# Patient Record
Sex: FEMALE | Race: WHITE | NOT HISPANIC OR LATINO | Employment: FULL TIME | URBAN - METROPOLITAN AREA
[De-identification: names, ages, dates, MRNs, and addresses within clinical notes are randomized per-mention and may not be internally consistent; named-entity substitution may affect disease eponyms.]

---

## 2018-04-25 ENCOUNTER — HISTORICAL (OUTPATIENT)
Dept: ADMINISTRATIVE | Facility: HOSPITAL | Age: 53
End: 2018-04-25

## 2018-04-25 LAB
ALBUMIN SERPL-MCNC: 4 GM/DL (ref 3.4–5)
ALBUMIN/GLOB SERPL: 1.74 {RATIO} (ref 1.5–2.5)
ALP SERPL-CCNC: 74 UNIT/L (ref 38–126)
ALT SERPL-CCNC: 11 UNIT/L (ref 7–52)
AST SERPL-CCNC: 11 UNIT/L (ref 15–37)
BILIRUB SERPL-MCNC: 0.4 MG/DL (ref 0.2–1)
BILIRUBIN DIRECT+TOT PNL SERPL-MCNC: 0.1 MG/DL (ref 0–0.5)
BUN SERPL-MCNC: 13 MG/DL (ref 7–18)
CALCIUM SERPL-MCNC: 9.2 MG/DL (ref 8.5–10)
CHLORIDE SERPL-SCNC: 105 MMOL/L (ref 98–107)
CHOLEST SERPL-MCNC: 236 MG/DL (ref 0–200)
CHOLEST/HDLC SERPL: 5.6 {RATIO}
CO2 SERPL-SCNC: 26 MMOL/L (ref 21–32)
CREAT SERPL-MCNC: 0.79 MG/DL (ref 0.6–1.3)
CREAT/UREA NIT SERPL: 16.5
EST. AVERAGE GLUCOSE BLD GHB EST-MCNC: 148 MG/DL
GGT SERPL-CCNC: 27 UNIT/L (ref 5–85)
GLOBULIN SER-MCNC: 2.3 GM/DL (ref 1.2–3)
GLUCOSE SERPL-MCNC: 193 MG/DL (ref 74–106)
HBA1C MFR BLD: 6.8 % (ref 4.4–6.4)
HDLC SERPL-MCNC: 42 MG/DL (ref 35–60)
LDH SERPL-CCNC: 146 UNIT/L (ref 140–271)
LDLC SERPL CALC-MCNC: 154 MG/DL (ref 0–129)
POTASSIUM SERPL-SCNC: 4.5 MMOL/L (ref 3.5–5.1)
PROT SERPL-MCNC: 6.3 GM/DL (ref 6.4–8.2)
SODIUM SERPL-SCNC: 138 MMOL/L (ref 136–145)
TRIGL SERPL-MCNC: 191 MG/DL (ref 30–150)
TSH SERPL-ACNC: 0.16 MIU/ML (ref 0.35–4.94)
VLDLC SERPL CALC-MCNC: 38.2 MG/DL

## 2018-10-30 ENCOUNTER — HISTORICAL (OUTPATIENT)
Dept: ADMINISTRATIVE | Facility: HOSPITAL | Age: 53
End: 2018-10-30

## 2018-10-30 LAB — TSH SERPL-ACNC: 0.07 MIU/ML (ref 0.35–4.94)

## 2019-01-23 ENCOUNTER — HISTORICAL (OUTPATIENT)
Dept: LAB | Facility: HOSPITAL | Age: 54
End: 2019-01-23

## 2019-01-23 ENCOUNTER — HISTORICAL (OUTPATIENT)
Dept: ADMINISTRATIVE | Facility: HOSPITAL | Age: 54
End: 2019-01-23

## 2019-01-23 LAB
ABS NEUT (OLG): 3.9 X10(3)/MCL (ref 2.1–9.2)
ALBUMIN SERPL-MCNC: 4.2 GM/DL (ref 3.4–5)
ALBUMIN/GLOB SERPL: 1.75 {RATIO} (ref 1.5–2.5)
ALP SERPL-CCNC: 88 UNIT/L (ref 38–126)
ALT SERPL-CCNC: 25 UNIT/L (ref 7–52)
AMYLASE SERPL-CCNC: 58 UNIT/L (ref 25–115)
APPEARANCE, UA: ABNORMAL
AST SERPL-CCNC: 16 UNIT/L (ref 15–37)
BACTERIA #/AREA URNS AUTO: ABNORMAL /HPF
BILIRUB SERPL-MCNC: 0.3 MG/DL (ref 0.2–1)
BILIRUB UR QL STRIP: NEGATIVE MG/DL
BILIRUBIN DIRECT+TOT PNL SERPL-MCNC: 0.1 MG/DL (ref 0–0.5)
BILIRUBIN DIRECT+TOT PNL SERPL-MCNC: 0.2 MG/DL
BUN SERPL-MCNC: 21 MG/DL (ref 7–18)
CALCIUM SERPL-MCNC: 9.1 MG/DL (ref 8.5–10)
CHLORIDE SERPL-SCNC: 103 MMOL/L (ref 98–107)
CO2 SERPL-SCNC: 31 MMOL/L (ref 21–32)
COLOR UR: YELLOW
CREAT SERPL-MCNC: 0.89 MG/DL (ref 0.6–1.3)
ERYTHROCYTE [DISTWIDTH] IN BLOOD BY AUTOMATED COUNT: 13.6 % (ref 11.5–17)
ERYTHROCYTE [SEDIMENTATION RATE] IN BLOOD: 11 MM/HR (ref 0–20)
GLOBULIN SER-MCNC: 2.4 GM/DL (ref 1.2–3)
GLUCOSE (UA): NEGATIVE MG/DL
GLUCOSE SERPL-MCNC: 148 MG/DL (ref 74–106)
HCT VFR BLD AUTO: 43 % (ref 37–47)
HGB BLD-MCNC: 13.5 GM/DL (ref 12–16)
HGB UR QL STRIP: NEGATIVE UNIT/L
KETONES UR QL STRIP: NEGATIVE MG/DL
LEUKOCYTE ESTERASE UR QL STRIP: ABNORMAL UNIT/L
LIPASE SERPL-CCNC: 190 UNIT/L (ref 73–393)
LYMPHOCYTES # BLD AUTO: 3.6 X10(3)/MCL (ref 0.6–3.4)
LYMPHOCYTES NFR BLD AUTO: 46.9 % (ref 13–40)
MCH RBC QN AUTO: 30.5 PG (ref 27–31.2)
MCHC RBC AUTO-ENTMCNC: 31 GM/DL (ref 32–36)
MCV RBC AUTO: 97 FL (ref 80–94)
MONOCYTES # BLD AUTO: 0.2 X10(3)/MCL (ref 0.1–1.3)
MONOCYTES NFR BLD AUTO: 2.1 % (ref 0.1–24)
NEUTROPHILS NFR BLD AUTO: 51 % (ref 47–80)
NITRITE UR QL STRIP.AUTO: NEGATIVE
PH UR STRIP: 6.5 [PH]
PLATELET # BLD AUTO: 343 X10(3)/MCL (ref 130–400)
PMV BLD AUTO: 9.4 FL (ref 9.4–12.4)
POTASSIUM SERPL-SCNC: 4.6 MMOL/L (ref 3.5–5.1)
PROT SERPL-MCNC: 6.6 GM/DL (ref 6.4–8.2)
PROT UR QL STRIP: NEGATIVE MG/DL
RBC # BLD AUTO: 4.42 X10(6)/MCL (ref 4.2–5.4)
RBC #/AREA URNS HPF: ABNORMAL /HPF
SODIUM SERPL-SCNC: 139 MMOL/L (ref 136–145)
SP GR UR STRIP: 1.02
SQUAMOUS EPITHELIAL, UA: ABNORMAL /LPF
UROBILINOGEN UR STRIP-ACNC: 2 MG/DL
WBC # SPEC AUTO: 7.7 X10(3)/MCL (ref 4.5–11.5)
WBC #/AREA URNS AUTO: ABNORMAL /[HPF]

## 2019-03-06 ENCOUNTER — HISTORICAL (OUTPATIENT)
Dept: ADMINISTRATIVE | Facility: HOSPITAL | Age: 54
End: 2019-03-06

## 2019-03-06 LAB — TSH SERPL-ACNC: 4.81 MIU/ML (ref 0.35–4.94)

## 2019-09-11 ENCOUNTER — HISTORICAL (OUTPATIENT)
Dept: ADMINISTRATIVE | Facility: HOSPITAL | Age: 54
End: 2019-09-11

## 2019-09-11 LAB
ALBUMIN SERPL-MCNC: 4.3 GM/DL (ref 3.4–5)
ALBUMIN/GLOB SERPL: 2.15 {RATIO} (ref 1.5–2.5)
ALP SERPL-CCNC: 79 UNIT/L (ref 38–126)
ALT SERPL-CCNC: 13 UNIT/L (ref 7–52)
AST SERPL-CCNC: 13 UNIT/L (ref 15–37)
BILIRUB SERPL-MCNC: 0.6 MG/DL (ref 0.2–1)
BILIRUBIN DIRECT+TOT PNL SERPL-MCNC: 0.1 MG/DL (ref 0–0.5)
BILIRUBIN DIRECT+TOT PNL SERPL-MCNC: 0.5 MG/DL
BUN SERPL-MCNC: 13 MG/DL (ref 7–18)
CALCIUM SERPL-MCNC: 9.8 MG/DL (ref 8.5–10)
CHLORIDE SERPL-SCNC: 105 MMOL/L (ref 98–107)
CO2 SERPL-SCNC: 31 MMOL/L (ref 21–32)
CREAT SERPL-MCNC: 0.83 MG/DL (ref 0.6–1.3)
EST. AVERAGE GLUCOSE BLD GHB EST-MCNC: 157 MG/DL
GLOBULIN SER-MCNC: 2 GM/DL (ref 1.2–3)
GLUCOSE SERPL-MCNC: 97 MG/DL (ref 74–106)
HBA1C MFR BLD: 7.1 % (ref 4.4–6.4)
POTASSIUM SERPL-SCNC: 5 MMOL/L (ref 3.5–5.1)
PROT SERPL-MCNC: 6.3 GM/DL (ref 6.4–8.2)
SODIUM SERPL-SCNC: 142 MMOL/L (ref 136–145)
TSH SERPL-ACNC: 1.59 MIU/ML (ref 0.35–4.94)

## 2020-02-11 ENCOUNTER — HISTORICAL (OUTPATIENT)
Dept: ADMINISTRATIVE | Facility: HOSPITAL | Age: 55
End: 2020-02-11

## 2020-02-11 LAB
EST. AVERAGE GLUCOSE BLD GHB EST-MCNC: 154 MG/DL
HBA1C MFR BLD: 7 % (ref 4.4–6.4)

## 2020-08-05 ENCOUNTER — HISTORICAL (OUTPATIENT)
Dept: ADMINISTRATIVE | Facility: HOSPITAL | Age: 55
End: 2020-08-05

## 2020-08-05 LAB
ALBUMIN SERPL-MCNC: 4.7 GM/DL (ref 3.4–5)
ALBUMIN/GLOB SERPL: 2.04 {RATIO} (ref 1.5–2.5)
ALP SERPL-CCNC: 90 UNIT/L (ref 38–126)
ALT SERPL-CCNC: 15 UNIT/L (ref 7–52)
AST SERPL-CCNC: 15 UNIT/L (ref 15–37)
BILIRUB SERPL-MCNC: 0.8 MG/DL (ref 0.2–1)
BILIRUBIN DIRECT+TOT PNL SERPL-MCNC: 0.1 MG/DL (ref 0–0.5)
BILIRUBIN DIRECT+TOT PNL SERPL-MCNC: 0.7 MG/DL
BUN SERPL-MCNC: 16 MG/DL (ref 7–18)
CALCIUM SERPL-MCNC: 10.1 MG/DL (ref 8.5–10)
CHLORIDE SERPL-SCNC: 104 MMOL/L (ref 98–107)
CHOLEST SERPL-MCNC: 287 MG/DL (ref 0–200)
CHOLEST/HDLC SERPL: 5 {RATIO}
CO2 SERPL-SCNC: 30 MMOL/L (ref 21–32)
CREAT SERPL-MCNC: 0.85 MG/DL (ref 0.6–1.3)
EST. AVERAGE GLUCOSE BLD GHB EST-MCNC: 151 MG/DL
GLOBULIN SER-MCNC: 2.3 GM/DL (ref 1.2–3)
GLUCOSE SERPL-MCNC: 103 MG/DL (ref 74–106)
HBA1C MFR BLD: 6.9 % (ref 4.4–6.4)
HDLC SERPL-MCNC: 57 MG/DL (ref 35–60)
LDLC SERPL CALC-MCNC: 194 MG/DL (ref 0–129)
POTASSIUM SERPL-SCNC: 5.2 MMOL/L (ref 3.5–5.1)
PROT SERPL-MCNC: 7 GM/DL (ref 6.4–8.2)
SODIUM SERPL-SCNC: 141 MMOL/L (ref 136–145)
TRIGL SERPL-MCNC: 178 MG/DL (ref 30–150)
TSH SERPL-ACNC: 1.83 MIU/ML (ref 0.35–4.94)
VLDLC SERPL CALC-MCNC: 35.6 MG/DL

## 2021-03-10 ENCOUNTER — HISTORICAL (OUTPATIENT)
Dept: ADMINISTRATIVE | Facility: HOSPITAL | Age: 56
End: 2021-03-10

## 2021-03-10 LAB
ALBUMIN SERPL-MCNC: 4.6 GM/DL (ref 3.4–5)
ALBUMIN/GLOB SERPL: 2.09 {RATIO} (ref 1.5–2.5)
ALP SERPL-CCNC: 80 UNIT/L (ref 38–126)
ALT SERPL-CCNC: 14 UNIT/L (ref 7–52)
AST SERPL-CCNC: 12 UNIT/L (ref 15–37)
BILIRUB SERPL-MCNC: 0.5 MG/DL (ref 0.2–1)
BILIRUBIN DIRECT+TOT PNL SERPL-MCNC: 0.1 MG/DL (ref 0–0.5)
BILIRUBIN DIRECT+TOT PNL SERPL-MCNC: 0.4 MG/DL
BUN SERPL-MCNC: 20 MG/DL (ref 7–18)
CALCIUM SERPL-MCNC: 10.4 MG/DL (ref 8.5–10)
CHLORIDE SERPL-SCNC: 102 MMOL/L (ref 98–107)
CHOLEST SERPL-MCNC: 252 MG/DL (ref 0–200)
CHOLEST/HDLC SERPL: 4.1 {RATIO}
CO2 SERPL-SCNC: 28 MMOL/L (ref 21–32)
CREAT SERPL-MCNC: 0.81 MG/DL (ref 0.6–1.3)
CREAT UR-MCNC: 10 MG/DL
EST. AVERAGE GLUCOSE BLD GHB EST-MCNC: 157 MG/DL
GLOBULIN SER-MCNC: 2.2 GM/DL (ref 1.2–3)
GLUCOSE SERPL-MCNC: 253 MG/DL (ref 74–106)
HBA1C MFR BLD: 7.1 % (ref 4.4–6.4)
HDLC SERPL-MCNC: 62 MG/DL (ref 35–60)
LDLC SERPL CALC-MCNC: 175 MG/DL (ref 0–129)
MICROALBUMIN UR-MCNC: 10 MG/L
MICROALBUMIN/CREAT RATIO PNL UR: ABNORMAL MG/GM
POTASSIUM SERPL-SCNC: 4.7 MMOL/L (ref 3.5–5.1)
PROT SERPL-MCNC: 6.8 GM/DL (ref 6.4–8.2)
SODIUM SERPL-SCNC: 138 MMOL/L (ref 136–145)
TRIGL SERPL-MCNC: 81 MG/DL (ref 30–150)
TSH SERPL-ACNC: 2.4 MIU/ML (ref 0.35–4.94)
VLDLC SERPL CALC-MCNC: 16.2 MG/DL

## 2021-07-28 ENCOUNTER — HISTORICAL (OUTPATIENT)
Dept: ADMINISTRATIVE | Facility: HOSPITAL | Age: 56
End: 2021-07-28

## 2021-07-28 LAB
ALBUMIN SERPL-MCNC: 4.6 GM/DL (ref 3.4–5)
ALBUMIN/GLOB SERPL: 2 {RATIO} (ref 1.5–2.5)
ALP SERPL-CCNC: 77 UNIT/L (ref 38–126)
ALT SERPL-CCNC: 14 UNIT/L (ref 7–52)
AST SERPL-CCNC: 18 UNIT/L (ref 15–37)
BILIRUB SERPL-MCNC: 0.7 MG/DL (ref 0.2–1)
BILIRUBIN DIRECT+TOT PNL SERPL-MCNC: 0.1 MG/DL (ref 0–0.5)
BILIRUBIN DIRECT+TOT PNL SERPL-MCNC: 0.6 MG/DL
BUN SERPL-MCNC: 17 MG/DL (ref 7–18)
CALCIUM SERPL-MCNC: 9.9 MG/DL (ref 8.5–10)
CHLORIDE SERPL-SCNC: 102 MMOL/L (ref 98–107)
CHOLEST SERPL-MCNC: 164 MG/DL (ref 0–200)
CHOLEST/HDLC SERPL: 3.2 {RATIO}
CO2 SERPL-SCNC: 30 MMOL/L (ref 21–32)
CREAT SERPL-MCNC: 0.94 MG/DL (ref 0.6–1.3)
EST. AVERAGE GLUCOSE BLD GHB EST-MCNC: 151 MG/DL
GLOBULIN SER-MCNC: 2.4 GM/DL (ref 1.2–3)
GLUCOSE SERPL-MCNC: 97 MG/DL (ref 74–106)
HBA1C MFR BLD: 6.9 % (ref 4.4–6.4)
HDLC SERPL-MCNC: 52 MG/DL (ref 35–60)
LDLC SERPL CALC-MCNC: 84 MG/DL (ref 0–129)
POTASSIUM SERPL-SCNC: 5.1 MMOL/L (ref 3.5–5.1)
PROT SERPL-MCNC: 6.9 GM/DL (ref 6.4–8.2)
SODIUM SERPL-SCNC: 139 MMOL/L (ref 136–145)
TRIGL SERPL-MCNC: 145 MG/DL (ref 30–150)
TSH SERPL-ACNC: 11.03 MIU/ML (ref 0.35–4.94)
VLDLC SERPL CALC-MCNC: 29 MG/DL

## 2022-04-10 ENCOUNTER — HISTORICAL (OUTPATIENT)
Dept: ADMINISTRATIVE | Facility: HOSPITAL | Age: 57
End: 2022-04-10

## 2022-04-26 VITALS
BODY MASS INDEX: 18.85 KG/M2 | DIASTOLIC BLOOD PRESSURE: 72 MMHG | HEIGHT: 66 IN | SYSTOLIC BLOOD PRESSURE: 118 MMHG | WEIGHT: 117.31 LBS

## 2022-05-03 NOTE — HISTORICAL [facility]
This is a historical note converted from Saida. Formatting and pictures may have been removed.  Please reference Saida for original formatting and attached multimedia. Chief Complaint  bump on right pointer finger  History of Present Illness  first noticed it a few weeks ago, getting more and more tender  she is right handed and it is interfering with everything  also due to recheck TSH on 100 mcg of synthroid  also due to check A!C  Review of Systems  No new complaints except as explained HPI  ?  Physical Exam  Vitals & Measurements  HR:?76(Peripheral)? BP:?120/70?  HT:?167.64?cm? HT:?167.64?cm? WT:?56.69?kg? WT:?56.69?kg? BMI:?20.17?  VERY tender 3mm superficial?nodule on pulp of distal phalanx of right index finger  o/w OK  Neuro vascular intact  ?  PHYSICAL EXAM  ?   WDWN patient in NAD, ?AFVSS  HEENT - no acute abnormality  ??????????????? oropharynx WNL  HEART - RRR  LUNGS -? CTA  ABDOMEN - benign, NTND;? no peritoneal signs  EXTREMITIES - No CCE  SKIN - warm, dry, intact  PSYCH - affect appropriate;? alert and oriented  NEURO- no new deficits noted;? cranial nerves grossly intact  ?  Assessment/Plan  1.?Hypothyroid  due to check TSH  Ordered:  Clinic Follow up, *Est. 10/25/18 3:00:00 CDT, PLEASE MAKE THIS A 30 MINUTE CPX, Order for future visit, Hypothyroid, [facility]  Lab Collection Request, 04/25/18 7:31:00 CDT, [facility] - [facility], 04/25/18 7:31:00 CDT  Office/Outpatient Visit Level 3 Established 01386 PC, Hypothyroid  Nodule of finger of right hand  Diabetes, [facility] - [facility], 04/25/18 7:31:00 CDT  Thyroid Stimulating Hormone, Routine collect, 04/25/18 7:31:00 CDT, Blood, Order for future visit, Stop date 04/25/18 7:31:00 CDT, Lab Collect, Hypothyroid, 04/25/18 7:31:00 CDT  ?  2.?Nodule of finger of right hand  ?right index finger; will refer to hand surgeon due to location and pain  Ordered:  Office/Outpatient Visit Level 3 Established [id number] PC, Hypothyroid  Nodule of finger of right hand  Diabetes,  [facility] AMB - [facility], 04/25/18 7:31:00 CDT  ?  3.?Diabetes  ?check A1C  Ordered:  Hemoglobin A1c, Routine collect, 04/25/18 7:31:00 CDT, Blood, Order for future visit, Stop date 04/25/18 7:31:00 CDT, Lab Collect, Diabetes, 04/25/18 7:31:00 CDT  Office/Outpatient Visit Level 3 Established [id number] PC, Hypothyroid  Nodule of finger of right hand  Diabetes, [facility] - [facility], 04/25/18 7:31:00 CDT  ?  4.?HLD (hyperlipidemia)  check FLP  Ordered:  CMP, Routine collect, 04/25/18 7:31:00 CDT, Blood, Order for future visit, Stop date 04/25/18 7:31:00 CDT, Lab Collect, HLD (hyperlipidemia), 04/25/18 7:31:00 CDT  Lipid Panel, Routine collect, 04/25/18 7:31:00 CDT, Blood, Order for future visit, Stop date 04/25/18 7:31:00 CDT, Lab Collect, HLD (hyperlipidemia), 04/25/18 7:31:00 CDT  ?   Problem List/Past Medical History  Ongoing  Asthma  Hyperlipidemia  Hypothyroid  Historical  diabetic  Numbness and tingling in right hand  Rheumatoid arthritis  SVT (supraventricular tachycardia)  UTI frequently  Procedure/Surgical History  Carpal Tunnel Release (Right) (10/23/2013), Cervical Fusion Anterior (.) (10/23/2013), Excision of intervertebral disc (10/23/2013), Fusion or refusion of 2-3 vertebrae (10/23/2013), Other Cervical Fusion of the Anterior Column, Anterior Technique (10/23/2013), Release of carpal tunnel (10/23/2013), Cardioversion, elective, electrical conversion of arrhythmia; external. (08/01/2013), Other electric countershock of heart (08/01/2013), angiogram, c spine surgery, hysterectomy, hystertomy, Kidney surgery as child unsure, right wrist, RT breast Lumpectomu x 2, T & A.  Medications  albuterol 2.5 mg/3 mL (0.083%) inhalation solution, 2.5 mg= 3 mL, INH, q6hr, PRN, 3 refills  albuterol CFC free 90 mcg/inh inhalation aerosol with adapter, 2 puff(s), INH, q4hr, PRN, 3 refills  levothyroxine 150 mcg (0.15 mg) oral tablet, 1/2 tab, Oral, Daily  nebulizer machine, See Instructions  Vitamin D3 oral tablet, 04810ST, Oral,  qMonday  Zanaflex 4 mg oral tablet, 4 mg= 1 tab(s), Oral, q8hr, PRN, 1 refills  Allergies  Toradol  aspirin  penicillins  sulfa drugs  Social History  Alcohol - Denies Alcohol Use, 10/17/2013  Home/Environment - No Risk, 09/27/2012  Substance Abuse - Denies Substance Abuse, 10/17/2013  Tobacco - High Risk, 10/17/2013  Current every day smoker, Cigarettes, 15 per day. 15 year(s)., 08/06/2015

## 2022-05-03 NOTE — HISTORICAL [facility]
This is a historical note converted from Saida. Formatting and pictures may have been removed.  Please reference Saida for original formatting and attached multimedia. Chief Complaint  bump on right pointer finger  Physical Exam  Vitals & Measurements  HR:?76(Peripheral)? BP:?120/70?  HT:?167.64?cm? HT:?167.64?cm? WT:?56.69?kg? WT:?56.69?kg? BMI:?20.17?  Assessment/Plan  1.?Hypothyroid  Ordered:  Clinic Follow up, *Est. 10/25/18 3:00:00 CDT, PLEASE MAKE THIS A 30 MINUTE CPX, Order for future visit, Hypothyroid, [facility]  Lab Collection Request, 04/25/18 7:31:00 CDT, [facility] - [facility], 04/25/18 7:31:00 CDT  Office/Outpatient Visit Level 3 Established [id number] PC, Hypothyroid  Nodule of finger of right hand  Diabetes, [facility] - [facility], 04/25/18 7:31:00 CDT  Thyroid Stimulating Hormone, Routine collect, 04/25/18 7:31:00 CDT, Blood, Order for future visit, Stop date 04/25/18 7:31:00 CDT, Lab Collect, Hypothyroid, 04/25/18 7:31:00 CDT  ?  2.?Nodule of finger of right hand  Ordered:  Office/Outpatient Visit Level 3 Established 62061 PC, Hypothyroid  Nodule of finger of right hand  Diabetes, [facility] - [facility], 04/25/18 7:31:00 CDT  ?  3.?Diabetes  Ordered:  Hemoglobin A1c, Routine collect, 04/25/18 7:31:00 CDT, Blood, Order for future visit, Stop date 04/25/18 7:31:00 CDT, Lab Collect, Diabetes, 04/25/18 7:31:00 CDT  Office/Outpatient Visit Level 3 Established [id number] PC, Hypothyroid  Nodule of finger of right hand  Diabetes, [facility] AM[facility] - [facility], 04/25/18 7:31:00 CDT  ?  4.?HLD (hyperlipidemia)  Ordered:  CMP, Routine collect, 04/25/18 7:31:00 CDT, Blood, Order for future visit, Stop date 04/25/18 7:31:00 CDT, Lab Collect, HLD (hyperlipidemia), 04/25/18 7:31:00 CDT  Lipid Panel, Routine collect, 04/25/18 7:31:00 CDT, Blood, Order for future visit, Stop date 04/25/18 7:31:00 CDT, Lab Collect, HLD (hyperlipidemia), 04/25/18 7:31:00 CDT  ?   Problem List/Past Medical  History  Ongoing  Asthma  Hyperlipidemia  Hypothyroid  Historical  diabetic  Numbness and tingling in right hand  Rheumatoid arthritis  SVT (supraventricular tachycardia)  UTI frequently  Procedure/Surgical History  Carpal Tunnel Release (Right) (10/23/2013), Cervical Fusion Anterior (.) (10/23/2013), Excision of intervertebral disc (10/23/2013), Fusion or refusion of 2-3 vertebrae (10/23/2013), Other Cervical Fusion of the Anterior Column, Anterior Technique (10/23/2013), Release of carpal tunnel (10/23/2013), Cardioversion, elective, electrical conversion of arrhythmia; external. (08/01/2013), Other electric countershock of heart (08/01/2013), angiogram, c spine surgery, hysterectomy, hystertomy, Kidney surgery as child unsure, right wrist, RT breast Lumpectomu x 2, T & A.  Medications  albuterol 2.5 mg/3 mL (0.083%) inhalation solution, 2.5 mg= 3 mL, INH, q6hr, PRN, 3 refills  albuterol CFC free 90 mcg/inh inhalation aerosol with adapter, 2 puff(s), INH, q4hr, PRN, 3 refills  levothyroxine 150 mcg (0.15 mg) oral tablet, 1/2 tab, Oral, Daily  nebulizer machine, See Instructions  Vitamin D3 oral tablet, 96379QV, Oral, qMonday  Zanaflex 4 mg oral tablet, 4 mg= 1 tab(s), Oral, q8hr, PRN, 1 refills  Allergies  Toradol  aspirin  penicillins  sulfa drugs  Social History  Alcohol - Denies Alcohol Use, 10/17/2013  Home/Environment - No Risk, 09/27/2012  Substance Abuse - Denies Substance Abuse, 10/17/2013  Tobacco - High Risk, 10/17/2013  Current every day smoker, Cigarettes, 15 per day. 15 year(s)., 08/06/2015

## 2022-09-13 PROBLEM — F32.A DEPRESSIVE DISORDER: Status: ACTIVE | Noted: 2022-09-13

## 2022-09-13 PROBLEM — E03.9 HYPOTHYROIDISM: Status: ACTIVE | Noted: 2022-09-13

## 2022-09-13 PROBLEM — E78.5 HYPERLIPIDEMIA: Status: ACTIVE | Noted: 2022-09-13

## 2022-09-13 PROBLEM — J43.9 PULMONARY EMPHYSEMA: Status: ACTIVE | Noted: 2022-09-13

## 2022-09-13 PROBLEM — E11.9 DIABETES MELLITUS: Status: ACTIVE | Noted: 2022-09-13

## 2022-09-13 PROBLEM — G47.00 INSOMNIA: Status: ACTIVE | Noted: 2022-09-13

## 2022-09-13 PROBLEM — K21.9 GASTROESOPHAGEAL REFLUX DISEASE: Status: ACTIVE | Noted: 2022-09-13

## 2022-09-13 PROBLEM — F41.1 GENERALIZED ANXIETY DISORDER: Status: ACTIVE | Noted: 2022-09-13

## 2022-09-13 PROBLEM — J45.909 ASTHMA: Status: ACTIVE | Noted: 2022-09-13

## 2022-11-02 ENCOUNTER — HOSPITAL ENCOUNTER (EMERGENCY)
Facility: HOSPITAL | Age: 57
Discharge: HOME | End: 2022-11-02
Attending: EMERGENCY MEDICINE
Payer: COMMERCIAL

## 2022-11-02 VITALS
RESPIRATION RATE: 15 BRPM | SYSTOLIC BLOOD PRESSURE: 142 MMHG | TEMPERATURE: 98 F | OXYGEN SATURATION: 98 % | DIASTOLIC BLOOD PRESSURE: 89 MMHG | HEART RATE: 67 BPM

## 2022-11-02 DIAGNOSIS — R10.13 EPIGASTRIC ABDOMINAL PAIN: ICD-10-CM

## 2022-11-02 DIAGNOSIS — R10.13 EPIGASTRIC PAIN: Primary | ICD-10-CM

## 2022-11-02 DIAGNOSIS — N39.0 URINARY TRACT INFECTION WITHOUT HEMATURIA, SITE UNSPECIFIED: ICD-10-CM

## 2022-11-02 LAB
ALBUMIN SERPL-MCNC: 4.2 GM/DL (ref 3.5–5)
ALBUMIN/GLOB SERPL: 1.6 RATIO (ref 1.1–2)
ALP SERPL-CCNC: 79 UNIT/L (ref 40–150)
ALT SERPL-CCNC: 12 UNIT/L (ref 0–55)
APPEARANCE UR: ABNORMAL
AST SERPL-CCNC: 12 UNIT/L (ref 5–34)
BACTERIA #/AREA URNS AUTO: ABNORMAL /HPF
BASOPHILS # BLD AUTO: 0.07 X10(3)/MCL (ref 0–0.2)
BASOPHILS NFR BLD AUTO: 1.1 %
BILIRUB UR QL STRIP.AUTO: NEGATIVE MG/DL
BILIRUBIN DIRECT+TOT PNL SERPL-MCNC: 0.6 MG/DL
BUN SERPL-MCNC: 11.5 MG/DL (ref 9.8–20.1)
CALCIUM SERPL-MCNC: 10.2 MG/DL (ref 8.4–10.2)
CHLORIDE SERPL-SCNC: 107 MMOL/L (ref 98–107)
CO2 SERPL-SCNC: 29 MMOL/L (ref 22–29)
COLOR UR AUTO: YELLOW
CREAT SERPL-MCNC: 0.83 MG/DL (ref 0.55–1.02)
EOSINOPHIL # BLD AUTO: 0.17 X10(3)/MCL (ref 0–0.9)
EOSINOPHIL NFR BLD AUTO: 2.7 %
ERYTHROCYTE [DISTWIDTH] IN BLOOD BY AUTOMATED COUNT: 13.3 % (ref 11.5–17)
GFR SERPLBLD CREATININE-BSD FMLA CKD-EPI: >60 MLS/MIN/1.73/M2
GLOBULIN SER-MCNC: 2.6 GM/DL (ref 2.4–3.5)
GLUCOSE SERPL-MCNC: 94 MG/DL (ref 74–100)
GLUCOSE UR QL STRIP.AUTO: NEGATIVE MG/DL
HCT VFR BLD AUTO: 44.3 % (ref 37–47)
HGB BLD-MCNC: 14.5 GM/DL (ref 12–16)
IMM GRANULOCYTES # BLD AUTO: 0.02 X10(3)/MCL (ref 0–0.04)
IMM GRANULOCYTES NFR BLD AUTO: 0.3 %
KETONES UR QL STRIP.AUTO: NEGATIVE MG/DL
LEUKOCYTE ESTERASE UR QL STRIP.AUTO: ABNORMAL UNIT/L
LIPASE SERPL-CCNC: 34 U/L
LYMPHOCYTES # BLD AUTO: 2.64 X10(3)/MCL (ref 0.6–4.6)
LYMPHOCYTES NFR BLD AUTO: 41.8 %
MCH RBC QN AUTO: 29.9 PG (ref 27–31)
MCHC RBC AUTO-ENTMCNC: 32.7 MG/DL (ref 33–36)
MCV RBC AUTO: 91.3 FL (ref 80–94)
MONOCYTES # BLD AUTO: 0.63 X10(3)/MCL (ref 0.1–1.3)
MONOCYTES NFR BLD AUTO: 10 %
NEUTROPHILS # BLD AUTO: 2.8 X10(3)/MCL (ref 2.1–9.2)
NEUTROPHILS NFR BLD AUTO: 44.1 %
NITRITE UR QL STRIP.AUTO: POSITIVE
NRBC BLD AUTO-RTO: 0 %
PH UR STRIP.AUTO: 6 [PH]
PLATELET # BLD AUTO: 345 X10(3)/MCL (ref 130–400)
PMV BLD AUTO: 9.9 FL (ref 7.4–10.4)
POTASSIUM SERPL-SCNC: 5.1 MMOL/L (ref 3.5–5.1)
PROT SERPL-MCNC: 6.8 GM/DL (ref 6.4–8.3)
PROT UR QL STRIP.AUTO: NEGATIVE MG/DL
RBC # BLD AUTO: 4.85 X10(6)/MCL (ref 4.2–5.4)
RBC #/AREA URNS AUTO: <5 /HPF
RBC UR QL AUTO: NEGATIVE UNIT/L
SODIUM SERPL-SCNC: 141 MMOL/L (ref 136–145)
SP GR UR STRIP.AUTO: 1.02 (ref 1–1.03)
SQUAMOUS #/AREA URNS AUTO: <5 /HPF
TROPONIN I SERPL-MCNC: <0.01 NG/ML (ref 0–0.04)
UROBILINOGEN UR STRIP-ACNC: 0.2 MG/DL
WBC # SPEC AUTO: 6.3 X10(3)/MCL (ref 4.5–11.5)
WBC #/AREA URNS AUTO: 14 /HPF

## 2022-11-02 PROCEDURE — 87077 CULTURE AEROBIC IDENTIFY: CPT | Performed by: PHYSICIAN ASSISTANT

## 2022-11-02 PROCEDURE — 99285 EMERGENCY DEPT VISIT HI MDM: CPT | Mod: 25

## 2022-11-02 PROCEDURE — 80053 COMPREHEN METABOLIC PANEL: CPT | Performed by: PHYSICIAN ASSISTANT

## 2022-11-02 PROCEDURE — 84484 ASSAY OF TROPONIN QUANT: CPT | Performed by: PHYSICIAN ASSISTANT

## 2022-11-02 PROCEDURE — 25500020 PHARM REV CODE 255: Performed by: NURSE PRACTITIONER

## 2022-11-02 PROCEDURE — 85025 COMPLETE CBC W/AUTO DIFF WBC: CPT | Performed by: PHYSICIAN ASSISTANT

## 2022-11-02 PROCEDURE — 83690 ASSAY OF LIPASE: CPT | Performed by: PHYSICIAN ASSISTANT

## 2022-11-02 PROCEDURE — 25000003 PHARM REV CODE 250: Performed by: NURSE PRACTITIONER

## 2022-11-02 PROCEDURE — 81001 URINALYSIS AUTO W/SCOPE: CPT | Performed by: PHYSICIAN ASSISTANT

## 2022-11-02 PROCEDURE — 63600175 PHARM REV CODE 636 W HCPCS: Performed by: NURSE PRACTITIONER

## 2022-11-02 PROCEDURE — 96374 THER/PROPH/DIAG INJ IV PUSH: CPT | Mod: 59

## 2022-11-02 RX ORDER — ONDANSETRON 4 MG/1
4 TABLET, ORALLY DISINTEGRATING ORAL ONCE
Status: DISCONTINUED | OUTPATIENT
Start: 2022-11-02 | End: 2022-11-02

## 2022-11-02 RX ORDER — LIDOCAINE HYDROCHLORIDE 20 MG/ML
15 SOLUTION OROPHARYNGEAL ONCE
Status: DISCONTINUED | OUTPATIENT
Start: 2022-11-02 | End: 2022-11-02

## 2022-11-02 RX ORDER — CEFDINIR 300 MG/1
300 CAPSULE ORAL 2 TIMES DAILY
Qty: 20 CAPSULE | Refills: 0 | Status: SHIPPED | OUTPATIENT
Start: 2022-11-02 | End: 2022-11-12

## 2022-11-02 RX ORDER — MAG HYDROX/ALUMINUM HYD/SIMETH 200-200-20
30 SUSPENSION, ORAL (FINAL DOSE FORM) ORAL ONCE
Status: COMPLETED | OUTPATIENT
Start: 2022-11-02 | End: 2022-11-02

## 2022-11-02 RX ORDER — MAG HYDROX/ALUMINUM HYD/SIMETH 200-200-20
30 SUSPENSION, ORAL (FINAL DOSE FORM) ORAL ONCE
Status: DISCONTINUED | OUTPATIENT
Start: 2022-11-02 | End: 2022-11-02

## 2022-11-02 RX ORDER — ONDANSETRON 2 MG/ML
4 INJECTION INTRAMUSCULAR; INTRAVENOUS ONCE
Status: COMPLETED | OUTPATIENT
Start: 2022-11-02 | End: 2022-11-02

## 2022-11-02 RX ORDER — SUCRALFATE 1 G/10ML
1 SUSPENSION ORAL
Qty: 400 ML | Refills: 0 | Status: SHIPPED | OUTPATIENT
Start: 2022-11-02

## 2022-11-02 RX ORDER — LIDOCAINE HYDROCHLORIDE 20 MG/ML
15 SOLUTION OROPHARYNGEAL ONCE
Status: DISCONTINUED | OUTPATIENT
Start: 2022-11-02 | End: 2022-11-02 | Stop reason: HOSPADM

## 2022-11-02 RX ADMIN — ONDANSETRON 4 MG: 2 INJECTION INTRAMUSCULAR; INTRAVENOUS at 09:11

## 2022-11-02 RX ADMIN — IOPAMIDOL 100 ML: 755 INJECTION, SOLUTION INTRAVENOUS at 07:11

## 2022-11-02 RX ADMIN — ALUMINUM HYDROXIDE, MAGNESIUM HYDROXIDE, AND SIMETHICONE 30 ML: 200; 200; 20 SUSPENSION ORAL at 08:11

## 2022-11-02 NOTE — FIRST PROVIDER EVALUATION
Medical screening examination initiated.  I have conducted a focused provider triage encounter, findings are as follows:    Chief Complaint   Patient presents with    Abdominal Pain    Nausea     Epigastric pain radiating to RUQ, reports Nausea x few weekas, worse today. PMH GERD     Brief history of present illness:  57 y.o. female presents to the ED with epigastric abdominal pain radiating to RUQ . Nausea worsening as well. Hx of GERD.     Vitals:    11/02/22 1536   BP: 107/77   BP Location: Left arm   Patient Position: Sitting   Pulse: 77   Resp: 18   Temp: 97.9 °F (36.6 °C)   TempSrc: Temporal   SpO2: 99%       Pertinent physical exam:  Awake, alert, ambulatory, non-labored respirations      Brief workup plan:  labs, UA, US    Preliminary workup initiated; this workup will be continued and followed by the physician or advanced practice provider that is assigned to the patient when roomed.

## 2022-11-03 NOTE — ED PROVIDER NOTES
Encounter Date: 11/2/2022       History     Chief Complaint   Patient presents with    Abdominal Pain    Nausea     Epigastric pain radiating to RUQ, reports Nausea x few weekas, worse today. PMH GERD     Patient is a 57-year-old female  that presents with epigastric pain that has been present 3 weeks. Associated symptoms nausea. Surrounding information is nothing. Exacerbated by nothing. Relieved by nothing. Patient treatment prior to arrival none. Risk factors include none. Other history pertaining to this complaint nothing.       The history is provided by the patient. No  was used.   Review of patient's allergies indicates:   Allergen Reactions    Aspirin     Ketorolac     Penicillins     Sulfa (sulfonamide antibiotics)      Past Medical History:   Diagnosis Date    Accelerated junctional rhythm     Agatston CAC score, <100     Anxiety disorder, unspecified     COPD type A     Depression     Diabetes mellitus without complication     GERD (gastroesophageal reflux disease)     History of COVID-19     HLD (hyperlipidemia)     Hypothyroidism, unspecified     Insomnia     Lung nodule     Mild intermittent asthma, uncomplicated      Past Surgical History:   Procedure Laterality Date    ANGIOGRAM, CORONARY, WITH LEFT HEART CATHETERIZATION      BREAST LUMPECTOMY Right     CARDIOVERSION  08/01/2013    CARPAL TUNNEL RELEASE  10/23/2013    FUSION OF CERVICAL SPINE BY ANTERIOR APPROACH USING COMPUTER-ASSISTED NAVIGATION  06/10/2019    KIDNEY SURGERY      TONSILLECTOMY AND ADENOIDECTOMY      TOTAL ABDOMINAL HYSTERECTOMY      WRIST SURGERY       Family History   Problem Relation Age of Onset    Heart attack Mother     Diabetes Father      Social History     Tobacco Use    Smoking status: Every Day     Packs/day: 0.50     Types: Cigarettes    Smokeless tobacco: Never   Substance Use Topics    Alcohol use: Never    Drug use: Never     Review of Systems   Constitutional:  Negative for fever.   Respiratory:   Negative for cough and shortness of breath.    Cardiovascular:  Negative for chest pain.   Gastrointestinal:  Positive for abdominal pain and nausea.   Genitourinary:  Negative for difficulty urinating and dysuria.   Musculoskeletal:  Negative for gait problem.   Skin:  Negative for color change.   Neurological:  Negative for dizziness, speech difficulty and headaches.   Psychiatric/Behavioral:  Negative for hallucinations and suicidal ideas.    All other systems reviewed and are negative.    Physical Exam     Initial Vitals [11/02/22 1536]   BP Pulse Resp Temp SpO2   107/77 77 18 97.9 °F (36.6 °C) 99 %      MAP       --         Physical Exam    Nursing note and vitals reviewed.  Constitutional: She appears well-developed and well-nourished.   HENT:   Head: Normocephalic.   Eyes: EOM are normal.   Neck:   Normal range of motion.  Cardiovascular:  Normal rate, regular rhythm, normal heart sounds and intact distal pulses.           Pulmonary/Chest: Breath sounds normal. No respiratory distress.   Abdominal: Abdomen is soft. Bowel sounds are normal. There is abdominal tenderness.   Epigastric tenderness   Musculoskeletal:         General: Normal range of motion.      Cervical back: Normal range of motion.     Neurological: She is alert and oriented to person, place, and time. She has normal strength.   Skin: Skin is warm and dry.   Psychiatric: She has a normal mood and affect. Her behavior is normal. Judgment and thought content normal.       ED Course   Procedures  Labs Reviewed   URINALYSIS, REFLEX TO URINE CULTURE - Abnormal; Notable for the following components:       Result Value    Appearance, UA Cloudy (*)     Nitrites, UA Positive (*)     Leukocyte Esterase, UA 1+ (*)     All other components within normal limits   CBC WITH DIFFERENTIAL - Abnormal; Notable for the following components:    MCHC 32.7 (*)     All other components within normal limits   URINALYSIS, MICROSCOPIC - Abnormal; Notable for the following  components:    WBC, UA 14 (*)     Bacteria, UA 4+ (*)     All other components within normal limits   LIPASE - Normal   TROPONIN I - Normal   CULTURE, URINE   CBC W/ AUTO DIFFERENTIAL    Narrative:     The following orders were created for panel order CBC auto differential.  Procedure                               Abnormality         Status                     ---------                               -----------         ------                     CBC with Differential[[id number]]        Abnormal            Final result                 Please view results for these tests on the individual orders.   COMPREHENSIVE METABOLIC PANEL          Imaging Results              CT Abdomen Pelvis With Contrast (Final result)  Result time 11/02/22 19:37:57      Final result by Adam Sears MD (11/02/22 19:37:57)                   Impression:      1. Bilateral kidneys mild lobular control without nephrolithiasis or obstructive uropathy..    2. No acute findings identified..      Electronically signed by: Adam Sears  Date:    11/02/2022  Time:    19:37               Narrative:    EXAMINATION:  CT ABDOMEN PELVIS WITH CONTRAST    CLINICAL HISTORY:  Epigastric pain;    TECHNIQUE:  Multidetector axial images were obtained of the abdomen and pelvis following the administration of IV contrast. Oral contrast was not administered.    Dose length product of 249 mGycm. Automated exposure control was utilized to minimize radiation dose.    COMPARISON:  Ultrasound exam same date    FINDINGS:  Included portion of the lungs are without suspicious nodularity, acute air space infiltrates or fluid within the pleural spaces.    Hepatic volume and attenuation is unremarkable. Gallbladder wall is not thickened and there is no intra luminal calcified calculus. No biliary dilation identified. Pancreatic unremarkable attenuation without acute peripancreatic phlegmons. Main pancreatic duct is not dilated. Spleen is of normal size without focal  lesion.    The adrenal glands appear within normal limits.  Bilateral mild lobular contour of the kidneys without significant loss of cortical volume. No solid or cystic renal lesion identified. There is no hydronephrosis and no definite renal calculi identified as were suspected on the previous ultrasound exam.  No perinephric fluid strandings or collections identified.    The stomach is is entirely decompressed and difficult to assess.  Small bowel is normal in caliber. The appendix appears unremarkable. There is no apparent colonic wall thickening.  Pericolonic fat is preserved. There is no evidence for bowel obstruction. There is no free air or free fluid.    Urinary bladder appears within normal limits. There is no pelvic free fluid.    No acute or otherwise osseous abnormality identified.                                       US Abdomen Limited (Final result)  Result time 11/02/22 17:50:04      Final result by Adam Sears MD (11/02/22 17:50:04)                   Impression:      1.  Suspected right renal calculi.    2.  Otherwise, no acute findings identified.      Electronically signed by: Adam Sears  Date:    11/02/2022  Time:    17:50               Narrative:    EXAMINATION:  US ABDOMEN LIMITED    CLINICAL HISTORY:  Right upper quadrant pain.  Nausea and vomiting    TECHNIQUE:  Multiple real-time transverse and longitudinal sections were performed of the right abdomen by the sonographer. Select images were submitted for review.    COMPARISON:  September 27, 2012    FINDINGS:  Liver is of unremarkable echotexture with normal contour and size. There was no delineation of discrete hepatic cystic or solid mass. Hepatic maximum diameter of 15.6 cm. There was unremarkable hepatopedal flow within the portal vein.  Pancreas is hyper echoic which can be seen with chronic pancreatitis.    Gallbladder lumen is without echogenicity indicative of sludge or cholelithiasis. Gallbladder wall thickness is within  normal limits. Common bile duct caliber of 2.6 mm is within normal limits for the age. Sonographer reported negative Osborn's sign.    Inferior vena cava is unremarkable. Visualized portion of the abdominal aorta is without aneurysmal dilatation.    Normally located right kidney length measures 9.6 x 4.2 x 4.0 cm.  Right kidney is remarkable for small echogenic foci which may represent calculi.  Otherwise, right renal corticomedullary differentiation is unremarkable. No evidence of hydronephrosis.                                       Medications   aluminum-magnesium hydroxide-simethicone 200-200-20 mg/5 mL suspension 30 mL (30 mLs Oral Given 11/2/22 2045)     And   LIDOcaine HCl 2% oral solution 15 mL (has no administration in time range)   iopamidoL (ISOVUE-370) injection 100 mL (100 mLs Intravenous Given 11/2/22 1930)   ondansetron injection 4 mg (4 mg Intravenous Given 11/2/22 2100)     Medical Decision Making:   History:   Old Medical Records: I decided to obtain old medical records.  Old Records Summarized: records from previous admission(s).       <> Summary of Records: Patient's previous records is not pertinent to current visit  Initial Assessment:   See history and physical  Differential Diagnosis:   Kidney stone, cholecystitis, colon that the aces,  Clinical Tests:   Lab Tests: Reviewed  The following lab test(s) were unremarkable: CBC, BMP, Lipase and Urinalysis  Radiological Study: Reviewed  ED Management:  History and physical was obtained.  Assessment was done and charted.  Reviewed lab work and ultrasound.  None of this explains her symptoms.  I will order a CT scan of abdomen and pelvis to rule out other pathology.   CT scan and ultrasound were both negative.  Will place patient on Carafate and have her follow-up with GI outpatient                        Clinical Impression:   Final diagnoses:  [R10.13] Epigastric abdominal pain  [R10.13] Epigastric pain (Primary)        ED Disposition Condition     Discharge Stable          ED Prescriptions       Medication Sig Dispense Start Date End Date Auth. Provider    sucralfate (CARAFATE) 100 mg/mL suspension Take 10 mLs (1 g total) by mouth 4 (four) times daily before meals and nightly. 400 mL 11/2/2022 -- LUIS Aiken          Follow-up Information       Follow up With Specialties Details Why Contact Info    Your Primary Care Provider  Call in 3 days ed follow up     Nigel Peter MD Gastroenterology Call in 3 days ed follow up [address] [facility] [address]  [phone]               LUIS Aiken  11/02/22 8079

## 2022-11-04 LAB — BACTERIA UR CULT: ABNORMAL

## 2023-01-05 PROBLEM — Z72.0 TOBACCO USE: Status: ACTIVE | Noted: 2023-01-05

## 2023-01-05 PROBLEM — F41.9 ANXIETY DISORDER, UNSPECIFIED: Status: ACTIVE | Noted: 2022-09-13

## 2023-05-25 ENCOUNTER — HOSPITAL ENCOUNTER (OUTPATIENT)
Dept: RADIOLOGY | Facility: HOSPITAL | Age: 58
Discharge: HOME | End: 2023-05-25
Attending: FAMILY MEDICINE
Payer: COMMERCIAL

## 2023-05-25 DIAGNOSIS — Z72.0 TOBACCO USE: ICD-10-CM

## 2023-05-25 PROCEDURE — 71271 CT THORAX LUNG CANCER SCR C-: CPT | Mod: TC

## 2023-05-25 NOTE — PROGRESS NOTES
Patient's screening chest CT is negative.  No evidence of lung cancer at this time.  Would recommend scheduling repeat for 1 year at this time so we can put it on the books.

## 2023-09-21 PROBLEM — E11.65 INADEQUATELY CONTROLLED DIABETES MELLITUS: Status: ACTIVE | Noted: 2022-09-13

## 2023-09-21 PROCEDURE — 86803 HEPATITIS C AB TEST: CPT | Performed by: FAMILY MEDICINE

## 2023-09-21 PROCEDURE — 87389 HIV-1 AG W/HIV-1&-2 AB AG IA: CPT | Performed by: FAMILY MEDICINE

## 2024-06-01 ENCOUNTER — ANESTHESIA EVENT (OUTPATIENT)
Dept: RADIOLOGY | Facility: HOSPITAL | Age: 59
DRG: 024 | End: 2024-06-01
Payer: COMMERCIAL

## 2024-06-01 ENCOUNTER — HOSPITAL ENCOUNTER (INPATIENT)
Facility: HOSPITAL | Age: 59
LOS: 4 days | Discharge: HOME | DRG: 024 | End: 2024-06-05
Attending: EMERGENCY MEDICINE | Admitting: HOSPITALIST
Payer: COMMERCIAL

## 2024-06-01 ENCOUNTER — ANESTHESIA (OUTPATIENT)
Dept: RADIOLOGY | Facility: HOSPITAL | Age: 59
DRG: 024 | End: 2024-06-01
Payer: COMMERCIAL

## 2024-06-01 DIAGNOSIS — R29.818 ACUTE FOCAL NEUROLOGICAL DEFICIT: ICD-10-CM

## 2024-06-01 DIAGNOSIS — I63.9 STROKE: ICD-10-CM

## 2024-06-01 DIAGNOSIS — R53.1 ACUTE RIGHT-SIDED WEAKNESS: ICD-10-CM

## 2024-06-01 DIAGNOSIS — I63.232 CEREBRAL INFARCTION DUE TO OCCLUSION OF LEFT INTERNAL CAROTID ARTERY: Primary | ICD-10-CM

## 2024-06-01 LAB
ALBUMIN SERPL-MCNC: 3.3 G/DL (ref 3.5–5)
ALBUMIN/GLOB SERPL: 1.1 RATIO (ref 1.1–2)
ALLENS TEST BLOOD GAS (OHS): YES
ALP SERPL-CCNC: 95 UNIT/L (ref 40–150)
ALT SERPL-CCNC: 16 UNIT/L (ref 0–55)
ANION GAP SERPL CALC-SCNC: 18 MMOL/L (ref 8–16)
ANION GAP SERPL CALC-SCNC: 6 MEQ/L
AST SERPL-CCNC: 13 UNIT/L (ref 5–34)
BASE EXCESS BLD CALC-SCNC: 2 MMOL/L
BASOPHILS # BLD AUTO: 0.05 X10(3)/MCL
BASOPHILS NFR BLD AUTO: 0.9 %
BILIRUB SERPL-MCNC: 0.6 MG/DL
BLOOD GAS SAMPLE TYPE (OHS): ABNORMAL
BUN SERPL-MCNC: 15 MG/DL (ref 6–30)
BUN SERPL-MCNC: 15.6 MG/DL (ref 9.8–20.1)
CA-I BLD-SCNC: 1.14 MMOL/L (ref 1.12–1.23)
CALCIUM SERPL-MCNC: 9 MG/DL (ref 8.4–10.2)
CHLORIDE SERPL-SCNC: 106 MMOL/L (ref 95–110)
CHLORIDE SERPL-SCNC: 111 MMOL/L (ref 98–107)
CHOLEST SERPL-MCNC: 221 MG/DL
CHOLEST/HDLC SERPL: 5 {RATIO} (ref 0–5)
CO2 BLDA-SCNC: 27.7 MMOL/L
CO2 SERPL-SCNC: 24 MMOL/L (ref 22–29)
CREAT SERPL-MCNC: 0.6 MG/DL (ref 0.5–1.4)
CREAT SERPL-MCNC: 0.76 MG/DL (ref 0.55–1.02)
CREAT/UREA NIT SERPL: 21
DRAWN BY BLOOD GAS (OHS): ABNORMAL
EOSINOPHIL # BLD AUTO: 0.21 X10(3)/MCL (ref 0–0.9)
EOSINOPHIL NFR BLD AUTO: 3.9 %
ERYTHROCYTE [DISTWIDTH] IN BLOOD BY AUTOMATED COUNT: 13.2 % (ref 11.5–17)
EST. AVERAGE GLUCOSE BLD GHB EST-MCNC: 154.2 MG/DL
GFR SERPLBLD CREATININE-BSD FMLA CKD-EPI: >60 ML/MIN/1.73/M2
GLOBULIN SER-MCNC: 3 GM/DL (ref 2.4–3.5)
GLUCOSE SERPL-MCNC: 154 MG/DL (ref 70–110)
GLUCOSE SERPL-MCNC: 154 MG/DL (ref 74–100)
HBA1C MFR BLD: 7 %
HCO3 BLDA-SCNC: 26.5 MMOL/L (ref 22–26)
HCT VFR BLD AUTO: 42.2 % (ref 37–47)
HCT VFR BLD CALC: 43 %PCV (ref 36–54)
HDLC SERPL-MCNC: 41 MG/DL (ref 35–60)
HGB BLD-MCNC: 13.8 G/DL (ref 12–16)
HGB BLD-MCNC: 15 G/DL
IMM GRANULOCYTES # BLD AUTO: 0.01 X10(3)/MCL (ref 0–0.04)
IMM GRANULOCYTES NFR BLD AUTO: 0.2 %
INHALED O2 CONCENTRATION: 40 %
INR PPP: 0.9
LDLC SERPL CALC-MCNC: 158 MG/DL (ref 50–140)
LYMPHOCYTES # BLD AUTO: 2.33 X10(3)/MCL (ref 0.6–4.6)
LYMPHOCYTES NFR BLD AUTO: 43.5 %
MCH RBC QN AUTO: 29.9 PG (ref 27–31)
MCHC RBC AUTO-ENTMCNC: 32.7 G/DL (ref 33–36)
MCV RBC AUTO: 91.3 FL (ref 80–94)
MECH RR (OHS): 20 B/MIN
MODE (OHS): AC
MONOCYTES # BLD AUTO: 0.71 X10(3)/MCL (ref 0.1–1.3)
MONOCYTES NFR BLD AUTO: 13.2 %
NEUTROPHILS # BLD AUTO: 2.05 X10(3)/MCL (ref 2.1–9.2)
NEUTROPHILS NFR BLD AUTO: 38.3 %
NRBC BLD AUTO-RTO: 0 %
OXYGEN DEVICE BLOOD GAS (OHS): ABNORMAL
PCO2 BLDA: 40 MMHG (ref 35–45)
PEEP RESPIRATORY: 5 CMH2O
PH BLDA: 7.43 [PH] (ref 7.35–7.45)
PLATELET # BLD AUTO: 311 X10(3)/MCL (ref 130–400)
PMV BLD AUTO: 10.2 FL (ref 7.4–10.4)
PO2 BLDA: 186 MMHG (ref 80–100)
POC IONIZED CALCIUM: 1.19 MMOL/L (ref 1.06–1.42)
POC PTINR: 1.1 (ref 0.9–1.2)
POC PTWBT: 13.5 SEC (ref 9.7–14.3)
POC TCO2 (MEASURED): 24 MMOL/L (ref 23–29)
POCT GLUCOSE: 151 MG/DL (ref 70–110)
POCT GLUCOSE: 176 MG/DL (ref 70–110)
POTASSIUM BLD-SCNC: 4.1 MMOL/L (ref 3.5–5.1)
POTASSIUM BLOOD GAS (OHS): 3.6 MMOL/L (ref 3.5–5)
POTASSIUM SERPL-SCNC: 4.1 MMOL/L (ref 3.5–5.1)
PROT SERPL-MCNC: 6.3 GM/DL (ref 6.4–8.3)
PROTHROMBIN TIME: 12.5 SECONDS (ref 12.5–14.5)
RBC # BLD AUTO: 4.62 X10(6)/MCL (ref 4.2–5.4)
SAMPLE SITE BLOOD GAS (OHS): ABNORMAL
SAMPLE: ABNORMAL
SAMPLE: NORMAL
SAO2 % BLDA: 100 %
SODIUM BLD-SCNC: 142 MMOL/L (ref 136–145)
SODIUM BLOOD GAS (OHS): 138 MMOL/L (ref 137–145)
SODIUM SERPL-SCNC: 141 MMOL/L (ref 136–145)
SPONT+MECH VT ON VENT: 420 ML
TRIGL SERPL-MCNC: 110 MG/DL (ref 37–140)
TSH SERPL-ACNC: 0.04 UIU/ML (ref 0.35–4.94)
VLDLC SERPL CALC-MCNC: 22 MG/DL
WBC # SPEC AUTO: 5.36 X10(3)/MCL (ref 4.5–11.5)

## 2024-06-01 PROCEDURE — 85610 PROTHROMBIN TIME: CPT | Performed by: EMERGENCY MEDICINE

## 2024-06-01 PROCEDURE — 80053 COMPREHEN METABOLIC PANEL: CPT | Performed by: EMERGENCY MEDICINE

## 2024-06-01 PROCEDURE — 5A1945Z RESPIRATORY VENTILATION, 24-96 CONSECUTIVE HOURS: ICD-10-PCS | Performed by: INTERNAL MEDICINE

## 2024-06-01 PROCEDURE — 82962 GLUCOSE BLOOD TEST: CPT

## 2024-06-01 PROCEDURE — 80048 BASIC METABOLIC PNL TOTAL CA: CPT | Mod: XB

## 2024-06-01 PROCEDURE — 99285 EMERGENCY DEPT VISIT HI MDM: CPT | Mod: 25

## 2024-06-01 PROCEDURE — 99900035 HC TECH TIME PER 15 MIN (STAT)

## 2024-06-01 PROCEDURE — 36600 WITHDRAWAL OF ARTERIAL BLOOD: CPT

## 2024-06-01 PROCEDURE — 25500020 PHARM REV CODE 255: Performed by: EMERGENCY MEDICINE

## 2024-06-01 PROCEDURE — 36415 COLL VENOUS BLD VENIPUNCTURE: CPT | Performed by: NURSE PRACTITIONER

## 2024-06-01 PROCEDURE — 82803 BLOOD GASES ANY COMBINATION: CPT

## 2024-06-01 PROCEDURE — 03CL3ZZ EXTIRPATION OF MATTER FROM LEFT INTERNAL CAROTID ARTERY, PERCUTANEOUS APPROACH: ICD-10-PCS | Performed by: STUDENT IN AN ORGANIZED HEALTH CARE EDUCATION/TRAINING PROGRAM

## 2024-06-01 PROCEDURE — 93005 ELECTROCARDIOGRAM TRACING: CPT

## 2024-06-01 PROCEDURE — 94760 N-INVAS EAR/PLS OXIMETRY 1: CPT | Mod: XB

## 2024-06-01 PROCEDURE — 27100171 HC OXYGEN HIGH FLOW UP TO 24 HOURS

## 2024-06-01 PROCEDURE — 63600175 PHARM REV CODE 636 W HCPCS

## 2024-06-01 PROCEDURE — D9220A PRA ANESTHESIA: Mod: CRNA,,, | Performed by: NURSE ANESTHETIST, CERTIFIED REGISTERED

## 2024-06-01 PROCEDURE — 80061 LIPID PANEL: CPT | Performed by: EMERGENCY MEDICINE

## 2024-06-01 PROCEDURE — 20000000 HC ICU ROOM

## 2024-06-01 PROCEDURE — 51702 INSERT TEMP BLADDER CATH: CPT

## 2024-06-01 PROCEDURE — D9220A PRA ANESTHESIA: Mod: ANES,,, | Performed by: ANESTHESIOLOGY

## 2024-06-01 PROCEDURE — 27200966 HC CLOSED SUCTION SYSTEM

## 2024-06-01 PROCEDURE — 99900031 HC PATIENT EDUCATION (STAT)

## 2024-06-01 PROCEDURE — 25000003 PHARM REV CODE 250: Performed by: HOSPITALIST

## 2024-06-01 PROCEDURE — 85025 COMPLETE CBC W/AUTO DIFF WBC: CPT | Performed by: EMERGENCY MEDICINE

## 2024-06-01 PROCEDURE — 84443 ASSAY THYROID STIM HORMONE: CPT | Performed by: EMERGENCY MEDICINE

## 2024-06-01 PROCEDURE — B3171ZZ FLUOROSCOPY OF LEFT INTERNAL CAROTID ARTERY USING LOW OSMOLAR CONTRAST: ICD-10-PCS | Performed by: STUDENT IN AN ORGANIZED HEALTH CARE EDUCATION/TRAINING PROGRAM

## 2024-06-01 PROCEDURE — 93010 ELECTROCARDIOGRAM REPORT: CPT | Mod: ,,, | Performed by: INTERNAL MEDICINE

## 2024-06-01 PROCEDURE — 99900026 HC AIRWAY MAINTENANCE (STAT)

## 2024-06-01 PROCEDURE — 83036 HEMOGLOBIN GLYCOSYLATED A1C: CPT | Performed by: NURSE PRACTITIONER

## 2024-06-01 PROCEDURE — B3141ZZ FLUOROSCOPY OF LEFT COMMON CAROTID ARTERY USING LOW OSMOLAR CONTRAST: ICD-10-PCS | Performed by: STUDENT IN AN ORGANIZED HEALTH CARE EDUCATION/TRAINING PROGRAM

## 2024-06-01 PROCEDURE — 25000003 PHARM REV CODE 250

## 2024-06-01 PROCEDURE — 63600175 PHARM REV CODE 636 W HCPCS: Performed by: STUDENT IN AN ORGANIZED HEALTH CARE EDUCATION/TRAINING PROGRAM

## 2024-06-01 PROCEDURE — 63600175 PHARM REV CODE 636 W HCPCS: Performed by: HOSPITALIST

## 2024-06-01 PROCEDURE — 25000003 PHARM REV CODE 250: Performed by: NURSE ANESTHETIST, CERTIFIED REGISTERED

## 2024-06-01 PROCEDURE — 94003 VENT MGMT INPAT SUBQ DAY: CPT

## 2024-06-01 PROCEDURE — 25500020 PHARM REV CODE 255: Performed by: STUDENT IN AN ORGANIZED HEALTH CARE EDUCATION/TRAINING PROGRAM

## 2024-06-01 PROCEDURE — 63600175 PHARM REV CODE 636 W HCPCS: Performed by: NURSE ANESTHETIST, CERTIFIED REGISTERED

## 2024-06-01 RX ORDER — HYDRALAZINE HYDROCHLORIDE 20 MG/ML
10 INJECTION INTRAMUSCULAR; INTRAVENOUS EVERY 6 HOURS PRN
Status: DISCONTINUED | OUTPATIENT
Start: 2024-06-01 | End: 2024-06-01

## 2024-06-01 RX ORDER — MUPIROCIN 20 MG/G
OINTMENT TOPICAL 2 TIMES DAILY
Status: DISCONTINUED | OUTPATIENT
Start: 2024-06-01 | End: 2024-06-05 | Stop reason: HOSPADM

## 2024-06-01 RX ORDER — HYDROMORPHONE HYDROCHLORIDE 2 MG/ML
0.2 INJECTION, SOLUTION INTRAMUSCULAR; INTRAVENOUS; SUBCUTANEOUS EVERY 5 MIN PRN
OUTPATIENT
Start: 2024-06-01

## 2024-06-01 RX ORDER — PROPOFOL 10 MG/ML
0-50 INJECTION, EMULSION INTRAVENOUS CONTINUOUS
Status: DISCONTINUED | OUTPATIENT
Start: 2024-06-01 | End: 2024-06-04

## 2024-06-01 RX ORDER — FENTANYL CITRATE 50 UG/ML
50 INJECTION, SOLUTION INTRAMUSCULAR; INTRAVENOUS
Status: DISCONTINUED | OUTPATIENT
Start: 2024-06-01 | End: 2024-06-05 | Stop reason: HOSPADM

## 2024-06-01 RX ORDER — EPHEDRINE SULFATE 50 MG/ML
INJECTION, SOLUTION INTRAVENOUS
Status: DISCONTINUED | OUTPATIENT
Start: 2024-06-01 | End: 2024-06-01

## 2024-06-01 RX ORDER — LIDOCAINE HYDROCHLORIDE 10 MG/ML
1 INJECTION, SOLUTION EPIDURAL; INFILTRATION; INTRACAUDAL; PERINEURAL ONCE
OUTPATIENT
Start: 2024-06-01 | End: 2024-06-01

## 2024-06-01 RX ORDER — FENTANYL CITRATE 50 UG/ML
INJECTION, SOLUTION INTRAMUSCULAR; INTRAVENOUS
Status: DISCONTINUED | OUTPATIENT
Start: 2024-06-01 | End: 2024-06-01

## 2024-06-01 RX ORDER — PROPOFOL 10 MG/ML
VIAL (ML) INTRAVENOUS
Status: DISCONTINUED | OUTPATIENT
Start: 2024-06-01 | End: 2024-06-01

## 2024-06-01 RX ORDER — SODIUM CHLORIDE 9 MG/ML
INJECTION, SOLUTION INTRAVENOUS CONTINUOUS
Status: DISCONTINUED | OUTPATIENT
Start: 2024-06-01 | End: 2024-06-05 | Stop reason: HOSPADM

## 2024-06-01 RX ORDER — ONDANSETRON HYDROCHLORIDE 2 MG/ML
4 INJECTION, SOLUTION INTRAVENOUS EVERY 8 HOURS PRN
Status: DISCONTINUED | OUTPATIENT
Start: 2024-06-01 | End: 2024-06-05 | Stop reason: HOSPADM

## 2024-06-01 RX ORDER — BISACODYL 10 MG/1
10 SUPPOSITORY RECTAL DAILY PRN
Status: DISCONTINUED | OUTPATIENT
Start: 2024-06-01 | End: 2024-06-05 | Stop reason: HOSPADM

## 2024-06-01 RX ORDER — HYDRALAZINE HYDROCHLORIDE 20 MG/ML
10 INJECTION INTRAMUSCULAR; INTRAVENOUS EVERY 6 HOURS PRN
Status: DISCONTINUED | OUTPATIENT
Start: 2024-06-01 | End: 2024-06-05 | Stop reason: HOSPADM

## 2024-06-01 RX ORDER — ROCURONIUM BROMIDE 10 MG/ML
INJECTION, SOLUTION INTRAVENOUS
Status: DISCONTINUED | OUTPATIENT
Start: 2024-06-01 | End: 2024-06-01

## 2024-06-01 RX ORDER — SODIUM CHLORIDE 0.9 % (FLUSH) 0.9 %
10 SYRINGE (ML) INJECTION
OUTPATIENT
Start: 2024-06-01

## 2024-06-01 RX ORDER — ACETAMINOPHEN 10 MG/ML
1000 INJECTION, SOLUTION INTRAVENOUS ONCE
OUTPATIENT
Start: 2024-06-01 | End: 2024-06-01

## 2024-06-01 RX ORDER — SODIUM CHLORIDE 0.9 % (FLUSH) 0.9 %
10 SYRINGE (ML) INJECTION
Status: DISCONTINUED | OUTPATIENT
Start: 2024-06-01 | End: 2024-06-05 | Stop reason: HOSPADM

## 2024-06-01 RX ORDER — FENTANYL CITRATE 50 UG/ML
25 INJECTION, SOLUTION INTRAMUSCULAR; INTRAVENOUS EVERY 5 MIN PRN
OUTPATIENT
Start: 2024-06-01

## 2024-06-01 RX ORDER — ATORVASTATIN CALCIUM 40 MG/1
80 TABLET, FILM COATED ORAL DAILY
Status: DISCONTINUED | OUTPATIENT
Start: 2024-06-01 | End: 2024-06-01

## 2024-06-01 RX ORDER — LIDOCAINE HYDROCHLORIDE 20 MG/ML
INJECTION, SOLUTION EPIDURAL; INFILTRATION; INTRACAUDAL; PERINEURAL
Status: DISCONTINUED | OUTPATIENT
Start: 2024-06-01 | End: 2024-06-01

## 2024-06-01 RX ORDER — GLYCOPYRROLATE 0.2 MG/ML
INJECTION INTRAMUSCULAR; INTRAVENOUS
Status: DISCONTINUED | OUTPATIENT
Start: 2024-06-01 | End: 2024-06-01

## 2024-06-01 RX ORDER — ONDANSETRON HYDROCHLORIDE 2 MG/ML
INJECTION, SOLUTION INTRAVENOUS
Status: DISCONTINUED | OUTPATIENT
Start: 2024-06-01 | End: 2024-06-01

## 2024-06-01 RX ORDER — SUCCINYLCHOLINE CHLORIDE 20 MG/ML
INJECTION INTRAMUSCULAR; INTRAVENOUS
Status: DISCONTINUED | OUTPATIENT
Start: 2024-06-01 | End: 2024-06-01

## 2024-06-01 RX ORDER — ATORVASTATIN CALCIUM 40 MG/1
80 TABLET, FILM COATED ORAL DAILY
Status: DISCONTINUED | OUTPATIENT
Start: 2024-06-01 | End: 2024-06-05 | Stop reason: HOSPADM

## 2024-06-01 RX ADMIN — ATORVASTATIN CALCIUM 80 MG: 40 TABLET, FILM COATED ORAL at 08:06

## 2024-06-01 RX ADMIN — LIDOCAINE HYDROCHLORIDE 50 MG: 20 INJECTION, SOLUTION INTRAVENOUS at 05:06

## 2024-06-01 RX ADMIN — MUPIROCIN: 20 OINTMENT TOPICAL at 08:06

## 2024-06-01 RX ADMIN — GLYCOPYRROLATE 0.2 MG: 0.2 INJECTION INTRAMUSCULAR; INTRAVENOUS at 05:06

## 2024-06-01 RX ADMIN — SODIUM CHLORIDE, SODIUM GLUCONATE, SODIUM ACETATE, POTASSIUM CHLORIDE AND MAGNESIUM CHLORIDE: 526; 502; 368; 37; 30 INJECTION, SOLUTION INTRAVENOUS at 05:06

## 2024-06-01 RX ADMIN — SUCCINYLCHOLINE CHLORIDE 120 MG: 20 INJECTION, SOLUTION INTRAMUSCULAR; INTRAVENOUS at 05:06

## 2024-06-01 RX ADMIN — IOPAMIDOL 100 ML: 755 INJECTION, SOLUTION INTRAVENOUS at 06:06

## 2024-06-01 RX ADMIN — EPHEDRINE SULFATE 5 MG: 50 INJECTION INTRAVENOUS at 05:06

## 2024-06-01 RX ADMIN — PROPOFOL 50 MCG/KG/MIN: 10 INJECTION, EMULSION INTRAVENOUS at 10:06

## 2024-06-01 RX ADMIN — SODIUM CHLORIDE: 9 INJECTION, SOLUTION INTRAVENOUS at 05:06

## 2024-06-01 RX ADMIN — PROPOFOL 40 MCG/KG/MIN: 10 INJECTION, EMULSION INTRAVENOUS at 05:06

## 2024-06-01 RX ADMIN — CEFTRIAXONE SODIUM 1 G: 1 INJECTION, POWDER, FOR SOLUTION INTRAMUSCULAR; INTRAVENOUS at 01:06

## 2024-06-01 RX ADMIN — IOHEXOL 50 ML: 350 INJECTION, SOLUTION INTRAVENOUS at 04:06

## 2024-06-01 RX ADMIN — FENTANYL CITRATE 50 MCG: 50 INJECTION, SOLUTION INTRAMUSCULAR; INTRAVENOUS at 05:06

## 2024-06-01 RX ADMIN — FENTANYL CITRATE 50 MCG: 50 INJECTION, SOLUTION INTRAMUSCULAR; INTRAVENOUS at 12:06

## 2024-06-01 RX ADMIN — ROCURONIUM BROMIDE 30 MG: 10 SOLUTION INTRAVENOUS at 05:06

## 2024-06-01 RX ADMIN — ONDANSETRON 4 MG: 2 INJECTION INTRAMUSCULAR; INTRAVENOUS at 05:06

## 2024-06-01 RX ADMIN — SODIUM CHLORIDE: 9 INJECTION, SOLUTION INTRAVENOUS at 06:06

## 2024-06-01 RX ADMIN — ROCURONIUM BROMIDE 5 MG: 10 SOLUTION INTRAVENOUS at 05:06

## 2024-06-01 RX ADMIN — PROPOFOL 150 MG: 10 INJECTION, EMULSION INTRAVENOUS at 05:06

## 2024-06-01 NOTE — ED PROVIDER NOTES
Encounter Date: 6/1/2024       History     Chief Complaint   Patient presents with    Cerebrovascular Accident     Pt. woke from sleep approx 30 min ago with R-sided facial droop, slurred speech and flaccid R arm and leg. Pt. last seen normal 2130 last night before going to bed. CBG in triage 151. Hx DM.      57 yo female with right side weakness since waking up 30 minutes ago and fell due to right side weakness while attempting to get up to go to restroom. Per , she was normal at 2130 last night when he went to bed. He is unsure of what time she went to sleep.     The history is provided by the spouse.     Review of patient's allergies indicates:   Allergen Reactions    Aspirin     Ketorolac     Penicillins     Sulfa (sulfonamide antibiotics)     Ozempic [semaglutide] Other (See Comments)     Abdominal pain     Past Medical History:   Diagnosis Date    Accelerated junctional rhythm     Agatston CAC score, <100     Anxiety disorder, unspecified     COPD type A     Diabetes mellitus without complication     GERD (gastroesophageal reflux disease)     History of COVID-19     HLD (hyperlipidemia)     Insomnia     Lung nodule      Past Surgical History:   Procedure Laterality Date    ANGIOGRAM, CORONARY, WITH LEFT HEART CATHETERIZATION      BREAST LUMPECTOMY Right     CARDIOVERSION  08/01/2013    CARPAL TUNNEL RELEASE  10/23/2013    FUSION OF CERVICAL SPINE BY ANTERIOR APPROACH USING COMPUTER-ASSISTED NAVIGATION  06/10/2019    KIDNEY SURGERY      TONSILLECTOMY AND ADENOIDECTOMY      TOTAL ABDOMINAL HYSTERECTOMY      WRIST SURGERY       Family History   Problem Relation Name Age of Onset    Heart attack Mother      Diabetes Father       Social History     Tobacco Use    Smoking status: Every Day     Current packs/day: 0.50     Types: Cigarettes    Smokeless tobacco: Never   Substance Use Topics    Alcohol use: Never    Drug use: Never     Review of Systems   Neurological:  Positive for speech difficulty and  weakness.       Physical Exam     Initial Vitals [06/01/24 0400]   BP Pulse Resp Temp SpO2   118/78 80 18 97.9 °F (36.6 °C) (!) 94 %      MAP       --         Physical Exam    Nursing note and vitals reviewed.  Constitutional: She appears well-developed and well-nourished. She is not diaphoretic. She does not appear ill. No distress.   HENT:   Head: Normocephalic and atraumatic.   Right Ear: External ear normal.   Left Ear: External ear normal.   Mouth/Throat: Oropharynx is clear and moist.   Eyes: Conjunctivae are normal.   Neck: Neck supple. No tracheal deviation present.   Cardiovascular:  Normal rate, regular rhythm and normal heart sounds.           No murmur heard.  Pulmonary/Chest: Breath sounds normal. No respiratory distress. She has no wheezes. She has no rhonchi. She has no rales.   Abdominal: Abdomen is soft. She exhibits no distension. There is no abdominal tenderness.   No right CVA tenderness.  No left CVA tenderness.   Musculoskeletal:         General: Normal range of motion.      Cervical back: Neck supple.     Neurological: She is alert. She displays no seizure activity. GCS eye subscore is 3. GCS verbal subscore is 5. GCS motor subscore is 6.   Right facial droop, RUE and RLE are flaccid, right side neglect    Skin: Skin is warm and dry. Capillary refill takes less than 2 seconds. No rash noted. No pallor.         ED Course   Critical Care    Date/Time: 6/1/2024 4:02 AM    Performed by: Katerine Verduzco MD  Authorized by: Katerine Verduzco MD  Direct patient critical care time: 15 minutes  Additional history critical care time: 5 minutes  Ordering / reviewing critical care time: 5 minutes  Documentation critical care time: 5 minutes  Consulting other physicians critical care time: 5 minutes  Consult with family critical care time: 7 minutes  Total critical care time (exclusive of procedural time) : 42 minutes  Critical care time was exclusive of separately billable procedures and treating other  patients and teaching time.  Critical care was necessary to treat or prevent imminent or life-threatening deterioration of the following conditions: cardiac failure and CNS failure or compromise.  Critical care was time spent personally by me on the following activities: blood draw for specimens, development of treatment plan with patient or surrogate, discussions with consultants, examination of patient, obtaining history from patient or surrogate, ordering and performing treatments and interventions, ordering and review of laboratory studies and ordering and review of radiographic studies.        Labs Reviewed   COMPREHENSIVE METABOLIC PANEL - Abnormal; Notable for the following components:       Result Value    Chloride 111 (*)     Glucose 154 (*)     Protein Total 6.3 (*)     Albumin 3.3 (*)     All other components within normal limits   TSH - Abnormal; Notable for the following components:    TSH 0.035 (*)     All other components within normal limits   LIPID PANEL - Abnormal; Notable for the following components:    Cholesterol Total 221 (*)     LDL Cholesterol 158.00 (*)     All other components within normal limits   CBC WITH DIFFERENTIAL - Abnormal; Notable for the following components:    MCHC 32.7 (*)     Neut # 2.05 (*)     All other components within normal limits   POCT GLUCOSE - Abnormal; Notable for the following components:    POCT Glucose 151 (*)     All other components within normal limits   ISTAT CHEM8 - Abnormal; Notable for the following components:    POC Glucose 154 (*)     POC Anion Gap 18 (*)     All other components within normal limits   PROTIME-INR - Normal   CBC W/ AUTO DIFFERENTIAL    Narrative:     The following orders were created for panel order CBC W/ AUTO DIFFERENTIAL.  Procedure                               Abnormality         Status                     ---------                               -----------         ------                     CBC with Differential[[id number]]        Abnormal            Final result                 Please view results for these tests on the individual orders.   POCT GLUCOSE, HAND-HELD DEVICE   ISTAT PROCEDURE     EKG Readings: (Independently Interpreted)   Initial Reading: No STEMI. Rhythm: Normal Sinus Rhythm. Heart Rate: 71. Ectopy: No Ectopy. ST Segments: Normal ST Segments. T Waves: Normal. Axis: Normal.   Performed at 0422       Imaging Results              IR Thrombectomy Intracranial Inc all Imaging (In process)  Result time 06/01/24 08:57:08                      CTA STROKE MULTI-PHASE (Final result)  Result time 06/01/24 08:10:12      Final result by Zoran Decker MD (06/01/24 08:10:12)                   Narrative:    EXAMINATION  CTA STROKE MULTI-PHASE    CLINICAL HISTORY  Neuro deficit, acute, stroke suspected;    TECHNIQUE  Pre- and post-contrast helical-acquisition CT images were obtained, imaging timed to coincide with arterial opacification for purposes of CT angiography.  Multiplanar reconstructions, to include MIP and volume-rendered (3D) images, were accomplished by a CT technologist at a separate workstation, pushed to PACS for physician review.    Evaluation of any visualized ICA stenosis was performed using NASCET criteria.    CONTRAST  IV: Omnipaque 350, 50 mL    COMPARISON  *No prior CTA is available at the time of initial interpretation.  *Non-contrast head CT obtained earlier the same day was reviewed.    FINDINGS  Images were reviewed in soft tissue, brain, subdural, and bone windows.    Exam quality: adequate for evaluation    Non-contrast Head: No significant change from the above-referenced non-contrast head CT comparison.    Intracranial Vasculature: Minimal atheromatous calcification of the clinoid and supraclinoid segment of the right internal carotid artery is seen with no significant stenosis. There is significantly diminished opacification of the distal cervical segment of the left internal carotid artery with no definitive  flow identified in the petrous, cavernous, clinoid and supraclinoid segments (series 11, images 59-40). There is also non-opacification of the A1 segment of the left anterior cerebral artery and the M1 through M2 segments of the left middle cerebral artery (series 11, image 40), with diminished contrast flow through the M3 segments and M4 segments.  There is diminished contrast flow in the P1 and P2 segments of the left posterior cerebral artery.  Flow reconstitution at the left YOLANDA A2 segment is provided by of a patent anterior communicating vessel.  The right YOLANDA, MCA, and PCA demonstrate normal intraluminal flow.  Normal contour and intraluminal contrast opacification of the dural sinuses.    Extracranial Vasculature: The bilateral common carotid vessels are unremarkable. The carotid bifurcations are unremarkable.  There is normal contrast opacification throughout the extracranial right ICA course.  Diminished contrast density is appreciated through the extracranial left ICA.  No suspicious findings of the bilateral ECA branches. Left-dominant vertebral artery system is present, with both vessels well opacified throughout their extracranial course and no focal narrowing or intraluminal abnormality. Visualized aortic arch is without focal contour irregularity, aneurysmal dilatation, or intraluminal abnormality. Normal 3-branch arch configuration is present.    Nonvascular: The included nonvascular structures are without convincing evidence of acute or suspicious focal abnormality. Aerodigestive structures are widely patent. No acute or destructive osseous process is appreciated.    IMPRESSION  1. No significant short-interval change of the non-contrast head CT.  2. Grossly diminished flow within the left cervical ICA, with complete loss of intraluminal contrast density through the intracranial ICA segments.  3. Markedly reduced/absent contrast flow into the left YOLANDA A1 segment, throughout the course of the left  MCA, and the left PCA.  4. Additional secondary details discussed above.  ==========    No significant discrepancy identified in relation to the teleradiology preliminary report. The above findings were discussed with Dr. Verduzco (Emergency Dept) prior to completion of the overnight preliminary report (1 June 2024, 04:24).    ==========    This report was flagged in Epic as abnormal.    RADIATION DOSE  Automated tube current modulation, weight-based exposure dosing, and/or iterative reconstruction technique utilized to reach lowest reasonably achievable exposure rate.    DLP: 1295 mGy*cm      Electronically signed by: Zoran Decker  Date:    06/01/2024  Time:    08:10                      Preliminary result by Bartolo Harding MD (06/01/24 04:37:37)                   Impression:    1. No abnormal intracranial enhancement is seen on the post contrast images.  2. Minimal atheromatous calcification of the clinoid and supraclinoid segment of the right internal carotid artery is seen with no significant stenosis. There is significantly diminished opacification of the distal cervical segment of the left internal carotid artery with no definitive flow identified in the petrous, cavernous, clinoid and supraclinoid segments (series 11, images 59-40) of the left intracranial internal carotid artery. There is also non-opacification of the A1 segment of the left anterior cerebral artery as well as the M1 through M2 segments of the left middle cerebral artery (series 11, image 40) with diminished contrast flow through the M3 segments and M4 segments of the left middle cerebral artery. There is diminished contrast flow in the P1 and P2 segments of the left posterior cerebral artery. This is consistent with large vessel occusion of the left internal carotid artery with consequent lack of flow in the left MCA and other vessels as detailed above.  3. The right posterior cerebral artery appears intact with most of its flow coming from  a posterior communicating artery however the left posterior cerebral artery does not demonstrate definitive flow possibly reflecting lack of flow from a nonvisualized posterior communicating artery from the anterior circulation on the left. Correlate clinically as regards additional evaluation and follow-up.  4. Details and other findings as described above.               Narrative:    START OF REPORT:  Technique: CT angiogram of the intracranial vessels was performed with intravenous contrast with direct axial as well as sagittal and coronal reformations with coronal reformation only. CT angiogram of the neck vessels was performed with intravenous contrast with direct axial as well as sagittal and coronal reformations. Non-contrast CT head axial as well as coronal and sagittal images were also performed.    Comparison: Correlation is with study [id number] 04:12:05.    Clinical history: Right side neglect.    Findings:  Intracranial Vascular structures:  Internal carotid arteries: Minimal atheromatous calcification of the clinoid and supraclinoid segment of the right internal carotid artery is seen with no significant stenosis. There is significantly diminished opacification of the distal cervical segment of the left internal carotid artery with no definitive flow identified in the petrous, cavernous, clinoid and supraclinoid segments (series 11, images 59-40) of the left intracranial internal carotid artery. There is also non-opacification of the A1 segment of the left anterior cerebral artery as well as the M1 through M2 segments of the left middle cerebral artery (series 11, image 40) with diminished contrast flow through the M3 segments and M4 segments of the left middle cerebral artery. There is diminished contrast flow in the P1 and P2 segments of the left posterior cerebral artery.  Middle cerebral arteries: Unremarkable right middle cerebral artery.  Anterior cerebral arteries: Unremarkable right  anterior cerebral artery.  Vertebral arteries: Unremarkable.  Basilar artery: Unremarkable.  Posterior cerebral arteries: The right posterior cerebral artery appears intact with most of its flow coming from a posterior communicating artery however the left posterior cerebral artery does not demonstrate definitive flow possibly reflecting lack of flow from a nonvisualized posterior communicating artery from the anterior circulation on the left.  Posterior communicating arteries: Unremarkable.  Jugular Veins and venous sinuses: Unremarkable.  Neck Vascular structures: The visualized aorta and origin of the great vessels of the neck appear unremarkable.  Carotids:  Common carotid arteries: Unremarkable.  Internal carotid artery: The right internal carotid artery appears unremarkable.  Vertebral arteries: Unremarkable.  Jugular Veins and venous sinuses: Unremarkable.  Hemorrhage: No acute intracranial hemorrhage is seen.  Brain parenchyma: No abnormal intracranial enhancement is seen on the post contrast images.    Notifications: This is a stroke protocol report and the results were discussed with the emergency room physician (Dr Verduzco) prior to dictation at 2024-06-01 04:24:26 CDT.                                         CT HEAD FOR STROKE (Final result)  Result time 06/01/24 08:40:46   Procedure changed from CT Head Without Contrast     Final result by Nigel Au MD (06/01/24 08:40:46)                   Impression:      Obscuration of the gray-white matter differentiation in the left MCA distribution suggesting cytotoxic edema and left MCA infarction.    [facility] concordance      Electronically signed by: Nigel Au MD  Date:    06/01/2024  Time:    08:40               Narrative:    EXAMINATION:  CT HEAD FOR STROKE    CLINICAL HISTORY:  Neuro deficit, acute, stroke suspected;    TECHNIQUE:  Axial images of the head were obtained without IV contrast administration.  Coronal and sagittal reconstructions were  provided.  Three dimensional and MIP images were obtained and evaluated.  Total DLP was 967 mGy-cm. Dose lowering technique and automated exposure control were utilized for this exam.    COMPARISON:  CT of the head 07/28/2015.    FINDINGS:  There is normal brain formation.  There is slight obscuration of the gray-white matter differentiation within the left MCA distribution.  There is no hemorrhage, hydrocephalus, or midline shift.  There is no intra or extra-axial fluid collection.  There is no herniation.    The calvarium is intact.  There is no fracture.  The bilateral orbits are normal.  The paranasal sinuses and mastoid air cells are normally developed and free of disease.                        Preliminary result by Bartolo Harding MD (06/01/24 04:27:00)                   Impression:    1. There is subtle blurring of the left MCA territory gray-white junction. This is suggestive of an acute left MCA territory infarct. Follow-up as clinically indicated.  2. This is a stroke protocol report and the results were discussed with the emergency room physician (Dr Verduzco) prior to dictation at 2024-06-01 04:24:26 CDT.  3. Details and other findings as noted above. Recommend additional evaluation and follow-up as indicated.               Narrative:    START OF REPORT:  Technique: CT of the head was performed without intravenous contrast with axial as well as coronal and sagittal images.    Comparison: Comparison is with study dated 2015-07-28 21:28:22.    Dosage Information: Automated exposure control was utilized.    Clinical history: Code stroke, right side neglect.    Findings:  Hemorrhage: No acute intracranial hemorrhage is seen.  CSF spaces: The ventricles sulci and basal cisterns are within normal limits.  Brain parenchyma: Mild microvascular change is seen in portions of the periventricular and deep white matter tracts. There is subtle blurring of the left MCA territory gray-white junction.  Cerebellum:  Unremarkable.  Vascular: Mild to moderate stable appearing atheromatous calcification of the intracranial arteries is seen.  Sella and skull base: The sella appears to be within normal limits for age.  Cerebellopontine angles: Within normal limits.  Herniation: None.  Intracranial calcifications: Incidental note is made of bilateral choroid plexus calcification. Incidental note is made of some pineal region calcification. Incidental note is made of some calcification of the falx.  Calvarium: No acute linear or depressed skull fracture is seen.    Maxillofacial Structures:  Paranasal sinuses: The visualized paranasal sinuses appear clear with no mucoperiosteal thickening or air fluid levels identified.  Orbits: The orbits appear unremarkable.  Zygomatic arches: The zygomatic arches are intact and unremarkable.  Temporal bones and mastoids: The temporal bones and mastoids appear unremarkable.  TMJ: The mandibular condyles appear normally placed with respect to the mandibular fossa.    Notifications: This is a stroke protocol report and the results were discussed with the emergency room physician (Dr Verduzco) prior to dictation at 2024-06-01 04:24:26 CDT.                                      X-Rays:   Independently Interpreted Readings:   Head CT: Diminished contrast flow on left      Medications   sodium chloride 0.9% flush 10 mL (has no administration in time range)   mupirocin 2 % ointment ( Nasal Given 6/1/24 0839)   0.9%  NaCl infusion ( Intravenous Restarted 6/1/24 0839)   atorvastatin tablet 80 mg (80 mg Oral Given 6/1/24 0839)   hydrALAZINE injection 10 mg (has no administration in time range)   propofol (DIPRIVAN) 10 mg/mL infusion (has no administration in time range)   0.9%  NaCl infusion ( Intravenous Not Given 6/1/24 0815)   ondansetron injection 4 mg (has no administration in time range)   bisacodyL suppository 10 mg (has no administration in time range)   iohexoL (OMNIPAQUE 350) injection 50 mL (50 mLs  Intravenous Given 6/1/24 0420)   iopamidoL (ISOVUE-370) injection 100 mL (100 mLs Intravenous Given 6/1/24 0604)     Medical Decision Making  DDX includes but not limited to ischemic vs hemorrhagic CVA, brain tumor, intracranial hemorrhage, hypoglycemia, seizure    CT with LVO of left internal carotid   Discussed with neuro and taken to cath lab for thrombectomy     Problems Addressed:  Acute focal neurological deficit: acute illness or injury that poses a threat to life or bodily functions  Acute right-sided weakness: acute illness or injury that poses a threat to life or bodily functions  Cerebral infarction due to occlusion of left internal carotid artery: acute illness or injury that poses a threat to life or bodily functions    Amount and/or Complexity of Data Reviewed  Labs: ordered. Decision-making details documented in ED Course.  Radiology: ordered and independent interpretation performed. Decision-making details documented in ED Course.    Risk  Prescription drug management.  Decision regarding hospitalization.  Emergency major surgery.               ED Course as of 06/01/24 0858   Sat Jun 01, 2024   2255 Neuro IR consult: spoke with Dr Amato who will review CTA as soon as images cross over, no wake up MRI for now  [KM]   0425 Spoke with Dr Hogan (radiology) and Dr Amato (neuro)- internal carotid occlusion, will take for thrombectomy  [KM]   0428 Discussed results with patient and .  They are both understanding and wished to proceed [KM]   0437 Spoke with ICU resident regarding admission  [KM]      ED Course User Index  [KM] Katerine Verduzco MD                           Clinical Impression:  Final diagnoses:  [R29.818] Acute focal neurological deficit  [I63.232] Cerebral infarction due to occlusion of left internal carotid artery (Primary)  [R53.1] Acute right-sided weakness          ED Disposition Condition    Admit Fair                Katerine Verduzco MD  06/01/24 0858

## 2024-06-01 NOTE — H&P
[facility] - [facility]  Pulmonary Critical Care Note    Patient Name: Chelle Chandra  MRN: [id number]  Admission Date: 6/1/2024  Hospital Length of Stay: 0 days  Code Status: Full Code  Attending Provider: Robin Santoyo MD  Primary Care Provider: Jorge Silver MD     Subjective:     HPI:       57 yo female with right side weakness since waking up 30 minutes ago and fell due to right side weakness while attempting to get up to go to restroom. Per , she was normal at 2130 last night when he went to bed. He is unsure of what time she went to sleep.  Deficits in the ED noted as right facial droop, right upper extremity and right lower extremity flaccid paralysis, as well as right-sided neglect.  CTA stroke yielded diminished flow in the left anterior cerebral artery, as well as left middle cerebral artery, left posterior cerebral artery, and large vessel occlusion in the left MCA.  Patient was taken for mechanical thrombectomy.  Mechanical thrombectomy was successful.  Patient currently sedated intubated.  We will attempt to wean as tolerated.     Past Medical History:   Diagnosis Date    Accelerated junctional rhythm     Agatston CAC score, <100     Anxiety disorder, unspecified     COPD type A     Diabetes mellitus without complication     GERD (gastroesophageal reflux disease)     History of COVID-19     HLD (hyperlipidemia)     Insomnia     Lung nodule        Past Surgical History:   Procedure Laterality Date    ANGIOGRAM, CORONARY, WITH LEFT HEART CATHETERIZATION      BREAST LUMPECTOMY Right     CARDIOVERSION  08/01/2013    CARPAL TUNNEL RELEASE  10/23/2013    FUSION OF CERVICAL SPINE BY ANTERIOR APPROACH USING COMPUTER-ASSISTED NAVIGATION  06/10/2019    KIDNEY SURGERY      TONSILLECTOMY AND ADENOIDECTOMY      TOTAL ABDOMINAL HYSTERECTOMY      WRIST SURGERY         Social History     Socioeconomic History    Marital status:    Tobacco Use    Smoking status: Every Day     Current  "packs/day: 0.50     Types: Cigarettes    Smokeless tobacco: Never   Substance and Sexual Activity    Alcohol use: Never    Drug use: Never           Current Outpatient Medications   Medication Instructions    albuterol (PROVENTIL) 2.5 mg, Nebulization, Every 6 hours PRN    busPIRone (BUSPAR) 15 mg, Oral, 3 times daily    dapagliflozin propanediol (FARXIGA) 5 mg, Oral, Daily    fluticasone propionate (FLOVENT DISKUS) 250 mcg/actuation DsDv 2 puffs, Inhalation, 2 times daily, Controller    levothyroxine (SYNTHROID) 112 mcg, Oral, Before breakfast    metFORMIN (GLUCOPHAGE) 500 mg, Oral, 2 times daily with meals    paroxetine (PAXIL) 20 mg, Oral, Every morning    pen needle, diabetic 32 gauge x 5/32" Ndle 1 each, Misc.(Non-Drug; Combo Route), Weekly    promethazine-codeine 6.25-10 mg/5 ml (PHENERGAN WITH CODEINE) 6.25-10 mg/5 mL syrup 5 mLs, Oral, Every 4 hours PRN    rosuvastatin (CRESTOR) 40 mg, Oral, Daily    sucralfate (CARAFATE) 1 g, Oral, Before meals & nightly    VENTOLIN HFA 90 mcg/actuation inhaler 2 puffs, Inhalation, Every 4 hours PRN       Current Inpatient Medications   mupirocin   Nasal BID       Current Intravenous Infusions   sodium chloride 0.9%   Intravenous Continuous             Review of Systems   Reason unable to perform ROS: Unable to assess patient was currently sedated and ventilated.          Objective:       Intake/Output Summary (Last 24 hours) at 6/1/2024 0631  Last data filed at 6/1/2024 0558  Gross per 24 hour   Intake 600 ml   Output --   Net 600 ml         Vital Signs (Most Recent):  Temp: (!) 94 °F (34.4 °C) (06/01/24 0626)  Pulse: 65 (06/01/24 0626)  Resp: 19 (06/01/24 0626)  BP: (!) 152/104 (06/01/24 0626)  SpO2: 100 % (06/01/24 0626)  Body mass index is 18.68 kg/m².  Weight: 52.5 kg (115 lb 11.9 oz) Vital Signs (24h Range):  Temp:  [94 °F (34.4 °C)-97.9 °F (36.6 °C)] 94 °F (34.4 °C)  Pulse:  [65-80] 65  Resp:  [16-26] 19  SpO2:  [94 %-100 %] 100 %  BP: (118-152)/() 152/104 " "    Constitutional: She appears well-developed and well-nourished. She is not diaphoretic. She does not appear ill. No distress.   HENT:   Head: Normocephalic and atraumatic.   Right Ear: External ear normal.   Left Ear: External ear normal.   Mouth/Throat: Oropharynx is clear and moist.   Eyes: Conjunctivae are normal.   Neck: Neck supple. No tracheal deviation present.   Cardiovascular:  Normal rate, regular rhythm and normal heart sounds.           No murmur heard.  Pulmonary/Chest: Breath sounds normal.  Patient currently intubated  Abdominal: Abdomen is soft. She exhibits no distension. There is no abdominal tenderness.   No right CVA tenderness.  No left CVA tenderness.   Musculoskeletal:        Unable to assess patient currently sedated  Neurology:  Unable to assess due to patient currently sedated intubated      Lines/Drains/Airways       Airway  Duration                  Airway - Non-Surgical 06/01/24 0509 <1 day              Peripheral Intravenous Line  Duration                  Peripheral IV - Single Lumen 06/01/24 0403 20 G Anterior;Left Forearm <1 day         Peripheral IV - Single Lumen 06/01/24 0403 20 G Left;Posterior Forearm <1 day                    Significant Labs:    Lab Results   Component Value Date    WBC 5.36 06/01/2024    HGB 13.8 06/01/2024    HCT 42.2 06/01/2024    MCV 91.3 06/01/2024     06/01/2024           BMP  Lab Results   Component Value Date     06/01/2024    K 4.1 06/01/2024    CO2 24 06/01/2024    BUN 15.6 06/01/2024    CREATININE 0.76 06/01/2024    CALCIUM 9.0 06/01/2024    AGAP 6.0 06/01/2024    EGFRNONAA >60 07/28/2021         ABG  No results for input(s): "PH", "PO2", "PCO2", "HCO3", "POCBASEDEF" in the last 168 hours.    Mechanical Ventilation Support:  Oxygen Concentration (%): 100 (06/01/24 0626)      Significant Imaging:  I have reviewed the pertinent imaging within the past 24 hours.        Assessment/Plan:     Assessment  CVA s/p thrombectomy of left hand " and my Ca segments M1      Plan  -patient currently intubated and sedated. Will wean as tolerated.   -ECG, CXR, CT head: as above   -Lipitor 80 mg daily  -Vital signs & Neuro checks: every 15 minutes x 2 hours, then every 30 minutes x 6 hours, then every hour x 16 hours  -NPO. Swallow study ordered. Speech therapy to additionally eval for aphasia/dysarthria    -HOB flat, bedrest (from stroke symptom onset) for 24hrs  -sheath in place: groin site assessment  -Intervention blood pressure protocol with necessary medications for SBP >140 for 24 hours, progress to Cleviprex as needed  -progressive mobility once sheath is removed  -PT/OT  -NS @100 cc/hr      DVT Prophylaxis:  GI Prophylaxis:     32 minutes of critical care was time spent personally by me on the following activities: development of treatment plan with patient or surrogate and bedside caregivers, discussions with consultants, evaluation of patient's response to treatment, examination of patient, ordering and performing treatments and interventions, ordering and review of laboratory studies, ordering and review of radiographic studies, pulse oximetry, re-evaluation of patient's condition.  This critical care time did not overlap with that of any other provider or involve time for any procedures.     Susana Flanagan MD  Pulmonary Critical Care Medicine  [facility] - [facility]  DOS: 06/01/2024

## 2024-06-01 NOTE — CONSULTS
NEUROINTERVENTIONAL CONSULT    Patient: Karey Chandra     MRN: [id number]        : 1965 Age: 58 y.o. Sex:female  Room: Exam 35 Bed: 35    Admit Date: 2024        Pt. Type: Emergency    Reason for Visit:   Chief Complaint   Patient presents with    Cerebrovascular Accident     Pt. woke from sleep approx 30 min ago with R-sided facial droop, slurred speech and flaccid R arm and leg. Pt. last seen normal 2130 last night before going to bed. CBG in triage 151. Hx DM.         Attending Physician: Katerine Verduzco MD      Admitting Physician: No admitting provider for patient encounter.    Consulting Physician:     [facility]    Primary Care Physician: Jorge Silver MD    Reason For Consult  Stroke    History Of Present Illness  KAREY Chandra is a 58 y.o. female presenting with wake up stroke.    LKW 2130 prior to going to bed, woke up with right facial droop and right arm/leg weakness with dysarthria.    CT head negative for acute process. No TNK due to outside time window. CTA demonstrates left carotid occlusion.    Taken to thrombectomy s/p aspiration x 2 with TICI 2c reperfusion. Remains intubated post procedure due to emesis and significant dysarthria preprocedure.     Past Medical History  Smoker    Surgical History  She has a past surgical history that includes Fusion of cervical spine by anterior approach using computer-assisted navigation (06/10/2019); Carpal tunnel release (10/23/2013); Cardioversion (2013); ANGIOGRAM, CORONARY, WITH LEFT HEART CATHETERIZATION; Total abdominal hysterectomy; Kidney surgery; Wrist surgery; Breast lumpectomy (Right); and Tonsillectomy and adenoidectomy.     Social History  She reports that she has been smoking cigarettes. She has never used smokeless tobacco. She reports that she does not drink alcohol and does not use drugs.     Allergies  Aspirin, Ketorolac, Penicillins, Sulfa (sulfonamide antibiotics), and Ozempic  "[semaglutide]    Medications  (Not in a hospital admission)      Review of Systems  Review of Systems    Unable to obtain during acute evaluation.    Last Recorded Vitals  Blood pressure 135/80, pulse 75, temperature 97.9 °F (36.6 °C), temperature source Temporal, resp. rate (!) 23, height 5' 6" (1.676 m), weight 52.5 kg (115 lb 11.9 oz), SpO2 97%.     Physical Exam  Physical Exam    Alert, fully oriented  Left gaze preference  Dysarthria  Right facial droop  Flaccid paralysis right upper/lower extremities  2+ DP    Relevant Results  CT head negative acute process  CTA left carotid occlusion with no opacification intracranially     Assessment & Plan   There are no hospital problems to display for this patient.    57 yo woman presenting as a wake-up stroke with tandem left ICA communicating segment and MCA occlusion.    - Tandem occlusion s/p thromebctomy 6/1/24, TICI 2c. No TNK due to outside time window. Stroke etiology possibly centroembolic given no significant large vessel disease.  - Smoker    Post thrombectomy protocol, SBP <160 (MAPs >65)  Repeat CT head in 6 hours  MRI brain wo contrast  Check A1c, lipid panel  Telemetry, TTE  PT/OT    Discussed above with  at bedside. Questions and concerns answered.    Samira Amato MD  Endovascular Surgical Neuroradiology   "

## 2024-06-01 NOTE — BRIEF OP NOTE
Date: 2024 Location: [facility]    Name: Karey Chandra : 1965 MRN: [id number]    Diagnosis  Stroke Left MCA syndrome     Procedures  Right femoral artery ultrasound guided access  Emergent thrombectomy    Surgeons   Samira Amato     Procedure Summary  Anesthesia: General anesthesia   Estimated Blood Loss: 15 cc    Indications: KAREY Chandra is an 58 y.o. female who presents with left MCA syndrome found to have noninvasive imaging concerning for tandem carotid/M1 occlusion.      Preliminary Findings:   Left ICA communicating segment occlusion with tandem M1 occlusion  Successful aspiration thrombectomy x 2. TICI 2c.    Complications:  None; patient tolerated the procedure well.     Disposition: ICU - intubated and hemodynamically stable.  Condition: stable    Samira Amato   Endovascular Surgical Neuroradiology

## 2024-06-01 NOTE — TRANSFER OF CARE
"Anesthesia Transfer of Care Note    Patient: Chelle Chandra    Procedure(s) Performed: * No procedures listed *    Patient location: ICU    Anesthesia Type: general    Transport from OR: Transported from OR intubated on 100% O2  with adequate ventilation controlled by transport ventilator. Continuous ECG monitoring in transport. Continuous SpO2 monitoring in transport    Post pain: adequate analgesia    Post assessment: no apparent anesthetic complications and tolerated procedure well    Post vital signs: stable    Level of consciousness: awake and alert    Nausea/Vomiting: no nausea/vomiting    Complications: none    Transfer of care protocol was followedComments: Pt transported w/ ICU nurse; ICU nurse came to cath lab bedside; report given; report accepted; pt connected to transport ventilator; pt vital signs stable and appropriate for transport      Last vitals: Visit Vitals  /81   Pulse 68   Temp 36.6 °C (97.9 °F) (Temporal)   Resp (!) 26   Ht 5' 6" (1.676 m)   Wt 52.5 kg (115 lb 11.9 oz)   SpO2 96%   BMI 18.68 kg/m²     "

## 2024-06-01 NOTE — NURSING
Nurses Note -- 4 Eyes      6/1/2024   7:08 AM      Skin assessed during: Admit      [] No Altered Skin Integrity Present    [x]Prevention Measures Documented      [x] Yes- Altered Skin Integrity Present or Discovered   [] LDA Added if Not in Epic (Describe Wound)   [] New Altered Skin Integrity was Present on Admit and Documented in LDA   [] Wound Image Taken    Wound Care Consulted? No    Attending Nurse:  Bryley Second RN/Staff Member:  ASHLEY Santos    Patient has no skin abnormalities except for R groin sheath site that is c/d/I.

## 2024-06-01 NOTE — PLAN OF CARE
Problem: Adult Inpatient Plan of Care  Goal: Plan of Care Review  Outcome: Progressing  Goal: Patient-Specific Goal (Individualized)  Outcome: Progressing  Goal: Absence of Hospital-Acquired Illness or Injury  Outcome: Progressing  Goal: Optimal Comfort and Wellbeing  Outcome: Progressing  Goal: Readiness for Transition of Care  Outcome: Progressing     Problem: Diabetes Comorbidity  Goal: Blood Glucose Level Within Targeted Range  Outcome: Progressing     Problem: Wound  Goal: Optimal Coping  Outcome: Progressing  Goal: Optimal Functional Ability  Outcome: Progressing  Goal: Absence of Infection Signs and Symptoms  Outcome: Progressing  Goal: Improved Oral Intake  Outcome: Progressing  Goal: Optimal Pain Control and Function  Outcome: Progressing  Goal: Skin Health and Integrity  Outcome: Progressing  Goal: Optimal Wound Healing  Outcome: Progressing     Problem: Fall Injury Risk  Goal: Absence of Fall and Fall-Related Injury  Outcome: Progressing     Problem: Skin Injury Risk Increased  Goal: Skin Health and Integrity  Outcome: Progressing     Problem: Stroke, Ischemic (Includes Transient Ischemic Attack)  Goal: Optimal Coping  Outcome: Progressing  Goal: Effective Bowel Elimination  Outcome: Progressing  Goal: Optimal Cerebral Tissue Perfusion  Outcome: Progressing  Goal: Optimal Cognitive Function  Outcome: Progressing  Goal: Improved Communication Skills  Outcome: Progressing  Goal: Optimal Functional Ability  Outcome: Progressing  Goal: Optimal Nutrition Intake  Outcome: Progressing  Goal: Effective Oxygenation and Ventilation  Outcome: Progressing  Goal: Improved Sensorimotor Function  Outcome: Progressing  Goal: Safe and Effective Swallow  Outcome: Progressing  Goal: Effective Urinary Elimination  Outcome: Progressing     Problem: Infection  Goal: Absence of Infection Signs and Symptoms  Outcome: Progressing

## 2024-06-01 NOTE — PT/OT/SLP PROGRESS
CVA orders received, chart reviewed, and nursing consulted. Pt admitted 6/1/24 s/p fall due to R sided weakness, right facial droop, right upper extremity and right lower extremity flaccid paralysis, as well as right-sided neglect.  CTA stroke yielded diminished flow in the left anterior cerebral artery, as well as left middle cerebral artery, left posterior cerebral artery, and large vessel occlusion in the left MCA.  Patient underwent mechanical thrombectomy.  Patient currently sedated and intubated with plan to wean as tolerated.    Pt not appropriate at this time for evaluation/treatment. ST to sign off at this time. Feel free to re-consult as appropriate.

## 2024-06-01 NOTE — NURSING
Nurses Note -- 4 Eyes      6/1/2024   3:01 PM      Skin assessed during: Q Shift Change      [] No Altered Skin Integrity Present    []Prevention Measures Documented      [x] Yes- Altered Skin Integrity Present or Discovered   [] LDA Added if Not in Epic (Describe Wound)   [] New Altered Skin Integrity was Present on Admit and Documented in LDA   [] Wound Image Taken    Wound Care Consulted? No    Attending Nurse:  Radha Orozco RN     Second RN/Staff Member:  tiffanie gracia rn

## 2024-06-01 NOTE — ED NOTES
Report given to Tricia RAMIREZ in IR Cath Lab 7. Room being readied for patient. Will place patient on monitor to transport

## 2024-06-01 NOTE — PROGRESS NOTES
Patient seen briefly s/p thrombectomy, family at bedside, nursing reports no new issues    Off of sedation:   Follows commands with LUE, LLE  Nods appropriately  Can wiggle toes on right foot and squeeze right hand  Currently intubated and sedated      -160  Q1 hour neuro checks  Continue IVF @100  Repeat imaging tomorrow am or if she has a neuro change  Hopefully she will be able to be extubated tomorrow am     Will continue to follow     1400  Reviewed CT head  -expected findings of evolving left MCA infarct on CT   -clinically has not changed   -hopefully MRI brain tomorrow (24 hours), if unable to have MRI within that time frame likely needs repeat CT head, will assess in am  -aspirin 81 mg today

## 2024-06-01 NOTE — ANESTHESIA PROCEDURE NOTES
Intubation    Date/Time: 6/1/2024 5:09 AM    Performed by: Estiven Wright CRNA  Authorized by: Harinder Frazier DO    Intubation:     Induction:  Rapid sequence induction    Intubated:  Postinduction    Mask Ventilation:  Not attempted    Attempts:  1    Attempted By:  CRNA    Method of Intubation:  Direct    Blade:  Lang 2    Laryngeal View Grade: Grade IIA - cords partially seen      Difficult Airway Encountered?: No      Complications:  None    Airway Device:  Oral endotracheal tube    Airway Device Size:  7.5    Style/Cuff Inflation:  Cuffed (inflated to minimal occlusive pressure)    Inflation Amount (mL):  6    Tube secured:  21    Secured at:  The lips    Placement Verified By:  Capnometry    Complicating Factors:  None    Findings Post-Intubation:  BS equal bilateral

## 2024-06-01 NOTE — ANESTHESIA PREPROCEDURE EVALUATION
06/01/2024  Chelle Chandra is a 58 y.o., female.      Pre-op Assessment    I have reviewed the Patient Summary Reports.     I have reviewed the Nursing Notes. I have reviewed the NPO Status.   I have reviewed the Medications.     Review of Systems  Anesthesia Hx:  No problems with previous Anesthesia                Social:  Smoker       Cardiovascular:      Denies Hypertension.   Denies MI.        Denies Angina.                                  Pulmonary:   COPD Asthma       Asthma:   Chronic Obstructive Pulmonary Disease (COPD):                      Renal/:   Denies Chronic Renal Disease. no renal calculi               Hepatic/GI:     GERD Denies Liver Disease.  Denies Hepatitis.    Gerd          Neurological:   CVA (current)    Denies Seizures.                                Endocrine:  Diabetes, poorly controlled, type 2 Hypothyroidism   Diabetes                   Hypothyroidism        Denies Obesity / BMI > 30  Psych:  Psychiatric History anxiety                 Physical Exam  General: Well nourished, Cooperative, Alert, Oriented and Lethargic    Airway:  Mallampati: II   Mouth Opening: Normal  Tongue: Normal  Neck ROM: Normal ROM    Dental:  Dentures        Anesthesia Plan  Type of Anesthesia, risks & benefits discussed:    Anesthesia Type: Gen ETT  Intra-op Monitoring Plan: Standard ASA Monitors  Induction:  IV  Airway Plan: Video  Informed Consent: Informed consent signed with the Patient representative and all parties understand the risks and agree with anesthesia plan.  All questions answered.   ASA Score: 3  Day of Surgery Review of History & Physical: H&P Update referred to the surgeon/provider.  Anesthesia Plan Notes: Ipad consent by  Roberto Gill For Surgery From Anesthesia Perspective.     .

## 2024-06-01 NOTE — OP NOTE
CEREBRAL ANGIOGRAM AND MECHANICAL THROMBECTOMY     Date: 2024 Location:  Neuro endovascular lab    Name: Chelle Chandra, : 1965, MRN: [id number]    Preprocedure Diagnosis:    Acute ischemic stroke    Postprocedure Diagnosis  Same    Procedures  Diagnostic cerebral angiogram  with emergent thrombectomy  Use of femoral artery ultrasound for  right femoral  Artery access    -Diagnostic catheter: Lopez 2  -Guidewire: 035 Glidewire  -Guide catheter: 088 Zoom  -Intermediate catheter: 6F Lora  -Microcatheter: 035 Zoom  -Microwire: 016 Fathom  -Stent-retriever: N/A    Surgeons   Samira Amato MD    Procedure Summary  Anesthesia: General     Estimated Blood Loss: 10 cc    Indications: Chelle Chandra is an 58 y.o. female who presents with left MCA syndrome. Non-invasive imaging suggested tandem left carotid/M1 occlusion. A diagnostic cerebral angiogram was requested to better define the anatomy and evaluate for possible therapeutic intervention.    Technique: The patient was brought to the angiography suite, positioned supine, underwent general anesthesia without complication, and both inguinal regions were prepped and draped in the usual sterile fashion. Time-out performed according to protocol.     Localization: The right femoral head was localized under fluoroscopy. The right femoral artery was then sonographically evaluated with an image saved:    Right femoral artery: Patent and normal.    The right femoral artery was accessed under real-time ultrasound to visualize needle entry into the vessel  and vascular access was obtained in the right femoral artery utilizing a 4 Fr micropuncture. A 8  Fr sheath was inserted and connected to a heparinized saline flush.     A 5F Lopez 2 diagnostic catheter in conjunction with a 035 angled glidewire was used to selectively catheterize the left carotid arteries.     PRE-INTERVENTION FINDINGS:    LEFT COMMON CAROTID ARTERY (cervical): DSA images of the left  anterior cervical circulation demonstrate normal course and caliber of the distal common carotid artery. The bifurcation is at C4. No significant atherosclerosis at the bifurcation. Tapering flow to occlusion indicating distal ICA occlusion. There is no evidence of dissection or early arteriovenous shunting.     LEFT INTERNAL  CAROTID ARTERY (cranial): DSA images of the left anterior circulation demonstrate an intraluminal filling defect due to clot at the communicating segment. The ophthalmic artery origin, course, and caliber are normal. There appears to be a moderate sized posterior communicating artery with incomplete filling proximally. No aneurysm or arteriovenous shunting is identified.     BASED ON THE ABOVE FINDINGS, WE PROCEEDED WITH THE FOLLOWING INTERVENTION:     Using roadmap assistance, the 088 Zoom was advanced over the Lopez 2 catheter to the distal common carotid artery. The Zoom catheter was unable to advance alone with a 035 Zoom within. A 6F Lora intermediate catheter over a 035 Zoom microcatheter and 016 Fathom microwire was advanced to the occlusion using fluoroscopic roadmap assistance. The microcatheter and microwire were removed. Using a 50 ml self locking syringe, the intermediate catheter was pulled slowly into the guide catheter, then removed through the guide catheter under constant suction. The guide sheath was allowed to back bleed and once no clots were seen it was flushed forward. Follow-up angiography demonstrated residual distal ICA/proximal M1 occlusion.     Again, the microcatheter within the intermediate catheter were advanced to the occlusion and aspiration was repeated. Follow up angiography demonstrated restoration of intracranial flow.    Follow-up angiography over the head to identify branch occlusion was performed.     An angiography of the sheath access site was performed.     Hemostasis at the right groin was obtained with 8F angioseal.    POST-INTERVENTION  FINDINGS:  LEFT INTERNAL CAROTID ARTERY (cranial): DSA images of the left anterior circulation demonstrate normal course and caliber of the petrous, cavernous, and supraclinoid segments of the internal carotid artery. The ophthalmic artery origin, course, and caliber are normal. There is a moderate sized posterior communicating artery, now filling normally. The anterior choroidal artery is normal. M1 and A1 segments are normal. There is a distal frontal capillary filling defect due to distal occlusion, but otherwise normal filling in the MCA territory. There is no filling across the anterior communicating artery to the contralateral YOLANDA. Capillary and venous phases are otherwise normal. No aneurysm or arteriovenous shunting is identified.     FEMORAL ARTERY: DSA images of the femoral artery demonstrate normal course and caliber of the artery. There is no evidence of dissection.        IMPRESSION:    1. Successful recanalization of the left tandem ICA communicating segment and M1 occlusion with mechanical thrombectomy by aspiration  2. Recanalization grade TICI 2c    Complications:  None; patient tolerated the procedure well.     Disposition: ICU - intubated and hemodynamically stable.  Condition: stable    Samira Amato MD  Endovascular Surgical Neuroradiology

## 2024-06-01 NOTE — ANESTHESIA POSTPROCEDURE EVALUATION
Anesthesia Post Evaluation    Patient: Chelle Chandra    Procedure(s) Performed: * No procedures listed *    Final Anesthesia Type: general      Patient location during evaluation: ICU  Patient participation: No - Unable to Participate, Intubation  Level of consciousness: sedated  Post-procedure vital signs: reviewed and stable  Airway patency: patent    PONV status at discharge: No PONV  Anesthetic complications: no      Cardiovascular status: blood pressure returned to baseline  Respiratory status: intubated and ventilator  Hydration status: euvolemic  Follow-up needed               Vitals Value Taken Time   /97 06/01/24 0631   Temp 34.4 °C (94 °F) 06/01/24 0626   Pulse 70 06/01/24 0631   Resp 14 06/01/24 0631   SpO2 94 % 06/01/24 0631   Vitals shown include unfiled device data.      No case tracking events are documented in the log.      Pain/Bob Score: No data recorded

## 2024-06-02 PROBLEM — I63.9 STROKE: Status: ACTIVE | Noted: 2024-06-02

## 2024-06-02 LAB
ALLENS TEST BLOOD GAS (OHS): ABNORMAL
ALLENS TEST BLOOD GAS (OHS): YES
AV INDEX (PROSTH): 0.71
AV MEAN GRADIENT: 5 MMHG
AV PEAK GRADIENT: 9 MMHG
AV VALVE AREA BY VELOCITY RATIO: 2.18 CM²
AV VALVE AREA: 2.02 CM²
AV VELOCITY RATIO: 0.77
BASE EXCESS BLD CALC-SCNC: 0.8 MMOL/L
BASE EXCESS BLD CALC-SCNC: 0.9 MMOL/L
BLOOD GAS SAMPLE TYPE (OHS): ABNORMAL
BLOOD GAS SAMPLE TYPE (OHS): ABNORMAL
CA-I BLD-SCNC: 1.15 MMOL/L (ref 1.12–1.23)
CA-I BLD-SCNC: 1.19 MMOL/L (ref 1.12–1.23)
CO2 BLDA-SCNC: 24.8 MMOL/L
CO2 BLDA-SCNC: 25.2 MMOL/L
CV ECHO LV RWT: 0.36 CM
DOP CALC AO PEAK VEL: 1.52 M/S
DOP CALC AO VTI: 33 CM
DOP CALC LVOT AREA: 2.8 CM2
DOP CALC LVOT DIAMETER: 1.9 CM
DOP CALC LVOT PEAK VEL: 1.17 M/S
DOP CALC LVOT STROKE VOLUME: 66.6 CM3
DOP CALC MV VTI: 27.9 CM
DOP CALCLVOT PEAK VEL VTI: 23.5 CM
DRAWN BY BLOOD GAS (OHS): ABNORMAL
DRAWN BY BLOOD GAS (OHS): ABNORMAL
E WAVE DECELERATION TIME: 198 MSEC
E/A RATIO: 0.95
E/E' RATIO: 5.4 M/S
ECHO LV POSTERIOR WALL: 0.77 CM (ref 0.6–1.1)
FRACTIONAL SHORTENING: 30 % (ref 28–44)
HCO3 BLDA-SCNC: 23.8 MMOL/L (ref 22–26)
HCO3 BLDA-SCNC: 24.2 MMOL/L (ref 22–26)
HR MV ECHO: 80 BPM
INHALED O2 CONCENTRATION: 25 %
INHALED O2 CONCENTRATION: 25 %
INTERVENTRICULAR SEPTUM: 0.7 CM (ref 0.6–1.1)
LEFT ATRIUM SIZE: 3 CM
LEFT ATRIUM VOLUME INDEX MOD: 19.2 ML/M2
LEFT ATRIUM VOLUME MOD: 30.4 CM3
LEFT INTERNAL DIMENSION IN SYSTOLE: 2.99 CM (ref 2.1–4)
LEFT VENTRICLE DIASTOLIC VOLUME INDEX: 52.03 ML/M2
LEFT VENTRICLE DIASTOLIC VOLUME: 82.2 ML
LEFT VENTRICLE MASS INDEX: 59 G/M2
LEFT VENTRICLE SYSTOLIC VOLUME INDEX: 22 ML/M2
LEFT VENTRICLE SYSTOLIC VOLUME: 34.7 ML
LEFT VENTRICULAR INTERNAL DIMENSION IN DIASTOLE: 4.28 CM (ref 3.5–6)
LEFT VENTRICULAR MASS: 93.54 G
LV LATERAL E/E' RATIO: 4.76 M/S
LV SEPTAL E/E' RATIO: 6.23 M/S
LVOT MG: 3 MMHG
LVOT MV: 0.76 CM/S
MECH RR (OHS): 20 B/MIN
MODE (OHS): ABNORMAL
MODE (OHS): AC
MV MEAN GRADIENT: 2 MMHG
MV PEAK A VEL: 0.85 M/S
MV PEAK E VEL: 0.81 M/S
MV PEAK GRADIENT: 4 MMHG
MV STENOSIS PRESSURE HALF TIME: 79 MS
MV VALVE AREA BY CONTINUITY EQUATION: 2.39 CM2
MV VALVE AREA P 1/2 METHOD: 2.78 CM2
OHS LV EJECTION FRACTION SIMPSONS BIPLANE MOD: 63 %
OHS QRS DURATION: 74 MS
OHS QTC CALCULATION: 460 MS
OXYGEN DEVICE BLOOD GAS (OHS): ABNORMAL
OXYGEN DEVICE BLOOD GAS (OHS): ABNORMAL
PCO2 BLDA: 32 MMHG (ref 35–45)
PCO2 BLDA: 34 MMHG (ref 35–45)
PEEP RESPIRATORY: 5 CMH2O
PEEP RESPIRATORY: 5 CMH2O
PH BLDA: 7.46 [PH] (ref 7.35–7.45)
PH BLDA: 7.48 [PH] (ref 7.35–7.45)
PISA TR MAX VEL: 1.67 M/S
PO2 BLDA: 119 MMHG (ref 80–100)
PO2 BLDA: 128 MMHG (ref 80–100)
POCT GLUCOSE: 136 MG/DL (ref 70–110)
POTASSIUM BLOOD GAS (OHS): 3.3 MMOL/L (ref 3.5–5)
POTASSIUM BLOOD GAS (OHS): 3.3 MMOL/L (ref 3.5–5)
PS (OHS): 10 CMH2O
RA PRESSURE ESTIMATED: 15 MMHG
RV TB RVSP: 17 MMHG
SAMPLE SITE BLOOD GAS (OHS): ABNORMAL
SAMPLE SITE BLOOD GAS (OHS): ABNORMAL
SAO2 % BLDA: 99 %
SAO2 % BLDA: 99 %
SINUS: 3.1 CM
SODIUM BLOOD GAS (OHS): 140 MMOL/L (ref 137–145)
SODIUM BLOOD GAS (OHS): 140 MMOL/L (ref 137–145)
SPONT+MECH VT ON VENT: 420 ML
TDI LATERAL: 0.17 M/S
TDI SEPTAL: 0.13 M/S
TDI: 0.15 M/S
TR MAX PG: 11 MMHG
TRICUSPID ANNULAR PLANE SYSTOLIC EXCURSION: 1.83 CM
TV REST PULMONARY ARTERY PRESSURE: 26 MMHG
Z-SCORE OF LEFT VENTRICULAR DIMENSION IN END DIASTOLE: -0.56
Z-SCORE OF LEFT VENTRICULAR DIMENSION IN END SYSTOLE: 0.5

## 2024-06-02 PROCEDURE — 25000003 PHARM REV CODE 250: Performed by: NURSE PRACTITIONER

## 2024-06-02 PROCEDURE — 36600 WITHDRAWAL OF ARTERIAL BLOOD: CPT

## 2024-06-02 PROCEDURE — 99900031 HC PATIENT EDUCATION (STAT)

## 2024-06-02 PROCEDURE — 82803 BLOOD GASES ANY COMBINATION: CPT

## 2024-06-02 PROCEDURE — 63600175 PHARM REV CODE 636 W HCPCS: Performed by: HOSPITALIST

## 2024-06-02 PROCEDURE — 99900026 HC AIRWAY MAINTENANCE (STAT)

## 2024-06-02 PROCEDURE — 63600175 PHARM REV CODE 636 W HCPCS

## 2024-06-02 PROCEDURE — 25000003 PHARM REV CODE 250: Performed by: HOSPITALIST

## 2024-06-02 PROCEDURE — 20000000 HC ICU ROOM

## 2024-06-02 PROCEDURE — 25000003 PHARM REV CODE 250

## 2024-06-02 PROCEDURE — 63600175 PHARM REV CODE 636 W HCPCS: Performed by: STUDENT IN AN ORGANIZED HEALTH CARE EDUCATION/TRAINING PROGRAM

## 2024-06-02 PROCEDURE — 94760 N-INVAS EAR/PLS OXIMETRY 1: CPT

## 2024-06-02 PROCEDURE — 99900035 HC TECH TIME PER 15 MIN (STAT)

## 2024-06-02 PROCEDURE — 27100171 HC OXYGEN HIGH FLOW UP TO 24 HOURS

## 2024-06-02 PROCEDURE — 94003 VENT MGMT INPAT SUBQ DAY: CPT

## 2024-06-02 RX ORDER — CLOPIDOGREL BISULFATE 75 MG/1
75 TABLET ORAL DAILY
Status: DISCONTINUED | OUTPATIENT
Start: 2024-06-02 | End: 2024-06-05 | Stop reason: HOSPADM

## 2024-06-02 RX ORDER — ACETAMINOPHEN 325 MG/1
650 TABLET ORAL EVERY 6 HOURS PRN
Status: DISCONTINUED | OUTPATIENT
Start: 2024-06-02 | End: 2024-06-05 | Stop reason: HOSPADM

## 2024-06-02 RX ADMIN — PROPOFOL 50 MCG/KG/MIN: 10 INJECTION, EMULSION INTRAVENOUS at 12:06

## 2024-06-02 RX ADMIN — ACETAMINOPHEN 650 MG: 325 TABLET, FILM COATED ORAL at 01:06

## 2024-06-02 RX ADMIN — FENTANYL CITRATE 50 MCG: 50 INJECTION, SOLUTION INTRAMUSCULAR; INTRAVENOUS at 07:06

## 2024-06-02 RX ADMIN — ATORVASTATIN CALCIUM 80 MG: 40 TABLET, FILM COATED ORAL at 10:06

## 2024-06-02 RX ADMIN — PROPOFOL 50 MCG/KG/MIN: 10 INJECTION, EMULSION INTRAVENOUS at 08:06

## 2024-06-02 RX ADMIN — MUPIROCIN: 20 OINTMENT TOPICAL at 08:06

## 2024-06-02 RX ADMIN — SODIUM CHLORIDE: 9 INJECTION, SOLUTION INTRAVENOUS at 05:06

## 2024-06-02 RX ADMIN — PROPOFOL 50 MCG/KG/MIN: 10 INJECTION, EMULSION INTRAVENOUS at 05:06

## 2024-06-02 RX ADMIN — MUPIROCIN: 20 OINTMENT TOPICAL at 09:06

## 2024-06-02 RX ADMIN — CEFTRIAXONE SODIUM 1 G: 1 INJECTION, POWDER, FOR SOLUTION INTRAMUSCULAR; INTRAVENOUS at 10:06

## 2024-06-02 NOTE — PROGRESS NOTES
[facility]  Pulmonary Critical Care Note    Patient Name: Chelle Chandra  MRN: [id number]  Admission Date: 6/1/2024  Hospital Length of Stay: 1 days  Code Status: Full Code  Attending Provider: Robin Santoyo MD  Primary Care Provider: Jorge Silver MD     Subjective:     HPI: 57 yo female with right side weakness since waking up 30 minutes ago and fell due to right side weakness while attempting to get up to go to restroom. Per , she was normal at 2130 last night when he went to bed. He is unsure of what time she went to sleep.  Deficits in the ED noted as right facial droop, right upper extremity and right lower extremity flaccid paralysis, as well as right-sided neglect.  CTA stroke yielded diminished flow in the left anterior cerebral artery, as well as left middle cerebral artery, left posterior cerebral artery, and large vessel occlusion in the left MCA.  Patient was taken for mechanical thrombectomy.  Mechanical thrombectomy was successful.  Patient currently sedated intubated.  We will attempt to wean as tolerated.       Hospital Course/Significant events:      24 Hour Interval History:  No acute events overnight.  Patient continues to be intubated and sedated.    Past Medical History:   Diagnosis Date    Accelerated junctional rhythm     Agatston CAC score, <100     Anxiety disorder, unspecified     COPD type A     Diabetes mellitus without complication     GERD (gastroesophageal reflux disease)     History of COVID-19     HLD (hyperlipidemia)     Insomnia     Lung nodule        Past Surgical History:   Procedure Laterality Date    ANGIOGRAM, CORONARY, WITH LEFT HEART CATHETERIZATION      BREAST LUMPECTOMY Right     CARDIOVERSION  08/01/2013    CARPAL TUNNEL RELEASE  10/23/2013    FUSION OF CERVICAL SPINE BY ANTERIOR APPROACH USING COMPUTER-ASSISTED NAVIGATION  06/10/2019    KIDNEY SURGERY      TONSILLECTOMY AND ADENOIDECTOMY      TOTAL ABDOMINAL HYSTERECTOMY       "WRIST SURGERY         Social History     Socioeconomic History    Marital status:    Tobacco Use    Smoking status: Every Day     Current packs/day: 0.50     Types: Cigarettes    Smokeless tobacco: Never   Substance and Sexual Activity    Alcohol use: Never    Drug use: Never           Current Outpatient Medications   Medication Instructions    albuterol (PROVENTIL) 2.5 mg, Nebulization, Every 6 hours PRN    busPIRone (BUSPAR) 15 mg, Oral, 3 times daily    dapagliflozin propanediol (FARXIGA) 5 mg, Oral, Daily    fluticasone propionate (FLOVENT DISKUS) 250 mcg/actuation DsDv 2 puffs, Inhalation, 2 times daily, Controller    levothyroxine (SYNTHROID) 112 mcg, Oral, Before breakfast    metFORMIN (GLUCOPHAGE) 500 mg, Oral, 2 times daily with meals    paroxetine (PAXIL) 20 mg, Oral, Every morning    pen needle, diabetic 32 gauge x 5/32" Ndle 1 each, Misc.(Non-Drug; Combo Route), Weekly    promethazine-codeine 6.25-10 mg/5 ml (PHENERGAN WITH CODEINE) 6.25-10 mg/5 mL syrup 5 mLs, Oral, Every 4 hours PRN    rosuvastatin (CRESTOR) 40 mg, Oral, Daily    sucralfate (CARAFATE) 1 g, Oral, Before meals & nightly    VENTOLIN HFA 90 mcg/actuation inhaler 2 puffs, Inhalation, Every 4 hours PRN       Current Inpatient Medications   atorvastatin  80 mg Oral Daily    cefTRIAXone (Rocephin) IV (PEDS and ADULTS)  1 g Intravenous Q24H    clopidogreL  75 mg Per NG tube Daily    mupirocin   Nasal BID       Current Intravenous Infusions   sodium chloride 0.9%   Intravenous Continuous 100 mL/hr at 06/02/24 0611 Rate Verify at 06/02/24 0611    sodium chloride 0.9%   Intravenous Continuous        propofoL  0-50 mcg/kg/min (Dosing Weight) Intravenous Continuous 15.8 mL/hr at 06/02/24 0611 50 mcg/kg/min at 06/02/24 0611       Review of Systems   Reason unable to perform ROS: Unable to assess patient was currently sedated and ventilated.       Objective:       Intake/Output Summary (Last 24 hours) at 6/2/2024 0634  Last data filed at " 6/2/2024 0611  Gross per 24 hour   Intake 2579.22 ml   Output 2035 ml   Net 544.22 ml         Vital Signs (Most Recent):  Temp: 98.9 °F (37.2 °C) (06/02/24 0400)  Pulse: 66 (06/02/24 0600)  Resp: 20 (06/02/24 0600)  BP: 129/80 (06/02/24 0600)  SpO2: 98 % (06/02/24 0600)  Body mass index is 18.69 kg/m².  Weight: 52.5 kg (115 lb 11.9 oz) Vital Signs (24h Range):  Temp:  [97.1 °F (36.2 °C)-98.9 °F (37.2 °C)] 98.9 °F (37.2 °C)  Pulse:  [57-90] 66  Resp:  [13-24] 20  SpO2:  [96 %-100 %] 98 %  BP: (103-149)/(69-89) 129/80     Constitutional: She appears well-developed and well-nourished. She is not diaphoretic. She does not appear ill. No distress.   HENT:   Head: Normocephalic and atraumatic.   Right Ear: External ear normal.   Left Ear: External ear normal.   Mouth/Throat: Oropharynx is clear and moist.   Eyes: Conjunctivae are normal.   Neck: Neck supple. No tracheal deviation present.   Cardiovascular:  Normal rate, regular rhythm and normal heart sounds.           No murmur heard.  Pulmonary/Chest: Breath sounds normal.  Patient currently intubated  Abdominal: Abdomen is soft. She exhibits no distension. There is no abdominal tenderness.   No right CVA tenderness.  No left CVA tenderness.   Musculoskeletal:        Unable to assess patient currently sedated  Neurology:  Unable to assess due to patient currently sedated intubated      Lines/Drains/Airways       Drain  Duration                  NG/OG Tube 06/01/24 0830 [place]os sump 16 Fr. Center mouth <1 day         Urethral Catheter 06/01/24 1400 16 Fr. <1 day              Airway  Duration                  Airway - Non-Surgical 06/01/24 0509 1 day              Peripheral Intravenous Line  Duration                  Peripheral IV - Single Lumen 06/01/24 0403 20 G Anterior;Left Forearm 1 day         Peripheral IV - Single Lumen 06/01/24 0403 20 G Left;Posterior Forearm 1 day                    Significant Labs:    Lab Results   Component Value Date    WBC 5.36 06/01/2024     HGB 13.8 06/01/2024    HCT 42.2 06/01/2024    MCV 91.3 06/01/2024     06/01/2024           BMP  Lab Results   Component Value Date     06/01/2024    K 4.1 06/01/2024    CO2 24 06/01/2024    BUN 15.6 06/01/2024    CREATININE 0.76 06/01/2024    CALCIUM 9.0 06/01/2024    AGAP 6.0 06/01/2024    EGFRNONAA >60 07/28/2021         ABG  Recent Labs   Lab 06/02/24 0316   PH 7.480*   PO2 128.0*   PCO2 32.0*   HCO3 23.8   POCBASEDEF 0.90       Mechanical Ventilation Support:  Vent Mode: A/C (06/02/24 0500)  Set Rate: 20 BPM (06/02/24 0500)  Vt Set: 420 mL (06/02/24 0500)  PEEP/CPAP: 5 cmH20 (06/02/24 0500)  Oxygen Concentration (%): 25 (06/02/24 0500)  Peak Airway Pressure: 27 cmH20 (06/02/24 0500)  Total Ve: 6.9 L/m (06/02/24 0500)  F/VT Ratio<105 (RSBI): (!) 57.64 (06/02/24 0500)      Significant Imaging:  I have reviewed the pertinent imaging within the past 24 hours.        Assessment/Plan:     Assessment  CVA s/p thrombectomy of left hand and ICA segments M1        Plan  -patient still currently intubated and sedated. Will wean as tolerated.   -ECG, CXR, CT head: as above  -plans for MRI brain.  Neurology possibly also plans for another CT head.  We will await their recommendation and orders.  -Lipitor 80 mg daily  -Vital signs & Neuro checks: q.1 hour   -NPO. Swallow study ordered. Speech therapy to additionally eval for aphasia/dysarthria    -HOB flat, bedrest (from stroke symptom onset) for 24hrs  -sheath in place: groin site assessment  -Intervention blood pressure protocol with necessary medications for SBP >160 for 24 hours, progress to Cleviprex as needed  -progressive mobility once sheath is removed  -PT/OT  -NS @100 cc/hr    DVT Prophylaxis:  SCD  GI Prophylaxis:  None     32 minutes of critical care was time spent personally by me on the following activities: development of treatment plan with patient or surrogate and bedside caregivers, discussions with consultants, evaluation of patient's response  to treatment, examination of patient, ordering and performing treatments and interventions, ordering and review of laboratory studies, ordering and review of radiographic studies, pulse oximetry, re-evaluation of patient's condition.  This critical care time did not overlap with that of any other provider or involve time for any procedures.     Susana Flanagan MD  Pulmonary Critical Care Medicine  [facility]  DOS: 06/02/2024

## 2024-06-02 NOTE — PROGRESS NOTES
[facility]  Neurology  Progress Note    Patient Name: Chelle Chandra  MRN: [id number]  Admission Date: 6/1/2024  Hospital Length of Stay: 1 days  Code Status: Full Code   Attending Provider: Robin Santoyo MD  Primary Care Physician: Jorge Silver MD   Principal Problem:<principal problem not specified>        Subjective:     Interval History:     No new issues, sedation has been turned off, she is following commands, family at bedside, all questions answered.     Current Neurological Medications:     Current Facility-Administered Medications   Medication Dose Route Frequency Provider Last Rate Last Admin    0.9%  NaCl infusion   Intravenous Continuous Susana Flanagan  mL/hr at 06/02/24 1013 Restarted at 06/02/24 1013    0.9%  NaCl infusion   Intravenous Continuous Ana Luisa Ku  mL/hr at 06/02/24 1010 Restarted at 06/02/24 1010    atorvastatin tablet 80 mg  80 mg Oral Daily Susana Flanagan MD   80 mg at 06/02/24 1005    bisacodyL suppository 10 mg  10 mg Rectal Daily PRN Ana Luisa Ku NP        cefTRIAXone (Rocephin) 1 g in dextrose 5 % in water (D5W) 100 mL IVPB (MB+)  1 g Intravenous Q24H Robin Santoyo  mL/hr at 06/02/24 1015 1 g at 06/02/24 1015    clopidogreL tablet 75 mg  75 mg Per NG tube Daily Ana Luisa Ku NP        fentaNYL injection 50 mcg  50 mcg Intravenous Q1H PRN Srikanth Sanders DO   50 mcg at 06/02/24 0750    hydrALAZINE injection 10 mg  10 mg Intravenous Q6H PRN Susana Flanagan MD        mupirocin 2 % ointment   Nasal BID Robin Santoyo MD   Given at 06/02/24 0900    ondansetron injection 4 mg  4 mg Intravenous Q8H PRN Ana Luisa Ku NP        propofol (DIPRIVAN) 10 mg/mL infusion  0-50 mcg/kg/min (Dosing Weight) Intravenous Continuous Susana Flanagan MD 15.8 mL/hr at 06/02/24 1015 50 mcg/kg/min at 06/02/24 1015    sodium chloride 0.9% flush 10 mL  10 mL Intravenous PRN Samira Amato MD           Review of Systems    Unable to perform ROS: Intubated     Objective:     Vital Signs (Most Recent):  Temp: 98.9 °F (37.2 °C) (06/02/24 0400)  Pulse: 74 (06/02/24 1145)  Resp: (!) 22 (06/02/24 1145)  BP: (!) 145/86 (06/02/24 1115)  SpO2: 99 % (06/02/24 1145) Vital Signs (24h Range):  Temp:  [97.1 °F (36.2 °C)-98.9 °F (37.2 °C)] 98.9 °F (37.2 °C)  Pulse:  [57-92] 74  Resp:  [13-24] 22  SpO2:  [96 %-100 %] 99 %  BP: (116-152)/(70-89) 145/86     Weight: 52.5 kg (115 lb 11.9 oz)  Body mass index is 18.69 kg/m².     Physical Exam  Vitals and nursing note reviewed.   Constitutional:       General: She is not in acute distress.     Interventions: She is intubated.      Comments: Sedation paused during exam     HENT:      Head: Normocephalic.   Eyes:      Pupils: Pupils are equal, round, and reactive to light.   Cardiovascular:      Rate and Rhythm: Normal rate.   Pulmonary:      Effort: Pulmonary effort is normal. She is intubated.   Musculoskeletal:         General: Normal range of motion.      Cervical back: Normal range of motion.      Right lower leg: No edema.      Left lower leg: No edema.   Skin:     General: Skin is warm and dry.      Capillary Refill: Capillary refill takes less than 2 seconds.   Neurological:      Mental Status: She is alert.      Comments:     Limited PE 2/2 intubation  She is awake, tracks  PERR, no gaze preference, looks to the right and left  Follows commands  Antigravity RUE, RLE  5/5 LUE, LLE  Able to stick out tongue   Limited communication 2/2 intubation     Psychiatric:         Behavior: Behavior is cooperative.          NEUROLOGICAL EXAMINATION:     CRANIAL NERVES     CN III, IV, VI   Pupils are equal, round, and reactive to light.      Significant Labs:   Recent Lab Results         06/02/24  1155   06/02/24  0316        Allens Test N/A   Yes       Calcium Level Ionized 1.19   1.15       Drawn by krviator rt   TIGIST, RT       FIO2, Blood gas 25   25       Mech Vt   420       MODE CPAP   AC       Oxygen  Device, Blood gas Ventilator   Ventilator       PEEP 5.0   5.0       Base Excess, Blood gas 0.80   0.90       POC HCO3 24.2   23.8       POC PCO2 34.0   32.0       POC PH 7.460   7.480       POC PO2 119.0   128.0       Potassium, Blood Gas 3.3   3.3       Sodium, Blood Gas 140   140       PS 10.0         [facility] RR   20       Sample site Left Radial Artery   Right Radial Artery       Sample Type Arterial Blood   Arterial Blood       sO2, Blood gas 99.0   99.0       TOC2, Blood gas 25.2   24.8               Significant Imaging: I have reviewed all pertinent imaging results/findings within the past 24 hours.  Assessment and Plan:     Stroke  -presented with right sided weakness, right sided neglect  -stroke RF: smoking  -intervention: thrombectomy for L ICA occlusion      Stroke workup  -MRI brain:  1. Findings consistent with widespread acute ischemic infarct involving the left MCA distribution.  2. No convincing evidence of interval hemorrhagic conversion or other new intracranial abnormality.  -CT head on 6/1 at 13:00:  1. Changes consistent with evolving left MCA ischemic infarct.  2. No evidence of acute intracranial hemorrhage.  -LDL: 158  -TSH: 0.035        Plan  -therapy evaluations after extubation  -plavix today, she did have mild bloody drainage coming from NGT unsure if it is just local irritation from NGT (allergy to aspirin, hives)  -Goal Sbp less than 140, A1c goal less than 7, LDL goal less than 70 to optimize secondary stroke prevention  -atorvastatin 80 mg   -awaiting ECHO    -can follow up in stroke clinic within 2 months of hospital discharge with Jayne BOSTON  -needs 30 day monitor upon discharge, if negative needs loop/linq, strong suspicion for cardioembolic etiology   -signing off from a stroke standpoint, please call with questions       Further recommendations to follow from MD    VTE Risk Mitigation (From admission, onward)           Ordered     IP VTE LOW RISK PATIENT  Once         06/01/24 0751      Place sequential compression device  Until discontinued         06/01/24 0709                    Ana Luisa Ku NP  Neurology  [facility]

## 2024-06-02 NOTE — SUBJECTIVE & OBJECTIVE
Subjective:     Interval History:     No new issues, sedation has been turned off, she is following commands, family at bedside, all questions answered.     Current Neurological Medications:     Current Facility-Administered Medications   Medication Dose Route Frequency Provider Last Rate Last Admin    0.9%  NaCl infusion   Intravenous Continuous Susana Flanagan  mL/hr at 06/02/24 1013 Restarted at 06/02/24 1013    0.9%  NaCl infusion   Intravenous Continuous Ana Luisa Ku  mL/hr at 06/02/24 1010 Restarted at 06/02/24 1010    atorvastatin tablet 80 mg  80 mg Oral Daily Susana Flanagan MD   80 mg at 06/02/24 1005    bisacodyL suppository 10 mg  10 mg Rectal Daily PRN Ana Luisa Ku NP        cefTRIAXone (Rocephin) 1 g in dextrose 5 % in water (D5W) 100 mL IVPB (MB+)  1 g Intravenous Q24H Robin Santoyo  mL/hr at 06/02/24 1015 1 g at 06/02/24 1015    clopidogreL tablet 75 mg  75 mg Per NG tube Daily Ana Luisa Ku NP        fentaNYL injection 50 mcg  50 mcg Intravenous Q1H PRN Srikanth Sanders DO   50 mcg at 06/02/24 0750    hydrALAZINE injection 10 mg  10 mg Intravenous Q6H PRN Susana Flanagan MD        mupirocin 2 % ointment   Nasal BID Robin Santoyo MD   Given at 06/02/24 0900    ondansetron injection 4 mg  4 mg Intravenous Q8H PRN Ana Luisa Ku NP        propofol (DIPRIVAN) 10 mg/mL infusion  0-50 mcg/kg/min (Dosing Weight) Intravenous Continuous Susana Flanagan MD 15.8 mL/hr at 06/02/24 1015 50 mcg/kg/min at 06/02/24 1015    sodium chloride 0.9% flush 10 mL  10 mL Intravenous PRN Samira Amato MD           Review of Systems   Unable to perform ROS: Intubated     Objective:     Vital Signs (Most Recent):  Temp: 98.9 °F (37.2 °C) (06/02/24 0400)  Pulse: 74 (06/02/24 1145)  Resp: (!) 22 (06/02/24 1145)  BP: (!) 145/86 (06/02/24 1115)  SpO2: 99 % (06/02/24 1145) Vital Signs (24h Range):  Temp:  [97.1 °F (36.2 °C)-98.9 °F (37.2 °C)] 98.9 °F (37.2 °C)  Pulse:  [57-92]  74  Resp:  [13-24] 22  SpO2:  [96 %-100 %] 99 %  BP: (116-152)/(70-89) 145/86     Weight: 52.5 kg (115 lb 11.9 oz)  Body mass index is 18.69 kg/m².     Physical Exam  Vitals and nursing note reviewed.   Constitutional:       General: She is not in acute distress.     Interventions: She is intubated.      Comments: Sedation paused during exam     HENT:      Head: Normocephalic.   Eyes:      Pupils: Pupils are equal, round, and reactive to light.   Cardiovascular:      Rate and Rhythm: Normal rate.   Pulmonary:      Effort: Pulmonary effort is normal. She is intubated.   Musculoskeletal:         General: Normal range of motion.      Cervical back: Normal range of motion.      Right lower leg: No edema.      Left lower leg: No edema.   Skin:     General: Skin is warm and dry.      Capillary Refill: Capillary refill takes less than 2 seconds.   Neurological:      Mental Status: She is alert.      Comments:     Limited PE 2/2 intubation  She is awake, tracks  PERR, no gaze preference, looks to the right and left  Follows commands  Antigravity RUE, RLE  5/5 LUE, LLE  Able to stick out tongue   Limited communication 2/2 intubation     Psychiatric:         Behavior: Behavior is cooperative.          NEUROLOGICAL EXAMINATION:     CRANIAL NERVES     CN III, IV, VI   Pupils are equal, round, and reactive to light.      Significant Labs:   Recent Lab Results         06/02/24  1155   06/02/24  0316        Allens Test N/A   Yes       Calcium Level Ionized 1.19   1.15       Drawn by krviator rt   TIGIST, RT       FIO2, Blood gas 25   25       Mech Vt   420       MODE CPAP   AC       Oxygen Device, Blood gas Ventilator   Ventilator       PEEP 5.0   5.0       Base Excess, Blood gas 0.80   0.90       POC HCO3 24.2   23.8       POC PCO2 34.0   32.0       POC PH 7.460   7.480       POC PO2 119.0   128.0       Potassium, Blood Gas 3.3   3.3       Sodium, Blood Gas 140   140       PS 10.0         Mech RR   20       Sample site Left  Radial Artery   Right Radial Artery       Sample Type Arterial Blood   Arterial Blood       sO2, Blood gas 99.0   99.0       TOC2, Blood gas 25.2   24.8               Significant Imaging: I have reviewed all pertinent imaging results/findings within the past 24 hours.

## 2024-06-02 NOTE — NURSING
Nurses Note -- 4 Eyes      6/1/2024   8:14 PM      Skin assessed during: Daily Assessment      [] No Altered Skin Integrity Present    [x]Prevention Measures Documented      [x] Yes- Altered Skin Integrity Present or Discovered   [] LDA Added if Not in Epic (Describe Wound)   [] New Altered Skin Integrity was Present on Admit and Documented in LDA   [] Wound Image Taken    Wound Care Consulted? No    Attending Nurse:  Angella Lopez RN/Staff Member:  ASHLEY Campbell    Patient has no skin abnormalities except for R groin sheath site that is c/d/I.

## 2024-06-02 NOTE — PLAN OF CARE
Problem: Adult Inpatient Plan of Care  Goal: Plan of Care Review  Outcome: Progressing  Flowsheets (Taken 6/1/2024 2013)  Plan of Care Reviewed With: patient  Goal: Patient-Specific Goal (Individualized)  Outcome: Progressing  Goal: Absence of Hospital-Acquired Illness or Injury  Outcome: Progressing  Goal: Optimal Comfort and Wellbeing  Outcome: Progressing  Goal: Readiness for Transition of Care  Outcome: Progressing     Problem: Diabetes Comorbidity  Goal: Blood Glucose Level Within Targeted Range  Outcome: Progressing     Problem: Wound  Goal: Optimal Coping  Outcome: Progressing  Goal: Optimal Functional Ability  Outcome: Progressing  Goal: Absence of Infection Signs and Symptoms  Outcome: Progressing  Goal: Improved Oral Intake  Outcome: Progressing  Goal: Optimal Pain Control and Function  Outcome: Progressing  Goal: Skin Health and Integrity  Outcome: Progressing  Goal: Optimal Wound Healing  Outcome: Progressing     Problem: Fall Injury Risk  Goal: Absence of Fall and Fall-Related Injury  Outcome: Progressing     Problem: Skin Injury Risk Increased  Goal: Skin Health and Integrity  Outcome: Progressing     Problem: Stroke, Ischemic (Includes Transient Ischemic Attack)  Goal: Optimal Coping  Outcome: Progressing  Goal: Effective Bowel Elimination  Outcome: Progressing  Goal: Optimal Cerebral Tissue Perfusion  Outcome: Progressing  Goal: Optimal Cognitive Function  Outcome: Progressing  Goal: Improved Communication Skills  Outcome: Progressing  Goal: Optimal Functional Ability  Outcome: Progressing  Goal: Optimal Nutrition Intake  Outcome: Progressing  Goal: Effective Oxygenation and Ventilation  Outcome: Progressing  Goal: Improved Sensorimotor Function  Outcome: Progressing  Goal: Safe and Effective Swallow  Outcome: Progressing  Goal: Effective Urinary Elimination  Outcome: Progressing     Problem: Infection  Goal: Absence of Infection Signs and Symptoms  Outcome: Progressing

## 2024-06-02 NOTE — PT/OT/SLP PROGRESS
Physical Therapy      Patient Name:  Chelle Chandra   MRN:  [id number]    Patient not seen today secondary to, currently intubated/sedated, not appropriate for therapy  . Will follow-up as schedule permits.

## 2024-06-02 NOTE — NURSING
Nurses Note -- 4 Eyes      6/2/2024   8:29 AM      Skin assessed during: Daily Assessment      [] No Altered Skin Integrity Present    []Prevention Measures Documented      [x] Yes- Altered Skin Integrity Present or Discovered   [] LDA Added if Not in Epic (Describe Wound)   [] New Altered Skin Integrity was Present on Admit and Documented in LDA   [] Wound Image Taken    Wound Care Consulted? No    Attending Nurse:  Radha Orozco RN     Second RN/Staff Member:  Jessica Rivera RN

## 2024-06-03 LAB
ALBUMIN SERPL-MCNC: 2.9 G/DL (ref 3.5–5)
ALBUMIN/GLOB SERPL: 1.2 RATIO (ref 1.1–2)
ALP SERPL-CCNC: 95 UNIT/L (ref 40–150)
ALT SERPL-CCNC: 11 UNIT/L (ref 0–55)
ANION GAP SERPL CALC-SCNC: 11 MEQ/L
AST SERPL-CCNC: 23 UNIT/L (ref 5–34)
BASOPHILS # BLD AUTO: 0.04 X10(3)/MCL
BASOPHILS NFR BLD AUTO: 0.5 %
BILIRUB SERPL-MCNC: 0.9 MG/DL
BUN SERPL-MCNC: 11.3 MG/DL (ref 9.8–20.1)
CALCIUM SERPL-MCNC: 8.4 MG/DL (ref 8.4–10.2)
CHLORIDE SERPL-SCNC: 112 MMOL/L (ref 98–107)
CO2 SERPL-SCNC: 21 MMOL/L (ref 22–29)
CREAT SERPL-MCNC: 0.68 MG/DL (ref 0.55–1.02)
CREAT/UREA NIT SERPL: 17
EOSINOPHIL # BLD AUTO: 0.08 X10(3)/MCL (ref 0–0.9)
EOSINOPHIL NFR BLD AUTO: 1.1 %
ERYTHROCYTE [DISTWIDTH] IN BLOOD BY AUTOMATED COUNT: 12.9 % (ref 11.5–17)
GFR SERPLBLD CREATININE-BSD FMLA CKD-EPI: >60 ML/MIN/1.73/M2
GLOBULIN SER-MCNC: 2.5 GM/DL (ref 2.4–3.5)
GLUCOSE SERPL-MCNC: 76 MG/DL (ref 74–100)
HCT VFR BLD AUTO: 35.3 % (ref 37–47)
HGB BLD-MCNC: 12.1 G/DL (ref 12–16)
IMM GRANULOCYTES # BLD AUTO: 0.02 X10(3)/MCL (ref 0–0.04)
IMM GRANULOCYTES NFR BLD AUTO: 0.3 %
LEFT CCA DIST DIAS: 27 CM/S
LEFT CCA DIST SYS: 74 CM/S
LEFT CCA PROX DIAS: 25 CM/S
LEFT CCA PROX SYS: 63 CM/S
LEFT ECA DIAS: 15 CM/S
LEFT ECA SYS: 67 CM/S
LEFT ICA DIST DIAS: 38 CM/S
LEFT ICA DIST SYS: 92 CM/S
LEFT ICA MID DIAS: 29 CM/S
LEFT ICA MID SYS: 69 CM/S
LEFT ICA PROX DIAS: 28 CM/S
LEFT ICA PROX SYS: 69 CM/S
LEFT VERTEBRAL DIAS: 14 CM/S
LEFT VERTEBRAL SYS: 49 CM/S
LYMPHOCYTES # BLD AUTO: 1.67 X10(3)/MCL (ref 0.6–4.6)
LYMPHOCYTES NFR BLD AUTO: 22.4 %
MCH RBC QN AUTO: 30.6 PG (ref 27–31)
MCHC RBC AUTO-ENTMCNC: 34.3 G/DL (ref 33–36)
MCV RBC AUTO: 89.1 FL (ref 80–94)
MONOCYTES # BLD AUTO: 0.69 X10(3)/MCL (ref 0.1–1.3)
MONOCYTES NFR BLD AUTO: 9.3 %
NEUTROPHILS # BLD AUTO: 4.94 X10(3)/MCL (ref 2.1–9.2)
NEUTROPHILS NFR BLD AUTO: 66.4 %
NRBC BLD AUTO-RTO: 0 %
OHS CV CAROTID RIGHT ICA EDV HIGHEST: 31
OHS CV CAROTID ULTRASOUND LEFT ICA/CCA RATIO: 1.24
OHS CV CAROTID ULTRASOUND RIGHT ICA/CCA RATIO: 1.4
OHS CV PV CAROTID LEFT HIGHEST CCA: 74
OHS CV PV CAROTID LEFT HIGHEST ICA: 92
OHS CV PV CAROTID RIGHT HIGHEST CCA: 63
OHS CV PV CAROTID RIGHT HIGHEST ICA: 88
OHS CV US CAROTID LEFT HIGHEST EDV: 38
PLATELET # BLD AUTO: 238 X10(3)/MCL (ref 130–400)
PMV BLD AUTO: 10.8 FL (ref 7.4–10.4)
POCT GLUCOSE: 123 MG/DL (ref 70–110)
POTASSIUM SERPL-SCNC: 3.6 MMOL/L (ref 3.5–5.1)
PROT SERPL-MCNC: 5.4 GM/DL (ref 6.4–8.3)
RBC # BLD AUTO: 3.96 X10(6)/MCL (ref 4.2–5.4)
RIGHT CCA DIST DIAS: 16 CM/S
RIGHT CCA DIST SYS: 63 CM/S
RIGHT CCA PROX DIAS: 24 CM/S
RIGHT CCA PROX SYS: 62 CM/S
RIGHT ECA DIAS: 49 CM/S
RIGHT ECA SYS: 141 CM/S
RIGHT ICA DIST DIAS: 31 CM/S
RIGHT ICA DIST SYS: 88 CM/S
RIGHT ICA MID DIAS: 27 CM/S
RIGHT ICA MID SYS: 77 CM/S
RIGHT ICA PROX DIAS: 20 CM/S
RIGHT ICA PROX SYS: 57 CM/S
RIGHT VERTEBRAL SYS: 32 CM/S
SODIUM SERPL-SCNC: 144 MMOL/L (ref 136–145)
T3FREE SERPL-MCNC: 2.26 PG/ML (ref 1.58–3.91)
T4 FREE SERPL-MCNC: 2.01 NG/DL (ref 0.7–1.48)
WBC # SPEC AUTO: 7.44 X10(3)/MCL (ref 4.5–11.5)

## 2024-06-03 PROCEDURE — 25000003 PHARM REV CODE 250: Performed by: NURSE PRACTITIONER

## 2024-06-03 PROCEDURE — 25000003 PHARM REV CODE 250: Performed by: HOSPITALIST

## 2024-06-03 PROCEDURE — 11000001 HC ACUTE MED/SURG PRIVATE ROOM

## 2024-06-03 PROCEDURE — 92611 MOTION FLUOROSCOPY/SWALLOW: CPT

## 2024-06-03 PROCEDURE — 63600175 PHARM REV CODE 636 W HCPCS: Performed by: HOSPITALIST

## 2024-06-03 PROCEDURE — 84481 FREE ASSAY (FT-3): CPT | Performed by: STUDENT IN AN ORGANIZED HEALTH CARE EDUCATION/TRAINING PROGRAM

## 2024-06-03 PROCEDURE — 25500020 PHARM REV CODE 255: Performed by: HOSPITALIST

## 2024-06-03 PROCEDURE — 85025 COMPLETE CBC W/AUTO DIFF WBC: CPT | Performed by: HOSPITALIST

## 2024-06-03 PROCEDURE — A9698 NON-RAD CONTRAST MATERIALNOC: HCPCS | Performed by: HOSPITALIST

## 2024-06-03 PROCEDURE — 25000003 PHARM REV CODE 250

## 2024-06-03 PROCEDURE — 97161 PT EVAL LOW COMPLEX 20 MIN: CPT

## 2024-06-03 PROCEDURE — 36415 COLL VENOUS BLD VENIPUNCTURE: CPT | Performed by: HOSPITALIST

## 2024-06-03 PROCEDURE — 80053 COMPREHEN METABOLIC PANEL: CPT | Performed by: HOSPITALIST

## 2024-06-03 PROCEDURE — 84439 ASSAY OF FREE THYROXINE: CPT | Performed by: STUDENT IN AN ORGANIZED HEALTH CARE EDUCATION/TRAINING PROGRAM

## 2024-06-03 PROCEDURE — 97166 OT EVAL MOD COMPLEX 45 MIN: CPT

## 2024-06-03 PROCEDURE — 92610 EVALUATE SWALLOWING FUNCTION: CPT

## 2024-06-03 RX ADMIN — MUPIROCIN: 20 OINTMENT TOPICAL at 08:06

## 2024-06-03 RX ADMIN — CEFTRIAXONE SODIUM 1 G: 1 INJECTION, POWDER, FOR SOLUTION INTRAMUSCULAR; INTRAVENOUS at 09:06

## 2024-06-03 RX ADMIN — CLOPIDOGREL BISULFATE 75 MG: 75 TABLET ORAL at 09:06

## 2024-06-03 RX ADMIN — ACETAMINOPHEN 650 MG: 325 TABLET, FILM COATED ORAL at 09:06

## 2024-06-03 RX ADMIN — ATORVASTATIN CALCIUM 80 MG: 40 TABLET, FILM COATED ORAL at 09:06

## 2024-06-03 RX ADMIN — BARIUM SULFATE 10 ML: 0.81 POWDER, FOR SUSPENSION ORAL at 01:06

## 2024-06-03 NOTE — PROGRESS NOTES
[facility] - [facility]  Pulmonary Critical Care Note    Patient Name: Chelle Chandra  MRN: [id number]  Admission Date: 6/1/2024  Hospital Length of Stay: 2 days  Code Status: Full Code  Attending Provider: Robin Santoyo MD  Primary Care Provider: Jorge Silver MD     Subjective:     HPI: 59 yo female with right side weakness since waking up 30 minutes ago and fell due to right side weakness while attempting to get up to go to restroom. Per , she was normal at 2130 last night when he went to bed. He is unsure of what time she went to sleep.  Deficits in the ED noted as right facial droop, right upper extremity and right lower extremity flaccid paralysis, as well as right-sided neglect.  CTA stroke yielded diminished flow in the left anterior cerebral artery, as well as left middle cerebral artery, left posterior cerebral artery, and large vessel occlusion in the left MCA.  Patient was taken for mechanical thrombectomy.  Mechanical thrombectomy was successful.  Patient currently sedated intubated.  We will attempt to wean as tolerated.       Hospital Course/Significant events:      24 Hour Interval History:  No acute events overnight.  Patient was doing well.  Off of Cleviprex infusion.  Keeping blood pressure parameters by neurology recommendation.  Needs speech evaluation given patient failed bedside swallow study yesterday.    Past Medical History:   Diagnosis Date    Accelerated junctional rhythm     Agatston CAC score, <100     Anxiety disorder, unspecified     COPD type A     Diabetes mellitus without complication     GERD (gastroesophageal reflux disease)     History of COVID-19     HLD (hyperlipidemia)     Insomnia     Lung nodule        Past Surgical History:   Procedure Laterality Date    ANGIOGRAM, CORONARY, WITH LEFT HEART CATHETERIZATION      BREAST LUMPECTOMY Right     CARDIOVERSION  08/01/2013    CARPAL TUNNEL RELEASE  10/23/2013    FUSION OF CERVICAL SPINE BY ANTERIOR  "APPROACH USING COMPUTER-ASSISTED NAVIGATION  06/10/2019    KIDNEY SURGERY      TONSILLECTOMY AND ADENOIDECTOMY      TOTAL ABDOMINAL HYSTERECTOMY      WRIST SURGERY         Social History     Socioeconomic History    Marital status:    Tobacco Use    Smoking status: Every Day     Current packs/day: 0.50     Types: Cigarettes    Smokeless tobacco: Never   Substance and Sexual Activity    Alcohol use: Never    Drug use: Never           Current Outpatient Medications   Medication Instructions    albuterol (PROVENTIL) 2.5 mg, Nebulization, Every 6 hours PRN    busPIRone (BUSPAR) 15 mg, Oral, 3 times daily    dapagliflozin propanediol (FARXIGA) 5 mg, Oral, Daily    fluticasone propionate (FLOVENT DISKUS) 250 mcg/actuation DsDv 2 puffs, Inhalation, 2 times daily, Controller    levothyroxine (SYNTHROID) 112 mcg, Oral, Before breakfast    metFORMIN (GLUCOPHAGE) 500 mg, Oral, 2 times daily with meals    paroxetine (PAXIL) 20 mg, Oral, Every morning    pen needle, diabetic 32 gauge x 5/32" Ndle 1 each, Misc.(Non-Drug; Combo Route), Weekly    promethazine-codeine 6.25-10 mg/5 ml (PHENERGAN WITH CODEINE) 6.25-10 mg/5 mL syrup 5 mLs, Oral, Every 4 hours PRN    rosuvastatin (CRESTOR) 40 mg, Oral, Daily    sucralfate (CARAFATE) 1 g, Oral, Before meals & nightly    VENTOLIN HFA 90 mcg/actuation inhaler 2 puffs, Inhalation, Every 4 hours PRN       Current Inpatient Medications   atorvastatin  80 mg Oral Daily    cefTRIAXone (Rocephin) IV (PEDS and ADULTS)  1 g Intravenous Q24H    clopidogreL  75 mg Per NG tube Daily    mupirocin   Nasal BID       Current Intravenous Infusions   sodium chloride 0.9%   Intravenous Continuous 100 mL/hr at 06/02/24 1811 Rate Verify at 06/02/24 1811    sodium chloride 0.9%   Intravenous Continuous 100 mL/hr at 06/02/24 1010 Restarted at 06/02/24 1010    propofoL  0-50 mcg/kg/min (Dosing Weight) Intravenous Continuous   Stopped at 06/02/24 1040       Review of Systems   Reason unable to perform ROS: " Unable to assess patient was currently sedated and ventilated.       Objective:       Intake/Output Summary (Last 24 hours) at 6/3/2024 0739  Last data filed at 6/2/2024 1811  Gross per 24 hour   Intake 1065.56 ml   Output 1125 ml   Net -59.44 ml         Vital Signs (Most Recent):  Temp: 97.1 °F (36.2 °C) (06/03/24 0400)  Pulse: 77 (06/03/24 0400)  Resp: (!) 24 (06/03/24 0400)  BP: 127/71 (06/03/24 0400)  SpO2: 96 % (06/03/24 0400)  Body mass index is 18.69 kg/m².  Weight: 52.5 kg (115 lb 11.9 oz) Vital Signs (24h Range):  Temp:  [97.1 °F (36.2 °C)-100.7 °F (38.2 °C)] 97.1 °F (36.2 °C)  Pulse:  [64-92] 77  Resp:  [14-29] 24  SpO2:  [95 %-100 %] 96 %  BP: (121-146)/(63-87) 127/71     Constitutional: She appears well-developed and well-nourished. She is not diaphoretic. She does not appear ill. No distress.   HENT:   Head: Normocephalic and atraumatic.   Right Ear: External ear normal.   Left Ear: External ear normal.   Mouth/Throat: Oropharynx is clear and moist.   Eyes: Conjunctivae are normal.   Neck: Neck supple. No tracheal deviation present.   Cardiovascular:  Normal rate, regular rhythm and normal heart sounds.           No murmur heard.  Pulmonary/Chest: Breath sounds normal.  On room air  Abdominal: Abdomen is soft. She exhibits no distension. There is no abdominal tenderness.   No right CVA tenderness.  No left CVA tenderness.   Musculoskeletal:          Neurology:  Alert, oriented x3; right facial droop noted.  Coherent speech.  5/5 muscle strength on upper and lower extremities bilaterally.  Sensation intact      Lines/Drains/Airways       Peripheral Intravenous Line  Duration                  Peripheral IV - Single Lumen 06/01/24 0403 20 G Anterior;Left Forearm 2 days         Peripheral IV - Single Lumen 06/01/24 0403 20 G Left;Posterior Forearm 2 days                    Significant Labs:    Lab Results   Component Value Date    WBC 5.36 06/01/2024    HGB 13.8 06/01/2024    HCT 42.2 06/01/2024    MCV 91.3  06/01/2024     06/01/2024           BMP  Lab Results   Component Value Date     06/01/2024    K 4.1 06/01/2024    CO2 24 06/01/2024    BUN 15.6 06/01/2024    CREATININE 0.76 06/01/2024    CALCIUM 9.0 06/01/2024    AGAP 6.0 06/01/2024    EGFRNONAA >60 07/28/2021         ABG  Recent Labs   Lab 06/02/24  1155   PH 7.460*   PO2 119.0*   PCO2 34.0*   HCO3 24.2   POCBASEDEF 0.80       Mechanical Ventilation Support:  Vent Mode: (S) CPAP / PSV (per MD) (06/02/24 1144)  Set Rate: 20 BPM (06/02/24 0812)  Vt Set: 420 mL (06/02/24 0812)  Pressure Support: 10 cmH20 (06/02/24 1144)  PEEP/CPAP: 5 cmH20 (06/02/24 1144)  Oxygen Concentration (%): 25 (06/02/24 1200)  Peak Airway Pressure: 23 cmH20 (06/02/24 1144)  Total Ve: 8.1 L/m (06/02/24 1144)  F/VT Ratio<105 (RSBI): (!) 50.25 (06/02/24 1144)      Significant Imaging:  I have reviewed the pertinent imaging within the past 24 hours.        Assessment/Plan:     Assessment  CVA s/p thrombectomy of left hand and ICA segments M1  Hyperlipidemia  Diabetes        Plan  -continue Plavix(aspirin allergy) and atorvastatin 80 per neurology recommendation  -Vital signs & Neuro checks: q.1 hour   -patient failed bedside swallow study yesterday; will keep patient NPO until speech evaluate the patient this morning  -HOB flat, bedrest (from stroke symptom onset) for 24hrs  -sheath in place: groin site assessment  -patient is off of Cleviprex infusion; keeping blood pressure parameters per neurology and neurosurgery recommendation  -progressive mobility once sheath is removed  -PT/OT  - labs pending this morning including T3 and T4  -needs Holter monitor on discharge for AFib monitoring per neurology recommendation  -likely downgrade patient today if okay with Neurology and Neurosurgery recommendation    DVT Prophylaxis:  SCD  GI Prophylaxis:  None     32 minutes of critical care was time spent personally by me on the following activities: development of treatment plan with patient  or surrogate and bedside caregivers, discussions with consultants, evaluation of patient's response to treatment, examination of patient, ordering and performing treatments and interventions, ordering and review of laboratory studies, ordering and review of radiographic studies, pulse oximetry, re-evaluation of patient's condition.  This critical care time did not overlap with that of any other provider or involve time for any procedures.     Anjum Geronimo DO  Pulmonary Critical Care Medicine  [facility] - [facility]  DOS: 06/03/2024

## 2024-06-03 NOTE — PLAN OF CARE
06/03/24 1305   Discharge Assessment   Assessment Type Discharge Planning Assessment   Confirmed/corrected address, phone number and insurance Yes   Confirmed Demographics Correct on Facesheet   Source of Information patient   When was your last doctors appointment?   (last month)   Communicated DARIAN with patient/caregiver Date not available/Unable to determine   Reason For Admission Stroke   People in Home spouse   Do you expect to return to your current living situation? Yes   Do you have help at home or someone to help you manage your care at home? Yes   Who are your caregiver(s) and their phone number(s)? Roberto Chandra (Spouse)  [phone]   Prior to hospitilization cognitive status: Unable to Assess   Current cognitive status: Alert/Oriented   Walking or Climbing Stairs Difficulty no   Dressing/Bathing Difficulty no   Home Accessibility stairs to enter home   Number of Stairs, Main Entrance three   Stair Railings, Main Entrance railings safe and in good condition   Equipment Currently Used at Home blood pressure machine;glucometer   Readmission within 30 days? No   Patient currently being followed by outpatient case management? No   Do you currently have service(s) that help you manage your care at home? No   Do you take prescription medications? Yes   Do you have prescription coverage? Yes   Coverage bcbs   Do you have any problems affording any of your prescribed medications? No   Is the patient taking medications as prescribed? yes   Who is going to help you get home at discharge? Roberto Chandra (Spouse)  [phone]   How do you get to doctors appointments? car, drives self   Are you on dialysis? No   Do you take coumadin? No   Discharge Plan A Home   Discharge Plan B Home   DME Needed Upon Discharge  none   Discharge Plan discussed with: Patient   Transition of Care Barriers None   Physical Activity   On average, how many days per week do you engage in moderate to strenuous exercise (like a brisk  walk)? 0 days   Financial Resource Strain   How hard is it for you to pay for the very basics like food, housing, medical care, and heating? Not very   Housing Stability   In the last 12 months, was there a time when you were not able to pay the mortgage or rent on time? N   At any time in the past 12 months, were you homeless or living in a shelter (including now)? N   Transportation Needs   Has the lack of transportation kept you from medical appointments, meetings, work or from getting things needed for daily living? No   Food Insecurity   Within the past 12 months, you worried that your food would run out before you got the money to buy more. Never true   Within the past 12 months, the food you bought just didn't last and you didn't have money to get more. Never true   Stress   Do you feel stress - tense, restless, nervous, or anxious, or unable to sleep at night because your mind is troubled all the time - these days? Very much   Social Isolation   How often do you feel lonely or isolated from those around you?  Sometimes   Alcohol Use   Q1: How often do you have a drink containing alcohol? Never   Utilities   In the past 12 months has the electric, gas, oil, or water company threatened to shut off services in your home? No   Health Literacy   How often do you need to have someone help you when you read instructions, pamphlets, or other written material from your doctor or pharmacy? Never   OTHER   Name(s) of People in Home Roberto Chandra (Spouse)  [phone]

## 2024-06-03 NOTE — PLAN OF CARE
Problem: Adult Inpatient Plan of Care  Goal: Plan of Care Review  Outcome: Progressing  Goal: Patient-Specific Goal (Individualized)  Outcome: Progressing  Goal: Absence of Hospital-Acquired Illness or Injury  Outcome: Progressing  Goal: Optimal Comfort and Wellbeing  Outcome: Progressing  Goal: Readiness for Transition of Care  Outcome: Progressing     Problem: Diabetes Comorbidity  Goal: Blood Glucose Level Within Targeted Range  Outcome: Progressing     Problem: Stroke, Ischemic (Includes Transient Ischemic Attack)  Goal: Effective Bowel Elimination  Outcome: Progressing  Goal: Optimal Cerebral Tissue Perfusion  Outcome: Progressing  Goal: Optimal Cognitive Function  Outcome: Progressing  Goal: Improved Communication Skills  Outcome: Progressing  Goal: Improved Sensorimotor Function  Outcome: Progressing  Goal: Safe and Effective Swallow  Outcome: Progressing  Goal: Effective Urinary Elimination  Outcome: Progressing

## 2024-06-03 NOTE — NURSING
Nurses Note -- 4 Eyes      6/2/2024   9:45 PM      Skin assessed during: Daily Assessment      [] No Altered Skin Integrity Present    []Prevention Measures Documented      [] Yes- Altered Skin Integrity Present or Discovered   [] LDA Added if Not in Epic (Describe Wound)   [] New Altered Skin Integrity was Present on Admit and Documented in LDA   [] Wound Image Taken    Wound Care Consulted? No    Attending Nurse:  Angella Lopez RN/Staff Member:  ASHLEY Valencia    Patient has no skin abnormalities except for R groin sheath site that is c/d/I.

## 2024-06-03 NOTE — PT/OT/SLP EVAL
"Occupational Therapy  Evaluation    Name: Chelle Chandra  MRN: [id number]  Admitting Diagnosis: CVA  Recent Surgery: * No surgery found *      Recommendations:     Discharge therapy intensity: Low Intensity Therapy   Discharge Equipment Recommendations:  none  Barriers to discharge:  none evident     Assessment:     Chelle Chandra is a 58 y.o. female with a medical diagnosis of CVA s/p thrombectomy of L ICA communicating segment and MCA occlusion.  No TNK due to outside of window.  Hx of dm, hld, current smoker.  She presents agreeable and pleasant.  RUE weakness present. Anticipate low intensity therapy at discharge to promote return of function of RUE.  She presents with the following performance deficits affecting function: weakness, impaired self care skills, decreased upper extremity function.     Rehab Prognosis: Good; patient would benefit from acute skilled OT services to address these deficits and reach maximum level of function.       Plan:     Patient to be seen  5x/week to address the above listed problems via self-care/home management, therapeutic exercises  Plan of Care Expires: 07/03/24  Plan of Care Reviewed with:      Subjective     Chief Complaint: no complaints, agreeable and pleasant  Patient/Family Comments/goals: "get back to work"    Occupational Profile:  Living Environment: lives with  who works during the day, 3 steps R rail, tub/shower combo, grab bars in [place], near shower and toilet  Previous level of function: independent, works as a manager at [employer]  Roles and Routines: wife, employee  Equipment Used at Home: none  Assistance upon Discharge:  but he works during the day    Pain/Comfort:  Pain Rating 1: 0/10    Patients cultural, spiritual, Temple conflicts given the current situation:      Objective:     OT communicated with RN prior to session.      Patient was found supine with  (vital monitoring) upon OT entry to room.    General Precautions: Standard,  " "(<160)  Orthopedic Precautions: N/A  Braces: N/A    Vital Signs: 134/71 HR 67    Bed Mobility:    Patient completed Supine to Sit with stand by assistance  Patient completed Sit to Supine with stand by assistance    Functional Mobility/Transfers:  Patient completed Sit <> Stand Transfer with contact guard assistance  with  no assistive device   Patient completed Toilet Transfer Step Transfer technique with contact guard assistance with  no AD  Functional Mobility: Gait belt utilized, overall CGA for functional mobility to toilet.      Activities of Daily Living:  Lower Body Dressing: stand by assistance increased time to don R sock, hard time grasping with R thumb and index  Toileting: stand by assistance     AMPA 6 Click ADL:  AMPA Total Score: 18    Functional Cognition:  Intact  Affect: Appropriate to situation    Visual Perceptual Skills:  Intact    Upper Extremity Function:  Right Upper Extremity:   3/5 R shoulder, elbow/hand 3+/5    Left Upper Extremity:  WFL    Balance:   Intact    Therapeutic Positioning  Risk for acquired pressure injuries is decreased due to ability to get to BSC/toilet with assist.    OT interventions performed during the course of today's session:   Education was provided on benefits of and recommendations for therapeutic positioning    Skin assessment: all bony prominences were assessed    Findings: no redness or breakdown noted    OT recommendations for therapeutic positioning throughout hospitalization:   Follow [facility] Pressure Injury Prevention Protocol      Patient Education:  Patient provided with verbal education education regarding OT role/goals/POC, fall prevention, and Discharge/DME recommendations.  Understanding was verbalized, however additional teaching warranted.     Patient left supine with all lines intact, "I want to take a nap".  Chair left in room for assist up to chair for lunch    GOALS:   Multidisciplinary Problems       Occupational Therapy Goals          " Problem: Occupational Therapy    Goal Priority Disciplines Outcome Interventions   Occupational Therapy Goal     OT, PT/OT Progressing    Description: Goals to be met by: 7/3/24     Patient will increase functional independence with ADLs by performing:    UE Dressing with Modified [place].  LE Dressing with Modified [place].  Toileting from toilet with Modified [place] for hygiene and clothing management.   Toilet transfer to toilet with Modified [place].  Increased functional strength to 4+/5 through therEx, neuromuscular re-ed.                         History:     Past Medical History:   Diagnosis Date    Accelerated junctional rhythm     Agatston CAC score, <100     Anxiety disorder, unspecified     COPD type A     Diabetes mellitus without complication     GERD (gastroesophageal reflux disease)     History of COVID-19     HLD (hyperlipidemia)     Insomnia     Lung nodule          Past Surgical History:   Procedure Laterality Date    ANGIOGRAM, CORONARY, WITH LEFT HEART CATHETERIZATION      BREAST LUMPECTOMY Right     CARDIOVERSION  08/01/2013    CARPAL TUNNEL RELEASE  10/23/2013    FUSION OF CERVICAL SPINE BY ANTERIOR APPROACH USING COMPUTER-ASSISTED NAVIGATION  06/10/2019    KIDNEY SURGERY      TONSILLECTOMY AND ADENOIDECTOMY      TOTAL ABDOMINAL HYSTERECTOMY      WRIST SURGERY         Time Tracking:     OT Date of Treatment:    OT Start Time: 1020  OT Stop Time: 1033  OT Total Time (min): 13 min    Billable Minutes:Evaluation MOD    6/3/2024

## 2024-06-03 NOTE — PLAN OF CARE
Problem: SLP  Goal: SLP Goal  Description:   LTG: Tolerate least restrictive PO diet with no clinical signs/sx aspiration  STGs:  1.  Complete base of tongue and laryngeal strengthening exercises with minimal cues  2.  Tolerate thermal stimulation to the anterior faucial pillars with 100% effortful swallow responses and delay less than 2 seconds.  3.  Pt will tolerate 2 oz of ice chips by spoon with no clinical signs/sx aspiration.     Outcome: Progressing

## 2024-06-03 NOTE — PLAN OF CARE
Referral sent to [facility], stated they are in network with pts insurance. Awaiting phone call with acceptance or denial. Fax confirmation confirmed.

## 2024-06-03 NOTE — NURSING
Nurses Note -- 4 Eyes      6/3/2024   4:28 PM      Skin assessed during: Q Shift Change      [x] No Altered Skin Integrity Present    [x]Prevention Measures Documented      [] Yes- Altered Skin Integrity Present or Discovered   [] LDA Added if Not in Epic (Describe Wound)   [] New Altered Skin Integrity was Present on Admit and Documented in LDA   [] Wound Image Taken    Wound Care Consulted? No    Attending Nurse:  Yandel Lopez RN/Staff Member:  ASHLEY jones

## 2024-06-03 NOTE — PROCEDURES
[facility]  Speech Language Pathology Department  Modified Barium Swallow (MBS) Study    Patient Name:  Chelle Chandra   MRN:  [id number]    Recommendations     General recommendations:  dysphagia therapy and Speech/Language and Cognitive Evaluation  Repeat MBS study: 10-14 days  Diet texture/consistency recommendations:  Puree solids (IDDSI 4) and moderately thick liquids (IDDSI 3)  Medications: crushed in puree  Swallow strategies/precautions: small bites/sips, slow rate, and supervision with meals  General precautions: Standard, aspiration    History     Chelle Chandra is a/n 58 y.o. female to ICU s/p mechanical thrombectomy with successful recanalization of the left tandem ICA communicating segment and M1 occlusion on 6/1.  Pt remained intubated post procedure and was extubated on 6/2.  Pt failed nursing swallow screen.     Past Medical History:   Diagnosis Date    Accelerated junctional rhythm     Agatston CAC score, <100     Anxiety disorder, unspecified     COPD type A     Diabetes mellitus without complication     GERD (gastroesophageal reflux disease)     History of COVID-19     HLD (hyperlipidemia)     Insomnia     Lung nodule      Past Surgical History:   Procedure Laterality Date    ANGIOGRAM, CORONARY, WITH LEFT HEART CATHETERIZATION      BREAST LUMPECTOMY Right     CARDIOVERSION  08/01/2013    CARPAL TUNNEL RELEASE  10/23/2013    FUSION OF CERVICAL SPINE BY ANTERIOR APPROACH USING COMPUTER-ASSISTED NAVIGATION  06/10/2019    KIDNEY SURGERY      TONSILLECTOMY AND ADENOIDECTOMY      TOTAL ABDOMINAL HYSTERECTOMY      WRIST SURGERY       A MBS Study was completed to assess the efficiency of her swallow function, rule out aspiration and make recommendations regarding safe dietary consistencies, effective compensatory strategies, and safe eating environment.     Home diet texture/consistency: Regular and thin liquids  Current Method of Nutrition: NPO    Imaging   Results for  orders placed during the hospital encounter of 06/01/24    X-Ray Chest 1 View    Narrative  EXAMINATION:  XR CHEST 1 VIEW    CLINICAL HISTORY:  Resp failure;    TECHNIQUE:  Single frontal view of the chest was performed.    COMPARISON:  11/14/2023    FINDINGS:  Enteric tube is identified with the distal tip and side hole distal to the GE junction.  Endotracheal tube is at the level the clavicular heads.    The cardiomediastinal silhouette and pulmonary vasculature are normal.    The lungs and pleural spaces are clear.    Impression  1. Lines and tubes as above.  2. No acute cardiopulmonary process.      Electronically signed by: Nigel Zhu MD  Date:    06/02/2024  Time:    11:57    No results found for this or any previous visit.    Results for orders placed during the hospital encounter of 06/01/24    MRI Brain Without Contrast    Narrative  EXAMINATION  MRI BRAIN WITHOUT CONTRAST    CLINICAL HISTORY  Stroke, follow up;    TECHNIQUE  Multiplanar, multisequence MR images were obtained without the intravenous administration of gadolinium-based contrast media.    COMPARISON  1 June 2024 non-contrast head CT    FINDINGS  Exam quality: adequate for evaluation    Parenchyma: Widespread areas of increased diffusion weighted signal and corresponding signal hypointensity on the ADC map through the left MCA distribution consistent with acute ischemic infarct.  Associated cortical T1 hypointensity and hyperintense T2/FLAIR signal consistent with diffuse cortical edema through the left temporoparietal region, as well as the ipsilateral basal ganglia and thalamus.  No acute signal abnormality of the right cerebral hemisphere or cerebellum.  No mass, mass effect, or evidence of hemorrhage.  Differentiation of the gray-white border is grossly preserved.    Midline shift: None appreciated.    CSF spaces: No abnormal extra-axial collection or mass-like findings.  Normal size and configuration of the ventricles.  The basal cisterns  are preserved.  Sulcal volume appears normal for patient age.    Sella/Suprasellar structures: No abnormalities.    Vasculature: Normal flow signal voids within the large intracranial arteries.  No focal abnormality of the dural sinuses.    Other findings: No abnormalities of the skull or scalp.  Mastoid air cells are well aerated.  Facial cavities are clear, with no fluid level.    IMPRESSION  1. Findings consistent with widespread acute ischemic infarct involving the left MCA distribution.  2. No convincing evidence of interval hemorrhagic conversion or other new intracranial abnormality.  ==========    This report was flagged in Epic as abnormal.      Electronically signed by: Zoran Decker  Date:    06/02/2024  Time:    11:44    Subjective     Patient awake, alert, and cooperative.    Spiritual/Cultural/Yazdanism Beliefs/Practices that affect care: no  Pain/Comfort: Pain Rating 1: 0/10    Respiratory Status: room air  Restraints/positioning devices: none    Fluoroscopic Findings     Oral Musculature  Dentition: upper dentures and lower dentures  Secretion Management: adequate  Mucosal Quality: good  Facial Movement: general weakness  Buccal Strength & Mobility: WFL  Mandibular Strength & Mobility: WFL  Oral Labial Strength & Mobility: WFL  Lingual Strength & Mobility: impaired strength  Velar Elevation: WFL  Vocal Quality: hoarse    Setup  Upright in bed  Able to self feed  Adequate head control    Visualization  Lateral view    Oral Phase:   Adequate lip closure  Adequate bolus formation  Prolonged mastication  Piecemeal deglutition  Reduced bolus cohesion  Adequate anterior-posterior transport  Loss of bolus control with all consistencies    Pharyngeal Phase:   Swallow delay with spill to the vocal folds and pyriform sinuses  Reduced base of tongue retraction  Adequate epiglottic deflection  Reduced hyolaryngeal excursion  Poor airway protection  Consistency Fed by Laryngeal Penetration Aspiration Residue    Mildly thick liquid by cup Self Before the swallow  To the vocal folds  Did NOT clear None Mild  Valleculae and Pyriform sinus  Cleared with additional swallow   Thin liquid by cup Self Before the swallow  To the vocal folds  Did NOT clear None Mild  Valleculae and Pyriform sinus  Cleared with additional swallow   Puree SLP During the swallow  Cleared spontaneously None Mild  Valleculae  Cleared with additional swallow   Thin liquid by straw Self Before the swallow  To the vocal folds  Did NOT clear None Mild  Valleculae and Pyriform sinus  Cleared with additional swallow   Moderately thick liquid by cup Self None None Mild  Valleculae and Pyriform sinus  Cleared with additional swallow   Chewable solid SLP During the swallow  Did NOT clear None Mild  Valleculae and Posterior pharyngeal wall  Did NOT fully clear   Moderately thick liquid by straw SLP None None Trace     Cervical Esophageal Phase:   UES appeared to accommodate all bolus types without stasis or retrograde movement visualized    Assessment     Pt exhibited moderate oropharyngeal dysphagia characterized by the findings noted above.  Laryngeal penetration of thin and mildly thick liquids as well as puree and chewable solids visualized.  Contrast material did NOT consistently clear the laryngeal vestibule placing pt at HIGH risk for aspiration.  Both swallow safety and efficiency are impaired.     Patient appears to be at moderate risk for aspiration related pneumonia when considering complicated medical status, poor airway closure, and reduced mobility.  Prognosis for behavioral swallow rehabilitation is fair.    Goals     Multidisciplinary Problems       SLP Goals          Problem: SLP    Goal Priority Disciplines Outcome   SLP Goal     SLP Progressing   Description:   LTG: Tolerate least restrictive PO diet with no clinical signs/sx aspiration  STGs:  1.  Complete base of tongue and laryngeal strengthening exercises with minimal cues  2.  Tolerate  thermal stimulation to the anterior faucial pillars with 100% effortful swallow responses and delay less than 2 seconds.  3.  Pt will tolerate 2 oz of ice chips by spoon with no clinical signs/sx aspiration.                        Education     Patient provided with verbal education regarding results/recommendations.  Understanding was verbalized, however additional teaching warranted.    Plan     SLP Follow-Up:  Yes    Patient to be seen:  5 x/week   Plan of Care expires:  06/13/24  Plan of Care reviewed with:  patient     Time Tracking     SLP Treatment Date:   06/03/24  Speech Start Time:  1250  Speech Stop Time:  1310     Speech Total Time (min):  20 min    Billable minutes:   Motion Fluoroscopic Evaluation, Video Recording, 20 minutes     06/03/2024

## 2024-06-03 NOTE — PLAN OF CARE
Problem: Adult Inpatient Plan of Care  Goal: Plan of Care Review  Outcome: Progressing  Flowsheets (Taken 6/2/2024 2145)  Plan of Care Reviewed With: patient  Goal: Patient-Specific Goal (Individualized)  Outcome: Progressing  Goal: Absence of Hospital-Acquired Illness or Injury  Outcome: Progressing  Goal: Optimal Comfort and Wellbeing  Outcome: Progressing  Goal: Readiness for Transition of Care  Outcome: Progressing     Problem: Diabetes Comorbidity  Goal: Blood Glucose Level Within Targeted Range  Outcome: Progressing     Problem: Wound  Goal: Optimal Coping  Outcome: Progressing  Goal: Optimal Functional Ability  Outcome: Progressing  Goal: Absence of Infection Signs and Symptoms  Outcome: Progressing  Goal: Improved Oral Intake  Outcome: Progressing  Goal: Optimal Pain Control and Function  Outcome: Progressing  Goal: Skin Health and Integrity  Outcome: Progressing  Goal: Optimal Wound Healing  Outcome: Progressing     Problem: Fall Injury Risk  Goal: Absence of Fall and Fall-Related Injury  Outcome: Progressing     Problem: Skin Injury Risk Increased  Goal: Skin Health and Integrity  Outcome: Progressing     Problem: Stroke, Ischemic (Includes Transient Ischemic Attack)  Goal: Optimal Coping  Outcome: Progressing  Goal: Effective Bowel Elimination  Outcome: Progressing  Goal: Optimal Cerebral Tissue Perfusion  Outcome: Progressing  Goal: Optimal Cognitive Function  Outcome: Progressing  Goal: Improved Communication Skills  Outcome: Progressing  Goal: Optimal Functional Ability  Outcome: Progressing  Goal: Optimal Nutrition Intake  Outcome: Progressing  Goal: Effective Oxygenation and Ventilation  Outcome: Progressing  Goal: Improved Sensorimotor Function  Outcome: Progressing  Goal: Safe and Effective Swallow  Outcome: Progressing  Goal: Effective Urinary Elimination  Outcome: Progressing     Problem: Infection  Goal: Absence of Infection Signs and Symptoms  Outcome: Progressing

## 2024-06-03 NOTE — NURSING
Kristyn with SLP at bedside. She states able to crush medications and place in pudding for medication administration

## 2024-06-03 NOTE — PT/OT/SLP EVAL
[facility]  Speech Language Pathology Department  Clinical Swallow Evaluation    Patient Name:  Chelle Chandra   MRN:  [id number]    Recommendations     General recommendations:  Modified Barium Swallow Study  Diet texture/consistency recommendations:  NPO  Medications: crushed in puree  Precautions: Standard, aspiration    History     Chelle Chandra is a/n 58 y.o. female admitted to ICU s/p mechanical thrombectomy with successful recanalization of the left tandem ICA communicating segment and M1 occlusion on 6/1.  Pt remained intubated post procedure and was extubated on 6/2.  Pt failed nursing swallow screen.    Past Medical History:   Diagnosis Date    Accelerated junctional rhythm     Agatston CAC score, <100     Anxiety disorder, unspecified     COPD type A     Diabetes mellitus without complication     GERD (gastroesophageal reflux disease)     History of COVID-19     HLD (hyperlipidemia)     Insomnia     Lung nodule      Past Surgical History:   Procedure Laterality Date    ANGIOGRAM, CORONARY, WITH LEFT HEART CATHETERIZATION      BREAST LUMPECTOMY Right     CARDIOVERSION  08/01/2013    CARPAL TUNNEL RELEASE  10/23/2013    FUSION OF CERVICAL SPINE BY ANTERIOR APPROACH USING COMPUTER-ASSISTED NAVIGATION  06/10/2019    KIDNEY SURGERY      TONSILLECTOMY AND ADENOIDECTOMY      TOTAL ABDOMINAL HYSTERECTOMY      WRIST SURGERY       Home diet texture/consistency: Regular and thin liquids  Current method of nutrition: NPO    Imaging   Results for orders placed during the hospital encounter of 06/01/24    X-Ray Chest 1 View    Narrative  EXAMINATION:  XR CHEST 1 VIEW    CLINICAL HISTORY:  Resp failure;    TECHNIQUE:  Single frontal view of the chest was performed.    COMPARISON:  11/14/2023    FINDINGS:  Enteric tube is identified with the distal tip and side hole distal to the GE junction.  Endotracheal tube is at the level the clavicular heads.    The cardiomediastinal silhouette and  pulmonary vasculature are normal.    The lungs and pleural spaces are clear.    Impression  1. Lines and tubes as above.  2. No acute cardiopulmonary process.      Electronically signed by: Nigel Zhu MD  Date:    06/02/2024  Time:    11:57    MRI Brain Without Contrast    Narrative  EXAMINATION  MRI BRAIN WITHOUT CONTRAST    CLINICAL HISTORY  Stroke, follow up;    TECHNIQUE  Multiplanar, multisequence MR images were obtained without the intravenous administration of gadolinium-based contrast media.    COMPARISON  1 June 2024 non-contrast head CT    FINDINGS  Exam quality: adequate for evaluation    Parenchyma: Widespread areas of increased diffusion weighted signal and corresponding signal hypointensity on the ADC map through the left MCA distribution consistent with acute ischemic infarct.  Associated cortical T1 hypointensity and hyperintense T2/FLAIR signal consistent with diffuse cortical edema through the left temporoparietal region, as well as the ipsilateral basal ganglia and thalamus.  No acute signal abnormality of the right cerebral hemisphere or cerebellum.  No mass, mass effect, or evidence of hemorrhage.  Differentiation of the gray-white border is grossly preserved.    Midline shift: None appreciated.    CSF spaces: No abnormal extra-axial collection or mass-like findings.  Normal size and configuration of the ventricles.  The basal cisterns are preserved.  Sulcal volume appears normal for patient age.    Sella/Suprasellar structures: No abnormalities.    Vasculature: Normal flow signal voids within the large intracranial arteries.  No focal abnormality of the dural sinuses.    Other findings: No abnormalities of the skull or scalp.  Mastoid air cells are well aerated.  Facial cavities are clear, with no fluid level.    IMPRESSION  1. Findings consistent with widespread acute ischemic infarct involving the left MCA distribution.  2. No convincing evidence of interval hemorrhagic conversion or other  new intracranial abnormality.  ==========    This report was flagged in Epic as abnormal.      Electronically signed by: Zoran Decker  Date:    06/02/2024  Time:    11:44    Subjective     Patient awake, alert, and cooperative.    Patient goals: to eat/drink   Spiritual/Cultural/Restoration Beliefs/Practices that affect care: no    Pain/Comfort: Pain Rating 1: 0/10  Respiratory status: room air  Restraints/positioning devices: none    Objective     ORAL MUSCULATURE  Dentition: upper dentures and own teeth lower  Secretion Management: adequate  Mucosal Quality: good  Facial Movement: general weakness  Buccal Strength & Mobility: WFL  Mandibular Strength & Mobility: WFL  Oral Labial Strength & Mobility: WFL  Lingual Strength & Mobility: impaired strength  Vocal Quality: hoarse    Consistency Fed By Oral Symptoms Pharyngeal Symptoms   Thin liquid by cup Self None Throat clear after swallow   Puree Self None Multiple swallows   Thin liquid by straw Self None Throat clear after swallow     Assessment     Pt presents with signs/sx oropharyngeal dysphagia warranting comprehensive assessment of swallow function to determine safety of PO intake.    Education     Patient and spouse were provided with verbal education regarding risks of aspiration and SLP POC.  Understanding was verbalized.    Plan     Plan of Care reviewed with:  patient, spouse     Time Tracking     SLP Treatment Date:   06/03/24  Speech Start Time:  0920  Speech Stop Time:  0930     Speech Total Time (min):  10 min    Billable minutes:  Swallow and Oral Function Evaluation, 10 minutes     06/03/2024

## 2024-06-03 NOTE — PT/OT/SLP EVAL
Physical Therapy Evaluation and Discharge Note    Patient Name:  Chelle Chandra   MRN:  [id number]    Recommendations:     Discharge therapy intensity: No Therapy Indicated   Discharge Equipment Recommendations: none   Barriers to discharge: None    Assessment:     Chelle Chandra is a 58 y.o. female admitted with a medical diagnosis of left ICA communicating segment and MCA occlusion. . .  At this time, patient is functioning at their prior level of function and does not require further acute PT services.     Recent Surgery: * No surgery found *      Plan:     During this hospitalization, patient does not require further acute PT services.  Please re-consult if situation changes.      Subjective     Chief Complaint:   Patient/Family Comments/goals:   Pain/Comfort:  Pain Rating 1: 0/10    Patients cultural, spiritual, Anabaptism conflicts given the current situation:      Living Environment:  Pt klives with spouse in a camper with 3 steps  Prior to admission, patients level of function was ind.  Equipment used at home: none.  DME owned (not currently used): none.  Upon discharge, patient will have assistance from spouse.    Objective:     Communicated with nurse prior to session.  Patient found supine with blood pressure cuff, pulse ox (continuous), telemetry upon PT entry to room.    General Precautions: Standard, fall    Orthopedic Precautions:N/A   Braces: N/A  Respiratory Status: Room air  Blood Pressure:     Exams:  RLE ROM: WFL  RLE Strength: WFL  LLE ROM: WFL  LLE Strength: WFL    Functional Mobility:  Bed Mobility:     Supine to Sit: independence  Sit to Supine: independence  Transfers:     Sit to Stand:  independence with no AD  Bed to Chair: independence with  no AD  using  Step Transfer  Gait: ambulated 200ft with no ad with ind    AM-PAC 6 CLICK MOBILITY  Total Score:        Treatment and Education:      Patient and spouse were provided with verbal education education regarding PT role/goals/POC.   Understanding was verbalized.     Patient left up in chair with all lines intact and call button in reach.    GOALS:   Multidisciplinary Problems       Physical Therapy Goals       Not on file                    History:     Past Medical History:   Diagnosis Date    Accelerated junctional rhythm     Agatston CAC score, <100     Anxiety disorder, unspecified     COPD type A     Diabetes mellitus without complication     GERD (gastroesophageal reflux disease)     History of COVID-19     HLD (hyperlipidemia)     Insomnia     Lung nodule        Past Surgical History:   Procedure Laterality Date    ANGIOGRAM, CORONARY, WITH LEFT HEART CATHETERIZATION      BREAST LUMPECTOMY Right     CARDIOVERSION  08/01/2013    CARPAL TUNNEL RELEASE  10/23/2013    FUSION OF CERVICAL SPINE BY ANTERIOR APPROACH USING COMPUTER-ASSISTED NAVIGATION  06/10/2019    KIDNEY SURGERY      TONSILLECTOMY AND ADENOIDECTOMY      TOTAL ABDOMINAL HYSTERECTOMY      WRIST SURGERY         Time Tracking:     PT Received On:    PT Start Time: 1055     PT Stop Time: 1117  PT Total Time (min): 22 min     Billable Minutes: Evaluation 22 06/03/2024

## 2024-06-03 NOTE — PLAN OF CARE
Problem: Occupational Therapy  Goal: Occupational Therapy Goal  Description: Goals to be met by: 7/3/24     Patient will increase functional independence with ADLs by performing:    UE Dressing with Modified [place].  LE Dressing with Modified [place].  Toileting from toilet with Modified [place] for hygiene and clothing management.   Toilet transfer to toilet with Modified [place].  Increased functional strength to 4+/5 through therEx, neuromuscular re-ed.    Outcome: Progressing

## 2024-06-04 LAB
POCT GLUCOSE: 155 MG/DL (ref 70–110)
POCT GLUCOSE: 194 MG/DL (ref 70–110)

## 2024-06-04 PROCEDURE — 92523 SPEECH SOUND LANG COMPREHEN: CPT

## 2024-06-04 PROCEDURE — 25000003 PHARM REV CODE 250: Performed by: NURSE PRACTITIONER

## 2024-06-04 PROCEDURE — 11000001 HC ACUTE MED/SURG PRIVATE ROOM

## 2024-06-04 PROCEDURE — 25000003 PHARM REV CODE 250: Performed by: HOSPITALIST

## 2024-06-04 PROCEDURE — 25000003 PHARM REV CODE 250

## 2024-06-04 PROCEDURE — 97535 SELF CARE MNGMENT TRAINING: CPT | Mod: CO

## 2024-06-04 RX ORDER — IBUPROFEN 200 MG
16 TABLET ORAL
Status: DISCONTINUED | OUTPATIENT
Start: 2024-06-04 | End: 2024-06-05 | Stop reason: HOSPADM

## 2024-06-04 RX ORDER — IBUPROFEN 200 MG
24 TABLET ORAL
Status: DISCONTINUED | OUTPATIENT
Start: 2024-06-04 | End: 2024-06-05 | Stop reason: HOSPADM

## 2024-06-04 RX ORDER — GLUCAGON 1 MG
1 KIT INJECTION
Status: DISCONTINUED | OUTPATIENT
Start: 2024-06-04 | End: 2024-06-05 | Stop reason: HOSPADM

## 2024-06-04 RX ORDER — INSULIN ASPART 100 [IU]/ML
0-5 INJECTION, SOLUTION INTRAVENOUS; SUBCUTANEOUS
Status: DISCONTINUED | OUTPATIENT
Start: 2024-06-04 | End: 2024-06-05 | Stop reason: HOSPADM

## 2024-06-04 RX ADMIN — CLOPIDOGREL BISULFATE 75 MG: 75 TABLET ORAL at 08:06

## 2024-06-04 RX ADMIN — MUPIROCIN: 20 OINTMENT TOPICAL at 08:06

## 2024-06-04 RX ADMIN — ACETAMINOPHEN 650 MG: 325 TABLET, FILM COATED ORAL at 05:06

## 2024-06-04 RX ADMIN — ATORVASTATIN CALCIUM 80 MG: 40 TABLET, FILM COATED ORAL at 08:06

## 2024-06-04 NOTE — HPI
58 y.o. female who  PMH includes anxiety, COPD, DM type II, GERD, HLD, lung nodule, insomnia; presents to the ED at [facility] on 6/1/2024 with a primary complaint of right side weakness while attempting to get up to go to restroom while waking up. It was reported pt was normal the night prior to going to bed. She woke up the am of arrival in the ED with right side weakness and associated right side facial droop right lower extremity flaccid paralysis, as well as right-sided neglect. Pt presented to the ED for further evaluation. Work up in the ED  CTA stroke yielded diminished flow in the left anterior cerebral artery, as well as left middle cerebral artery, left posterior cerebral artery, and large vessel occlusion in the left MCA.  Patient was taken for mechanical thrombectomy per [facility] which was successful.  Patient was admitted to ICU sedated and intubated.  Patient did require Cleviprex infusion secondary to hypertension.  Patient has since been extubated and weaned off of Cleviprex infusion.  Her blood pressures have been stable.  Patient has been evaluated by therapy Services PT, OT, ST services.  Patient does have slight residual right-sided facial droop however her right-sided deficits have resolved.  Patient does admit to smoking about 1/2 pack cigarettes a day prior to admission.  She denies any illicit drug use and drinks alcohol socially.  Patient has been cleared for transfer out of ICU to the regular floor.  Hospital medicine services have been consulted for assumption of care.

## 2024-06-04 NOTE — PLAN OF CARE
Problem: Adult Inpatient Plan of Care  Goal: Plan of Care Review  Outcome: Progressing  Goal: Patient-Specific Goal (Individualized)  Outcome: Progressing  Goal: Optimal Comfort and Wellbeing  Outcome: Progressing  Goal: Readiness for Transition of Care  Outcome: Progressing     Problem: Fall Injury Risk  Goal: Absence of Fall and Fall-Related Injury  Outcome: Progressing     Problem: Stroke, Ischemic (Includes Transient Ischemic Attack)  Goal: Optimal Coping  Outcome: Progressing  Goal: Effective Bowel Elimination  Outcome: Progressing  Goal: Optimal Cerebral Tissue Perfusion  Outcome: Progressing  Goal: Optimal Cognitive Function  Outcome: Progressing  Goal: Effective Oxygenation and Ventilation  Outcome: Progressing  Goal: Safe and Effective Swallow  Outcome: Progressing

## 2024-06-04 NOTE — HOSPITAL COURSE
6/4/24-Patient seems to be doing well at this time.  Likely d/c soon if she continues to improve.      6/5/24-Patietn is doing well.  She will be set up with outpatient therapy and a walker.  She is stable for discharge home today.  Exam(A&O, NAD, RRR, CTA, BS +, NTTP,  ROM I)

## 2024-06-04 NOTE — ASSESSMENT & PLAN NOTE
-presented with right sided weakness, right sided neglect  -stroke RF: smoking  -intervention: thrombectomy for L ICA occlusion      Stroke workup  -MRI brain:  1. Findings consistent with widespread acute ischemic infarct involving the left MCA distribution.  2. No convincing evidence of interval hemorrhagic conversion or other new intracranial abnormality.  -CT head on 6/1 at 13:00:  1. Changes consistent with evolving left MCA ischemic infarct.  2. No evidence of acute intracranial hemorrhage.  -LDL: 158  -TSH: 0.035        Plan  -therapy evaluations after extubation  -plavix today, she did have mild bloody drainage coming from NGT unsure if it is just local irritation from NGT (allergy to aspirin, hives)  -Goal Sbp less than 140, A1c goal less than 7, LDL goal less than 70 to optimize secondary stroke prevention  -atorvastatin 80 mg   -awaiting ECHO    -can follow up in stroke clinic within 2 months of hospital discharge with Jayne BOSTON  -needs 30 day monitor upon discharge, if negative needs loop/linq, strong suspicion for cardioembolic etiology   -signing off from a stroke standpoint, please call with questions     
.  Antithrombotics for secondary stroke prevention: Antiplatelets: Clopidogrel: 75 mg daily    Statins for secondary stroke prevention and hyperlipidemia, if present:   Statins: Atorvastatin- 40 mg daily    Aggressive risk factor modification: HTN, Smoking     Rehab efforts: The patient has been evaluated by a stroke team provider and the therapy needs have been fully considered based off the presenting complaints and exam findings. The following therapy evaluations are needed: PT evaluate and treat, OT evaluate and treat, SLP evaluate and treat    Diagnostics ordered/pending: CT scan of head without contrast to asses brain parenchyma, MRI head without contrast to assess brain parenchyma    VTE prophylaxis: None: Reason for No Pharmacological VTE Prophylaxis: Holding x 24 hours s/p treatment with Thrombolytic therapy    BP parameters: Infarct: Post Thrombolytic therapy, SBP <180      
Advised to stop.  Can offer nicotine supplements.  She is considering stopping(cessation=4mins)    
Resume meds    
WDL

## 2024-06-04 NOTE — PT/OT/SLP PROGRESS
Occupational Therapy   Treatment    Name: Chelle Chandra  MRN: [id number]  Admitting Diagnosis:  CVA       Recommendations:     Recommended therapy intensity at discharge: Low Intensity Therapy   Discharge Equipment Recommendations:  none  Barriers to discharge:       Assessment:     Chelle Chandra is a 58 y.o. female with a medical diagnosis of CVA.  She presents with improved balance and endurance, increased R UE strength, educated on HEP for R UE strengthing. Recommending Low intensity therapy at this time.   Performance deficits affecting function are weakness, impaired self care skills, decreased upper extremity function.     Rehab Prognosis:  Good; patient would benefit from acute skilled OT services to address these deficits and reach maximum level of function.       Plan:     Patient to be seen   to address the above listed problems via self-care/home management, therapeutic exercises  Plan of Care Expires: 07/03/24  Plan of Care Reviewed with: spouse, patient    Subjective     Pain/Comfort:       Objective:     Communicated with: RN prior to session.  Patient found HOB elevated with   upon OT entry to room.    General Precautions: Standard, aspiration    Orthopedic Precautions:N/A  Braces: N/A  Respiratory Status: Room air  Vital Signs: Blood Pressure: 150/79     Occupational Performance:   (Bed Mobility- independent)  (Sitting balance EOB- Independent)  Pt. Donning/doffing socks Mod I EOB in a figure 4 position.  (Sit to stand- Supervision)  Pt. Ambulating to toilet with supervision no LOB noted, performing toilet t/f with supervision.   3/5 R UE, educated patient on HEP for R UE strengthening. Pt. Able to demonstrate thera ex and verbalized understanding.       Therapeutic Positioning    OT interventions performed during the course of today's session in an effort to prevent and/or reduce acquired pressure injuries:   Therapeutic positioning was provided at the conclusion of session to offload all bony  prominences for the prevention and/or reduction of pressure injuries        [facility] 6 Click ADL:      Patient Education:  Patient provided with verbal education education regarding fall prevention, safety awareness, and pressure ulcer prevention.  Additional teaching is warranted.      Patient left HOB elevated with all lines intact and call button in reach.    GOALS:   Multidisciplinary Problems       Occupational Therapy Goals          Problem: Occupational Therapy    Goal Priority Disciplines Outcome Interventions   Occupational Therapy Goal     OT, PT/OT Progressing    Description: Goals to be met by: 7/3/24     Patient will increase functional independence with ADLs by performing:    UE Dressing with Modified [place].  LE Dressing with Modified [place].  Toileting from toilet with Modified [place] for hygiene and clothing management.   Toilet transfer to toilet with Modified [place].  Increased functional strength to 4+/5 through therEx, neuromuscular re-ed.                         Time Tracking:     OT Date of Treatment: 06/04/24  OT Start Time: 1047  OT Stop Time: 1058  OT Total Time (min): 11 min    Billable Minutes:Self Care/Home Management 1    OT/EDILSON: EDILSON     Number of EDILSON visits since last OT visit: 1    6/4/2024

## 2024-06-04 NOTE — NURSING
Nurses Note -- 4 Eyes      6/3/2024   7:16 PM      Skin assessed during: Daily Assessment      [] No Altered Skin Integrity Present    [x]Prevention Measures Documented      [x] Yes- Altered Skin Integrity Present or Discovered   [] LDA Added if Not in Epic (Describe Wound)   [] New Altered Skin Integrity was Present on Admit and Documented in LDA   [] Wound Image Taken    Wound Care Consulted? No    Attending Nurse:  Angella Lopez RN/Staff Member:  ASHLEY Antony    Patient has no skin abnormalities except for R groin sheath site that is c/d/I.

## 2024-06-04 NOTE — PT/OT/SLP EVAL
[facility]  Speech Language Pathology Department  Cognitive-Communication Evaluation    Patient Name:  Chelle Chandra   MRN:  [id number]    Recommendations     General recommendations:  cognitive-linguistic therapy and continue dysphagia therapy as established  Communication strategies:  go to room if call light pushed    Discharge therapy intensity: Low Intensity Therapy  Barriers to safe discharge: none    History     Chelle Chandra is a/n 58 y.o. female to ICU s/p mechanical thrombectomy with successful recanalization of the left tandem ICA communicating segment and M1 occlusion on 6/1.  Pt remained intubated post procedure and was extubated on 6/2.     Past Medical History:   Diagnosis Date    Accelerated junctional rhythm     Agatston CAC score, <100     Anxiety disorder, unspecified     COPD type A     Diabetes mellitus without complication     GERD (gastroesophageal reflux disease)     History of COVID-19     HLD (hyperlipidemia)     Insomnia     Lung nodule      Past Surgical History:   Procedure Laterality Date    ANGIOGRAM, CORONARY, WITH LEFT HEART CATHETERIZATION      BREAST LUMPECTOMY Right     CARDIOVERSION  08/01/2013    CARPAL TUNNEL RELEASE  10/23/2013    FUSION OF CERVICAL SPINE BY ANTERIOR APPROACH USING COMPUTER-ASSISTED NAVIGATION  06/10/2019    KIDNEY SURGERY      TONSILLECTOMY AND ADENOIDECTOMY      TOTAL ABDOMINAL HYSTERECTOMY      WRIST SURGERY         Previous level of Function  Education: GED  Occupation: full time job ()  Lives: with spouse  Handed: Right  Glasses: yes  Hearing Aids: no  Home Responsibilities: drives, financial management, medication/health management, laundry, shopping, meal preparation, and cleaning    Imaging   Results for orders placed during the hospital encounter of 06/01/24    MRI Brain Without Contrast    Narrative  EXAMINATION  MRI BRAIN WITHOUT CONTRAST    CLINICAL HISTORY  Stroke, follow  up;    TECHNIQUE  Multiplanar, multisequence MR images were obtained without the intravenous administration of gadolinium-based contrast media.    COMPARISON  1 June 2024 non-contrast head CT    FINDINGS  Exam quality: adequate for evaluation    Parenchyma: Widespread areas of increased diffusion weighted signal and corresponding signal hypointensity on the ADC map through the left MCA distribution consistent with acute ischemic infarct.  Associated cortical T1 hypointensity and hyperintense T2/FLAIR signal consistent with diffuse cortical edema through the left temporoparietal region, as well as the ipsilateral basal ganglia and thalamus.  No acute signal abnormality of the right cerebral hemisphere or cerebellum.  No mass, mass effect, or evidence of hemorrhage.  Differentiation of the gray-white border is grossly preserved.    Midline shift: None appreciated.    CSF spaces: No abnormal extra-axial collection or mass-like findings.  Normal size and configuration of the ventricles.  The basal cisterns are preserved.  Sulcal volume appears normal for patient age.    Sella/Suprasellar structures: No abnormalities.    Vasculature: Normal flow signal voids within the large intracranial arteries.  No focal abnormality of the dural sinuses.    Other findings: No abnormalities of the skull or scalp.  Mastoid air cells are well aerated.  Facial cavities are clear, with no fluid level.    IMPRESSION  1. Findings consistent with widespread acute ischemic infarct involving the left MCA distribution.  2. No convincing evidence of interval hemorrhagic conversion or other new intracranial abnormality.  ==========    This report was flagged in Epic as abnormal.      Electronically signed by: Zoran Decker  Date:    06/02/2024  Time:    11:44    Subjective     Patient awake, alert, and cooperative.  Patient goals: to go home   Spiritual/Cultural/Zoroastrianism Beliefs/Practices that affect care: no    Pain/Comfort: Pain Rating 1:  0/10  Respiratory Status: room air    Objective     ORAL MUSCULATURE  Dentition: upper dentures  Facial Movement: general weakness  Buccal Strength & Mobility: WFL  Mandibular Strength & Mobility: WFL  Oral Labial Strength & Mobility: WFL  Lingual Strength & Mobility: impaired strength    SPEECH PRODUCTION  Phoneme Production: adequate  Voice Quality: adequate  Voice Production: adequate  Speech Rate: appropriate  Loudness: acceptable  Respiration: WFL for speech  Resonance: adequate  Prosody: adequate  Speech Intelligibility  Known Context: Greater that 90%  Unknown Context: Greater that 90%    AUDITORY COMPREHENSION  Following Directions:  1-Step: 100%  2-Step: 100%  Yes/No Questions:  Biographical: 100%  Environmental: 100%  Simple: 100%  Complex: 100%    VERBAL EXPRESSION  Automatic Speech:  Functional needs: Within Functional Limits  Confrontation Naming  Objects: 100%  Wh- Questions:  Object name: 100%  Object function: 100%    COGNITION  Orientation:  Person: yes  Place: yes  Time: yes  Situation: yes   Attention:  Focused: Within Functional Limits  Sustained: Within Functional Limits  Pragmatics:  Within Functional Limits  Memory:  Immediate: Within Functional Limits  Delayed: Impaired (4/12 Four Word Memory Task)  Long Term: Within Functional Limits  Problem Solving  Functional simple: Within Functional Limits  Organization:  Convergent thinking: Within Functional Limits  Divergent thinking: Within Functional Limits  Executive Function:  Within Functional Limits    Assessment     Pt presents with functional speech/language abilities.  Mild memory impairments noted and given previously high level of independent functioning, continued skilled SLP services warranted.    Goals     Multidisciplinary Problems       SLP Goals          Problem: SLP    Goal Priority Disciplines Outcome   SLP Goal     SLP Progressing   Description:   LTG: Tolerate least restrictive PO diet with no clinical signs/sx  aspiration  STGs:  1.  Complete base of tongue and laryngeal strengthening exercises with minimal cues  2.  Tolerate thermal stimulation to the anterior faucial pillars with 100% effortful swallow responses and delay less than 2 seconds.  3.  Pt will tolerate 2 oz of ice chips by spoon with no clinical signs/sx aspiration.     LTG: complete complex cognitive tasks modified independent  STGs:  1.  Delayed memory tasks with visual cues  2.  Money management tasks with supervision  3.  Medication management tasks with supervision                       Patient Education     Patient and spouse were provided with verbal education regarding SLP POC.  Understanding was verbalized.    Plan     SLP Follow-Up:  Yes   Patient to be seen:  5 x/week   Plan of Care expires:  06/13/24  Plan of Care reviewed with:  patient, spouse      Time Tracking     SLP Treatment Date:   06/04/24  Speech Start Time:  1050  Speech Stop Time:  1110     Speech Total Time (min):  20 min    Billable minutes:  Evaluation of Speech Sound Production with Comprehension and Expression, 20 minutes     06/04/2024

## 2024-06-04 NOTE — PROGRESS NOTES
[facility] Hospital Medicine  Progress Note    Patient Name: Chelle Chandra  MRN: [id number]  Patient Class: IP- Inpatient   Admission Date: 6/1/2024  Length of Stay: 3 days  Attending Physician: Levy Damon MD  Primary Care Provider: Jorge Silver MD        Subjective:     Principal Problem:Stroke        HPI:  58 y.o. female who  PMH includes anxiety, COPD, DM type II, GERD, HLD, lung nodule, insomnia; presents to the ED at [facility] on 6/1/2024 with a primary complaint of right side weakness while attempting to get up to go to restroom while waking up. It was reported pt was normal the night prior to going to bed. She woke up the am of arrival in the ED with right side weakness and associated right side facial droop right lower extremity flaccid paralysis, as well as right-sided neglect. Pt presented to the ED for further evaluation. Work up in the ED  CTA stroke yielded diminished flow in the left anterior cerebral artery, as well as left middle cerebral artery, left posterior cerebral artery, and large vessel occlusion in the left MCA.  Patient was taken for mechanical thrombectomy per Interventional Neurology Services which was successful.  Patient was admitted to ICU sedated and intubated.  Patient did require Cleviprex infusion secondary to hypertension.  Patient has since been extubated and weaned off of Cleviprex infusion.  Her blood pressures have been stable.  Patient has been evaluated by therapy Services PT, OT, ST services.  Patient does have slight residual right-sided facial droop however her right-sided deficits have resolved.  Patient does admit to smoking about 1/2 pack cigarettes a day prior to admission.  She denies any illicit drug use and drinks alcohol socially.  Patient has been cleared for transfer out of ICU to the regular floor.  Hospital medicine services have been consulted for assumption of care.     Overview/Hospital Course:  6/4/24-Patient seems to  be doing well at this time.  Likely d/c soon if she continues to improve.      Interval History:     Review of Systems   Constitutional:  Positive for activity change.   HENT: Negative.     Eyes: Negative.    Respiratory: Negative.     Cardiovascular: Negative.    Gastrointestinal: Negative.    Endocrine: Negative.    Musculoskeletal:  Positive for gait problem.   Skin: Negative.    Allergic/Immunologic: Negative.    Neurological:  Positive for weakness.   Hematological: Negative.    Psychiatric/Behavioral: Negative.       Objective:     Vital Signs (Most Recent):  Temp: 98.6 °F (37 °C) (06/04/24 1200)  Pulse: (!) 57 (06/04/24 1200)  Resp: 20 (06/04/24 1200)  BP: (!) 144/79 (06/04/24 1200)  SpO2: 98 % (06/04/24 1200) Vital Signs (24h Range):  Temp:  [97.1 °F (36.2 °C)-98.7 °F (37.1 °C)] 98.6 °F (37 °C)  Pulse:  [57-77] 57  Resp:  [15-26] 20  SpO2:  [97 %-99 %] 98 %  BP: (123-144)/(71-83) 144/79     Weight: 52.5 kg (115 lb 11.9 oz)  Body mass index is 18.69 kg/m².    Intake/Output Summary (Last 24 hours) at 6/4/2024 1331  Last data filed at 6/4/2024 0828  Gross per 24 hour   Intake --   Output 200 ml   Net -200 ml         Physical Exam  Constitutional:       Appearance: Normal appearance. She is normal weight.   HENT:      Head: Normocephalic and atraumatic.      Nose: Nose normal.      Mouth/Throat:      Mouth: Mucous membranes are moist.      Pharynx: Oropharynx is clear.   Eyes:      Extraocular Movements: Extraocular movements intact.      Conjunctiva/sclera: Conjunctivae normal.      Pupils: Pupils are equal, round, and reactive to light.   Cardiovascular:      Rate and Rhythm: Normal rate and regular rhythm.      Pulses: Normal pulses.      Heart sounds: Normal heart sounds.   Pulmonary:      Effort: Pulmonary effort is normal.      Breath sounds: Normal breath sounds.   Abdominal:      General: Bowel sounds are normal.      Palpations: Abdomen is soft.   Musculoskeletal:         General: Normal range of  "motion.      Cervical back: Normal range of motion and neck supple.   Skin:     General: Skin is warm and dry.      Capillary Refill: Capillary refill takes 2 to 3 seconds.   Neurological:      Mental Status: She is alert and oriented to person, place, and time.      Motor: Weakness present.   Psychiatric:         Mood and Affect: Mood normal.         Behavior: Behavior normal.         Thought Content: Thought content normal.             Significant Labs: All pertinent labs within the past 24 hours have been reviewed.  BMP:   Recent Labs   Lab 06/03/24  0805      K 3.6   *   CO2 21*   BUN 11.3   CREATININE 0.68   CALCIUM 8.4     CBC:   Recent Labs   Lab 06/03/24  0805   WBC 7.44   HGB 12.1   HCT 35.3*        CMP:   Recent Labs   Lab 06/03/24  0805      K 3.6   *   CO2 21*   BUN 11.3   CREATININE 0.68   CALCIUM 8.4   ALBUMIN 2.9*   BILITOT 0.9   ALKPHOS 95   AST 23   ALT 11     Magnesium: No results for input(s): "MG" in the last 48 hours.    Significant Imaging: I have reviewed all pertinent imaging results/findings within the past 24 hours.    Assessment/Plan:      * Stroke  .  Antithrombotics for secondary stroke prevention: Antiplatelets: Clopidogrel: 75 mg daily    Statins for secondary stroke prevention and hyperlipidemia, if present:   Statins: Atorvastatin- 40 mg daily    Aggressive risk factor modification: HTN, Smoking     Rehab efforts: The patient has been evaluated by a stroke team provider and the therapy needs have been fully considered based off the presenting complaints and exam findings. The following therapy evaluations are needed: PT evaluate and treat, OT evaluate and treat, SLP evaluate and treat    Diagnostics ordered/pending: CT scan of head without contrast to asses brain parenchyma, MRI head without contrast to assess brain parenchyma    VTE prophylaxis: None: Reason for No Pharmacological VTE Prophylaxis: Holding x 24 hours s/p treatment with Thrombolytic " therapy    BP parameters: Infarct: Post Thrombolytic therapy, SBP <180        Tobacco use  Advised to stop.  Can offer nicotine supplements.  She is considering stopping(cessation=4mins)      HLD (hyperlipidemia)  Resume meds        VTE Risk Mitigation (From admission, onward)           Ordered     IP VTE LOW RISK PATIENT  Once         06/01/24 0709     Place sequential compression device  Until discontinued         06/01/24 0709                  Therapy  OOB  Resume current meds  Possible d/c soon  Discharge Planning   DARIAN:      Code Status: Full Code   Is the patient medically ready for discharge?:     Reason for patient still in hospital (select all that apply): Patient trending condition, Treatment, Consult recommendations, PT / OT recommendations, and Pending disposition  Discharge Plan A: Home                  Alessio Arteaga MD  Department of Hospital Medicine   [facility] - [facility]

## 2024-06-04 NOTE — SUBJECTIVE & OBJECTIVE
Interval History:     Review of Systems   Constitutional:  Positive for activity change.   HENT: Negative.     Eyes: Negative.    Respiratory: Negative.     Cardiovascular: Negative.    Gastrointestinal: Negative.    Endocrine: Negative.    Musculoskeletal:  Positive for gait problem.   Skin: Negative.    Allergic/Immunologic: Negative.    Neurological:  Positive for weakness.   Hematological: Negative.    Psychiatric/Behavioral: Negative.       Objective:     Vital Signs (Most Recent):  Temp: 98.6 °F (37 °C) (06/04/24 1200)  Pulse: (!) 57 (06/04/24 1200)  Resp: 20 (06/04/24 1200)  BP: (!) 144/79 (06/04/24 1200)  SpO2: 98 % (06/04/24 1200) Vital Signs (24h Range):  Temp:  [97.1 °F (36.2 °C)-98.7 °F (37.1 °C)] 98.6 °F (37 °C)  Pulse:  [57-77] 57  Resp:  [15-26] 20  SpO2:  [97 %-99 %] 98 %  BP: (123-144)/(71-83) 144/79     Weight: 52.5 kg (115 lb 11.9 oz)  Body mass index is 18.69 kg/m².    Intake/Output Summary (Last 24 hours) at 6/4/2024 1331  Last data filed at 6/4/2024 0828  Gross per 24 hour   Intake --   Output 200 ml   Net -200 ml         Physical Exam  Constitutional:       Appearance: Normal appearance. She is normal weight.   HENT:      Head: Normocephalic and atraumatic.      Nose: Nose normal.      Mouth/Throat:      Mouth: Mucous membranes are moist.      Pharynx: Oropharynx is clear.   Eyes:      Extraocular Movements: Extraocular movements intact.      Conjunctiva/sclera: Conjunctivae normal.      Pupils: Pupils are equal, round, and reactive to light.   Cardiovascular:      Rate and Rhythm: Normal rate and regular rhythm.      Pulses: Normal pulses.      Heart sounds: Normal heart sounds.   Pulmonary:      Effort: Pulmonary effort is normal.      Breath sounds: Normal breath sounds.   Abdominal:      General: Bowel sounds are normal.      Palpations: Abdomen is soft.   Musculoskeletal:         General: Normal range of motion.      Cervical back: Normal range of motion and neck supple.   Skin:      "General: Skin is warm and dry.      Capillary Refill: Capillary refill takes 2 to 3 seconds.   Neurological:      Mental Status: She is alert and oriented to person, place, and time.      Motor: Weakness present.   Psychiatric:         Mood and Affect: Mood normal.         Behavior: Behavior normal.         Thought Content: Thought content normal.             Significant Labs: All pertinent labs within the past 24 hours have been reviewed.  BMP:   Recent Labs   Lab 06/03/24  0805      K 3.6   *   CO2 21*   BUN 11.3   CREATININE 0.68   CALCIUM 8.4     CBC:   Recent Labs   Lab 06/03/24  0805   WBC 7.44   HGB 12.1   HCT 35.3*        CMP:   Recent Labs   Lab 06/03/24  0805      K 3.6   *   CO2 21*   BUN 11.3   CREATININE 0.68   CALCIUM 8.4   ALBUMIN 2.9*   BILITOT 0.9   ALKPHOS 95   AST 23   ALT 11     Magnesium: No results for input(s): "MG" in the last 48 hours.    Significant Imaging: I have reviewed all pertinent imaging results/findings within the past 24 hours.  "

## 2024-06-04 NOTE — H&P
[facility] Hospital Medicine History & Physical Examination       Patient Name: Chelle Chandra  MRN: [id number]  Patient Class: IP- Inpatient   Admission Date: 6/1/2024   Admitting Physician: ALYSA Service   Length of Stay: 2  Attending Physician: Dr. Levy Damon  Primary Care Provider: Jorge Silver MD  Face-to-Face encounter date: 06/03/2024  Code Status: Full code  Chief Complaint: Cerebrovascular Accident (Pt. woke from sleep approx 30 min ago with R-sided facial droop, slurred speech and flaccid R arm and leg. Pt. last seen normal 2130 last night before going to bed. CBG in triage 151. Hx DM. )        Screening for Social Drivers for health:  Patient screened for food insecurity, housing instability, transportation needs, utility difficulties, and interpersonal safety (select all that apply as identified as concern)  []Housing or Food  []Transportation Needs  []Utility Difficulties  []Interpersonal safety  [x]None    Patient information was obtained from patient, patient's family, past medical records and/or ER records.     HISTORY OF PRESENT ILLNESS:   Chelle Chandra is a 58 y.o. female who  PMH includes anxiety, COPD, DM type II, GERD, HLD, lung nodule, insomnia; presents to the ED at [facility] on 6/1/2024 with a primary complaint of right side weakness while attempting to get up to go to restroom while waking up. It was reported pt was normal the night prior to going to bed. She woke up the am of arrival in the ED with right side weakness and associated right side facial droop right lower extremity flaccid paralysis, as well as right-sided neglect. Pt presented to the ED for further evaluation. Work up in the ED  CTA stroke yielded diminished flow in the left anterior cerebral artery, as well as left middle cerebral artery, left posterior cerebral artery, and large vessel occlusion in the left MCA.  Patient was taken for mechanical thrombectomy per Interventional Neurology  Services which was successful.  Patient was admitted to ICU sedated and intubated.  Patient did require Cleviprex infusion secondary to hypertension.  Patient has since been extubated and weaned off of Cleviprex infusion.  Her blood pressures have been stable.  Patient has been evaluated by therapy Services PT, OT, ST services.  Patient does have slight residual right-sided facial droop however her right-sided deficits have resolved.  Patient does admit to smoking about 1/2 pack cigarettes a day prior to admission.  She denies any illicit drug use and drinks alcohol socially.  Patient has been cleared for transfer out of ICU to the regular floor.  Hospital medicine services have been consulted for assumption of care.     PAST MEDICAL HISTORY:     Past Medical History:   Diagnosis Date    Accelerated junctional rhythm     Agatston CAC score, <100     Anxiety disorder, unspecified     COPD type A     Diabetes mellitus without complication     GERD (gastroesophageal reflux disease)     History of COVID-19     HLD (hyperlipidemia)     Insomnia     Lung nodule        PAST SURGICAL HISTORY:     Past Surgical History:   Procedure Laterality Date    ANGIOGRAM, CORONARY, WITH LEFT HEART CATHETERIZATION      BREAST LUMPECTOMY Right     CARDIOVERSION  08/01/2013    CARPAL TUNNEL RELEASE  10/23/2013    FUSION OF CERVICAL SPINE BY ANTERIOR APPROACH USING COMPUTER-ASSISTED NAVIGATION  06/10/2019    KIDNEY SURGERY      TONSILLECTOMY AND ADENOIDECTOMY      TOTAL ABDOMINAL HYSTERECTOMY      WRIST SURGERY         ALLERGIES:   Aspirin, Ketorolac, Penicillins, Sulfa (sulfonamide antibiotics), and Ozempic [semaglutide]    FAMILY HISTORY:   Reviewed and negative    SOCIAL HISTORY:     Social History     Tobacco Use    Smoking status: Every Day     Current packs/day: 0.50     Types: Cigarettes    Smokeless tobacco: Never   Substance Use Topics    Alcohol use: Never        HOME MEDICATIONS:   As documented   Prior to Admission medications   "  Medication Sig Start Date End Date Taking? Authorizing Provider   albuterol (PROVENTIL) 2.5 mg /3 mL (0.083 %) nebulizer solution USE 1 VIAL IN NEBULIZER EVERY 6 HOURS AS NEEDED FOR WHEEZING 5/8/24   Jorge Silver MD   busPIRone (BUSPAR) 15 MG tablet Take 1 tablet (15 mg total) by mouth 3 (three) times daily. 9/21/23 9/20/24  Carlton Maldonado FNP   dapagliflozin (FARXIGA) 5 mg Tab tablet Take 1 tablet (5 mg total) by mouth once daily. 1/5/23   Jorge Silver MD   fluticasone propionate (FLOVENT DISKUS) 250 mcg/actuation DsDv Inhale 2 puffs into the lungs 2 (two) times daily. Controller 5/23/23   Jorge Silver MD   levothyroxine (SYNTHROID) 112 MCG tablet Take 1 tablet (112 mcg total) by mouth before breakfast. 2/22/24   Jorge Silver MD   metFORMIN (GLUCOPHAGE) 500 MG tablet Take 500 mg by mouth 2 (two) times daily with meals. 2/10/22   Provider, Historical   paroxetine (PAXIL) 20 MG tablet Take 1 tablet (20 mg total) by mouth every morning. 9/21/23 9/20/24  Carlton Maldonado FNP   pen needle, diabetic 32 gauge x 5/32" Ndle 1 each by Misc.(Non-Drug; Combo Route) route once a week. 11/3/22   Carlton Maldonado FNP   promethazine-codeine 6.25-10 mg/5 ml (PHENERGAN WITH CODEINE) 6.25-10 mg/5 mL syrup Take 5 mLs by mouth every 4 (four) hours as needed for Cough. 11/14/23   Jorge Silver MD   rosuvastatin (CRESTOR) 40 MG Tab Take 1 tablet (40 mg total) by mouth once daily. 9/28/23 9/27/24  Carlton Maldonado FNP   sucralfate (CARAFATE) 100 mg/mL suspension Take 10 mLs (1 g total) by mouth 4 (four) times daily before meals and nightly. 11/2/22   Lauchner, [employer]qu[employer], FNP   VENTOLIN HFA 90 mcg/actuation inhaler Inhale 2 puffs into the lungs every 4 (four) hours as needed. 5/8/24   Jorge Silver MD       REVIEW OF SYSTEMS:   Except as documented, all other systems reviewed and negative     PHYSICAL EXAM:     VITAL SIGNS: 24 HRS MIN & MAX LAST   Temp  Min: 97.1 °F (36.2 °C)  Max: 98.9 °F (37.2 °C) 98.7 °F " (37.1 °C)   BP  Min: 123/81  Max: 138/82 138/82   Pulse  Min: 53  Max: 80  68   Resp  Min: 15  Max: 26 (!) 26   SpO2  Min: 95 %  Max: 99 % 99 %       General appearance: Well-developed, well-nourished female, looks older than stated age; nontoxic, in no apparent distress; family member visiting at the bedside  HENT: Atraumatic head. Moist mucous membranes of oral cavity, mild right-sided facial droop  Eyes: PERRL  Lungs: Clear to auscultation bilaterally. No wheezing present.   Heart: Regular rate and rhythm. S1 and S2 present with no murmurs/gallop/rub. No pedal edema. No JVD present.   Abdomen: Soft, non-distended, non-tender. No rebound tenderness/guarding. Bowel sounds are normal.   Extremities: No cyanosis, clubbing, or edema.  Skin: No Rash.   Neuro: Neuro  CN 2, 3, 4, 6: EOMI, nl visual fields, nl gross vision test  CN 5: nl chewing, nl facial sensation, mild right side facial droop  CN 7: nl smile, nl brow wrinkle, nl eyes closure  CN 8: nl hearing  CN 9, 10: nl uvular elevation on phonation  CN 11: nl shoulder shrug  CN 12: nl tongue protrusion, midline, nl lateral deviation   Strength: nl strength proximally and distally, upper and lower extremity  Sensation: nl to light touch proximally and distally, upper and lower extremity  Speech: clear, no dysarthria  Thinking: clear, goal oriented, no delusions  Psych/mental status: Appropriate mood and affect. Responds appropriately to questions.     LABS AND IMAGING:     Recent Labs   Lab 06/01/24  0405 06/01/24  0407 06/03/24  0805   WBC  --  5.36 7.44   RBC  --  4.62 3.96*   HGB  --  13.8 12.1   HCT 43 42.2 35.3*   MCV  --  91.3 89.1   MCH  --  29.9 30.6   MCHC  --  32.7* 34.3   RDW  --  13.2 12.9   PLT  --  311 238   MPV  --  10.2 10.8*       Recent Labs   Lab 05/28/24  0742 06/01/24  0407 06/01/24  1000 06/02/24  0316 06/02/24  1155 06/03/24  0805    141  --   --   --  144   K 4.9 4.1  --   --   --  3.6    111*  --   --   --  112*   CO2 29 24  --    --   --  21*   BUN 20* 15.6  --   --   --  11.3   CREATININE 0.84 0.76  --   --   --  0.68   CALCIUM 9.5 9.0  --   --   --  8.4   PH  --   --  7.430 7.480* 7.460*  --    ALBUMIN 4.1 3.3*  --   --   --  2.9*   ALKPHOS 88 95  --   --   --  95   ALT 19 16  --   --   --  11   AST 15 13  --   --   --  23   BILITOT 0.6 0.6  --   --   --  0.9       Microbiology Results (last 7 days)       ** No results found for the last 168 hours. **             Fl Modified Barium Swallow Speech  See procedure notes from Speech Pathologist.    This procedure was auto-finalized.  CV Ultrasound Bilateral Doppler Carotid  The right internal carotid artery demonstrated no hemodynamically   significant stenosis.  The left internal carotid artery demonstrated no hemodynamically   significant stenosis.    Bilateral vertebral arteries were patent with antegrade flow.          ASSESSMENT & PLAN:   ASSESSMENT:  Acute CVA left ICA occlusion-POA   Status post thrombectomy mechanical on 06/01/2024   Right-sided weakness and facial droop-secondary to acute CVA-POA  HTN-urgency-POA  Dysphagia- secondary to CVA- POA  DM type 2-with hyperglycemia-POA   HLD with elevated LDL-POA   Tobacco abuse- cigarettes- POA    PLAN:  Neurology Services following appreciate assistance and recommendations   Continue with PT OT ST services  P.o. diet per SLP recommendations   Discussed at length smoking cessation  Accu-Cheks with sliding scale coverage  Blood pressure parameters per post thrombectomy protocol   Repeat lab work in a.m.   Continue with Plavix and atorvastatin per Neurology recommendations-patient has a allergy to aspirin   Monitor groin site with vital signs   Home medication deemed necessary  Per Neurology recommendations patient will need Holter monitor for AFib monitoring on discharge    Discussed at length smoking cessation is in need to quit smoking and the benefits on her overall health.  Discussed at length continued smoking on her overall health and  the possibility of recurrent CVA episodes.  Offered gum patches which patient reports she will think about on discharge.  Patient is amenable to smoking cessation at this time.  Time spent 8 minutes      VTE Prophylaxis: SCD for DVT prophylaxis and will be advised to be as mobile as possible and sit in a chair as tolerated    Patient condition:  Stable  __________________________________________________________________________  INPATIENT LIST OF MEDICATIONS     Scheduled Meds:   atorvastatin  80 mg Oral Daily    clopidogreL  75 mg Per NG tube Daily    mupirocin   Nasal BID     Continuous Infusions:   sodium chloride 0.9%   Intravenous Continuous   Stopped at 06/02/24 2012    sodium chloride 0.9%   Intravenous Continuous   Stopped at 06/03/24 0700    propofoL  0-50 mcg/kg/min (Dosing Weight) Intravenous Continuous   Stopped at 06/02/24 1040     PRN Meds:.  Current Facility-Administered Medications:     acetaminophen, 650 mg, Oral, Q6H PRN    bisacodyL, 10 mg, Rectal, Daily PRN    fentaNYL, 50 mcg, Intravenous, Q1H PRN    hydrALAZINE, 10 mg, Intravenous, Q6H PRN    ondansetron, 4 mg, Intravenous, Q8H PRN    sodium chloride 0.9%, 10 mL, Intravenous, PRN      I, LUIS Batista have reviewed and discussed the case with   Dr Levy Damon.  Please see the following addendum for further assessment and plan from their attending MD.  LUIS Nugent   06/03/2024    Dr damon 6-3-24 - chart reviewed and pt examined . 58 y.o. female w anxiety, COPD, DM type II, GERD, HLD, lung nodule, insomnia; presents  with a primary complaint of right side weakness , right side facial droop right lower extremity flaccid paralysis, as well as right-sided neglect.  CTA brain/neck  yielded diminished flow in the left anterior cerebral artery, as well as left middle cerebral artery, left posterior cerebral artery, and large vessel occlusion in the left MCA. taken for mechanical thrombectomy which was successful. admitted to  ICU intubated/extubated and weaned off of Cleviprex infusion.   blood pressures have been stable. evaluated by  PT, OT, ST services.  Patient does have slight residual right-sided facial droop however her right-sided deficits have resolved.  Patient does admit to smoking about 1/2 pack cigarettes a day prior to admission.  She denies any illicit drug use and drinks alcohol socially.  Patient has been cleared for transfer out of ICU to the regular floor.  Hospital medicine services have been consulted for assumption of care. Continue present management  and follow recs by PT as well as neurology. + tobacco. Continue speech recs. Will discuss antiplatelet therapy with neurology since pt is allergic to aspirin .  Agree with assessment and plans done by NP, Ms kris zarco. Some corrections were done to HPI/PE/A+P at the time of my evaluation.    Discharge Planning and Disposition: No mobility needs. Ambulating well. Good social support system.   Anticipated discharge    All diagnosis and differential diagnosis have been reviewed; assessment and plan has been documented; I have personally reviewed the labs and test results that are presently available; I have reviewed the patients medication list; I have reviewed the consulting providers response and recommendations. I have reviewed or attempted to review medical records based upon their availability.    All of the patient and family questions have been addressed and answered. Patient's is agreeable to the above stated plan. I will continue to monitor closely and make adjustments to medical management as needed.

## 2024-06-04 NOTE — PLAN OF CARE
Problem: Adult Inpatient Plan of Care  Goal: Plan of Care Review  Outcome: Progressing  Flowsheets (Taken 6/3/2024 1915)  Plan of Care Reviewed With: patient  Goal: Patient-Specific Goal (Individualized)  Outcome: Progressing  Goal: Absence of Hospital-Acquired Illness or Injury  Outcome: Progressing  Goal: Optimal Comfort and Wellbeing  Outcome: Progressing  Goal: Readiness for Transition of Care  Outcome: Progressing     Problem: Diabetes Comorbidity  Goal: Blood Glucose Level Within Targeted Range  Outcome: Progressing     Problem: Wound  Goal: Optimal Coping  Outcome: Progressing  Goal: Optimal Functional Ability  Outcome: Progressing  Goal: Absence of Infection Signs and Symptoms  Outcome: Progressing  Goal: Improved Oral Intake  Outcome: Progressing  Goal: Optimal Pain Control and Function  Outcome: Progressing  Goal: Skin Health and Integrity  Outcome: Progressing  Goal: Optimal Wound Healing  Outcome: Progressing     Problem: Fall Injury Risk  Goal: Absence of Fall and Fall-Related Injury  Outcome: Progressing     Problem: Skin Injury Risk Increased  Goal: Skin Health and Integrity  Outcome: Progressing     Problem: Stroke, Ischemic (Includes Transient Ischemic Attack)  Goal: Optimal Coping  Outcome: Progressing  Goal: Effective Bowel Elimination  Outcome: Progressing  Goal: Optimal Cerebral Tissue Perfusion  Outcome: Progressing  Goal: Optimal Cognitive Function  Outcome: Progressing  Goal: Improved Communication Skills  Outcome: Progressing  Goal: Optimal Functional Ability  Outcome: Progressing  Goal: Optimal Nutrition Intake  Outcome: Progressing  Goal: Effective Oxygenation and Ventilation  Outcome: Progressing  Goal: Improved Sensorimotor Function  Outcome: Progressing  Goal: Safe and Effective Swallow  Outcome: Progressing  Goal: Effective Urinary Elimination  Outcome: Progressing     Problem: Infection  Goal: Absence of Infection Signs and Symptoms  Outcome: Progressing

## 2024-06-04 NOTE — NURSING
Nurses Note -- 4 Eyes      6/4/2024   4:01 PM      Skin assessed during: Q Shift Change      [x] No Altered Skin Integrity Present    [x]Prevention Measures Documented      [] Yes- Altered Skin Integrity Present or Discovered   [] LDA Added if Not in Epic (Describe Wound)   [] New Altered Skin Integrity was Present on Admit and Documented in LDA   [] Wound Image Taken    Wound Care Consulted? No    Attending Nurse:  Yandel Lopez RN/Staff Member:  ASHLEY rodriguez

## 2024-06-05 VITALS
HEART RATE: 53 BPM | DIASTOLIC BLOOD PRESSURE: 80 MMHG | SYSTOLIC BLOOD PRESSURE: 141 MMHG | WEIGHT: 115.75 LBS | OXYGEN SATURATION: 98 % | TEMPERATURE: 98 F | BODY MASS INDEX: 18.6 KG/M2 | RESPIRATION RATE: 18 BRPM | HEIGHT: 66 IN

## 2024-06-05 LAB — POCT GLUCOSE: 151 MG/DL (ref 70–110)

## 2024-06-05 PROCEDURE — 25000003 PHARM REV CODE 250: Performed by: NURSE PRACTITIONER

## 2024-06-05 PROCEDURE — 25000003 PHARM REV CODE 250

## 2024-06-05 PROCEDURE — 97530 THERAPEUTIC ACTIVITIES: CPT | Mod: CO

## 2024-06-05 RX ORDER — ATORVASTATIN CALCIUM 80 MG/1
80 TABLET, FILM COATED ORAL DAILY
Qty: 90 TABLET | Refills: 3 | Status: SHIPPED | OUTPATIENT
Start: 2024-06-06 | End: 2025-06-06

## 2024-06-05 RX ORDER — CLOPIDOGREL BISULFATE 75 MG/1
75 TABLET ORAL DAILY
Qty: 30 TABLET | Refills: 11 | Status: SHIPPED | OUTPATIENT
Start: 2024-06-06 | End: 2025-06-06

## 2024-06-05 RX ADMIN — CLOPIDOGREL BISULFATE 75 MG: 75 TABLET ORAL at 08:06

## 2024-06-05 RX ADMIN — ATORVASTATIN CALCIUM 80 MG: 40 TABLET, FILM COATED ORAL at 08:06

## 2024-06-05 RX ADMIN — ACETAMINOPHEN 650 MG: 325 TABLET, FILM COATED ORAL at 11:06

## 2024-06-05 RX ADMIN — ACETAMINOPHEN 650 MG: 325 TABLET, FILM COATED ORAL at 05:06

## 2024-06-05 NOTE — NURSING TRANSFER
Nursing Transfer Note      6/4/2024   9:38 PM    Nurse giving handoff:Angella  Nurse receiving handoff:Cherie    Reason patient is being transferred: downgraded    Transfer To: 431    Transfer via wheelchair    Transfer with chart, personal belongings    Transported by NT    Transfer Vital Signs:WNL    Order for Tele Monitor? No      4eyes on Skin: yes    Medicines sent: n/a    Any special needs or follow-up needed: n/a    Patient belongings transferred with patient: Yes    Chart send with patient: Yes    Notified: spouse- called pt's  Roberto and updated on pt being transferred to room 431

## 2024-06-05 NOTE — PT/OT/SLP PROGRESS
Occupational Therapy   Treatment    Name: Chelle Chandra  MRN: [id number]  Admitting Diagnosis:  Stroke       Recommendations:     Recommended therapy intensity at discharge: Low Intensity Therapy   Discharge Equipment Recommendations:  none  Barriers to discharge:       Assessment:     Chelle Chandra is a 58 y.o. female with a medical diagnosis of Stroke. Performance deficits affecting function are weakness, impaired self care skills, decreased upper extremity function.     Rehab Prognosis:  Good; patient would benefit from acute skilled OT services to address these deficits and reach maximum level of function.       Plan:     Patient to be seen   to address the above listed problems via self-care/home management, therapeutic exercises  Plan of Care Expires: 07/03/24  Plan of Care Reviewed with: patient    Subjective     Pain/Comfort:  Pain Rating 1: 0/10    Objective:     Communicated with: RN prior to session.  Patient found supine with telemetry upon OT entry to room.    General Precautions: Standard, aspiration    Orthopedic Precautions:N/A  Braces: N/A  Respiratory Status: Room air     Occupational Performance:     Bed Mobility:    Patient completed Scooting/Bridging with independence  Patient completed Supine to Sit with independence     Functional Mobility/Transfers:  Patient completed Sit <> Stand Transfer with stand by assistance  with  no assistive device   Functional Mobility: ambulated in hallway with SBA and no AD. Few minor LOB however pt able to correct.     Therapeutic Activities:  Educated on UE ex/ROM and strengthening activities for RUE. Provided pt with printed HEP, theraband and sponge for  strengthening and educated on use. Verbalized understanding.     Therapeutic Positioning    OT interventions performed during the course of today's session in an effort to prevent and/or reduce acquired pressure injuries:   Education was provided on benefits of and recommendations for therapeutic  positioning    [facility] 6 Click ADL:      Patient Education:  Patient provided with verbal education education regarding OT role/goals/POC, fall prevention, safety awareness, and Discharge/DME recommendations.  Understanding was verbalized.      Patient left up in chair with all lines intact, call button in reach, and RN present.    GOALS:   Multidisciplinary Problems       Occupational Therapy Goals          Problem: Occupational Therapy    Goal Priority Disciplines Outcome Interventions   Occupational Therapy Goal     OT, PT/OT Progressing    Description: Goals to be met by: 7/3/24     Patient will increase functional independence with ADLs by performing:    UE Dressing with Modified [place].  LE Dressing with Modified [place].  Toileting from toilet with Modified [place] for hygiene and clothing management.   Toilet transfer to toilet with Modified [place].  Increased functional strength to 4+/5 through therEx, neuromuscular re-ed.                         Time Tracking:     OT Date of Treatment: 06/05/24  OT Start Time: 0837  OT Stop Time: 0900  OT Total Time (min): 23 min    Billable Minutes:Therapeutic Activity 23    OT/EDILSON: EDILSON     Number of EDILSON visits since last OT visit: 2    6/5/2024

## 2024-06-05 NOTE — NURSING
Nurses Note -- 4 Eyes      6/4/2024   9:25 PM      Skin assessed during: Daily Assessment      [] No Altered Skin Integrity Present    [x]Prevention Measures Documented      [x] Yes- Altered Skin Integrity Present or Discovered   [] LDA Added if Not in Epic (Describe Wound)   [] New Altered Skin Integrity was Present on Admit and Documented in LDA   [] Wound Image Taken    Wound Care Consulted? No    Attending Nurse:  Angella Lopez RN/Staff Member:  ASHLEY Roldan    Patient has no skin abnormalities except for R groin sheath site that is c/d/I.

## 2024-06-05 NOTE — PLAN OF CARE
Spoke to patient about equipment (walker) for discharge today. Referral sent to [facility] via [facility].

## 2024-06-05 NOTE — NURSING
No data to display            Nurses Note -- 4 Eyes      6/5/2024   6:49 AM      Skin assessed during: Admit      [x] No Altered Skin Integrity Present    []Prevention Measures Documented      [] Yes- Altered Skin Integrity Present or Discovered   [] LDA Added if Not in Epic (Describe Wound)   [] New Altered Skin Integrity was Present on Admit and Documented in LDA   [] Wound Image Taken    Wound Care Consulted? No    Attending Nurse:  Cherie Lopez RN/Staff Member:  ASHLEY Roldan N

## 2024-06-05 NOTE — NURSING
Received room ICU via wheelchair.  Color good resp non labored.  Oriented to room call light in reach, no distress noted at this time  Denies complaints of pain at this time.

## 2024-06-05 NOTE — PLAN OF CARE
Problem: Adult Inpatient Plan of Care  Goal: Plan of Care Review  Outcome: Progressing  Flowsheets (Taken 6/4/2024 2124)  Plan of Care Reviewed With: patient  Goal: Patient-Specific Goal (Individualized)  Outcome: Progressing  Goal: Absence of Hospital-Acquired Illness or Injury  Outcome: Progressing  Goal: Optimal Comfort and Wellbeing  Outcome: Progressing  Goal: Readiness for Transition of Care  Outcome: Progressing     Problem: Diabetes Comorbidity  Goal: Blood Glucose Level Within Targeted Range  Outcome: Progressing     Problem: Wound  Goal: Optimal Coping  Outcome: Progressing  Goal: Optimal Functional Ability  Outcome: Progressing  Goal: Absence of Infection Signs and Symptoms  Outcome: Progressing  Goal: Improved Oral Intake  Outcome: Progressing  Goal: Optimal Pain Control and Function  Outcome: Progressing  Goal: Skin Health and Integrity  Outcome: Progressing  Goal: Optimal Wound Healing  Outcome: Progressing     Problem: Fall Injury Risk  Goal: Absence of Fall and Fall-Related Injury  Outcome: Progressing     Problem: Skin Injury Risk Increased  Goal: Skin Health and Integrity  Outcome: Progressing     Problem: Stroke, Ischemic (Includes Transient Ischemic Attack)  Goal: Optimal Coping  Outcome: Progressing  Goal: Effective Bowel Elimination  Outcome: Progressing  Goal: Optimal Cerebral Tissue Perfusion  Outcome: Progressing  Goal: Optimal Cognitive Function  Outcome: Progressing  Goal: Improved Communication Skills  Outcome: Progressing  Goal: Optimal Functional Ability  Outcome: Progressing  Goal: Optimal Nutrition Intake  Outcome: Progressing  Goal: Effective Oxygenation and Ventilation  Outcome: Progressing  Goal: Improved Sensorimotor Function  Outcome: Progressing  Goal: Safe and Effective Swallow  Outcome: Progressing  Goal: Effective Urinary Elimination  Outcome: Progressing     Problem: Infection  Goal: Absence of Infection Signs and Symptoms  Outcome: Progressing

## 2024-06-05 NOTE — DISCHARGE SUMMARY
[facility] - 4th Floor [facility] Medicine  Discharge Summary      Patient Name: Chelle Chandra  MRN: [id number]  LUIS: [id number]  Patient Class: IP- Inpatient  Admission Date: 6/1/2024  Hospital Length of Stay: 4 days  Discharge Date and Time:  06/05/2024 9:49 AM  Attending Physician: Levy Damon MD   Discharging Provider: Alessio Arteaga MD  Primary Care Provider: Jorge Silver MD    Primary Care Team: Networked reference to record PCT     HPI:   58 y.o. female who  PMH includes anxiety, COPD, DM type II, GERD, HLD, lung nodule, insomnia; presents to the ED at [facility] on 6/1/2024 with a primary complaint of right side weakness while attempting to get up to go to restroom while waking up. It was reported pt was normal the night prior to going to bed. She woke up the am of arrival in the ED with right side weakness and associated right side facial droop right lower extremity flaccid paralysis, as well as right-sided neglect. Pt presented to the ED for further evaluation. Work up in the ED  CTA stroke yielded diminished flow in the left anterior cerebral artery, as well as left middle cerebral artery, left posterior cerebral artery, and large vessel occlusion in the left MCA.  Patient was taken for mechanical thrombectomy per Interventional Neurology Services which was successful.  Patient was admitted to ICU sedated and intubated.  Patient did require Cleviprex infusion secondary to hypertension.  Patient has since been extubated and weaned off of Cleviprex infusion.  Her blood pressures have been stable.  Patient has been evaluated by therapy Services PT, OT, ST services.  Patient does have slight residual right-sided facial droop however her right-sided deficits have resolved.  Patient does admit to smoking about 1/2 pack cigarettes a day prior to admission.  She denies any illicit drug use and drinks alcohol socially.  Patient has been cleared for transfer out of ICU to  the regular floor.  Hospital medicine services have been consulted for assumption of care.     * No surgery found *      Hospital Course:   6/4/24-Patient seems to be doing well at this time.  Likely d/c soon if she continues to improve.      6/5/24-Shelby is doing well.  She will be set up with outpatient therapy and a walker.  She is stable for discharge home today.  Exam(A&O, NAD, RRR, CTA, BS +, NTTP,  ROM I)     Goals of Care Treatment Preferences:  Code Status: Full Code      Consults:   Consults (From admission, onward)          Status Ordering Provider     Inpatient consult to Social Work/Case Management  Once        Provider:  (Not yet assigned)    Completed ZACK ABURTO     Inpatient consult to Social Work/Case Management  Once        Provider:  (Not yet assigned)    Completed CHANELL VILLATORO     Inpatient consult to Vascular (Stroke) Neurology  Once        Provider:  Samira Amato MD    Completed ALEKSEY WILLIS            No new Assessment & Plan notes have been filed under this hospital service since the last note was generated.  Service: Hospital Medicine    Final Active Diagnoses:    Diagnosis Date Noted POA    PRINCIPAL PROBLEM:  Stroke [I63.9] 06/02/2024 Yes    Tobacco use [Z72.0] 01/05/2023 Yes    HLD (hyperlipidemia) [E78.5] 09/13/2022 Yes      Problems Resolved During this Admission:       Discharged Condition: stable    Disposition: Home    Follow Up:   Follow-up Information       Jayne Verdin FNP Follow up in 2 month(s).    Specialty: Neurology  Why: Follow up in stroke clinic within 2 months of hospital discharge with Jayne BOSTON  Contact information:  [address] [place] [address] [address] [place] [id number]  [phone]               [facility] Follow up.    Specialty: Physical Therapy  Why: They will call you upon discharge  Contact information:  [address] [place] [address] [address] [place] [id number]  [phone]                           Patient Instructions:   No discharge  "procedures on file.    Significant Diagnostic Studies: Labs: BMP: No results for input(s): "GLU", "NA", "K", "CL", "CO2", "BUN", "CREATININE", "CALCIUM", "MG" in the last 48 hours., CMP No results for input(s): "NA", "K", "CL", "CO2", "GLU", "BUN", "CREATININE", "CALCIUM", "PROT", "ALBUMIN", "BILITOT", "ALKPHOS", "AST", "ALT", "ANIONGAP", "ESTGFRAFRICA", "EGFRNONAA" in the last 48 hours., and CBC No results for input(s): "WBC", "HGB", "HCT", "PLT" in the last 48 hours.    Pending Diagnostic Studies:       None           Medications:  Reconciled Home Medications:      Medication List        START taking these medications      atorvastatin 80 MG tablet  Commonly known as: LIPITOR  Take 1 tablet (80 mg total) by mouth once daily.  Start taking on: June 6, 2024     clopidogreL 75 mg tablet  Commonly known as: PLAVIX  Take 1 tablet (75 mg total) by mouth once daily.  Start taking on: June 6, 2024            CONTINUE taking these medications      * albuterol 2.5 mg /3 mL (0.083 %) nebulizer solution  Commonly known as: PROVENTIL  USE 1 VIAL IN NEBULIZER EVERY 6 HOURS AS NEEDED FOR WHEEZING     * VENTOLIN HFA 90 mcg/actuation inhaler  Generic drug: albuterol  Inhale 2 puffs into the lungs every 4 (four) hours as needed.     busPIRone 15 MG tablet  Commonly known as: BUSPAR  Take 1 tablet (15 mg total) by mouth 3 (three) times daily.     dapagliflozin propanediol 5 mg Tab tablet  Commonly known as: Farxiga  Take 1 tablet (5 mg total) by mouth once daily.     FLOVENT DISKUS 250 mcg/actuation Dsdv  Generic drug: fluticasone propionate  Inhale 2 puffs into the lungs 2 (two) times daily. Controller     levothyroxine 112 MCG tablet  Commonly known as: SYNTHROID  Take 1 tablet (112 mcg total) by mouth before breakfast.     metFORMIN 500 MG tablet  Commonly known as: GLUCOPHAGE  Take 500 mg by mouth 2 (two) times daily with meals.     paroxetine 20 MG tablet  Commonly known as: PAXIL  Take 1 tablet (20 mg total) by mouth every " "morning.     pen needle, diabetic 32 gauge x 5/32" Ndle  1 each by Misc.(Non-Drug; Combo Route) route once a week.     promethazine-codeine 6.25-10 mg/5 ml 6.25-10 mg/5 mL syrup  Commonly known as: PHENERGAN with CODEINE  Take 5 mLs by mouth every 4 (four) hours as needed for Cough.     sucralfate 100 mg/mL suspension  Commonly known as: CARAFATE  Take 10 mLs (1 g total) by mouth 4 (four) times daily before meals and nightly.           * This list has 2 medication(s) that are the same as other medications prescribed for you. Read the directions carefully, and ask your doctor or other care provider to review them with you.                STOP taking these medications      rosuvastatin 40 MG Tab  Commonly known as: CRESTOR              Indwelling Lines/Drains at time of discharge:   Lines/Drains/Airways       None                   Time spent on the discharge of patient: 36 minutes        Patient screened for food insecurity, housing instability, transportation needs, utility difficulties, and interpersonal safety.   Will be set up with outpatient therapy    Alessio Arteaga MD  Department of Hospital Medicine  [facility] - 4th Floor Medical Telemetry  "

## 2024-06-06 ENCOUNTER — PATIENT OUTREACH (OUTPATIENT)
Dept: ADMINISTRATIVE | Facility: CLINIC | Age: 59
End: 2024-06-06
Payer: COMMERCIAL

## 2024-06-06 NOTE — PROGRESS NOTES
C3 nurse attempted to contact Chelle Chandra  for a TCC post hospital discharge follow up call. No answer. Left voicemail with callback information. The patient has a scheduled HOS appointment with Jorge Silver MD (Family Medicine) on 6/13/2024 @11:00am

## 2024-06-07 ENCOUNTER — NURSE TRIAGE (OUTPATIENT)
Dept: ADMINISTRATIVE | Facility: CLINIC | Age: 59
End: 2024-06-07
Payer: COMMERCIAL

## 2024-06-07 NOTE — TELEPHONE ENCOUNTER
[place]    PCP:  Dr. Jorge Silver    Spoke with Dtr, Wendy Camacho, on pts behalf.  S/P hospital discharge on Wednesday for CVA.  C/O constant HA, visual floaters, and nausea.  Denies vomiting, weakness, SOB, and CP.  Per protocol, care advised is go to ED now.  Pt/Dtr VU but refused care advised.  Care advice reinforced but they continue to refuse care advice.  Advised to call for worsening/questions/concerns.  VU.    Reason for Disposition   SEVERE headache, states 'worst headache' of life    Additional Information   Negative: Difficult to awaken or acting confused (e.g., disoriented, slurred speech)   Negative: Weakness of the face, arm or leg on one side of the body and new-onset   Negative: Numbness of the face, arm or leg on one side of the body and new-onset   Negative: Loss of speech or garbled speech and new-onset   Negative: Passed out (i.e., fainted, collapsed and was not responding)   Negative: Sounds like a life-threatening emergency to the triager   Negative: Unable to walk without falling   Negative: Stiff neck (can't touch chin to chest)   Negative: Possibility of carbon monoxide exposure    Protocols used: Headache-A-OH

## 2024-06-12 ENCOUNTER — HOSPITAL ENCOUNTER (OUTPATIENT)
Dept: RADIOLOGY | Facility: HOSPITAL | Age: 59
Discharge: HOME | End: 2024-06-12
Attending: FAMILY MEDICINE
Payer: COMMERCIAL

## 2024-06-12 ENCOUNTER — CLINICAL SUPPORT (OUTPATIENT)
Dept: REHABILITATION | Facility: HOSPITAL | Age: 59
End: 2024-06-12
Attending: FAMILY MEDICINE
Payer: COMMERCIAL

## 2024-06-12 DIAGNOSIS — R13.10 DYSPHAGIA, UNSPECIFIED TYPE: ICD-10-CM

## 2024-06-12 PROCEDURE — A9698 NON-RAD CONTRAST MATERIALNOC: HCPCS | Performed by: FAMILY MEDICINE

## 2024-06-12 PROCEDURE — 92611 MOTION FLUOROSCOPY/SWALLOW: CPT

## 2024-06-12 PROCEDURE — 74230 X-RAY XM SWLNG FUNCJ C+: CPT | Mod: TC

## 2024-06-12 PROCEDURE — 25500020 PHARM REV CODE 255: Performed by: FAMILY MEDICINE

## 2024-06-12 RX ADMIN — BARIUM SULFATE 10 ML: 0.81 POWDER, FOR SUSPENSION ORAL at 01:06

## 2024-06-13 NOTE — PROGRESS NOTES
[facility]  Speech Language Pathology Department  Outpatient Modified Barium Swallow Study    Patient Name:  Chelle Chandra   MRN:  [id number]    Recommendations     General recommendations:  outpatient SLP services for oropharyngeal dysphagia and follow up with PCP as scheduled  Repeat MBS study: 6-8 weeks  Diet texture/consistency recommendations: Soft & Bite-sized solids (IDDSI 6) and moderately thick liquids (IDDSI 3)  Medications: crushed in puree  Swallow strategies/precautions: small bites/sips, slow rate, additional swallow per bite/sip, and alternate solids/liquids  General precautions: aspiration    History/Reason for Referral     Chelle Chandra is a/n 58 y.o. female referred by Dr. Jorge Silver for a follow up Modified Barium Swallow Study.  Ms Chandra is known to this clinician from recent hospital stay.  She was admitted for a CVA and a mechanical thrombectomy with successful recanalization of the left tandem ICA communicating segment and M1 occlusion was performed.  MBS study was completed on 6/3 at which time pt exhibited moderate oropharyngeal dysphagia.  Laryngeal penetration of thin and mildly thick liquids as well as puree and chewable solids visualized.  Contrast material did NOT consistently clear the laryngeal vestibule placing pt at HIGH risk for aspiration.  Both swallow safety and efficiency were impaired. Puree solids and moderately thick liquids were recommended.  She reports compliance with diet modification and home exercise program, but she is not currently receiving OP SLP services.    Past medical history includes COPD, a lung nodule, GERD, and hyperlipidemia.  Past surgical history includes an ACDF.  She reports she is quitting smoking.    Home diet texture/consistency: Puree and moderately thick liquids    Patient complaint: would like to advance diet texture/consistency    Subjective     Patient awake, alert, and  cooperative.  Spiritual/Cultural/Adventist Beliefs/Practices that affect care: none    Pain/Comfort: no c/o pain    Respiratory Status:  room air    Restraints/positioning devices: none    Fluoroscopic Findings     Oral Musculature  Dentition: upper dentures and lower dentures  Secretion Management: adequate  Mucosal Quality: good  Facial Movement: WFL  Buccal Strength & Mobility: WFL  Mandibular Strength & Mobility: WFL  Oral Labial Strength & Mobility: WFL  Lingual Strength & Mobility: WFL  Velar Elevation: WFL  Vocal Quality: hoarse    Setup  Seated on fluoroscopy chair  Able to self feed  Adequate head control    Visualization  Lateral view  Cervical hardware noted    Oral Phase:   Adequate lip closure  Adequate bolus formation  Prolonged mastication  Adequate bolus cohesion  Adequate anterior-posterior transport  Loss of bolus control with liquids    Pharyngeal Phase:   Swallow delay with spill to the pyriform sinuses  Reduced base of tongue retraction  Adequate epiglottic deflection  Reduced hyolaryngeal excursion  Poor airway protection  Consistency Laryngeal Penetration Aspiration Residue   Mildly thick liquid by cup During the swallow  To the vocal folds  Did NOT clear None Mild   Puree None None Mild-moderate   Thin liquid by cup During the swallow  To the vocal folds  Did NOT clear None Mild   Thin liquid by straw During the swallow  To the vocal folds  Did NOT clear None Mild   Chewable solid None None Mild-moderate   Mildly thick liquid by straw During the swallow  Did NOT clear None Mild     Cervical Esophageal Phase:   UES appeared to accommodate all bolus types without stasis or retrograde movement visualized    Compensatory Strategies:  A breath hold strategy was NOT effective for improved airway protection  Additional swallows and/or liquids was was effective for clearing pharyngeal residue    Assessment     Pt exhibited mild-moderate oropharyngeal dysphagia characterized by the findings noted  above.  Laryngeal penetration of thin and mildly thick liquids to the vocal folds persists and while no aspiration was visualized during this study pt remains at HIGH risk.  Both swallow safety and efficiency are impaired.      Patient appears to be at moderate risk for aspiration related pneumonia when considering severity of dysphagia and poor airway closure.  Prognosis for behavioral swallow rehabilitation is good.    Patient Education     Patient provided with verbal education and handouts regarding study results/recommendations and upgraded diet texture.  Understanding was verbalized.  Pt was encouraged to call SLP with any questions/concerns.    Time Tracking     SLP Treatment Date:  6/12/2024  Speech Start Time:  1300  Speech Stop Time:  1320     Speech Total Time (min):  20    Billable minutes:   Motion Fluoroscopic Evaluation, Video Recording, 20 minutes     06/13/2024

## 2024-06-14 PROBLEM — Z86.73 HISTORY OF STROKE: Status: ACTIVE | Noted: 2024-06-14

## 2024-06-14 PROBLEM — E78.2 MIXED HYPERLIPIDEMIA: Status: ACTIVE | Noted: 2022-09-13

## 2024-07-01 ENCOUNTER — HOSPITAL ENCOUNTER (INPATIENT)
Facility: HOSPITAL | Age: 59
LOS: 2 days | Discharge: HOME HEALTH | DRG: 641 | End: 2024-07-04
Attending: EMERGENCY MEDICINE | Admitting: STUDENT IN AN ORGANIZED HEALTH CARE EDUCATION/TRAINING PROGRAM
Payer: COMMERCIAL

## 2024-07-01 DIAGNOSIS — Z86.73 HISTORY OF ISCHEMIC LEFT MCA STROKE: ICD-10-CM

## 2024-07-01 DIAGNOSIS — R26.81 GAIT INSTABILITY: Primary | ICD-10-CM

## 2024-07-01 DIAGNOSIS — R42 DIZZINESS: ICD-10-CM

## 2024-07-01 DIAGNOSIS — R09.89 SUSPECTED CEREBROVASCULAR ACCIDENT (CVA): ICD-10-CM

## 2024-07-01 LAB
ALBUMIN SERPL-MCNC: 3.9 G/DL (ref 3.5–5)
ALBUMIN/GLOB SERPL: 1.6 RATIO (ref 1.1–2)
ALP SERPL-CCNC: 110 UNIT/L (ref 40–150)
ALT SERPL-CCNC: 49 UNIT/L (ref 0–55)
ANION GAP SERPL CALC-SCNC: 6 MEQ/L
AST SERPL-CCNC: 30 UNIT/L (ref 5–34)
BACTERIA #/AREA URNS AUTO: ABNORMAL /HPF
BASOPHILS # BLD AUTO: 0.06 X10(3)/MCL
BASOPHILS NFR BLD AUTO: 1 %
BILIRUB SERPL-MCNC: 0.6 MG/DL
BILIRUB UR QL STRIP.AUTO: NEGATIVE
BUN SERPL-MCNC: 15 MG/DL (ref 9.8–20.1)
CALCIUM SERPL-MCNC: 10.1 MG/DL (ref 8.4–10.2)
CHLORIDE SERPL-SCNC: 106 MMOL/L (ref 98–107)
CLARITY UR: CLEAR
CO2 SERPL-SCNC: 30 MMOL/L (ref 22–29)
COLOR UR AUTO: ABNORMAL
CREAT SERPL-MCNC: 0.78 MG/DL (ref 0.55–1.02)
CREAT/UREA NIT SERPL: 19
CRP SERPL HS-MCNC: 0.43 MG/L
EOSINOPHIL # BLD AUTO: 0.36 X10(3)/MCL (ref 0–0.9)
EOSINOPHIL NFR BLD AUTO: 6.1 %
ERYTHROCYTE [DISTWIDTH] IN BLOOD BY AUTOMATED COUNT: 12.9 % (ref 11.5–17)
GFR SERPLBLD CREATININE-BSD FMLA CKD-EPI: >60 ML/MIN/1.73/M2
GLOBULIN SER-MCNC: 2.5 GM/DL (ref 2.4–3.5)
GLUCOSE SERPL-MCNC: 102 MG/DL (ref 74–100)
GLUCOSE UR QL STRIP: ABNORMAL
HCT VFR BLD AUTO: 40.8 % (ref 37–47)
HGB BLD-MCNC: 13.8 G/DL (ref 12–16)
HGB UR QL STRIP: NEGATIVE
IMM GRANULOCYTES # BLD AUTO: 0.01 X10(3)/MCL (ref 0–0.04)
IMM GRANULOCYTES NFR BLD AUTO: 0.2 %
INR PPP: 0.9
KETONES UR QL STRIP: NEGATIVE
LEUKOCYTE ESTERASE UR QL STRIP: NEGATIVE
LYMPHOCYTES # BLD AUTO: 2.21 X10(3)/MCL (ref 0.6–4.6)
LYMPHOCYTES NFR BLD AUTO: 37.5 %
MAGNESIUM SERPL-MCNC: 1.8 MG/DL (ref 1.6–2.6)
MCH RBC QN AUTO: 30.1 PG (ref 27–31)
MCHC RBC AUTO-ENTMCNC: 33.8 G/DL (ref 33–36)
MCV RBC AUTO: 88.9 FL (ref 80–94)
MONOCYTES # BLD AUTO: 0.59 X10(3)/MCL (ref 0.1–1.3)
MONOCYTES NFR BLD AUTO: 10 %
MUCOUS THREADS URNS QL MICRO: ABNORMAL /LPF
NEUTROPHILS # BLD AUTO: 2.66 X10(3)/MCL (ref 2.1–9.2)
NEUTROPHILS NFR BLD AUTO: 45.2 %
NITRITE UR QL STRIP: NEGATIVE
NRBC BLD AUTO-RTO: 0 %
PH UR STRIP: 5.5 [PH]
PLATELET # BLD AUTO: 303 X10(3)/MCL (ref 130–400)
PMV BLD AUTO: 10.1 FL (ref 7.4–10.4)
POTASSIUM SERPL-SCNC: 4.7 MMOL/L (ref 3.5–5.1)
PROT SERPL-MCNC: 6.4 GM/DL (ref 6.4–8.3)
PROT UR QL STRIP: NEGATIVE
PROTHROMBIN TIME: 12.6 SECONDS (ref 12.5–14.5)
RBC # BLD AUTO: 4.59 X10(6)/MCL (ref 4.2–5.4)
RBC #/AREA URNS AUTO: ABNORMAL /HPF
SODIUM SERPL-SCNC: 142 MMOL/L (ref 136–145)
SP GR UR STRIP.AUTO: 1.03 (ref 1–1.03)
SQUAMOUS #/AREA URNS LPF: ABNORMAL /HPF
TROPONIN I SERPL-MCNC: <0.01 NG/ML (ref 0–0.04)
TSH SERPL-ACNC: 0.15 UIU/ML (ref 0.35–4.94)
UROBILINOGEN UR STRIP-ACNC: NORMAL
WBC # BLD AUTO: 5.89 X10(3)/MCL (ref 4.5–11.5)
WBC #/AREA URNS AUTO: ABNORMAL /HPF

## 2024-07-01 PROCEDURE — G0378 HOSPITAL OBSERVATION PER HR: HCPCS

## 2024-07-01 PROCEDURE — 83735 ASSAY OF MAGNESIUM: CPT

## 2024-07-01 PROCEDURE — 96361 HYDRATE IV INFUSION ADD-ON: CPT

## 2024-07-01 PROCEDURE — 99285 EMERGENCY DEPT VISIT HI MDM: CPT | Mod: 25

## 2024-07-01 PROCEDURE — 85025 COMPLETE CBC W/AUTO DIFF WBC: CPT

## 2024-07-01 PROCEDURE — 93010 ELECTROCARDIOGRAM REPORT: CPT | Mod: ,,, | Performed by: INTERNAL MEDICINE

## 2024-07-01 PROCEDURE — 25000003 PHARM REV CODE 250: Performed by: NURSE PRACTITIONER

## 2024-07-01 PROCEDURE — 84484 ASSAY OF TROPONIN QUANT: CPT

## 2024-07-01 PROCEDURE — 85610 PROTHROMBIN TIME: CPT

## 2024-07-01 PROCEDURE — 96374 THER/PROPH/DIAG INJ IV PUSH: CPT

## 2024-07-01 PROCEDURE — 25000003 PHARM REV CODE 250: Performed by: EMERGENCY MEDICINE

## 2024-07-01 PROCEDURE — 80053 COMPREHEN METABOLIC PANEL: CPT

## 2024-07-01 PROCEDURE — 84439 ASSAY OF FREE THYROXINE: CPT | Performed by: INTERNAL MEDICINE

## 2024-07-01 PROCEDURE — 63600175 PHARM REV CODE 636 W HCPCS

## 2024-07-01 PROCEDURE — 95816 EEG AWAKE AND DROWSY: CPT | Mod: 26,,, | Performed by: PSYCHIATRY & NEUROLOGY

## 2024-07-01 PROCEDURE — 84443 ASSAY THYROID STIM HORMONE: CPT | Performed by: NURSE PRACTITIONER

## 2024-07-01 PROCEDURE — 81001 URINALYSIS AUTO W/SCOPE: CPT

## 2024-07-01 PROCEDURE — 86141 C-REACTIVE PROTEIN HS: CPT | Performed by: NURSE PRACTITIONER

## 2024-07-01 PROCEDURE — 93005 ELECTROCARDIOGRAM TRACING: CPT

## 2024-07-01 RX ORDER — IBUPROFEN 200 MG
24 TABLET ORAL
Status: DISCONTINUED | OUTPATIENT
Start: 2024-07-01 | End: 2024-07-04 | Stop reason: HOSPADM

## 2024-07-01 RX ORDER — LABETALOL HYDROCHLORIDE 5 MG/ML
10 INJECTION, SOLUTION INTRAVENOUS
Status: DISCONTINUED | OUTPATIENT
Start: 2024-07-01 | End: 2024-07-04 | Stop reason: HOSPADM

## 2024-07-01 RX ORDER — GLUCAGON 1 MG
1 KIT INJECTION
Status: DISCONTINUED | OUTPATIENT
Start: 2024-07-01 | End: 2024-07-04 | Stop reason: HOSPADM

## 2024-07-01 RX ORDER — ONDANSETRON HYDROCHLORIDE 2 MG/ML
4 INJECTION, SOLUTION INTRAVENOUS EVERY 4 HOURS PRN
Status: DISCONTINUED | OUTPATIENT
Start: 2024-07-01 | End: 2024-07-04 | Stop reason: HOSPADM

## 2024-07-01 RX ORDER — ONDANSETRON HYDROCHLORIDE 2 MG/ML
4 INJECTION, SOLUTION INTRAVENOUS
Status: COMPLETED | OUTPATIENT
Start: 2024-07-01 | End: 2024-07-01

## 2024-07-01 RX ORDER — ENOXAPARIN SODIUM 100 MG/ML
40 INJECTION SUBCUTANEOUS EVERY 24 HOURS
Status: DISCONTINUED | OUTPATIENT
Start: 2024-07-02 | End: 2024-07-04 | Stop reason: HOSPADM

## 2024-07-01 RX ORDER — BISACODYL 10 MG/1
10 SUPPOSITORY RECTAL DAILY PRN
Status: DISCONTINUED | OUTPATIENT
Start: 2024-07-01 | End: 2024-07-04 | Stop reason: HOSPADM

## 2024-07-01 RX ORDER — LORAZEPAM 0.5 MG/1
1 TABLET ORAL ONCE
Status: COMPLETED | OUTPATIENT
Start: 2024-07-01 | End: 2024-07-01

## 2024-07-01 RX ORDER — IBUPROFEN 200 MG
16 TABLET ORAL
Status: DISCONTINUED | OUTPATIENT
Start: 2024-07-01 | End: 2024-07-04 | Stop reason: HOSPADM

## 2024-07-01 RX ORDER — ACETAMINOPHEN 325 MG/1
650 TABLET ORAL EVERY 6 HOURS PRN
Status: DISCONTINUED | OUTPATIENT
Start: 2024-07-01 | End: 2024-07-04 | Stop reason: HOSPADM

## 2024-07-01 RX ORDER — INSULIN ASPART 100 [IU]/ML
0-10 INJECTION, SOLUTION INTRAVENOUS; SUBCUTANEOUS
Status: DISCONTINUED | OUTPATIENT
Start: 2024-07-01 | End: 2024-07-04 | Stop reason: HOSPADM

## 2024-07-01 RX ADMIN — SODIUM CHLORIDE, POTASSIUM CHLORIDE, SODIUM LACTATE AND CALCIUM CHLORIDE 1000 ML: 600; 310; 30; 20 INJECTION, SOLUTION INTRAVENOUS at 04:07

## 2024-07-01 RX ADMIN — ONDANSETRON 4 MG: 2 INJECTION INTRAMUSCULAR; INTRAVENOUS at 04:07

## 2024-07-01 RX ADMIN — LORAZEPAM 1 MG: 0.5 TABLET ORAL at 11:07

## 2024-07-01 RX ADMIN — SODIUM CHLORIDE 1000 ML: 9 INJECTION, SOLUTION INTRAVENOUS at 06:07

## 2024-07-01 NOTE — Clinical Note
Diagnosis: Suspected cerebrovascular accident (CVA) [[id number]]   Future Attending Provider: RANJANA MCCOY [[id number]]   Admit to which facility:: [facility] [[id number]]

## 2024-07-01 NOTE — ED PROVIDER NOTES
Encounter Date: 7/1/2024       History     Chief Complaint   Patient presents with    Dizziness     Reports dizziness, loss of balance, nausea & diarrhea x4 days. Also reports intermittent headaches. Hx CVA 1 month ago. Sent by PCP for eval.      59 y/o F with hx DM, recent CVA presents w/ progressive gait instability over last 4 days. States since CVA (s/p thrombectomy), only minimal right sided weakness but has been independently ambulatory - able to work with outpatient PT. However, today, unable to ambulate without walker, unable to tolerate even 10 minutes of therapy. Denies any headache, vision changes, no N/V.     The history is provided by the patient, a relative and medical records.     Review of patient's allergies indicates:   Allergen Reactions    Aspirin     Ketorolac     Penicillins     Sulfa (sulfonamide antibiotics)     Ozempic [semaglutide] Other (See Comments)     Abdominal pain     Past Medical History:   Diagnosis Date    Accelerated junctional rhythm     Agatston CAC score, <100     Anxiety disorder, unspecified     COPD type A     Diabetes mellitus without complication     GERD (gastroesophageal reflux disease)     History of COVID-19     Insomnia     Lung nodule      Past Surgical History:   Procedure Laterality Date    ANGIOGRAM, CORONARY, WITH LEFT HEART CATHETERIZATION      BREAST LUMPECTOMY Right     CARDIOVERSION  08/01/2013    CARPAL TUNNEL RELEASE  10/23/2013    FUSION OF CERVICAL SPINE BY ANTERIOR APPROACH USING COMPUTER-ASSISTED NAVIGATION  06/10/2019    KIDNEY SURGERY      TONSILLECTOMY AND ADENOIDECTOMY      TOTAL ABDOMINAL HYSTERECTOMY      WRIST SURGERY       Family History   Problem Relation Name Age of Onset    Heart attack Mother      Diabetes Father       Social History     Tobacco Use    Smoking status: Every Day     Current packs/day: 0.50     Types: Cigarettes    Smokeless tobacco: Never   Substance Use Topics    Alcohol use: Never    Drug use: Never     Review of Systems    Constitutional:  Negative for fever.   Gastrointestinal:  Negative for abdominal pain, nausea and vomiting.   Musculoskeletal:  Positive for gait problem.   Neurological:  Positive for weakness.       Physical Exam     Initial Vitals [07/01/24 1535]   BP Pulse Resp Temp SpO2   107/73 82 18 97.5 °F (36.4 °C) 98 %      MAP       --         Physical Exam    Nursing note and vitals reviewed.  Constitutional: She appears well-developed and well-nourished. No distress.   HENT:   Head: Normocephalic and atraumatic.   Mouth/Throat: Oropharynx is clear and moist.   Eyes: Conjunctivae are normal. Pupils are equal, round, and reactive to light.   Neck: Neck supple.   Normal range of motion.  Cardiovascular:  Normal rate, regular rhythm and normal heart sounds.           No murmur heard.  Pulmonary/Chest: Breath sounds normal. No respiratory distress.   Abdominal: Abdomen is soft. She exhibits no distension. There is no abdominal tenderness.   Musculoskeletal:         General: Normal range of motion.      Cervical back: Normal range of motion and neck supple.     Neurological: She is alert and oriented to person, place, and time. No cranial nerve deficit. GCS score is 15. GCS eye subscore is 4. GCS verbal subscore is 5. GCS motor subscore is 6.   5/5 strength bilateral upper extremities, 4/5 bilateral lower extremities - neither hit bed, + impaired heel-shin testing bilaterally, normal FNF   Skin: Skin is warm and dry. No rash noted.   Psychiatric: She has a normal mood and affect.         ED Course   Procedures  Labs Reviewed   COMPREHENSIVE METABOLIC PANEL - Abnormal; Notable for the following components:       Result Value    CO2 30 (*)     Glucose 102 (*)     All other components within normal limits   URINALYSIS, REFLEX TO URINE CULTURE - Abnormal; Notable for the following components:    Glucose, UA 4+ (*)     Mucous, UA Trace (*)     All other components within normal limits   MAGNESIUM - Normal   PROTIME-INR - Normal    TROPONIN I - Normal   CBC W/ AUTO DIFFERENTIAL    Narrative:     The following orders were created for panel order CBC auto differential.  Procedure                               Abnormality         Status                     ---------                               -----------         ------                     CBC with Differential[[id number]]                           Final result                 Please view results for these tests on the individual orders.   CBC WITH DIFFERENTIAL     EKG Readings: (Independently Interpreted)   Initial Reading: No STEMI. Rhythm: Normal Sinus Rhythm. Heart Rate: 81. Ectopy: No Ectopy. ST Segments: Normal ST Segments. T Waves: Normal. Axis: Normal. Clinical Impression: Normal Sinus Rhythm   07/01/2024 @ 1536     ECG Results              EKG 12-lead (Final result)        Collection Time Result Time QRS Duration OHS QTC Calculation    07/01/24 15:36:04 07/02/24 01:08:36 72 448                     Final result by Interface, Lab In [facility] (07/02/24 01:08:45)                   Narrative:    Test Reason : R42,    Vent. Rate : 081 BPM     Atrial Rate : 081 BPM     P-R Int : 148 ms          QRS Dur : 072 ms      QT Int : 386 ms       P-R-T Axes : 083 084 073 degrees     QTc Int : 448 ms    Normal sinus rhythm  Normal ECG  Confirmed by Julien Valle MD ([id number]) on 7/2/2024 1:08:35 AM    Referred By:             Confirmed By:Julien Valle MD                                  Imaging Results              CT Head Without Contrast (Final result)  Result time 07/01/24 16:06:14      Final result by Zoran Decker MD (07/01/24 16:06:14)                   Narrative:    EXAMINATION  CT HEAD WITHOUT CONTRAST    CLINICAL HISTORY  Dizziness, persistent/recurrent, cardiac or vascular cause suspected;    TECHNIQUE  Axial non-contrast CT images of the head were acquired and multiplanar reconstructions accomplished by a CT technologist at a separate workstation, pushed to PACS for physician  review.    COMPARISON  1 June 2024    FINDINGS  Images were reviewed in subdural, brain, soft tissue, and bone windows.    Exam quality: Motion/streak artifact limits assessment of the posterior fossa.    No development of hyperdensity to suggest acute/worsened intracranial hemorrhage. Gray-white differentiation is maintained.  No new or worsening focal mass effect or midline shift.  There are interval parenchymal attenuation changes again consistent with sequela of recent left MCA territory ischemic infarct.  Similar appearance of the ventricular system and sulcal spaces.  No expansile extra-axial collection is identified.  Chronic/age-related intracranial changes are stable in the short interval.    Visualized paranasal sinuses and mastoid cavities are clear.  No new or worsening focal abnormality of the regional osseous structures and extracranial soft tissues.  Vascular components are unchanged by limited non-contrast CT assessment.    IMPRESSION  1. No convincing acute intracranial abnormality.  2. Redemonstrated findings consistent with changes of left MCA territory ischemic infarct.    RADIATION DOSE  Automated tube current modulation, weight-based exposure dosing, and/or iterative reconstruction technique utilized to reach lowest reasonably achievable exposure rate.    DLP: 1654 mGy*cm      Electronically signed by: Zoran Decker  Date:    07/01/2024  Time:    16:06                                     Medications   glucose chewable tablet 16 g (has no administration in time range)   glucose chewable tablet 24 g (has no administration in time range)   glucagon (human recombinant) injection 1 mg (has no administration in time range)   insulin aspart U-100 injection 0-10 Units (has no administration in time range)   dextrose 10% bolus 125 mL 125 mL (has no administration in time range)   dextrose 10% bolus 250 mL 250 mL (has no administration in time range)   acetaminophen tablet 650 mg (has no administration in  time range)   labetaloL injection 10 mg (has no administration in time range)   ondansetron injection 4 mg (has no administration in time range)   bisacodyL suppository 10 mg (has no administration in time range)   enoxaparin injection 40 mg (has no administration in time range)   0.9%  NaCl infusion ( Intravenous New Bag 7/2/24 0144)   LORazepam tablet 1 mg (has no administration in time range)   atorvastatin tablet 80 mg (80 mg Oral Given 7/2/24 0843)   clopidogreL tablet 75 mg (75 mg Oral Given 7/2/24 0842)   ezetimibe tablet 10 mg (10 mg Oral Given 7/2/24 0843)   levothyroxine tablet 100 mcg (100 mcg Oral Given 7/2/24 0540)   traZODone tablet 100 mg (has no administration in time range)   lactated ringers bolus 1,000 mL (0 mLs Intravenous Stopped 7/1/24 1700)   ondansetron injection 4 mg (4 mg Intravenous Given 7/1/24 1652)   sodium chloride 0.9% bolus 1,000 mL 1,000 mL (0 mLs Intravenous Stopped 7/1/24 1939)   LORazepam tablet 1 mg (1 mg Oral Given 7/1/24 2351)     Medical Decision Making  Problems Addressed:  Dizziness: acute illness or injury  Gait instability: acute illness or injury  History of ischemic left MCA stroke: chronic illness or injury with exacerbation, progression, or side effects of treatment  Suspected cerebrovascular accident (CVA): acute illness or injury      ED assessment:    Ms. Chandra presented w/ new gait instability x 4 days - worse today, in the setting of large left MCA CVA last month but reportedly with minimal residual deficits. + lower extremity ataxia today but otherwise limited focal neuro deficits. Borderline low BP on arrival - reportedly w/ some recent loose, non-bloody stools.     Differential diagnosis (including but not limited to):   Dehydration, ADAM, intravascular volume depletion, recrudescence of recent CVA symptoms, cerebral hypoperfusion, penumbra expansion, acute CVA, vertigo    ED management:   Labs and CT unrevealing for acute pathology - CT demonstrates evolving  large territory infarction. Given recent CVA, concern for new/worsening ischemic process. Will admit for further inpatient evaluation, IV hydration, neuro evaluation and possible PT/OT.    My independent radiology interpretation:   CT head w/o contrast: no acute intracranial hemorrhage      Amount and/or Complexity of Data Reviewed  Independent historian: daughter    Summary of history: reports when discharged from hospital, minimal weakness, ambulatory independently until last few days - very unsteady, requiring walker today.   External data reviewed: notes from previous admissions, notes from previous ED visits, prior labs, prior imaging, and previous procedure and OR notes  Summary of data reviewed: presented 6/1/24 with acute onset right facial droop, slurred speech, hemiplegia. CTA w/ left cervical ICA occlusion. Underwent emergent cerebral angiography and mechanical thrombectomy   Risk and benefits of testing: discussed   Labs: ordered and reviewed  Radiology: ordered and independent interpretation performed (see above or ED course)  ECG/medicine tests: ordered and independent interpretation performed (see above or ED course)  Discussion of management or test interpretation with external provider(s): discussed with hospitalist physician   Summary of discussion: reviewed with hospitalist NP who accepts for admission    Risk  Decision regarding hospitalization  Shared decision making     Critical Care  none    I, Lynn Gould MD personally performed the history, PE, MDM, and procedures as documented above and agree with the scribe's documentation.                                     Clinical Impression:  Final diagnoses:  [R42] Dizziness  [R09.89] Suspected cerebrovascular accident (CVA)  [R26.81] Gait instability (Primary)  [Z86.73] History of ischemic left MCA stroke          ED Disposition Condition    Observation Stable                Lynn Gould MD  07/02/24 2745

## 2024-07-01 NOTE — FIRST PROVIDER EVALUATION
"Medical screening examination initiated.  I have conducted a focused provider triage encounter, findings are as follows:    Brief history of present illness:  58 year old female presents to ER with c/o dizziness, off balance, and diarrhea since Thursday. Daughter also reports confusion. Patient reports having a stroke a month ago.     Vitals:    07/01/24 1535   BP: 107/73   Pulse: 82   Resp: 18   Temp: 97.5 °F (36.4 °C)   TempSrc: Temporal   SpO2: 98%   Weight: 50.8 kg (112 lb)   Height: 5' 6" (1.676 m)       Pertinent physical exam:  Awake and alert, NAD    Brief workup plan:  Labs, imaging     Preliminary workup initiated; this workup will be continued and followed by the physician or advanced practice provider that is assigned to the patient when roomed.  "

## 2024-07-02 PROBLEM — F17.200 TOBACCO DEPENDENCY: Status: ACTIVE | Noted: 2024-07-02

## 2024-07-02 LAB
ALBUMIN SERPL-MCNC: 3.1 G/DL (ref 3.5–5)
ALBUMIN/GLOB SERPL: 1.5 RATIO (ref 1.1–2)
ALP SERPL-CCNC: 94 UNIT/L (ref 40–150)
ALT SERPL-CCNC: 41 UNIT/L (ref 0–55)
ANION GAP SERPL CALC-SCNC: 6 MEQ/L
AST SERPL-CCNC: 29 UNIT/L (ref 5–34)
BASOPHILS # BLD AUTO: 0.06 X10(3)/MCL
BASOPHILS NFR BLD AUTO: 1.2 %
BILIRUB SERPL-MCNC: 0.9 MG/DL
BUN SERPL-MCNC: 11.5 MG/DL (ref 9.8–20.1)
CALCIUM SERPL-MCNC: 8.8 MG/DL (ref 8.4–10.2)
CHLORIDE SERPL-SCNC: 111 MMOL/L (ref 98–107)
CO2 SERPL-SCNC: 24 MMOL/L (ref 22–29)
CREAT SERPL-MCNC: 0.73 MG/DL (ref 0.55–1.02)
CREAT/UREA NIT SERPL: 16
EOSINOPHIL # BLD AUTO: 0.31 X10(3)/MCL (ref 0–0.9)
EOSINOPHIL NFR BLD AUTO: 6.1 %
ERYTHROCYTE [DISTWIDTH] IN BLOOD BY AUTOMATED COUNT: 13 % (ref 11.5–17)
ERYTHROCYTE [SEDIMENTATION RATE] IN BLOOD: <1 MM/HR (ref 0–20)
GFR SERPLBLD CREATININE-BSD FMLA CKD-EPI: >60 ML/MIN/1.73/M2
GLOBULIN SER-MCNC: 2.1 GM/DL (ref 2.4–3.5)
GLUCOSE SERPL-MCNC: 76 MG/DL (ref 74–100)
HCT VFR BLD AUTO: 34.8 % (ref 37–47)
HGB BLD-MCNC: 11.6 G/DL (ref 12–16)
IMM GRANULOCYTES # BLD AUTO: 0.01 X10(3)/MCL (ref 0–0.04)
IMM GRANULOCYTES NFR BLD AUTO: 0.2 %
LYMPHOCYTES # BLD AUTO: 2.02 X10(3)/MCL (ref 0.6–4.6)
LYMPHOCYTES NFR BLD AUTO: 39.5 %
MAGNESIUM SERPL-MCNC: 1.7 MG/DL (ref 1.6–2.6)
MCH RBC QN AUTO: 30.4 PG (ref 27–31)
MCHC RBC AUTO-ENTMCNC: 33.3 G/DL (ref 33–36)
MCV RBC AUTO: 91.1 FL (ref 80–94)
MONOCYTES # BLD AUTO: 0.57 X10(3)/MCL (ref 0.1–1.3)
MONOCYTES NFR BLD AUTO: 11.2 %
NEUTROPHILS # BLD AUTO: 2.14 X10(3)/MCL (ref 2.1–9.2)
NEUTROPHILS NFR BLD AUTO: 41.8 %
NRBC BLD AUTO-RTO: 0 %
OHS QRS DURATION: 72 MS
OHS QTC CALCULATION: 448 MS
PHOSPHATE SERPL-MCNC: 3.8 MG/DL (ref 2.3–4.7)
PLATELET # BLD AUTO: 238 X10(3)/MCL (ref 130–400)
PMV BLD AUTO: 10.6 FL (ref 7.4–10.4)
POCT GLUCOSE: 84 MG/DL (ref 70–110)
POTASSIUM SERPL-SCNC: 3.7 MMOL/L (ref 3.5–5.1)
PROT SERPL-MCNC: 5.2 GM/DL (ref 6.4–8.3)
RBC # BLD AUTO: 3.82 X10(6)/MCL (ref 4.2–5.4)
SODIUM SERPL-SCNC: 141 MMOL/L (ref 136–145)
T4 FREE SERPL-MCNC: 1.18 NG/DL (ref 0.7–1.48)
WBC # BLD AUTO: 5.11 X10(3)/MCL (ref 4.5–11.5)

## 2024-07-02 PROCEDURE — 85025 COMPLETE CBC W/AUTO DIFF WBC: CPT | Performed by: NURSE PRACTITIONER

## 2024-07-02 PROCEDURE — 97166 OT EVAL MOD COMPLEX 45 MIN: CPT

## 2024-07-02 PROCEDURE — 25000003 PHARM REV CODE 250: Performed by: STUDENT IN AN ORGANIZED HEALTH CARE EDUCATION/TRAINING PROGRAM

## 2024-07-02 PROCEDURE — 83735 ASSAY OF MAGNESIUM: CPT | Performed by: NURSE PRACTITIONER

## 2024-07-02 PROCEDURE — 25000003 PHARM REV CODE 250: Performed by: NURSE PRACTITIONER

## 2024-07-02 PROCEDURE — 80053 COMPREHEN METABOLIC PANEL: CPT | Performed by: NURSE PRACTITIONER

## 2024-07-02 PROCEDURE — 25000003 PHARM REV CODE 250: Performed by: INTERNAL MEDICINE

## 2024-07-02 PROCEDURE — 85652 RBC SED RATE AUTOMATED: CPT | Performed by: NURSE PRACTITIONER

## 2024-07-02 PROCEDURE — 84100 ASSAY OF PHOSPHORUS: CPT | Performed by: NURSE PRACTITIONER

## 2024-07-02 PROCEDURE — 36415 COLL VENOUS BLD VENIPUNCTURE: CPT | Performed by: NURSE PRACTITIONER

## 2024-07-02 PROCEDURE — 92610 EVALUATE SWALLOWING FUNCTION: CPT

## 2024-07-02 PROCEDURE — 11000001 HC ACUTE MED/SURG PRIVATE ROOM

## 2024-07-02 PROCEDURE — 92523 SPEECH SOUND LANG COMPREHEN: CPT

## 2024-07-02 PROCEDURE — 97162 PT EVAL MOD COMPLEX 30 MIN: CPT

## 2024-07-02 PROCEDURE — 63600175 PHARM REV CODE 636 W HCPCS: Performed by: STUDENT IN AN ORGANIZED HEALTH CARE EDUCATION/TRAINING PROGRAM

## 2024-07-02 RX ORDER — ATORVASTATIN CALCIUM 40 MG/1
80 TABLET, FILM COATED ORAL DAILY
Status: DISCONTINUED | OUTPATIENT
Start: 2024-07-02 | End: 2024-07-04 | Stop reason: HOSPADM

## 2024-07-02 RX ORDER — EZETIMIBE 10 MG/1
10 TABLET ORAL DAILY
Status: DISCONTINUED | OUTPATIENT
Start: 2024-07-02 | End: 2024-07-04 | Stop reason: HOSPADM

## 2024-07-02 RX ORDER — LEVOTHYROXINE SODIUM 100 UG/1
100 TABLET ORAL
Status: DISCONTINUED | OUTPATIENT
Start: 2024-07-02 | End: 2024-07-04 | Stop reason: HOSPADM

## 2024-07-02 RX ORDER — METFORMIN HYDROCHLORIDE 500 MG/1
500 TABLET ORAL 2 TIMES DAILY WITH MEALS
Status: DISCONTINUED | OUTPATIENT
Start: 2024-07-02 | End: 2024-07-04 | Stop reason: HOSPADM

## 2024-07-02 RX ORDER — BUSPIRONE HYDROCHLORIDE 5 MG/1
15 TABLET ORAL 2 TIMES DAILY
Status: DISCONTINUED | OUTPATIENT
Start: 2024-07-02 | End: 2024-07-04 | Stop reason: HOSPADM

## 2024-07-02 RX ORDER — SODIUM CHLORIDE 9 MG/ML
INJECTION, SOLUTION INTRAVENOUS CONTINUOUS
Status: DISCONTINUED | OUTPATIENT
Start: 2024-07-02 | End: 2024-07-02

## 2024-07-02 RX ORDER — TRAZODONE HYDROCHLORIDE 100 MG/1
100 TABLET ORAL NIGHTLY PRN
Status: DISCONTINUED | OUTPATIENT
Start: 2024-07-02 | End: 2024-07-04 | Stop reason: HOSPADM

## 2024-07-02 RX ORDER — MAGNESIUM SULFATE HEPTAHYDRATE 40 MG/ML
2 INJECTION, SOLUTION INTRAVENOUS ONCE
Status: COMPLETED | OUTPATIENT
Start: 2024-07-02 | End: 2024-07-02

## 2024-07-02 RX ORDER — CLOPIDOGREL BISULFATE 75 MG/1
75 TABLET ORAL DAILY
Status: DISCONTINUED | OUTPATIENT
Start: 2024-07-02 | End: 2024-07-04 | Stop reason: HOSPADM

## 2024-07-02 RX ORDER — SODIUM CHLORIDE 9 MG/ML
INJECTION, SOLUTION INTRAVENOUS CONTINUOUS
Status: ACTIVE | OUTPATIENT
Start: 2024-07-02 | End: 2024-07-03

## 2024-07-02 RX ORDER — PAROXETINE HYDROCHLORIDE 20 MG/1
20 TABLET, FILM COATED ORAL EVERY MORNING
Status: DISCONTINUED | OUTPATIENT
Start: 2024-07-02 | End: 2024-07-04 | Stop reason: HOSPADM

## 2024-07-02 RX ORDER — LORAZEPAM 0.5 MG/1
1 TABLET ORAL ONCE
Status: COMPLETED | OUTPATIENT
Start: 2024-07-02 | End: 2024-07-02

## 2024-07-02 RX ADMIN — MAGNESIUM SULFATE HEPTAHYDRATE 2 G: 40 INJECTION, SOLUTION INTRAVENOUS at 03:07

## 2024-07-02 RX ADMIN — LEVOTHYROXINE SODIUM 100 MCG: 100 TABLET ORAL at 05:07

## 2024-07-02 RX ADMIN — CLOPIDOGREL BISULFATE 75 MG: 75 TABLET ORAL at 08:07

## 2024-07-02 RX ADMIN — METFORMIN HYDROCHLORIDE 500 MG: 500 TABLET, FILM COATED ORAL at 06:07

## 2024-07-02 RX ADMIN — EZETIMIBE 10 MG: 10 TABLET ORAL at 08:07

## 2024-07-02 RX ADMIN — LORAZEPAM 1 MG: 0.5 TABLET ORAL at 10:07

## 2024-07-02 RX ADMIN — ATORVASTATIN CALCIUM 80 MG: 40 TABLET, FILM COATED ORAL at 08:07

## 2024-07-02 RX ADMIN — BUSPIRONE HYDROCHLORIDE 15 MG: 5 TABLET ORAL at 08:07

## 2024-07-02 RX ADMIN — SODIUM CHLORIDE: 9 INJECTION, SOLUTION INTRAVENOUS at 01:07

## 2024-07-02 RX ADMIN — SODIUM CHLORIDE: 9 INJECTION, SOLUTION INTRAVENOUS at 04:07

## 2024-07-02 RX ADMIN — ENOXAPARIN SODIUM 40 MG: 40 INJECTION SUBCUTANEOUS at 06:07

## 2024-07-02 RX ADMIN — PAROXETINE HYDROCHLORIDE 20 MG: 20 TABLET, FILM COATED ORAL at 04:07

## 2024-07-02 RX ADMIN — TRAZODONE HYDROCHLORIDE 100 MG: 100 TABLET ORAL at 08:07

## 2024-07-02 NOTE — PLAN OF CARE
Problem: Adult Inpatient Plan of Care  Goal: Plan of Care Review  Outcome: Progressing  Goal: Patient-Specific Goal (Individualized)  Outcome: Progressing  Goal: Absence of Hospital-Acquired Illness or Injury  Outcome: Progressing  Goal: Optimal Comfort and Wellbeing  Outcome: Progressing  Goal: Readiness for Transition of Care  Outcome: Progressing     Problem: Diabetes Comorbidity  Goal: Blood Glucose Level Within Targeted Range  Outcome: Progressing     Problem: Wound  Goal: Optimal Coping  Outcome: Progressing  Goal: Optimal Functional Ability  Outcome: Progressing  Goal: Absence of Infection Signs and Symptoms  Outcome: Progressing  Goal: Improved Oral Intake  Outcome: Progressing  Goal: Optimal Pain Control and Function  Outcome: Progressing  Goal: Skin Health and Integrity  Outcome: Progressing  Goal: Optimal Wound Healing  Outcome: Progressing     Problem: Stroke, Ischemic (Includes Transient Ischemic Attack)  Goal: Optimal Coping  Outcome: Progressing  Goal: Effective Bowel Elimination  Outcome: Progressing  Goal: Optimal Cerebral Tissue Perfusion  Outcome: Progressing  Goal: Optimal Cognitive Function  Outcome: Progressing  Goal: Improved Communication Skills  Outcome: Progressing  Goal: Optimal Functional Ability  Outcome: Progressing  Goal: Optimal Nutrition Intake  Outcome: Progressing  Goal: Effective Oxygenation and Ventilation  Outcome: Progressing  Goal: Improved Sensorimotor Function  Outcome: Progressing  Goal: Safe and Effective Swallow  Outcome: Progressing  Goal: Effective Urinary Elimination  Outcome: Progressing     Problem: Bariatric Environmental Safety  Goal: Safety Maintained with Care  Outcome: Progressing

## 2024-07-02 NOTE — PT/OT/SLP EVAL
Physical Therapy Evaluation    Patient Name:  Chelle Chandra   MRN:  [id number]    Recommendations:     Discharge therapy intensity: Low Intensity Therapy   Discharge Equipment Recommendations:   none  Barriers to discharge: Ongoing medical needs    Assessment:     Chelle Chandra is a 58 y.o. female admitted with a medical diagnosis of dizziness; suspected cerebrovascular accident. Pt with previous CVA about 1 month ago. She presents with the following impairments/functional limitations: weakness, decreased lower extremity function, impaired functional mobility. Pt req SBA for bed mobility. Pt with decreased RLE strength compared to LLE. Unable to assess transfers or ambulation since pt was taken for MRI. Will assess next session. Recommending low intensity therapy at this time due to caregiver support and current mobility level; will update if status changes.     Rehab Prognosis: Good; patient would benefit from acute skilled PT services to address these deficits and reach maximum level of function.    Recent Surgery: * No surgery found *      Plan:     During this hospitalization, patient would benefit from acute PT services 6 x/week to address the identified rehab impairments via gait training, therapeutic activities, therapeutic exercises, neuromuscular re-education and progress toward the following goals:    Plan of Care Expires:  08/02/24    Subjective     Chief Complaint: Headache   Patient/Family Comments/goals: get better   Pain/Comfort:  Location 1: head    Patients cultural, spiritual, Anabaptism conflicts given the current situation: no    Living Environment:  Pt lives with her  and daughter. Pt's daughter is able to stay home and assist her as needed.   Prior to admission, patients level of function was Independent.  Equipment used at home: rollator.  DME owned (not currently used):  rollator .  Upon discharge, patient will have assistance from  and daughter.    Objective:      Communicated with RN prior to session.  Patient found supine with telemetry, pulse ox (continuous), peripheral IV  upon PT entry to room.    General Precautions: Standard,    Orthopedic Precautions:    Braces:    Respiratory Status: Room air  Blood Pressure: 131/68      Exams:  Cognitive Exam:  Patient is oriented to Person, Place, Time, and Situation  RLE ROM: WFL except ankle; inconsistent exam ankle DF maybe limited by tone vs resistance   RLE Strength: Deficits: grossly 3/5  LLE ROM: WNL  LLE Strength: WNL  Skin integrity: Visible skin intact      Functional Mobility:  Bed Mobility:     Supine to Sit: contact guard assistance  Sit to Supine: contact guard assistance  Transfers: unable to assess  Gait: unable to asssess      AM-PAC 6 CLICK MOBILITY  Total Score:        Treatment & Education:  Sitting balance EOB WFL. Transporter took pt to MRI.     Patient and daughter/s were provided with verbal education education regarding PT role/goals/POC.  Understanding was verbalized.     Patient left supine with all lines intact, call button in reach, and daughter and MRI transporter present.    GOALS:   Multidisciplinary Problems       Physical Therapy Goals          Problem: Physical Therapy    Goal Priority Disciplines Outcome Goal Variances Interventions   Physical Therapy Goal     PT, PT/OT Progressing     Description: Goals to be met by: 2024     Patient will increase functional independence with mobility by performin. Supine to sit with [place]  2. Sit to supine with [place]  3. Sit to stand transfer with [place]  4. Gait  x 150 feet with Modified [place] using Rolling Walker vs LRAD.   5. Ascend/descend 2 stair with bilateral Handrails Modified [place] using No Assistive Device.                          History:     Past Medical History:   Diagnosis Date    Accelerated junctional rhythm     Agatston CAC score, <100     Anxiety disorder, unspecified     COPD type A      Diabetes mellitus without complication     GERD (gastroesophageal reflux disease)     History of COVID-19     Insomnia     Lung nodule        Past Surgical History:   Procedure Laterality Date    ANGIOGRAM, CORONARY, WITH LEFT HEART CATHETERIZATION      BREAST LUMPECTOMY Right     CARDIOVERSION  08/01/2013    CARPAL TUNNEL RELEASE  10/23/2013    FUSION OF CERVICAL SPINE BY ANTERIOR APPROACH USING COMPUTER-ASSISTED NAVIGATION  06/10/2019    KIDNEY SURGERY      TONSILLECTOMY AND ADENOIDECTOMY      TOTAL ABDOMINAL HYSTERECTOMY      WRIST SURGERY         Time Tracking:     PT Received On: 07/02/24  PT Start Time: 1008     PT Stop Time: 1025  PT Total Time (min): 17 min     Billable Minutes: Evaluation Mod      07/02/2024

## 2024-07-02 NOTE — PT/OT/SLP EVAL
[facility]  Speech Language Pathology Department  Clinical Swallow Evaluation    Patient Name:  Chelle Chandra   MRN:  [id number]    Recommendations     General recommendations:  Modified Barium Swallow Study  Solid texture recommendation:  Regular Diet - IDDSI Level 7  Liquid consistency recommendation: Moderately thick liquids - IDDSI Level 3   Medications: per patient preference  Swallow strategies/precautions: small bites/sips and slow rate  Precautions:      History     Chelle Chandra is a/n 58 y.o. female admitted with progressive gait instability. Pt with recent hospital admission for CVA and mechanical thrombectomy with successful recanalization of the left tandem ICA communicating segment and M1 occlusion was performed.  MBS study was completed on 6/13 at which time pt exhibited mild- moderate oropharyngeal dysphagia.  Laryngeal penetration of thin and mildly thick liquids was visualized and did not clear. Pt denies compliance with diet recommendations.     Past Medical History:   Diagnosis Date    Accelerated junctional rhythm     Agatston CAC score, <100     Anxiety disorder, unspecified     COPD type A     Diabetes mellitus without complication     GERD (gastroesophageal reflux disease)     History of COVID-19     Insomnia     Lung nodule      Past Surgical History:   Procedure Laterality Date    ANGIOGRAM, CORONARY, WITH LEFT HEART CATHETERIZATION      BREAST LUMPECTOMY Right     CARDIOVERSION  08/01/2013    CARPAL TUNNEL RELEASE  10/23/2013    FUSION OF CERVICAL SPINE BY ANTERIOR APPROACH USING COMPUTER-ASSISTED NAVIGATION  06/10/2019    KIDNEY SURGERY      TONSILLECTOMY AND ADENOIDECTOMY      TOTAL ABDOMINAL HYSTERECTOMY      WRIST SURGERY       Home diet texture/consistency: Regular and thin liquids  Current method of nutrition: PO diet (SBS and thin liquids )    Imaging   Results for orders placed during the hospital encounter of 06/01/24    X-Ray Chest 1  View    Narrative  EXAMINATION:  XR CHEST 1 VIEW    CLINICAL HISTORY:  Resp failure;    TECHNIQUE:  Single frontal view of the chest was performed.    COMPARISON:  11/14/2023    FINDINGS:  Enteric tube is identified with the distal tip and side hole distal to the GE junction.  Endotracheal tube is at the level the clavicular heads.    The cardiomediastinal silhouette and pulmonary vasculature are normal.    The lungs and pleural spaces are clear.    Impression  1. Lines and tubes as above.  2. No acute cardiopulmonary process.      Electronically signed by: Nigel Zhu MD  Date:    06/02/2024  Time:    11:57    No results found for this or any previous visit.    Results for orders placed during the hospital encounter of 07/01/24    MRI Brain Without Contrast    Narrative  EXAMINATION:  MRI BRAIN WITHOUT CONTRAST    CLINICAL HISTORY:  dizziness, off-balance, r/o cva, recent history of cva 1 month ago;    TECHNIQUE:  Multiplanar MRI sequences were performed of the brain without contrast.    COMPARISON:  MRI brain June 2, 2024    FINDINGS:  There are extensive left middle cerebral artery territory frontoparietal lobes and temporooccipital lobes as well as basal ganglia subacute nonhemorrhagic infarcts.  Infarct shows less dense brightness on diffusion-weighted sequence and now is not associated with signal dropout on the ADC map.  Brain edematous changes and sulci effacement is slightly less evident.  There is local mass effect without midline shift.  No hydrocephalus.  There is no new infratentorial or supratentorial brain infarct.  Gradient echo sequence are without altered signal to reflect a previous hemorrhagic byproduct.  Sella and the suprasellar areas are unremarkable.    The cerebellar tonsils are normally positioned.  No acute extra axial fluid collections identified. The mastoid air cells are clear.    Impression  1.  Left cerebral hemisphere extensive subacute infarct without hemorrhagic  transformation.    2.  No new findings.      Electronically signed by: Adam Sears  Date:    07/02/2024  Time:    11:36    Subjective     Patient awake, alert, and oriented x4 .  Spiritual/Cultural/Zoroastrianism Beliefs/Practices that affect care:  no  Pain/Comfort:  0/10  Respiratory Status:  room air  Restraints/positioning devices: none    Objective     ORAL MUSCULATURE  Dentition: own teeth  Secretion Management: adequate  Mucosal Quality: good  Facial Movement: WFL  Buccal Strength & Mobility: WFL  Mandibular Strength & Mobility: WFL  Oral Labial Strength & Mobility: WFL  Lingual Strength & Mobility: WFL  Vocal Quality: adequate  Volitional Cough: Able to clear secretions    PO TRIALS  Consistency Fed By Oral Symptoms Pharyngeal Symptoms   Moderately thick liquid by cup SLP None None   Puree SLP None None   Chewable solid SLP None None     Assessment     No signs/sx of aspiration observed. REC: MBS to further evaluate swallow function.     Education     Patient and daughter/s were provided with verbal education regarding POC.  Understanding was verbalized.    Plan     SLP Follow-Up:    7/3/24  Plan of Care reviewed with:  patient, daughter     Time Tracking     SLP Treatment Date:   07/02/24  Speech Start Time:  0830  Speech Stop Time:  0845     Speech Total Time (min):  15 min    Billable minutes:  Swallow and Oral Function Evaluation, 15 minutes     07/02/2024

## 2024-07-02 NOTE — SUBJECTIVE & OBJECTIVE
Past Medical History:   Diagnosis Date    Accelerated junctional rhythm     Agatston CAC score, <100     Anxiety disorder, unspecified     COPD type A     Diabetes mellitus without complication     GERD (gastroesophageal reflux disease)     History of COVID-19     Insomnia     Lung nodule        Past Surgical History:   Procedure Laterality Date    ANGIOGRAM, CORONARY, WITH LEFT HEART CATHETERIZATION      BREAST LUMPECTOMY Right     CARDIOVERSION  08/01/2013    CARPAL TUNNEL RELEASE  10/23/2013    FUSION OF CERVICAL SPINE BY ANTERIOR APPROACH USING COMPUTER-ASSISTED NAVIGATION  06/10/2019    KIDNEY SURGERY      TONSILLECTOMY AND ADENOIDECTOMY      TOTAL ABDOMINAL HYSTERECTOMY      WRIST SURGERY         Review of patient's allergies indicates:   Allergen Reactions    Aspirin     Ketorolac     Penicillins     Sulfa (sulfonamide antibiotics)     Ozempic [semaglutide] Other (See Comments)     Abdominal pain       Current Neurological Medications:     No current facility-administered medications on file prior to encounter.     Current Outpatient Medications on File Prior to Encounter   Medication Sig    atorvastatin (LIPITOR) 80 MG tablet Take 1 tablet (80 mg total) by mouth once daily.    busPIRone (BUSPAR) 15 MG tablet Take 1 tablet (15 mg total) by mouth 3 (three) times daily.    clopidogreL (PLAVIX) 75 mg tablet Take 1 tablet (75 mg total) by mouth once daily.    dapagliflozin propanediol (FARXIGA) 5 mg Tab tablet Take 1 tablet (5 mg total) by mouth once daily.    ezetimibe (ZETIA) 10 mg tablet Take 1 tablet (10 mg total) by mouth once daily.    fluticasone propionate (FLOVENT DISKUS) 250 mcg/actuation DsDv Inhale 2 puffs into the lungs 2 (two) times daily. Controller    levothyroxine (SYNTHROID) 100 MCG tablet Take 1 tablet (100 mcg total) by mouth before breakfast.    metFORMIN (GLUCOPHAGE) 500 MG tablet Take 500 mg by mouth 2 (two) times daily with meals.    paroxetine (PAXIL) 20 MG tablet Take 1 tablet (20 mg  "total) by mouth every morning.    pen needle, diabetic 32 gauge x 5/32" Ndle 1 each by Misc.(Non-Drug; Combo Route) route once a week.    traZODone (DESYREL) 100 MG tablet Take 1 tablet (100 mg total) by mouth nightly as needed for Insomnia.    VENTOLIN HFA 90 mcg/actuation inhaler Inhale 2 puffs into the lungs every 4 (four) hours as needed.     Family History       Problem Relation (Age of Onset)    Diabetes Father    Heart attack Mother          Tobacco Use    Smoking status: Every Day     Current packs/day: 0.50     Types: Cigarettes    Smokeless tobacco: Never   Substance and Sexual Activity    Alcohol use: Never    Drug use: Never    Sexual activity: Not on file     Review of Systems   All other systems reviewed and are negative.    Objective:     Vital Signs (Most Recent):  Temp: 97.5 °F (36.4 °C) (07/02/24 1110)  Pulse: 66 (07/02/24 1110)  Resp: 18 (07/02/24 0336)  BP: 127/84 (07/02/24 1110)  SpO2: 97 % (07/02/24 1110) Vital Signs (24h Range):  Temp:  [97.4 °F (36.3 °C)-98.1 °F (36.7 °C)] 97.5 °F (36.4 °C)  Pulse:  [60-90] 66  Resp:  [15-20] 18  SpO2:  [95 %-100 %] 97 %  BP: (104-130)/(60-84) 127/84     Weight: 120.6 kg (265 lb 14 oz)  Body mass index is 42.91 kg/m².     Physical Exam  Vitals reviewed.   Constitutional:       Appearance: Normal appearance.   HENT:      Head: Normocephalic and atraumatic.      Nose: Nose normal.      Mouth/Throat:      Mouth: Mucous membranes are moist.   Eyes:      Extraocular Movements: Extraocular movements intact.      Pupils: Pupils are equal, round, and reactive to light.   Pulmonary:      Effort: Pulmonary effort is normal.   Abdominal:      Palpations: Abdomen is soft.   Musculoskeletal:         General: Normal range of motion.      Cervical back: Normal range of motion and neck supple.   Skin:     General: Skin is warm and dry.      Capillary Refill: Capillary refill takes less than 2 seconds.   Neurological:      Mental Status: She is alert and oriented to person, " place, and time.      Cranial Nerves: No cranial nerve deficit.      Sensory: No sensory deficit.      Motor: Weakness (RUE 4.5/5, RLE 4.5/5, LUE 5/5, LLLE  5/5) present.      Coordination: Coordination abnormal (dysmetria of LUE on finger to nose).   Psychiatric:         Mood and Affect: Mood normal.          NEUROLOGICAL EXAMINATION:     MENTAL STATUS   Oriented to person, place, and time.     CRANIAL NERVES     CN III, IV, VI   Pupils are equal, round, and reactive to light.      Significant Labs: CBC:   Recent Labs   Lab 07/01/24  1616 07/02/24  0334   WBC 5.89 5.11   HGB 13.8 11.6*   HCT 40.8 34.8*    238     CMP:   Recent Labs   Lab 07/01/24  1616 07/02/24  0334    141   K 4.7 3.7    111*   CO2 30* 24   BUN 15.0 11.5   CREATININE 0.78 0.73   CALCIUM 10.1 8.8   MG 1.80 1.70   ALBUMIN 3.9 3.1*   BILITOT 0.6 0.9   ALKPHOS 110 94   AST 30 29   ALT 49 41       Significant Imaging: I have reviewed all pertinent imaging results/findings within the past 24 hours.

## 2024-07-02 NOTE — PROCEDURES
EEG    Date/Time: 7/1/2024 3:44 PM    Performed by: Marian Hogan MD  Authorized by: Reyes, Thairy G, DO      Reason for exam: seizure      Technical description:  A standard digital EEG was performed at [facility].  The 10 20 international system of electrode placement is used.  Standard montages were recorded.  Activation procedures were performed.    Description:  The record was dominated by a 11 hertz posterior dominant rhythm which attenuated with eye opening activity.  During drowsiness, identified by ocular signs and alpha attenuation, there is diffuse asynchronous theta activity.  Stage 2 sleep was not identified.  Photic stimulation elicited no abnormalities.  There were no epileptiform discharges or electrographic seizures    Impression:  This is a normal awake and drowsy EEG.

## 2024-07-02 NOTE — H&P
[facility] Hospital Medicine - H&P Note    Patient Name: Chelle Chandra  : 1965  MRN: [id number]  PCP: Jorge Silver MD  Admitting Physician: Robbin Figueroa MD  Admission Class: OP- Observation   Length of Stay: 0  Face-to-Face encounter date: 2024  Code status: --Full    Chief Complaint   Dizziness (Reports dizziness, loss of balance, nausea & diarrhea x4 days. Also reports intermittent headaches. Hx CVA 1 month ago. Sent by PCP for eval. )      History of Present Illness   Ms. Chandra is a 58 year old female with a pmh of COPD, T2 DM, GERD, HLD, CVA, lung nodule, insomnia who presented to the ED with complaints of 4 days of dizziness, nausea, and diarrhea. States dizziness became acutely worse today.  She has a history of a left ICA CVA one month ago. At the time of my visit, she is lethargic as she was given Ativan for her MRI and has become very sleepy. She does arouse to voice, but poor effort to participate in HPI or physical exam, but does state she is feeling better.    ED vitals on arrival:  Temp 97.5° F, pulse 82, resp 18, /73, SpO2 98% on RA.  Today's ED lab work was wholly unremarkable.  CT head without contrast showed no convincing acute intracranial abnormality.  Redemonstrated findings consistent with changes of left MCA territory ischemic infarct.  In the ED, she was given 1 L NS, 1 L LR, Zofran and admitted to Hospital Medicine for management.      ROS   Except as documented, all other systems reviewed and negative     Past Medical History     COPD   Diabetes mellitus type 2   GERD   Hyperlipidemia   CVA  Lung nodule   Insomnia  Anxiety    Past Surgical History     Past Surgical History:   Procedure Laterality Date    ANGIOGRAM, CORONARY, WITH LEFT HEART CATHETERIZATION      BREAST LUMPECTOMY Right     CARDIOVERSION  2013    CARPAL TUNNEL RELEASE  10/23/2013    FUSION OF CERVICAL SPINE BY ANTERIOR APPROACH USING COMPUTER-ASSISTED NAVIGATION   06/10/2019    KIDNEY SURGERY      TONSILLECTOMY AND ADENOIDECTOMY      TOTAL ABDOMINAL HYSTERECTOMY      WRIST SURGERY         Social History     Screening for Social Drivers for health: Patient screened for food insecurity, housing instability, transportation needs, utility   difficulties, and interpersonal safety (select all that apply as identified as concern)  []Housing or Food  []Transportation Needs  []Utility Difficulties  []Interpersonal safety  [x]None    Social History     Tobacco Use    Smoking status: Every Day     Current packs/day: 0.50     Types: Cigarettes    Smokeless tobacco: Never   Substance Use Topics    Alcohol use: Never        Family History   Reviewed and negative    Allergies   Aspirin, Ketorolac, Penicillins, Sulfa (sulfonamide antibiotics), and Ozempic [semaglutide]    Home Medications     Prior to Admission medications    Medication Sig Start Date End Date Taking? Authorizing Provider   atorvastatin (LIPITOR) 80 MG tablet Take 1 tablet (80 mg total) by mouth once daily. 6/6/24 6/6/25  Alessio Arteaga MD   busPIRone (BUSPAR) 15 MG tablet Take 1 tablet (15 mg total) by mouth 3 (three) times daily. 9/21/23 9/20/24  Carlton Maldonado FNP   clopidogreL (PLAVIX) 75 mg tablet Take 1 tablet (75 mg total) by mouth once daily. 6/6/24 6/6/25  Alessio Arteaga MD   dapagliflozin propanediol (FARXIGA) 5 mg Tab tablet Take 1 tablet (5 mg total) by mouth once daily. 6/21/24   Jorge Silver MD   ezetimibe (ZETIA) 10 mg tablet Take 1 tablet (10 mg total) by mouth once daily. 6/5/24 6/5/25  Jorge Silver MD   fluticasone propionate (FLOVENT DISKUS) 250 mcg/actuation DsDv Inhale 2 puffs into the lungs 2 (two) times daily. Controller 5/23/23   Jorge Silver MD   levothyroxine (SYNTHROID) 100 MCG tablet Take 1 tablet (100 mcg total) by mouth before breakfast. 6/5/24   Jorge Silver MD   metFORMIN (GLUCOPHAGE) 500 MG tablet Take 500 mg by mouth 2 (two) times daily with meals. 2/10/22    "Provider, Historical   paroxetine (PAXIL) 20 MG tablet Take 1 tablet (20 mg total) by mouth every morning. 9/21/23 9/20/24  Carlton Maldonado FNP   pen needle, diabetic 32 gauge x 5/32" Ndle 1 each by Misc.(Non-Drug; Combo Route) route once a week. 11/3/22   Carlton Maldonado FNP   traZODone (DESYREL) 100 MG tablet Take 1 tablet (100 mg total) by mouth nightly as needed for Insomnia. 6/24/24 6/24/25  Jorge Silver MD   VENTOLIN HFA 90 mcg/actuation inhaler Inhale 2 puffs into the lungs every 4 (four) hours as needed. 5/8/24   Jorge Silver MD        Physical Exam   Vital Signs  Temp:  [97.5 °F (36.4 °C)-98.1 °F (36.7 °C)]   Pulse:  [64-90]   Resp:  [15-20]   BP: (104-130)/(60-84)   SpO2:  [95 %-100 %]    General: Well developed, well nourished. In no acute distress, non-toxic appearing, lethargic after receiving ativan  HEENT: NC/AT  Neck:  Supple. No JVD  Chest: Clear bilaterally, no wheezing, no accessory muscle use.  CVS: Regular rhythm. Normal S1/S2.  Abdomen: nondistended, normoactive BS, soft and non-tender.  MSK: No obvious deformity or joint swelling  Skin: Warm and dry  Neuro: AAOx3, no focal neurological deficit  Psych: Cooperative      Labs     Recent Labs     07/01/24  1616   WBC 5.89   RBC 4.59   HGB 13.8   HCT 40.8   MCV 88.9   MCH 30.1   MCHC 33.8   RDW 12.9        Recent Labs     07/01/24  1616   INR 0.9      Recent Labs     07/01/24  1616      K 4.7   CO2 30*   BUN 15.0   CREATININE 0.78   EGFRNORACEVR >60   GLUCOSE 102*   CALCIUM 10.1   MG 1.80   ALBUMIN 3.9   GLOBULIN 2.5   ALKPHOS 110   ALT 49   AST 30   BILITOT 0.6     No results for input(s): "LACTIC" in the last 72 hours.  Recent Labs     07/01/24  1616   TROPONINI <0.010        Microbiology Results (last 7 days)       ** No results found for the last 168 hours. **           Imaging     CT Head Without Contrast   Final Result        Assessment & Plan   Dizziness likely 2/2 IVVD due to diarrhea  S/p Left MCA infarct 1 month " ago    History: COPD, T2 DM, GERD, HLD, CVA, lung nodule, insomnia    Plan:  -Telemetry monitoring  -MRI brain without contrast pending  -Neurology consult placed in ED  -NS @ 75 ml/hour  -Neuro checks q 4 hours  -PT/OT/SLP eval and treat  -Aspiration/fall precautions  -Sliding scale insulin with accu-checks  -Antihypertensives as needed  -Resume home meds as appropriate  -Labs in AM       VTE Prophylaxis: SCDs, Lovenox       I, Suellen Bhandari, NP have reviewed and discussed this case with Dr. Figueroa.  Please see addendum for further assessment and plan from attending MD.

## 2024-07-02 NOTE — PLAN OF CARE
Problem: Occupational Therapy  Goal: Occupational Therapy Goal  Description: Goals to be met by: 8/2/24     Patient will increase functional independence with ADLs by performing:    UE Dressing with Modified [place].  LE Dressing with Modified [place].  Grooming while standing at sink with Modified [place].  Toileting from toilet with Modified [place] for hygiene and clothing management.   Toilet transfer to toilet with Modified [place].    Outcome: Progressing

## 2024-07-02 NOTE — HPI
Chelle Chandra is a 58 y.o. female with past medical history of COPD, DM II, GERD, HLD, CVA, and Lung nodule who presented to the ER with complaints fo dizziness, nausea, and diarrhea for the past 4 days. Dizziness worsened yesterday prompting her to come to the Hospital. Recent history of Left ICA CVA s/p MT with Dr. Leone. CT head without contrast showed no convincing acute intracranial abnormality. Redemonstrated findings consistent with changes of left MCA territory ischemic infarct. Patient admitted to Hospitalist's and Vascular Neurology consulted for possible CVA.

## 2024-07-02 NOTE — PT/OT/SLP EVAL
[facility]  Speech Language Pathology Department  Cognitive-Communication Evaluation    Patient Name:  Chelle Chandra   MRN:  [id number]    Recommendations     General recommendations:  SLP intervention not indicated  Communication strategies:  none  Barriers to safe discharge: level of skilled assistance needed    History     Chelle Chandra is a/n 58 y.o. female with history of CVA was admitted with progressive gait instability. Pt passed swallow screen.     Past Medical History:   Diagnosis Date    Accelerated junctional rhythm     Agatston CAC score, <100     Anxiety disorder, unspecified     COPD type A     Diabetes mellitus without complication     GERD (gastroesophageal reflux disease)     History of COVID-19     Insomnia     Lung nodule      Past Surgical History:   Procedure Laterality Date    ANGIOGRAM, CORONARY, WITH LEFT HEART CATHETERIZATION      BREAST LUMPECTOMY Right     CARDIOVERSION  08/01/2013    CARPAL TUNNEL RELEASE  10/23/2013    FUSION OF CERVICAL SPINE BY ANTERIOR APPROACH USING COMPUTER-ASSISTED NAVIGATION  06/10/2019    KIDNEY SURGERY      TONSILLECTOMY AND ADENOIDECTOMY      TOTAL ABDOMINAL HYSTERECTOMY      WRIST SURGERY         Previous level of Function  Education:high school  Occupation: unemployed  Lives: with spouse  Handed: Right  Glasses: yes  Hearing Aids: no    Imaging   Results for orders placed during the hospital encounter of 07/01/24    MRI Brain Without Contrast    Narrative  EXAMINATION:  MRI BRAIN WITHOUT CONTRAST    CLINICAL HISTORY:  dizziness, off-balance, r/o cva, recent history of cva 1 month ago;    TECHNIQUE:  Multiplanar MRI sequences were performed of the brain without contrast.    COMPARISON:  MRI brain June 2, 2024    FINDINGS:  There are extensive left middle cerebral artery territory frontoparietal lobes and temporooccipital lobes as well as basal ganglia subacute nonhemorrhagic infarcts.  Infarct shows less dense brightness on  diffusion-weighted sequence and now is not associated with signal dropout on the ADC map.  Brain edematous changes and sulci effacement is slightly less evident.  There is local mass effect without midline shift.  No hydrocephalus.  There is no new infratentorial or supratentorial brain infarct.  Gradient echo sequence are without altered signal to reflect a previous hemorrhagic byproduct.  Sella and the suprasellar areas are unremarkable.    The cerebellar tonsils are normally positioned.  No acute extra axial fluid collections identified. The mastoid air cells are clear.    Impression  1.  Left cerebral hemisphere extensive subacute infarct without hemorrhagic transformation.    2.  No new findings.    Electronically signed by: Adam Sears  Date:    07/02/2024  Time:    11:36    Subjective     Patient awake and alert.  Spiritual/Cultural/Mormon Beliefs/Practices that affect care:  no    Pain/Comfort:  0/10    Objective     ORAL MUSCULATURE  Dentition: own teeth  Facial Movement: WFL  Buccal Strength & Mobility: WFL  Mandibular Strength & Mobility: WFL    SPEECH PRODUCTION  Phoneme Production: adequate  Voice Quality: adequate  Voice Production: adequate  Speech Rate: appropriate  Loudness: acceptable  Respiration: WFL for speech  Speech Intelligibility  Known Context: Greater that 90%  Unknown Context: Greater that 90%    AUDITORY COMPREHENSION  Identification:  Body parts: 100%  Objects: 100%  Following Directions:  1-Step: 100%  2-Step: 100%  Yes/No Questions:  Biographical: 100%  Environmental: 100%  Simple: 100%  Complex: 100%    VERBAL EXPRESSION  Automatic Speech:  Days of the week: 100%  Months of the year: 100%  Repetition:  Phonemes: 100%  Single syllable words: 100%  Bi-syllabic words: 100%  Multi-syllabic words: 100%  Phrase Completion: 100%  Confrontation Naming  Body Parts: 100%  Objects: 100%  Wh- Questions:  Object name: 100%  Object function: 100%    COGNITION  Orientation:  Person: yes  Place:  yes  Time: yes  Situation: yes   Attention:  Focused: Within Functional Limits  Sustained: Within Functional Limits  Memory:  Immediate: Within Functional Limits  Delayed: Within Functional Limits  Problem Solving  Functional simple: Within Functional Limits  Functional complex: Within Functional Limits  Organization:  Convergent thinking: Within Functional Limits  Divergent thinking: Within Functional Limits  Executive Function:  Planning: Within Functional Limits    Assessment     Pt presents with functional speech and language skills, cognitive linguistic skills note to be at baseline. No skilled SLP intervention is warranted at this time.       Patient Education     Patient and daughter/s were provided with verbal education regarding POC.  Understanding was verbalized.    Plan     Plan of Care reviewed with:    patient and daughter     Time Tracking     SLP Treatment Date:   07/02/24  Speech Start Time:  0815  Speech Stop Time:  0830     Speech Total Time (min):  15 min    Billable minutes:  Evaluation of Speech Sound Production with Comprehension and Expression, 15 minutes     07/02/2024

## 2024-07-02 NOTE — PLAN OF CARE
Problem: Physical Therapy  Goal: Physical Therapy Goal  Description: Goals to be met by: 2024     Patient will increase functional independence with mobility by performin. Supine to sit with [place]  2. Sit to supine with [place]  3. Sit to stand transfer with [place]  4. Gait  x 150 feet with Modified [place] using Rolling Walker vs LRAD.   5. Ascend/descend 2 stair with bilateral Handrails Modified [place] using No Assistive Device.     2024 1155 by Bijal Macedo, PT  Outcome: Progressing

## 2024-07-02 NOTE — ASSESSMENT & PLAN NOTE
Presented with Dizziness, Ataxia, Nausea  Hx of Right MCA s/p MT  RF: CVA, HLD, DM  Etiology: Stroke Recrudescence vs. Seizure vs.. Metabolic Derangement      CT head without contrast showed no convincing acute intracranial abnormality. Redemonstrated findings consistent with changes of left MCA territory ischemic infarct.    MRI: There are extensive left middle cerebral artery territory frontoparietal lobes and temporooccipital lobes as well as basal ganglia subacute nonhemorrhagic infarcts. Infarct shows less dense brightness on diffusion-weighted sequence and now is not associated with signal dropout on the ADC map. Brain edematous changes and sulci effacement is slightly less evident. There is local mass effect without midline shift. No hydrocephalus. There is no new infratentorial or supratentorial brain infarct. Gradient echo sequence are without altered signal to reflect a previous hemorrhagic byproduct.       Plan:    Ok to normalize blood pressure  PT/OT to evaluate  Ok to continue home Plavix  Continue home atorvastatin 80 mg   EEG to rule out possible seizure activity.    Further recommendations may follow per MD.

## 2024-07-02 NOTE — NURSING
Pt arrived to the floor via transport on stretcher, Pt independently moved to bed. AAOx4. Answered all admit questions. C/o headache to the back of head.

## 2024-07-02 NOTE — CONSULTS
[facility] - Neurology  Neurology  Consult Note    Patient Name: Chelle Chandra  MRN: [id number]  Admission Date: 7/1/2024  Hospital Length of Stay: 0 days  Code Status: Full Code   Attending Provider: Reyes, Thairy G, DO   Consulting Provider: Diya Dewitt NP  Primary Care Physician: Jorge Silver MD  Principal Problem:Dizziness    Inpatient consult to Vascular (Stroke) Neurology  Consult performed by: Diya Dewitt NP  Consult ordered by: Suellen Bhandari, [facility]         Subjective:     Chief Complaint:    Chief Complaint   Patient presents with    Dizziness     Reports dizziness, loss of balance, nausea & diarrhea x4 days. Also reports intermittent headaches. Hx CVA 1 month ago. Sent by PCP for eval.           HPI:   Chelle Chandra is a 58 y.o. female with past medical history of COPD, DM II, GERD, HLD, CVA, and Lung nodule who presented to the ER with complaints fo dizziness, nausea, and diarrhea for the past 4 days. Dizziness worsened yesterday prompting her to come to the Hospital. Recent history of Left ICA CVA s/p MT with Dr. Leone. CT head without contrast showed no convincing acute intracranial abnormality. Redemonstrated findings consistent with changes of left MCA territory ischemic infarct. Patient admitted to Hospitalist's and Vascular Neurology consulted for possible CVA.      Past Medical History:   Diagnosis Date    Accelerated junctional rhythm     Agatston CAC score, <100     Anxiety disorder, unspecified     COPD type A     Diabetes mellitus without complication     GERD (gastroesophageal reflux disease)     History of COVID-19     Insomnia     Lung nodule        Past Surgical History:   Procedure Laterality Date    ANGIOGRAM, CORONARY, WITH LEFT HEART CATHETERIZATION      BREAST LUMPECTOMY Right     CARDIOVERSION  08/01/2013    CARPAL TUNNEL RELEASE  10/23/2013    FUSION OF CERVICAL SPINE BY ANTERIOR APPROACH USING COMPUTER-ASSISTED NAVIGATION  06/10/2019     "KIDNEY SURGERY      TONSILLECTOMY AND ADENOIDECTOMY      TOTAL ABDOMINAL HYSTERECTOMY      WRIST SURGERY         Review of patient's allergies indicates:   Allergen Reactions    Aspirin     Ketorolac     Penicillins     Sulfa (sulfonamide antibiotics)     Ozempic [semaglutide] Other (See Comments)     Abdominal pain       Current Neurological Medications:     No current facility-administered medications on file prior to encounter.     Current Outpatient Medications on File Prior to Encounter   Medication Sig    atorvastatin (LIPITOR) 80 MG tablet Take 1 tablet (80 mg total) by mouth once daily.    busPIRone (BUSPAR) 15 MG tablet Take 1 tablet (15 mg total) by mouth 3 (three) times daily.    clopidogreL (PLAVIX) 75 mg tablet Take 1 tablet (75 mg total) by mouth once daily.    dapagliflozin propanediol (FARXIGA) 5 mg Tab tablet Take 1 tablet (5 mg total) by mouth once daily.    ezetimibe (ZETIA) 10 mg tablet Take 1 tablet (10 mg total) by mouth once daily.    fluticasone propionate (FLOVENT DISKUS) 250 mcg/actuation DsDv Inhale 2 puffs into the lungs 2 (two) times daily. Controller    levothyroxine (SYNTHROID) 100 MCG tablet Take 1 tablet (100 mcg total) by mouth before breakfast.    metFORMIN (GLUCOPHAGE) 500 MG tablet Take 500 mg by mouth 2 (two) times daily with meals.    paroxetine (PAXIL) 20 MG tablet Take 1 tablet (20 mg total) by mouth every morning.    pen needle, diabetic 32 gauge x 5/32" Ndle 1 each by Misc.(Non-Drug; Combo Route) route once a week.    traZODone (DESYREL) 100 MG tablet Take 1 tablet (100 mg total) by mouth nightly as needed for Insomnia.    VENTOLIN HFA 90 mcg/actuation inhaler Inhale 2 puffs into the lungs every 4 (four) hours as needed.     Family History       Problem Relation (Age of Onset)    Diabetes Father    Heart attack Mother          Tobacco Use    Smoking status: Every Day     Current packs/day: 0.50     Types: Cigarettes    Smokeless tobacco: Never   Substance and Sexual " Activity    Alcohol use: Never    Drug use: Never    Sexual activity: Not on file     Review of Systems   All other systems reviewed and are negative.    Objective:     Vital Signs (Most Recent):  Temp: 97.5 °F (36.4 °C) (07/02/24 1110)  Pulse: 66 (07/02/24 1110)  Resp: 18 (07/02/24 0336)  BP: 127/84 (07/02/24 1110)  SpO2: 97 % (07/02/24 1110) Vital Signs (24h Range):  Temp:  [97.4 °F (36.3 °C)-98.1 °F (36.7 °C)] 97.5 °F (36.4 °C)  Pulse:  [60-90] 66  Resp:  [15-20] 18  SpO2:  [95 %-100 %] 97 %  BP: (104-130)/(60-84) 127/84     Weight: 120.6 kg (265 lb 14 oz)  Body mass index is 42.91 kg/m².     Physical Exam  Vitals reviewed.   Constitutional:       Appearance: Normal appearance.   HENT:      Head: Normocephalic and atraumatic.      Nose: Nose normal.      Mouth/Throat:      Mouth: Mucous membranes are moist.   Eyes:      Extraocular Movements: Extraocular movements intact.      Pupils: Pupils are equal, round, and reactive to light.   Pulmonary:      Effort: Pulmonary effort is normal.   Abdominal:      Palpations: Abdomen is soft.   Musculoskeletal:         General: Normal range of motion.      Cervical back: Normal range of motion and neck supple.   Skin:     General: Skin is warm and dry.      Capillary Refill: Capillary refill takes less than 2 seconds.   Neurological:      Mental Status: She is alert and oriented to person, place, and time.      Cranial Nerves: No cranial nerve deficit.      Sensory: No sensory deficit.      Motor: Weakness (RUE 4.5/5, RLE 4.5/5, LUE 5/5, LLLE  5/5) present.      Coordination: Coordination abnormal (dysmetria of LUE on finger to nose).   Psychiatric:         Mood and Affect: Mood normal.          NEUROLOGICAL EXAMINATION:     MENTAL STATUS   Oriented to person, place, and time.     CRANIAL NERVES     CN III, IV, VI   Pupils are equal, round, and reactive to light.      Significant Labs: CBC:   Recent Labs   Lab 07/01/24  1616 07/02/24  0334   WBC 5.89 5.11   HGB 13.8 11.6*    HCT 40.8 34.8*    238     CMP:   Recent Labs   Lab 07/01/24  1616 07/02/24  0334    141   K 4.7 3.7    111*   CO2 30* 24   BUN 15.0 11.5   CREATININE 0.78 0.73   CALCIUM 10.1 8.8   MG 1.80 1.70   ALBUMIN 3.9 3.1*   BILITOT 0.6 0.9   ALKPHOS 110 94   AST 30 29   ALT 49 41       Significant Imaging: I have reviewed all pertinent imaging results/findings within the past 24 hours.  Assessment and Plan:     Stroke  Presented with Dizziness, Ataxia, Nausea  Hx of Right MCA s/p MT  RF: CVA, HLD, DM  Etiology: Stroke Recrudescence vs. Seizure vs.. Metabolic Derangement      CT head without contrast showed no convincing acute intracranial abnormality. Redemonstrated findings consistent with changes of left MCA territory ischemic infarct.    MRI: There are extensive left middle cerebral artery territory frontoparietal lobes and temporooccipital lobes as well as basal ganglia subacute nonhemorrhagic infarcts. Infarct shows less dense brightness on diffusion-weighted sequence and now is not associated with signal dropout on the ADC map. Brain edematous changes and sulci effacement is slightly less evident. There is local mass effect without midline shift. No hydrocephalus. There is no new infratentorial or supratentorial brain infarct. Gradient echo sequence are without altered signal to reflect a previous hemorrhagic byproduct.       Plan:    Ok to normalize blood pressure  PT/OT to evaluate  Ok to continue home Plavix  Continue home atorvastatin 80 mg   EEG to rule out possible seizure activity.    Further recommendations may follow per MD.          VTE Risk Mitigation (From admission, onward)           Ordered     enoxaparin injection 40 mg  Daily         07/01/24 2231     IP VTE HIGH RISK PATIENT  Once         07/01/24 2231     Place sequential compression device  Until discontinued         07/01/24 2231                    Thank you for your consult.  Will follow up with patient.    Diya Dewitt,  NP  Neurology  [facility]

## 2024-07-02 NOTE — PROGRESS NOTES
[facility] MEDICINE ~ PROGRESS NOTE    CHIEF COMPLAINT   Hospital follow up for AMS    HOSPITAL COURSE   58 year old female with a pmh of COPD, T2 DM, GERD, HLD, CVA, lung nodule, insomnia who presented to the ED with complaints of 4 days of dizziness, nausea, and diarrhea. States dizziness became acutely worse today.  She has a history of a left ICA CVA one month ago. At the time of my visit, she is lethargic as she was given Ativan for her MRI and has become very sleepy. She does arouse to voice, but poor effort to participate in HPI or physical exam, but does state she is feeling better.     ED vitals on arrival:  Temp 97.5° F, pulse 82, resp 18, /73, SpO2 98% on RA.  Today's ED lab work was wholly unremarkable.  CT head without contrast showed no convincing acute intracranial abnormality.  Redemonstrated findings consistent with changes of left MCA territory ischemic infarct.  In the ED, she was given 1 L NS, 1 L LR, Zofran and admitted to Hospital Medicine for management.     At baseline patient lives home with her , uses a rolling walker.    Today  No complaints on exam   OBJECTIVE/PHYSICAL EXAM     VITAL SIGNS (MOST RECENT):  Temp: 97.5 °F (36.4 °C) (07/02/24 1110)  Pulse: 66 (07/02/24 1110)  Resp: 18 (07/02/24 0336)  BP: 127/84 (07/02/24 1110)  SpO2: 97 % (07/02/24 1110) VITAL SIGNS (24 HOUR RANGE):  Temp:  [97.4 °F (36.3 °C)-98.1 °F (36.7 °C)] 97.5 °F (36.4 °C)  Pulse:  [60-90] 66  Resp:  [15-20] 18  SpO2:  [95 %-100 %] 97 %  BP: (104-130)/(60-84) 127/84   GENERAL: In no acute distress, afebrile  HEENT:PERRLA  CHEST: Clear to auscultation bilaterally  HEART: S1, S2, no appreciable murmur  ABDOMEN: Soft, nontender, BS +  MSK: Warm, no lower extremity edema, no clubbing or cyanosis  NEUROLOGIC: Alert and oriented x4, R sided deficits   ASSESSMENT/PLAN   Dizziness likely 2/2 IVVD due to diarrhea  Sub acute Stroke   -S/p Left MCA infarct 1 month ago     History:  COPD, T2 DM, GERD, HLD, CVA, lung nodule, insomnia     Plan:  -MRI brain without contrast showed subacute left hemispheric infarct without hemorrhagic transformation  -Neurology planning for EEG today, okay to resume Plavix  -NS @ 75 ml/hour for 24 more hours  -PT/OT/SLP eval and treat       VTE Prophylaxis: SCDs, Lovenox  Anticipated discharge and disposition:  Back to home with her ?  __________________________________________________________________________    NUTRITIONAL STATUS     Patient meets ASPEN criteria for   malnutrition of   per RD assessment as evidenced by:                       A minimum of two characteristics is recommended for diagnosis of either severe or non-severe malnutrition.     LABS/MICRO/MEDS/DIAGNOSTICS       LABS  Recent Labs     07/02/24  0334      K 3.7   CO2 24   BUN 11.5   CREATININE 0.73   GLUCOSE 76   CALCIUM 8.8   ALKPHOS 94   AST 29   ALT 41   ALBUMIN 3.1*     Recent Labs     07/02/24  0334   WBC 5.11   RBC 3.82*   HCT 34.8*   MCV 91.1          MICROBIOLOGY  Microbiology Results (last 7 days)       ** No results found for the last 168 hours. **               MEDICATIONS   atorvastatin  80 mg Oral Daily    busPIRone  15 mg Oral BID    clopidogreL  75 mg Oral Daily    enoxparin  40 mg Subcutaneous Daily    ezetimibe  10 mg Oral Daily    levothyroxine  100 mcg Oral Before breakfast    metFORMIN  500 mg Oral BID WM    paroxetine  20 mg Oral QAM         INFUSIONS   0.9% NaCl   Intravenous Continuous 75 mL/hr at 07/02/24 0144 New Bag at 07/02/24 0144          DIAGNOSTIC TESTS  MRI Brain Without Contrast   Final Result      1.  Left cerebral hemisphere extensive subacute infarct without hemorrhagic transformation.      2.  No new findings.         Electronically signed by: Adam Sears   Date:    07/02/2024   Time:    11:36      CT Head Without Contrast   Final Result           No echocardiogram results found for the past 14 days.         Case related differential  diagnoses have been reviewed; assessment and plan has been documented. I have personally reviewed the labs and test results that are currently available; I have reviewed the patients medication list. I have reviewed the consulting providers recommendations. I have reviewed or attempted to review medical records based upon their availability.  All of the patient's and/or family's questions have been addressed and answered to the best of my ability.  I will continue to monitor closely and make adjustments to medical management as needed.  This document was created using M*Modal Fluency Direct.  Transcription errors may have been made.  Please contact me if any questions may rise regarding documentation to clarify transcription.        Thairy G Reyes, DO   Internal Medicine  Department of Hospital Medicine  [facility] - Neurology

## 2024-07-02 NOTE — NURSING
Nurses Note -- 4 Eyes      7/1/2024   10:33 PM      Skin assessed during: Admit      [x] No Altered Skin Integrity Present    []Prevention Measures Documented      [] Yes- Altered Skin Integrity Present or Discovered   [] LDA Added if Not in Epic (Describe Wound)   [] New Altered Skin Integrity was Present on Admit and Documented in LDA   [] Wound Image Taken    Wound Care Consulted? No    Attending Nurse:  Lauri Lopez RN/Staff Member: ASHLEY Pizano

## 2024-07-02 NOTE — PT/OT/SLP EVAL
Occupational Therapy  Evaluation    Name: Chelle Chandra  MRN: [id number]  Admitting Diagnosis: dizziness, diarrhea  Recent Surgery: * No surgery found *      Recommendations:     Discharge therapy intensity: Low Intensity Therapy   Discharge Equipment Recommendations:  none  Barriers to discharge:       Assessment:     Chelle Chandra is a 58 y.o. female with a medical diagnosis of L cerebral hemisphere extensive subacute infarct, hx of L MCA CVA 1 month ago s/p mechanical thrombectomy. She presents with the following performance deficits affecting function: weakness, impaired endurance, impaired self care skills, impaired functional mobility, gait instability, impaired balance, decreased safety awareness, decreased lower extremity function, visual deficits.     Pt with good tolerance to OT evaluation, presents with equal BUE strength. Pt reports intermittent tingling in bilateral hands (R>L), sensation intact. Pt with decreased RUE coordination, reports this has been since previous CVA. Overall pt required SBA for LB dressing (more difficulty with R sock due to RLE weakness) and required CGA for functional mobility using RW, 1 LOB to left when opening room door requiring Min A for correction. Pt has decreased vision in R peripheral field (chronic) and requires cues during ambulation due to NBOS and kyphotic posture. Pt occasionally veers to right when ambulating, no LOB noted. Pt expected to progress well, recommending low intensity therapy upon discharge. Pt was active with outpatient OT prior to admit.    Rehab Prognosis: Good; patient would benefit from acute skilled OT services to address these deficits and reach maximum level of function.       Plan:     Patient to be seen 5 x/week to address the above listed problems via self-care/home management, therapeutic activities, therapeutic exercises  Plan of Care Expires: 08/02/24  Plan of Care Reviewed with: patient, daughter    Subjective     Chief Complaint:  none  Patient/Family Comments/goals: to get better    Occupational Profile:  Living Environment: lives with  and daughter in [facility], 4 [address] with R rail  Previous level of function: Mod I with rollator  Roles and Routines: mother, spouse  Equipment Used at Home: rollator, grab bar, shower chair, walker, rolling  Assistance upon Discharge:  and daughter present 24/7    Pain/Comfort:  Pain Rating 1: 0/10    Patients cultural, spiritual, Mandaen conflicts given the current situation: no    Objective:     OT communicated with RN prior to session.      Patient was found up in chair with pulse ox (continuous), telemetry, peripheral IV upon OT entry to room.    General Precautions: Standard, fall  Orthopedic Precautions: N/A  Braces: N/A    Vital Signs: Blood Pressure: 115/76  HR: 73    Functional Mobility/Transfers:  Patient completed Sit <> Stand Transfer with stand by assistance  with  rolling walker   Functional Mobility: CGA for mobility 53 ft x2 with RW, 1 seated rest break between trials due to fatigue. Pt with 1 LOB to left while opening room door, requiring Min A to correct. Pt with NBOS and kyphotic posture while ambulating requiring verbal cues to correct    Activities of Daily Living:  Lower Body Dressing: stand by assistance donning socks; able to complete figure four position with LLE, unable to with RLE due to weakness (donned with forward lean)    Functional Cognition:  Orientation: oriented to Person, Place, Time (month, not year), and Situation    Visual Perceptual Skills:  Pursuits: WFL  Convergence/Divergence: WFL  Visual Fields: R peripheral vision impaired    Upper Extremity Function:  Right Upper Extremity:   Strength: WFL  Coordination: moderately slowed finger opposition and finger to nose  Sensation: WFL (reports intermittent tingling)    Left Upper Extremity:  Strength: WFL  Coordination: mildly slowed finger opposition and finger to nose  Sensation: WFL (reports intermittent  tingling)    Balance:   Static standing balance:WFL  Dynamic standing balance:min A at RW    Therapeutic Positioning  Risk for acquired pressure injuries is decreased due to ability to get to BSC/toilet with assist.    OT interventions performed during the course of today's session:   Education was provided on benefits of and recommendations for therapeutic positioning    Skin assessment: all bony prominences were assessed    Findings: no redness or breakdown noted    OT recommendations for therapeutic positioning throughout hospitalization:   Follow [facility] Pressure Injury Prevention Protocol    Patient Education:  Patient and family were provided with verbal education education regarding OT role/goals/POC, fall prevention, safety awareness, and Discharge/DME recommendations.  Understanding was verbalized.     Patient left up in chair with all lines intact, call button in reach, and family present.    GOALS:   Multidisciplinary Problems       Occupational Therapy Goals          Problem: Occupational Therapy    Goal Priority Disciplines Outcome Interventions   Occupational Therapy Goal     OT, PT/OT Progressing    Description: Goals to be met by: 8/2/24     Patient will increase functional independence with ADLs by performing:    UE Dressing with Modified [place].  LE Dressing with Modified [place].  Grooming while standing at sink with Modified [place].  Toileting from toilet with Modified [place] for hygiene and clothing management.   Toilet transfer to toilet with Modified [place].                         History:     Past Medical History:   Diagnosis Date    Accelerated junctional rhythm     Agatston CAC score, <100     Anxiety disorder, unspecified     COPD type A     Diabetes mellitus without complication     GERD (gastroesophageal reflux disease)     History of COVID-19     Insomnia     Lung nodule          Past Surgical History:   Procedure Laterality Date    ANGIOGRAM, CORONARY, WITH  LEFT HEART CATHETERIZATION      BREAST LUMPECTOMY Right     CARDIOVERSION  08/01/2013    CARPAL TUNNEL RELEASE  10/23/2013    FUSION OF CERVICAL SPINE BY ANTERIOR APPROACH USING COMPUTER-ASSISTED NAVIGATION  06/10/2019    KIDNEY SURGERY      TONSILLECTOMY AND ADENOIDECTOMY      TOTAL ABDOMINAL HYSTERECTOMY      WRIST SURGERY         Time Tracking:     OT Date of Treatment: 07/02/24  OT Start Time: 1346  OT Stop Time: 1415  OT Total Time (min): 29 min    Billable Minutes:Evaluation mod complexity    7/2/2024

## 2024-07-03 LAB
CHOLEST SERPL-MCNC: 93 MG/DL
CHOLEST/HDLC SERPL: 3 {RATIO} (ref 0–5)
EST. AVERAGE GLUCOSE BLD GHB EST-MCNC: 151.3 MG/DL
HBA1C MFR BLD: 6.9 %
HDLC SERPL-MCNC: 37 MG/DL (ref 35–60)
LDLC SERPL CALC-MCNC: 40 MG/DL (ref 50–140)
POCT GLUCOSE: 105 MG/DL (ref 70–110)
POCT GLUCOSE: 124 MG/DL (ref 70–110)
POCT GLUCOSE: 157 MG/DL (ref 70–110)
POCT GLUCOSE: 93 MG/DL (ref 70–110)
TRIGL SERPL-MCNC: 82 MG/DL (ref 37–140)
VLDLC SERPL CALC-MCNC: 16 MG/DL

## 2024-07-03 PROCEDURE — 36415 COLL VENOUS BLD VENIPUNCTURE: CPT | Performed by: STUDENT IN AN ORGANIZED HEALTH CARE EDUCATION/TRAINING PROGRAM

## 2024-07-03 PROCEDURE — 80061 LIPID PANEL: CPT | Performed by: STUDENT IN AN ORGANIZED HEALTH CARE EDUCATION/TRAINING PROGRAM

## 2024-07-03 PROCEDURE — 97116 GAIT TRAINING THERAPY: CPT

## 2024-07-03 PROCEDURE — 11000001 HC ACUTE MED/SURG PRIVATE ROOM

## 2024-07-03 PROCEDURE — 25000003 PHARM REV CODE 250: Performed by: NURSE PRACTITIONER

## 2024-07-03 PROCEDURE — A9698 NON-RAD CONTRAST MATERIALNOC: HCPCS | Performed by: STUDENT IN AN ORGANIZED HEALTH CARE EDUCATION/TRAINING PROGRAM

## 2024-07-03 PROCEDURE — 63600175 PHARM REV CODE 636 W HCPCS: Performed by: STUDENT IN AN ORGANIZED HEALTH CARE EDUCATION/TRAINING PROGRAM

## 2024-07-03 PROCEDURE — 83036 HEMOGLOBIN GLYCOSYLATED A1C: CPT | Performed by: STUDENT IN AN ORGANIZED HEALTH CARE EDUCATION/TRAINING PROGRAM

## 2024-07-03 PROCEDURE — 25500020 PHARM REV CODE 255: Performed by: STUDENT IN AN ORGANIZED HEALTH CARE EDUCATION/TRAINING PROGRAM

## 2024-07-03 PROCEDURE — 25000003 PHARM REV CODE 250: Performed by: STUDENT IN AN ORGANIZED HEALTH CARE EDUCATION/TRAINING PROGRAM

## 2024-07-03 PROCEDURE — 97530 THERAPEUTIC ACTIVITIES: CPT | Mod: CO

## 2024-07-03 PROCEDURE — 92611 MOTION FLUOROSCOPY/SWALLOW: CPT

## 2024-07-03 PROCEDURE — 97535 SELF CARE MNGMENT TRAINING: CPT | Mod: CO

## 2024-07-03 RX ADMIN — METFORMIN HYDROCHLORIDE 500 MG: 500 TABLET, FILM COATED ORAL at 06:07

## 2024-07-03 RX ADMIN — PAROXETINE HYDROCHLORIDE 20 MG: 20 TABLET, FILM COATED ORAL at 06:07

## 2024-07-03 RX ADMIN — LEVOTHYROXINE SODIUM 100 MCG: 100 TABLET ORAL at 06:07

## 2024-07-03 RX ADMIN — METFORMIN HYDROCHLORIDE 500 MG: 500 TABLET, FILM COATED ORAL at 09:07

## 2024-07-03 RX ADMIN — ENOXAPARIN SODIUM 40 MG: 40 INJECTION SUBCUTANEOUS at 06:07

## 2024-07-03 RX ADMIN — BARIUM SULFATE 10 ML: 0.81 POWDER, FOR SUSPENSION ORAL at 10:07

## 2024-07-03 RX ADMIN — CLOPIDOGREL BISULFATE 75 MG: 75 TABLET ORAL at 09:07

## 2024-07-03 RX ADMIN — ATORVASTATIN CALCIUM 80 MG: 40 TABLET, FILM COATED ORAL at 09:07

## 2024-07-03 RX ADMIN — TRAZODONE HYDROCHLORIDE 100 MG: 100 TABLET ORAL at 08:07

## 2024-07-03 RX ADMIN — EZETIMIBE 10 MG: 10 TABLET ORAL at 09:07

## 2024-07-03 RX ADMIN — BUSPIRONE HYDROCHLORIDE 15 MG: 5 TABLET ORAL at 09:07

## 2024-07-03 RX ADMIN — BUSPIRONE HYDROCHLORIDE 15 MG: 5 TABLET ORAL at 08:07

## 2024-07-03 NOTE — PROCEDURES
[facility]  Speech Language Pathology Department  Modified Barium Swallow (MBS) Study    Patient Name:  Chelle Chandra   MRN:  [id number]    Recommendations     General recommendations:  dysphagia therapy  Repeat MBS study: as clinically warranted  Solid texture recommendation:  Regular Diet - IDDSI Level 7  Liquid consistency recommendation: Mildly thick liquids - IDDSI Level 2   Medications: per patient preference  Swallow strategies/precautions: small bites/sips and slow rate  General precautions:      History     Chelle Chandra is a/n 58 y.o. female admitted with progressive gait instability. Pt with recent hospital admission for CVA and mechanical thrombectomy with successful recanalization of the left tandem ICA communicating segment and M1 occlusion was performed.  MBS study was completed on 6/13 at which time pt exhibited mild- moderate oropharyngeal dysphagia.  Laryngeal penetration of thin and mildly thick liquids was visualized and did not clear. Pt denies compliance with diet recommendations.     Past Medical History:   Diagnosis Date    Accelerated junctional rhythm     Agatston CAC score, <100     Anxiety disorder, unspecified     COPD type A     Diabetes mellitus without complication     GERD (gastroesophageal reflux disease)     History of COVID-19     Insomnia     Lung nodule      Past Surgical History:   Procedure Laterality Date    ANGIOGRAM, CORONARY, WITH LEFT HEART CATHETERIZATION      BREAST LUMPECTOMY Right     CARDIOVERSION  08/01/2013    CARPAL TUNNEL RELEASE  10/23/2013    FUSION OF CERVICAL SPINE BY ANTERIOR APPROACH USING COMPUTER-ASSISTED NAVIGATION  06/10/2019    KIDNEY SURGERY      TONSILLECTOMY AND ADENOIDECTOMY      TOTAL ABDOMINAL HYSTERECTOMY      WRIST SURGERY       A MBS Study was completed to assess the efficiency of her swallow function, rule out aspiration and make recommendations regarding safe dietary consistencies, effective compensatory  strategies, and safe eating environment.     Home diet texture/consistency: Regular and thin liquids  Current Method of Nutrition: PO diet (moderately thick liquids and regular solids)    Imaging   Results for orders placed during the hospital encounter of 06/01/24    X-Ray Chest 1 View    Narrative  EXAMINATION:  XR CHEST 1 VIEW    CLINICAL HISTORY:  Resp failure;    TECHNIQUE:  Single frontal view of the chest was performed.    COMPARISON:  11/14/2023    FINDINGS:  Enteric tube is identified with the distal tip and side hole distal to the GE junction.  Endotracheal tube is at the level the clavicular heads.    The cardiomediastinal silhouette and pulmonary vasculature are normal.    The lungs and pleural spaces are clear.    Impression  1. Lines and tubes as above.  2. No acute cardiopulmonary process.      Electronically signed by: Nigel Zhu MD  Date:    06/02/2024  Time:    11:57    No results found for this or any previous visit.    Results for orders placed during the hospital encounter of 07/01/24    MRI Brain Without Contrast    Narrative  EXAMINATION:  MRI BRAIN WITHOUT CONTRAST    CLINICAL HISTORY:  dizziness, off-balance, r/o cva, recent history of cva 1 month ago;    TECHNIQUE:  Multiplanar MRI sequences were performed of the brain without contrast.    COMPARISON:  MRI brain June 2, 2024    FINDINGS:  There are extensive left middle cerebral artery territory frontoparietal lobes and temporooccipital lobes as well as basal ganglia subacute nonhemorrhagic infarcts.  Infarct shows less dense brightness on diffusion-weighted sequence and now is not associated with signal dropout on the ADC map.  Brain edematous changes and sulci effacement is slightly less evident.  There is local mass effect without midline shift.  No hydrocephalus.  There is no new infratentorial or supratentorial brain infarct.  Gradient echo sequence are without altered signal to reflect a previous hemorrhagic byproduct.  Sella and the  suprasellar areas are unremarkable.    The cerebellar tonsils are normally positioned.  No acute extra axial fluid collections identified. The mastoid air cells are clear.    Impression  1.  Left cerebral hemisphere extensive subacute infarct without hemorrhagic transformation.    2.  No new findings.  Electronically signed by: Adam Sears  Date:    07/02/2024  Time:    11:36    Subjective     Patient awake and alert.    Spiritual/Cultural/Mormonism Beliefs/Practices that affect care:  no  Pain/Comfort:  0/10    Fluoroscopic Findings     Oral Musculature  Dentition: own teeth  Secretion Management: adequate  Mucosal Quality: good  Facial Movement: WFL  Buccal Strength & Mobility: WFL  Mandibular Strength & Mobility: WFL  Oral Labial Strength & Mobility: WFL  Lingual Strength & Mobility: WFL  Vocal Quality: adequate  Volitional Cough: Able to clear secretions    Setup  Upright in bed  Able to self feed  Adequate head control    Visualization  Lateral view  Cervical hardware noted    Oral Phase:   Adequate lip closure  Prolonged mastication  Adequate bolus cohesion  Adequate anterior-posterior transport  Reduced bolus control with thin liquids    Pharyngeal Phase:   Swallow delay with spill to the valleculae  Reduced base of tongue retraction  Adequate epiglottic deflection  Reduced hyolaryngeal excursion  Reduced airway protection    Consistency Fed by Laryngeal Penetration Aspiration Residue   Mildly thick liquid by cup Self None None Mild   Thin liquid by cup Self During the swallow  Did NOT clear None Mild   Thin liquid by straw Self During the swallow  Did NOT clear None Mild   Puree Self None None None   Mildly thick liquid by straw Self None None Mild       Cervical Esophageal Phase:   UES appeared to accommodate all bolus types without stasis or retrograde movement visualized  Assessment     Pt exhibited mild-moderate oropharyngeal dysphagia characterized by the findings noted above.  Laryngeal penetration of  thin liquids via cup and straw was visualized and did not clear placing the pt at risk for aspiration. No aspiration was visualized during this study.  Both swallow safety and efficiency are impaired.     Patient appears to be at moderate risk for aspiration related pneumonia when considering severity of dysphagia and reduced airway protection .  Prognosis for behavioral swallow rehabilitation is good.    Goals     Multidisciplinary Problems       SLP Goals          Problem: SLP    Goal Priority Disciplines Outcome   SLP Goal     SLP    Description: LT. To tolerate least restrictive diet without clinical signs/sx of aspiration.   ST. Tongue base/laryngeal excursion exercises   2. Tolerate thermal stimulation x10 with 100% swallow response and less than a 2 second delay.   3. Thin liquid trials without clinical signs/sx of aspiration.                        Education     Patient provided with verbal education regarding POC.  Understanding was verbalized.    Plan     SLP Follow-Up:       Patient to be seen:  5 x/week   Plan of Care expires:  24  Plan of Care reviewed with:  patient, daughter     Time Tracking     SLP Treatment Date:   24  Speech Start Time:  1010  Speech Stop Time:  1030     Speech Total Time (min):  20 min    Billable minutes:   Motion Fluoroscopic Evaluation, Video Recording, 20 minutes     2024

## 2024-07-03 NOTE — PT/OT/SLP PROGRESS
Occupational Therapy   Treatment    Name: Chelle Chandra  MRN: [id number]  Admitting Diagnosis:  Dizziness       Recommendations:     Recommended therapy intensity at discharge: Low Intensity Therapy   Discharge Equipment Recommendations:  none  Barriers to discharge:       Assessment:     Chelle Chandra is a 58 y.o. female with a medical diagnosis of Dizziness.  She presents with good motivation and participation. Performance deficits affecting function are weakness, impaired balance, impaired endurance, impaired self care skills.     Rehab Prognosis:  Good; patient would benefit from acute skilled OT services to address these deficits and reach maximum level of function.       Plan:     Patient to be seen 5 x/week to address the above listed problems via self-care/home management, therapeutic activities, therapeutic exercises  Plan of Care Expires: 08/02/24  Plan of Care Reviewed with: patient, daughter    Subjective     Pain/Comfort:  Pain Rating 1: 0/10    Objective:     Communicated with: nurse prior to session.  Patient found  laying on sofa  with pulse ox (continuous), telemetry, peripheral IV upon OT entry to room.    General Precautions: Standard, fall    Orthopedic Precautions:N/A  Braces: N/A  Respiratory Status: Room air     Occupational Performance:     Bed Mobility:    Patient completed Supine to Sit with stand by assistance     Functional Mobility/Transfers:  Patient completed Sit <> Stand Transfer with stand by assistance  with  rollator   Patient completed sofa <> Chair Transfer using Step Transfer technique with stand by assistance with rollator  Patient completed Toilet Transfer Step Transfer technique with stand by assistance with  rollator  Functional Mobility: functional mobility in hallway negotiating obstacles with SBA    Activities of Daily Living:  Lower Body Dressing: stand by assistance don doff socks sitting on sofa  Education provided to patient and daughter on use of rollator and  locking before sitting and safety with mobility  Instructed on sit to stand exercises 3 x 5 or 10 whatever is tolerated, 2 x per day for strengthening for safety    [facility] 6 Click ADL: 21    Patient Education:  Patient and daughter/s were provided with verbal education and demonstrations education regarding fall prevention and safety awareness.  Patient was able to return demonstration.      Patient left HOB elevated with all lines intact, call button in reach, and daughter present.    GOALS:   Multidisciplinary Problems       Occupational Therapy Goals          Problem: Occupational Therapy    Goal Priority Disciplines Outcome Interventions   Occupational Therapy Goal     OT, PT/OT Progressing    Description: Goals to be met by: 8/2/24     Patient will increase functional independence with ADLs by performing:    UE Dressing with Modified [place].  LE Dressing with Modified [place].  Grooming while standing at sink with Modified [place].  Toileting from toilet with Modified [place] for hygiene and clothing management.   Toilet transfer to toilet with Modified [place].                         Time Tracking:     OT Date of Treatment: 07/03/24  OT Start Time: 1500  OT Stop Time: 1538  OT Total Time (min): 38 min    Billable Minutes:Self Care/Home Management 23  Therapeutic Activity 15    OT/EDILSON: EDILSON     Number of EDILSON visits since last OT visit: 1    7/3/2024

## 2024-07-03 NOTE — PLAN OF CARE
LT. To tolerate least restrictive diet without clinical signs/sx of aspiration.   ST. Tongue base/laryngeal excursion exercises   2. Tolerate thermal stimulation x10 with 100% swallow response and less than a 2 second delay.   3. Thin liquid trials without clinical signs/sx of aspiration.

## 2024-07-03 NOTE — PROGRESS NOTES
[facility] MEDICINE ~ PROGRESS NOTE    CHIEF COMPLAINT   Hospital follow up for AMS    HOSPITAL COURSE   58 year old female with a pmh of COPD, T2 DM, GERD, HLD, CVA, lung nodule, insomnia who presented to the ED with complaints of 4 days of dizziness, nausea, and diarrhea. States dizziness became acutely worse today.  She has a history of a left ICA CVA one month ago. At the time of my visit, she is lethargic as she was given Ativan for her MRI and has become very sleepy. She does arouse to voice, but poor effort to participate in HPI or physical exam, but does state she is feeling better.     ED vitals on arrival:  Temp 97.5° F, pulse 82, resp 18, /73, SpO2 98% on RA.  Today's ED lab work was wholly unremarkable.  CT head without contrast showed no convincing acute intracranial abnormality.  Redemonstrated findings consistent with changes of left MCA territory ischemic infarct.  In the ED, she was given 1 L NS, 1 L LR, Zofran and admitted to Hospital Medicine for management.     At baseline patient lives home with her , uses a rolling walker.    July 2, 2024-No complaints on exam     July 3, 2024-the patient got the EEG yesterday and result is pending, the patient is waiting for barium swallowing study today  OBJECTIVE/PHYSICAL EXAM     VITAL SIGNS (MOST RECENT):  Temp: 97.5 °F (36.4 °C) (07/03/24 0321)  Pulse: 73 (07/03/24 0820)  Resp: 18 (07/03/24 0321)  BP: 108/66 (07/03/24 0820)  SpO2: 96 % (07/03/24 0820) VITAL SIGNS (24 HOUR RANGE):  Temp:  [97.5 °F (36.4 °C)-97.6 °F (36.4 °C)] 97.5 °F (36.4 °C)  Pulse:  [58-80] 73  Resp:  [18] 18  SpO2:  [95 %-97 %] 96 %  BP: ()/(54-84) 108/66   GENERAL: In no acute distress, afebrile  HEENT:PERRLA  CHEST: Clear to auscultation bilaterally  HEART: S1, S2, no appreciable murmur  ABDOMEN: Soft, nontender, BS +  MSK: Warm, no lower extremity edema, no clubbing or cyanosis  NEUROLOGIC: Alert and oriented x4, R sided  deficits   ASSESSMENT/PLAN   Dizziness likely 2/2 IVVD due to diarrhea  Sub acute Stroke   -S/p Left MCA infarct 1 month ago     History: COPD, T2 DM, GERD, HLD, CVA, lung nodule, insomnia     Plan:  -MRI brain without contrast showed subacute left hemispheric infarct without hemorrhagic transformation  -consult Neurology  -check EEG result and the barium swallowing study  -PT/OT/SLP eval and treat       VTE Prophylaxis: SCDs, Lovenox  Anticipated discharge and disposition:  Back to home with her ?  __________________________________________________________________________    NUTRITIONAL STATUS     Patient meets ASPEN criteria for   malnutrition of   per RD assessment as evidenced by:                       A minimum of two characteristics is recommended for diagnosis of either severe or non-severe malnutrition.     LABS/MICRO/MEDS/DIAGNOSTICS       LABS  Recent Labs     07/02/24  0334      K 3.7   CO2 24   BUN 11.5   CREATININE 0.73   GLUCOSE 76   CALCIUM 8.8   ALKPHOS 94   AST 29   ALT 41   ALBUMIN 3.1*     Recent Labs     07/02/24  0334   WBC 5.11   RBC 3.82*   HCT 34.8*   MCV 91.1          MICROBIOLOGY  Microbiology Results (last 7 days)       ** No results found for the last 168 hours. **               MEDICATIONS   atorvastatin  80 mg Oral Daily    busPIRone  15 mg Oral BID    clopidogreL  75 mg Oral Daily    enoxparin  40 mg Subcutaneous Daily    ezetimibe  10 mg Oral Daily    levothyroxine  100 mcg Oral Before breakfast    metFORMIN  500 mg Oral BID WM    paroxetine  20 mg Oral QAM         INFUSIONS   0.9% NaCl   Intravenous Continuous 75 mL/hr at 07/02/24 1601 New Bag at 07/02/24 1601          DIAGNOSTIC TESTS  MRI Brain Without Contrast   Final Result      1.  Left cerebral hemisphere extensive subacute infarct without hemorrhagic transformation.      2.  No new findings.         Electronically signed by: Adam Sears   Date:    07/02/2024   Time:    11:36      CT Head Without Contrast    Final Result      Fl Modified Barium Swallow Speech    (Results Pending)        No echocardiogram results found for the past 14 days.         Case related differential diagnoses have been reviewed; assessment and plan has been documented. I have personally reviewed the labs and test results that are currently available; I have reviewed the patients medication list. I have reviewed the consulting providers recommendations. I have reviewed or attempted to review medical records based upon their availability.  All of the patient's and/or family's questions have been addressed and answered to the best of my ability.  I will continue to monitor closely and make adjustments to medical management as needed.  This document was created using [employer] Fluency Direct.  Transcription errors may have been made.  Please contact me if any questions may rise regarding documentation to clarify transcription.        Idris Decker MD   Internal Medicine  Department of [facility] Medicine  [facility] - Neurology

## 2024-07-03 NOTE — PT/OT/SLP PROGRESS
Physical Therapy Treatment    Patient Name:  Chelle Cahndra   MRN:  [id number]    Recommendations:     Discharge therapy intensity: Low Intensity Therapy   Discharge Equipment Recommendations: none  Barriers to discharge: Ongoing medical needs    Assessment:     Chelle Chandra is a 58 y.o. female admitted with a medical diagnosis of dizziness; suspected cerebrovascular accident. Pt with previous CVA about 1 month ago. CT head without contrast showed no convincing acute intracranial abnormality. MRI: There are extensive left middle cerebral artery territory frontoparietal lobes and temporooccipital lobes as well as basal ganglia subacute nonhemorrhagic infarcts.She presents with the following impairments/functional limitations: weakness, gait instability, impaired endurance. Pt req CGA-SGA for transfers and ambulation today. Pt ambulated 200ft CGA with RW + 100ft SBA with RW and ascent/descent 3 steps in stair well with B handrails req Min A. Still recommending low intensity therapy due to currently mobility and great caregiver support from her daughter.     Rehab Prognosis: Good; patient would benefit from acute skilled PT services to address these deficits and reach maximum level of function.    Recent Surgery: * No surgery found *      Plan:     During this hospitalization, patient would benefit from acute PT services 6 x/week to address the identified rehab impairments via gait training, therapeutic activities, therapeutic exercises, neuromuscular re-education and progress toward the following goals:    Plan of Care Expires:  08/02/24    Subjective     Chief Complaint: none  Patient/Family Comments/goals: get stronger  Pain/Comfort:  Pain Rating 1: 0/10      Objective:     Communicated with RN prior to session.  Patient found HOB elevated with pulse ox (continuous), telemetry, peripheral IV upon PT entry to room.     General Precautions: Standard, fall  Orthopedic Precautions:    Braces:    Respiratory Status:  Room air  Blood Pressure: 129/75  Skin Integrity: Visible skin intact      Functional Mobility:  Bed Mobility:     Supine to Sit: stand by assistance  Transfers:     Sit to Stand:  contact guard assistance with rolling walker  Gait: Pt amb 200ft CGA + 100ft SBA with RW. Pt starting amb with narrow ALEJANDRO but with cues and with increased distance was able to walk with a wider and more normal ALEJANDRO. VC given for gaze to decrease looking down at her feet.    Balance: sitting EOB SBA  Stairs:  Pt ascended/descended 3 stair(s) with No Assistive Device with bilateral handrails with Minimal Assistance.  Pt with increased difficulty with descent due to decreased eccentric control with lowering. Educated patient on up with good leg and down with bad leg.     Therapeutic Activities/Exercises:      Education:  Patient provided with verbal education education regarding PT role/goals/POC and fall prevention.  Understanding was verbalized.     Patient left up in chair with all lines intact, call button in reach, RN notified, and daughter present    GOALS:   Multidisciplinary Problems       Physical Therapy Goals          Problem: Physical Therapy    Goal Priority Disciplines Outcome Goal Variances Interventions   Physical Therapy Goal     PT, PT/OT Progressing     Description: Goals to be met by: 2024     Patient will increase functional independence with mobility by performin. Supine to sit with [place]  2. Sit to supine with [place]  3. Sit to stand transfer with [place]  4. Gait  x 150 feet with Modified [place] using Rolling Walker vs LRAD.   5. Ascend/descend 2 stair with bilateral Handrails Modified [place] using No Assistive Device.                          Time Tracking:     PT Received On: 24  PT Start Time: 1114     PT Stop Time: 1136  PT Total Time (min): 22 min     Billable Minutes: Gait Training 22    Treatment Type: Treatment  PT/PTA: PT     Number of PTA visits since last PT  visit: 1     07/03/2024

## 2024-07-04 VITALS
OXYGEN SATURATION: 97 % | DIASTOLIC BLOOD PRESSURE: 73 MMHG | HEART RATE: 60 BPM | WEIGHT: 265.88 LBS | BODY MASS INDEX: 42.73 KG/M2 | RESPIRATION RATE: 18 BRPM | TEMPERATURE: 98 F | HEIGHT: 66 IN | SYSTOLIC BLOOD PRESSURE: 119 MMHG

## 2024-07-04 LAB
POCT GLUCOSE: 125 MG/DL (ref 70–110)
POCT GLUCOSE: 95 MG/DL (ref 70–110)

## 2024-07-04 PROCEDURE — 92526 ORAL FUNCTION THERAPY: CPT

## 2024-07-04 PROCEDURE — 25000003 PHARM REV CODE 250: Performed by: NURSE PRACTITIONER

## 2024-07-04 PROCEDURE — 25000003 PHARM REV CODE 250: Performed by: STUDENT IN AN ORGANIZED HEALTH CARE EDUCATION/TRAINING PROGRAM

## 2024-07-04 RX ORDER — ATORVASTATIN CALCIUM 80 MG/1
80 TABLET, FILM COATED ORAL DAILY
Qty: 90 TABLET | Refills: 3 | Status: SHIPPED | OUTPATIENT
Start: 2024-07-04 | End: 2025-07-04

## 2024-07-04 RX ORDER — BUSPIRONE HYDROCHLORIDE 15 MG/1
15 TABLET ORAL 2 TIMES DAILY
Qty: 60 TABLET | Refills: 11 | Status: SHIPPED | OUTPATIENT
Start: 2024-07-04 | End: 2025-07-04

## 2024-07-04 RX ORDER — CLOPIDOGREL BISULFATE 75 MG/1
75 TABLET ORAL DAILY
Qty: 30 TABLET | Refills: 11 | Status: SHIPPED | OUTPATIENT
Start: 2024-07-04 | End: 2025-07-04

## 2024-07-04 RX ADMIN — LEVOTHYROXINE SODIUM 100 MCG: 100 TABLET ORAL at 06:07

## 2024-07-04 RX ADMIN — PAROXETINE HYDROCHLORIDE 20 MG: 20 TABLET, FILM COATED ORAL at 06:07

## 2024-07-04 RX ADMIN — METFORMIN HYDROCHLORIDE 500 MG: 500 TABLET, FILM COATED ORAL at 08:07

## 2024-07-04 RX ADMIN — EZETIMIBE 10 MG: 10 TABLET ORAL at 08:07

## 2024-07-04 RX ADMIN — BUSPIRONE HYDROCHLORIDE 15 MG: 5 TABLET ORAL at 08:07

## 2024-07-04 RX ADMIN — ATORVASTATIN CALCIUM 80 MG: 40 TABLET, FILM COATED ORAL at 08:07

## 2024-07-04 RX ADMIN — CLOPIDOGREL BISULFATE 75 MG: 75 TABLET ORAL at 08:07

## 2024-07-04 NOTE — PT/OT/SLP PROGRESS
[facility]  Speech Language Pathology Department  Dysphagia Therapy Progress Note    Patient Name:  Chelle Chandra   MRN:  [id number]    Recommendations     General recommendations:  dysphagia therapy  Solid texture recommendation:  Regular Diet - IDDSI Level 7  Liquid consistency recommendation: Mildly thick liquids - IDDSI Level 2   Medications: per patient preference  Aspiration precautions: small bites/sips and slow rate    Discharge therapy intensity: Low Intensity Therapy   Barriers to safe discharge:  none    Subjective     Patient awake, alert, and cooperative.  Spiritual/Cultural/Lutheran Beliefs/Practices that affect care: no    Pain/Comfort: Pain Rating 1: 0/10    Objective     Therapeutic Activities:  Pt completed base of tongue and laryngeal exercises x40 with minimal cues.  Pt performed effortful swallows with all PO trials.    Therapeutic PO Trials:  Consistency Amount Fed By Oral Symptoms Pharyngeal Symptoms   Mildly thick liquid by straw x10 Self None None   Chewable solid x5 Self None None     Assessment     Pt continues to present with oropharyngeal dysphagia requiring diet modification to reduce the risk of aspiration.    Goals     Multidisciplinary Problems       SLP Goals          Problem: SLP    Goal Priority Disciplines Outcome   SLP Goal     SLP    Description: LT. To tolerate least restrictive diet without clinical signs/sx of aspiration.   ST. Tongue base/laryngeal excursion exercises   2. Tolerate thermal stimulation x10 with 100% swallow response and less than a 2 second delay.   3. Thin liquid trials without clinical signs/sx of aspiration.                        Patient Education     Patient provided with verbal education regarding ST POC.  Understanding was verbalized.    Plan     Will continue to follow and tx as appropriate.    SLP Follow-Up:  Yes   Patient to be seen:  5 x/week   Plan of Care expires:  24  Plan of Care reviewed with:   patient       Time Tracking     SLP Treatment Date:   07/04/24  Speech Start Time:  1140  Speech Stop Time:  1150     Speech Total Time (min):  10 min    Billable minutes:  Treatment of Swallow Dysfunction, 10 minutes       07/04/2024

## 2024-07-04 NOTE — PLAN OF CARE
07/04/24 1258   Final Note   Assessment Type Final Discharge Note   Anticipated Discharge Disposition Home-Health  (FOC: The patient will be discharged from [facility] to her private residence with HH services through; [facility]. Clinical notes/updates and HH Packet and HH orders and AVS and D/C Summary will be sent by April, [facility] via [employer].)   Hospital Resources/Appts/Education Provided Appointment suggestion unavailable   Post-Acute Status   Post-Acute Authorization Home Health   Home Health Status Referrals Sent   Patient choice form signed by patient/caregiver List with quality metrics by geographic area provided   Discharge Delays None known at this time     The patient will be discharged from [facility] to her private residence with HH services through [facility]. Clinical notes/updates and AVS and D/C Summary will be sent via [employer] for review. The patient is requesting HH services through: [facility]; her PCP: Jorge Silver. I spoke with Harleen Ochoa- [facility]-on call staff ([phone]) and informed her of discharge and referral sent to [facility] for HH services.

## 2024-07-04 NOTE — PLAN OF CARE
[facility] sent referral/discharge orders/AVS and clinicals to [facility] via [employer].

## 2024-07-04 NOTE — PLAN OF CARE
Problem: Adult Inpatient Plan of Care  Goal: Plan of Care Review  7/4/2024 1431 by Tricia Rutherford RN  Outcome: Met  7/4/2024 0847 by Tricia Rutherford RN  Outcome: Progressing  Goal: Patient-Specific Goal (Individualized)  7/4/2024 1431 by Tricia Rutherford RN  Outcome: Met  7/4/2024 0847 by Tricia Rutherford RN  Outcome: Progressing  Goal: Absence of Hospital-Acquired Illness or Injury  7/4/2024 1431 by Tricia Rutherford RN  Outcome: Met  7/4/2024 0847 by Tricia Rutherford RN  Outcome: Progressing  Goal: Optimal Comfort and Wellbeing  7/4/2024 1431 by Tricia Rutherford RN  Outcome: Met  7/4/2024 0847 by Tricia Rutherford RN  Outcome: Progressing  Goal: Readiness for Transition of Care  7/4/2024 1431 by Tricia Rutherford RN  Outcome: Met  7/4/2024 0847 by Tricia Rutherford RN  Outcome: Progressing     Problem: Diabetes Comorbidity  Goal: Blood Glucose Level Within Targeted Range  7/4/2024 1431 by Tricia Rutherford RN  Outcome: Met  7/4/2024 0847 by Tricia Rutherford RN  Outcome: Progressing     Problem: Wound  Goal: Optimal Coping  7/4/2024 1431 by Tricia Rutherford RN  Outcome: Met  7/4/2024 0847 by Tricia Rutherford RN  Outcome: Progressing  Goal: Optimal Functional Ability  7/4/2024 1431 by Tricia Rutherford, RN  Outcome: Met  7/4/2024 0847 by Tricia Rutherford, RN  Outcome: Progressing  Goal: Absence of Infection Signs and Symptoms  7/4/2024 1431 by Tricia Rutherford, RN  Outcome: Met  7/4/2024 0847 by Tricia Rutherford, RN  Outcome: Progressing  Goal: Improved Oral Intake  7/4/2024 1431 by Tricia Rutherford, RN  Outcome: Met  7/4/2024 0847 by Tricia Rutherford RN  Outcome: Progressing  Goal: Optimal Pain Control and Function  7/4/2024 1431 by Tricia Rutherford, RN  Outcome: Met  7/4/2024 0847 by Tricia Rutherford, RN  Outcome: Progressing  Goal: Skin Health and Integrity  7/4/2024 1431 by Tricia Rutherford, RN  Outcome: Met  7/4/2024 0847 by Tricia Rutherford, RN  Outcome: Progressing  Goal: Optimal Wound Healing  7/4/2024 1431 by Tricia Rutherford, RN  Outcome: Met  7/4/2024 0847 by Tricia Rutherford, RN  Outcome:  Progressing     Problem: Stroke, Ischemic (Includes Transient Ischemic Attack)  Goal: Optimal Coping  7/4/2024 1431 by Tricia Rutherford, RN  Outcome: Met  7/4/2024 0847 by Tricia Rutherford RN  Outcome: Progressing  Goal: Effective Bowel Elimination  7/4/2024 1431 by Tricia Rutherford, RN  Outcome: Met  7/4/2024 0847 by Tricia Rutherford, RN  Outcome: Progressing  Goal: Optimal Cerebral Tissue Perfusion  7/4/2024 1431 by Tricia Rutherford, RN  Outcome: Met  7/4/2024 0847 by Tricia Rutherford RN  Outcome: Progressing  Goal: Optimal Cognitive Function  7/4/2024 1431 by Tricia Rutherford, RN  Outcome: Met  7/4/2024 0847 by Tricia Rutherford RN  Outcome: Progressing  Goal: Improved Communication Skills  7/4/2024 1431 by Tricia Rutherford, RN  Outcome: Met  7/4/2024 0847 by Tricia Rutherford, RN  Outcome: Progressing  Goal: Optimal Functional Ability  7/4/2024 1431 by Tricia Rutherford, RN  Outcome: Met  7/4/2024 0847 by Tricia Rutherford, RN  Outcome: Progressing  Goal: Optimal Nutrition Intake  7/4/2024 1431 by Tricia Rutherford, RN  Outcome: Met  7/4/2024 0847 by Tricia Rutherford, RN  Outcome: Progressing  Goal: Effective Oxygenation and Ventilation  7/4/2024 1431 by Tricia Rutherford, RN  Outcome: Met  7/4/2024 0847 by Tricia Rutherford, RN  Outcome: Progressing  Goal: Improved Sensorimotor Function  7/4/2024 1431 by Tricia Rutherford, RN  Outcome: Met  7/4/2024 0847 by Tricia Rutherford, RN  Outcome: Progressing  Goal: Safe and Effective Swallow  7/4/2024 1431 by Tricia Rutherford, RN  Outcome: Met  7/4/2024 0847 by Tricia Rutherford, RN  Outcome: Progressing  Goal: Effective Urinary Elimination  7/4/2024 1431 by Tricia Rutherford, RN  Outcome: Met  7/4/2024 0847 by Tricia Rutherford, RN  Outcome: Progressing     Problem: Bariatric Environmental Safety  Goal: Safety Maintained with Care  7/4/2024 1431 by Tricia Rutherford, RN  Outcome: Met  7/4/2024 0847 by Tricia Rutherford, RN  Outcome: Progressing

## 2024-07-29 NOTE — DISCHARGE SUMMARY
[facility]  Hospital Medicine Discharge Summary    Admit Date: 7/1/2024  Discharge Date and Time: 07/04/2024  Admitting Physician:  Team  Discharging Physician: Chucho Hernandez MD.  Primary Care Physician: Jorge Silver MD  Consults: Hospital Medicine and Neurology    Discharge Diagnoses:  Dizziness likely 2/2 IVVD due to diarrhea  Sub acute Stroke   -S/p Left MCA infarct 1 month ago     History: COPD, T2 DM, GERD, HLD, CVA, lung nodule, insomnia    Hospital Course:   58 year old female with a pmh of COPD, T2 DM, GERD, HLD, CVA, lung nodule, insomnia who presented to the ED with complaints of 4 days of dizziness, nausea, and diarrhea. States dizziness became acutely worse today.  She has a history of a left ICA CVA one month ago. At the time of my visit, she is lethargic as she was given Ativan for her MRI and has become very sleepy. She does arouse to voice, but poor effort to participate in HPI or physical exam, but does state she is feeling better.     ED vitals on arrival:  Temp 97.5° F, pulse 82, resp 18, /73, SpO2 98% on RA.  Today's ED lab work was wholly unremarkable.  CT head without contrast showed no convincing acute intracranial abnormality.  Redemonstrated findings consistent with changes of left MCA territory ischemic infarct.  In the ED, she was given 1 L NS, 1 L LR, Zofran and admitted to Hospital Medicine for management. EEG done which showed no abnormalities. Speech therapy performed MBS & recommended regular diet and mildly thick liquids. PT/OT recommended low intensity therapy.    Pt was seen and examined on the day of discharge. She stated she was doing well and had no new complaints. Plan for DC with .    Vitals:  VITAL SIGNS: 24 HRS MIN & MAX LAST   No data recorded 98 °F (36.7 °C)   No data recorded 119/73   No data recorded  60   No data recorded 18   No data recorded 97 %       Physical Exam:  General: alert lady lying comfortably in bed, in no  "acute distress  HENT: oral and oropharyngeal mucosa moist, pink, with no erythema or exudates, no ear pain or discharge  Neck: normal neck movement, no lymph nodes or swellings, no JVD or Carotid bruit  Respiratory: clear breathing sounds bilaterally, no crackles, rales, ronchi or wheezes  Cardiovascular: clear S1 and S2, no murmurs, rubs or gallops  Peripheral Vascular: no lesions, ulcers or erosions, normal peripheral pulses and no pedal edema  Gastrointestinal: soft, non-tender, non-distended abdomen, no guarding, rigidity or rebound tenderness, normal bowel sounds  Integumentary: normal skin color, no rashes or lesions  Neuro: AAO x 3; motor strength 5/5 in B/L UEs & LEs; sensation intact to gross and fine touch B/L; CN II-XII grossly intact    Procedures Performed: No admission procedures for hospital encounter.     Significant Diagnostic Studies: See Full reports for all details    No results for input(s): "WBC", "RBC", "HGB", "HCT", "MCV", "MCH", "MCHC", "RDW", "PLT", "MPV", "GRAN", "LYMPH", "MONO", "BASO", "NRBC" in the last 168 hours.    No results for input(s): "NA", "K", "CL", "CO2", "ANIONGAP", "BUN", "CREATININE", "GLU", "CALCIUM", "PH", "MG", "ALBUMIN", "PROT", "ALKPHOS", "ALT", "AST", "BILITOT" in the last 168 hours.     Microbiology Results (last 7 days)       ** No results found for the last 168 hours. **             Fl Modified Barium Swallow Speech  See procedure notes from Speech Pathologist.    This procedure was auto-finalized.         Medication List        CHANGE how you take these medications      busPIRone 15 MG tablet  Commonly known as: BUSPAR  Take 1 tablet (15 mg total) by mouth 2 (two) times daily.  What changed: when to take this            CONTINUE taking these medications      atorvastatin 80 MG tablet  Commonly known as: LIPITOR  Take 1 tablet (80 mg total) by mouth once daily.     clopidogreL 75 mg tablet  Commonly known as: PLAVIX  Take 1 tablet (75 mg total) by mouth once " "daily.     dapagliflozin propanediol 5 mg Tab tablet  Commonly known as: Farxiga  Take 1 tablet (5 mg total) by mouth once daily.     ezetimibe 10 mg tablet  Commonly known as: ZETIA  Take 1 tablet (10 mg total) by mouth once daily.     FLOVENT DISKUS 250 mcg/actuation Dsdv  Generic drug: fluticasone propionate  Inhale 2 puffs into the lungs 2 (two) times daily. Controller     levothyroxine 100 MCG tablet  Commonly known as: SYNTHROID  Take 1 tablet (100 mcg total) by mouth before breakfast.     metFORMIN 500 MG tablet  Commonly known as: GLUCOPHAGE     paroxetine 20 MG tablet  Commonly known as: PAXIL  Take 1 tablet (20 mg total) by mouth every morning.     pen needle, diabetic 32 gauge x 5/32" Ndle  1 each by Misc.(Non-Drug; Combo Route) route once a week.     traZODone 100 MG tablet  Commonly known as: DESYREL  Take 1 tablet (100 mg total) by mouth nightly as needed for Insomnia.     VENTOLIN HFA 90 mcg/actuation inhaler  Generic drug: albuterol  Inhale 2 puffs into the lungs every 4 (four) hours as needed.               Where to Get Your Medications        These medications were sent to [facility] [place] - [address]  [address] [address]      Phone: [phone]   atorvastatin 80 MG tablet  busPIRone 15 MG tablet  clopidogreL 75 mg tablet          Explained in detail to the patient about the discharge plan, medications, and follow-up visits. Pt understands and agrees with the treatment plan  Discharge Disposition: Home-Health Care [facility]   Discharged Condition: stable  Diet-    Medications Per DC med rec  Activities as tolerated    For further questions contact hospitalist office    Discharge time 33 minutes    For worsening symptoms, chest pain, shortness of breath, increased abdominal pain, high grade fever, stroke or stroke like symptoms, immediately go to the nearest Emergency Room or call 911 as soon as possible.      Chucho Solis M.D."

## 2024-08-05 ENCOUNTER — TELEPHONE (OUTPATIENT)
Dept: NEUROLOGY | Facility: CLINIC | Age: 59
End: 2024-08-05
Payer: COMMERCIAL

## 2024-08-06 ENCOUNTER — OFFICE VISIT (OUTPATIENT)
Dept: NEUROLOGY | Facility: CLINIC | Age: 59
End: 2024-08-06
Payer: COMMERCIAL

## 2024-08-06 VITALS
HEART RATE: 96 BPM | HEIGHT: 66 IN | BODY MASS INDEX: 42.73 KG/M2 | SYSTOLIC BLOOD PRESSURE: 106 MMHG | WEIGHT: 265.88 LBS | DIASTOLIC BLOOD PRESSURE: 82 MMHG

## 2024-08-06 DIAGNOSIS — I63.9 CEREBROVASCULAR ACCIDENT (CVA), UNSPECIFIED MECHANISM: Primary | ICD-10-CM

## 2024-08-06 PROCEDURE — 99999 PR PBB SHADOW E&M-EST. PATIENT-LVL IV: CPT | Mod: PBBFAC,,, | Performed by: NURSE PRACTITIONER

## 2024-09-09 PROBLEM — I63.9 STROKE: Status: RESOLVED | Noted: 2024-06-02 | Resolved: 2024-09-09

## 2024-10-03 ENCOUNTER — TELEPHONE (OUTPATIENT)
Dept: NEUROLOGY | Facility: CLINIC | Age: 59
End: 2024-10-03
Payer: COMMERCIAL

## 2024-10-03 NOTE — TELEPHONE ENCOUNTER
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 Jorge Silver, states this is a mutual patient. She is requesting to be release to return to work. You may contact Catherine at [phone] Ext/ [phone]

## 2024-10-03 NOTE — TELEPHONE ENCOUNTER
Contacted patient to make her aware we sent a letter to Dr. Jorge Silver for her clearance and also left a message for Catherine at Dr. Jorge Jordan office to advise her as well.

## 2025-02-02 PROBLEM — F51.01 PRIMARY INSOMNIA: Status: ACTIVE | Noted: 2022-09-13

## 2025-02-04 ENCOUNTER — OFFICE VISIT (OUTPATIENT)
Dept: NEUROLOGY | Facility: CLINIC | Age: 60
End: 2025-02-04
Payer: COMMERCIAL

## 2025-02-04 VITALS
WEIGHT: 110.88 LBS | HEIGHT: 66 IN | SYSTOLIC BLOOD PRESSURE: 114 MMHG | BODY MASS INDEX: 17.82 KG/M2 | DIASTOLIC BLOOD PRESSURE: 85 MMHG

## 2025-02-04 DIAGNOSIS — E78.2 MIXED HYPERLIPIDEMIA: ICD-10-CM

## 2025-02-04 DIAGNOSIS — F51.01 PRIMARY INSOMNIA: ICD-10-CM

## 2025-02-04 DIAGNOSIS — G31.84 MCI (MILD COGNITIVE IMPAIRMENT): ICD-10-CM

## 2025-02-04 DIAGNOSIS — I63.9 CEREBROVASCULAR ACCIDENT (CVA), UNSPECIFIED MECHANISM: Primary | ICD-10-CM

## 2025-02-04 DIAGNOSIS — E03.9 ACQUIRED HYPOTHYROIDISM: ICD-10-CM

## 2025-02-04 PROCEDURE — 3079F DIAST BP 80-89 MM HG: CPT | Mod: CPTII,S$GLB,, | Performed by: NURSE PRACTITIONER

## 2025-02-04 PROCEDURE — 3074F SYST BP LT 130 MM HG: CPT | Mod: CPTII,S$GLB,, | Performed by: NURSE PRACTITIONER

## 2025-02-04 PROCEDURE — 99999 PR PBB SHADOW E&M-EST. PATIENT-LVL III: CPT | Mod: PBBFAC,,, | Performed by: NURSE PRACTITIONER

## 2025-02-04 PROCEDURE — 3044F HG A1C LEVEL LT 7.0%: CPT | Mod: CPTII,S$GLB,, | Performed by: NURSE PRACTITIONER

## 2025-02-04 PROCEDURE — 1160F RVW MEDS BY RX/DR IN RCRD: CPT | Mod: CPTII,S$GLB,, | Performed by: NURSE PRACTITIONER

## 2025-02-04 PROCEDURE — 3008F BODY MASS INDEX DOCD: CPT | Mod: CPTII,S$GLB,, | Performed by: NURSE PRACTITIONER

## 2025-02-04 PROCEDURE — 1159F MED LIST DOCD IN RCRD: CPT | Mod: CPTII,S$GLB,, | Performed by: NURSE PRACTITIONER

## 2025-02-04 PROCEDURE — 99215 OFFICE O/P EST HI 40 MIN: CPT | Mod: S$GLB,,, | Performed by: NURSE PRACTITIONER

## 2025-02-04 RX ORDER — NORTRIPTYLINE HYDROCHLORIDE 25 MG/1
25 CAPSULE ORAL NIGHTLY
Qty: 30 CAPSULE | Refills: 2 | Status: SHIPPED | OUTPATIENT
Start: 2025-02-04

## 2025-02-04 RX ORDER — BUTALBITAL, ACETAMINOPHEN AND CAFFEINE 50; 325; 40 MG/1; MG/1; MG/1
1 TABLET ORAL EVERY 6 HOURS PRN
Qty: 30 TABLET | Refills: 0 | Status: SHIPPED | OUTPATIENT
Start: 2025-02-04 | End: 2025-03-06

## 2025-02-04 NOTE — PROGRESS NOTES
Subjective:      Patient ID: Chelle Chandra is a 59 y.o. female.    Chief Complaint:  Cerebrovascular accident (CVA), unspecified mechanism (Pt states she is still falling. Fell 3x in less than a month. Memory decline..headaches; taking tylenol. States it only it takes the edge off. Had a headache every day for the past week. Unsteady gait)      History of Present Illness     An office visit of an established patient,59 years old. Prior to the patient's arrival on the same day, the provider spends 10 minutes reviewing the results of lab work, hospital stay and physician notes. Once in the exam room with the patient, the provider then spends 35 minutes in the room with the member performing a history and exam as well as reviewing the test results and recommendations with the patient. After leaving the exam room, the provider then spends an additional 10 minutes completing the electronic health record. The total time spent that day caring for the member is 55 minutes, and this time--including the breakdown--is documented in the medical record.          Patient presents for hospital follow up of stroke. Patient presented to [facility] on June 1 with reports of right facial droop, right arm and leg weakness and dysarthria. Patient's CT head was negative for an acute abnormality. CTA showed a left carotid occlusion. Patient was taken to cath lab and had thrombectomy s/p aspiration x 2 with TICI 2c reperfusion by Dr. Lyn. MRI brain on 6/2 consistent with widespread acute ischemic infarct involving the left MCA distribution. Patient reported back to ED in early July with reports of increased right sided weakness. Patient's daughter reported patient was dehydrated.   Today, patient reports she is still experiencing falls. About 3 to 4 falls a week. Her recent A1C was 6.7. Her TSH was still low. Her LDL was elevated despite being on Zetia. Reports she has recently discontinued Atorvastatin due to high LFTs in November. LFTs  yesterday were normal.. She reports a daily headache that is not alleviated by tylenol. States a history of migraines that occur to her right eye. Last migraine was last week. Holter monitor ordered after LOV with no Afib noted, potential SVT noted for duration of about 10 min on 8/27 at 10:20 AM.Patient reports memory is worsened since after stroke. She returned to work but was sent home recently due to inability to complete tasks. 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 She has a family history of dementia in 2 aunts but it was late onset. She is fearful that memory loss is from dementia.             Past Medical History:   Diagnosis Date    Accelerated junctional rhythm     Agatston CAC score, <100     Anxiety disorder, unspecified     COPD type A     Diabetes mellitus without complication     GERD (gastroesophageal reflux disease)     History of COVID-19     Insomnia     Lung nodule        Past Surgical History:   Procedure Laterality Date    ANGIOGRAM, CORONARY, WITH LEFT HEART CATHETERIZATION      BREAST LUMPECTOMY Right     CARDIOVERSION  08/01/2013    CARPAL TUNNEL RELEASE  10/23/2013    FUSION OF CERVICAL SPINE BY ANTERIOR APPROACH USING COMPUTER-ASSISTED NAVIGATION  06/10/2019    KIDNEY SURGERY      TONSILLECTOMY AND ADENOIDECTOMY      TOTAL ABDOMINAL HYSTERECTOMY      WRIST SURGERY         Family History   Problem Relation Name Age of Onset    Heart attack Mother      Diabetes Father         Social History     Socioeconomic History    Marital status:    Tobacco Use    Smoking status: Every Day     Current packs/day: 0.50     Types: Cigarettes    Smokeless tobacco: Never   Substance and Sexual Activity    Alcohol use: Never    Drug use: Never     Social Drivers of Health     Financial Resource Strain: Low Risk  (7/11/2024)    [facility]    Overall  Financial Resource Strain (CARDIA)     Difficulty of Paying Living Expenses: Not hard at all   Food Insecurity: No Food Insecurity (7/11/2024)    Received from [facility]    Hunger Vital Sign     Worried About Running Out of Food in the Last Year: Never true     Ran Out of Food in the Last Year: Never true   Transportation Needs: No Transportation Needs (7/11/2024)    Received from [facility]    PRAPARE - Transportation     Lack of Transportation (Medical): No     Lack of Transportation (Non-Medical): No   Physical Activity: Sufficiently Active (7/11/2024)    Received from [facility]    Exercise Vital Sign     Days of Exercise per Week: 3 days     Minutes of Exercise per Session: 50 min   Stress: Stress Concern Present (7/3/2024)    [employer] [place] [employer] - Occupational Stress Questionnaire     Feeling of Stress : Very much   Housing Stability: Unknown (7/11/2024)    Received from [facility]    Housing Stability Vital Sign     Unable to Pay for Housing in the Last Year: No     Homeless in the Last Year: No       Current Outpatient Medications   Medication Sig Dispense Refill    atorvastatin (LIPITOR) 80 MG tablet Take 1 tablet (80 mg total) by mouth once daily. 90 tablet 3    busPIRone (BUSPAR) 15 MG tablet Take 1 tablet (15 mg total) by mouth 2 (two) times daily. 60 tablet 11    clopidogreL (PLAVIX) 75 mg tablet Take 1 tablet (75 mg total) by mouth once daily. 30 tablet 11    dapagliflozin propanediol (FARXIGA) 5 mg Tab tablet Take 1 tablet (5 mg total) by mouth once daily. 30 tablet 3    ezetimibe (ZETIA) 10 mg tablet Take 1 tablet by mouth once daily 90 tablet 0    fluticasone propionate (FLOVENT DISKUS) 250 mcg/actuation DsDv Inhale 2 puffs into the lungs 2 (two) times daily. Controller 60 each 5    levothyroxine (SYNTHROID) 100 MCG tablet TAKE 1 TABLET BY MOUTH BEFORE BREAKFAST  "90 tablet 0    metFORMIN (GLUCOPHAGE) 500 MG tablet TAKE 1 TABLET BY MOUTH TWICE DAILY WITH MEALS 180 tablet 0    metoprolol succinate (TOPROL-XL) 25 MG 24 hr tablet Take 12.5 mg by mouth.      ondansetron (ZOFRAN) 8 MG tablet Take 1 tablet (8 mg total) by mouth every 6 (six) hours as needed for Nausea. 20 tablet 1    paroxetine (PAXIL) 20 MG tablet Take 1 tablet (20 mg total) by mouth every morning. 90 tablet 1    pen needle, diabetic 32 gauge x 5/32" Ndle 1 each by Misc.(Non-Drug; Combo Route) route once a week. 100 each 1    traZODone (DESYREL) 100 MG tablet Take 1 tablet (100 mg total) by mouth nightly as needed for Insomnia. 30 tablet 3    VENTOLIN HFA 90 mcg/actuation inhaler Inhale 2 puffs into the lungs every 4 (four) hours as needed. 18 g 2    butalbital-acetaminophen-caffeine -40 mg (FIORICET, ESGIC) -40 mg per tablet Take 1 tablet by mouth every 6 (six) hours as needed for Headaches. 30 tablet 0    nortriptyline (PAMELOR) 25 MG capsule Take 1 capsule (25 mg total) by mouth every evening. 30 capsule 2     No current facility-administered medications for this visit.       Review of patient's allergies indicates:   Allergen Reactions    Aspirin     Ketorolac     Penicillins     Sulfa (sulfonamide antibiotics)     Ozempic [semaglutide] Other (See Comments)     Abdominal pain      Vitals:    02/04/25 0948   BP: 114/85   BP Location: Left arm   Patient Position: Sitting   Weight: 50.3 kg (110 lb 14.3 oz)   Height: 5' 6" (1.676 m)      Review of Systems  12 point ROS performed and negative unless otherwise documented in HPI  Objective:     Neurologic Exam      Mental Status   Oriented to person, place, and time.   Speech: speech is normal   Level of consciousness: alert     Cranial Nerves   Cranial nerves II through XII intact.      Motor Exam   Muscle bulk: normalRight upper and lower extremity slightly weaker than the left      Sensory Exam   Decreased sensation to right upper and " lower extremity      Gait, Coordination, and Reflexes Slow/careful         Physical Exam  Vitals reviewed.   Pulmonary:      Effort: Pulmonary effort is normal.   Neurological:      Mental Status: She is oriented to person, place, and time.      Cranial Nerves: Cranial nerves 2-12 are intact.   Psychiatric:         Speech: Speech normal.     Assessment:     1. Cerebrovascular accident (CVA), unspecified mechanism    2. HLD.    3. HYPOTHYROIDISM.    4. Primary insomnia    5. MCI (mild cognitive impairment)         Plan:     Recommend repeat outpatient therapy services (speech and PT)  Will send referral to [facility] for speech/cognitive therapy  Already has referral to [facility] for balance therapy  MMSE today 28/30, [place] orderable for early detection of Alz. ordered  Start fiorcet prn migraine  Nortriptyline 25 mg QHS for headache prevention  Results of lab work provided to patient  Discussed elevated LDL; needs LDL to be at 70. Should follow a mediterranean type diet. LFTs improved

## 2025-04-12 ENCOUNTER — TELEPHONE (OUTPATIENT)
Dept: PHARMACY | Facility: CLINIC | Age: 60
End: 2025-04-12
Payer: COMMERCIAL

## 2025-04-13 NOTE — TELEPHONE ENCOUNTER
[facility]/Population Health Chart Review & Patient Outreach Details For Medication Adherence Project    Reason for Outreach Encounter: 3rd Party payor non-compliance report ([employer], [employer], [employer], etc)  2.  Patient Outreach Method: Reviewed patient chart   3.   Medication in question:    Hyperlipidemia Medications              atorvastatin (LIPITOR) 80 MG tablet Take 1 tablet (80 mg total) by mouth once daily.    ezetimibe (ZETIA) 10 mg tablet Take 1 tablet by mouth once daily                    last filled  3/4/25 for 90 day supply      4.  Reviewed and or Updates Made To: Patient Chart  5. Outreach Outcomes and/or actions taken: Patient filled medication and is on track to be adherent  Additional Notes:

## 2025-04-28 DIAGNOSIS — F51.01 PRIMARY INSOMNIA: ICD-10-CM

## 2025-04-28 RX ORDER — NORTRIPTYLINE HYDROCHLORIDE 25 MG/1
25 CAPSULE ORAL
Qty: 30 CAPSULE | Refills: 0 | Status: SHIPPED | OUTPATIENT
Start: 2025-04-28

## 2025-05-06 ENCOUNTER — OFFICE VISIT (OUTPATIENT)
Dept: NEUROLOGY | Facility: CLINIC | Age: 60
End: 2025-05-06
Payer: COMMERCIAL

## 2025-05-06 VITALS
SYSTOLIC BLOOD PRESSURE: 88 MMHG | DIASTOLIC BLOOD PRESSURE: 65 MMHG | HEIGHT: 66 IN | WEIGHT: 112 LBS | HEART RATE: 104 BPM | BODY MASS INDEX: 18 KG/M2

## 2025-05-06 DIAGNOSIS — R26.81 GAIT INSTABILITY: ICD-10-CM

## 2025-05-06 DIAGNOSIS — R51.9 NONINTRACTABLE HEADACHE, UNSPECIFIED CHRONICITY PATTERN, UNSPECIFIED HEADACHE TYPE: ICD-10-CM

## 2025-05-06 DIAGNOSIS — I63.9 CEREBROVASCULAR ACCIDENT (CVA), UNSPECIFIED MECHANISM: Primary | ICD-10-CM

## 2025-05-06 DIAGNOSIS — R55 SYNCOPE AND COLLAPSE: ICD-10-CM

## 2025-05-06 PROCEDURE — 1160F RVW MEDS BY RX/DR IN RCRD: CPT | Mod: CPTII,S$GLB,, | Performed by: NURSE PRACTITIONER

## 2025-05-06 PROCEDURE — 3074F SYST BP LT 130 MM HG: CPT | Mod: CPTII,S$GLB,, | Performed by: NURSE PRACTITIONER

## 2025-05-06 PROCEDURE — 99999 PR PBB SHADOW E&M-EST. PATIENT-LVL IV: CPT | Mod: PBBFAC,,, | Performed by: NURSE PRACTITIONER

## 2025-05-06 PROCEDURE — 99214 OFFICE O/P EST MOD 30 MIN: CPT | Mod: S$GLB,,, | Performed by: NURSE PRACTITIONER

## 2025-05-06 PROCEDURE — 3044F HG A1C LEVEL LT 7.0%: CPT | Mod: CPTII,S$GLB,, | Performed by: NURSE PRACTITIONER

## 2025-05-06 PROCEDURE — 3078F DIAST BP <80 MM HG: CPT | Mod: CPTII,S$GLB,, | Performed by: NURSE PRACTITIONER

## 2025-05-06 PROCEDURE — 1159F MED LIST DOCD IN RCRD: CPT | Mod: CPTII,S$GLB,, | Performed by: NURSE PRACTITIONER

## 2025-05-06 PROCEDURE — 3008F BODY MASS INDEX DOCD: CPT | Mod: CPTII,S$GLB,, | Performed by: NURSE PRACTITIONER

## 2025-05-06 RX ORDER — BUTALBITAL, ASPIRIN, AND CAFFEINE 325; 50; 40 MG/1; MG/1; MG/1
1 CAPSULE ORAL EVERY 4 HOURS PRN
COMMUNITY

## 2025-05-06 NOTE — PROGRESS NOTES
Subjective:      Patient ID: Chelle Chandra is a 59 y.o. female.    Chief Complaint:  Cerebrovascular accident (CVA), unspecified mechanism (Pt states about 5 falls since last visit, 3 within the last two weeks. No ER visit. Unsteady gait. Denies any other issues at this time)      History of Present Illness  Patient presents for hospital follow up of stroke. Patient presented to [facility] on June 1 with reports of right facial droop, right arm and leg weakness and dysarthria. Patient's CT head was negative for an acute abnormality. CTA showed a left carotid occlusion. Patient was taken to cath lab and had thrombectomy s/p aspiration x 2 with TICI 2c reperfusion by Dr. Lyn. MRI brain on 6/2 consistent with widespread acute ischemic infarct involving the left MCA distribution. Patient reported back to ED in early July with reports of increased right sided weakness. Patient's daughter reported patient was dehydrated.   Today, patient reports she is still experiencing falls. She had about 5 falls since her last visit 3 months ago. 3 of the falls occurred within the last 2 weeks. Her BP is low today. She reports she is staying hydrated. She reports that she is not having headaches since the initiation of Nortriptyline. She is no longer seeing cardiology. She reports she was unable to afford PT/ST.        Past Medical History:   Diagnosis Date    Accelerated junctional rhythm     Agatston CAC score, <100     Anxiety disorder, unspecified     COPD type A     Diabetes mellitus without complication     GERD (gastroesophageal reflux disease)     History of COVID-19     Insomnia     Lung nodule        Past Surgical History:   Procedure Laterality Date    ANGIOGRAM, CORONARY, WITH LEFT HEART CATHETERIZATION      BREAST LUMPECTOMY Right     CARDIOVERSION  08/01/2013    CARPAL TUNNEL RELEASE  10/23/2013    FUSION OF CERVICAL SPINE BY ANTERIOR APPROACH USING COMPUTER-ASSISTED NAVIGATION  06/10/2019    KIDNEY SURGERY       TONSILLECTOMY AND ADENOIDECTOMY      TOTAL ABDOMINAL HYSTERECTOMY      WRIST SURGERY         Family History   Problem Relation Name Age of Onset    Heart attack Mother      Diabetes Father         Social History     Socioeconomic History    Marital status:    Tobacco Use    Smoking status: Every Day     Current packs/day: 0.50     Types: Cigarettes    Smokeless tobacco: Never   Substance and Sexual Activity    Alcohol use: Never    Drug use: Never     Social Drivers of Health     Financial Resource Strain: Low Risk  (7/11/2024)    Received from [facility]    Overall Financial Resource Strain (CARDIA)     Difficulty of Paying Living Expenses: Not hard at all   Food Insecurity: No Food Insecurity (7/11/2024)    Received from [facility]    Hunger Vital Sign     Worried About Running Out of Food in the Last Year: Never true     Ran Out of Food in the Last Year: Never true   Transportation Needs: No Transportation Needs (7/11/2024)    Received from [facility]    PRAPARE - Transportation     Lack of Transportation (Medical): No     Lack of Transportation (Non-Medical): No   Physical Activity: Sufficiently Active (7/11/2024)    Received from [facility]    Exercise Vital Sign     Days of Exercise per Week: 3 days     Minutes of Exercise per Session: 50 min   Stress: Stress Concern Present (7/3/2024)    [employer] - Occupational Stress Questionnaire     Feeling of Stress : Very much   Housing Stability: Unknown (7/11/2024)    Received from [facility]    Housing Stability Vital Sign     Unable to Pay for Housing in the Last Year: No     Homeless in the Last Year: No       Current Medications[1]    Review of patient's allergies indicates:   Allergen Reactions    Aspirin     Ketorolac     Penicillins     Sulfa (sulfonamide antibiotics)     Ozempic [semaglutide] Other  "(See Comments)     Abdominal pain        Vitals:    05/06/25 1002   BP: (!) 88/65   BP Location: Left arm   Patient Position: Sitting   Pulse: 104   Weight: 50.8 kg (112 lb)   Height: 5' 6" (1.676 m)        Review of Systems  12 point ROS performed and negative unless otherwise documented in HPI  Objective:     Neurologic Exam      Mental Status   Oriented to person, place, and time.   Speech: speech is normal   Level of consciousness: alert     Cranial Nerves   Cranial nerves II through XII intact.      Motor Exam   Muscle bulk: normalRight upper and lower extremity slightly weaker than the left      Sensory Exam   Decreased sensation to right upper and lower extremity      Gait, Coordination, and Reflexes Slow/careful         Physical Exam  Vitals reviewed.   Pulmonary:      Effort: Pulmonary effort is normal.   Neurological:      Mental Status: She is oriented to person, place, and time.      Cranial Nerves: Cranial nerves 2-12 are intact.   Psychiatric:         Speech: Speech normal.     Assessment:     1. Cerebrovascular accident (CVA), unspecified mechanism    2. Gait instability    3. Nonintractable headache, unspecified chronicity pattern, unspecified headache type        Plan:   Continue ASA 81 mg daily  Increase hydration.  CTA head and neck to ensure adequate flow to brain.  Patient's BP is low today. Falls could be from hypotension. Advised to take BP 2 times a day x 1 week and contact PCP for further recs.   Reviewed medication list. Nothing would be causing decreases in BP.  Handouts given on balance therapy. Patient is unable to afford therapy services. She has not been able to return to work due to her memory issues.  Memory loss most likely due to stroke rather than dementia.  Based on AHA/ACC 2021 guidelines, patient's primary care doctor should target BP goal of <130/80 mm?Hg, LDL-C <70 mg/dL and HbA1c of <7% to minimize the risk of future strokes.            [1]   Current Outpatient Medications " "  Medication Sig Dispense Refill    atorvastatin (LIPITOR) 80 MG tablet Take 1 tablet (80 mg total) by mouth once daily. 90 tablet 3    busPIRone (BUSPAR) 15 MG tablet Take 1 tablet (15 mg total) by mouth 2 (two) times daily. 60 tablet 11    butalbital-aspirin-caffeine -40 mg (FIORINAL) -40 mg Cap Take 1 capsule by mouth every 4 (four) hours as needed.      clopidogreL (PLAVIX) 75 mg tablet Take 1 tablet (75 mg total) by mouth once daily. 30 tablet 11    ezetimibe (ZETIA) 10 mg tablet Take 1 tablet by mouth once daily 90 tablet 0    FARXIGA 5 mg Tab tablet Take 1 tablet by mouth once daily 30 tablet 3    fluticasone propionate (FLOVENT DISKUS) 250 mcg/actuation DsDv Inhale 2 puffs into the lungs 2 (two) times daily. Controller 60 each 5    levothyroxine (SYNTHROID) 100 MCG tablet TAKE 1 TABLET BY MOUTH BEFORE BREAKFAST 90 tablet 0    metFORMIN (GLUCOPHAGE) 500 MG tablet TAKE 1 TABLET BY MOUTH TWICE DAILY WITH MEALS 180 tablet 0    metoprolol succinate (TOPROL-XL) 25 MG 24 hr tablet Take 12.5 mg by mouth.      nortriptyline (PAMELOR) 25 MG capsule TAKE 1 CAPSULE BY MOUTH IN THE EVENING 30 capsule 0    ondansetron (ZOFRAN) 8 MG tablet Take 1 tablet (8 mg total) by mouth every 6 (six) hours as needed for Nausea. 20 tablet 1    paroxetine (PAXIL) 20 MG tablet TAKE 1 TABLET BY MOUTH ONCE DAILY IN THE MORNING 90 tablet 0    pen needle, diabetic 32 gauge x 5/32" Ndle 1 each by Misc.(Non-Drug; Combo Route) route once a week. 100 each 1    traZODone (DESYREL) 100 MG tablet Take 1 tablet (100 mg total) by mouth nightly as needed for Insomnia. 30 tablet 3    VENTOLIN HFA 90 mcg/actuation inhaler Inhale 2 puffs into the lungs every 4 (four) hours as needed. 18 g 2     No current facility-administered medications for this visit.     "

## 2025-05-14 ENCOUNTER — HOSPITAL ENCOUNTER (OUTPATIENT)
Dept: RADIOLOGY | Facility: HOSPITAL | Age: 60
Discharge: HOME | End: 2025-05-14
Attending: NURSE PRACTITIONER
Payer: COMMERCIAL

## 2025-05-14 DIAGNOSIS — R55 SYNCOPE AND COLLAPSE: ICD-10-CM

## 2025-05-14 PROCEDURE — 70496 CT ANGIOGRAPHY HEAD: CPT | Mod: TC

## 2025-05-14 PROCEDURE — 25500020 PHARM REV CODE 255: Performed by: NURSE PRACTITIONER

## 2025-05-14 RX ADMIN — IOHEXOL 50 ML: 350 INJECTION, SOLUTION INTRAVENOUS at 10:05

## 2025-05-15 ENCOUNTER — HOSPITAL ENCOUNTER (INPATIENT)
Facility: HOSPITAL | Age: 60
LOS: 7 days | Discharge: HOME HEALTH | DRG: 025 | End: 2025-05-22
Attending: EMERGENCY MEDICINE | Admitting: INTERNAL MEDICINE
Payer: COMMERCIAL

## 2025-05-15 ENCOUNTER — RESULTS FOLLOW-UP (OUTPATIENT)
Dept: NEUROLOGY | Facility: CLINIC | Age: 60
End: 2025-05-15

## 2025-05-15 DIAGNOSIS — R07.9 CHEST PAIN: ICD-10-CM

## 2025-05-15 DIAGNOSIS — Z79.02 PLATELET INHIBITION DUE TO PLAVIX: ICD-10-CM

## 2025-05-15 DIAGNOSIS — S06.5XAA SUBDURAL HEMATOMA: Primary | ICD-10-CM

## 2025-05-15 DIAGNOSIS — Z01.818 PRE-OP EXAMINATION: ICD-10-CM

## 2025-05-15 LAB
ALBUMIN SERPL-MCNC: 4 G/DL (ref 3.4–4.8)
ALBUMIN/GLOB SERPL: 1.2 RATIO (ref 1.1–2)
ALP SERPL-CCNC: 157 UNIT/L (ref 40–150)
ALT SERPL-CCNC: 56 UNIT/L (ref 0–55)
ANION GAP SERPL CALC-SCNC: 10 MEQ/L
APTT PPP: 27.4 SECONDS (ref 23.2–33.7)
AST SERPL-CCNC: 24 UNIT/L (ref 11–45)
BACTERIA #/AREA URNS AUTO: ABNORMAL /HPF
BASOPHILS # BLD AUTO: 0.07 X10(3)/MCL
BASOPHILS NFR BLD AUTO: 1 %
BILIRUB SERPL-MCNC: 0.7 MG/DL
BILIRUB UR QL STRIP.AUTO: NEGATIVE
BUN SERPL-MCNC: 14.6 MG/DL (ref 9.8–20.1)
CALCIUM SERPL-MCNC: 9.8 MG/DL (ref 8.4–10.2)
CHLORIDE SERPL-SCNC: 107 MMOL/L (ref 98–107)
CLARITY UR: CLEAR
CO2 SERPL-SCNC: 24 MMOL/L (ref 23–31)
COLOR UR AUTO: ABNORMAL
CREAT SERPL-MCNC: 0.82 MG/DL (ref 0.55–1.02)
CREAT/UREA NIT SERPL: 18
EOSINOPHIL # BLD AUTO: 0.26 X10(3)/MCL (ref 0–0.9)
EOSINOPHIL NFR BLD AUTO: 3.7 %
ERYTHROCYTE [DISTWIDTH] IN BLOOD BY AUTOMATED COUNT: 14 % (ref 11.5–17)
GFR SERPLBLD CREATININE-BSD FMLA CKD-EPI: >60 ML/MIN/1.73/M2
GLOBULIN SER-MCNC: 3.4 GM/DL (ref 2.4–3.5)
GLUCOSE SERPL-MCNC: 129 MG/DL (ref 82–115)
GLUCOSE UR QL STRIP: ABNORMAL
HCT VFR BLD AUTO: 42.1 % (ref 37–47)
HGB BLD-MCNC: 14 G/DL (ref 12–16)
HGB UR QL STRIP: NEGATIVE
IMM GRANULOCYTES # BLD AUTO: 0.02 X10(3)/MCL (ref 0–0.04)
IMM GRANULOCYTES NFR BLD AUTO: 0.3 %
INR PPP: 1
KETONES UR QL STRIP: NEGATIVE
LEUKOCYTE ESTERASE UR QL STRIP: 25
LYMPHOCYTES # BLD AUTO: 2.43 X10(3)/MCL (ref 0.6–4.6)
LYMPHOCYTES NFR BLD AUTO: 34.9 %
MCH RBC QN AUTO: 30 PG (ref 27–31)
MCHC RBC AUTO-ENTMCNC: 33.3 G/DL (ref 33–36)
MCV RBC AUTO: 90.3 FL (ref 80–94)
MONOCYTES # BLD AUTO: 0.57 X10(3)/MCL (ref 0.1–1.3)
MONOCYTES NFR BLD AUTO: 8.2 %
NEUTROPHILS # BLD AUTO: 3.61 X10(3)/MCL (ref 2.1–9.2)
NEUTROPHILS NFR BLD AUTO: 51.9 %
NITRITE UR QL STRIP: NEGATIVE
NRBC BLD AUTO-RTO: 0 %
PH UR STRIP: 5.5 [PH]
PLATELET # BLD AUTO: 382 X10(3)/MCL (ref 130–400)
PMV BLD AUTO: 10 FL (ref 7.4–10.4)
POCT GLUCOSE: 128 MG/DL (ref 70–110)
POTASSIUM SERPL-SCNC: 4.1 MMOL/L (ref 3.5–5.1)
PROT SERPL-MCNC: 7.4 GM/DL (ref 5.8–7.6)
PROT UR QL STRIP: NEGATIVE
PROTHROMBIN TIME: 12.7 SECONDS (ref 12.5–14.5)
RBC # BLD AUTO: 4.66 X10(6)/MCL (ref 4.2–5.4)
RBC #/AREA URNS AUTO: ABNORMAL /HPF
SODIUM SERPL-SCNC: 141 MMOL/L (ref 136–145)
SP GR UR STRIP.AUTO: 1.02 (ref 1–1.03)
SQUAMOUS #/AREA URNS LPF: ABNORMAL /HPF
UROBILINOGEN UR STRIP-ACNC: NORMAL
WBC # BLD AUTO: 6.96 X10(3)/MCL (ref 4.5–11.5)
WBC #/AREA URNS AUTO: ABNORMAL /HPF

## 2025-05-15 PROCEDURE — 11000001 HC ACUTE MED/SURG PRIVATE ROOM

## 2025-05-15 PROCEDURE — 82977 ASSAY OF GGT: CPT | Performed by: STUDENT IN AN ORGANIZED HEALTH CARE EDUCATION/TRAINING PROGRAM

## 2025-05-15 PROCEDURE — 84443 ASSAY THYROID STIM HORMONE: CPT | Performed by: STUDENT IN AN ORGANIZED HEALTH CARE EDUCATION/TRAINING PROGRAM

## 2025-05-15 PROCEDURE — 99285 EMERGENCY DEPT VISIT HI MDM: CPT | Mod: 25

## 2025-05-15 PROCEDURE — 21400001 HC TELEMETRY ROOM

## 2025-05-15 PROCEDURE — 85730 THROMBOPLASTIN TIME PARTIAL: CPT | Performed by: NURSE PRACTITIONER

## 2025-05-15 PROCEDURE — 25000003 PHARM REV CODE 250: Performed by: STUDENT IN AN ORGANIZED HEALTH CARE EDUCATION/TRAINING PROGRAM

## 2025-05-15 PROCEDURE — 80053 COMPREHEN METABOLIC PANEL: CPT | Performed by: NURSE PRACTITIONER

## 2025-05-15 PROCEDURE — 81001 URINALYSIS AUTO W/SCOPE: CPT | Performed by: NURSE PRACTITIONER

## 2025-05-15 PROCEDURE — 84439 ASSAY OF FREE THYROXINE: CPT | Performed by: STUDENT IN AN ORGANIZED HEALTH CARE EDUCATION/TRAINING PROGRAM

## 2025-05-15 PROCEDURE — 85025 COMPLETE CBC W/AUTO DIFF WBC: CPT | Performed by: NURSE PRACTITIONER

## 2025-05-15 PROCEDURE — 85610 PROTHROMBIN TIME: CPT | Performed by: NURSE PRACTITIONER

## 2025-05-15 RX ORDER — ATORVASTATIN CALCIUM 40 MG/1
80 TABLET, FILM COATED ORAL DAILY
Status: DISCONTINUED | OUTPATIENT
Start: 2025-05-16 | End: 2025-05-22 | Stop reason: HOSPADM

## 2025-05-15 RX ORDER — BISACODYL 10 MG/1
10 SUPPOSITORY RECTAL DAILY PRN
Status: DISCONTINUED | OUTPATIENT
Start: 2025-05-15 | End: 2025-05-22 | Stop reason: HOSPADM

## 2025-05-15 RX ORDER — NALOXONE HCL 0.4 MG/ML
0.02 VIAL (ML) INJECTION
Status: DISCONTINUED | OUTPATIENT
Start: 2025-05-15 | End: 2025-05-22 | Stop reason: HOSPADM

## 2025-05-15 RX ORDER — INSULIN ASPART 100 [IU]/ML
0-5 INJECTION, SOLUTION INTRAVENOUS; SUBCUTANEOUS
Status: DISCONTINUED | OUTPATIENT
Start: 2025-05-15 | End: 2025-05-22 | Stop reason: HOSPADM

## 2025-05-15 RX ORDER — POLYETHYLENE GLYCOL 3350 17 G/17G
17 POWDER, FOR SOLUTION ORAL 3 TIMES DAILY PRN
Status: DISCONTINUED | OUTPATIENT
Start: 2025-05-15 | End: 2025-05-22 | Stop reason: HOSPADM

## 2025-05-15 RX ORDER — SIMETHICONE 80 MG
1 TABLET,CHEWABLE ORAL 4 TIMES DAILY PRN
Status: DISCONTINUED | OUTPATIENT
Start: 2025-05-15 | End: 2025-05-22 | Stop reason: HOSPADM

## 2025-05-15 RX ORDER — IBUPROFEN 200 MG
16 TABLET ORAL
Status: DISCONTINUED | OUTPATIENT
Start: 2025-05-15 | End: 2025-05-22 | Stop reason: HOSPADM

## 2025-05-15 RX ORDER — TRAZODONE HYDROCHLORIDE 100 MG/1
100 TABLET ORAL NIGHTLY PRN
Status: DISCONTINUED | OUTPATIENT
Start: 2025-05-16 | End: 2025-05-22 | Stop reason: HOSPADM

## 2025-05-15 RX ORDER — EZETIMIBE 10 MG/1
10 TABLET ORAL DAILY
Status: DISCONTINUED | OUTPATIENT
Start: 2025-05-16 | End: 2025-05-22 | Stop reason: HOSPADM

## 2025-05-15 RX ORDER — SODIUM CHLORIDE 0.9 % (FLUSH) 0.9 %
10 SYRINGE (ML) INJECTION
Status: DISCONTINUED | OUTPATIENT
Start: 2025-05-15 | End: 2025-05-22 | Stop reason: HOSPADM

## 2025-05-15 RX ORDER — ONDANSETRON HYDROCHLORIDE 2 MG/ML
4 INJECTION, SOLUTION INTRAVENOUS EVERY 8 HOURS PRN
Status: DISCONTINUED | OUTPATIENT
Start: 2025-05-15 | End: 2025-05-22 | Stop reason: HOSPADM

## 2025-05-15 RX ORDER — TALC
9 POWDER (GRAM) TOPICAL NIGHTLY PRN
Status: DISCONTINUED | OUTPATIENT
Start: 2025-05-15 | End: 2025-05-22 | Stop reason: HOSPADM

## 2025-05-15 RX ORDER — LEVOTHYROXINE SODIUM 100 UG/1
100 TABLET ORAL
Status: DISCONTINUED | OUTPATIENT
Start: 2025-05-16 | End: 2025-05-16

## 2025-05-15 RX ORDER — MORPHINE SULFATE 4 MG/ML
2 INJECTION, SOLUTION INTRAMUSCULAR; INTRAVENOUS EVERY 6 HOURS PRN
Refills: 0 | Status: DISCONTINUED | OUTPATIENT
Start: 2025-05-15 | End: 2025-05-22 | Stop reason: HOSPADM

## 2025-05-15 RX ORDER — IPRATROPIUM BROMIDE AND ALBUTEROL SULFATE 2.5; .5 MG/3ML; MG/3ML
3 SOLUTION RESPIRATORY (INHALATION) EVERY 6 HOURS PRN
Status: DISCONTINUED | OUTPATIENT
Start: 2025-05-15 | End: 2025-05-22 | Stop reason: HOSPADM

## 2025-05-15 RX ORDER — BUSPIRONE HYDROCHLORIDE 5 MG/1
15 TABLET ORAL 2 TIMES DAILY
Status: DISCONTINUED | OUTPATIENT
Start: 2025-05-15 | End: 2025-05-22 | Stop reason: HOSPADM

## 2025-05-15 RX ORDER — IBUPROFEN 200 MG
24 TABLET ORAL
Status: DISCONTINUED | OUTPATIENT
Start: 2025-05-15 | End: 2025-05-22 | Stop reason: HOSPADM

## 2025-05-15 RX ORDER — ACETAMINOPHEN 325 MG/1
650 TABLET ORAL EVERY 4 HOURS PRN
Status: DISCONTINUED | OUTPATIENT
Start: 2025-05-15 | End: 2025-05-22 | Stop reason: HOSPADM

## 2025-05-15 RX ORDER — ONDANSETRON 4 MG/1
8 TABLET, ORALLY DISINTEGRATING ORAL EVERY 8 HOURS PRN
Status: DISCONTINUED | OUTPATIENT
Start: 2025-05-15 | End: 2025-05-22 | Stop reason: HOSPADM

## 2025-05-15 RX ORDER — HYDROCODONE BITARTRATE AND ACETAMINOPHEN 5; 325 MG/1; MG/1
1 TABLET ORAL EVERY 6 HOURS PRN
Refills: 0 | Status: DISCONTINUED | OUTPATIENT
Start: 2025-05-15 | End: 2025-05-17

## 2025-05-15 RX ORDER — NORTRIPTYLINE HYDROCHLORIDE 25 MG/1
25 CAPSULE ORAL NIGHTLY
Status: DISCONTINUED | OUTPATIENT
Start: 2025-05-16 | End: 2025-05-22 | Stop reason: HOSPADM

## 2025-05-15 RX ORDER — ALUMINUM HYDROXIDE, MAGNESIUM HYDROXIDE, AND SIMETHICONE 1200; 120; 1200 MG/30ML; MG/30ML; MG/30ML
30 SUSPENSION ORAL 4 TIMES DAILY PRN
Status: DISCONTINUED | OUTPATIENT
Start: 2025-05-15 | End: 2025-05-22 | Stop reason: HOSPADM

## 2025-05-15 RX ORDER — PAROXETINE 20 MG/1
20 TABLET, FILM COATED ORAL EVERY MORNING
Status: DISCONTINUED | OUTPATIENT
Start: 2025-05-16 | End: 2025-05-22 | Stop reason: HOSPADM

## 2025-05-15 RX ORDER — GLUCAGON 1 MG
1 KIT INJECTION
Status: DISCONTINUED | OUTPATIENT
Start: 2025-05-15 | End: 2025-05-22 | Stop reason: HOSPADM

## 2025-05-15 RX ADMIN — BUSPIRONE HYDROCHLORIDE 15 MG: 5 TABLET ORAL at 10:05

## 2025-05-15 RX ADMIN — ACETAMINOPHEN 650 MG: 325 TABLET ORAL at 10:05

## 2025-05-15 NOTE — ED PROVIDER NOTES
Encounter Date: 5/15/2025       History     Chief Complaint   Patient presents with    Medical Evaluation     Pt presents to ED. Pt had outpatient CT yesterday showing a brain bleed. Pt hs been falling more frequently over the last few months with 3 falls in the last 30 days. GCS 15 in triage.     Pt is 58 yo female w/ pmhx of T2DM, HLD, Left MCA infarct 06/2024 w/ residual right sided weakness, hypothyroidism, GERD who presents to ED for evaluation after she had outpatient CT yesterday, 05/14/2025, that revealed subdural hemorrhage along the left cerebral convexity.  Patient has been falling more recently these last few months and has had persistent headache.  She has residual right-sided weakness from her previous stroke and intermittently uses a Rollator.  Last fall 3 weeks ago, she did not feel like she hit her head.  Last dose of Plavix 7:00 a.m. this morning.  Denies current headache, shortness breath, chest pain, dysuria, abdominal pain, diarrhea, constipation, fever.  Complains of frequent falls and poor balance.  Daughter at bedside who reports that patient is at her mental baseline and strength baseline.    Review of patient's allergies indicates:   Allergen Reactions    Aspirin     Ketorolac     Penicillins     Sulfa (sulfonamide antibiotics)     Ozempic [semaglutide] Other (See Comments)     Abdominal pain     Past Medical History:   Diagnosis Date    Accelerated junctional rhythm     Agatston CAC score, <100     Anxiety disorder, unspecified     COPD type A     Diabetes mellitus without complication     GERD (gastroesophageal reflux disease)     History of COVID-19     Insomnia     Lung nodule      Past Surgical History:   Procedure Laterality Date    ANGIOGRAM, CORONARY, WITH LEFT HEART CATHETERIZATION      BREAST LUMPECTOMY Right     CARDIOVERSION  08/01/2013    CARPAL TUNNEL RELEASE  10/23/2013    FUSION OF CERVICAL SPINE BY ANTERIOR APPROACH USING COMPUTER-ASSISTED NAVIGATION  06/10/2019    KIDNEY  SURGERY      TONSILLECTOMY AND ADENOIDECTOMY      TOTAL ABDOMINAL HYSTERECTOMY      WRIST SURGERY       Family History   Problem Relation Name Age of Onset    Heart attack Mother      Diabetes Father       Social History[1]  Review of Systems   Constitutional:  Negative for activity change, appetite change, diaphoresis and fever.   HENT:  Negative for congestion, ear pain, rhinorrhea and sore throat.    Respiratory:  Negative for cough and shortness of breath.    Gastrointestinal:  Negative for diarrhea and vomiting.   Genitourinary:  Negative for decreased urine volume.   Skin:  Negative for rash.   Neurological:         Frequent falls  Poor balance       Physical Exam     Initial Vitals [05/15/25 1454]   BP Pulse Resp Temp SpO2   101/65 94 20 97.4 °F (36.3 °C) 96 %      MAP       --         Physical Exam    Nursing note and vitals reviewed.  Constitutional: She appears well-developed and well-nourished.   HENT:   Head: Normocephalic and atraumatic.   Eyes: EOM are normal. Pupils are equal, round, and reactive to light.   Cardiovascular:  Normal rate and regular rhythm.           Pulmonary/Chest: Breath sounds normal. No respiratory distress.   Abdominal: Abdomen is soft. Bowel sounds are normal. She exhibits no distension. There is no abdominal tenderness.     Neurological: She is oriented to person, place, and time. No cranial nerve deficit.   RLE strength 3/5  RUE strength 4/5  LUE strength 5/5  LLE strength 5/5   Skin: Skin is warm. Capillary refill takes less than 2 seconds.         ED Course   Procedures  Labs Reviewed   COMPREHENSIVE METABOLIC PANEL - Abnormal       Result Value    Sodium 141      Potassium 4.1      Chloride 107      CO2 24      Glucose 129 (*)     Blood Urea Nitrogen 14.6      Creatinine 0.82      Calcium 9.8      Protein Total 7.4      Albumin 4.0      Globulin 3.4      Albumin/Globulin Ratio 1.2      Bilirubin Total 0.7       (*)     ALT 56 (*)     AST 24      eGFR >60      Anion  Gap 10.0      BUN/Creatinine Ratio 18     URINALYSIS, REFLEX TO URINE CULTURE - Abnormal    Color, UA Light-Yellow      Appearance, UA Clear      Specific Gravity, UA 1.022      pH, UA 5.5      Protein, UA Negative      Glucose, UA 4+ (*)     Ketones, UA Negative      Blood, UA Negative      Bilirubin, UA Negative      Urobilinogen, UA Normal      Nitrites, UA Negative      Leukocyte Esterase, UA 25 (*)     RBC, UA 0-5      WBC, UA 0-5      Bacteria, UA Trace      Squamous Epithelial Cells, UA Trace     APTT - Normal    PTT 27.4     PROTIME-INR - Normal    PT 12.7      INR 1.0      Narrative:     Protimes are used to monitor anticoagulant agents such as warfarin. PT INR values are based on the current patient normal mean and the LESLYE value for the specific instrument reagent used.  **Routine theraputic target values for the INR are 2.0-3.0**   CBC W/ AUTO DIFFERENTIAL    Narrative:     The following orders were created for panel order CBC Auto Differential.  Procedure                               Abnormality         Status                     ---------                               -----------         ------                     CBC with Differential[[id number]]                           Final result                 Please view results for these tests on the individual orders.   CBC WITH DIFFERENTIAL    WBC 6.96      RBC 4.66      Hgb 14.0      Hct 42.1      MCV 90.3      MCH 30.0      MCHC 33.3      RDW 14.0      Platelet 382      MPV 10.0      Neut % 51.9      Lymph % 34.9      Mono % 8.2      Eos % 3.7      Basophil % 1.0      Imm Grans % 0.3      Neut # 3.61      Lymph # 2.43      Mono # 0.57      Eos # 0.26      Baso # 0.07      Imm Gran # 0.02      NRBC% 0.0            Imaging Results              CT Head Without Contrast (Final result)  Result time 05/15/25 16:15:16      Final result by Wally Lam MD (05/15/25 16:15:16)                   Impression:      Similar left-sided subdural  hematoma.      Electronically signed by: Wally aLm  Date:    05/15/2025  Time:    16:15               Narrative:    EXAMINATION:  CT HEAD WITHOUT CONTRAST    CLINICAL HISTORY:  Subdural hematoma;    TECHNIQUE:  CT imaging of the head performed from the skull base to the vertex without intravenous contrast.   mGycm. Automatic exposure control, adjustment of mA/kV or iterative reconstruction technique was used to reduce radiation.    COMPARISON:  Yesterday    FINDINGS:  There is similar appearance of left-sided heterogeneous largely isoattenuating subdural hematoma which measures up to approximately 15 mm.  Little if any midline shift.  No new areas of hemorrhage are seen.  No new parenchymal attenuation abnormality.    Visualized paranasal sinuses and mastoid air cells are clear.                                       Medications - No data to display  Medical Decision Making  Patient is 59-year-old female who presents to the ED for evaluation after it was seen that she had subdural hematoma along left cerebral convexity on outpatient CT yesterday.  CT performed today in ED similar appearing hematoma. No new confusion or cranial nerve deficits.  Strength appears to be at baseline with known right-sided weakness according to daughter at bedside.  Last dose of Plavix early this morning (7 AM on 05/15/2025), last fall 3 weeks ago.  Neurosurgery was consulted who recommended SBP less than 160, q4h neuro checks, and admission. She may be candidate for surgery per NSGY. Pt hemodynamically stable throughout ED visit; NPO at midnight for possible procedure tomorrow.  Hospital Medicine was consulted for admission.    Amount and/or Complexity of Data Reviewed  Radiology: ordered.    Risk  Decision regarding hospitalization.                                      Clinical Impression:  Final diagnoses:  [S06.5XAA] Subdural hematoma (Primary)          ED Disposition Condition    Admit                     [1]   Social  History  Tobacco Use    Smoking status: Every Day     Current packs/day: 0.50     Types: Cigarettes    Smokeless tobacco: Never   Substance Use Topics    Alcohol use: Never    Drug use: Never        Albert Cordova MD  Resident  05/15/25 8547

## 2025-05-15 NOTE — FIRST PROVIDER EVALUATION
"Medical screening examination initiated.  I have conducted a focused provider triage encounter, findings are as follows:    Brief history of present illness:  Patient states that she had an outpatient CT scan of her head due to frequent falls and feeling off balance. States that she was called and told to report to the ED due to her having a "brain bleed."    There were no vitals filed for this visit.    Pertinent physical exam:  Awake, alert, ambulatory      Brief workup plan:  Labs    Preliminary workup initiated; this workup will be continued and followed by the physician or advanced practice provider that is assigned to the patient when roomed.  "

## 2025-05-16 ENCOUNTER — ANESTHESIA (OUTPATIENT)
Dept: INTERVENTIONAL RADIOLOGY/VASCULAR | Facility: HOSPITAL | Age: 60
End: 2025-05-16
Payer: COMMERCIAL

## 2025-05-16 LAB
EST. AVERAGE GLUCOSE BLD GHB EST-MCNC: 162.8 MG/DL
GGT SERPL-CCNC: 52 U/L (ref 9–36)
HBA1C MFR BLD: 7.3 %
POCT GLUCOSE: 122 MG/DL (ref 70–110)
POCT GLUCOSE: 158 MG/DL (ref 70–110)
POCT GLUCOSE: 184 MG/DL (ref 70–110)
T4 FREE SERPL-MCNC: 1.27 NG/DL (ref 0.7–1.48)
TSH SERPL-ACNC: 0.2 UIU/ML (ref 0.35–4.94)

## 2025-05-16 PROCEDURE — 25000003 PHARM REV CODE 250: Performed by: STUDENT IN AN ORGANIZED HEALTH CARE EDUCATION/TRAINING PROGRAM

## 2025-05-16 PROCEDURE — 83036 HEMOGLOBIN GLYCOSYLATED A1C: CPT | Performed by: STUDENT IN AN ORGANIZED HEALTH CARE EDUCATION/TRAINING PROGRAM

## 2025-05-16 PROCEDURE — 25500020 PHARM REV CODE 255: Performed by: INTERNAL MEDICINE

## 2025-05-16 PROCEDURE — 63600175 PHARM REV CODE 636 W HCPCS: Performed by: STUDENT IN AN ORGANIZED HEALTH CARE EDUCATION/TRAINING PROGRAM

## 2025-05-16 PROCEDURE — 75894 X-RAYS TRANSCATH THERAPY: CPT | Mod: 26,,, | Performed by: PSYCHIATRY & NEUROLOGY

## 2025-05-16 PROCEDURE — 63600175 PHARM REV CODE 636 W HCPCS: Performed by: INTERNAL MEDICINE

## 2025-05-16 PROCEDURE — 36223 PLACE CATH CAROTID/INOM ART: CPT | Mod: 51,LT,, | Performed by: PSYCHIATRY & NEUROLOGY

## 2025-05-16 PROCEDURE — B31B1ZZ FLUOROSCOPY OF LEFT EXTERNAL CAROTID ARTERY USING LOW OSMOLAR CONTRAST: ICD-10-PCS | Performed by: PSYCHIATRY & NEUROLOGY

## 2025-05-16 PROCEDURE — 75898 FOLLOW-UP ANGIOGRAPHY: CPT | Mod: 26,,, | Performed by: PSYCHIATRY & NEUROLOGY

## 2025-05-16 PROCEDURE — 21400001 HC TELEMETRY ROOM

## 2025-05-16 PROCEDURE — 11000001 HC ACUTE MED/SURG PRIVATE ROOM

## 2025-05-16 PROCEDURE — 99223 1ST HOSP IP/OBS HIGH 75: CPT | Mod: FS,,, | Performed by: STUDENT IN AN ORGANIZED HEALTH CARE EDUCATION/TRAINING PROGRAM

## 2025-05-16 PROCEDURE — B3141ZZ FLUOROSCOPY OF LEFT COMMON CAROTID ARTERY USING LOW OSMOLAR CONTRAST: ICD-10-PCS | Performed by: PSYCHIATRY & NEUROLOGY

## 2025-05-16 PROCEDURE — 36227 PLACE CATH XTRNL CAROTID: CPT | Mod: LT,,, | Performed by: PSYCHIATRY & NEUROLOGY

## 2025-05-16 PROCEDURE — 63600175 PHARM REV CODE 636 W HCPCS: Mod: JZ,TB | Performed by: STUDENT IN AN ORGANIZED HEALTH CARE EDUCATION/TRAINING PROGRAM

## 2025-05-16 PROCEDURE — 99223 1ST HOSP IP/OBS HIGH 75: CPT | Mod: 25,FS,, | Performed by: PSYCHIATRY & NEUROLOGY

## 2025-05-16 PROCEDURE — 36415 COLL VENOUS BLD VENIPUNCTURE: CPT | Performed by: STUDENT IN AN ORGANIZED HEALTH CARE EDUCATION/TRAINING PROGRAM

## 2025-05-16 PROCEDURE — 03LG3DZ OCCLUSION OF INTRACRANIAL ARTERY WITH INTRALUMINAL DEVICE, PERCUTANEOUS APPROACH: ICD-10-PCS | Performed by: PSYCHIATRY & NEUROLOGY

## 2025-05-16 PROCEDURE — 76937 US GUIDE VASCULAR ACCESS: CPT | Mod: 26,,, | Performed by: PSYCHIATRY & NEUROLOGY

## 2025-05-16 PROCEDURE — 61624 TCAT PERM OCCLS/EMBOLJ CNS: CPT | Mod: ,,, | Performed by: PSYCHIATRY & NEUROLOGY

## 2025-05-16 RX ORDER — PROPOFOL 10 MG/ML
VIAL (ML) INTRAVENOUS
Status: DISCONTINUED | OUTPATIENT
Start: 2025-05-16 | End: 2025-05-16

## 2025-05-16 RX ORDER — ACETAMINOPHEN 10 MG/ML
1000 INJECTION, SOLUTION INTRAVENOUS ONCE
Status: COMPLETED | OUTPATIENT
Start: 2025-05-16 | End: 2025-05-16

## 2025-05-16 RX ORDER — LIDOCAINE HYDROCHLORIDE 20 MG/ML
INJECTION INTRAVENOUS
Status: DISCONTINUED | OUTPATIENT
Start: 2025-05-16 | End: 2025-05-16

## 2025-05-16 RX ORDER — ROCURONIUM BROMIDE 10 MG/ML
INJECTION, SOLUTION INTRAVENOUS
Status: DISCONTINUED | OUTPATIENT
Start: 2025-05-16 | End: 2025-05-16

## 2025-05-16 RX ORDER — IOPAMIDOL 755 MG/ML
50 INJECTION, SOLUTION INTRAVASCULAR
Status: COMPLETED | OUTPATIENT
Start: 2025-05-16 | End: 2025-05-16

## 2025-05-16 RX ORDER — HEPARIN SODIUM 1000 [USP'U]/ML
INJECTION, SOLUTION INTRAVENOUS; SUBCUTANEOUS
Status: DISCONTINUED | OUTPATIENT
Start: 2025-05-16 | End: 2025-05-16

## 2025-05-16 RX ORDER — DEXAMETHASONE SODIUM PHOSPHATE 4 MG/ML
INJECTION, SOLUTION INTRA-ARTICULAR; INTRALESIONAL; INTRAMUSCULAR; INTRAVENOUS; SOFT TISSUE
Status: DISCONTINUED | OUTPATIENT
Start: 2025-05-16 | End: 2025-05-16

## 2025-05-16 RX ORDER — PHENYLEPHRINE HCL IN 0.9% NACL 1 MG/10 ML
SYRINGE (ML) INTRAVENOUS
Status: DISCONTINUED | OUTPATIENT
Start: 2025-05-16 | End: 2025-05-16

## 2025-05-16 RX ORDER — ONDANSETRON HYDROCHLORIDE 2 MG/ML
INJECTION, SOLUTION INTRAVENOUS
Status: DISCONTINUED | OUTPATIENT
Start: 2025-05-16 | End: 2025-05-16

## 2025-05-16 RX ORDER — GLYCOPYRROLATE 0.2 MG/ML
INJECTION INTRAMUSCULAR; INTRAVENOUS
Status: DISCONTINUED | OUTPATIENT
Start: 2025-05-16 | End: 2025-05-16

## 2025-05-16 RX ORDER — LEVOTHYROXINE SODIUM 88 UG/1
88 TABLET ORAL
Status: DISCONTINUED | OUTPATIENT
Start: 2025-05-16 | End: 2025-05-22 | Stop reason: HOSPADM

## 2025-05-16 RX ORDER — FENTANYL CITRATE 50 UG/ML
INJECTION, SOLUTION INTRAMUSCULAR; INTRAVENOUS
Status: DISCONTINUED | OUTPATIENT
Start: 2025-05-16 | End: 2025-05-16

## 2025-05-16 RX ADMIN — FENTANYL CITRATE 50 MCG: 50 INJECTION, SOLUTION INTRAMUSCULAR; INTRAVENOUS at 12:05

## 2025-05-16 RX ADMIN — HEPARIN SODIUM 2000 UNITS: 1000 INJECTION, SOLUTION INTRAVENOUS; SUBCUTANEOUS at 12:05

## 2025-05-16 RX ADMIN — HYDROCODONE BITARTRATE AND ACETAMINOPHEN 1 TABLET: 5; 325 TABLET ORAL at 01:05

## 2025-05-16 RX ADMIN — PAROXETINE HYDROCHLORIDE 20 MG: 20 TABLET, FILM COATED ORAL at 06:05

## 2025-05-16 RX ADMIN — SODIUM CHLORIDE, SODIUM GLUCONATE, SODIUM ACETATE, POTASSIUM CHLORIDE AND MAGNESIUM CHLORIDE: 526; 502; 368; 37; 30 INJECTION, SOLUTION INTRAVENOUS at 12:05

## 2025-05-16 RX ADMIN — Medication 50 MCG: at 12:05

## 2025-05-16 RX ADMIN — NORTRIPTYLINE HYDROCHLORIDE 25 MG: 25 CAPSULE ORAL at 08:05

## 2025-05-16 RX ADMIN — HYDROCODONE BITARTRATE AND ACETAMINOPHEN 1 TABLET: 5; 325 TABLET ORAL at 07:05

## 2025-05-16 RX ADMIN — ONDANSETRON 4 MG: 2 INJECTION INTRAMUSCULAR; INTRAVENOUS at 12:05

## 2025-05-16 RX ADMIN — BUSPIRONE HYDROCHLORIDE 15 MG: 5 TABLET ORAL at 08:05

## 2025-05-16 RX ADMIN — METOPROLOL SUCCINATE 12.5 MG: 25 TABLET, EXTENDED RELEASE ORAL at 09:05

## 2025-05-16 RX ADMIN — BUSPIRONE HYDROCHLORIDE 15 MG: 5 TABLET ORAL at 09:05

## 2025-05-16 RX ADMIN — ACETAMINOPHEN 650 MG: 325 TABLET ORAL at 09:05

## 2025-05-16 RX ADMIN — GLYCOPYRROLATE 0.2 MG: 0.2 INJECTION INTRAMUSCULAR; INTRAVENOUS at 12:05

## 2025-05-16 RX ADMIN — MORPHINE SULFATE 2 MG: 4 INJECTION INTRAVENOUS at 01:05

## 2025-05-16 RX ADMIN — DEXAMETHASONE SODIUM PHOSPHATE 4 MG: 4 INJECTION, SOLUTION INTRA-ARTICULAR; INTRALESIONAL; INTRAMUSCULAR; INTRAVENOUS; SOFT TISSUE at 12:05

## 2025-05-16 RX ADMIN — IOPAMIDOL 50 ML: 755 INJECTION, SOLUTION INTRAVENOUS at 12:05

## 2025-05-16 RX ADMIN — ACETAMINOPHEN 1000 MG: 10 INJECTION, SOLUTION INTRAVENOUS at 03:05

## 2025-05-16 RX ADMIN — ONDANSETRON 8 MG: 4 TABLET, ORALLY DISINTEGRATING ORAL at 03:05

## 2025-05-16 RX ADMIN — PROPOFOL 100 MG: 10 INJECTION, EMULSION INTRAVENOUS at 12:05

## 2025-05-16 RX ADMIN — LIDOCAINE HYDROCHLORIDE 80 MG: 20 INJECTION INTRAVENOUS at 12:05

## 2025-05-16 RX ADMIN — ATORVASTATIN CALCIUM 80 MG: 40 TABLET, FILM COATED ORAL at 09:05

## 2025-05-16 RX ADMIN — SUGAMMADEX 200 MG: 100 INJECTION, SOLUTION INTRAVENOUS at 12:05

## 2025-05-16 RX ADMIN — LEVOTHYROXINE SODIUM 88 MCG: 0.09 TABLET ORAL at 06:05

## 2025-05-16 RX ADMIN — EZETIMIBE 10 MG: 10 TABLET ORAL at 09:05

## 2025-05-16 RX ADMIN — ROCURONIUM BROMIDE 40 MG: 10 SOLUTION INTRAVENOUS at 12:05

## 2025-05-16 NOTE — TRANSFER OF CARE
"Anesthesia Transfer of Care Note    Patient: Chelle Chandra    Procedure(s) Performed: * No procedures listed *    Patient location: PACU    Anesthesia Type: general    Transport from OR: Transported from OR on room air with adequate spontaneous ventilation    Post pain: adequate analgesia    Post assessment: no apparent anesthetic complications    Post vital signs: stable    Level of consciousness: sedated    Nausea/Vomiting: no nausea/vomiting    Complications: none    Transfer of care protocol was followed      Last vitals: Visit Vitals  /77   Pulse 78   Temp 36.4 °C (97.6 °F) (Oral)   Resp 18   Ht 5' 6" (1.676 m)   Wt 49 kg (108 lb 0.4 oz)   SpO2 96%   BMI 17.44 kg/m²     "

## 2025-05-16 NOTE — ASSESSMENT & PLAN NOTE
- presented with frequent falls, incidental finding of SDH  - Stroke RF: DM, smoker, HLD, previous CVA  - Intervention: MMA embolization today (5/16/2025) with Dr. Egan    Stroke workup:  -CTh: Similar left-sided subdural hematoma   -CTA h/n: No large vessel occlusion or flow-limiting stenosis. New intermediate density subdural hemorrhage along the left cerebral convexity.  No midline shift.      -home medications include: Plavix, atorvastatin, ezetimibe        Plan:  - MMA embolization planned for today with Dr. Egan  - continue Atorvastatin 80mg daily  - avoid anticoagulation/antiplatelets at this time  - SBP < 160  - Further recommendations to follow post-procedure from MD

## 2025-05-16 NOTE — H&P
[facility]  Emergency [facility] MEDICINE - H&P ADMISSION NOTE      Patient Name: Chelle Chandra  MRN: [id number]  Patient Class: IP- Inpatient   Admission Date: 5/15/2025   Admitting Physician: ALYSA Service   Attending Physician: Thairy G Reyes, DO  Primary Care Provider: Jorge Silver MD  Face-to-Face encounter date: 05/15/2025        CHIEF COMPLAINT     Chief Complaint   Patient presents with    Medical Evaluation     Pt presents to ED. Pt had outpatient CT yesterday showing a brain bleed. Pt hs been falling more frequently over the last few months with 3 falls in the last 30 days. GCS 15 in triage.       HISTORY OF PRESENTING ILLNESS   59-year-old female with a past medical history of prior CVA, diabetes mellitus, tobacco dependence presenting after an outpatient CT head showed subdural hematoma.  Patient has had 3 falls alone in the month of May and had 1 fall in the month of April.  She states the most recent fall was a proximally 3 weeks ago.  Says her legs have been wobbling lately and while she should be ambulating with her Rollator she tends to hold onto the walls in her home.  The most recent fall occurred when she was ambulating in this way however she fell forward but denies any head trauma/syncope. States she was able to catch herself with her hands.  Patient takes her blood pressure at home on a regular basis and states her systolic blood pressure is never in the 100s usually systolic in the 70s.     At baseline patient lives with her .  PAST MEDICAL HISTORY     Past Medical History:   Diagnosis Date    Accelerated junctional rhythm     Agatston CAC score, <100     Anxiety disorder, unspecified     COPD type A     Diabetes mellitus without complication     GERD (gastroesophageal reflux disease)     History of COVID-19     Insomnia     Lung nodule        PAST SURGICAL HISTORY     Past Surgical History:   Procedure Laterality Date    ANGIOGRAM, CORONARY, WITH LEFT HEART  CATHETERIZATION      BREAST LUMPECTOMY Right     CARDIOVERSION  08/01/2013    CARPAL TUNNEL RELEASE  10/23/2013    FUSION OF CERVICAL SPINE BY ANTERIOR APPROACH USING COMPUTER-ASSISTED NAVIGATION  06/10/2019    KIDNEY SURGERY      TONSILLECTOMY AND ADENOIDECTOMY      TOTAL ABDOMINAL HYSTERECTOMY      WRIST SURGERY         FAMILY HISTORY   Reviewed and noncontributory to this case    SOCIAL HISTORY   Social History[1]    HOME MEDICATIONS     Prior to Admission medications    Medication Sig Start Date End Date Taking? Authorizing Provider   atorvastatin (LIPITOR) 80 MG tablet Take 1 tablet (80 mg total) by mouth once daily. 7/4/24 7/4/25  Chucho Hernandez MD   busPIRone (BUSPAR) 15 MG tablet Take 1 tablet (15 mg total) by mouth 2 (two) times daily. 7/4/24 7/4/25  Chucho Hernandez MD   butalbital-aspirin-caffeine -40 mg (FIORINAL) -40 mg Cap Take 1 capsule by mouth every 4 (four) hours as needed.    Provider, Historical   clopidogreL (PLAVIX) 75 mg tablet Take 1 tablet (75 mg total) by mouth once daily. 7/4/24 7/4/25  Chucho Hernandez MD   ezetimibe (ZETIA) 10 mg tablet Take 1 tablet by mouth once daily 2/24/25   Jorge Silver MD   FARXIGA 5 mg Tab tablet Take 1 tablet by mouth once daily 5/15/25   Jorge Silver MD   fluticasone propionate (FLOVENT DISKUS) 250 mcg/actuation DsDv Inhale 2 puffs into the lungs 2 (two) times daily. Controller 2/3/25   Jorge Silver MD   levothyroxine (SYNTHROID) 100 MCG tablet TAKE 1 TABLET BY MOUTH BEFORE BREAKFAST 3/24/25   Jorge Silver MD   metFORMIN (GLUCOPHAGE) 500 MG tablet TAKE 1 TABLET BY MOUTH TWICE DAILY WITH MEALS 4/22/25   Jorge Silver MD   metoprolol succinate (TOPROL-XL) 25 MG 24 hr tablet Take 12.5 mg by mouth. 9/20/24   Provider, Historical   nortriptyline (PAMELOR) 25 MG capsule TAKE 1 CAPSULE BY MOUTH IN THE EVENING 4/28/25   Jayne Verdin, LUIS   ondansetron (ZOFRAN) 8 MG tablet Take 1 tablet (8 mg total) by mouth every 6 (six) hours as  "needed for Nausea. 10/29/24   Jorge Silver MD   paroxetine (PAXIL) 20 MG tablet TAKE 1 TABLET BY MOUTH ONCE DAILY IN THE MORNING 4/22/25   Jorge Silver MD   pen needle, diabetic 32 gauge x 5/32" Ndle 1 each by Misc.(Non-Drug; Combo Route) route once a week. 11/3/22   Carlton Maldonado FNP   traZODone (DESYREL) 100 MG tablet Take 1 tablet (100 mg total) by mouth nightly as needed for Insomnia. 6/24/24 6/24/25  Jorge Silver MD   VENTOLIN HFA 90 mcg/actuation inhaler Inhale 2 puffs into the lungs every 4 (four) hours as needed. 5/8/24   Jorge Silver MD   FARXIGA 5 mg Tab tablet Take 1 tablet by mouth once daily 2/17/25 5/15/25  Jorge Silver MD       ALLERGIES   Aspirin, Ketorolac, Penicillins, Sulfa (sulfonamide antibiotics), and Ozempic [semaglutide]    REVIEW OF SYSTEMS   Except as documented above, all other systems reviewed and negative    PHYSICAL EXAM     Vitals:    05/15/25 1802   BP: 116/74   Pulse: 74   Resp:    Temp:       General:  Frail  Head and neck:  Atraumatic, normocephalic, moist mucous membranes, supple neck  Chest:  Clear to auscultation bilaterally  Heart:  S1, S2, no appreciable murmur  Abdomen:  Soft, nontender, BS +  MSK:  Warm, no lower extremity edema, no clubbing or cyanosis  Neuro:  Alert and oriented x4, moving all extremities with good strength  Integumentary:  No obvious skin rash  Psychiatry:  Appropriate mood and affect  ASSESSMENT AND PLAN   Subdural hematoma  -noncontrast CT head showed left-sided subdural hematoma similar in size to outpatient CT  -patient is on Plavix at home for a recent CVA  -states she is not taking aspirin at this time but she is taking migraine headache medication with aspirin in it  -hold all antiplatelet therapy, chemical DVT prophylaxis  -Neuro checks, bedrest  -neurosurgery consulted for further recommendations    Frequent falls  -BP soft since arrival  -resumed patient's metoprolol with hold parameters    NIDDM  -hold home Farxiga, " metformin  -sliding scale insulin   -hemoglobin A1c = 7.3    Elevated ALP   -also with a concurrent elevated GGT   -pending right upper quadrant ultrasound  history of prior CVA, diabetes mellitus, tobacco dependence     Hypothyroidism   -TSH low, reduce her dose of levothyroxine from 100 to 88 mcg   -repeat TSH in 4-6 weeks     DVT prophylaxis:  SCD in the setting of subdural hematoma  Screening for Social Drivers for health:  Patient screened for food insecurity, housing instability, transportation needs, utility difficulties, and interpersonal safety (select all that apply as identified as concern)  []Housing or Food  []Transportation Needs  []Utility Difficulties  []Interpersonal safety  [x]None  __________________________________________________________________  LABS/MICRO/MEDS/DIAGNOSTICS       LABS  Recent Labs     05/15/25  1514      K 4.1   CO2 24   BUN 14.6   CREATININE 0.82   CALCIUM 9.8   ALKPHOS 157*   AST 24   ALT 56*   ALBUMIN 4.0     Recent Labs     05/15/25  1514   WBC 6.96   RBC 4.66   HCT 42.1   MCV 90.3          MICROBIOLOGY  Microbiology Results (last 7 days)       ** No results found for the last 168 hours. **            MEDICATIONS     INFUSIONS      DIAGNOSTIC TESTS  CT Head Without Contrast   Final Result      Similar left-sided subdural hematoma.         Electronically signed by: Wally Lam   Date:    05/15/2025   Time:    16:15      IR Embolization (CNS) Intracranial (XPD)    (Results Pending)          Patient information was obtained from patient, patient's family, past medical records and ER records.   All diagnosis and differential diagnosis have been reviewed; assessment and plan has been documented. I have personally reviewed the labs and test results that are presently available; I have reviewed the patients medication list. I have reviewed the consulting providers response and recommendations. I have reviewed or attempted to review medical records based upon their  availability.  All of the patient's questions have been addressed and answered. Patient's is agreeable to the above stated plan. I will continue to monitor closely and make adjustments to medical management as needed.  This note was created using [employer] voice recognition software that occasionally misinterpreted phrases or words.  Please contact me if any questions may rise regarding documentation to clarify verbiage.        Thairy G Reyes, DO   Internal Medicine  Department of [facility] Medicine  [facility] - Emergency Dept         [1]   Social History  Socioeconomic History    Marital status:    Tobacco Use    Smoking status: Every Day     Current packs/day: 0.50     Types: Cigarettes    Smokeless tobacco: Never   Substance and Sexual Activity    Alcohol use: Never    Drug use: Never     Social Drivers of Health     Financial Resource Strain: Low Risk  (7/11/2024)    Received from [facility]    Overall Financial Resource Strain (CARDIA)     Difficulty of Paying Living Expenses: Not hard at all   Food Insecurity: No Food Insecurity (7/11/2024)    Received from [facility]    Hunger Vital Sign     Worried About Running Out of Food in the Last Year: Never true     Ran Out of Food in the Last Year: Never true   Transportation Needs: No Transportation Needs (7/11/2024)    Received from [facility]    PRAPARE - Transportation     Lack of Transportation (Medical): No     Lack of Transportation (Non-Medical): No   Physical Activity: Sufficiently Active (7/11/2024)    Received from [facility]    Exercise Vital Sign     Days of Exercise per Week: 3 days     Minutes of Exercise per Session: 50 min   Stress: Stress Concern Present (7/3/2024)    [employer] - Occupational Stress Questionnaire     Feeling of Stress : Very much   Housing Stability: Unknown (7/11/2024)    Received  from [facility]    Housing Stability Vital Sign     Unable to Pay for Housing in the Last Year: No     Homeless in the Last Year: No

## 2025-05-16 NOTE — CONSULTS
[facility] - Neurology  Neurosurgery  Consult Note    Inpatient consult to Neurosurgery  Consult performed by: Judith Luo PA  Consult ordered by: Reyes, Thairy G, DO        Subjective:     Chief Complaint/Reason for Admission: SDH, hygroma    History of Present Illness: Patient is a 59-year-old female with a past medical history of prior CVA, diabetes mellitus, tobacco dependence, who presented to the ED after an outpatient CT head showed subdural hematoma.  Patient has had 3 falls alone in the month of May and had 1 fall in the month of April.  She states the most recent fall was a proximally 3 weeks ago.  Says her legs have been wobbling lately and while she should be ambulating with her Rollator she tends to hold onto the walls in her home.  The most recent fall occurred when she was ambulating in this way however she fell forward but denies any head trauma/syncope. States she was able to catch herself with her hands.  Patient takes her blood pressure at home on a regular basis and states her systolic blood pressure is never in the 100s usually systolic in the 70s.     CT head was significant for left SDH with minimal midline shift. Dr. Mendiola was consulted for evaluation and treatment recommendations.    On PE today she is lying in bed, NAD. She does c/o mild HA. She has no other complaints at time of rounds.    PTA Medications   Medication Sig    atorvastatin (LIPITOR) 80 MG tablet Take 1 tablet (80 mg total) by mouth once daily.    busPIRone (BUSPAR) 15 MG tablet Take 1 tablet (15 mg total) by mouth 2 (two) times daily.    clopidogreL (PLAVIX) 75 mg tablet Take 1 tablet (75 mg total) by mouth once daily.    ezetimibe (ZETIA) 10 mg tablet Take 1 tablet by mouth once daily    FARXIGA 5 mg Tab tablet Take 1 tablet by mouth once daily    levothyroxine (SYNTHROID) 100 MCG tablet TAKE 1 TABLET BY MOUTH BEFORE BREAKFAST    metFORMIN (GLUCOPHAGE) 500 MG tablet TAKE 1 TABLET BY MOUTH TWICE  "DAILY WITH MEALS    metoprolol succinate (TOPROL-XL) 25 MG 24 hr tablet Take 12.5 mg by mouth.    nortriptyline (PAMELOR) 25 MG capsule TAKE 1 CAPSULE BY MOUTH IN THE EVENING    paroxetine (PAXIL) 20 MG tablet TAKE 1 TABLET BY MOUTH ONCE DAILY IN THE MORNING    traZODone (DESYREL) 100 MG tablet Take 1 tablet (100 mg total) by mouth nightly as needed for Insomnia.    butalbital-aspirin-caffeine -40 mg (FIORINAL) -40 mg Cap Take 1 capsule by mouth every 4 (four) hours as needed.    fluticasone propionate (FLOVENT DISKUS) 250 mcg/actuation DsDv Inhale 2 puffs into the lungs 2 (two) times daily. Controller    ondansetron (ZOFRAN) 8 MG tablet Take 1 tablet (8 mg total) by mouth every 6 (six) hours as needed for Nausea.    pen needle, diabetic 32 gauge x 5/32" Ndle 1 each by Misc.(Non-Drug; Combo Route) route once a week.    VENTOLIN HFA 90 mcg/actuation inhaler Inhale 2 puffs into the lungs every 4 (four) hours as needed.       Review of patient's allergies indicates:   Allergen Reactions    Aspirin     Ketorolac     Penicillins     Sulfa (sulfonamide antibiotics)     Ozempic [semaglutide] Other (See Comments)     Abdominal pain       Past Medical History:   Diagnosis Date    Accelerated junctional rhythm     Agatston CAC score, <100     Anxiety disorder, unspecified     COPD type A     Diabetes mellitus without complication     GERD (gastroesophageal reflux disease)     History of COVID-19     Insomnia     Lung nodule      Past Surgical History:   Procedure Laterality Date    ANGIOGRAM, CORONARY, WITH LEFT HEART CATHETERIZATION      BREAST LUMPECTOMY Right     CARDIOVERSION  08/01/2013    CARPAL TUNNEL RELEASE  10/23/2013    FUSION OF CERVICAL SPINE BY ANTERIOR APPROACH USING COMPUTER-ASSISTED NAVIGATION  06/10/2019    KIDNEY SURGERY      TONSILLECTOMY AND ADENOIDECTOMY      TOTAL ABDOMINAL HYSTERECTOMY      WRIST SURGERY       Family History       Problem Relation (Age of Onset)    Diabetes Father    Heart " attack Mother          Tobacco Use    Smoking status: Every Day     Current packs/day: 0.50     Types: Cigarettes    Smokeless tobacco: Never   Substance and Sexual Activity    Alcohol use: Never    Drug use: Never    Sexual activity: Not on file     Review of Systems  Objective:     Weight: 49 kg (108 lb 0.4 oz)  Body mass index is 17.44 kg/m².  Vital Signs (Most Recent):  Temp: 97.6 °F (36.4 °C) (05/16/25 0735)  Pulse: 78 (05/16/25 0735)  Resp: 18 (05/15/25 2154)  BP: 112/77 (05/16/25 0735)  SpO2: 96 % (05/16/25 0735) Vital Signs (24h Range):  Temp:  [97.4 °F (36.3 °C)-97.8 °F (36.6 °C)] 97.6 °F (36.4 °C)  Pulse:  [71-94] 78  Resp:  [17-21] 18  SpO2:  [93 %-99 %] 96 %  BP: ()/(65-77) 112/77                              Physical Exam:  Nursing note and vitals reviewed.    Constitutional: She appears well-developed and well-nourished. No distress.     Eyes: Pupils are equal, round, and reactive to light. EOM are normal.     Cardiovascular: Intact distal pulses.     Abdominal: Soft.     Psych/Behavior: She is alert. She is oriented to person, place, and time. She has a normal mood and affect.     Musculoskeletal:        Neck: Range of motion is full.        Back: Range of motion is full.        Right Upper Extremities: Range of motion is full. Muscle strength is 5/5.        Left Upper Extremities: Range of motion is full. Muscle strength is 5/5.       Right Lower Extremities: Range of motion is full. Muscle strength is 5/5.        Left Lower Extremities: Range of motion is full. Muscle strength is 5/5.     Neurological:        Sensory: There is no sensory deficit in the trunk. There is no sensory deficit in the extremities.        DTRs: DTRs are DTRS NORMAL AND SYMMETRICnormal and symmetric.        Cranial nerves: Cranial nerve(s) II, III, IV, V, VI, VII, VIII, IX, X, XI and XII are intact.     Neg clonus  No pronator drift    Significant Labs:  Recent Labs   Lab 05/15/25  1514   *      K 4.1   CL  107   CO2 24   BUN 14.6   CREATININE 0.82   CALCIUM 9.8     Recent Labs   Lab 05/15/25  1514   WBC 6.96   HGB 14.0   HCT 42.1        Recent Labs   Lab 05/15/25  1514   INR 1.0   APTT 27.4     Microbiology Results (last 7 days)       ** No results found for the last 168 hours. **          Significant Diagnostics:  CT Head Without Contrast [[id number]] Resulted: 05/15/25 1615   Order Status: Completed Updated: 05/15/25 1617   Narrative:     EXAMINATION:  CT HEAD WITHOUT CONTRAST    CLINICAL HISTORY:  Subdural hematoma;    TECHNIQUE:  CT imaging of the head performed from the skull base to the vertex without intravenous contrast.   mGycm. Automatic exposure control, adjustment of mA/kV or iterative reconstruction technique was used to reduce radiation.    COMPARISON:  Yesterday    FINDINGS:  There is similar appearance of left-sided heterogeneous largely isoattenuating subdural hematoma which measures up to approximately 15 mm.  Little if any midline shift.  No new areas of hemorrhage are seen.  No new parenchymal attenuation abnormality.    Visualized paranasal sinuses and mastoid air cells are clear.   Impression:       Similar left-sided subdural hematoma.       Assessment/Plan:     Active Diagnoses:    Diagnosis Date Noted POA    PRINCIPAL PROBLEM:  Subdural hematoma [S06.5XAA] 05/15/2025 Yes      Problems Resolved During this Admission:     SDH    She is GCS 15 with mild c/o HA  Repeat CT head shows stable left SDH  No plans for surgical intervention at this time  Interventional neurology plans for MMA embolization today  We will follow along and see how she does following this procedure  OK for Q2 hour neuro exams  BP parameters per Dr. Julisa Jones BID  PT/OT when appropriate    Thank you for your consult. I will follow-up with patient. Please contact us if you have any additional questions.    CECILIA Khan  Neurosurgery  [facility] - Neurology

## 2025-05-16 NOTE — SUBJECTIVE & OBJECTIVE
Past Medical History:   Diagnosis Date    Accelerated junctional rhythm     Agatston CAC score, <100     Anxiety disorder, unspecified     COPD type A     Diabetes mellitus without complication     GERD (gastroesophageal reflux disease)     History of COVID-19     Insomnia     Lung nodule        Past Surgical History:   Procedure Laterality Date    ANGIOGRAM, CORONARY, WITH LEFT HEART CATHETERIZATION      BREAST LUMPECTOMY Right     CARDIOVERSION  08/01/2013    CARPAL TUNNEL RELEASE  10/23/2013    FUSION OF CERVICAL SPINE BY ANTERIOR APPROACH USING COMPUTER-ASSISTED NAVIGATION  06/10/2019    KIDNEY SURGERY      TONSILLECTOMY AND ADENOIDECTOMY      TOTAL ABDOMINAL HYSTERECTOMY      WRIST SURGERY         Review of patient's allergies indicates:   Allergen Reactions    Aspirin     Ketorolac     Penicillins     Sulfa (sulfonamide antibiotics)     Ozempic [semaglutide] Other (See Comments)     Abdominal pain       Current Neurological Medications:     No current facility-administered medications on file prior to encounter.     Current Outpatient Medications on File Prior to Encounter   Medication Sig    atorvastatin (LIPITOR) 80 MG tablet Take 1 tablet (80 mg total) by mouth once daily.    busPIRone (BUSPAR) 15 MG tablet Take 1 tablet (15 mg total) by mouth 2 (two) times daily.    clopidogreL (PLAVIX) 75 mg tablet Take 1 tablet (75 mg total) by mouth once daily.    ezetimibe (ZETIA) 10 mg tablet Take 1 tablet by mouth once daily    FARXIGA 5 mg Tab tablet Take 1 tablet by mouth once daily    levothyroxine (SYNTHROID) 100 MCG tablet TAKE 1 TABLET BY MOUTH BEFORE BREAKFAST    metFORMIN (GLUCOPHAGE) 500 MG tablet TAKE 1 TABLET BY MOUTH TWICE DAILY WITH MEALS    metoprolol succinate (TOPROL-XL) 25 MG 24 hr tablet Take 12.5 mg by mouth.    nortriptyline (PAMELOR) 25 MG capsule TAKE 1 CAPSULE BY MOUTH IN THE EVENING    paroxetine (PAXIL) 20 MG tablet TAKE 1 TABLET BY MOUTH ONCE DAILY IN THE MORNING    traZODone (DESYREL) 100  "MG tablet Take 1 tablet (100 mg total) by mouth nightly as needed for Insomnia.    butalbital-aspirin-caffeine -40 mg (FIORINAL) -40 mg Cap Take 1 capsule by mouth every 4 (four) hours as needed.    fluticasone propionate (FLOVENT DISKUS) 250 mcg/actuation DsDv Inhale 2 puffs into the lungs 2 (two) times daily. Controller    ondansetron (ZOFRAN) 8 MG tablet Take 1 tablet (8 mg total) by mouth every 6 (six) hours as needed for Nausea.    pen needle, diabetic 32 gauge x 5/32" Ndle 1 each by Misc.(Non-Drug; Combo Route) route once a week.    VENTOLIN HFA 90 mcg/actuation inhaler Inhale 2 puffs into the lungs every 4 (four) hours as needed.     Family History       Problem Relation (Age of Onset)    Diabetes Father    Heart attack Mother          Tobacco Use    Smoking status: Every Day     Current packs/day: 0.50     Types: Cigarettes    Smokeless tobacco: Never   Substance and Sexual Activity    Alcohol use: Never    Drug use: Never    Sexual activity: Not on file     Review of Systems   Constitutional:  Negative for diaphoresis, fatigue and fever.   HENT:  Negative for trouble swallowing.    Eyes:  Positive for visual disturbance. Negative for photophobia.        Reports of intermittent "floaters" in the left eye   Cardiovascular:  Negative for chest pain, palpitations and leg swelling.   Gastrointestinal:  Negative for nausea and vomiting.   Musculoskeletal:  Positive for gait problem.        Patient reports frequent falls with balance issues   Neurological:  Positive for headaches. Negative for dizziness, tremors, seizures, syncope, facial asymmetry, speech difficulty, weakness, light-headedness and numbness.        Patient reports mild headache described as pressure behind her eyes   Psychiatric/Behavioral:  Negative for confusion and sleep disturbance.    All other systems reviewed and are negative.    Objective:     Vital Signs (Most Recent):  Temp: 97.6 °F (36.4 °C) (05/16/25 0735)  Pulse: 78 " (05/16/25 0735)  Resp: 18 (05/15/25 2154)  BP: 112/77 (05/16/25 0735)  SpO2: 96 % (05/16/25 0735) Vital Signs (24h Range):  Temp:  [97.4 °F (36.3 °C)-97.8 °F (36.6 °C)] 97.6 °F (36.4 °C)  Pulse:  [71-94] 78  Resp:  [17-21] 18  SpO2:  [93 %-99 %] 96 %  BP: ()/(65-77) 112/77     Weight: 49 kg (108 lb 0.4 oz)  Body mass index is 17.44 kg/m².     Physical Exam  Vitals and nursing note reviewed.   Constitutional:       General: She is not in acute distress.     Appearance: Normal appearance. She is not toxic-appearing.   HENT:      Head: Normocephalic and atraumatic.   Eyes:      General: No visual field deficit.     Extraocular Movements: Extraocular movements intact.      Pupils: Pupils are equal, round, and reactive to light.   Pulmonary:      Effort: Pulmonary effort is normal. No respiratory distress.   Musculoskeletal:         General: No swelling. Normal range of motion.      Cervical back: Normal range of motion.      Right lower leg: No edema.      Left lower leg: No edema.   Skin:     General: Skin is warm and dry.   Neurological:      General: No focal deficit present.      Mental Status: She is alert and oriented to person, place, and time.      GCS: GCS eye subscore is 4. GCS verbal subscore is 5. GCS motor subscore is 6.      Cranial Nerves: Cranial nerves 2-12 are intact. No dysarthria or facial asymmetry.      Sensory: Sensation is intact. No sensory deficit.      Motor: Motor function is intact. No weakness, tremor, atrophy, abnormal muscle tone, seizure activity or pronator drift.   Psychiatric:         Mood and Affect: Mood normal.         Behavior: Behavior normal.          NEUROLOGICAL EXAMINATION:     MENTAL STATUS   Oriented to person, place, and time.     CRANIAL NERVES   Cranial nerves II through XII intact.     CN III, IV, VI   Pupils are equal, round, and reactive to light.      Significant Labs: All pertinent lab results from the past 24 hours have been reviewed.    Significant Imaging:  I have reviewed all pertinent imaging results/findings within the past 24 hours.

## 2025-05-16 NOTE — OP NOTE
[facility]                       [address] [id number]     PATIENT NAME:Chelle Chandra    YOB: 1965      DATE OF SURGERY: 5/16/24      SURGEON:  Denis Egan MD     PREOPERATIVE DIAGNOSIS:  Chronic left subdural hematoma     POSTOPERATIVE DIAGNOSIS:  as above     PROCEDURE PERFORMED:    Cerebral angiogram with catheter insertion in the following arteries:   left common carotid artery, left external carotid artery, left middle meningeal artery  Interpretation of images  Embolization of the left middle meningeal artery using coil and glue  Ultrasound-guided right femoral artery access  Right groin access closure with angioseal       LEVEL OF SEDATION:  GA     TOTAL INTRASERVICE SEDATION TIME:  45 minutes.     COMPLICATIONS:  None.    APPROACH:  Right femoral artery      VESSELS SELECTIVELY CATHETERIZED:     Left common carotid artery  Left external carotid artery       DEVICES USED:  6 Fr short sheath  5 Fr Angled catheter  035 glidewire  Headway duo microcatheter  Synchro microwire  Coils Target daniella 2 x 4 mm  Trufil nBCA liquid embolic agent  Angioseal  Micropuncture kit       INDICATION:  59 y.o. years old female with a history of long term anticoagulation use, multiple falls recently was found to have chronic left SDH. She presents for a cerebral angiogram for evaluation and MMA embolization.    DETAILED DESCRIPTION:      Risks/benefits were thoroughly reviewed with patient/family prior to the procedure.  Risks inclusive but not limited to death, stroke, contrast reaction/nephropathy, access/vascular injury, and other unforeseen risks.  Patient/family provided informed consent.  Patient supine on the angio table, groin prepped and draped in routine fashion.  General anesthesia is induced by the anesthesia team.     The right femoral artery was sonographically evaluated and judged to be patent.  Real-time  ultrasound was used to visualize needle entry into the vessel and a permanent image was stored. Using a micropuncture kit modified Seldinger technique,, an arterial sheath was placed the right femoral artery.  Diagnostic catheter telescoping over the 035 glidewire advanced to the arch and double flushed.  Enumerated vessels were then selectively catheterized and angiograms obtained as listed below.  Multiple cervical and intracranial runs were obtained in AP and lateral projection.  The films were reviewed and determined to be of good diagnostic quality.      Progressive embolization of the left middle meningeal artery is performed using coil in frontal branch and glue in parietal branch. Follow up injections demonstrated successful embolization of middle meningeal artery. Microcatheter & Diagnostic catheter and sheath were then withdrawn and hemostasis was obtained with the above closure device.  No immediate complications were noted.  Patient tolerated the procedure well.    Angiograms performed and findings:      Left common carotid artery - cervical/cranial: Unremarkable left common carotid artery and carotid bifurcation.  The left internal carotid artery is patent distally.  There is normal flow and branching noted in left YOLANDA and MCA territory.  There is a ophthalmic artery that is arising from the left internal carotid artery.  The left external carotid artery and its branches appear unremarkable.  Unremarkable capillary and venous phase.  Left external carotid artery-cranial: Unremarkable left external carotid artery and its branches.  The frontal and parietal branches of the left middle meningeal artery are visualized.  There is tortuosity noted in the frontal branch of the left middle meningeal artery.  Left external carotid artery-cranial-delia procedural:  Following coil embolization of the frontal branch and embolization of the parietal branch with the liquid embolic agent, there is near complete  obliteration of the frontal and parietal branches.  Left external carotid artery-cranial-delia procedural view:  The frontal and parietal branches of the left middle meningeal artery are completely obliterated.  There was no evidence of contrast extravasation noted.    Left common carotid artery-cranial:  Following embolization of the left middle meningeal artery, there is patent flow in the intracranial branches of the left internal carotid artery including left MCA and YOLANDA without evidence of distal emboli.  The left ophthalmic artery appears patent.        OVERALL IMPRESSION:  Successful embolization of the left middle meningeal artery using coil and liquid embolic agent        ______________________________  Denis Egan MD  Vascular and Interventional Neurology

## 2025-05-16 NOTE — PLAN OF CARE
Problem: Adult Inpatient Plan of Care  Goal: Plan of Care Review  5/16/2025 0252 by Xavier Beatty RN  Outcome: Progressing  5/16/2025 0252 by Xavier Beatty RN  Outcome: Progressing  Goal: Patient-Specific Goal (Individualized)  5/16/2025 0252 by Xaveir Beatty RN  Outcome: Progressing  5/16/2025 0252 by Xavier Beatty RN  Outcome: Progressing  Goal: Absence of Hospital-Acquired Illness or Injury  5/16/2025 0252 by Xavier Beatty RN  Outcome: Progressing  5/16/2025 0252 by Xavier Beatty RN  Outcome: Progressing  Goal: Optimal Comfort and Wellbeing  5/16/2025 0252 by Xavier Beatty RN  Outcome: Progressing  5/16/2025 0252 by Xavier Beatty RN  Outcome: Progressing  Goal: Readiness for Transition of Care  5/16/2025 0252 by Xavier Beatty RN  Outcome: Progressing  5/16/2025 0252 by Xavier Beatty RN  Outcome: Progressing     Problem: Diabetes Comorbidity  Goal: Blood Glucose Level Within Targeted Range  5/16/2025 0252 by Xavier Beatty RN  Outcome: Progressing  5/16/2025 0252 by Xavier Beatty RN  Outcome: Progressing     Problem: Wound  Goal: Optimal Coping  5/16/2025 0252 by Xavier Beatty RN  Outcome: Progressing  5/16/2025 0252 by Xavier Beatty RN  Outcome: Progressing  Goal: Optimal Functional Ability  5/16/2025 0252 by Xavier Beatty RN  Outcome: Progressing  5/16/2025 0252 by Xavier Beatty RN  Outcome: Progressing  Goal: Absence of Infection Signs and Symptoms  5/16/2025 0252 by Xavier Beatty RN  Outcome: Progressing  5/16/2025 0252 by Xavier Beatty RN  Outcome: Progressing  Goal: Improved Oral Intake  5/16/2025 0252 by Xavier Beatty RN  Outcome: Progressing  5/16/2025 0252 by Xavier Beatty RN  Outcome: Progressing  Goal: Optimal Pain Control and Function  5/16/2025 0252 by Xavier Beatty RN  Outcome:  Progressing  5/16/2025 0252 by Xavier Beatty RN  Outcome: Progressing  Goal: Skin Health and Integrity  5/16/2025 0252 by Xavier Beatty RN  Outcome: Progressing  5/16/2025 0252 by Xavier Beatty RN  Outcome: Progressing  Goal: Optimal Wound Healing  5/16/2025 0252 by Xavier Beatty RN  Outcome: Progressing  5/16/2025 0252 by Xavier Beatty RN  Outcome: Progressing     Problem: Infection  Goal: Absence of Infection Signs and Symptoms  5/16/2025 0252 by Xavier Beatty RN  Outcome: Progressing  5/16/2025 0252 by Xavier Beatty RN  Outcome: Progressing

## 2025-05-16 NOTE — BRIEF OP NOTE
Name: Chelle Chandra  MRN: [id number]  Age: 59 y.o.  Gender: female    Date of Procedure: 05/16/2025    Pre-operative Diagnosis: Chronic left SDH    Post-operative Diagnosis: as above    Procedure: Cerebral angiogram with left middle meningeal artery embolization     Access/Closure: Right femoral artery access closed with angioseal    Surgeon: Denis Egan MD    Anesthesia: GA    EBL: min ml    Description of findings:  - successful embolization of left MMA with coils and glue    Patient condition:   stable    Implant/specimen(s):  Target coil 2 x 4 mm daniella  Trufil Liquid embolic agent    Plan:  - -160  - bed rest x 2 hrs with HOB 30 degrees  - PT/OT tomorrow  - follow up with Dr Egan in 1 month with head CT      Denis Egan MD  Interventional Neurology

## 2025-05-16 NOTE — ANESTHESIA PROCEDURE NOTES
Intubation    Date/Time: 5/16/2025 12:07 PM    Performed by: Shantal Saleh CRNA  Authorized by: Fab Mendoza MD    Intubation:     Induction:  Intravenous    Intubated:  Postinduction    Mask Ventilation:  Easy mask    Attempts:  1    Attempted By:  CRNA    Method of Intubation:  Direct    Blade:  Lang 2    Laryngeal View Grade: Grade I - full view of cords      Difficult Airway Encountered?: No      Complications:  None    Airway Device:  Oral endotracheal tube    Airway Device Size:  7.0    Style/Cuff Inflation:  Cuffed (inflated to minimal occlusive pressure) (inflated to 64fnR20, verified with manometer)    Inflation Amount (mL):  6    Tube secured:  21    Secured at:  The lips    Placement Verified By:  Capnometry    Complicating Factors:  None    Findings Post-Intubation:  BS equal bilateral and atraumatic/condition of teeth unchanged

## 2025-05-16 NOTE — PLAN OF CARE
05/16/25 1402   Discharge Assessment   Assessment Type Discharge Planning Assessment   Confirmed/corrected address, phone number and insurance Yes   Confirmed Demographics Correct on Facesheet   Source of Information family   When was your last doctors appointment?   (Dr. Jorge Silver - Feb.)   Communicated DARIAN with patient/caregiver Date not available/Unable to determine   Reason For Admission SDA, Hygroma, Falls   People in Home spouse   Do you expect to return to your current living situation? Yes   Do you have help at home or someone to help you manage your care at home? Yes   Prior to hospitilization cognitive status: Unable to Assess   Current cognitive status: Unable to Assess  (Patient in a procedure.  Discussed with family.)   Walking or Climbing Stairs Difficulty yes   Walking or Climbing Stairs ambulation difficulty, requires equipment   Mobility Management rollator, walker, grab bars   Dressing/Bathing Difficulty no   Equipment Currently Used at Home rollator;walker, standard;grab bar   Readmission within 30 days? No   Patient currently being followed by outpatient case management? No   Do you currently have service(s) that help you manage your care at home? No   Do you take prescription medications? Yes   Do you have prescription coverage? Yes   Do you have any problems affording any of your prescribed medications? No   Is the patient taking medications as prescribed? yes   Who is going to help you get home at discharge? Spouse - Roberto Prateek [phone]   How do you get to doctors appointments? family or friend will provide   Are you on dialysis? No   Do you take coumadin? No   Discharge Plan A Home with family   Discharge Plan B Home Health   DME Needed Upon Discharge  other (see comments)  (TBD)   Discharge Plan discussed with: Adult children   Transition of Care Barriers None   OTHER   Name(s) of People in Home Spouse - Roberto Chandra [phone]     Patient admitted for SDH, hygroma, and  frequent falls.  PCP is Dr. Jorge Silver.  Pharmacy of choice is [facility] in [place]. Will follow for discharge planning needs.

## 2025-05-16 NOTE — ANESTHESIA PREPROCEDURE EVALUATION
05/16/2025  Chelle Chandra is a 59 y.o., female.    59-year-old female with a past medical history of prior CVA, diabetes mellitus, tobacco dependence presenting after an outpatient CT head showed subdural hematoma.  Patient has had 3 falls alone in the month of May and had 1 fall in the month of April.  She states the most recent fall was a proximally 3 weeks ago.  Says her legs have been wobbling lately and while she should be ambulating with her Rollator she tends to hold onto the walls in her home.  The most recent fall occurred when she was ambulating in this way however she fell forward but denies any head trauma/syncope. States she was able to catch herself with her hands.  Patient takes her blood pressure at home on a regular basis and states her systolic blood pressure is never in the 100s usually systolic in the 70s.     Past Medical History:   Diagnosis Date    Accelerated junctional rhythm     Agatston CAC score, <100     Anxiety disorder, unspecified     COPD type A     Diabetes mellitus without complication     GERD (gastroesophageal reflux disease)     History of COVID-19     Insomnia     Lung nodule      Past Surgical History:   Procedure Laterality Date    ANGIOGRAM, CORONARY, WITH LEFT HEART CATHETERIZATION      BREAST LUMPECTOMY Right     CARDIOVERSION  08/01/2013    CARPAL TUNNEL RELEASE  10/23/2013    FUSION OF CERVICAL SPINE BY ANTERIOR APPROACH USING COMPUTER-ASSISTED NAVIGATION  06/10/2019    KIDNEY SURGERY      TONSILLECTOMY AND ADENOIDECTOMY      TOTAL ABDOMINAL HYSTERECTOMY      WRIST SURGERY       Facility-Administered Medications as of 5/16/2025   Medication Dose Route Frequency Provider Last Rate Last Admin    acetaminophen tablet 650 mg  650 mg Oral Q4H PRN Reyes, Thairy G, DO   650 mg at 05/16/25 0905    albuterol-ipratropium 2.5 mg-0.5 mg/3 mL nebulizer solution 3 mL  3 mL  Nebulization Q6H PRN Reyes, Thairy G, DO        aluminum-magnesium hydroxide-simethicone 200-200-20 mg/5 mL suspension 30 mL  30 mL Oral QID PRN Reyes, Thairy G, DO        atorvastatin tablet 80 mg  80 mg Oral Daily Reyes, Thairy G, DO   80 mg at 05/16/25 0905    bisacodyL suppository 10 mg  10 mg Rectal Daily PRN Reyes, Thairy G, DO        busPIRone tablet 15 mg  15 mg Oral BID Reyes, Thairy G, DO   15 mg at 05/16/25 0905    dextrose 50% injection 12.5 g  12.5 g Intravenous PRN Reyes, Thairy G, DO        dextrose 50% injection 25 g  25 g Intravenous PRN Reyes, Thairy G, DO        ezetimibe tablet 10 mg  10 mg Oral Daily Reyes, Thairy G, DO   10 mg at 05/16/25 0905    glucagon (human recombinant) injection 1 mg  1 mg Intramuscular PRN Reyes, Thairy G, DO        glucose chewable tablet 16 g  16 g Oral PRN Reyes, Thairy G, DO        glucose chewable tablet 24 g  24 g Oral PRN Reyes, Thairy G, DO        HYDROcodone-acetaminophen 5-325 mg per tablet 1 tablet  1 tablet Oral Q6H PRN Reyes, Thairy G, DO        insulin aspart U-100 injection 0-5 Units  0-5 Units Subcutaneous QID (AC + HS) PRN Reyes, Thairy G, DO        levothyroxine tablet 88 mcg  88 mcg Oral Before breakfast Reyes, Thairy G, DO   88 mcg at 05/16/25 0608    melatonin tablet 9 mg  9 mg Oral Nightly PRN Reyes, Thairy G, DO        metoprolol succinate (TOPROL-XL) 24 hr split tablet 12.5 mg  12.5 mg Oral Daily Reyes, Thairy G, DO   12.5 mg at 05/16/25 0905    morphine injection 2 mg  2 mg Intravenous Q6H PRN Reyes, Thairy G, DO        naloxone 0.4 mg/mL injection 0.02 mg  0.02 mg Intravenous PRN Reyes, Thairy G, DO        nortriptyline capsule 25 mg  25 mg Oral QHS Reyes, Thairy G, DO        ondansetron disintegrating tablet 8 mg  8 mg Oral Q8H PRN Reyes, Thairy G, DO        ondansetron injection 4 mg  4 mg Intravenous Q8H PRN Reyes, Thairy G, DO        paroxetine tablet 20 mg  20 mg Oral QAM Reyes, Thairy G, DO   20 mg at 05/16/25 0608    polyethylene glycol  packet 17 g  17 g Oral TID PRN Reyes, Thairy G, DO        simethicone chewable tablet 80 mg  1 tablet Oral QID PRN Reyes, Thairy G, DO        sodium chloride 0.9% flush 10 mL  10 mL Intravenous PRN Reyes, Thairy G, DO        traZODone tablet 100 mg  100 mg Oral Nightly PRN Reyes, Thairy G, DO         No current outpatient medications on file as of 5/16/2025.     6/2/24 TTE:    Left Ventricle: The left ventricle is normal in size. Normal wall thickness. There is normal systolic function with a visually estimated ejection fraction of 55 - 60%. Grade I diastolic dysfunction.    Right Ventricle: Normal right ventricular cavity size. Systolic function is normal. TAPSE is 1.83 cm.    Left Atrium: Agitated saline study of the atrial septum is negative, suggesting absence of intracardiac shunt at the atrial level. Atrial septum is bulging to the right.    Aortic Valve: The aortic valve is structurally normal.    Mitral Valve: The mitral valve is structurally normal. There is trace regurgitation.    Tricuspid Valve: The tricuspid valve is structurally normal.    Pulmonary Artery: The estimated pulmonary artery systolic pressure is 26 mmHg.    IVC/SVC: Elevated venous pressure at 15 mmHg.         Pre-op Assessment    I have reviewed the Patient Summary Reports.     I have reviewed the Nursing Notes. I have reviewed the NPO Status.   I have reviewed the Medications.     Review of Systems  Anesthesia Hx:  No problems with previous Anesthesia                Social:  Smoker       Cardiovascular:     Hypertension                                          Pulmonary:   COPD                     Hepatic/GI:     GERD                Neurological:   CVA                                    Endocrine:  Diabetes           Psych:   anxiety                 Physical Exam  General: Cooperative, Alert and Oriented    Airway:  Mallampati: II   Mouth Opening: Small, but > 3cm  TM Distance: Normal  Tongue: Normal  Neck ROM: Normal  ROM    Dental:  Dentures        Anesthesia Plan  Type of Anesthesia, risks & benefits discussed:    Anesthesia Type: Gen ETT  Intra-op Monitoring Plan: Standard ASA Monitors  Post Op Pain Control Plan: multimodal analgesia  Induction:  IV  Airway Plan: Direct, Post-Induction  Informed Consent: Informed consent signed with the Patient and all parties understand the risks and agree with anesthesia plan.  All questions answered.   ASA Score: 3  Day of Surgery Review of History & Physical: H&P Update referred to the surgeon/provider.I have interviewed and examined the patient. I have reviewed the patient's H&P dated: There are no significant changes.     Ready For Surgery From Anesthesia Perspective.     .

## 2025-05-16 NOTE — SEDATION DOCUMENTATION
Report called to primary nurse.   Report given bedside to PACU nurse. Transported to PACU by CRNA &RN   Pt returned to bed. Groin access site free of bleeding or hematoma.  Dentures sent with patient.

## 2025-05-16 NOTE — ANESTHESIA POSTPROCEDURE EVALUATION
Anesthesia Post Evaluation    Patient: Chelle Chandra    Procedure(s) Performed: * No procedures listed *    Final Anesthesia Type: MAC      Patient location during evaluation: floor  Patient participation: Yes- Able to Participate  Level of consciousness: awake  Post-procedure vital signs: reviewed and stable  Pain management: adequate  Airway patency: patent  SARAH mitigation strategies: Multimodal analgesia  PONV status at discharge: No PONV  Anesthetic complications: no      Cardiovascular status: hemodynamically stable  Respiratory status: spontaneous ventilation  Hydration status: euvolemic  Follow-up not needed.              Vitals Value Taken Time   /85 05/16/25 13:41   Temp 97.6 05/16/25 13:46   Pulse 75 05/16/25 13:45   Resp 15 05/16/25 13:45   SpO2 96 % 05/16/25 13:45   Vitals shown include unfiled device data.      No case tracking events are documented in the log.      Pain/Bob Score: Pain Rating Prior to Med Admin: 7 (5/16/2025  1:32 PM)  Pain Rating Post Med Admin: 0 (5/15/2025 11:26 PM)  Bob Score: 7 (5/16/2025  1:06 PM)

## 2025-05-16 NOTE — CONSULTS
"[facility] - [facility]  Neurology  Consult Note    Patient Name: Chelle Chandra  MRN: [id number]  Admission Date: 5/15/2025  Hospital Length of Stay: 1 days  Code Status: Full Code   Attending Provider: Curry Payton MD   Consulting Provider: LUIS So  Primary Care Physician: Jorge Silver MD  Principal Problem:Subdural hematoma    IP CONSULT TO NEURO-INTERVENTIONAL  Consult performed by: Nadege Greenberg FNP  Consult ordered by: Reyes, Thairy G, DO         Subjective:     Chief Complaint:    Chief Complaint   Patient presents with    Medical Evaluation     Pt presents to ED. Pt had outpatient CT yesterday showing a brain bleed. Pt hs been falling more frequently over the last few months with 3 falls in the last 30 days. GCS 15 in triage.          HPI:   Chelle Chandra is a 59 y.o. female with past medical history significant for DM, previous CVA, and tobacco dependence who presented to the ED on 5/15/2025 after having an outpatient CTA which showed new subdural hemorrhage.  She was seen last June by the stroke team after presenting to the hospital with right facial droop, right arm and leg weakness and dysarthria, and imaging revealing left carotid occlusion.  She underwent thrombectomy s/p aspiration x 2 with TICI 2c reperfusion, and post-procedure MRI was consistent with widespread acute ischemic infarct involving the left MCA distribution.  She has been having frequent falls and was being seen in the stroke clinic for follow-up.  She is admitted to the hospitalist service and interventional neurology was consulted for MMA embolization.      On exam today (5/16/2025), the patient was seen lying in bed with complaints of a mild headache described as pressure behind her eyes.  Her daughter at the bedside reported that the patient has been having "floaters" in her left eye and is seeing an ophthalmologist for this.  She has no focal neurological deficits on exam and is scheduled for " an MMA embolization later today with Dr. Egan.     Past Medical History:   Diagnosis Date    Accelerated junctional rhythm     Agatston CAC score, <100     Anxiety disorder, unspecified     COPD type A     Diabetes mellitus without complication     GERD (gastroesophageal reflux disease)     History of COVID-19     Insomnia     Lung nodule        Past Surgical History:   Procedure Laterality Date    ANGIOGRAM, CORONARY, WITH LEFT HEART CATHETERIZATION      BREAST LUMPECTOMY Right     CARDIOVERSION  08/01/2013    CARPAL TUNNEL RELEASE  10/23/2013    FUSION OF CERVICAL SPINE BY ANTERIOR APPROACH USING COMPUTER-ASSISTED NAVIGATION  06/10/2019    KIDNEY SURGERY      TONSILLECTOMY AND ADENOIDECTOMY      TOTAL ABDOMINAL HYSTERECTOMY      WRIST SURGERY         Review of patient's allergies indicates:   Allergen Reactions    Aspirin     Ketorolac     Penicillins     Sulfa (sulfonamide antibiotics)     Ozempic [semaglutide] Other (See Comments)     Abdominal pain       Current Neurological Medications:     No current facility-administered medications on file prior to encounter.     Current Outpatient Medications on File Prior to Encounter   Medication Sig    atorvastatin (LIPITOR) 80 MG tablet Take 1 tablet (80 mg total) by mouth once daily.    busPIRone (BUSPAR) 15 MG tablet Take 1 tablet (15 mg total) by mouth 2 (two) times daily.    clopidogreL (PLAVIX) 75 mg tablet Take 1 tablet (75 mg total) by mouth once daily.    ezetimibe (ZETIA) 10 mg tablet Take 1 tablet by mouth once daily    FARXIGA 5 mg Tab tablet Take 1 tablet by mouth once daily    levothyroxine (SYNTHROID) 100 MCG tablet TAKE 1 TABLET BY MOUTH BEFORE BREAKFAST    metFORMIN (GLUCOPHAGE) 500 MG tablet TAKE 1 TABLET BY MOUTH TWICE DAILY WITH MEALS    metoprolol succinate (TOPROL-XL) 25 MG 24 hr tablet Take 12.5 mg by mouth.    nortriptyline (PAMELOR) 25 MG capsule TAKE 1 CAPSULE BY MOUTH IN THE EVENING    paroxetine (PAXIL) 20 MG tablet TAKE 1 TABLET BY MOUTH  "ONCE DAILY IN THE MORNING    traZODone (DESYREL) 100 MG tablet Take 1 tablet (100 mg total) by mouth nightly as needed for Insomnia.    butalbital-aspirin-caffeine -40 mg (FIORINAL) -40 mg Cap Take 1 capsule by mouth every 4 (four) hours as needed.    fluticasone propionate (FLOVENT DISKUS) 250 mcg/actuation DsDv Inhale 2 puffs into the lungs 2 (two) times daily. Controller    ondansetron (ZOFRAN) 8 MG tablet Take 1 tablet (8 mg total) by mouth every 6 (six) hours as needed for Nausea.    pen needle, diabetic 32 gauge x 5/32" Ndle 1 each by Misc.(Non-Drug; Combo Route) route once a week.    VENTOLIN HFA 90 mcg/actuation inhaler Inhale 2 puffs into the lungs every 4 (four) hours as needed.     Family History       Problem Relation (Age of Onset)    Diabetes Father    Heart attack Mother          Tobacco Use    Smoking status: Every Day     Current packs/day: 0.50     Types: Cigarettes    Smokeless tobacco: Never   Substance and Sexual Activity    Alcohol use: Never    Drug use: Never    Sexual activity: Not on file     Review of Systems   Constitutional:  Negative for diaphoresis, fatigue and fever.   HENT:  Negative for trouble swallowing.    Eyes:  Positive for visual disturbance. Negative for photophobia.        Reports of intermittent "floaters" in the left eye   Cardiovascular:  Negative for chest pain, palpitations and leg swelling.   Gastrointestinal:  Negative for nausea and vomiting.   Musculoskeletal:  Positive for gait problem.        Patient reports frequent falls with balance issues   Neurological:  Positive for headaches. Negative for dizziness, tremors, seizures, syncope, facial asymmetry, speech difficulty, weakness, light-headedness and numbness.        Patient reports mild headache described as pressure behind her eyes   Psychiatric/Behavioral:  Negative for confusion and sleep disturbance.    All other systems reviewed and are negative.    Objective:     Vital Signs (Most " Recent):  Temp: 97.6 °F (36.4 °C) (05/16/25 0735)  Pulse: 78 (05/16/25 0735)  Resp: 18 (05/15/25 2154)  BP: 112/77 (05/16/25 0735)  SpO2: 96 % (05/16/25 0735) Vital Signs (24h Range):  Temp:  [97.4 °F (36.3 °C)-97.8 °F (36.6 °C)] 97.6 °F (36.4 °C)  Pulse:  [71-94] 78  Resp:  [17-21] 18  SpO2:  [93 %-99 %] 96 %  BP: ()/(65-77) 112/77     Weight: 49 kg (108 lb 0.4 oz)  Body mass index is 17.44 kg/m².     Physical Exam  Vitals and nursing note reviewed.   Constitutional:       General: She is not in acute distress.     Appearance: Normal appearance. She is not toxic-appearing.   HENT:      Head: Normocephalic and atraumatic.   Eyes:      General: No visual field deficit.     Extraocular Movements: Extraocular movements intact.      Pupils: Pupils are equal, round, and reactive to light.   Pulmonary:      Effort: Pulmonary effort is normal. No respiratory distress.   Musculoskeletal:         General: No swelling. Normal range of motion.      Cervical back: Normal range of motion.      Right lower leg: No edema.      Left lower leg: No edema.   Skin:     General: Skin is warm and dry.   Neurological:      General: No focal deficit present.      Mental Status: She is alert and oriented to person, place, and time.      GCS: GCS eye subscore is 4. GCS verbal subscore is 5. GCS motor subscore is 6.      Cranial Nerves: Cranial nerves 2-12 are intact. No dysarthria or facial asymmetry.      Sensory: Sensation is intact. No sensory deficit.      Motor: Motor function is intact. No weakness, tremor, atrophy, abnormal muscle tone, seizure activity or pronator drift.   Psychiatric:         Mood and Affect: Mood normal.         Behavior: Behavior normal.          NEUROLOGICAL EXAMINATION:     MENTAL STATUS   Oriented to person, place, and time.     CRANIAL NERVES   Cranial nerves II through XII intact.     CN III, IV, VI   Pupils are equal, round, and reactive to light.      Significant Labs: All pertinent lab results from  the past 24 hours have been reviewed.    Significant Imaging: I have reviewed all pertinent imaging results/findings within the past 24 hours.  Assessment and Plan:     * Subdural hematoma  - presented with frequent falls, incidental finding of SDH  - Stroke RF: DM, smoker, HLD, previous CVA  - Intervention: MMA embolization today (5/16/2025) with Dr. Egan    Stroke workup:  -CTh: Similar left-sided subdural hematoma   -CTA h/n: No large vessel occlusion or flow-limiting stenosis. New intermediate density subdural hemorrhage along the left cerebral convexity.  No midline shift.      -home medications include: Plavix, atorvastatin, ezetimibe        Plan:  - MMA embolization planned for today with Dr. Egan  - continue Atorvastatin 80mg daily  - avoid anticoagulation/antiplatelets at this time  - SBP < 160  - Further recommendations to follow post-procedure from MD          VTE Risk Mitigation (From admission, onward)           Ordered     IP VTE LOW RISK PATIENT  Once         05/15/25 2217     Place sequential compression device  Until discontinued         05/15/25 2217                        ILA So  Neurology  [facility]

## 2025-05-16 NOTE — PROGRESS NOTES
[facility] Hospital Medicine Progress Note        Chief Complaint: Inpatient Follow-up     HPI:   59-year-old female with a past medical history of prior CVA, diabetes mellitus, tobacco dependence presenting after an outpatient CT head showed subdural hematoma.  Patient has had 3 falls alone in the month of May and had 1 fall in the month of April.  She states the most recent fall was a proximally 3 weeks ago.  Says her legs have been wobbling lately and while she should be ambulating with her Rollator she tends to hold onto the walls in her home.  The most recent fall occurred when she was ambulating in this way however she fell forward but denies any head trauma/syncope. States she was able to catch herself with her hands.  Patient takes her blood pressure at home on a regular basis and states her systolic blood pressure is never in the 100s usually systolic in the 70s. At baseline patient lives with her .    Interval Hx:     Some headache. Planned for MMA embolization today by neurology.  Prn pain meds    Case was discussed with patient's nurse and  on the floor.    Objective/physical exam:  General: In no acute distress, afebrile  Chest: Clear to auscultation bilaterally  Heart: RRR, +S1, S2, no appreciable murmur  Abdomen: Soft, nontender, BS +  MSK: Warm, no lower extremity edema, no clubbing or cyanosis  Neurologic: Alert and oriented, moving all extremities with good strength    VITAL SIGNS: 24 HRS MIN & MAX LAST   Temp  Min: 97.4 °F (36.3 °C)  Max: 97.8 °F (36.6 °C) 97.6 °F (36.4 °C)   BP  Min: 99/69  Max: 118/75 112/77   Pulse  Min: 71  Max: 94  78   Resp  Min: 17  Max: 21 18   SpO2  Min: 93 %  Max: 99 % 96 %     I have reviewed the following labs:  Recent Labs   Lab 05/15/25  1514   WBC 6.96   RBC 4.66   HGB 14.0   HCT 42.1   MCV 90.3   MCH 30.0   MCHC 33.3   RDW 14.0      MPV 10.0     Recent Labs   Lab 05/15/25  1514      K 4.1      CO2  24   BUN 14.6   CREATININE 0.82   *   CALCIUM 9.8   ALBUMIN 4.0   PROT 7.4   ALKPHOS 157*   ALT 56*   AST 24   BILITOT 0.7     Microbiology Results (last 7 days)       ** No results found for the last 168 hours. **             See below for Radiology    CTA head and neck 5/14 1. No large vessel occlusion or flow-limiting stenosis.  2. New intermediate density subdural hemorrhage along the left cerebral convexity.  No midline shift.  CT head 5/15 Similar left-sided subdural hematoma.     Assessment/Plan:    Subdural hematoma likely related to below  - s/p Embolization of the left middle meningeal artery using coil and liquid embolic agent 5/16/25  Frequent falls  Hypothyroidism   NIDDM  Elevated ALP    history of prior CVA, diabetes mellitus, tobacco dependence      Plan  noncontrast CT head showed left-sided subdural hematoma similar in size to outpatient CT  on Plavix at home for a recent CVA; states she is not taking aspirin at this time but she is taking migraine headache medication with aspirin in it; hold all antiplatelet therapy  Neuro checks q2    Neurosurgery No plans for surgical intervention at this time   Interventional neurology   - s/p Embolization of the left middle meningeal artery using coil and glue 5/16/25  - -160  - bed rest x 2 hrs with HOB 30 degrees  - PT/OT tomorrow  - hold plavix x 2 weeks   - follow up with Dr Egan in 1 month with head CT    Frequent falls, BP soft since arrival, PT/OT eval on 5/17/25  Resumed patient's metoprolol with hold parameters     Elevated ; also with a concurrent elevated GGT; monitor  right upper quadrant ultrasound 5/16 - No significant sonographic abnormality of the right upper quadrant.     Hypothyroidism; TSH low, reduce her dose of levothyroxine from 100 to 88 mcg   -repeat TSH in 4-6 weeks     NIDDM, hold home Farxiga, metformin  sliding scale insulin; hemoglobin A1c = 7.3  Resume home meds as appropriate    Labs am    VTE prophylaxis:  SCD in the setting of subdural hematoma    Patient condition:  Fair    Anticipated discharge and Disposition:         All diagnosis and differential diagnosis have been reviewed; assessment and plan has been documented; I have personally reviewed the labs and test results that are presently available; I have reviewed the patients medication list; I have reviewed the consulting providers response and recommendations. I have reviewed or attempted to review medical records based upon their availability    All of the patient's questions have been  addressed and answered. Patient's is agreeable to the above stated plan. I will continue to monitor closely and make adjustments to medical management as needed.    Portions of this note dictated using EMR integrated voice recognition software, and may be subject to voice recognition errors not corrected at proofreading. Please contact writer for clarification if needed.   _____________________________________________________________________    Malnutrition Status:  Nutrition consulted. Most recent weight and BMI monitored-     Measurements:  Wt Readings from Last 1 Encounters:   05/15/25 49 kg (108 lb 0.4 oz)   Body mass index is 17.44 kg/m².    Patient has been screened and assessed by RD.    Malnutrition Type:  Context:    Level:      Malnutrition Characteristic Summary:       Interventions/Recommendations (treatment strategy):        Scheduled Med:   atorvastatin  80 mg Oral Daily    busPIRone  15 mg Oral BID    ezetimibe  10 mg Oral Daily    levothyroxine  88 mcg Oral Before breakfast    metoprolol succinate  12.5 mg Oral Daily    nortriptyline  25 mg Oral QHS    paroxetine  20 mg Oral QAM      Continuous Infusions:     PRN Meds:    Current Facility-Administered Medications:     acetaminophen, 650 mg, Oral, Q4H PRN    albuterol-ipratropium, 3 mL, Nebulization, Q6H PRN    aluminum-magnesium hydroxide-simethicone, 30 mL, Oral, QID PRN    bisacodyL, 10 mg, Rectal, Daily  PRN    dextrose 50%, 12.5 g, Intravenous, PRN    dextrose 50%, 25 g, Intravenous, PRN    glucagon (human recombinant), 1 mg, Intramuscular, PRN    glucose, 16 g, Oral, PRN    glucose, 24 g, Oral, PRN    HYDROcodone-acetaminophen, 1 tablet, Oral, Q6H PRN    insulin aspart U-100, 0-5 Units, Subcutaneous, QID (AC + HS) PRN    melatonin, 9 mg, Oral, Nightly PRN    morphine, 2 mg, Intravenous, Q6H PRN    naloxone, 0.02 mg, Intravenous, PRN    ondansetron, 8 mg, Oral, Q8H PRN    ondansetron, 4 mg, Intravenous, Q8H PRN    polyethylene glycol, 17 g, Oral, TID PRN    simethicone, 1 tablet, Oral, QID PRN    sodium chloride 0.9%, 10 mL, Intravenous, PRN    traZODone, 100 mg, Oral, Nightly PRN     Radiology:  I have personally reviewed the following imaging and agree with the radiologist.     US Abdomen Limited  Narrative: EXAMINATION:  US ABDOMEN LIMITED    CLINICAL HISTORY:  RUQ/ Elevated ALP, GGT;    COMPARISON:  2 November 2022.    FINDINGS:  Grayscale, color and spectral doppler evaluation of the right upper quadrant.    Pancreas obscured by bowel gas.  Imaged portions of the IVC normal in caliber.    Liver is not significantly enlarged. No focal liver lesion. Normal hepatopetal flow is noted in the portal vein.    No gallstones. No significant gallbladder wall thickening or pericholecystic fluid.  The common bile duct is normal in caliber  and measures 4 mm.    Right kidney measures 9 cm in length. No hydronephrosis.  Impression: No significant sonographic abnormality of the right upper quadrant.    Electronically signed by: Wally Lam  Date:    05/16/2025  Time:    08:31      Curry Payton MD  Department of Hospital Medicine   [facility]   05/16/2025

## 2025-05-16 NOTE — HPI
"Chelle Chandra is a 59 y.o. female with past medical history significant for DM, previous CVA, and tobacco dependence who presented to the ED on 5/15/2025 after having an outpatient CTA which showed new subdural hemorrhage.  She was seen last June by the stroke team after presenting to the hospital with right facial droop, right arm and leg weakness and dysarthria, and imaging revealing left carotid occlusion.  She underwent thrombectomy s/p aspiration x 2 with TICI 2c reperfusion, and post-procedure MRI was consistent with widespread acute ischemic infarct involving the left MCA distribution.  She has been having frequent falls and was being seen in the stroke clinic for follow-up.  She is admitted to the hospitalist service and interventional neurology was consulted for MMA embolization.      On exam today (5/16/2025), the patient was seen lying in bed with complaints of a mild headache described as pressure behind her eyes.  Her daughter at the bedside reported that the patient has been having "floaters" in her left eye and is seeing an ophthalmologist for this.  She has no focal neurological deficits on exam and is scheduled for an MMA embolization later today with Dr. Egan.  "

## 2025-05-17 LAB
ALBUMIN SERPL-MCNC: 3.6 G/DL (ref 3.4–4.8)
ALBUMIN/GLOB SERPL: 1.2 RATIO (ref 1.1–2)
ALP SERPL-CCNC: 139 UNIT/L (ref 40–150)
ALT SERPL-CCNC: 40 UNIT/L (ref 0–55)
ANION GAP SERPL CALC-SCNC: 10 MEQ/L
AST SERPL-CCNC: 16 UNIT/L (ref 11–45)
BILIRUB SERPL-MCNC: 0.7 MG/DL
BUN SERPL-MCNC: 18.3 MG/DL (ref 9.8–20.1)
CALCIUM SERPL-MCNC: 9.2 MG/DL (ref 8.4–10.2)
CHLORIDE SERPL-SCNC: 104 MMOL/L (ref 98–107)
CO2 SERPL-SCNC: 23 MMOL/L (ref 23–31)
CREAT SERPL-MCNC: 0.72 MG/DL (ref 0.55–1.02)
CREAT/UREA NIT SERPL: 25
ERYTHROCYTE [DISTWIDTH] IN BLOOD BY AUTOMATED COUNT: 13.5 % (ref 11.5–17)
GFR SERPLBLD CREATININE-BSD FMLA CKD-EPI: >60 ML/MIN/1.73/M2
GLOBULIN SER-MCNC: 3.1 GM/DL (ref 2.4–3.5)
GLUCOSE SERPL-MCNC: 131 MG/DL (ref 82–115)
GLUCOSE SERPL-MCNC: 226 MG/DL (ref 70–110)
HCT VFR BLD AUTO: 39.3 % (ref 37–47)
HGB BLD-MCNC: 12.9 G/DL (ref 12–16)
MAGNESIUM SERPL-MCNC: 1.9 MG/DL (ref 1.6–2.6)
MCH RBC QN AUTO: 29.5 PG (ref 27–31)
MCHC RBC AUTO-ENTMCNC: 32.8 G/DL (ref 33–36)
MCV RBC AUTO: 89.7 FL (ref 80–94)
NRBC BLD AUTO-RTO: 0 %
PLATELET # BLD AUTO: 379 X10(3)/MCL (ref 130–400)
PMV BLD AUTO: 10 FL (ref 7.4–10.4)
POCT GLUCOSE: 187 MG/DL (ref 70–110)
POCT GLUCOSE: 187 MG/DL (ref 70–110)
POCT GLUCOSE: 229 MG/DL (ref 70–110)
POTASSIUM SERPL-SCNC: 4.5 MMOL/L (ref 3.5–5.1)
PROT SERPL-MCNC: 6.7 GM/DL (ref 5.8–7.6)
RBC # BLD AUTO: 4.38 X10(6)/MCL (ref 4.2–5.4)
SODIUM SERPL-SCNC: 137 MMOL/L (ref 136–145)
WBC # BLD AUTO: 8.79 X10(3)/MCL (ref 4.5–11.5)

## 2025-05-17 PROCEDURE — 80053 COMPREHEN METABOLIC PANEL: CPT | Performed by: INTERNAL MEDICINE

## 2025-05-17 PROCEDURE — 99233 SBSQ HOSP IP/OBS HIGH 50: CPT | Mod: ,,, | Performed by: STUDENT IN AN ORGANIZED HEALTH CARE EDUCATION/TRAINING PROGRAM

## 2025-05-17 PROCEDURE — 97116 GAIT TRAINING THERAPY: CPT

## 2025-05-17 PROCEDURE — 21400001 HC TELEMETRY ROOM

## 2025-05-17 PROCEDURE — 83735 ASSAY OF MAGNESIUM: CPT | Performed by: INTERNAL MEDICINE

## 2025-05-17 PROCEDURE — 85027 COMPLETE CBC AUTOMATED: CPT | Performed by: INTERNAL MEDICINE

## 2025-05-17 PROCEDURE — 99232 SBSQ HOSP IP/OBS MODERATE 35: CPT | Mod: FS,,, | Performed by: PSYCHIATRY & NEUROLOGY

## 2025-05-17 PROCEDURE — 97165 OT EVAL LOW COMPLEX 30 MIN: CPT

## 2025-05-17 PROCEDURE — 25000003 PHARM REV CODE 250: Performed by: NURSE PRACTITIONER

## 2025-05-17 PROCEDURE — 63600175 PHARM REV CODE 636 W HCPCS: Performed by: STUDENT IN AN ORGANIZED HEALTH CARE EDUCATION/TRAINING PROGRAM

## 2025-05-17 PROCEDURE — 97161 PT EVAL LOW COMPLEX 20 MIN: CPT

## 2025-05-17 PROCEDURE — 11000001 HC ACUTE MED/SURG PRIVATE ROOM

## 2025-05-17 PROCEDURE — 25000003 PHARM REV CODE 250: Performed by: STUDENT IN AN ORGANIZED HEALTH CARE EDUCATION/TRAINING PROGRAM

## 2025-05-17 PROCEDURE — 36415 COLL VENOUS BLD VENIPUNCTURE: CPT | Performed by: INTERNAL MEDICINE

## 2025-05-17 RX ORDER — HYDROCODONE BITARTRATE AND ACETAMINOPHEN 5; 325 MG/1; MG/1
1 TABLET ORAL EVERY 4 HOURS PRN
Refills: 0 | Status: DISCONTINUED | OUTPATIENT
Start: 2025-05-17 | End: 2025-05-22 | Stop reason: HOSPADM

## 2025-05-17 RX ORDER — HEPARIN SODIUM 5000 [USP'U]/ML
5000 INJECTION, SOLUTION INTRAVENOUS; SUBCUTANEOUS EVERY 8 HOURS
Status: DISCONTINUED | OUTPATIENT
Start: 2025-05-17 | End: 2025-05-19

## 2025-05-17 RX ADMIN — HEPARIN SODIUM 5000 UNITS: 5000 INJECTION, SOLUTION INTRAVENOUS; SUBCUTANEOUS at 09:05

## 2025-05-17 RX ADMIN — PAROXETINE HYDROCHLORIDE 20 MG: 20 TABLET, FILM COATED ORAL at 06:05

## 2025-05-17 RX ADMIN — NORTRIPTYLINE HYDROCHLORIDE 25 MG: 25 CAPSULE ORAL at 08:05

## 2025-05-17 RX ADMIN — HYDROCODONE BITARTRATE AND ACETAMINOPHEN 1 TABLET: 5; 325 TABLET ORAL at 02:05

## 2025-05-17 RX ADMIN — HEPARIN SODIUM 5000 UNITS: 5000 INJECTION, SOLUTION INTRAVENOUS; SUBCUTANEOUS at 02:05

## 2025-05-17 RX ADMIN — LEVOTHYROXINE SODIUM 88 MCG: 0.09 TABLET ORAL at 06:05

## 2025-05-17 RX ADMIN — BUSPIRONE HYDROCHLORIDE 15 MG: 5 TABLET ORAL at 09:05

## 2025-05-17 RX ADMIN — ATORVASTATIN CALCIUM 80 MG: 40 TABLET, FILM COATED ORAL at 09:05

## 2025-05-17 RX ADMIN — HYDROCODONE BITARTRATE AND ACETAMINOPHEN 1 TABLET: 5; 325 TABLET ORAL at 01:05

## 2025-05-17 RX ADMIN — HYDROCODONE BITARTRATE AND ACETAMINOPHEN 1 TABLET: 5; 325 TABLET ORAL at 09:05

## 2025-05-17 RX ADMIN — EZETIMIBE 10 MG: 10 TABLET ORAL at 09:05

## 2025-05-17 RX ADMIN — METOPROLOL SUCCINATE 12.5 MG: 25 TABLET, EXTENDED RELEASE ORAL at 09:05

## 2025-05-17 RX ADMIN — HYDROCODONE BITARTRATE AND ACETAMINOPHEN 1 TABLET: 5; 325 TABLET ORAL at 08:05

## 2025-05-17 RX ADMIN — BUSPIRONE HYDROCHLORIDE 15 MG: 5 TABLET ORAL at 08:05

## 2025-05-17 RX ADMIN — ACETAMINOPHEN 650 MG: 325 TABLET ORAL at 06:05

## 2025-05-17 NOTE — PT/OT/SLP EVAL
Physical Therapy Evaluation and Discharge Note    Patient Name:  Chelle Chandra   MRN:  [id number]    Recommendations:     Discharge therapy intensity: No Therapy Indicated   Discharge Equipment Recommendations: none   Barriers to discharge: Ongoing medical needs    Assessment:     Chelle Chandra is a 59 y.o. female admitted with a medical diagnosis of Frequent falls, SDH, S/p MMA embolization 5/16. Hx CVA w/ R sided deficits.  At this time, patient is functioning at their prior level of function and does not require further acute PT services.     Recent Surgery: * No surgery found *      Plan:     During this hospitalization, patient does not require further acute PT services.  Please re-consult if situation changes.      Subjective     Chief Complaint: headache  Patient/Family Comments/goals: get stronger & stop falling  Pain/Comfort:  Location 1: head    Patients cultural, spiritual, Lutheran conflicts given the current situation:      Living Environment:  Pt lives with spouse in [facility], [address] w/ railing  Prior to admission, patients level of function was ind ADLs, ind/Mod I mobility w/ rollator however pt states inconsistent use.  Equipment used at home: rollator, shower chair, grab bar.  DME owned (not currently used): none.  Upon discharge, patient will have assistance from spouse, family.    Objective:     Communicated with RN prior to session.  Patient found HOB elevated with telemetry, peripheral IV upon PT entry to room.    General Precautions: Standard, fall, other (see comments) (-160)    Orthopedic Precautions:N/A   Braces: N/A  Respiratory Status: Room air  Blood Pressure: 112/73    Exams:  Fine Motor Coordination:    -       Impaired  Right hand, finger to nose   Gross Motor Coordination:  impaired heel to shin on R  Impaired peripheral vision bilaterally  RLE ROM: WNL  RLE Strength: WFL except hip flexion 4-/5  LLE ROM: WNL  LLE Strength: WNL    Functional Mobility:  Bed Mobility:     Supine  to Sit: modified independence  Transfers:     Sit to Stand:  stand by assistance with rolling walker  Gait: Pt ambulated ~275 ft w/ RW, SBA. Fairly slow gait speed, decreased safety awareness with frequent taking hands off of walker. Cueing needed for safety and staying focused to task    AM-PAC 6 CLICK MOBILITY  Total Score:21       Treatment and Education:  Safe mobility, use of AD at all times, need for assist/supervision to mobilize while here in hospital, PT POC eval/d/c    Patient left up in chair with all lines intact, call button in reach, chair alarm on, and RN notified.    GOALS:   Multidisciplinary Problems       Physical Therapy Goals       Not on file                    History:     Past Medical History:   Diagnosis Date    Accelerated junctional rhythm     Agatston CAC score, <100     Anxiety disorder, unspecified     COPD type A     Diabetes mellitus without complication     GERD (gastroesophageal reflux disease)     History of COVID-19     Insomnia     Lung nodule        Past Surgical History:   Procedure Laterality Date    ANGIOGRAM, CORONARY, WITH LEFT HEART CATHETERIZATION      BREAST LUMPECTOMY Right     CARDIOVERSION  08/01/2013    CARPAL TUNNEL RELEASE  10/23/2013    FUSION OF CERVICAL SPINE BY ANTERIOR APPROACH USING COMPUTER-ASSISTED NAVIGATION  06/10/2019    KIDNEY SURGERY      TONSILLECTOMY AND ADENOIDECTOMY      TOTAL ABDOMINAL HYSTERECTOMY      WRIST SURGERY         Time Tracking:     PT Received On: 05/17/25  PT Start Time: 0943     PT Stop Time: 1011  PT Total Time (min): 28 min     Billable Minutes: Evaluation 18 and Gait Training 10      05/17/2025

## 2025-05-17 NOTE — PROGRESS NOTES
[facility]  Neurology  Interventional Neurology  Progress Note    Subjective:     Interval History:  59-year-old female with chronic left subdural hematoma on Plavix with history of multiple falls and headaches.  Status post MMA embolization to decrease the risk of reoccurrence and management of the chronic subdural hematoma.    Status post left MMA embolization with glue and coils.      Continues with intermittent headaches-Norco is ordered every 6 hours we will change this to every 4 hours.  Patient states by the 6 hour jake things are out of control.    Has worked with physical therapy.  Has ambulated independently to bathroom   Vital signs have been stable   No acute events overnight    Post-Op Info:  * No surgery found *          Medications:  Continuous Infusions:  Scheduled Meds:   atorvastatin  80 mg Oral Daily    busPIRone  15 mg Oral BID    ezetimibe  10 mg Oral Daily    heparin (porcine)  5,000 Units Subcutaneous Q8H    levothyroxine  88 mcg Oral Before breakfast    metoprolol succinate  12.5 mg Oral Daily    nortriptyline  25 mg Oral QHS    paroxetine  20 mg Oral QAM     PRN Meds:  Current Facility-Administered Medications:     acetaminophen, 650 mg, Oral, Q4H PRN    albuterol-ipratropium, 3 mL, Nebulization, Q6H PRN    aluminum-magnesium hydroxide-simethicone, 30 mL, Oral, QID PRN    bisacodyL, 10 mg, Rectal, Daily PRN    dextrose 50%, 12.5 g, Intravenous, PRN    dextrose 50%, 25 g, Intravenous, PRN    glucagon (human recombinant), 1 mg, Intramuscular, PRN    glucose, 16 g, Oral, PRN    glucose, 24 g, Oral, PRN    HYDROcodone-acetaminophen, 1 tablet, Oral, Q6H PRN    insulin aspart U-100, 0-5 Units, Subcutaneous, QID (AC + HS) PRN    melatonin, 9 mg, Oral, Nightly PRN    morphine, 2 mg, Intravenous, Q6H PRN    naloxone, 0.02 mg, Intravenous, PRN    ondansetron, 8 mg, Oral, Q8H PRN    ondansetron, 4 mg, Intravenous, Q8H PRN    polyethylene glycol, 17 g, Oral, TID PRN    simethicone, 1  tablet, Oral, QID PRN    sodium chloride 0.9%, 10 mL, Intravenous, PRN    traZODone, 100 mg, Oral, Nightly PRN     Review of Systems  Objective:     Weight: 49 kg (108 lb 0.4 oz)  Body mass index is 17.44 kg/m².  Vital Signs (Most Recent):  Temp: 97.9 °F (36.6 °C) (05/17/25 0716)  Pulse: 78 (05/17/25 0909)  Resp: 17 (05/17/25 0909)  BP: 113/76 (05/17/25 0909)  SpO2: 95 % (05/17/25 0716) Vital Signs (24h Range):  Temp:  [97.6 °F (36.4 °C)-98.3 °F (36.8 °C)] 97.9 °F (36.6 °C)  Pulse:  [72-92] 78  Resp:  [11-22] 17  SpO2:  [94 %-98 %] 95 %  BP: (113-134)/(64-88) 113/76                              Neurosurgery Physical Exam    Awake and oriented with a GCS of 15   Has ambulated independently   Moves all extremities   Speech clear   PERRLA   Continues with intermittent headaches       Significant Labs:  Recent Labs   Lab 05/15/25  1514 05/17/25  0508   * 131*    137   K 4.1 4.5    104   CO2 24 23   BUN 14.6 18.3   CREATININE 0.82 0.72   CALCIUM 9.8 9.2   MG  --  1.90     Recent Labs   Lab 05/15/25  1514 05/17/25  0508   WBC 6.96 8.79   HGB 14.0 12.9   HCT 42.1 39.3    379     Recent Labs   Lab 05/15/25  1514   INR 1.0   APTT 27.4     Microbiology Results (last 7 days)       ** No results found for the last 168 hours. **            Assessment/Plan:    Status post left MMA embolization with blowing coils   Chronic left subdural hematoma on Plavix with multiple falls, headaches.        Neurological exams every 4 hours  Patient on stroke unit   PTOT today   SCDs   Fall precautions   Blood pressure less than 160 systolic.    Follow up in 1 month with Dr. Egan- with CT head without contrast  Dr. Mendiola with Neurosurgery has assessed patient-if patient continues with persistent headaches she may need diony hole drainage versus a mini craniotomy for subdural hematoma early next week   Hold antiplatelets per Neurosurgery   SubQ heparin for DVT prophylaxis per Neurosurgery       Active Diagnoses:     Diagnosis Date Noted POA    PRINCIPAL PROBLEM:  Subdural hematoma [S06.5XAA] 05/15/2025 Yes      Problems Resolved During this Admission:       ILA Campo-BC  Interventional Neurology  [facility] - Neurology

## 2025-05-17 NOTE — PROGRESS NOTES
NEUROSURGERY PROGRESS NOTE     HD2 L sided subacute/chronic SDH     Interval  Neuro IR for MMA embolization  Patient continues with intermittent headaches despite PRN analgesics  Has ambulated by herself to bathroom without issues.  Awaiting PT/OT assessment    Vital Signs (Most Recent)  Temp: 97.9 °F (36.6 °C) (05/17/25 0716)  Pulse: 78 (05/17/25 0909)  Resp: 17 (05/17/25 0909)  BP: 113/76 (05/17/25 0909)  SpO2: 95 % (05/17/25 0716)    Vital Signs Range (Last 24H):  Temp:  [97.6 °F (36.4 °C)-98.3 °F (36.8 °C)]   Pulse:  [72-92]   Resp:  [11-22]   BP: (113-134)/(64-88)   SpO2:  [94 %-98 %]      Data Review:    CBC:   Lab Results   Component Value Date    WBC 8.79 05/17/2025    RBC 4.38 05/17/2025    HGB 12.9 05/17/2025    HCT 39.3 05/17/2025    HCT 43 06/01/2024     05/17/2025     BMP:   Lab Results   Component Value Date     (H) 05/17/2025     05/17/2025    K 4.5 05/17/2025     05/17/2025    CO2 23 05/17/2025    BUN 18.3 05/17/2025    CREATININE 0.72 05/17/2025    CALCIUM 9.2 05/17/2025     Coagulation:   Lab Results   Component Value Date    INR 1.0 05/15/2025    INR 1.1 06/01/2024    APTT 27.4 05/15/2025     Exam:  GCS 15  No focal deficits     PLAN  Continue serial neurological exams, Q4H neuro checks  PT/OT, mobilize as tolerated  Pain control  Will continue to follow. If she continues with persistent headaches despite medical management she may need diony hole drainage vs mini crani for SDH early next week  Continue to hold antiplatelets  SCDs and SQH for DVT prophylaxis     Ricardo Mendiola MD  Neurosurgery

## 2025-05-17 NOTE — PROGRESS NOTES
[facility] Medicine Progress Note        Chief Complaint: Inpatient Follow-up     HPI:   59-year-old female with a past medical history of prior CVA, diabetes mellitus, tobacco dependence presenting after an outpatient CT head showed subdural hematoma.  Patient has had 3 falls alone in the month of May and had 1 fall in the month of April.  She states the most recent fall was a proximally 3 weeks ago.  Says her legs have been wobbling lately and while she should be ambulating with her Rollator she tends to hold onto the walls in her home.  The most recent fall occurred when she was ambulating in this way however she fell forward but denies any head trauma/syncope. States she was able to catch herself with her hands.  Patient takes her blood pressure at home on a regular basis and states her systolic blood pressure is never in the 100s usually systolic in the 70s. At baseline patient lives with her .    5/15 Neuro IR for MMA embolization     Interval Hx:     continues with intermittent headaches despite PRN analgesics   ambulated by herself to bathroom without issues.   Per neurosurgery - If she continues with persistent headaches despite medical management she may need diony hole drainage vs mini crani for SDH early next week     Case was discussed with patient's nurse and  on the floor.    Objective/physical exam:  General: In no acute distress, afebrile  Chest: Clear to auscultation bilaterally  Heart: RRR, +S1, S2, no appreciable murmur  Abdomen: Soft, nontender, BS +  MSK: Warm, no lower extremity edema, no clubbing or cyanosis  Neurologic: Alert and oriented, moving all extremities with good strength    VITAL SIGNS: 24 HRS MIN & MAX LAST   Temp  Min: 97.6 °F (36.4 °C)  Max: 98.3 °F (36.8 °C) 98.1 °F (36.7 °C)   BP  Min: 106/65  Max: 132/73 119/72   Pulse  Min: 73  Max: 92  73   Resp  Min: 17  Max: 18 17   SpO2  Min: 94 %  Max: 97 % 95 %     I have  reviewed the following labs:  Recent Labs   Lab 05/15/25  1514 05/17/25  0508   WBC 6.96 8.79   RBC 4.66 4.38   HGB 14.0 12.9   HCT 42.1 39.3   MCV 90.3 89.7   MCH 30.0 29.5   MCHC 33.3 32.8*   RDW 14.0 13.5    379   MPV 10.0 10.0     Recent Labs   Lab 05/15/25  1514 05/17/25  0508    137   K 4.1 4.5    104   CO2 24 23   BUN 14.6 18.3   CREATININE 0.82 0.72   * 131*   CALCIUM 9.8 9.2   MG  --  1.90   ALBUMIN 4.0 3.6   PROT 7.4 6.7   ALKPHOS 157* 139   ALT 56* 40   AST 24 16   BILITOT 0.7 0.7     Microbiology Results (last 7 days)       ** No results found for the last 168 hours. **             See below for Radiology    CTA head and neck 5/14 1. No large vessel occlusion or flow-limiting stenosis.  2. New intermediate density subdural hemorrhage along the left cerebral convexity.  No midline shift.  CT head 5/15 Similar left-sided subdural hematoma.     Assessment/Plan:    Subdural hematoma likely related to below  - s/p Embolization of the left middle meningeal artery using coil and liquid embolic agent 5/16/25  Frequent falls  Hypothyroidism   NIDDM  Elevated ALP    history of prior CVA, diabetes mellitus, tobacco dependence      Plan  noncontrast CT head showed left-sided subdural hematoma similar in size to outpatient CT  on Plavix at home for a recent CVA; states she is not taking aspirin at this time but she is taking migraine headache medication with aspirin in it; hold all antiplatelet therapy  Neuro checks q2    Neurosurgery No plans for surgical intervention at this time   - If she continues with persistent headaches despite medical management she may need diony hole drainage vs mini crani for SDH early next week     Interventional neurology signed off 5/17  - s/p Embolization of the left middle meningeal artery using coil and glue 5/16/25  - -160  - hold plavix x 2 weeks   - follow up with Dr Egan in 1 month with head CT    Frequent falls, BP soft since arrival, PT/OT  eval on 5/17/25  Resumed patient's metoprolol with hold parameters     Elevated >>139, improved  right upper quadrant ultrasound 5/16 - No significant sonographic abnormality of the right upper quadrant.     Hypothyroidism; TSH low, reduce her dose of levothyroxine from 100 to 88 mcg   -repeat TSH in 4-6 weeks     NIDDM, hold home Farxiga, metformin  sliding scale insulin; hemoglobin A1c = 7.3  Resume home meds as appropriate    Labs am    VTE prophylaxis: heparin s/q, cleared by neurosurgery    Patient condition:  Fair    Anticipated discharge and Disposition:         All diagnosis and differential diagnosis have been reviewed; assessment and plan has been documented; I have personally reviewed the labs and test results that are presently available; I have reviewed the patients medication list; I have reviewed the consulting providers response and recommendations. I have reviewed or attempted to review medical records based upon their availability    All of the patient's questions have been  addressed and answered. Patient's is agreeable to the above stated plan. I will continue to monitor closely and make adjustments to medical management as needed.    Portions of this note dictated using EMR integrated voice recognition software, and may be subject to voice recognition errors not corrected at proofreading. Please contact writer for clarification if needed.   _____________________________________________________________________    Malnutrition Status:  Nutrition consulted. Most recent weight and BMI monitored-     Measurements:  Wt Readings from Last 1 Encounters:   05/15/25 49 kg (108 lb 0.4 oz)   Body mass index is 17.44 kg/m².    Patient has been screened and assessed by RD.    Malnutrition Type:  Context:    Level:      Malnutrition Characteristic Summary:       Interventions/Recommendations (treatment strategy):        Scheduled Med:   atorvastatin  80 mg Oral Daily    busPIRone  15 mg Oral BID     ezetimibe  10 mg Oral Daily    heparin (porcine)  5,000 Units Subcutaneous Q8H    levothyroxine  88 mcg Oral Before breakfast    metoprolol succinate  12.5 mg Oral Daily    nortriptyline  25 mg Oral QHS    paroxetine  20 mg Oral QAM      Continuous Infusions:     PRN Meds:    Current Facility-Administered Medications:     acetaminophen, 650 mg, Oral, Q4H PRN    albuterol-ipratropium, 3 mL, Nebulization, Q6H PRN    aluminum-magnesium hydroxide-simethicone, 30 mL, Oral, QID PRN    bisacodyL, 10 mg, Rectal, Daily PRN    dextrose 50%, 12.5 g, Intravenous, PRN    dextrose 50%, 25 g, Intravenous, PRN    glucagon (human recombinant), 1 mg, Intramuscular, PRN    glucose, 16 g, Oral, PRN    glucose, 24 g, Oral, PRN    HYDROcodone-acetaminophen, 1 tablet, Oral, Q4H PRN    insulin aspart U-100, 0-5 Units, Subcutaneous, QID (AC + HS) PRN    melatonin, 9 mg, Oral, Nightly PRN    morphine, 2 mg, Intravenous, Q6H PRN    naloxone, 0.02 mg, Intravenous, PRN    ondansetron, 8 mg, Oral, Q8H PRN    ondansetron, 4 mg, Intravenous, Q8H PRN    polyethylene glycol, 17 g, Oral, TID PRN    simethicone, 1 tablet, Oral, QID PRN    sodium chloride 0.9%, 10 mL, Intravenous, PRN    traZODone, 100 mg, Oral, Nightly PRN     Radiology:  I have personally reviewed the following imaging and agree with the radiologist.     IR Embolization (CNS) Intracranial (XPD)  Narrative: EXAMINATION:  IR EMBOLIZATION (CNS) INTRACRANIAL (XPD)    CLINICAL HISTORY:  MMA embolization;    FINDINGS:  Fluoroscopic and sonographic assistance provided during vascular procedure.  Images were independently interpreted by the attending physician performing the procedure.    Fluoro time: 9.3 minutes.    Reference Air Kerma: 157 mGy.  Impression: Fluoroscopic assistance provided as above.    Electronically signed by: Wally Lam  Date:    05/16/2025  Time:    13:01  US Abdomen Limited  Narrative: EXAMINATION:  US ABDOMEN LIMITED    CLINICAL HISTORY:  RUQ/ Elevated ALP,  GGT;    COMPARISON:  2 November 2022.    FINDINGS:  Grayscale, color and spectral doppler evaluation of the right upper quadrant.    Pancreas obscured by bowel gas.  Imaged portions of the IVC normal in caliber.    Liver is not significantly enlarged. No focal liver lesion. Normal hepatopetal flow is noted in the portal vein.    No gallstones. No significant gallbladder wall thickening or pericholecystic fluid.  The common bile duct is normal in caliber  and measures 4 mm.    Right kidney measures 9 cm in length. No hydronephrosis.  Impression: No significant sonographic abnormality of the right upper quadrant.    Electronically signed by: Wally Lam  Date:    05/16/2025  Time:    08:31      Curry Payton MD  Department of Hospital Medicine   [facility]   05/17/2025

## 2025-05-17 NOTE — PLAN OF CARE
Problem: Fall Injury Risk  Goal: Absence of Fall and Fall-Related Injury  Outcome: Progressing     Problem: Pain Acute  Goal: Optimal Pain Control and Function  Outcome: Progressing     Problem: Wound  Goal: Optimal Wound Healing  Outcome: Progressing     Problem: Adult Inpatient Plan of Care  Goal: Plan of Care Review  Outcome: Progressing  Goal: Patient-Specific Goal (Individualized)  Outcome: Progressing  Goal: Absence of Hospital-Acquired Illness or Injury  Outcome: Progressing  Goal: Optimal Comfort and Wellbeing  Outcome: Progressing  Goal: Readiness for Transition of Care  Outcome: Progressing     Problem: Diabetes Comorbidity  Goal: Blood Glucose Level Within Targeted Range  Outcome: Progressing     Problem: Wound  Goal: Optimal Coping  Outcome: Progressing  Goal: Optimal Functional Ability  Outcome: Progressing  Goal: Absence of Infection Signs and Symptoms  Outcome: Progressing  Goal: Improved Oral Intake  Outcome: Progressing  Goal: Optimal Pain Control and Function  Outcome: Progressing  Goal: Skin Health and Integrity  Outcome: Progressing  Goal: Optimal Wound Healing  Outcome: Progressing     Problem: Infection  Goal: Absence of Infection Signs and Symptoms  Outcome: Progressing     Problem: Fall Injury Risk  Goal: Absence of Fall and Fall-Related Injury  Outcome: Progressing     Problem: Pain Acute  Goal: Optimal Pain Control and Function  Outcome: Progressing

## 2025-05-17 NOTE — PT/OT/SLP EVAL
Occupational Therapy   Evaluation and Discharge Note    Name: Chelle Chandra  MRN: [id number]  Recent Surgery: * No surgery found *      Recommendations:     Discharge therapy intensity: No Therapy Indicated   Discharge Equipment Recommendations: none  Barriers to discharge:  None    Assessment:     Chelle Chandra is a 59 y.o. female with a medical diagnosis of SDH s/p embolization of left middle meningeal artery 5/16, frequent falls, hx of CVA. On eval, patient presents with no self care deficits. Pt with poor peripheral vision bilaterally and report of floaters, however this does not hinder occupational performance. Pt completed ADLs and functional mobility with SBA using RW.  Skilled OT services are not warranted at this time.    Plan:     OT to sign off as acute OT services are not warranted at this time.  Please re-consult if situation changes during this hospitalization.    Plan of Care Reviewed with: patient    Subjective     Chief Complaint: headache  Patient/Family Comments/goals: to get better    Occupational Profile:  Living Environment: lives with spouse in [facility], 3 [address] with L rail; has tub shower with seat  Previous level of function: mod I with Rollator PRN, reports she does not use all the time, furniture walks.   Roles and Routines: mother, grandmother, wife  Equipment Used at Home: rollator, shower chair, grab bar  Assistance upon Discharge: family    Pain/Comfort:  Pain Rating 1: 6/10  Location 1: head  Pain Addressed 1: Reposition, Distraction, Cessation of Activity    Patients cultural, spiritual, Jainism conflicts given the current situation: no    Objective:     OT communicated with RN prior to session.      Patient was found HOB elevated with peripheral IV, telemetry upon OT entry to room.    General Precautions: Standard, fall, other (see comments) (-160)  Orthopedic Precautions: N/A  Braces: N/A    Vital Signs: Blood Pressure: 112/73    Bed Mobility:    Patient completed Supine to  Sit with modified independence    Functional Mobility/Transfers:  Patient completed Sit <> Stand Transfer with stand by assistance  with  rolling walker   Patient completed Toilet Transfer Step Transfer technique with stand by assistance with  rolling walker  Functional Mobility: ambulated >255 ft using RW with SBA. Cues needed for keeping hands on RW, noted slight right knee buckle with fatigue but no major LOB    Activities of Daily Living:  Lower Body Dressing: stand by assistance seated EOB    AMPAC 6 Click ADL:  AMPA Total Score: 24    Functional Cognition:  Orientation: oriented to Person, Place, Time, and Situation    Visual Perceptual Skills:  Pursuits: WFL  Visual Fields: poor vision in bilateral peripheral fields    Upper Extremity Function:  Right Upper Extremity:   Strength: WFL  Coordination: impaired finger to nose assessment, dysmetria    Left Upper Extremity:  Strength: WFL  Coordination: impaired finger to nose assessment, dysmetria    Balance:   Static sitting balance: WFL  Dynamic sitting balance:WFL  Static standing balance:WFL  Dynamic standing balance:SBA at RW    Therapeutic Positioning  Risk for acquired pressure injuries is decreased due to ability to get to BSC/toilet with assist.    OT interventions performed during the course of today's session in an effort to prevent and/or reduce acquired pressure injuries:   Education was provided on benefits of and recommendations for therapeutic positioning    OT recommendations for therapeutic positioning throughout hospitalization:   Follow [facility] Pressure Injury Prevention Protocol    Patient Education:  Patient provided with verbal education education regarding OT role/goals/POC, fall prevention, safety awareness, and Discharge/DME recommendations.  Understanding was verbalized.     Patient left up in chair with all lines intact, call button in reach, and RN notified.    History:     Past Medical History:   Diagnosis Date    Accelerated  junctional rhythm     Agatston CAC score, <100     Anxiety disorder, unspecified     COPD type A     Diabetes mellitus without complication     GERD (gastroesophageal reflux disease)     History of COVID-19     Insomnia     Lung nodule          Past Surgical History:   Procedure Laterality Date    ANGIOGRAM, CORONARY, WITH LEFT HEART CATHETERIZATION      BREAST LUMPECTOMY Right     CARDIOVERSION  08/01/2013    CARPAL TUNNEL RELEASE  10/23/2013    FUSION OF CERVICAL SPINE BY ANTERIOR APPROACH USING COMPUTER-ASSISTED NAVIGATION  06/10/2019    KIDNEY SURGERY      TONSILLECTOMY AND ADENOIDECTOMY      TOTAL ABDOMINAL HYSTERECTOMY      WRIST SURGERY         Time Tracking:     OT Date of Treatment: 05/17/25  OT Start Time: 0943  OT Stop Time: 1006  OT Total Time (min): 23 min    Billable Minutes:Evaluation Low    5/17/2025

## 2025-05-18 LAB
POCT GLUCOSE: 150 MG/DL (ref 70–110)
POCT GLUCOSE: 162 MG/DL (ref 70–110)
POCT GLUCOSE: 202 MG/DL (ref 70–110)

## 2025-05-18 PROCEDURE — 25000003 PHARM REV CODE 250: Performed by: INTERNAL MEDICINE

## 2025-05-18 PROCEDURE — 25000003 PHARM REV CODE 250: Performed by: STUDENT IN AN ORGANIZED HEALTH CARE EDUCATION/TRAINING PROGRAM

## 2025-05-18 PROCEDURE — 25000003 PHARM REV CODE 250: Performed by: NURSE PRACTITIONER

## 2025-05-18 PROCEDURE — 21400001 HC TELEMETRY ROOM

## 2025-05-18 PROCEDURE — 63600175 PHARM REV CODE 636 W HCPCS: Performed by: STUDENT IN AN ORGANIZED HEALTH CARE EDUCATION/TRAINING PROGRAM

## 2025-05-18 RX ORDER — BUTALBITAL, ACETAMINOPHEN AND CAFFEINE 50; 325; 40 MG/1; MG/1; MG/1
1 TABLET ORAL EVERY 6 HOURS PRN
Status: DISCONTINUED | OUTPATIENT
Start: 2025-05-18 | End: 2025-05-22 | Stop reason: HOSPADM

## 2025-05-18 RX ADMIN — POLYETHYLENE GLYCOL 3350 17 G: 17 POWDER, FOR SOLUTION ORAL at 10:05

## 2025-05-18 RX ADMIN — HEPARIN SODIUM 5000 UNITS: 5000 INJECTION, SOLUTION INTRAVENOUS; SUBCUTANEOUS at 03:05

## 2025-05-18 RX ADMIN — HYDROCODONE BITARTRATE AND ACETAMINOPHEN 1 TABLET: 5; 325 TABLET ORAL at 08:05

## 2025-05-18 RX ADMIN — EZETIMIBE 10 MG: 10 TABLET ORAL at 08:05

## 2025-05-18 RX ADMIN — POLYETHYLENE GLYCOL 3350 17 G: 17 POWDER, FOR SOLUTION ORAL at 08:05

## 2025-05-18 RX ADMIN — ATORVASTATIN CALCIUM 80 MG: 40 TABLET, FILM COATED ORAL at 08:05

## 2025-05-18 RX ADMIN — BUTALBITAL, ACETAMINOPHEN, AND CAFFEINE 1 TABLET: 325; 50; 40 TABLET ORAL at 10:05

## 2025-05-18 RX ADMIN — NORTRIPTYLINE HYDROCHLORIDE 25 MG: 25 CAPSULE ORAL at 08:05

## 2025-05-18 RX ADMIN — PAROXETINE HYDROCHLORIDE 20 MG: 20 TABLET, FILM COATED ORAL at 05:05

## 2025-05-18 RX ADMIN — HYDROCODONE BITARTRATE AND ACETAMINOPHEN 1 TABLET: 5; 325 TABLET ORAL at 11:05

## 2025-05-18 RX ADMIN — HYDROCODONE BITARTRATE AND ACETAMINOPHEN 1 TABLET: 5; 325 TABLET ORAL at 05:05

## 2025-05-18 RX ADMIN — LEVOTHYROXINE SODIUM 88 MCG: 0.09 TABLET ORAL at 05:05

## 2025-05-18 RX ADMIN — HEPARIN SODIUM 5000 UNITS: 5000 INJECTION, SOLUTION INTRAVENOUS; SUBCUTANEOUS at 05:05

## 2025-05-18 RX ADMIN — HEPARIN SODIUM 5000 UNITS: 5000 INJECTION, SOLUTION INTRAVENOUS; SUBCUTANEOUS at 09:05

## 2025-05-18 RX ADMIN — BUTALBITAL, ACETAMINOPHEN, AND CAFFEINE 1 TABLET: 325; 50; 40 TABLET ORAL at 06:05

## 2025-05-18 RX ADMIN — BUSPIRONE HYDROCHLORIDE 15 MG: 5 TABLET ORAL at 08:05

## 2025-05-18 NOTE — PROGRESS NOTES
Medications adjusted yesterday. Headaches better controlled.  Worked with PT OT, did well.  She remains at her neurologic baseline this AM  Continue serial neurological exams  Recommend observation for at least another 24h    Ricardo Mendiola MD  Neurosurgery  [facility]

## 2025-05-18 NOTE — PROGRESS NOTES
[facility]  Hospital Medicine Progress Note        Chief Complaint: Inpatient Follow-up     HPI:   59-year-old female with a past medical history of prior CVA, diabetes mellitus, tobacco dependence presenting after an outpatient CT head showed subdural hematoma.  Patient has had 3 falls alone in the month of May and had 1 fall in the month of April.  She states the most recent fall was a proximally 3 weeks ago.  Says her legs have been wobbling lately and while she should be ambulating with her Rollator she tends to hold onto the walls in her home.  The most recent fall occurred when she was ambulating in this way however she fell forward but denies any head trauma/syncope. States she was able to catch herself with her hands.  Patient takes her blood pressure at home on a regular basis and states her systolic blood pressure is never in the 100s usually systolic in the 70s. At baseline patient lives with her .    5/15 Neuro IR for MMA embolization   5/17 continues with intermittent headaches despite PRN analgesics   ambulated by herself to bathroom without issues.   Per neurosurgery - If she continues with persistent headaches despite medical management she may need diony hole drainage vs mini crani for SDH early next week     Interval Hx:     Continues to have headache similar to yesterday. No other focal neurologic weakness/ deficits.   Added Fioricet for headaches.    Case was discussed with patient's nurse and  on the floor.    Objective/physical exam:  General: In no acute distress, afebrile  Chest: Clear to auscultation bilaterally  Heart: RRR, +S1, S2, no appreciable murmur  Abdomen: Soft, nontender, BS +  MSK: Warm, no lower extremity edema, no clubbing or cyanosis  Neurologic: Alert and oriented, moving all extremities with good strength    VITAL SIGNS: 24 HRS MIN & MAX LAST   Temp  Min: 97.5 °F (36.4 °C)  Max: 98.1 °F (36.7 °C) 98.1 °F (36.7 °C)   BP  Min:  94/65  Max: 107/70 106/71   Pulse  Min: 72  Max: 85  84   Resp  Min: 16  Max: 17 17   SpO2  Min: 91 %  Max: 95 % (!) 93 %     I have reviewed the following labs:  Recent Labs   Lab 05/15/25  1514 05/17/25  0508   WBC 6.96 8.79   RBC 4.66 4.38   HGB 14.0 12.9   HCT 42.1 39.3   MCV 90.3 89.7   MCH 30.0 29.5   MCHC 33.3 32.8*   RDW 14.0 13.5    379   MPV 10.0 10.0     Recent Labs   Lab 05/15/25  1514 05/17/25  0508    137   K 4.1 4.5    104   CO2 24 23   BUN 14.6 18.3   CREATININE 0.82 0.72   * 131*   CALCIUM 9.8 9.2   MG  --  1.90   ALBUMIN 4.0 3.6   PROT 7.4 6.7   ALKPHOS 157* 139   ALT 56* 40   AST 24 16   BILITOT 0.7 0.7     Microbiology Results (last 7 days)       ** No results found for the last 168 hours. **             See below for Radiology    CTA head and neck 5/14 1. No large vessel occlusion or flow-limiting stenosis.  2. New intermediate density subdural hemorrhage along the left cerebral convexity.  No midline shift.  CT head 5/15 Similar left-sided subdural hematoma.     Assessment/Plan:    Subdural hematoma likely related to below  - s/p Embolization of the left middle meningeal artery using coil and liquid embolic agent 5/16/25  Frequent falls  Hypothyroidism   NIDDM  Elevated ALP    history of prior CVA, diabetes mellitus, tobacco dependence      Plan  noncontrast CT head showed left-sided subdural hematoma similar in size to outpatient CT  on Plavix at home for a recent CVA; states she is not taking aspirin at this time but she is taking migraine headache medication with aspirin in it; hold all antiplatelet therapy  Neuro checks q2    Neurosurgery No plans for surgical intervention at this time   - If she continues with persistent headaches despite medical management she may need diony hole drainage vs mini crani for SDH early next week     Interventional neurology signed off 5/17  - s/p Embolization of the left middle meningeal artery using coil and glue 5/16/25  - SBP  100-160  - hold plavix x 2 weeks   - follow up with Dr Egan in 1 month with head CT    Frequent falls, BP soft since arrival, PT/OT eval on 5/17/25  Resumed patient's metoprolol with hold parameters     Elevated >>139, improved  right upper quadrant ultrasound 5/16 - No significant sonographic abnormality of the right upper quadrant.     Hypothyroidism; TSH low, reduce her dose of levothyroxine from 100 to 88 mcg   -repeat TSH in 4-6 weeks     NIDDM, hold home Farxiga, metformin  sliding scale insulin; hemoglobin A1c = 7.3  Resume home meds as appropriate    Labs am  PT/OT no therapy indicated    VTE prophylaxis: heparin s/q, cleared by neurosurgery    Patient condition:  Fair    Anticipated discharge and Disposition:         All diagnosis and differential diagnosis have been reviewed; assessment and plan has been documented; I have personally reviewed the labs and test results that are presently available; I have reviewed the patients medication list; I have reviewed the consulting providers response and recommendations. I have reviewed or attempted to review medical records based upon their availability    All of the patient's questions have been  addressed and answered. Patient's is agreeable to the above stated plan. I will continue to monitor closely and make adjustments to medical management as needed.    Portions of this note dictated using EMR integrated voice recognition software, and may be subject to voice recognition errors not corrected at proofreading. Please contact writer for clarification if needed.   _____________________________________________________________________    Malnutrition Status:  Nutrition consulted. Most recent weight and BMI monitored-     Measurements:  Wt Readings from Last 1 Encounters:   05/15/25 49 kg (108 lb 0.4 oz)   Body mass index is 17.44 kg/m².    Patient has been screened and assessed by RD.    Malnutrition Type:  Context:    Level:      Malnutrition  Characteristic Summary:       Interventions/Recommendations (treatment strategy):        Scheduled Med:   atorvastatin  80 mg Oral Daily    busPIRone  15 mg Oral BID    ezetimibe  10 mg Oral Daily    heparin (porcine)  5,000 Units Subcutaneous Q8H    levothyroxine  88 mcg Oral Before breakfast    metoprolol succinate  12.5 mg Oral Daily    nortriptyline  25 mg Oral QHS    paroxetine  20 mg Oral QAM      Continuous Infusions:     PRN Meds:    Current Facility-Administered Medications:     acetaminophen, 650 mg, Oral, Q4H PRN    albuterol-ipratropium, 3 mL, Nebulization, Q6H PRN    aluminum-magnesium hydroxide-simethicone, 30 mL, Oral, QID PRN    bisacodyL, 10 mg, Rectal, Daily PRN    butalbital-acetaminophen-caffeine -40 mg, 1 tablet, Oral, Q6H PRN    dextrose 50%, 12.5 g, Intravenous, PRN    dextrose 50%, 25 g, Intravenous, PRN    glucagon (human recombinant), 1 mg, Intramuscular, PRN    glucose, 16 g, Oral, PRN    glucose, 24 g, Oral, PRN    HYDROcodone-acetaminophen, 1 tablet, Oral, Q4H PRN    insulin aspart U-100, 0-5 Units, Subcutaneous, QID (AC + HS) PRN    melatonin, 9 mg, Oral, Nightly PRN    morphine, 2 mg, Intravenous, Q6H PRN    naloxone, 0.02 mg, Intravenous, PRN    ondansetron, 8 mg, Oral, Q8H PRN    ondansetron, 4 mg, Intravenous, Q8H PRN    polyethylene glycol, 17 g, Oral, TID PRN    simethicone, 1 tablet, Oral, QID PRN    sodium chloride 0.9%, 10 mL, Intravenous, PRN    traZODone, 100 mg, Oral, Nightly PRN     Radiology:  I have personally reviewed the following imaging and agree with the radiologist.     IR Embolization (CNS) Intracranial (XPD)  Narrative: EXAMINATION:  IR EMBOLIZATION (CNS) INTRACRANIAL (XPD)    CLINICAL HISTORY:  MMA embolization;    FINDINGS:  Fluoroscopic and sonographic assistance provided during vascular procedure.  Images were independently interpreted by the attending physician performing the procedure.    Fluoro time: 9.3 minutes.    Reference Air Kerma: 157  mGy.  Impression: Fluoroscopic assistance provided as above.    Electronically signed by: Wally Lam  Date:    05/16/2025  Time:    13:01  US Abdomen Limited  Narrative: EXAMINATION:  US ABDOMEN LIMITED    CLINICAL HISTORY:  RUQ/ Elevated ALP, GGT;    COMPARISON:  2 November 2022.    FINDINGS:  Grayscale, color and spectral doppler evaluation of the right upper quadrant.    Pancreas obscured by bowel gas.  Imaged portions of the IVC normal in caliber.    Liver is not significantly enlarged. No focal liver lesion. Normal hepatopetal flow is noted in the portal vein.    No gallstones. No significant gallbladder wall thickening or pericholecystic fluid.  The common bile duct is normal in caliber  and measures 4 mm.    Right kidney measures 9 cm in length. No hydronephrosis.  Impression: No significant sonographic abnormality of the right upper quadrant.    Electronically signed by: Wally Lam  Date:    05/16/2025  Time:    08:31      Curry Payton MD  Department of Hospital Medicine   [facility]   05/18/2025

## 2025-05-19 ENCOUNTER — ANESTHESIA (OUTPATIENT)
Dept: SURGERY | Facility: HOSPITAL | Age: 60
End: 2025-05-19
Payer: COMMERCIAL

## 2025-05-19 ENCOUNTER — ANESTHESIA EVENT (OUTPATIENT)
Dept: SURGERY | Facility: HOSPITAL | Age: 60
End: 2025-05-19
Payer: COMMERCIAL

## 2025-05-19 LAB
ALBUMIN SERPL-MCNC: 3.9 G/DL (ref 3.4–4.8)
ALBUMIN/GLOB SERPL: 1.3 RATIO (ref 1.1–2)
ALP SERPL-CCNC: 163 UNIT/L (ref 40–150)
ALT SERPL-CCNC: 65 UNIT/L (ref 0–55)
ANION GAP SERPL CALC-SCNC: 9 MEQ/L
APTT PPP: 27.8 SECONDS (ref 23.2–33.7)
AST SERPL-CCNC: 45 UNIT/L (ref 11–45)
BASOPHILS # BLD AUTO: 0.08 X10(3)/MCL
BASOPHILS NFR BLD AUTO: 1 %
BILIRUB SERPL-MCNC: 0.7 MG/DL
BUN SERPL-MCNC: 14.4 MG/DL (ref 9.8–20.1)
CALCIUM SERPL-MCNC: 9.7 MG/DL (ref 8.4–10.2)
CHLORIDE SERPL-SCNC: 103 MMOL/L (ref 98–107)
CLOSURE TME COLL+ADP BLD: 89 SECONDS (ref 46–119)
CLOSURE TME COLL+EPINEP BLD: 80 SECONDS (ref 68–183)
CO2 SERPL-SCNC: 29 MMOL/L (ref 23–31)
CREAT SERPL-MCNC: 0.76 MG/DL (ref 0.55–1.02)
CREAT/UREA NIT SERPL: 19
EOSINOPHIL # BLD AUTO: 0.17 X10(3)/MCL (ref 0–0.9)
EOSINOPHIL NFR BLD AUTO: 2.1 %
ERYTHROCYTE [DISTWIDTH] IN BLOOD BY AUTOMATED COUNT: 13.6 % (ref 11.5–17)
GFR SERPLBLD CREATININE-BSD FMLA CKD-EPI: >60 ML/MIN/1.73/M2
GLOBULIN SER-MCNC: 3.1 GM/DL (ref 2.4–3.5)
GLUCOSE SERPL-MCNC: 173 MG/DL (ref 82–115)
GROUP & RH: NORMAL
HCT VFR BLD AUTO: 42.4 % (ref 37–47)
HGB BLD-MCNC: 13.9 G/DL (ref 12–16)
IMM GRANULOCYTES # BLD AUTO: 0.03 X10(3)/MCL (ref 0–0.04)
IMM GRANULOCYTES NFR BLD AUTO: 0.4 %
INDIRECT COOMBS: NORMAL
INR PPP: 1
LYMPHOCYTES # BLD AUTO: 2.25 X10(3)/MCL (ref 0.6–4.6)
LYMPHOCYTES NFR BLD AUTO: 27.4 %
MCH RBC QN AUTO: 29.8 PG (ref 27–31)
MCHC RBC AUTO-ENTMCNC: 32.8 G/DL (ref 33–36)
MCV RBC AUTO: 90.8 FL (ref 80–94)
MONOCYTES # BLD AUTO: 0.86 X10(3)/MCL (ref 0.1–1.3)
MONOCYTES NFR BLD AUTO: 10.5 %
NEUTROPHILS # BLD AUTO: 4.82 X10(3)/MCL (ref 2.1–9.2)
NEUTROPHILS NFR BLD AUTO: 58.6 %
NRBC BLD AUTO-RTO: 0 %
OHS QRS DURATION: 76 MS
OHS QTC CALCULATION: 454 MS
PLATELET # BLD AUTO: 383 X10(3)/MCL (ref 130–400)
PMV BLD AUTO: 10 FL (ref 7.4–10.4)
POCT GLUCOSE: 148 MG/DL (ref 70–110)
POCT GLUCOSE: 164 MG/DL (ref 70–110)
POCT GLUCOSE: 166 MG/DL (ref 70–110)
POTASSIUM SERPL-SCNC: 4.3 MMOL/L (ref 3.5–5.1)
PROT SERPL-MCNC: 7 GM/DL (ref 5.8–7.6)
PROTHROMBIN TIME: 12.5 SECONDS (ref 12.5–14.5)
RBC # BLD AUTO: 4.67 X10(6)/MCL (ref 4.2–5.4)
SODIUM SERPL-SCNC: 141 MMOL/L (ref 136–145)
SPECIMEN OUTDATE: NORMAL
WBC # BLD AUTO: 8.21 X10(3)/MCL (ref 4.5–11.5)

## 2025-05-19 PROCEDURE — 63600175 PHARM REV CODE 636 W HCPCS: Performed by: STUDENT IN AN ORGANIZED HEALTH CARE EDUCATION/TRAINING PROGRAM

## 2025-05-19 PROCEDURE — 63600175 PHARM REV CODE 636 W HCPCS

## 2025-05-19 PROCEDURE — C1762 CONN TISS, HUMAN(INC FASCIA): HCPCS | Performed by: STUDENT IN AN ORGANIZED HEALTH CARE EDUCATION/TRAINING PROGRAM

## 2025-05-19 PROCEDURE — 00C40ZZ EXTIRPATION OF MATTER FROM INTRACRANIAL SUBDURAL SPACE, OPEN APPROACH: ICD-10-PCS | Performed by: STUDENT IN AN ORGANIZED HEALTH CARE EDUCATION/TRAINING PROGRAM

## 2025-05-19 PROCEDURE — 25000003 PHARM REV CODE 250: Performed by: NURSE ANESTHETIST, CERTIFIED REGISTERED

## 2025-05-19 PROCEDURE — 93005 ELECTROCARDIOGRAM TRACING: CPT

## 2025-05-19 PROCEDURE — 25000003 PHARM REV CODE 250: Performed by: INTERNAL MEDICINE

## 2025-05-19 PROCEDURE — 20000000 HC ICU ROOM

## 2025-05-19 PROCEDURE — 80053 COMPREHEN METABOLIC PANEL: CPT

## 2025-05-19 PROCEDURE — 85610 PROTHROMBIN TIME: CPT

## 2025-05-19 PROCEDURE — D9220A PRA ANESTHESIA: Mod: ANES,,, | Performed by: ANESTHESIOLOGY

## 2025-05-19 PROCEDURE — 25000003 PHARM REV CODE 250

## 2025-05-19 PROCEDURE — 61312 CRNEC/CRNOT STTL XDRL/SDRL: CPT | Mod: ,,, | Performed by: STUDENT IN AN ORGANIZED HEALTH CARE EDUCATION/TRAINING PROGRAM

## 2025-05-19 PROCEDURE — 86901 BLOOD TYPING SEROLOGIC RH(D): CPT

## 2025-05-19 PROCEDURE — 37000008 HC ANESTHESIA 1ST 15 MINUTES: Performed by: STUDENT IN AN ORGANIZED HEALTH CARE EDUCATION/TRAINING PROGRAM

## 2025-05-19 PROCEDURE — 36000711: Performed by: STUDENT IN AN ORGANIZED HEALTH CARE EDUCATION/TRAINING PROGRAM

## 2025-05-19 PROCEDURE — 25000003 PHARM REV CODE 250: Performed by: NURSE PRACTITIONER

## 2025-05-19 PROCEDURE — 93010 ELECTROCARDIOGRAM REPORT: CPT | Mod: ,,, | Performed by: INTERNAL MEDICINE

## 2025-05-19 PROCEDURE — 37000009 HC ANESTHESIA EA ADD 15 MINS: Performed by: STUDENT IN AN ORGANIZED HEALTH CARE EDUCATION/TRAINING PROGRAM

## 2025-05-19 PROCEDURE — 25000003 PHARM REV CODE 250: Performed by: STUDENT IN AN ORGANIZED HEALTH CARE EDUCATION/TRAINING PROGRAM

## 2025-05-19 PROCEDURE — 36415 COLL VENOUS BLD VENIPUNCTURE: CPT

## 2025-05-19 PROCEDURE — 85576 BLOOD PLATELET AGGREGATION: CPT

## 2025-05-19 PROCEDURE — D9220A PRA ANESTHESIA: Mod: CRNA,,, | Performed by: NURSE ANESTHETIST, CERTIFIED REGISTERED

## 2025-05-19 PROCEDURE — C1713 ANCHOR/SCREW BN/BN,TIS/BN: HCPCS | Performed by: STUDENT IN AN ORGANIZED HEALTH CARE EDUCATION/TRAINING PROGRAM

## 2025-05-19 PROCEDURE — C1729 CATH, DRAINAGE: HCPCS | Performed by: STUDENT IN AN ORGANIZED HEALTH CARE EDUCATION/TRAINING PROGRAM

## 2025-05-19 PROCEDURE — 85730 THROMBOPLASTIN TIME PARTIAL: CPT

## 2025-05-19 PROCEDURE — 85025 COMPLETE CBC W/AUTO DIFF WBC: CPT

## 2025-05-19 PROCEDURE — 36000710: Performed by: STUDENT IN AN ORGANIZED HEALTH CARE EDUCATION/TRAINING PROGRAM

## 2025-05-19 PROCEDURE — 27201423 OPTIME MED/SURG SUP & DEVICES STERILE SUPPLY: Performed by: STUDENT IN AN ORGANIZED HEALTH CARE EDUCATION/TRAINING PROGRAM

## 2025-05-19 PROCEDURE — 86923 COMPATIBILITY TEST ELECTRIC: CPT | Performed by: STUDENT IN AN ORGANIZED HEALTH CARE EDUCATION/TRAINING PROGRAM

## 2025-05-19 PROCEDURE — 00U20KZ SUPPLEMENT DURA MATER WITH NONAUTOLOGOUS TISSUE SUBSTITUTE, OPEN APPROACH: ICD-10-PCS | Performed by: STUDENT IN AN ORGANIZED HEALTH CARE EDUCATION/TRAINING PROGRAM

## 2025-05-19 DEVICE — COLLAGEN DURAL REGENERATION MEMBRANE 3IN X 3IN (7.5CM X 7.5CM)
Type: IMPLANTABLE DEVICE | Site: BRAIN | Status: FUNCTIONAL
Brand: DURAMATRIX-ONLAY PLUS

## 2025-05-19 DEVICE — SCREW UN3 AXS SD 1.5X4MM: Type: IMPLANTABLE DEVICE | Site: BRAIN | Status: FUNCTIONAL

## 2025-05-19 DEVICE — IMPLANTABLE DEVICE: Type: IMPLANTABLE DEVICE | Site: BRAIN | Status: FUNCTIONAL

## 2025-05-19 DEVICE — PLATE SHUNT BURR HOLE 14MM: Type: IMPLANTABLE DEVICE | Site: BRAIN | Status: FUNCTIONAL

## 2025-05-19 RX ORDER — HYDRALAZINE HYDROCHLORIDE 20 MG/ML
10 INJECTION INTRAMUSCULAR; INTRAVENOUS EVERY 4 HOURS PRN
Status: DISCONTINUED | OUTPATIENT
Start: 2025-05-19 | End: 2025-05-22 | Stop reason: HOSPADM

## 2025-05-19 RX ORDER — LABETALOL HYDROCHLORIDE 5 MG/ML
10 INJECTION, SOLUTION INTRAVENOUS EVERY 4 HOURS PRN
Status: DISCONTINUED | OUTPATIENT
Start: 2025-05-19 | End: 2025-05-22 | Stop reason: HOSPADM

## 2025-05-19 RX ORDER — FENTANYL CITRATE 50 UG/ML
INJECTION, SOLUTION INTRAMUSCULAR; INTRAVENOUS
Status: DISCONTINUED | OUTPATIENT
Start: 2025-05-19 | End: 2025-05-19

## 2025-05-19 RX ORDER — MIDAZOLAM HYDROCHLORIDE 1 MG/ML
INJECTION INTRAMUSCULAR; INTRAVENOUS
Status: DISCONTINUED | OUTPATIENT
Start: 2025-05-19 | End: 2025-05-19

## 2025-05-19 RX ORDER — CLINDAMYCIN PHOSPHATE 900 MG/50ML
900 INJECTION, SOLUTION INTRAVENOUS
Status: DISCONTINUED | OUTPATIENT
Start: 2025-05-19 | End: 2025-05-22 | Stop reason: HOSPADM

## 2025-05-19 RX ORDER — DEXMEDETOMIDINE HYDROCHLORIDE 100 UG/ML
INJECTION, SOLUTION INTRAVENOUS
Status: DISCONTINUED | OUTPATIENT
Start: 2025-05-19 | End: 2025-05-19

## 2025-05-19 RX ORDER — LEVETIRACETAM 500 MG/5ML
1000 INJECTION, SOLUTION, CONCENTRATE INTRAVENOUS ONCE
Status: COMPLETED | OUTPATIENT
Start: 2025-05-19 | End: 2025-05-19

## 2025-05-19 RX ORDER — CLINDAMYCIN PHOSPHATE 900 MG/50ML
INJECTION, SOLUTION INTRAVENOUS
Status: DISCONTINUED | OUTPATIENT
Start: 2025-05-19 | End: 2025-05-19

## 2025-05-19 RX ORDER — HYDROCODONE BITARTRATE AND ACETAMINOPHEN 10; 325 MG/1; MG/1
1 TABLET ORAL EVERY 4 HOURS PRN
Refills: 0 | Status: DISCONTINUED | OUTPATIENT
Start: 2025-05-19 | End: 2025-05-22 | Stop reason: HOSPADM

## 2025-05-19 RX ORDER — ROCURONIUM BROMIDE 10 MG/ML
INJECTION, SOLUTION INTRAVENOUS
Status: DISCONTINUED | OUTPATIENT
Start: 2025-05-19 | End: 2025-05-19

## 2025-05-19 RX ORDER — CLINDAMYCIN PHOSPHATE 900 MG/50ML
900 INJECTION, SOLUTION INTRAVENOUS
Status: COMPLETED | OUTPATIENT
Start: 2025-05-19 | End: 2025-05-20

## 2025-05-19 RX ORDER — PROPOFOL 10 MG/ML
VIAL (ML) INTRAVENOUS
Status: DISCONTINUED | OUTPATIENT
Start: 2025-05-19 | End: 2025-05-19

## 2025-05-19 RX ORDER — SODIUM CHLORIDE 9 MG/ML
INJECTION, SOLUTION INTRAVENOUS CONTINUOUS
Status: DISCONTINUED | OUTPATIENT
Start: 2025-05-19 | End: 2025-05-20

## 2025-05-19 RX ORDER — LIDOCAINE HYDROCHLORIDE 20 MG/ML
INJECTION, SOLUTION EPIDURAL; INFILTRATION; INTRACAUDAL; PERINEURAL
Status: DISCONTINUED | OUTPATIENT
Start: 2025-05-19 | End: 2025-05-19

## 2025-05-19 RX ORDER — LIDOCAINE HYDROCHLORIDE AND EPINEPHRINE 5; 5 MG/ML; UG/ML
INJECTION, SOLUTION INFILTRATION; PERINEURAL
Status: DISCONTINUED | OUTPATIENT
Start: 2025-05-19 | End: 2025-05-19 | Stop reason: HOSPADM

## 2025-05-19 RX ORDER — LEVETIRACETAM 500 MG/1
500 TABLET ORAL 2 TIMES DAILY
Status: DISCONTINUED | OUTPATIENT
Start: 2025-05-19 | End: 2025-05-22 | Stop reason: HOSPADM

## 2025-05-19 RX ORDER — VANCOMYCIN HYDROCHLORIDE 1 G/20ML
INJECTION, POWDER, LYOPHILIZED, FOR SOLUTION INTRAVENOUS
Status: DISCONTINUED | OUTPATIENT
Start: 2025-05-19 | End: 2025-05-19 | Stop reason: HOSPADM

## 2025-05-19 RX ORDER — MUPIROCIN 20 MG/G
OINTMENT TOPICAL 2 TIMES DAILY
Status: DISCONTINUED | OUTPATIENT
Start: 2025-05-19 | End: 2025-05-22 | Stop reason: HOSPADM

## 2025-05-19 RX ADMIN — BUSPIRONE HYDROCHLORIDE 15 MG: 5 TABLET ORAL at 09:05

## 2025-05-19 RX ADMIN — MUPIROCIN: 20 OINTMENT TOPICAL at 09:05

## 2025-05-19 RX ADMIN — LEVETIRACETAM 500 MG: 500 TABLET, FILM COATED ORAL at 09:05

## 2025-05-19 RX ADMIN — ROCURONIUM BROMIDE 70 MG: 10 SOLUTION INTRAVENOUS at 05:05

## 2025-05-19 RX ADMIN — PAROXETINE HYDROCHLORIDE 20 MG: 20 TABLET, FILM COATED ORAL at 05:05

## 2025-05-19 RX ADMIN — CLINDAMYCIN PHOSPHATE 900 MG: 900 INJECTION, SOLUTION INTRAVENOUS at 05:05

## 2025-05-19 RX ADMIN — ATORVASTATIN CALCIUM 80 MG: 40 TABLET, FILM COATED ORAL at 08:05

## 2025-05-19 RX ADMIN — DOCUSATE SODIUM 50 MG: 50 CAPSULE, LIQUID FILLED ORAL at 09:05

## 2025-05-19 RX ADMIN — BUTALBITAL, ACETAMINOPHEN, AND CAFFEINE 1 TABLET: 325; 50; 40 TABLET ORAL at 09:05

## 2025-05-19 RX ADMIN — PROPOFOL 100 MG: 10 INJECTION, EMULSION INTRAVENOUS at 05:05

## 2025-05-19 RX ADMIN — EZETIMIBE 10 MG: 10 TABLET ORAL at 08:05

## 2025-05-19 RX ADMIN — SODIUM CHLORIDE: 9 INJECTION, SOLUTION INTRAVENOUS at 09:05

## 2025-05-19 RX ADMIN — BUSPIRONE HYDROCHLORIDE 15 MG: 5 TABLET ORAL at 08:05

## 2025-05-19 RX ADMIN — NORTRIPTYLINE HYDROCHLORIDE 25 MG: 25 CAPSULE ORAL at 09:05

## 2025-05-19 RX ADMIN — LEVETIRACETAM 1000 MG: 100 INJECTION, SOLUTION INTRAVENOUS at 06:05

## 2025-05-19 RX ADMIN — BUTALBITAL, ACETAMINOPHEN, AND CAFFEINE 1 TABLET: 325; 50; 40 TABLET ORAL at 01:05

## 2025-05-19 RX ADMIN — FENTANYL CITRATE 50 MCG: 50 INJECTION, SOLUTION INTRAMUSCULAR; INTRAVENOUS at 05:05

## 2025-05-19 RX ADMIN — SUGAMMADEX 400 MG: 100 INJECTION, SOLUTION INTRAVENOUS at 06:05

## 2025-05-19 RX ADMIN — METOPROLOL SUCCINATE 12.5 MG: 25 TABLET, EXTENDED RELEASE ORAL at 08:05

## 2025-05-19 RX ADMIN — MIDAZOLAM HYDROCHLORIDE 1 MG: 1 INJECTION, SOLUTION INTRAMUSCULAR; INTRAVENOUS at 05:05

## 2025-05-19 RX ADMIN — BUTALBITAL, ACETAMINOPHEN, AND CAFFEINE 1 TABLET: 325; 50; 40 TABLET ORAL at 08:05

## 2025-05-19 RX ADMIN — LIDOCAINE HYDROCHLORIDE 80 MG: 20 INJECTION, SOLUTION EPIDURAL; INFILTRATION; INTRACAUDAL; PERINEURAL at 05:05

## 2025-05-19 RX ADMIN — HYDROCODONE BITARTRATE AND ACETAMINOPHEN 1 TABLET: 5; 325 TABLET ORAL at 10:05

## 2025-05-19 RX ADMIN — CLINDAMYCIN PHOSPHATE 900 MG: 900 INJECTION, SOLUTION INTRAVENOUS at 09:05

## 2025-05-19 RX ADMIN — HYDROCODONE BITARTRATE AND ACETAMINOPHEN 1 TABLET: 5; 325 TABLET ORAL at 03:05

## 2025-05-19 RX ADMIN — HEPARIN SODIUM 5000 UNITS: 5000 INJECTION, SOLUTION INTRAVENOUS; SUBCUTANEOUS at 05:05

## 2025-05-19 RX ADMIN — LEVOTHYROXINE SODIUM 88 MCG: 0.09 TABLET ORAL at 05:05

## 2025-05-19 RX ADMIN — HYDROCODONE BITARTRATE AND ACETAMINOPHEN 1 TABLET: 5; 325 TABLET ORAL at 08:05

## 2025-05-19 RX ADMIN — DEXMEDETOMIDINE 10 MCG: 200 INJECTION, SOLUTION INTRAVENOUS at 07:05

## 2025-05-19 RX ADMIN — SODIUM CHLORIDE, SODIUM GLUCONATE, SODIUM ACETATE, POTASSIUM CHLORIDE AND MAGNESIUM CHLORIDE: 526; 502; 368; 37; 30 INJECTION, SOLUTION INTRAVENOUS at 05:05

## 2025-05-19 NOTE — PLAN OF CARE
Problem: Adult Inpatient Plan of Care  Goal: Plan of Care Review  Outcome: Progressing  Goal: Patient-Specific Goal (Individualized)  Outcome: Progressing  Goal: Absence of Hospital-Acquired Illness or Injury  Outcome: Progressing  Goal: Optimal Comfort and Wellbeing  Outcome: Progressing  Goal: Readiness for Transition of Care  Outcome: Progressing     Problem: Diabetes Comorbidity  Goal: Blood Glucose Level Within Targeted Range  Outcome: Progressing     Problem: Wound  Goal: Optimal Coping  Outcome: Progressing  Goal: Optimal Functional Ability  Outcome: Progressing  Goal: Absence of Infection Signs and Symptoms  Outcome: Progressing  Goal: Improved Oral Intake  Outcome: Progressing  Goal: Optimal Pain Control and Function  Outcome: Progressing  Goal: Skin Health and Integrity  Outcome: Progressing  Goal: Optimal Wound Healing  Outcome: Progressing     Problem: Infection  Goal: Absence of Infection Signs and Symptoms  Outcome: Progressing     Problem: Fall Injury Risk  Goal: Absence of Fall and Fall-Related Injury  Outcome: Progressing     Problem: Pain Acute  Goal: Optimal Pain Control and Function  Outcome: Progressing

## 2025-05-19 NOTE — PROGRESS NOTES
[facility] - Neurology  Neurosurgery  Progress Note    Subjective:     Interval History:   Hospital Day #4: L sided subacute/chronic SDH  Patient is lying in bed with no family members at the bedside. She complains her headache was better yesterday for a bit, but then the headache got worse. Will order a CT head to reassess the SDH. Will keep her NPO for now. She may need surgery depending on the CT head scan.     Post-Op Info:  * No surgery found *          Medications:  Continuous Infusions:  Scheduled Meds:   atorvastatin  80 mg Oral Daily    busPIRone  15 mg Oral BID    ezetimibe  10 mg Oral Daily    levothyroxine  88 mcg Oral Before breakfast    metoprolol succinate  12.5 mg Oral Daily    nortriptyline  25 mg Oral QHS    paroxetine  20 mg Oral QAM     PRN Meds:  Current Facility-Administered Medications:     acetaminophen, 650 mg, Oral, Q4H PRN    albuterol-ipratropium, 3 mL, Nebulization, Q6H PRN    aluminum-magnesium hydroxide-simethicone, 30 mL, Oral, QID PRN    bisacodyL, 10 mg, Rectal, Daily PRN    butalbital-acetaminophen-caffeine -40 mg, 1 tablet, Oral, Q6H PRN    dextrose 50%, 12.5 g, Intravenous, PRN    dextrose 50%, 25 g, Intravenous, PRN    glucagon (human recombinant), 1 mg, Intramuscular, PRN    glucose, 16 g, Oral, PRN    glucose, 24 g, Oral, PRN    HYDROcodone-acetaminophen, 1 tablet, Oral, Q4H PRN    insulin aspart U-100, 0-5 Units, Subcutaneous, QID (AC + HS) PRN    melatonin, 9 mg, Oral, Nightly PRN    morphine, 2 mg, Intravenous, Q6H PRN    naloxone, 0.02 mg, Intravenous, PRN    ondansetron, 8 mg, Oral, Q8H PRN    ondansetron, 4 mg, Intravenous, Q8H PRN    polyethylene glycol, 17 g, Oral, TID PRN    simethicone, 1 tablet, Oral, QID PRN    sodium chloride 0.9%, 10 mL, Intravenous, PRN    traZODone, 100 mg, Oral, Nightly PRN     Review of Systems  Objective:     Weight: 49 kg (108 lb 0.4 oz)  Body mass index is 17.44 kg/m².  Vital Signs (Most Recent):  Temp: 97.9 °F (36.6  "°C) (05/19/25 0805)  Pulse: 93 (05/19/25 0844)  Resp: 16 (05/18/25 2326)  BP: 133/78 (05/19/25 0844)  SpO2: (!) 94 % (05/19/25 0805) Vital Signs (24h Range):  Temp:  [97.5 °F (36.4 °C)-98.1 °F (36.7 °C)] 97.9 °F (36.6 °C)  Pulse:  [] 93  Resp:  [16-17] 16  SpO2:  [93 %-97 %] 94 %  BP: ()/(60-78) 133/78       Neurosurgery Physical Exam  GCS: 15  Alert and responds appropriately to questions  Speech is clear and coherent  No pronator drift  Spontaneously move all extremities    Significant Labs:  No results for input(s): "GLU", "NA", "K", "CL", "CO2", "BUN", "CREATININE", "CALCIUM", "MG" in the last 48 hours.  No results for input(s): "WBC", "HGB", "HCT", "PLT" in the last 48 hours.  No results for input(s): "LABPT", "INR", "APTT" in the last 48 hours.  Microbiology Results (last 7 days)       ** No results found for the last 168 hours. **            Significant Diagnostics:      Assessment/Plan:    Dx: L subacute/chronic SDH      Plan:  - Floor status  - Neuro checks Q4H  - SBP<160/90  - Pain control   - HOB 30 degrees  - Ordering full set of labs, PTT, INR, APTT, type and screen, EKG, and chest xray  - NPO for now  - SCDs for DVT prophylaxis. Discontinue subq heparin  - Pending CT head without contrast   - Possibly needing burrholes depending on the CT head scan result     Active Diagnoses:    Diagnosis Date Noted POA    PRINCIPAL PROBLEM:  Subdural hematoma [S06.5XAA] 05/15/2025 Yes      Problems Resolved During this Admission:       ILA Loyd-BC  Neurosurgery  [facility] - Neurology    "

## 2025-05-19 NOTE — PROGRESS NOTES
[facility]  [facility] Progress [facility]        Chief Complaint: Inpatient Follow-up     HPI:   59-year-old female with a past medical history of prior CVA, diabetes mellitus, tobacco dependence presenting after an outpatient CT head showed subdural hematoma.  Patient has had 3 falls alone in the month of May and had 1 fall in the month of April.  She states the most recent fall was a proximally 3 weeks ago.  Says her legs have been wobbling lately and while she should be ambulating with her Rollator she tends to hold onto the walls in her home.  The most recent fall occurred when she was ambulating in this way however she fell forward but denies any head trauma/syncope. States she was able to catch herself with her hands.  Patient takes her blood pressure at home on a regular basis and states her systolic blood pressure is never in the 100s usually systolic in the 70s. At baseline patient lives with her .    5/15 Neuro IR for MMA embolization   5/17 continues with intermittent headaches despite PRN analgesics   ambulated by herself to bathroom without issues.   Per neurosurgery - If she continues with persistent headaches despite medical management she may need diony hole drainage vs mini crani for SDH early next week   5/18 Continues to have headache similar to yesterday. No other focal neurologic weakness/ deficits.   Added Fioricet for headaches.    Interval Hx:     Headache persistent, planned for diony hole per neurosurgery today.    Case was discussed with patient's nurse and  on the floor.    Objective/physical exam:  General: In no acute distress, afebrile  Chest: Clear to auscultation bilaterally  Heart: RRR, +S1, S2, no appreciable murmur  Abdomen: Soft, nontender, BS +  MSK: Warm, no lower extremity edema, no clubbing or cyanosis  Neurologic: Alert and oriented, moving all extremities with good strength    VITAL SIGNS: 24 HRS MIN & MAX LAST   Temp  Min:  97.5 °F (36.4 °C)  Max: 98 °F (36.7 °C) 97.8 °F (36.6 °C)   BP  Min: 97/60  Max: 133/78 102/74   Pulse  Min: 88  Max: 105  89   Resp  Min: 15  Max: 17 15   SpO2  Min: 94 %  Max: 98 % 97 %     I have reviewed the following labs:  Recent Labs   Lab 05/15/25  1514 05/17/25  0508 05/19/25  1048   WBC 6.96 8.79 8.21   RBC 4.66 4.38 4.67   HGB 14.0 12.9 13.9   HCT 42.1 39.3 42.4   MCV 90.3 89.7 90.8   MCH 30.0 29.5 29.8   MCHC 33.3 32.8* 32.8*   RDW 14.0 13.5 13.6    379 383   MPV 10.0 10.0 10.0     Recent Labs   Lab 05/15/25  1514 05/17/25  0508 05/19/25  1048    137 141   K 4.1 4.5 4.3    104 103   CO2 24 23 29   BUN 14.6 18.3 14.4   CREATININE 0.82 0.72 0.76   * 131* 173*   CALCIUM 9.8 9.2 9.7   MG  --  1.90  --    ALBUMIN 4.0 3.6 3.9   PROT 7.4 6.7 7.0   ALKPHOS 157* 139 163*   ALT 56* 40 65*   AST 24 16 45   BILITOT 0.7 0.7 0.7     Microbiology Results (last 7 days)       ** No results found for the last 168 hours. **             See below for Radiology    CTA head and neck 5/14 1. No large vessel occlusion or flow-limiting stenosis.  2. New intermediate density subdural hemorrhage along the left cerebral convexity.  No midline shift.  CT head 5/15 Similar left-sided subdural hematoma.     Assessment/Plan:    Subdural hematoma likely related to below  - s/p Embolization of the left middle meningeal artery using coil and liquid embolic agent 5/16/25  Frequent falls  Hypothyroidism   NIDDM  Elevated ALP    history of prior CVA, diabetes mellitus, tobacco dependence      Plan  noncontrast CT head showed left-sided subdural hematoma similar in size to outpatient CT  on Plavix at home for a recent CVA; states she is not taking aspirin at this time but she is taking migraine headache medication with aspirin in it; hold all antiplatelet therapy  Neuro checks q2    Neurosurgery plans for L sided diony holes vs mini craniotomy for SDH evac. 5/19    Interventional neurology signed off 5/17  - s/p  Embolization of the left middle meningeal artery using coil and glue 5/16/25  - -160  - hold plavix x 2 weeks   - follow up with Dr Egan in 1 month with head CT    Frequent falls, BP soft since arrival, PT/OT eval on 5/17/25  Resumed patient's metoprolol with hold parameters     Elevated >>139>>163  right upper quadrant ultrasound 5/16 - No significant sonographic abnormality of the right upper quadrant.     Hypothyroidism; TSH low, reduce her dose of levothyroxine from 100 to 88 mcg   -repeat TSH in 4-6 weeks     NIDDM, hold home Farxiga, metformin  sliding scale insulin; hemoglobin A1c = 7.3  Resume home meds as appropriate    Labs am  PT/OT no therapy indicated    VTE prophylaxis: heparin s/q, cleared by neurosurgery    Patient condition:  Fair    Anticipated discharge and Disposition:         All diagnosis and differential diagnosis have been reviewed; assessment and plan has been documented; I have personally reviewed the labs and test results that are presently available; I have reviewed the patients medication list; I have reviewed the consulting providers response and recommendations. I have reviewed or attempted to review medical records based upon their availability    All of the patient's questions have been  addressed and answered. Patient's is agreeable to the above stated plan. I will continue to monitor closely and make adjustments to medical management as needed.    Portions of this note dictated using EMR integrated voice recognition software, and may be subject to voice recognition errors not corrected at proofreading. Please contact writer for clarification if needed.   _____________________________________________________________________    Malnutrition Status:  Nutrition consulted. Most recent weight and BMI monitored-     Measurements:  Wt Readings from Last 1 Encounters:   05/15/25 49 kg (108 lb 0.4 oz)   Body mass index is 17.44 kg/m².    Patient has been screened and assessed  by ALFREDO.    Malnutrition Type:  Context:    Level:      Malnutrition Characteristic Summary:       Interventions/Recommendations (treatment strategy):        Scheduled Med:   atorvastatin  80 mg Oral Daily    busPIRone  15 mg Oral BID    ezetimibe  10 mg Oral Daily    levothyroxine  88 mcg Oral Before breakfast    metoprolol succinate  12.5 mg Oral Daily    nortriptyline  25 mg Oral QHS    paroxetine  20 mg Oral QAM      Continuous Infusions:     PRN Meds:    Current Facility-Administered Medications:     acetaminophen, 650 mg, Oral, Q4H PRN    albuterol-ipratropium, 3 mL, Nebulization, Q6H PRN    aluminum-magnesium hydroxide-simethicone, 30 mL, Oral, QID PRN    bisacodyL, 10 mg, Rectal, Daily PRN    butalbital-acetaminophen-caffeine -40 mg, 1 tablet, Oral, Q6H PRN    clindamycin IV (PEDS and ADULTS), 900 mg, Intravenous, On Call Procedure    dextrose 50%, 12.5 g, Intravenous, PRN    dextrose 50%, 25 g, Intravenous, PRN    glucagon (human recombinant), 1 mg, Intramuscular, PRN    glucose, 16 g, Oral, PRN    glucose, 24 g, Oral, PRN    HYDROcodone-acetaminophen, 1 tablet, Oral, Q4H PRN    insulin aspart U-100, 0-5 Units, Subcutaneous, QID (AC + HS) PRN    melatonin, 9 mg, Oral, Nightly PRN    morphine, 2 mg, Intravenous, Q6H PRN    naloxone, 0.02 mg, Intravenous, PRN    ondansetron, 8 mg, Oral, Q8H PRN    ondansetron, 4 mg, Intravenous, Q8H PRN    polyethylene glycol, 17 g, Oral, TID PRN    simethicone, 1 tablet, Oral, QID PRN    sodium chloride 0.9%, 10 mL, Intravenous, PRN    traZODone, 100 mg, Oral, Nightly PRN     Radiology:  I have personally reviewed the following imaging and agree with the radiologist.     X-Ray Chest 1 View  Narrative: EXAMINATION:  XR CHEST 1 VIEW    CLINICAL HISTORY:  pre-op examination;, .    FINDINGS:  No alveolar consolidation, effusion, or pneumothorax is seen.   The thoracic aorta is normal  cardiac silhouette, central pulmonary vessels and mediastinum are normal in size and are  grossly unremarkable.   visualized osseous structures are grossly unremarkable.  Impression: No acute chest disease is identified.    Electronically signed by: Carlos Mcclain  Date:    05/19/2025  Time:    11:38  CT Head Without Contrast  Narrative: EXAMINATION:  CT HEAD WITHOUT CONTRAST    CLINICAL HISTORY:  F/U Bilateral SDH;    TECHNIQUE:  Axial scans were obtained from skull base to the vertex.    Coronal and sagittal reconstructions obtained from the axial data.    Automatic exposure control was utilized to limit radiation dose.    Contrast: None    Radiation Dose:    Total DLP: 899 mGy*cm    COMPARISON:  CT head dated 05/15/2025    FINDINGS:  There is stable size of a subdural hemorrhage along the left cerebral convexity measuring 1.6 cm in thickness.  There is encephalomalacia in the low cerebral hemisphere.  There is local mass effect without midline shift or herniation.  The basal cisterns are patent.  There is parenchymal volume loss with ex vacuo dilatation of the left temporal horn.  The calvarium and skull base are intact.  The mastoid air cells are clear  Impression: Stable exam without significant interval change.    Electronically signed by: Beverley Ames  Date:    05/19/2025  Time:    10:30      Curry Payton MD  Department of Hospital Medicine   [facility]   05/19/2025

## 2025-05-19 NOTE — ANESTHESIA PREPROCEDURE EVALUATION
05/19/2025  Chelle Chandra is a 59 y.o., female.      Pre-op Assessment    I have reviewed the Patient Summary Reports.     I have reviewed the Nursing Notes. I have reviewed the NPO Status.   I have reviewed the Medications.     Review of Systems  Anesthesia Hx:  No problems with previous Anesthesia                Social:  Smoker       Pulmonary:   COPD Asthma                    Hepatic/GI:     GERD                Neurological:   CVA, residual symptoms                                    Endocrine:  Diabetes Hypothyroidism          Psych:   anxiety               Physical Exam  General: Well nourished, Cooperative, Alert and Oriented    Airway:  Mallampati: II   Mouth Opening: Normal  TM Distance: Normal  Neck ROM: Normal ROM    Dental:  Partial Dentures    Chest/Lungs:  Clear to auscultation    Heart:  Rate: Normal    Anesthesia Plan  Type of Anesthesia, risks & benefits discussed:    Anesthesia Type: Gen ETT  Intra-op Monitoring Plan: Standard ASA Monitors  Post Op Pain Control Plan: multimodal analgesia  Induction:  IV  Airway Plan: Direct  ASA Score: 3  Day of Surgery Review of History & Physical: H&P Update referred to the surgeon/provider.    Ready For Surgery From Anesthesia Perspective.   .

## 2025-05-19 NOTE — ANESTHESIA PROCEDURE NOTES
Intubation    Date/Time: 5/19/2025 5:34 PM    Performed by: Tricia Egan CRNA  Authorized by: Bola Dunaway MD    Intubation:     Induction:  Intravenous    Intubated:  Postinduction    Mask Ventilation:  Easy mask    Attempts:  1    Attempted By:  Student (ANGELA Chowdhury)    Method of Intubation:  Direct    Blade:  Lang 2    Laryngeal View Grade: Grade I - full view of cords      Difficult Airway Encountered?: No      Complications:  None    Airway Device:  Oral endotracheal tube    Airway Device Size:  7.5    Style/Cuff Inflation:  Cuffed (inflated to minimal occlusive pressure)    Inflation Amount (mL):  6    Tube secured:  21    Secured at:  The lips    Placement Verified By:  Capnometry    Complicating Factors:  None    Findings Post-Intubation:  BS equal bilateral and atraumatic/condition of teeth unchanged

## 2025-05-19 NOTE — NURSING
Patient transport here to bring patient to OR for L sided diony hole procedure. Clindamycin hanging on patient's bed IV pole.

## 2025-05-20 LAB
ANION GAP SERPL CALC-SCNC: 9 MEQ/L
BUN SERPL-MCNC: 9.3 MG/DL (ref 9.8–20.1)
CALCIUM SERPL-MCNC: 8.4 MG/DL (ref 8.4–10.2)
CHLORIDE SERPL-SCNC: 106 MMOL/L (ref 98–107)
CO2 SERPL-SCNC: 25 MMOL/L (ref 23–31)
CREAT SERPL-MCNC: 0.66 MG/DL (ref 0.55–1.02)
CREAT/UREA NIT SERPL: 14
ERYTHROCYTE [DISTWIDTH] IN BLOOD BY AUTOMATED COUNT: 13.7 % (ref 11.5–17)
GFR SERPLBLD CREATININE-BSD FMLA CKD-EPI: >60 ML/MIN/1.73/M2
GLUCOSE SERPL-MCNC: 156 MG/DL (ref 82–115)
HCT VFR BLD AUTO: 36.1 % (ref 37–47)
HGB BLD-MCNC: 11.6 G/DL (ref 12–16)
MCH RBC QN AUTO: 30 PG (ref 27–31)
MCHC RBC AUTO-ENTMCNC: 32.1 G/DL (ref 33–36)
MCV RBC AUTO: 93.3 FL (ref 80–94)
NRBC BLD AUTO-RTO: 0 %
PLATELET # BLD AUTO: 299 X10(3)/MCL (ref 130–400)
PMV BLD AUTO: 9.9 FL (ref 7.4–10.4)
POCT GLUCOSE: 145 MG/DL (ref 70–110)
POCT GLUCOSE: 154 MG/DL (ref 70–110)
POCT GLUCOSE: 187 MG/DL (ref 70–110)
POTASSIUM SERPL-SCNC: 4.2 MMOL/L (ref 3.5–5.1)
RBC # BLD AUTO: 3.87 X10(6)/MCL (ref 4.2–5.4)
SODIUM SERPL-SCNC: 140 MMOL/L (ref 136–145)
WBC # BLD AUTO: 7.91 X10(3)/MCL (ref 4.5–11.5)

## 2025-05-20 PROCEDURE — 11000001 HC ACUTE MED/SURG PRIVATE ROOM

## 2025-05-20 PROCEDURE — 94760 N-INVAS EAR/PLS OXIMETRY 1: CPT

## 2025-05-20 PROCEDURE — 80048 BASIC METABOLIC PNL TOTAL CA: CPT | Performed by: INTERNAL MEDICINE

## 2025-05-20 PROCEDURE — 36415 COLL VENOUS BLD VENIPUNCTURE: CPT | Performed by: INTERNAL MEDICINE

## 2025-05-20 PROCEDURE — 25000003 PHARM REV CODE 250: Performed by: STUDENT IN AN ORGANIZED HEALTH CARE EDUCATION/TRAINING PROGRAM

## 2025-05-20 PROCEDURE — 25000003 PHARM REV CODE 250

## 2025-05-20 PROCEDURE — 97164 PT RE-EVAL EST PLAN CARE: CPT

## 2025-05-20 PROCEDURE — 99900035 HC TECH TIME PER 15 MIN (STAT)

## 2025-05-20 PROCEDURE — 85027 COMPLETE CBC AUTOMATED: CPT | Performed by: INTERNAL MEDICINE

## 2025-05-20 PROCEDURE — 21400001 HC TELEMETRY ROOM

## 2025-05-20 PROCEDURE — 63600175 PHARM REV CODE 636 W HCPCS

## 2025-05-20 PROCEDURE — 97168 OT RE-EVAL EST PLAN CARE: CPT

## 2025-05-20 PROCEDURE — 25000003 PHARM REV CODE 250: Performed by: NURSE PRACTITIONER

## 2025-05-20 RX ADMIN — HYDROCODONE BITARTRATE AND ACETAMINOPHEN 1 TABLET: 10; 325 TABLET ORAL at 09:05

## 2025-05-20 RX ADMIN — EZETIMIBE 10 MG: 10 TABLET ORAL at 08:05

## 2025-05-20 RX ADMIN — DOCUSATE SODIUM 50 MG: 50 CAPSULE, LIQUID FILLED ORAL at 09:05

## 2025-05-20 RX ADMIN — LEVOTHYROXINE SODIUM 88 MCG: 0.09 TABLET ORAL at 05:05

## 2025-05-20 RX ADMIN — HYDROCODONE BITARTRATE AND ACETAMINOPHEN 1 TABLET: 5; 325 TABLET ORAL at 09:05

## 2025-05-20 RX ADMIN — METOPROLOL SUCCINATE 12.5 MG: 25 TABLET, EXTENDED RELEASE ORAL at 08:05

## 2025-05-20 RX ADMIN — PAROXETINE HYDROCHLORIDE 20 MG: 20 TABLET, FILM COATED ORAL at 06:05

## 2025-05-20 RX ADMIN — BUSPIRONE HYDROCHLORIDE 15 MG: 5 TABLET ORAL at 09:05

## 2025-05-20 RX ADMIN — NORTRIPTYLINE HYDROCHLORIDE 25 MG: 25 CAPSULE ORAL at 09:05

## 2025-05-20 RX ADMIN — BUSPIRONE HYDROCHLORIDE 15 MG: 5 TABLET ORAL at 08:05

## 2025-05-20 RX ADMIN — HYDROCODONE BITARTRATE AND ACETAMINOPHEN 1 TABLET: 10; 325 TABLET ORAL at 04:05

## 2025-05-20 RX ADMIN — HYDROCODONE BITARTRATE AND ACETAMINOPHEN 1 TABLET: 5; 325 TABLET ORAL at 01:05

## 2025-05-20 RX ADMIN — MUPIROCIN: 20 OINTMENT TOPICAL at 08:05

## 2025-05-20 RX ADMIN — HYDROCODONE BITARTRATE AND ACETAMINOPHEN 1 TABLET: 10; 325 TABLET ORAL at 12:05

## 2025-05-20 RX ADMIN — ATORVASTATIN CALCIUM 80 MG: 40 TABLET, FILM COATED ORAL at 08:05

## 2025-05-20 RX ADMIN — CLINDAMYCIN PHOSPHATE 900 MG: 900 INJECTION, SOLUTION INTRAVENOUS at 04:05

## 2025-05-20 RX ADMIN — CLINDAMYCIN PHOSPHATE 900 MG: 900 INJECTION, SOLUTION INTRAVENOUS at 11:05

## 2025-05-20 RX ADMIN — HYDROCODONE BITARTRATE AND ACETAMINOPHEN 1 TABLET: 5; 325 TABLET ORAL at 03:05

## 2025-05-20 RX ADMIN — DOCUSATE SODIUM 50 MG: 50 CAPSULE, LIQUID FILLED ORAL at 08:05

## 2025-05-20 RX ADMIN — LEVETIRACETAM 500 MG: 500 TABLET, FILM COATED ORAL at 09:05

## 2025-05-20 RX ADMIN — ONDANSETRON 8 MG: 4 TABLET, ORALLY DISINTEGRATING ORAL at 05:05

## 2025-05-20 RX ADMIN — LEVETIRACETAM 500 MG: 500 TABLET, FILM COATED ORAL at 08:05

## 2025-05-20 NOTE — OP NOTE
NEUROSURGERY OPERATIVE NOTE     DATE OF OPERATION:   5/19/2025     PREOPERATIVE DIAGNOSIS:   Left sided subacute/chronic subdural hematoma causing brain compression  Worsening headaches     POSTOPERATIVE DIAGNOSIS:   Same     SURGEON: Ricardo Mendiola MD  ASSISTANT: Sandra Ansari NP     PROCEDURE:   Left frontoparietal craniotomy for evacuation of subdural hematoma    Findings   Dark blood under moderate pressure was evacuated  Subdural space was irrigated until return was clear  Subdural membranes were coagulated and resected  Subdural drain left in place     ANESTHESIA:   General endotracheal      BLOOD LOSS:   100  mL     SPECIMEN(s):   None     COMPLICATIONS:   None      DRAINS:   Subdural drain- JOHNATHAN to thumbprint suction     INDICATION   59 year-old female with a past medical history of prior CVA, diabetes mellitus, tobacco dependence, who presented to the ED after an outpatient CT head showed subdural hematoma.  Patient has had 3 falls alone in the month of May and had 1 fall in the month of April. She underwent MMA embolization. She developed worsening headaches, nausea and vomiting. Subdural hematoma evacuation was recommended. Risks and benefits of mini craniotomy for evacuation were discussed and informed consent was obtained.     PROCEDURE IN DETAIL  The patient was transferred to the operating room where she underwent general endotracheal anesthesia.  Appropriate IV access and Geiger catheter were placed.  She was positioned supine on the operating table with a bump under her ipsilateral side and her head in a horseshoe head ogden turned to the right. A small linear incision was planned in the frontoparietal area approximately 6 cm of the midline.  Hair was clipped and the area was prepped and draped in sterile fashion.  She received preoperative antibiotics and Keppra.  A time-out was completed with all parties in agreement.     Incision was opened with a 10 blade.  Dissection carried down to the skull with  monopolar cautery.  Self-retaining retractor was placed.   on high-speed drill was used to complete two diony holes and then a standard craniotome used to elevate a small craniotomy flap. The dura was then coagulated and opened in cruciate fashion with a 15 blade.     Dark blood under pressure was immediately evacuated.  The subdural space was then copiously irrigated with warm saline until the return was clear. Subdural membranes were coagulated and resected. No active bleeding points were identified.  A 10 Fr round silicone drain was placed in the subdural space through the anterior diony hole and tunneled out. Gelfoam was placed. The dura was re approximated and Dura Matrix Onlay was then placed. [place] hole covers from the [employer] plating system were used to replace then bone flap. The wound was copiously irrigated.     Next, closure was undertaken using 2-0 Vicryl for the galea in inverted, interrupted fashion.  The scalp was closed with Staples.  Sterile dressing was applied.  General anesthesia was reversed.  She was then extubated and transferred to recovery in stable condition.  All needle and sponge counts were correct.      Ricardo Mendiola MD  Neurosurgery  [facility]

## 2025-05-20 NOTE — PLAN OF CARE
Problem: Adult Inpatient Plan of Care  Goal: Plan of Care Review  Outcome: Progressing  Goal: Patient-Specific Goal (Individualized)  Outcome: Progressing  Goal: Absence of Hospital-Acquired Illness or Injury  Outcome: Progressing  Goal: Optimal Comfort and Wellbeing  Outcome: Progressing  Goal: Readiness for Transition of Care  Outcome: Progressing     Problem: Diabetes Comorbidity  Goal: Blood Glucose Level Within Targeted Range  Outcome: Progressing     Problem: Infection  Goal: Absence of Infection Signs and Symptoms  Outcome: Progressing     Problem: Fall Injury Risk  Goal: Absence of Fall and Fall-Related Injury  Outcome: Progressing     Problem: Pain Acute  Goal: Optimal Pain Control and Function  Outcome: Progressing     Problem: Wound  Goal: Optimal Coping  Outcome: Progressing  Goal: Optimal Functional Ability  Outcome: Progressing  Goal: Absence of Infection Signs and Symptoms  Outcome: Progressing  Goal: Improved Oral Intake  Outcome: Progressing  Goal: Optimal Pain Control and Function  Outcome: Progressing  Goal: Skin Health and Integrity  Outcome: Progressing  Goal: Optimal Wound Healing  Outcome: Progressing

## 2025-05-20 NOTE — PT/OT/SLP RE-EVAL
Physical Therapy Re-Evaluation    Patient Name:  Chelle Chandra   MRN:  [id number]    Recommendations:     Discharge therapy intensity: Low Intensity Therapy   Discharge Equipment Recommendations: none   Barriers to discharge: medical dx, impaired mobility, decreased independence     Assessment:     Chelle Chandra is a 59 y.o. female admitted with a medical diagnosis of frequent falls; SDH s/p MMA embolization on 5/17; worsening headaches prompting s/p craniotomy on 5/19. Cleared by NSGY to continue mobilizing.      She presents with the following impairments/functional limitations: weakness, gait instability, impaired endurance, impaired balance, impaired self care skills, impaired functional mobility, decreased lower extremity function.    Pt tolerates PT re-eval fairly well. Complains of some nausea and increased headache with mobility. Requires CGA for transfers and ambulation with use of RW. At baseline, pt reports having very good family 24/7 support and assistance. Appears as if pt can dc with low intensity  (outpatient if she has a ride) and family help. Will continue seeing her while in-house in order to ensure complete safety upon dc.     Rehab Prognosis: Good; patient would benefit from acute skilled PT services to address these deficits and reach maximum level of function.    Recent Surgery: Procedure(s) (LRB):  CRANIOTOMY, FOR SUBDURAL HEMATOMA EVACUATION (Left) 1 Day Post-Op    Plan:     During this hospitalization, patient would benefit from acute PT services 5 x/week to address the identified rehab impairments via gait training, therapeutic activities, therapeutic exercises, neuromuscular re-education and progress toward the following goals:    Plan of Care Expires:  06/20/25    Subjective     Chief Complaint: headache  Patient/Family Comments/goals: to go home   Pain/Comfort:  Pain Rating 1: 7/10  Location 1:  (headache)    Patients cultural, spiritual, Judaism conflicts given the current  situation: no    Objective:     Communicated with NSG prior to session.  Patient found HOB elevated with blood pressure cuff, peripheral IV, pulse ox (continuous), telemetry, JOHNATHAN drain, SCD  upon PT entry to room.    General Precautions: Standard, fall (BP<140/90)  Orthopedic Precautions:N/A   Braces: N/A  Respiratory Status: Room air  Blood Pressure: 123/75  HR: 83  SpO2: 96%      Exams:  Cognitive Exam:  Patient is oriented to Person, Place, Time, and Situation  RLE Strength: hip 3+/5, knee 4-/5, DF 4-/5, PF 4+/5  LLE Strength: hip 4-/5 otherwise grossly 5/5  Skin integrity: Visible skin intact      Functional Mobility:  Bed Mobility:     Supine to Sit: stand by assistance  Transfers:     Sit to Stand:  contact guard assistance with rolling walker  Gait: pt ambulates approx 90 ft with use of RW and CGA; pt demo a step through pattern; decreased eva; NBOS with intermittent scissoring of R LE; no major LOB       AM-PAC 6 CLICK MOBILITY  Total Score:18       Treatment & Education:  Patient provided with verbal education regarding PT role/goals/POC, fall prevention, safety awareness, and discharge/DME recommendations.  Understanding was verbalized.     Patient left up in chair with all lines intact, call button in reach, and RN notified.    GOALS:   Multidisciplinary Problems       Physical Therapy Goals          Problem: Physical Therapy    Goal Priority Disciplines Outcome Interventions   Physical Therapy Goal     PT, PT/OT Progressing    Description: Goals to be met by: 25     Patient will increase functional independence with mobility by performin. Supine to sit with [place]  2. Sit to supine with [place]  3. Sit to stand transfer with Modified [place]  4. Gait  x 150 feet with Modified [place] using Rolling Walker.   5. Ascend/descend 3 stair with Handrail Modified [place] using No Assistive Device.                          History:     Past Medical History:   Diagnosis  Date    Accelerated junctional rhythm     Agatston CAC score, <100     Anxiety disorder, unspecified     COPD type A     Diabetes mellitus without complication     GERD (gastroesophageal reflux disease)     History of COVID-19     Insomnia     Lung nodule        Past Surgical History:   Procedure Laterality Date    ANGIOGRAM, CORONARY, WITH LEFT HEART CATHETERIZATION      BREAST LUMPECTOMY Right     CARDIOVERSION  08/01/2013    CARPAL TUNNEL RELEASE  10/23/2013    CRANIOTOMY FOR EVACUATION OF SUBDURAL HEMATOMA Left 5/19/2025    Procedure: CRANIOTOMY, FOR SUBDURAL HEMATOMA EVACUATION;  Surgeon: Ricardo Shelley MD;  Location: [facility];  Service: Neurosurgery;  Laterality: Left;    FUSION OF CERVICAL SPINE BY ANTERIOR APPROACH USING COMPUTER-ASSISTED NAVIGATION  06/10/2019    KIDNEY SURGERY      TONSILLECTOMY AND ADENOIDECTOMY      TOTAL ABDOMINAL HYSTERECTOMY      WRIST SURGERY         Time Tracking:     PT Received On: 05/20/25  PT Start Time: 0903     PT Stop Time: 0928  PT Total Time (min): 25 min     Billable Minutes: Re-eval 1      05/20/2025

## 2025-05-20 NOTE — PROGRESS NOTES
[facility] [address]  Neurosurgery  Progress Note    Subjective:     Interval History:   POD #1: Left mini craniotomy for subdural hematoma evacuation   No acute events overnight. Patient states she does not have headache, but soreness at the surgical incision site. Blood pressure is within normal limits and not on any anti-hypertensive gtts.     Post-Op Info:  Procedure(s) (LRB):  CRANIOTOMY, FOR SUBDURAL HEMATOMA EVACUATION (Left)   1 Day Post-Op      Medications:  Continuous Infusions:   0.9% NaCl   Intravenous Continuous 75 mL/hr at 05/20/25 0617 Rate Verify at 05/20/25 0617     Scheduled Meds:   atorvastatin  80 mg Oral Daily    busPIRone  15 mg Oral BID    clindamycin IV (PEDS and ADULTS)  900 mg Intravenous Q8H    docusate sodium  50 mg Oral BID    ezetimibe  10 mg Oral Daily    levETIRAcetam  500 mg Oral BID    levothyroxine  88 mcg Oral Before breakfast    metoprolol succinate  12.5 mg Oral Daily    mupirocin   Nasal BID    nortriptyline  25 mg Oral QHS    paroxetine  20 mg Oral QAM     PRN Meds:  Current Facility-Administered Medications:     acetaminophen, 650 mg, Oral, Q4H PRN    albuterol-ipratropium, 3 mL, Nebulization, Q6H PRN    aluminum-magnesium hydroxide-simethicone, 30 mL, Oral, QID PRN    bisacodyL, 10 mg, Rectal, Daily PRN    butalbital-acetaminophen-caffeine -40 mg, 1 tablet, Oral, Q6H PRN    clindamycin IV (PEDS and ADULTS), 900 mg, Intravenous, On Call Procedure    dextrose 50%, 12.5 g, Intravenous, PRN    dextrose 50%, 25 g, Intravenous, PRN    glucagon (human recombinant), 1 mg, Intramuscular, PRN    glucose, 16 g, Oral, PRN    glucose, 24 g, Oral, PRN    hydrALAZINE, 10 mg, Intravenous, Q4H PRN    HYDROcodone-acetaminophen, 1 tablet, Oral, Q4H PRN    HYDROcodone-acetaminophen, 1 tablet, Oral, Q4H PRN    insulin aspart U-100, 0-5 Units, Subcutaneous, QID (AC + HS) PRN    labetalol, 10 mg, Intravenous, Q4H PRN    melatonin, 9 mg, Oral, Nightly PRN    morphine, 2 mg,  "Intravenous, Q6H PRN    naloxone, 0.02 mg, Intravenous, PRN    ondansetron, 8 mg, Oral, Q8H PRN    ondansetron, 4 mg, Intravenous, Q8H PRN    polyethylene glycol, 17 g, Oral, TID PRN    simethicone, 1 tablet, Oral, QID PRN    sodium chloride 0.9%, 10 mL, Intravenous, PRN    traZODone, 100 mg, Oral, Nightly PRN     Review of Systems  Objective:     Weight: 49 kg (108 lb 0.4 oz)  Body mass index is 17.44 kg/m².  Vital Signs (Most Recent):  Temp: 97.8 °F (36.6 °C) (05/20/25 0400)  Pulse: 90 (05/20/25 0615)  Resp: 14 (05/20/25 0615)  BP: 127/79 (05/20/25 0615)  SpO2: 96 % (05/20/25 0615) Vital Signs (24h Range):  Temp:  [97 °F (36.1 °C)-98.2 °F (36.8 °C)] 97.8 °F (36.6 °C)  Pulse:  [70-94] 90  Resp:  [10-20] 14  SpO2:  [94 %-100 %] 96 %  BP: ()/(60-87) 127/79  Arterial Line BP: (0)/(0) 0/0                              Closed/Suction Drain 05/19/25 1823 Tube - 1 Left Scalp Bulb 10 Fr. (Active)   Site Description Unable to view 05/20/25 0400   Dressing Type Gauze 05/20/25 0400   Dressing Status Clean;Dry;Intact 05/20/25 0400   Dressing Intervention Integrity maintained 05/20/25 0400   Drainage Serosanguineous 05/20/25 0400   Status To bulb suction 05/20/25 0400   Output (mL) 10 mL 05/20/25 0400            Urethral Catheter 05/19/25 1733 Silicone 16 Fr. (Active)   Site Assessment Clean;Intact;Dry 05/20/25 0400   Collection Container Urimeter 05/20/25 0400   Securement Method secured to top of thigh w/ adhesive device 05/20/25 0400   Catheter Care Performed no 05/20/25 0400   Reason for Continuing Urinary Catheterization Post operative 05/20/25 0400   CAUTI Prevention Bundle Securement Device in place with 1" slack;Intact seal between catheter & drainage tubing;Drainage bag/urimeter off the floor;Sheeting clip in use;No dependent loops or kinks;Drainage bag/urimeter not overfilled (<2/3 full);Drainage bag/urimeter below bladder 05/20/25 0400   Output (mL) 600 mL 05/20/25 0400       Neurosurgery Physical Exam  GCS: " 15  Alert and oriented to person, place, time, and situation  PERRLA, EOMI  Speech is clear and coherent  No pronator drifts  Follows commands  Full strength in all extremities  JOHNATHAN drain: serosanguineous  JOHNATHAN output (12hrs): 110ml    Significant Labs:  Recent Labs   Lab 05/19/25  1048 05/20/25  0315   * 156*    140   K 4.3 4.2    106   CO2 29 25   BUN 14.4 9.3*   CREATININE 0.76 0.66   CALCIUM 9.7 8.4     Recent Labs   Lab 05/19/25  1048 05/20/25  0315   WBC 8.21 7.91   HGB 13.9 11.6*   HCT 42.4 36.1*    299     Recent Labs   Lab 05/19/25  1048   INR 1.0   APTT 27.8     Microbiology Results (last 7 days)       ** No results found for the last 168 hours. **            Significant Diagnostics:      Assessment/Plan:      Dx: Left subacute/chronic subdural hematoma      Plan:  - ICU status, ok to downgrade to hospitalist ONLY to the 10th floor  - Neuro checks Q2H  - SBP<140/90  - Keppra 500mg BID x 7 days  - Pain control  - HOB 30 degrees  - JOHNATHAN drain to thumbprint suction  - Discontinue alvarez catheter today  - PT, OT  - SCDs for DVT prophylaxis     Active Diagnoses:    Diagnosis Date Noted POA    PRINCIPAL PROBLEM:  Subdural hematoma [S06.5XAA] 05/15/2025 Yes      Problems Resolved During this Admission:       ILA Loyd-BC  Neurosurgery  [facility] - [facility] ICU

## 2025-05-20 NOTE — ANESTHESIA POSTPROCEDURE EVALUATION
Anesthesia Post Evaluation    Patient: Chelle Chandra    Procedure(s) Performed: Procedure(s) (LRB):  CRANIOTOMY, FOR SUBDURAL HEMATOMA EVACUATION (Left)    Final Anesthesia Type: general      Patient location during evaluation: PACU  Patient participation: Yes- Able to Participate  Level of consciousness: awake and alert  Post-procedure vital signs: reviewed and stable  Pain management: adequate  Airway patency: patent  SARAH mitigation strategies: Multimodal analgesia  PONV status at discharge: No PONV  Anesthetic complications: no      Cardiovascular status: blood pressure returned to baseline and hemodynamically stable  Respiratory status: nasal cannula and spontaneous ventilation  Hydration status: euvolemic  Follow-up not needed.          Vitals Value Taken Time   /75 05/20/25 09:05   Temp 36.9 °C (98.4 °F) 05/20/25 08:00   Pulse 89 05/20/25 09:08   Resp 21 05/20/25 09:07   SpO2 97 % 05/20/25 09:08   Vitals shown include unfiled device data.      No case tracking events are documented in the log.      Pain/Bob Score: Pain Rating Prior to Med Admin: 9 (5/20/2025  3:16 AM)  Pain Rating Post Med Admin: 3 (5/20/2025  4:16 AM)  Bob Score: 10 (5/19/2025  8:45 PM)

## 2025-05-20 NOTE — TRANSFER OF CARE
"Anesthesia Transfer of Care Note    Patient: Chelle Chandra    Procedure(s) Performed: Procedure(s) (LRB):  CRANIOTOMY, FOR SUBDURAL HEMATOMA EVACUATION (Left)    Patient location: PACU    Anesthesia Type: general    Transport from OR: Transported from OR on 2-3 L/min O2 by NC with adequate spontaneous ventilation    Post pain: adequate analgesia    Post assessment: no apparent anesthetic complications and tolerated procedure well    Post vital signs: stable    Level of consciousness: sedated and responds to stimulation    Nausea/Vomiting: no nausea/vomiting    Complications: none    Transfer of care protocol was followed    Last vitals: Visit Vitals  BP 91/62   Pulse 71   Temp 36.6 °C (97.8 °F) (Oral)   Resp 17   Ht 5' 6" (1.676 m)   Wt 49 kg (108 lb 0.4 oz)   SpO2 100%   BMI 17.44 kg/m²     "

## 2025-05-20 NOTE — PLAN OF CARE
Problem: Occupational Therapy  Goal: Occupational Therapy Goal  Description: Goals to be met by: 6/20/25     Patient will increase functional independence with ADLs by performing:    UE Dressing with Supervision.  LE Dressing with Supervision.  Grooming while standing at sink with Supervision.  Toileting from toilet with Supervision for hygiene and clothing management.   Bathing from  shower chair/bench with Supervision.  Toilet transfer to toilet with Supervision.    Outcome: Progressing

## 2025-05-20 NOTE — PLAN OF CARE
Problem: Physical Therapy  Goal: Physical Therapy Goal  Description: Goals to be met by: 25     Patient will increase functional independence with mobility by performin. Supine to sit with [place]  2. Sit to supine with [place]  3. Sit to stand transfer with Modified [place]  4. Gait  x 150 feet with Modified [place] using Rolling Walker.   5. Ascend/descend 3 stair with Handrail Modified [place] using No Assistive Device.     Outcome: Progressing

## 2025-05-20 NOTE — BRIEF OP NOTE
[facility] - Periop Services  Brief Operative Note    SUMMARY     Surgery Date: 5/19/2025     Surgeons and Role:     * Ricardo Shelley MD - Primary    Assisting Surgeon: None    Pre-op Diagnosis:  Subdural hematoma [S06.5XAA]    Post-op Diagnosis:  Post-Op Diagnosis Codes:     * Subdural hematoma [S06.5XAA]    Procedure(s) (LRB):  CRANIOTOMY, FOR SUBDURAL HEMATOMA EVACUATION (Left)    Left mini craniotomy for subdural hematoma evacuation    Anesthesia: General    Implants:  Implant Name Type Inv. Item Serial No.  Lot No. LRB No. Used Action   DURA MATRIX ONLAY PLUS 3X3 - [id number]  DURA MATRIX ONLAY PLUS 3X3  [employer]AN[employer]. [id number] Left 1 Implanted   PLATE 12MM DOG CRNMXF 1.5MM - [id number]  PLATE 12MM DOG CRNMXF 1.5MM  [employer]AN[employer].  Left 1 Implanted   COVER BUR HL CRNMXF 14MM - [id number]  COVER BUR HL CRNMXF 14MM  [employer]AN[employer].  Left 1 Implanted   PLATE SHUNT TERRI HOLE 14MM - [id number]  PLATE SHUNT TERRI HOLE 14MM  [employer]  Left 1 Implanted   SCREW UN3 AXS SD 1.5X4MM - [id number]  SCREW UN3 AXS SD 1.5X4MM  [employer] ALESSANDRA.  Left 8 Implanted       Operative Findings:   Started with two burrholes and switched over to mini craniotomy.  Successfully evacuated left subdural hematoma.      Estimated Blood Loss: 100 mL    Estimated Blood Loss has been documented.         Specimens:   Specimen (24h ago, onward)      None          * No specimens in log *    [id number]    Plan:  - PACU and then transfer to ICU  - Neuro checks Q1H  - SBP<140/90  - Keppra 500mg BID x 7 days  - Clindamycin 900mg BID x 24 hours  - Pain control  - HOB 30 degrees  - Advance diet as tolerated  - JOHNATHAN drain to thumbprint suction  - Discontinue alvarez catheter POD #1  - PT, OT  - SCDs for DVT prophylaxis  - Post-op CT head without contrast today at 2000      ILA Loyd-BC  Neurosurgery  [facility]     Problem: Activity Intolerance  Intervention: # Engage significant other to support ADL independence  Discussed importance of increasing activity with pt and wife. Pt stated \"the doctors have not cleared me to exercise or walk.\" Writer explained progressive mobilization, importance of ambulation, C&DB and I/S. Pt and wife agreeable to increasing activity and ambulating in rodriguez. Will continue to monitor.

## 2025-05-20 NOTE — PT/OT/SLP RE-EVAL
"Occupational Therapy   Re-evaluation    Name: Chelle Chandra  MRN: [id number]  Admitting Diagnosis: SDH  Recent Surgery: Procedure(s) (LRB):  CRANIOTOMY, FOR SUBDURAL HEMATOMA EVACUATION (Left) 1 Day Post-Op    Recommendations:     Discharge therapy intensity: Low Intensity Therapy   Discharge Equipment Recommendations:  none  Barriers to discharge:  none evident    Assessment:   Chelle Chandra is a 59 y.o. female with a medical diagnosis of falls with SDH s/p MMA embolization, worsening headaches now s/p craniotomy.  OT re-consulted following craniotomy.  She requires CGA to toilet with R, overall SBA-CGA ADLs.  She reports 24/7 support.  Rec low intensity therapy at discharge along with 24/7 support.    She presents with the following performance deficits affecting function: weakness, impaired self care skills, impaired functional mobility, gait instability, impaired balance, decreased safety awareness.    Rehab Prognosis: Good; patient would benefit from acute skilled OT services to address these deficits and reach maximum level of function.       Plan:     Patient to be seen 5 x/week to address the above listed problems via self-care/home management, therapeutic activities  Plan of Care Expires: 06/20/25  Plan of Care Reviewed with: patient, spouse    Subjective     Chief Complaint: "I'm nauseated"  Patient/Family Comments/goals: "go home, I have a lot of support"    Pain/Comfort:  Pain Rating 1: 7/10 (headache)  Pain Addressed 1: Reposition, Nurse notified    Patients cultural, spiritual, Yazidism conflicts given the current situation: no    Objective:     OT communicated with RN prior to session.      Patient was found HOB elevated with  (vital monitoring, JOHNATHAN, SCD, vital monitoring) upon OT entry to room.    General Precautions: Standard,  (fall, <140/90)  Orthopedic Precautions: N/A  Braces: N/A    Vital Signs: 123/73 HR  86    Bed Mobility:    Patient completed Supine to Sit with stand by " assistance    Functional Mobility/Transfers:  Patient completed Sit <> Stand Transfer with contact guard assistance  with  rolling walker   Patient completed Toilet Transfer Step Transfer technique with contact guard assistance with  rolling walker  Functional Mobility: CGA with use of RW    Activities of Daily Living:  Grooming: stand by assistance    Toileting: contact guard assistance      AMPA 6 Click ADL:  [facility] Total Score: 20    Functional Cognition:  Intact    Visual Perceptual Skills:  Intact    Upper Extremity Function:  Right Upper Extremity:   WFL    Left Upper Extremity:  WFL    Balance:   SBA static sitting balance    Therapeutic Positioning  Risk for acquired pressure injuries is decreased due to ability to get to BSC/toilet with assist.    OT interventions performed during the course of today's session in an effort to prevent and/or reduce acquired pressure injuries:   Education was provided on benefits of and recommendations for therapeutic positioning    Skin assessment: all bony prominences were assessed    Findings: no redness or breakdown noted    OT recommendations for therapeutic positioning throughout hospitalization:   Follow [facility] Pressure Injury Prevention Protocol        Patient Education:  Patient provided with verbal education education regarding OT role/goals/POC, post op precautions, fall prevention, safety awareness, Discharge/DME recommendations, and pressure ulcer prevention.  Understanding was verbalized, however additional teaching warranted.     Patient left up in chair with all lines intact, call button in reach, and chair alarm on    GOALS:   Multidisciplinary Problems       Occupational Therapy Goals          Problem: Occupational Therapy    Goal Priority Disciplines Outcome Interventions   Occupational Therapy Goal     OT, PT/OT Progressing    Description: Goals to be met by: 6/20/25     Patient will increase functional independence with ADLs by performing:    UE Dressing  with Supervision.  LE Dressing with Supervision.  Grooming while standing at sink with Supervision.  Toileting from toilet with Supervision for hygiene and clothing management.   Bathing from  shower chair/bench with Supervision.  Toilet transfer to toilet with Supervision.                         History:     Past Medical History:   Diagnosis Date    Accelerated junctional rhythm     Agatston CAC score, <100     Anxiety disorder, unspecified     COPD type A     Diabetes mellitus without complication     GERD (gastroesophageal reflux disease)     History of COVID-19     Insomnia     Lung nodule          Past Surgical History:   Procedure Laterality Date    ANGIOGRAM, CORONARY, WITH LEFT HEART CATHETERIZATION      BREAST LUMPECTOMY Right     CARDIOVERSION  08/01/2013    CARPAL TUNNEL RELEASE  10/23/2013    CRANIOTOMY FOR EVACUATION OF SUBDURAL HEMATOMA Left 5/19/2025    Procedure: CRANIOTOMY, FOR SUBDURAL HEMATOMA EVACUATION;  Surgeon: Ricardo Shelley MD;  Location: [facility];  Service: Neurosurgery;  Laterality: Left;    FUSION OF CERVICAL SPINE BY ANTERIOR APPROACH USING COMPUTER-ASSISTED NAVIGATION  06/10/2019    KIDNEY SURGERY      TONSILLECTOMY AND ADENOIDECTOMY      TOTAL ABDOMINAL HYSTERECTOMY      WRIST SURGERY         Time Tracking:     OT Date of Treatment:    OT Start Time: 0900  OT Stop Time: 0928  OT Total Time (min): 28 min    Billable Minutes:Re-eval 1    5/20/2025

## 2025-05-20 NOTE — PROGRESS NOTES
[facility]  Hospital Medicine Progress Note        Chief Complaint: Inpatient Follow-up     HPI:   59-year-old female with a past medical history of prior CVA, diabetes mellitus, tobacco dependence presenting after an outpatient CT head showed subdural hematoma.  Patient has had 3 falls alone in the month of May and had 1 fall in the month of April.  She states the most recent fall was a proximally 3 weeks ago.  Says her legs have been wobbling lately and while she should be ambulating with her Rollator she tends to hold onto the walls in her home.  The most recent fall occurred when she was ambulating in this way however she fell forward but denies any head trauma/syncope. States she was able to catch herself with her hands.  Patient takes her blood pressure at home on a regular basis and states her systolic blood pressure is never in the 100s usually systolic in the 70s. At baseline patient lives with her .    5/15 Neuro IR for MMA embolization   5/17 continues with intermittent headaches despite PRN analgesics   ambulated by herself to bathroom without issues.   Per neurosurgery - If she continues with persistent headaches despite medical management she may need diony hole drainage vs mini crani for SDH early next week   5/18 Continues to have headache similar to yesterday. No other focal neurologic weakness/ deficits.   Added Fioricet for headaches.  5/19 Headache persistent, planned for diony hole/ Left mini craniotomy per neurosurgery today.    Interval Hx:     POD 1 Left mini craniotomy for subdural hematoma evacuation   Was in the ICU post op, now downgraded to  service  Headache improved post op, has soreness at surgical site. No deficits.    Case was discussed with patient's nurse and  on the floor.    Objective/physical exam:  General: In no acute distress, afebrile  Chest: Clear to auscultation bilaterally  Heart: RRR, +S1, S2, no appreciable  murmur  Abdomen: Soft, nontender, BS +  MSK: Warm, no lower extremity edema, no clubbing or cyanosis  Neurologic: Alert and oriented, moving all extremities with good strength    VITAL SIGNS: 24 HRS MIN & MAX LAST   Temp  Min: 97 °F (36.1 °C)  Max: 98.4 °F (36.9 °C) 98.4 °F (36.9 °C)   BP  Min: 89/61  Max: 139/89 117/74   Pulse  Min: 70  Max: 99  84   Resp  Min: 10  Max: 20 17   SpO2  Min: 93 %  Max: 100 % 96 %     I have reviewed the following labs:  Recent Labs   Lab 05/17/25  0508 05/19/25  1048 05/20/25  0315   WBC 8.79 8.21 7.91   RBC 4.38 4.67 3.87*   HGB 12.9 13.9 11.6*   HCT 39.3 42.4 36.1*   MCV 89.7 90.8 93.3   MCH 29.5 29.8 30.0   MCHC 32.8* 32.8* 32.1*   RDW 13.5 13.6 13.7    383 299   MPV 10.0 10.0 9.9     Recent Labs   Lab 05/15/25  1514 05/17/25  0508 05/19/25  1048 05/20/25  0315    137 141 140   K 4.1 4.5 4.3 4.2    104 103 106   CO2 24 23 29 25   BUN 14.6 18.3 14.4 9.3*   CREATININE 0.82 0.72 0.76 0.66   * 131* 173* 156*   CALCIUM 9.8 9.2 9.7 8.4   MG  --  1.90  --   --    ALBUMIN 4.0 3.6 3.9  --    PROT 7.4 6.7 7.0  --    ALKPHOS 157* 139 163*  --    ALT 56* 40 65*  --    AST 24 16 45  --    BILITOT 0.7 0.7 0.7  --      Microbiology Results (last 7 days)       ** No results found for the last 168 hours. **             See below for Radiology    CTA head and neck 5/14 1. No large vessel occlusion or flow-limiting stenosis.  2. New intermediate density subdural hemorrhage along the left cerebral convexity.  No midline shift.  CT head 5/15 Similar left-sided subdural hematoma.     Assessment/Plan:    Subdural hematoma likely related to below  - s/p Embolization of the left middle meningeal artery using coil and liquid embolic agent 5/16/25  - Left mini craniotomy for subdural hematoma evacuation 5/19/25 given persistent headaches  Frequent falls  Hypothyroidism   NIDDM  Elevated ALP    history of prior CVA, diabetes mellitus, tobacco dependence      Plan  noncontrast CT head  showed left-sided subdural hematoma similar in size to outpatient CT  on Plavix at home for a recent CVA; states she is not taking aspirin at this time but she is taking migraine headache medication with aspirin in it; hold all antiplatelet therapy    Neurosurgery - Left mini craniotomy for subdural hematoma evacuation 5/19/25  - Neuro checks Q2H  - SBP<140/90  - Keppra 500mg BID x 7 days  - JOHNATHAN drain to thumbprint suction   - SCDs for DVT prophylaxis     Interventional neurology signed off 5/17  - s/p Embolization of the left middle meningeal artery using coil and glue 5/16/25  - -160  - hold plavix x 2 weeks   - follow up with Dr Egan in 1 month with head CT    Resumed patient's metoprolol with hold parameters  Elevated >>139>>163  Right upper quadrant ultrasound 5/16 - No significant sonographic abnormality of the right upper quadrant.     Hypothyroidism; TSH low, reduce her dose of levothyroxine from 100 to 88 mcg   -repeat TSH in 4-6 weeks     NIDDM, hold home Farxiga, metformin  Sliding scale insulin; hemoglobin A1c = 7.3  Resume home meds as appropriate    Labs am  PT/OT low intensity therapy     VTE prophylaxis: SCD, chemical ppx when cleared by neurosurgery    Patient condition:  Fair    Anticipated discharge and Disposition:         All diagnosis and differential diagnosis have been reviewed; assessment and plan has been documented; I have personally reviewed the labs and test results that are presently available; I have reviewed the patients medication list; I have reviewed the consulting providers response and recommendations. I have reviewed or attempted to review medical records based upon their availability    All of the patient's questions have been  addressed and answered. Patient's is agreeable to the above stated plan. I will continue to monitor closely and make adjustments to medical management as needed.    Portions of this note dictated using EMR integrated voice recognition  software, and may be subject to voice recognition errors not corrected at proofreading. Please contact writer for clarification if needed.   _____________________________________________________________________    Malnutrition Status:  Nutrition consulted. Most recent weight and BMI monitored-     Measurements:  Wt Readings from Last 1 Encounters:   05/15/25 49 kg (108 lb 0.4 oz)   Body mass index is 17.44 kg/m².    Patient has been screened and assessed by RD.    Malnutrition Type:  Context:    Level:      Malnutrition Characteristic Summary:       Interventions/Recommendations (treatment strategy):        Scheduled Med:   atorvastatin  80 mg Oral Daily    busPIRone  15 mg Oral BID    clindamycin IV (PEDS and ADULTS)  900 mg Intravenous Q8H    docusate sodium  50 mg Oral BID    ezetimibe  10 mg Oral Daily    levETIRAcetam  500 mg Oral BID    levothyroxine  88 mcg Oral Before breakfast    metoprolol succinate  12.5 mg Oral Daily    mupirocin   Nasal BID    nortriptyline  25 mg Oral QHS    paroxetine  20 mg Oral QAM      Continuous Infusions:     PRN Meds:    Current Facility-Administered Medications:     acetaminophen, 650 mg, Oral, Q4H PRN    albuterol-ipratropium, 3 mL, Nebulization, Q6H PRN    aluminum-magnesium hydroxide-simethicone, 30 mL, Oral, QID PRN    bisacodyL, 10 mg, Rectal, Daily PRN    butalbital-acetaminophen-caffeine -40 mg, 1 tablet, Oral, Q6H PRN    clindamycin IV (PEDS and ADULTS), 900 mg, Intravenous, On Call Procedure    dextrose 50%, 12.5 g, Intravenous, PRN    dextrose 50%, 25 g, Intravenous, PRN    glucagon (human recombinant), 1 mg, Intramuscular, PRN    glucose, 16 g, Oral, PRN    glucose, 24 g, Oral, PRN    hydrALAZINE, 10 mg, Intravenous, Q4H PRN    HYDROcodone-acetaminophen, 1 tablet, Oral, Q4H PRN    HYDROcodone-acetaminophen, 1 tablet, Oral, Q4H PRN    insulin aspart U-100, 0-5 Units, Subcutaneous, QID (AC + HS) PRN    labetalol, 10 mg, Intravenous, Q4H PRN    melatonin, 9 mg,  Oral, Nightly PRN    morphine, 2 mg, Intravenous, Q6H PRN    naloxone, 0.02 mg, Intravenous, PRN    ondansetron, 8 mg, Oral, Q8H PRN    ondansetron, 4 mg, Intravenous, Q8H PRN    polyethylene glycol, 17 g, Oral, TID PRN    simethicone, 1 tablet, Oral, QID PRN    sodium chloride 0.9%, 10 mL, Intravenous, PRN    traZODone, 100 mg, Oral, Nightly PRN     Radiology:  I have personally reviewed the following imaging and agree with the radiologist.     CT Head Without Contrast  Narrative: EXAMINATION:  CT HEAD WITHOUT CONTRAST    CLINICAL HISTORY:  s/p left craniotomy for SDH evacuation;    TECHNIQUE:  Axial scans were obtained from skull base to the vertex.    Coronal and sagittal reconstructions obtained from the axial data.    Automatic exposure control was utilized to limit radiation dose.    Contrast: None    Radiation Dose:    Total DLP: 968 mGy*cm    COMPARISON:  CT head dated 05/19/2020    FINDINGS:  There are postoperative changes following left-sided craniotomy for evacuation of a subdural hemorrhage, with subdural drain in place.  Residual subdural hemorrhage along the left cerebral convexity measures up to 9 mm in thickness.  There is postoperative pneumocephalus.  The brain parenchyma is unchanged with encephalomalacia in the left cerebral hemisphere.  Mass effect has overall decreased.  There is no significant midline shift or herniation.  The basal cisterns are patent.  The skull base is intact.  The mastoid air cells are clear.  Impression: Postoperative changes following left-sided craniotomy for evacuation of subdural hemorrhage, with decreased mass effect.    Electronically signed by: Beverley Ames  Date:    05/20/2025  Time:    08:05      Curry Payton MD  Department of Hospital Medicine   [facility]   05/20/2025

## 2025-05-20 NOTE — H&P
[facility] - Periop Services  Pulmonary Critical Care Note    Patient Name: Chelle Chandra  MRN: [id number]  Admission Date: 5/15/2025  Hospital Length of Stay: 4 days  Code Status: Full Code  Attending Provider: Curry Payton MD  Primary Care Provider: Jorge Silver MD     Subjective:     HPI:   Pt is a 58 y/o F with past medical history of prior CVA, diabetes mellitus, tobacco dependence presenting after an outpatient CT head showed subdural hematoma on 5/15/25.  Reported to have numerous falls in May and April.  Intervention neurology and neurosurgery consulted.  Patient underwent left middle meningeal artery coil embolization with Interventional Neurology on 05/16/2025.  Three course of hospitalization, patient had worsening headaches and nausea vomiting, ultimately requiring craniotomy for subdural hematoma evacuation on 05/19/2025.  Admitted to ICU for q.1h neuro checks post procedure.    Hospital Course/Significant events:  05/19/2025 admitted to ICU for close monitoring    24 Hour Interval History:  Patient seen awake, alert but complaining of pain.  Denies any other complaints.  We will watch overnight for close monitoring.    Past Medical History:   Diagnosis Date    Accelerated junctional rhythm     Agatston CAC score, <100     Anxiety disorder, unspecified     COPD type A     Diabetes mellitus without complication     GERD (gastroesophageal reflux disease)     History of COVID-19     Insomnia     Lung nodule        Past Surgical History:   Procedure Laterality Date    ANGIOGRAM, CORONARY, WITH LEFT HEART CATHETERIZATION      BREAST LUMPECTOMY Right     CARDIOVERSION  08/01/2013    CARPAL TUNNEL RELEASE  10/23/2013    FUSION OF CERVICAL SPINE BY ANTERIOR APPROACH USING COMPUTER-ASSISTED NAVIGATION  06/10/2019    KIDNEY SURGERY      TONSILLECTOMY AND ADENOIDECTOMY      TOTAL ABDOMINAL HYSTERECTOMY      WRIST SURGERY         Social History[1]        Current Outpatient Medications  "  Medication Instructions    atorvastatin (LIPITOR) 80 mg, Oral, Daily    busPIRone (BUSPAR) 15 mg, Oral, 2 times daily    butalbital-aspirin-caffeine -40 mg (FIORINAL) -40 mg Cap 1 capsule, Every 4 hours PRN    clopidogreL (PLAVIX) 75 mg, Oral, Daily    ezetimibe (ZETIA) 10 mg, Oral    FARXIGA 5 mg, Oral    fluticasone propionate (FLOVENT DISKUS) 250 mcg/actuation DsDv 2 puffs, Inhalation, 2 times daily, Controller    levothyroxine (SYNTHROID) 100 mcg, Oral, Before breakfast    metFORMIN (GLUCOPHAGE) 500 mg, Oral, 2 times daily with meals    metoprolol succinate (TOPROL-XL) 12.5 mg    nortriptyline (PAMELOR) 25 mg, Oral    ondansetron (ZOFRAN) 8 mg, Oral, Every 6 hours PRN    paroxetine (PAXIL) 20 mg, Oral, Every morning    pen needle, diabetic 32 gauge x 5/32" Ndle 1 each, Misc.(Non-Drug; Combo Route), Weekly    traZODone (DESYREL) 100 mg, Oral, Nightly PRN    VENTOLIN HFA 90 mcg/actuation inhaler 2 puffs, Inhalation, Every 4 hours PRN       Review of patient's allergies indicates:   Allergen Reactions    Aspirin     Ketorolac     Penicillins     Sulfa (sulfonamide antibiotics)     Ozempic [semaglutide] Other (See Comments)     Abdominal pain        Current Inpatient Medications   atorvastatin  80 mg Oral Daily    busPIRone  15 mg Oral BID    clindamycin IV (PEDS and ADULTS)  900 mg Intravenous Q8H    docusate sodium  50 mg Oral BID    ezetimibe  10 mg Oral Daily    levETIRAcetam  500 mg Oral BID    levothyroxine  88 mcg Oral Before breakfast    metoprolol succinate  12.5 mg Oral Daily    mupirocin   Nasal BID    nortriptyline  25 mg Oral QHS    paroxetine  20 mg Oral QAM       Current Intravenous Infusions   0.9% NaCl   Intravenous Continuous             Review of Systems   Respiratory:  Negative for cough, hemoptysis and shortness of breath.    Cardiovascular:  Negative for chest pain.   Gastrointestinal:  Negative for abdominal pain, nausea and vomiting.   Neurological:  Positive for headaches.      "   Falls          Objective:       Intake/Output Summary (Last 24 hours) at 5/19/2025 1933  Last data filed at 5/19/2025 1926  Gross per 24 hour   Intake 1400 ml   Output 700 ml   Net 700 ml         Vital Signs (Most Recent):  Temp: 97.8 °F (36.6 °C) (05/19/25 1529)  Pulse: 89 (05/19/25 1646)  Resp: 15 (05/19/25 1646)  BP: 102/74 (05/19/25 1646)  SpO2: 97 % (05/19/25 1646)  Body mass index is 17.44 kg/m².  Weight: 49 kg (108 lb 0.4 oz) Vital Signs (24h Range):  Temp:  [97.5 °F (36.4 °C)-98 °F (36.7 °C)] 97.8 °F (36.6 °C)  Pulse:  [] 89  Resp:  [15-17] 15  SpO2:  [94 %-98 %] 97 %  BP: ()/(60-80) 102/74     Physical Exam  Vitals reviewed.   Constitutional:       Appearance: Normal appearance.   HENT:      Head:      Comments: JOHNATHAN drain present left-sided head with overlying dressing  Eyes:      Extraocular Movements: Extraocular movements intact.      Conjunctiva/sclera: Conjunctivae normal.      Pupils: Pupils are equal, round, and reactive to light.   Cardiovascular:      Rate and Rhythm: Normal rate and regular rhythm.   Pulmonary:      Effort: Pulmonary effort is normal. No respiratory distress.      Breath sounds: Normal breath sounds.   Abdominal:      General: Bowel sounds are normal.      Palpations: Abdomen is soft.   Musculoskeletal:      Right lower leg: No edema.      Left lower leg: No edema.   Skin:     General: Skin is warm and dry.   Neurological:      General: No focal deficit present.      Mental Status: She is alert and oriented to person, place, and time. Mental status is at baseline.           Lines/Drains/Airways       Drain  Duration                  Closed/Suction Drain 05/19/25 1823 Tube - 1 Left Scalp Bulb 10 Fr. <1 day         Urethral Catheter 05/19/25 1733 Double-lumen;Silicone;Non-latex 16 Fr. <1 day              Arterial Line  Duration             Arterial Line 05/19/25 1726 Radial <1 day    Arterial Line 05/19/25 1800 Right Radial <1 day              Peripheral Intravenous  "Line  Duration                  Peripheral IV - Single Lumen 05/15/25 1513 20 G Anterior;Right Forearm 4 days                    Significant Labs:    Lab Results   Component Value Date    WBC 8.21 05/19/2025    HGB 13.9 05/19/2025    HCT 42.4 05/19/2025    MCV 90.8 05/19/2025     05/19/2025           BMP  Lab Results   Component Value Date     05/19/2025    K 4.3 05/19/2025    CO2 29 05/19/2025    BUN 14.4 05/19/2025    CREATININE 0.76 05/19/2025    CALCIUM 9.7 05/19/2025    AGAP 9.0 05/19/2025    EGFRNONAA >60 07/28/2021         ABG  No results for input(s): "PH", "PO2", "PCO2", "HCO3", "POCBASEDEF" in the last 168 hours.    Mechanical Ventilation Support:         Significant Imaging:  I have reviewed the pertinent imaging within the past 24 hours.        Assessment/Plan:     Assessment  Subdural hematoma status post evacuation/craniotomy on 05/19/2025  Past medical history of prior CVA, diabetes mellitus, tobacco use disorder      Plan  Admit to ICU for close monitoring   Continue supplementa   Neuro checks Q1H  SBP<140/90-p.r.n.'s in place  Keppra 500mg BID x 7 days  Clindamycin 900mg BID x 24 hours  Pain control  HOB 30 degrees  Advance diet as tolerated  JOHNATHAN drain to thumbprint suction      DVT Prophylaxis: SCD's  GI Prophylaxis: n/a       Thanh Millard DO  Pulmonary Critical Care Medicine  [facility]i [facility]  DOS: 05/19/2025          [1]   Social History  Socioeconomic History    Marital status:    Tobacco Use    Smoking status: Every Day     Current packs/day: 0.50     Types: Cigarettes    Smokeless tobacco: Never   Substance and Sexual Activity    Alcohol use: Never    Drug use: Never     Social Drivers of Health     Financial Resource Strain: Low Risk  (5/15/2025)    Overall Financial Resource Strain (CARDIA)     Difficulty of Paying Living Expenses: Not hard at all   Food Insecurity: No Food Insecurity (5/15/2025)    Hunger Vital Sign     Worried About Running " Out of Food in the Last Year: Never true     Ran Out of Food in the Last Year: Never true   Transportation Needs: No Transportation Needs (5/15/2025)    PRAPARE - Transportation     Lack of Transportation (Medical): No     Lack of Transportation (Non-Medical): No   Physical Activity: Sufficiently Active (7/11/2024)    Received from [facility]    Exercise Vital Sign     Days of Exercise per Week: 3 days     Minutes of Exercise per Session: 50 min   Stress: No Stress Concern Present (5/15/2025)    [employer] - Occupational Stress Questionnaire     Feeling of Stress : Not at all   Housing Stability: Low Risk  (5/15/2025)    Housing Stability Vital Sign     Unable to Pay for Housing in the Last Year: No     Homeless in the Last Year: No

## 2025-05-21 LAB
ANION GAP SERPL CALC-SCNC: 9 MEQ/L
BUN SERPL-MCNC: 6.4 MG/DL (ref 9.8–20.1)
CALCIUM SERPL-MCNC: 9.5 MG/DL (ref 8.4–10.2)
CHLORIDE SERPL-SCNC: 102 MMOL/L (ref 98–107)
CO2 SERPL-SCNC: 27 MMOL/L (ref 23–31)
CREAT SERPL-MCNC: 0.76 MG/DL (ref 0.55–1.02)
CREAT/UREA NIT SERPL: 8
ERYTHROCYTE [DISTWIDTH] IN BLOOD BY AUTOMATED COUNT: 13.9 % (ref 11.5–17)
GFR SERPLBLD CREATININE-BSD FMLA CKD-EPI: >60 ML/MIN/1.73/M2
GLUCOSE SERPL-MCNC: 138 MG/DL (ref 82–115)
HCT VFR BLD AUTO: 38.8 % (ref 37–47)
HGB BLD-MCNC: 12.5 G/DL (ref 12–16)
MAGNESIUM SERPL-MCNC: 1.7 MG/DL (ref 1.6–2.6)
MCH RBC QN AUTO: 29.4 PG (ref 27–31)
MCHC RBC AUTO-ENTMCNC: 32.2 G/DL (ref 33–36)
MCV RBC AUTO: 91.3 FL (ref 80–94)
NRBC BLD AUTO-RTO: 0 %
PLATELET # BLD AUTO: 353 X10(3)/MCL (ref 130–400)
PMV BLD AUTO: 9.9 FL (ref 7.4–10.4)
POCT GLUCOSE: 191 MG/DL (ref 70–110)
POTASSIUM SERPL-SCNC: 4 MMOL/L (ref 3.5–5.1)
RBC # BLD AUTO: 4.25 X10(6)/MCL (ref 4.2–5.4)
SODIUM SERPL-SCNC: 138 MMOL/L (ref 136–145)
WBC # BLD AUTO: 7.44 X10(3)/MCL (ref 4.5–11.5)

## 2025-05-21 PROCEDURE — 25000003 PHARM REV CODE 250: Performed by: STUDENT IN AN ORGANIZED HEALTH CARE EDUCATION/TRAINING PROGRAM

## 2025-05-21 PROCEDURE — 97110 THERAPEUTIC EXERCISES: CPT | Mod: CQ

## 2025-05-21 PROCEDURE — 21400001 HC TELEMETRY ROOM

## 2025-05-21 PROCEDURE — 99900035 HC TECH TIME PER 15 MIN (STAT)

## 2025-05-21 PROCEDURE — 25000003 PHARM REV CODE 250: Performed by: INTERNAL MEDICINE

## 2025-05-21 PROCEDURE — 83735 ASSAY OF MAGNESIUM: CPT | Performed by: INTERNAL MEDICINE

## 2025-05-21 PROCEDURE — 36415 COLL VENOUS BLD VENIPUNCTURE: CPT | Performed by: INTERNAL MEDICINE

## 2025-05-21 PROCEDURE — 80048 BASIC METABOLIC PNL TOTAL CA: CPT | Performed by: INTERNAL MEDICINE

## 2025-05-21 PROCEDURE — 25000003 PHARM REV CODE 250

## 2025-05-21 PROCEDURE — 94760 N-INVAS EAR/PLS OXIMETRY 1: CPT

## 2025-05-21 PROCEDURE — 63600175 PHARM REV CODE 636 W HCPCS

## 2025-05-21 PROCEDURE — 11000001 HC ACUTE MED/SURG PRIVATE ROOM

## 2025-05-21 PROCEDURE — 85027 COMPLETE CBC AUTOMATED: CPT | Performed by: INTERNAL MEDICINE

## 2025-05-21 PROCEDURE — 97116 GAIT TRAINING THERAPY: CPT | Mod: CQ

## 2025-05-21 PROCEDURE — 99024 POSTOP FOLLOW-UP VISIT: CPT | Mod: ,,, | Performed by: STUDENT IN AN ORGANIZED HEALTH CARE EDUCATION/TRAINING PROGRAM

## 2025-05-21 PROCEDURE — 63600175 PHARM REV CODE 636 W HCPCS: Performed by: INTERNAL MEDICINE

## 2025-05-21 RX ORDER — MAGNESIUM SULFATE HEPTAHYDRATE 40 MG/ML
2 INJECTION, SOLUTION INTRAVENOUS ONCE
Status: COMPLETED | OUTPATIENT
Start: 2025-05-21 | End: 2025-05-21

## 2025-05-21 RX ORDER — HEPARIN SODIUM 5000 [USP'U]/ML
5000 INJECTION, SOLUTION INTRAVENOUS; SUBCUTANEOUS EVERY 8 HOURS
Status: DISCONTINUED | OUTPATIENT
Start: 2025-05-21 | End: 2025-05-22 | Stop reason: HOSPADM

## 2025-05-21 RX ADMIN — HYDROCODONE BITARTRATE AND ACETAMINOPHEN 1 TABLET: 10; 325 TABLET ORAL at 04:05

## 2025-05-21 RX ADMIN — HYDROCODONE BITARTRATE AND ACETAMINOPHEN 1 TABLET: 10; 325 TABLET ORAL at 08:05

## 2025-05-21 RX ADMIN — LEVETIRACETAM 500 MG: 500 TABLET, FILM COATED ORAL at 08:05

## 2025-05-21 RX ADMIN — HEPARIN SODIUM 5000 UNITS: 5000 INJECTION, SOLUTION INTRAVENOUS; SUBCUTANEOUS at 08:05

## 2025-05-21 RX ADMIN — LEVOTHYROXINE SODIUM 88 MCG: 0.09 TABLET ORAL at 04:05

## 2025-05-21 RX ADMIN — DOCUSATE SODIUM 50 MG: 50 CAPSULE, LIQUID FILLED ORAL at 08:05

## 2025-05-21 RX ADMIN — PAROXETINE HYDROCHLORIDE 20 MG: 20 TABLET, FILM COATED ORAL at 08:05

## 2025-05-21 RX ADMIN — ATORVASTATIN CALCIUM 80 MG: 40 TABLET, FILM COATED ORAL at 08:05

## 2025-05-21 RX ADMIN — MUPIROCIN: 20 OINTMENT TOPICAL at 08:05

## 2025-05-21 RX ADMIN — EZETIMIBE 10 MG: 10 TABLET ORAL at 08:05

## 2025-05-21 RX ADMIN — METOPROLOL SUCCINATE 12.5 MG: 25 TABLET, EXTENDED RELEASE ORAL at 08:05

## 2025-05-21 RX ADMIN — BUSPIRONE HYDROCHLORIDE 15 MG: 5 TABLET ORAL at 08:05

## 2025-05-21 RX ADMIN — BISACODYL 10 MG: 10 SUPPOSITORY RECTAL at 04:05

## 2025-05-21 RX ADMIN — MAGNESIUM SULFATE HEPTAHYDRATE 2 G: 40 INJECTION, SOLUTION INTRAVENOUS at 08:05

## 2025-05-21 RX ADMIN — HYDROCODONE BITARTRATE AND ACETAMINOPHEN 1 TABLET: 10; 325 TABLET ORAL at 02:05

## 2025-05-21 NOTE — PROGRESS NOTES
JOHNATHAN drain was discontinued   Suture was placed utilizing sterile technique   Patient tolerated well   No further drainage noted

## 2025-05-21 NOTE — PT/OT/SLP PROGRESS
Physical Therapy Treatment    Patient Name:  Chelle Chandra   MRN:  [id number]    Recommendations:     Discharge therapy intensity: Low Intensity Therapy   Discharge Equipment Recommendations: none  Barriers to discharge: Ongoing medical needs    Assessment:     Chelle Chandra is a 59 y.o. female admitted with a medical diagnosis of frequent falls; SDH s/p MMA embolization on 5/17; worsening headaches prompting s/p craniotomy on 5/19. Cleared by NSGY to continue mobilizing. She presents with the following impairments/functional limitations: weakness, gait instability, impaired endurance, impaired balance, impaired self care skills, impaired functional mobility, decreased lower extremity function.    Rehab Prognosis: Good; patient would benefit from acute skilled PT services to address these deficits and reach maximum level of function.    Recent Surgery: Procedure(s) (LRB):  CRANIOTOMY, FOR SUBDURAL HEMATOMA EVACUATION (Left) 2 Days Post-Op    Plan:     During this hospitalization, patient would benefit from acute PT services 5 x/week to address the identified rehab impairments via gait training, therapeutic activities, therapeutic exercises, neuromuscular re-education and progress toward the following goals:    Plan of Care Expires:  06/20/25    Subjective     Chief Complaint: head ache    Objective:     Communicated with nurse prior to session.  Patient found supine with blood pressure cuff, peripheral IV, pulse ox (continuous), telemetry, JOHNATHAN drain, SCD upon PT entry to room.     General Precautions: Standard, fall (BP<140/90)  Orthopedic Precautions: N/A  Braces: N/A  Respiratory Status: Room air  Blood Pressure: 107/65  Skin Integrity: JOHNATHAN drain intact      Functional Mobility:  Mod I to get EOB and min assist STS  Gait 250 ft and 215 ft min HHA. Decreased coordination and strength RLE.   Pt performed LE PRE's to increase strenth, ROM, and endurance to improve overall independence.  Pt performed dynamic balance  activities to improve righting reactions  to prevent LOB/falls.     Education Provided:  Role and goals of PT, transfer training, bed mobility, gait training, balance training, safety awareness, assistive device, strengthening exercises, and importance of participating in PT to return to PLOF.     Patient left up in chair with all lines intact and call button in reach    GOALS:   Multidisciplinary Problems       Physical Therapy Goals          Problem: Physical Therapy    Goal Priority Disciplines Outcome Interventions   Physical Therapy Goal     PT, PT/OT Progressing    Description: Goals to be met by: 25     Patient will increase functional independence with mobility by performin. Supine to sit with Brewer  2. Sit to supine with Brewer  3. Sit to stand transfer with Modified Brewer  4. Gait  x 150 feet with Modified Brewer using Rolling Walker.   5. Ascend/descend 3 stair with Handrail Modified Brewer using No Assistive Device.                          Time Tracking:     PT Received On: 25  PT Start Time: 1225     PT Stop Time: 1250  PT Total Time (min): 25 min     Billable Minutes: Gait Training 13 and Therapeutic Exercise 12    Treatment Type: Treatment  PT/PTA: PTA     Number of PTA visits since last PT visit: 2025

## 2025-05-21 NOTE — PLAN OF CARE
Met with pt at bedside to discuss d/c POC and HH services. List of agencies and FOC provided. FOC signed. Referral sent to [facility] via [facility].     Peggy Champion LCSW

## 2025-05-21 NOTE — PROGRESS NOTES
[facility] Neuro  Neurosurgery  Progress Note    Subjective:     Interval History:   POD #2: Left mini craniotomy for subdural hematoma evacuation   No acute events overnight. Patient has mild headaches that is controlled with pain medication. JOHNATHAN drain had minimal drainage overnight.     Post-Op Info:  Procedure(s) (LRB):  CRANIOTOMY, FOR SUBDURAL HEMATOMA EVACUATION (Left)   2 Days Post-Op      Medications:  Continuous Infusions:  Scheduled Meds:   atorvastatin  80 mg Oral Daily    busPIRone  15 mg Oral BID    docusate sodium  50 mg Oral BID    ezetimibe  10 mg Oral Daily    heparin (porcine)  5,000 Units Subcutaneous Q8H    levETIRAcetam  500 mg Oral BID    levothyroxine  88 mcg Oral Before breakfast    magnesium sulfate 2 g IVPB  2 g Intravenous Once    metoprolol succinate  12.5 mg Oral Daily    mupirocin   Nasal BID    nortriptyline  25 mg Oral QHS    paroxetine  20 mg Oral QAM     PRN Meds:  Current Facility-Administered Medications:     acetaminophen, 650 mg, Oral, Q4H PRN    albuterol-ipratropium, 3 mL, Nebulization, Q6H PRN    aluminum-magnesium hydroxide-simethicone, 30 mL, Oral, QID PRN    bisacodyL, 10 mg, Rectal, Daily PRN    butalbital-acetaminophen-caffeine -40 mg, 1 tablet, Oral, Q6H PRN    clindamycin IV (PEDS and ADULTS), 900 mg, Intravenous, On Call Procedure    dextrose 50%, 12.5 g, Intravenous, PRN    dextrose 50%, 25 g, Intravenous, PRN    glucagon (human recombinant), 1 mg, Intramuscular, PRN    glucose, 16 g, Oral, PRN    glucose, 24 g, Oral, PRN    hydrALAZINE, 10 mg, Intravenous, Q4H PRN    HYDROcodone-acetaminophen, 1 tablet, Oral, Q4H PRN    HYDROcodone-acetaminophen, 1 tablet, Oral, Q4H PRN    insulin aspart U-100, 0-5 Units, Subcutaneous, QID (AC + HS) PRN    labetalol, 10 mg, Intravenous, Q4H PRN    melatonin, 9 mg, Oral, Nightly PRN    morphine, 2 mg, Intravenous, Q6H PRN    naloxone, 0.02 mg, Intravenous, PRN    ondansetron, 8 mg, Oral, Q8H PRN     ondansetron, 4 mg, Intravenous, Q8H PRN    polyethylene glycol, 17 g, Oral, TID PRN    simethicone, 1 tablet, Oral, QID PRN    sodium chloride 0.9%, 10 mL, Intravenous, PRN    traZODone, 100 mg, Oral, Nightly PRN     Review of Systems  Objective:     Weight: 49 kg (108 lb 0.4 oz)  Body mass index is 17.44 kg/m².  Vital Signs (Most Recent):  Temp: 98.8 °F (37.1 °C) (05/21/25 0730)  Pulse: 85 (05/21/25 0730)  Resp: 20 (05/21/25 0428)  BP: 118/78 (05/21/25 0730)  SpO2: 95 % (05/21/25 0730) Vital Signs (24h Range):  Temp:  [98.1 °F (36.7 °C)-99 °F (37.2 °C)] 98.8 °F (37.1 °C)  Pulse:  [] 85  Resp:  [13-20] 20  SpO2:  [91 %-96 %] 95 %  BP: (111-139)/(70-89) 118/78     Date 05/21/25 0700 - 05/22/25 0659   Shift 4370-2866 0565-4393 0717-3253 24 Hour Total   INTAKE   Shift Total(mL/kg)       OUTPUT   Drains 30   30   Shift Total(mL/kg) 30(0.6)   30(0.6)   Weight (kg) 49 49 49 49                            Closed/Suction Drain 05/19/25 1823 Tube - 1 Left Scalp Bulb 10 Fr. (Active)   Site Description Unable to view 05/20/25 1500   Dressing Type Gauze 05/20/25 1500   Dressing Status Clean;Dry;Intact 05/21/25 0156   Dressing Intervention Integrity maintained 05/21/25 0156   Drainage Serosanguineous 05/21/25 0156   Status Other (Comment) 05/21/25 0156   Output (mL) 30 mL 05/21/25 0714       Neurosurgery Physical Exam  GCS: 15  Alert and oriented to person, place, time, and situation  PERRLA, EOMI  Speech is clear and coherent  No pronator drifts  Follows commands  Full strength in all extremities  JOHNATHAN drain: serosanguineous  JOHNATHAN output: 30ml    Significant Labs:  Recent Labs   Lab 05/19/25  1048 05/20/25  0315 05/21/25  0503   * 156* 138*    140 138   K 4.3 4.2 4.0    106 102   CO2 29 25 27   BUN 14.4 9.3* 6.4*   CREATININE 0.76 0.66 0.76   CALCIUM 9.7 8.4 9.5   MG  --   --  1.70     Recent Labs   Lab 05/19/25  1048 05/20/25  0315 05/21/25  0503   WBC 8.21 7.91 7.44   HGB 13.9 11.6* 12.5   HCT 42.4 36.1*  38.8    299 353     Recent Labs   Lab 05/19/25  1048   INR 1.0   APTT 27.8     Microbiology Results (last 7 days)       ** No results found for the last 168 hours. **            Significant Diagnostics:      Assessment/Plan:    Dx: Left subacute/chronic subdural hematoma        Plan:  - Floor status  - Neuro checks Q4H  - SBP<140/90  - Keppra 500mg BID x 7 days  - Pain control  - HOB 30 degrees  - JOHNATHAN drain to thumbprint suction. Possible discontinue today at 2pm if drain puts out 30ml or less.   - PT, OT  - SCDs for DVT prophylaxis, starting subq heparin tonight  - Staples removal on 6/2/2025  - Follow up in neurosurgery clinic in 2 weeks for boubacar removal with LUDY Flores. Obtain CT head without contrast prior to appointment.      Active Diagnoses:    Diagnosis Date Noted POA    PRINCIPAL PROBLEM:  Subdural hematoma [S06.5XAA] 05/15/2025 Yes      Problems Resolved During this Admission:       ILA Loyd-BC  Neurosurgery  [facility] - [facility]

## 2025-05-22 ENCOUNTER — TELEPHONE (OUTPATIENT)
Dept: NEUROSURGERY | Facility: CLINIC | Age: 60
End: 2025-05-22
Payer: COMMERCIAL

## 2025-05-22 VITALS
RESPIRATION RATE: 18 BRPM | HEART RATE: 104 BPM | OXYGEN SATURATION: 95 % | BODY MASS INDEX: 17.36 KG/M2 | TEMPERATURE: 99 F | DIASTOLIC BLOOD PRESSURE: 70 MMHG | SYSTOLIC BLOOD PRESSURE: 112 MMHG | HEIGHT: 66 IN | WEIGHT: 108 LBS

## 2025-05-22 DIAGNOSIS — I62.00 NONTRAUMATIC SUBDURAL HEMORRHAGE, UNSPECIFIED: Primary | ICD-10-CM

## 2025-05-22 LAB
POCT GLUCOSE: 131 MG/DL (ref 70–110)
POCT GLUCOSE: 141 MG/DL (ref 70–110)

## 2025-05-22 PROCEDURE — 97116 GAIT TRAINING THERAPY: CPT | Mod: CQ

## 2025-05-22 PROCEDURE — 25000003 PHARM REV CODE 250

## 2025-05-22 PROCEDURE — 25000003 PHARM REV CODE 250: Performed by: STUDENT IN AN ORGANIZED HEALTH CARE EDUCATION/TRAINING PROGRAM

## 2025-05-22 PROCEDURE — 97110 THERAPEUTIC EXERCISES: CPT | Mod: CQ

## 2025-05-22 PROCEDURE — 63600175 PHARM REV CODE 636 W HCPCS

## 2025-05-22 PROCEDURE — 25000003 PHARM REV CODE 250: Performed by: INTERNAL MEDICINE

## 2025-05-22 RX ORDER — LEVOTHYROXINE SODIUM 88 UG/1
88 TABLET ORAL
Qty: 30 TABLET | Refills: 11 | Status: ON HOLD | OUTPATIENT
Start: 2025-05-23 | End: 2026-05-23

## 2025-05-22 RX ORDER — BUTALBITAL, ACETAMINOPHEN AND CAFFEINE 50; 325; 40 MG/1; MG/1; MG/1
1 TABLET ORAL EVERY 6 HOURS PRN
Qty: 40 TABLET | Refills: 1 | Status: SHIPPED | OUTPATIENT
Start: 2025-05-22 | End: 2025-05-22

## 2025-05-22 RX ORDER — LEVETIRACETAM 500 MG/1
500 TABLET ORAL 2 TIMES DAILY
Qty: 8 TABLET | Refills: 0 | Status: ON HOLD | OUTPATIENT
Start: 2025-05-22 | End: 2025-05-26

## 2025-05-22 RX ORDER — BUTALBITAL, ACETAMINOPHEN AND CAFFEINE 50; 325; 40 MG/1; MG/1; MG/1
1 TABLET ORAL EVERY 4 HOURS PRN
Qty: 30 TABLET | Refills: 0 | Status: SHIPPED | OUTPATIENT
Start: 2025-05-22 | End: 2025-05-28 | Stop reason: SDUPTHER

## 2025-05-22 RX ADMIN — EZETIMIBE 10 MG: 10 TABLET ORAL at 08:05

## 2025-05-22 RX ADMIN — HEPARIN SODIUM 5000 UNITS: 5000 INJECTION, SOLUTION INTRAVENOUS; SUBCUTANEOUS at 06:05

## 2025-05-22 RX ADMIN — HYDROCODONE BITARTRATE AND ACETAMINOPHEN 1 TABLET: 10; 325 TABLET ORAL at 06:05

## 2025-05-22 RX ADMIN — BUTALBITAL, ACETAMINOPHEN, AND CAFFEINE 1 TABLET: 325; 50; 40 TABLET ORAL at 08:05

## 2025-05-22 RX ADMIN — LEVETIRACETAM 500 MG: 500 TABLET, FILM COATED ORAL at 08:05

## 2025-05-22 RX ADMIN — ATORVASTATIN CALCIUM 80 MG: 40 TABLET, FILM COATED ORAL at 08:05

## 2025-05-22 RX ADMIN — HEPARIN SODIUM 5000 UNITS: 5000 INJECTION, SOLUTION INTRAVENOUS; SUBCUTANEOUS at 02:05

## 2025-05-22 RX ADMIN — BUSPIRONE HYDROCHLORIDE 15 MG: 5 TABLET ORAL at 08:05

## 2025-05-22 RX ADMIN — LEVOTHYROXINE SODIUM 88 MCG: 0.09 TABLET ORAL at 06:05

## 2025-05-22 RX ADMIN — DOCUSATE SODIUM 50 MG: 50 CAPSULE, LIQUID FILLED ORAL at 08:05

## 2025-05-22 RX ADMIN — MUPIROCIN: 20 OINTMENT TOPICAL at 09:05

## 2025-05-22 RX ADMIN — METOPROLOL SUCCINATE 12.5 MG: 25 TABLET, EXTENDED RELEASE ORAL at 08:05

## 2025-05-22 RX ADMIN — PAROXETINE HYDROCHLORIDE 20 MG: 20 TABLET, FILM COATED ORAL at 06:05

## 2025-05-22 RX ADMIN — HYDROCODONE BITARTRATE AND ACETAMINOPHEN 1 TABLET: 10; 325 TABLET ORAL at 11:05

## 2025-05-22 RX ADMIN — HYDROCODONE BITARTRATE AND ACETAMINOPHEN 1 TABLET: 10; 325 TABLET ORAL at 02:05

## 2025-05-22 NOTE — PT/OT/SLP PROGRESS
Physical Therapy Treatment    Patient Name:  Chelle Chandra   MRN:  [id number]    Recommendations:     Discharge therapy intensity: Low Intensity Therapy   Discharge Equipment Recommendations: none  Barriers to discharge: None    Assessment:     Chelle Chandra is a 59 y.o. female admitted with a medical diagnosis of frequent falls; SDH s/p MMA embolization on 5/17; worsening headaches prompting s/p craniotomy on 5/19. Cleared by NSGY to continue mobilizing. She presents with the following impairments/functional limitations: weakness, gait instability, impaired endurance, impaired balance, impaired self care skills, impaired functional mobility, decreased lower extremity function.     Rehab Prognosis: Good; patient would benefit from acute skilled PT services to address these deficits and reach maximum level of function.    Recent Surgery: Procedure(s) (LRB):  CRANIOTOMY, FOR SUBDURAL HEMATOMA EVACUATION (Left) 3 Days Post-Op    Plan:     During this hospitalization, patient would benefit from acute PT services 5 x/week to address the identified rehab impairments via gait training, therapeutic activities, therapeutic exercises, neuromuscular re-education and progress toward the following goals:    Plan of Care Expires:  06/20/25    Subjective     Chief Complaint: head aches      Objective:     Communicated with nurse prior to session.  Patient found up in chair with blood pressure cuff, peripheral IV, pulse ox (continuous), telemetry, JOHNATHAN drain, SCD upon PT entry to room.     General Precautions: Standard, fall (BP<140/90)  Orthopedic Precautions: N/A  Braces: N/A  Respiratory Status: Room air  Blood Pressure: 116/76  Skin Integrity: Visible skin intact      Functional Mobility:  SBA STS and min assist transfers  Gait 200 ft x 2 min HHA with mild ataxia and decrease step RLE  Pt performed LE PRE's to increase strenth, ROM, and endurance to improve overall independence.  Pt performed dynamic balance activities to  improve righting reactions  to prevent LOB/falls.     Education Provided:  Role and goals of PT, transfer training, bed mobility, gait training, balance training, safety awareness, assistive device, strengthening exercises, and importance of participating in PT to return to PLOF.    Patient left up in chair with call button in reach    GOALS:   Multidisciplinary Problems       Physical Therapy Goals          Problem: Physical Therapy    Goal Priority Disciplines Outcome Interventions   Physical Therapy Goal     PT, PT/OT Progressing    Description: Goals to be met by: 25     Patient will increase functional independence with mobility by performin. Supine to sit with Carter  2. Sit to supine with Carter  3. Sit to stand transfer with Modified Carter  4. Gait  x 150 feet with Modified Carter using Rolling Walker.   5. Ascend/descend 3 stair with Handrail Modified Carter using No Assistive Device.                          Time Tracking:     PT Received On: 25  PT Start Time: 740     PT Stop Time: 08  PT Total Time (min): 25 min     Billable Minutes: Gait Training 15 and Therapeutic Exercise 10    Treatment Type: Treatment  PT/PTA: PTA     Number of PTA visits since last PT visit: 2     2025

## 2025-05-22 NOTE — TELEPHONE ENCOUNTER
She will need a CT head w/o and 2 wk post op/staple removal/ CT scheduled for 5/30 @ [facility] at 1115 and appt with Sandra 6/2 @ 130. Patient's  is aware of appts.

## 2025-05-22 NOTE — DISCHARGE INSTRUCTIONS
Please take all medications as directed and attend all follow up appointments. You can begin to wash your hair with baby shampoo and then keep it open to air. No ointment or lotion over the surgical incision site. Keep your incision as clean and dry as possible. Do not submerge your incision in water. Continue to monitor you incision for any signs/symptoms of infection such as purulent drainage, fever, or extreme pain unrelieved by medication. Please notify your doctor or go to the nearest ER if you notice any of these.

## 2025-05-22 NOTE — PROGRESS NOTES
Discharge instructions provided to patient and family. Answered all questions and confirmed understanding. Discontinued IV. VS stable. Patient's belongings gathered. Patient wheeled down by staff for DC home.

## 2025-05-22 NOTE — PROGRESS NOTES
[facility]  Hospital Medicine Progress Note        Chief Complaint: Inpatient Follow-up     HPI:   59-year-old female with a past medical history of prior CVA, diabetes mellitus, tobacco dependence presenting after an outpatient CT head showed subdural hematoma.  Patient has had 3 falls alone in the month of May and had 1 fall in the month of April.  She states the most recent fall was a proximally 3 weeks ago.  Says her legs have been wobbling lately and while she should be ambulating with her Rollator she tends to hold onto the walls in her home.  The most recent fall occurred when she was ambulating in this way however she fell forward but denies any head trauma/syncope. States she was able to catch herself with her hands.  Patient takes her blood pressure at home on a regular basis and states her systolic blood pressure is never in the 100s usually systolic in the 70s. At baseline patient lives with her .    5/15 Neuro IR for MMA embolization   5/17 continues with intermittent headaches despite PRN analgesics   ambulated by herself to bathroom without issues.   Per neurosurgery - If she continues with persistent headaches despite medical management she may need diony hole drainage vs mini crani for SDH early next week   5/18 Continues to have headache similar to yesterday. No other focal neurologic weakness/ deficits.   Added Fioricet for headaches.  5/19 Headache persistent, planned for diony hole/ Left mini craniotomy per neurosurgery today.  5/20 Was in the ICU post op, now downgraded to  service    Interval Hx:     POD 2 Left mini craniotomy for subdural hematoma evacuation   Headache improved post op, has soreness at surgical site. No deficits.  Plan for JOHNATHAN drain removal later today per neurosurgery, plan dc am if okay with neurosurgery    Case was discussed with patient's nurse and  on the floor.    Objective/physical exam:  General: In no acute distress,  afebrile  Chest: Clear to auscultation bilaterally  Heart: RRR, +S1, S2, no appreciable murmur  Abdomen: Soft, nontender, BS +  MSK: Warm, no lower extremity edema, no clubbing or cyanosis  Neurologic: Alert and oriented, moving all extremities with good strength    VITAL SIGNS: 24 HRS MIN & MAX LAST   Temp  Min: 98 °F (36.7 °C)  Max: 99 °F (37.2 °C) 98 °F (36.7 °C)   BP  Min: 91/64  Max: 129/79 91/64   Pulse  Min: 85  Max: 104  91   Resp  Min: 17  Max: 20 18   SpO2  Min: 91 %  Max: 95 % (!) 92 %     I have reviewed the following labs:  Recent Labs   Lab 05/19/25  1048 05/20/25  0315 05/21/25  0503   WBC 8.21 7.91 7.44   RBC 4.67 3.87* 4.25   HGB 13.9 11.6* 12.5   HCT 42.4 36.1* 38.8   MCV 90.8 93.3 91.3   MCH 29.8 30.0 29.4   MCHC 32.8* 32.1* 32.2*   RDW 13.6 13.7 13.9    299 353   MPV 10.0 9.9 9.9     Recent Labs   Lab 05/15/25  1514 05/17/25  0508 05/19/25  1048 05/20/25  0315 05/21/25  0503    137 141 140 138   K 4.1 4.5 4.3 4.2 4.0    104 103 106 102   CO2 24 23 29 25 27   BUN 14.6 18.3 14.4 9.3* 6.4*   CREATININE 0.82 0.72 0.76 0.66 0.76   * 131* 173* 156* 138*   CALCIUM 9.8 9.2 9.7 8.4 9.5   MG  --  1.90  --   --  1.70   ALBUMIN 4.0 3.6 3.9  --   --    PROT 7.4 6.7 7.0  --   --    ALKPHOS 157* 139 163*  --   --    ALT 56* 40 65*  --   --    AST 24 16 45  --   --    BILITOT 0.7 0.7 0.7  --   --      Microbiology Results (last 7 days)       ** No results found for the last 168 hours. **             See below for Radiology    CTA head and neck 5/14 1. No large vessel occlusion or flow-limiting stenosis.  2. New intermediate density subdural hemorrhage along the left cerebral convexity.  No midline shift.  CT head 5/15 Similar left-sided subdural hematoma.     Assessment/Plan:    Subdural hematoma likely related to below  - s/p Embolization of the left middle meningeal artery using coil and liquid embolic agent 5/16/25  - Left mini craniotomy for subdural hematoma evacuation 5/19/25  given persistent headaches  Frequent falls  Hypothyroidism   NIDDM  Elevated ALP    history of prior CVA, diabetes mellitus, tobacco dependence      Plan  noncontrast CT head showed left-sided subdural hematoma similar in size to outpatient CT  on Plavix at home for a recent CVA; states she is not taking aspirin at this time but she is taking migraine headache medication with aspirin in it; hold all antiplatelet therapy    Neurosurgery - Left mini craniotomy for subdural hematoma evacuation 5/19/25  - Neuro checks Q2H  - SBP<140/90  - Keppra 500mg BID x 7 days  - JOHNATHAN drain to thumbprint suction   - SCDs for DVT prophylaxis     Interventional neurology signed off 5/17  - s/p Embolization of the left middle meningeal artery using coil and glue 5/16/25  - -160  - hold plavix x 2 weeks   - follow up with Dr Egan in 1 month with head CT    Resumed patient's metoprolol with hold parameters  Elevated >>139>>163  Right upper quadrant ultrasound 5/16 - No significant sonographic abnormality of the right upper quadrant.     Hypothyroidism; TSH low, reduce her dose of levothyroxine from 100 to 88 mcg   -repeat TSH in 4-6 weeks     NIDDM, hold home Farxiga, metformin  Sliding scale insulin; hemoglobin A1c = 7.3  Resume home meds as appropriate    Labs am  PT/OT low intensity therapy     VTE prophylaxis: SCD, heparin ppx cleared by neurosurgery on 5/21    Patient condition:  Fair    Anticipated discharge and Disposition:         All diagnosis and differential diagnosis have been reviewed; assessment and plan has been documented; I have personally reviewed the labs and test results that are presently available; I have reviewed the patients medication list; I have reviewed the consulting providers response and recommendations. I have reviewed or attempted to review medical records based upon their availability    All of the patient's questions have been  addressed and answered. Patient's is agreeable to the above  stated plan. I will continue to monitor closely and make adjustments to medical management as needed.    Portions of this note dictated using EMR integrated voice recognition software, and may be subject to voice recognition errors not corrected at proofreading. Please contact writer for clarification if needed.   _____________________________________________________________________    Malnutrition Status:  Nutrition consulted. Most recent weight and BMI monitored-     Measurements:  Wt Readings from Last 1 Encounters:   05/15/25 49 kg (108 lb 0.4 oz)   Body mass index is 17.44 kg/m².    Patient has been screened and assessed by RD.    Malnutrition Type:  Context:    Level:      Malnutrition Characteristic Summary:       Interventions/Recommendations (treatment strategy):        Scheduled Med:   atorvastatin  80 mg Oral Daily    busPIRone  15 mg Oral BID    docusate sodium  50 mg Oral BID    ezetimibe  10 mg Oral Daily    heparin (porcine)  5,000 Units Subcutaneous Q8H    levETIRAcetam  500 mg Oral BID    levothyroxine  88 mcg Oral Before breakfast    metoprolol succinate  12.5 mg Oral Daily    mupirocin   Nasal BID    nortriptyline  25 mg Oral QHS    paroxetine  20 mg Oral QAM      Continuous Infusions:     PRN Meds:    Current Facility-Administered Medications:     acetaminophen, 650 mg, Oral, Q4H PRN    albuterol-ipratropium, 3 mL, Nebulization, Q6H PRN    aluminum-magnesium hydroxide-simethicone, 30 mL, Oral, QID PRN    bisacodyL, 10 mg, Rectal, Daily PRN    butalbital-acetaminophen-caffeine -40 mg, 1 tablet, Oral, Q6H PRN    clindamycin IV (PEDS and ADULTS), 900 mg, Intravenous, On Call Procedure    dextrose 50%, 12.5 g, Intravenous, PRN    dextrose 50%, 25 g, Intravenous, PRN    glucagon (human recombinant), 1 mg, Intramuscular, PRN    glucose, 16 g, Oral, PRN    glucose, 24 g, Oral, PRN    hydrALAZINE, 10 mg, Intravenous, Q4H PRN    HYDROcodone-acetaminophen, 1 tablet, Oral, Q4H PRN     HYDROcodone-acetaminophen, 1 tablet, Oral, Q4H PRN    insulin aspart U-100, 0-5 Units, Subcutaneous, QID (AC + HS) PRN    labetalol, 10 mg, Intravenous, Q4H PRN    melatonin, 9 mg, Oral, Nightly PRN    morphine, 2 mg, Intravenous, Q6H PRN    naloxone, 0.02 mg, Intravenous, PRN    ondansetron, 8 mg, Oral, Q8H PRN    ondansetron, 4 mg, Intravenous, Q8H PRN    polyethylene glycol, 17 g, Oral, TID PRN    simethicone, 1 tablet, Oral, QID PRN    sodium chloride 0.9%, 10 mL, Intravenous, PRN    traZODone, 100 mg, Oral, Nightly PRN     Radiology:  I have personally reviewed the following imaging and agree with the radiologist.     CT Head Without Contrast  Narrative: EXAMINATION:  CT HEAD WITHOUT CONTRAST    CLINICAL HISTORY:  s/p left craniotomy for SDH evacuation;    TECHNIQUE:  Axial scans were obtained from skull base to the vertex.    Coronal and sagittal reconstructions obtained from the axial data.    Automatic exposure control was utilized to limit radiation dose.    Contrast: None    Radiation Dose:    Total DLP: 968 mGy*cm    COMPARISON:  CT head dated 05/19/2020    FINDINGS:  There are postoperative changes following left-sided craniotomy for evacuation of a subdural hemorrhage, with subdural drain in place.  Residual subdural hemorrhage along the left cerebral convexity measures up to 9 mm in thickness.  There is postoperative pneumocephalus.  The brain parenchyma is unchanged with encephalomalacia in the left cerebral hemisphere.  Mass effect has overall decreased.  There is no significant midline shift or herniation.  The basal cisterns are patent.  The skull base is intact.  The mastoid air cells are clear.  Impression: Postoperative changes following left-sided craniotomy for evacuation of subdural hemorrhage, with decreased mass effect.    Electronically signed by: Beverley Ames  Date:    05/20/2025  Time:    08:05      Curry Payton MD  Department of Hospital Medicine   [facility]  [facility]   05/21/2025

## 2025-05-22 NOTE — PLAN OF CARE
05/22/25 0939   Final Note   Assessment Type Final Discharge Note   Anticipated Discharge Disposition Home-Health   Hospital Resources/Appts/Education Provided Post-Acute resouces added to AVS   Post-Acute Status   Post-Acute Authorization Home Health   Home Health Status Set-up Complete/Auth obtained   Discharge Delays None known at this time     Pt to d/c home. HH arranged with [facility]- d/c info sent. No further CM needs known at this time.     Peggy Champion LCSW

## 2025-05-23 ENCOUNTER — PATIENT OUTREACH (OUTPATIENT)
Dept: ADMINISTRATIVE | Facility: CLINIC | Age: 60
End: 2025-05-23
Payer: COMMERCIAL

## 2025-05-23 LAB
ABO + RH BLD: NORMAL
ABO + RH BLD: NORMAL
BLD PROD TYP BPU: NORMAL
BLD PROD TYP BPU: NORMAL
BLOOD UNIT EXPIRATION DATE: NORMAL
BLOOD UNIT EXPIRATION DATE: NORMAL
BLOOD UNIT TYPE CODE: 6200
BLOOD UNIT TYPE CODE: 6200
CROSSMATCH INTERPRETATION: NORMAL
CROSSMATCH INTERPRETATION: NORMAL
DISPENSE STATUS: NORMAL
DISPENSE STATUS: NORMAL
UNIT NUMBER: NORMAL
UNIT NUMBER: NORMAL

## 2025-05-23 NOTE — PROGRESS NOTES
C3 nurse spoke with Chelle Chandra's daughter, Molly, for a TCC post hospital discharge follow up call. The patient has a scheduled HOSFU appointment with Jorge Silver MD on 6/6 @ 1100. Patient's daughter also aware of f/u appts with Denis Egan MD (Neurology) on 6/24 at 1100 and Sandra Ansari NP (Neurosurgery) on 6/2 at 1330.

## 2025-05-26 NOTE — DISCHARGE SUMMARY
[facility]  Hospital Medicine Discharge Summary    Admit Date: 5/15/2025  Discharge Date and Time: 05/22/2025  Admitting Physician:  Team  Discharging Physician: Chucho Hernandez MD.  Primary Care Physician: Jorge Silver MD  Consults: Hospital Medicine    Discharge Diagnoses:  Subdural hematoma likely related to below  - s/p Embolization of the left middle meningeal artery using coil and liquid embolic agent 5/16/25  - Left mini craniotomy for subdural hematoma evacuation 5/19/25 given persistent headaches  Frequent falls  Hypothyroidism   NIDDM  Elevated ALP     history of prior CVA, diabetes mellitus, tobacco dependence     Hospital Course:   59-year-old female with prior CVA, diabetes mellitus, tobacco dependence presenting after an outpatient CT head showed subdural hematoma. Patient has had 3 falls alone in the month of May and had 1 fall in the month of April. She states the most recent fall was a proximally 3 weeks ago. Says her legs have been wobbling lately and while she should be ambulating with her Rollator she tends to hold onto the walls in her home. The most recent fall occurred when she was ambulating in this way however she fell forward but denies any head trauma/syncope. States she was able to catch herself with her hands. Patient takes her blood pressure at home on a regular basis and states her systolic blood pressure is never in the 100s usually systolic in the 70s. At baseline patient lives with her .  Underwent MMA embolization with Interventional Neurology.  Continued to have intermittent headaches.  Neurosurgery performed left mini craniotomy due to persistent headaches on 05/19.  Postoperatively admitted to ICU and thereafter downgraded to Hospital Medicine.  JOHNATHAN drain removed on 05/21.    Pt was seen and examined on the day of discharge.  Denied any new complaints.  PT/OT recommending low intensity therapy.  Cleared for DC by Neurosurgery.  Plan for  discharge with home health.    Vitals:  VITAL SIGNS: 24 HRS MIN & MAX LAST   No data recorded 98.7 °F (37.1 °C)   No data recorded 112/70   No data recorded  104   No data recorded 18   No data recorded 95 %       Physical Exam:  General: alert lady lying comfortably in bed, in no acute distress  HENT: oral and oropharyngeal mucosa moist, pink, with no erythema or exudates, no ear pain or discharge  Neck: normal neck movement, no lymph nodes or swellings, no JVD or Carotid bruit  Respiratory: clear breathing sounds bilaterally, no crackles, rales, ronchi or wheezes  Cardiovascular: clear S1 and S2, no murmurs, rubs or gallops  Peripheral Vascular: no lesions, ulcers or erosions, normal peripheral pulses and no pedal edema  Gastrointestinal: soft, non-tender, non-distended abdomen, no guarding, rigidity or rebound tenderness, normal bowel sounds  Integumentary: normal skin color, no rashes or lesions  Neuro: AAO x 3; motor strength 5/5 in B/L UEs & LEs; sensation intact to gross and fine touch B/L; CN II-XII grossly intact    Procedures Performed: No admission procedures for hospital encounter.     Significant Diagnostic Studies: See Full reports for all details    Recent Labs   Lab 05/20/25  0315 05/21/25  0503   WBC 7.91 7.44   RBC 3.87* 4.25   HGB 11.6* 12.5   HCT 36.1* 38.8   MCV 93.3 91.3   MCH 30.0 29.4   MCHC 32.1* 32.2*   RDW 13.7 13.9    353   MPV 9.9 9.9       Recent Labs   Lab 05/20/25  0315 05/21/25  0503    138   K 4.2 4.0    102   CO2 25 27   BUN 9.3* 6.4*   CREATININE 0.66 0.76   * 138*   CALCIUM 8.4 9.5   MG  --  1.70        Microbiology Results (last 7 days)       ** No results found for the last 168 hours. **             CT Head Without Contrast  Narrative: EXAMINATION:  CT HEAD WITHOUT CONTRAST    CLINICAL HISTORY:  s/p left craniotomy for SDH evacuation;    TECHNIQUE:  Axial scans were obtained from skull base to the vertex.    Coronal and sagittal reconstructions  obtained from the axial data.    Automatic exposure control was utilized to limit radiation dose.    Contrast: None    Radiation Dose:    Total DLP: 968 mGy*cm    COMPARISON:  CT head dated 05/19/2020    FINDINGS:  There are postoperative changes following left-sided craniotomy for evacuation of a subdural hemorrhage, with subdural drain in place.  Residual subdural hemorrhage along the left cerebral convexity measures up to 9 mm in thickness.  There is postoperative pneumocephalus.  The brain parenchyma is unchanged with encephalomalacia in the left cerebral hemisphere.  Mass effect has overall decreased.  There is no significant midline shift or herniation.  The basal cisterns are patent.  The skull base is intact.  The mastoid air cells are clear.  Impression: Postoperative changes following left-sided craniotomy for evacuation of subdural hemorrhage, with decreased mass effect.    Electronically signed by: Beverley Ames  Date:    05/20/2025  Time:    08:05         Medication List        PAUSE taking these medications      clopidogreL 75 mg tablet  Wait to take this until: June 1, 2025  Commonly known as: PLAVIX  Take 1 tablet (75 mg total) by mouth once daily.            START taking these medications      butalbital-acetaminophen-caffeine -40 mg -40 mg per tablet  Commonly known as: FIORICET, ESGIC  Take 1 tablet by mouth every 4 (four) hours as needed for Pain.     docusate sodium 50 MG capsule  Commonly known as: COLACE  Take 2 capsules (100 mg total) by mouth 2 (two) times daily.     levETIRAcetam 500 MG Tab  Commonly known as: KEPPRA  Take 1 tablet (500 mg total) by mouth 2 (two) times daily. for 4 days            CHANGE how you take these medications      levothyroxine 88 MCG tablet  Commonly known as: SYNTHROID  Take 1 tablet (88 mcg total) by mouth before breakfast.  What changed:   medication strength  how much to take            CONTINUE taking these medications      atorvastatin 80 MG  "tablet  Commonly known as: LIPITOR  Take 1 tablet (80 mg total) by mouth once daily.     busPIRone 15 MG tablet  Commonly known as: BUSPAR  Take 1 tablet (15 mg total) by mouth 2 (two) times daily.     ezetimibe 10 mg tablet  Commonly known as: ZETIA  Take 1 tablet by mouth once daily     FARXIGA 5 mg Tab tablet  Generic drug: dapagliflozin propanediol  Take 1 tablet by mouth once daily     fluticasone propionate 250 mcg/actuation Dsdv  Commonly known as: FLOVENT DISKUS  Inhale 2 puffs into the lungs 2 (two) times daily. Controller     metFORMIN 500 MG tablet  Commonly known as: GLUCOPHAGE  TAKE 1 TABLET BY MOUTH TWICE DAILY WITH MEALS     metoprolol succinate 25 MG 24 hr tablet  Commonly known as: TOPROL-XL     nortriptyline 25 MG capsule  Commonly known as: PAMELOR  TAKE 1 CAPSULE BY MOUTH IN THE EVENING     ondansetron 8 MG tablet  Commonly known as: ZOFRAN  Take 1 tablet (8 mg total) by mouth every 6 (six) hours as needed for Nausea.     paroxetine 20 MG tablet  Commonly known as: PAXIL  TAKE 1 TABLET BY MOUTH ONCE DAILY IN THE MORNING     pen needle, diabetic 32 gauge x 5/32" Ndle  1 each by Misc.(Non-Drug; Combo Route) route once a week.     traZODone 100 MG tablet  Commonly known as: DESYREL  Take 1 tablet (100 mg total) by mouth nightly as needed for Insomnia.     VENTOLIN HFA 90 mcg/actuation inhaler  Generic drug: albuterol  Inhale 2 puffs into the lungs every 4 (four) hours as needed.            STOP taking these medications      butalbital-aspirin-caffeine -40 mg -40 mg Cap  Commonly known as: FIORINAL               Where to Get Your Medications        These medications were sent to [facility] - [place] [place] - [address]   [address] [address]      Phone: [phone]   docusate sodium 50 MG capsule  levETIRAcetam 500 MG Tab  levothyroxine 88 MCG tablet       Information about where to get these medications is not yet available    Ask your nurse or " doctor about these medications  butalbital-acetaminophen-caffeine -40 mg -40 mg per tablet          Explained in detail to the patient about the discharge plan, medications, and follow-up visits. Pt understands and agrees with the treatment plan  Discharge Disposition: [place]-Health Care [facility] [place]   Discharged Condition: stable  Diet-    Medications Per DC med rec  Activities as tolerated   Contact information for follow-up providers       Denis Egan MD. Go on 6/24/2025.    Specialties: Neurology, Interventional Radiology  Why: Appointment on 06/24/25 at 11 am  Contact information:  [address] [place] [address] [address]  [place] [address]  [phone]               Sandra Ansari NP. Go in 2 week(s).    Specialty: Neurosurgery  Why: Dr. Mendiola Staff will reach out to patient.  Contact information:  [address] [place] [address] [address]  [place] [address]  [phone]                       Contact information for after-discharge care       Home Medical Care       [facility]    Service: Home Health Services  Contact information:  [address] [place] [address]  [phone]                                 For further questions contact hospitalist office    Discharge time 33 minutes    For worsening symptoms, chest pain, shortness of breath, increased abdominal pain, high grade fever, stroke or stroke like symptoms, immediately go to the nearest Emergency Room or call 911 as soon as possible.

## 2025-05-28 DIAGNOSIS — R51.9 NONINTRACTABLE HEADACHE, UNSPECIFIED CHRONICITY PATTERN, UNSPECIFIED HEADACHE TYPE: Primary | ICD-10-CM

## 2025-05-28 RX ORDER — BUTALBITAL, ACETAMINOPHEN AND CAFFEINE 50; 325; 40 MG/1; MG/1; MG/1
1 TABLET ORAL EVERY 6 HOURS PRN
Qty: 30 TABLET | Refills: 0 | OUTPATIENT
Start: 2025-05-28

## 2025-05-28 RX ORDER — BUTALBITAL, ACETAMINOPHEN AND CAFFEINE 50; 325; 40 MG/1; MG/1; MG/1
1 TABLET ORAL EVERY 4 HOURS PRN
Qty: 30 TABLET | Refills: 0 | Status: ON HOLD | OUTPATIENT
Start: 2025-05-28 | End: 2025-06-27

## 2025-05-29 ENCOUNTER — DOCUMENTATION ONLY (OUTPATIENT)
Dept: NEUROSURGERY | Facility: CLINIC | Age: 60
End: 2025-05-29
Payer: COMMERCIAL

## 2025-05-29 ENCOUNTER — HOSPITAL ENCOUNTER (INPATIENT)
Facility: HOSPITAL | Age: 60
LOS: 8 days | Discharge: NURSING, REHAB OR LONG-TERM CARE | DRG: 025 | End: 2025-06-06
Attending: STUDENT IN AN ORGANIZED HEALTH CARE EDUCATION/TRAINING PROGRAM | Admitting: INTERNAL MEDICINE
Payer: COMMERCIAL

## 2025-05-29 DIAGNOSIS — R29.818 ACUTE FOCAL NEUROLOGICAL DEFICIT: Primary | ICD-10-CM

## 2025-05-29 DIAGNOSIS — I63.9 STROKE: ICD-10-CM

## 2025-05-29 DIAGNOSIS — R56.9 SEIZURE-LIKE ACTIVITY: ICD-10-CM

## 2025-05-29 DIAGNOSIS — R53.1 ACUTE RIGHT-SIDED WEAKNESS: ICD-10-CM

## 2025-05-29 LAB
ALBUMIN SERPL-MCNC: 2.6 G/DL (ref 3.4–4.8)
ALBUMIN/GLOB SERPL: 0.6 RATIO (ref 1.1–2)
ALP SERPL-CCNC: 192 UNIT/L (ref 40–150)
ALT SERPL-CCNC: 42 UNIT/L (ref 0–55)
ANION GAP SERPL CALC-SCNC: 13 MEQ/L
AST SERPL-CCNC: 26 UNIT/L (ref 11–45)
BASOPHILS # BLD AUTO: 0.07 X10(3)/MCL
BASOPHILS NFR BLD AUTO: 0.8 %
BILIRUB SERPL-MCNC: 0.3 MG/DL
BUN SERPL-MCNC: 8.6 MG/DL (ref 9.8–20.1)
CALCIUM SERPL-MCNC: 9.2 MG/DL (ref 8.4–10.2)
CHLORIDE SERPL-SCNC: 103 MMOL/L (ref 98–107)
CHOLEST SERPL-MCNC: 110 MG/DL
CHOLEST/HDLC SERPL: 4 {RATIO} (ref 0–5)
CK SERPL-CCNC: 32 U/L (ref 29–168)
CO2 SERPL-SCNC: 22 MMOL/L (ref 23–31)
CREAT SERPL-MCNC: 0.68 MG/DL (ref 0.55–1.02)
CREAT/UREA NIT SERPL: 13
EOSINOPHIL # BLD AUTO: 0.07 X10(3)/MCL (ref 0–0.9)
EOSINOPHIL NFR BLD AUTO: 0.8 %
ERYTHROCYTE [DISTWIDTH] IN BLOOD BY AUTOMATED COUNT: 13.5 % (ref 11.5–17)
GFR SERPLBLD CREATININE-BSD FMLA CKD-EPI: >60 ML/MIN/1.73/M2
GLOBULIN SER-MCNC: 4.3 GM/DL (ref 2.4–3.5)
GLUCOSE SERPL-MCNC: 191 MG/DL (ref 82–115)
HCT VFR BLD AUTO: 32.7 % (ref 37–47)
HDLC SERPL-MCNC: 28 MG/DL (ref 35–60)
HGB BLD-MCNC: 10.8 G/DL (ref 12–16)
IMM GRANULOCYTES # BLD AUTO: 0.08 X10(3)/MCL (ref 0–0.04)
IMM GRANULOCYTES NFR BLD AUTO: 0.9 %
INR PPP: 1.1
LDLC SERPL CALC-MCNC: 53 MG/DL (ref 50–140)
LYMPHOCYTES # BLD AUTO: 1.56 X10(3)/MCL (ref 0.6–4.6)
LYMPHOCYTES NFR BLD AUTO: 16.9 %
MCH RBC QN AUTO: 29.8 PG (ref 27–31)
MCHC RBC AUTO-ENTMCNC: 33 G/DL (ref 33–36)
MCV RBC AUTO: 90.3 FL (ref 80–94)
MONOCYTES # BLD AUTO: 1.09 X10(3)/MCL (ref 0.1–1.3)
MONOCYTES NFR BLD AUTO: 11.8 %
NEUTROPHILS # BLD AUTO: 6.36 X10(3)/MCL (ref 2.1–9.2)
NEUTROPHILS NFR BLD AUTO: 68.8 %
NRBC BLD AUTO-RTO: 0 %
PLATELET # BLD AUTO: 527 X10(3)/MCL (ref 130–400)
PMV BLD AUTO: 9.5 FL (ref 7.4–10.4)
POCT GLUCOSE: 109 MG/DL (ref 70–110)
POCT GLUCOSE: 162 MG/DL (ref 70–110)
POCT GLUCOSE: 258 MG/DL (ref 70–110)
POTASSIUM SERPL-SCNC: 3.9 MMOL/L (ref 3.5–5.1)
PROT SERPL-MCNC: 6.9 GM/DL (ref 5.8–7.6)
PROTHROMBIN TIME: 13.9 SECONDS (ref 12.5–14.5)
RBC # BLD AUTO: 3.62 X10(6)/MCL (ref 4.2–5.4)
SODIUM SERPL-SCNC: 138 MMOL/L (ref 136–145)
T3FREE SERPL-MCNC: 1.69 PG/ML (ref 1.58–3.91)
T4 FREE SERPL-MCNC: 1.23 NG/DL (ref 0.7–1.48)
TRIGL SERPL-MCNC: 145 MG/DL (ref 37–140)
TSH SERPL-ACNC: 0.31 UIU/ML (ref 0.35–4.94)
VLDLC SERPL CALC-MCNC: 29 MG/DL
WBC # BLD AUTO: 9.23 X10(3)/MCL (ref 4.5–11.5)

## 2025-05-29 PROCEDURE — 84443 ASSAY THYROID STIM HORMONE: CPT | Performed by: STUDENT IN AN ORGANIZED HEALTH CARE EDUCATION/TRAINING PROGRAM

## 2025-05-29 PROCEDURE — 25000003 PHARM REV CODE 250: Performed by: INTERNAL MEDICINE

## 2025-05-29 PROCEDURE — 85025 COMPLETE CBC W/AUTO DIFF WBC: CPT | Performed by: STUDENT IN AN ORGANIZED HEALTH CARE EDUCATION/TRAINING PROGRAM

## 2025-05-29 PROCEDURE — 97162 PT EVAL MOD COMPLEX 30 MIN: CPT

## 2025-05-29 PROCEDURE — 95816 EEG AWAKE AND DROWSY: CPT

## 2025-05-29 PROCEDURE — 99223 1ST HOSP IP/OBS HIGH 75: CPT | Mod: FS,,, | Performed by: PSYCHIATRY & NEUROLOGY

## 2025-05-29 PROCEDURE — 11000001 HC ACUTE MED/SURG PRIVATE ROOM

## 2025-05-29 PROCEDURE — 85610 PROTHROMBIN TIME: CPT | Performed by: STUDENT IN AN ORGANIZED HEALTH CARE EDUCATION/TRAINING PROGRAM

## 2025-05-29 PROCEDURE — 84439 ASSAY OF FREE THYROXINE: CPT | Performed by: NURSE PRACTITIONER

## 2025-05-29 PROCEDURE — 82962 GLUCOSE BLOOD TEST: CPT

## 2025-05-29 PROCEDURE — 25500020 PHARM REV CODE 255

## 2025-05-29 PROCEDURE — 21400001 HC TELEMETRY ROOM

## 2025-05-29 PROCEDURE — 92610 EVALUATE SWALLOWING FUNCTION: CPT

## 2025-05-29 PROCEDURE — 99285 EMERGENCY DEPT VISIT HI MDM: CPT | Mod: 25

## 2025-05-29 PROCEDURE — 93005 ELECTROCARDIOGRAM TRACING: CPT

## 2025-05-29 PROCEDURE — 80053 COMPREHEN METABOLIC PANEL: CPT | Performed by: STUDENT IN AN ORGANIZED HEALTH CARE EDUCATION/TRAINING PROGRAM

## 2025-05-29 PROCEDURE — 63600175 PHARM REV CODE 636 W HCPCS: Performed by: INTERNAL MEDICINE

## 2025-05-29 PROCEDURE — 93010 ELECTROCARDIOGRAM REPORT: CPT | Mod: ,,, | Performed by: INTERNAL MEDICINE

## 2025-05-29 PROCEDURE — 96365 THER/PROPH/DIAG IV INF INIT: CPT

## 2025-05-29 PROCEDURE — 84481 FREE ASSAY (FT-3): CPT | Performed by: NURSE PRACTITIONER

## 2025-05-29 PROCEDURE — 80061 LIPID PANEL: CPT | Performed by: STUDENT IN AN ORGANIZED HEALTH CARE EDUCATION/TRAINING PROGRAM

## 2025-05-29 PROCEDURE — 82550 ASSAY OF CK (CPK): CPT | Performed by: NURSE PRACTITIONER

## 2025-05-29 PROCEDURE — 63600175 PHARM REV CODE 636 W HCPCS: Performed by: NURSE PRACTITIONER

## 2025-05-29 PROCEDURE — 63600175 PHARM REV CODE 636 W HCPCS

## 2025-05-29 PROCEDURE — 25000003 PHARM REV CODE 250: Performed by: NURSE PRACTITIONER

## 2025-05-29 RX ORDER — EZETIMIBE 10 MG/1
10 TABLET ORAL DAILY
Status: DISCONTINUED | OUTPATIENT
Start: 2025-05-29 | End: 2025-05-30

## 2025-05-29 RX ORDER — DOCUSATE SODIUM 100 MG/1
100 CAPSULE, LIQUID FILLED ORAL 2 TIMES DAILY
Status: DISCONTINUED | OUTPATIENT
Start: 2025-05-29 | End: 2025-05-30

## 2025-05-29 RX ORDER — LEVETIRACETAM 500 MG/1
500 TABLET ORAL 2 TIMES DAILY
Status: DISCONTINUED | OUTPATIENT
Start: 2025-05-30 | End: 2025-05-29

## 2025-05-29 RX ORDER — LABETALOL HYDROCHLORIDE 5 MG/ML
10 INJECTION, SOLUTION INTRAVENOUS
Status: DISCONTINUED | OUTPATIENT
Start: 2025-05-29 | End: 2025-06-06 | Stop reason: HOSPADM

## 2025-05-29 RX ORDER — BISACODYL 10 MG/1
10 SUPPOSITORY RECTAL DAILY PRN
Status: DISCONTINUED | OUTPATIENT
Start: 2025-05-29 | End: 2025-06-06 | Stop reason: HOSPADM

## 2025-05-29 RX ORDER — NORTRIPTYLINE HYDROCHLORIDE 25 MG/1
25 CAPSULE ORAL NIGHTLY
Status: DISCONTINUED | OUTPATIENT
Start: 2025-05-29 | End: 2025-05-30

## 2025-05-29 RX ORDER — SODIUM CHLORIDE, SODIUM LACTATE, POTASSIUM CHLORIDE, CALCIUM CHLORIDE 600; 310; 30; 20 MG/100ML; MG/100ML; MG/100ML; MG/100ML
INJECTION, SOLUTION INTRAVENOUS CONTINUOUS
Status: ACTIVE | OUTPATIENT
Start: 2025-05-29 | End: 2025-05-30

## 2025-05-29 RX ORDER — BUSPIRONE HYDROCHLORIDE 5 MG/1
15 TABLET ORAL 2 TIMES DAILY
Status: DISCONTINUED | OUTPATIENT
Start: 2025-05-29 | End: 2025-05-30

## 2025-05-29 RX ORDER — ONDANSETRON HYDROCHLORIDE 2 MG/ML
4 INJECTION, SOLUTION INTRAVENOUS EVERY 4 HOURS PRN
Status: DISCONTINUED | OUTPATIENT
Start: 2025-05-29 | End: 2025-06-06 | Stop reason: HOSPADM

## 2025-05-29 RX ORDER — ATORVASTATIN CALCIUM 40 MG/1
80 TABLET, FILM COATED ORAL DAILY
Status: DISCONTINUED | OUTPATIENT
Start: 2025-05-29 | End: 2025-05-30

## 2025-05-29 RX ORDER — LEVETIRACETAM 10 MG/ML
2000 INJECTION INTRAVASCULAR ONCE
Status: COMPLETED | OUTPATIENT
Start: 2025-05-29 | End: 2025-05-29

## 2025-05-29 RX ORDER — SODIUM CHLORIDE 0.9 % (FLUSH) 0.9 %
10 SYRINGE (ML) INJECTION
Status: DISCONTINUED | OUTPATIENT
Start: 2025-05-29 | End: 2025-06-06 | Stop reason: HOSPADM

## 2025-05-29 RX ORDER — PROCHLORPERAZINE EDISYLATE 5 MG/ML
5 INJECTION INTRAMUSCULAR; INTRAVENOUS EVERY 6 HOURS PRN
Status: DISCONTINUED | OUTPATIENT
Start: 2025-05-29 | End: 2025-06-06 | Stop reason: HOSPADM

## 2025-05-29 RX ORDER — ACETAMINOPHEN 10 MG/ML
1000 INJECTION, SOLUTION INTRAVENOUS ONCE
Status: COMPLETED | OUTPATIENT
Start: 2025-05-29 | End: 2025-05-29

## 2025-05-29 RX ORDER — PAROXETINE 20 MG/1
20 TABLET, FILM COATED ORAL EVERY MORNING
Status: DISCONTINUED | OUTPATIENT
Start: 2025-05-30 | End: 2025-05-30

## 2025-05-29 RX ORDER — LORAZEPAM 2 MG/ML
2 INJECTION INTRAMUSCULAR EVERY 4 HOURS PRN
Status: DISCONTINUED | OUTPATIENT
Start: 2025-05-29 | End: 2025-06-06 | Stop reason: HOSPADM

## 2025-05-29 RX ORDER — HYDRALAZINE HYDROCHLORIDE 20 MG/ML
10 INJECTION INTRAMUSCULAR; INTRAVENOUS EVERY 6 HOURS PRN
Status: DISCONTINUED | OUTPATIENT
Start: 2025-05-29 | End: 2025-06-06 | Stop reason: HOSPADM

## 2025-05-29 RX ORDER — LEVETIRACETAM 500 MG/5ML
1000 INJECTION, SOLUTION, CONCENTRATE INTRAVENOUS EVERY 12 HOURS
Status: DISCONTINUED | OUTPATIENT
Start: 2025-05-29 | End: 2025-05-29

## 2025-05-29 RX ORDER — LEVOTHYROXINE SODIUM 88 UG/1
88 TABLET ORAL
Status: DISCONTINUED | OUTPATIENT
Start: 2025-05-30 | End: 2025-05-30

## 2025-05-29 RX ORDER — IPRATROPIUM BROMIDE AND ALBUTEROL SULFATE 2.5; .5 MG/3ML; MG/3ML
3 SOLUTION RESPIRATORY (INHALATION) EVERY 4 HOURS PRN
Status: DISCONTINUED | OUTPATIENT
Start: 2025-05-29 | End: 2025-06-06 | Stop reason: HOSPADM

## 2025-05-29 RX ORDER — ACETAMINOPHEN 325 MG/1
650 TABLET ORAL EVERY 6 HOURS PRN
Status: DISCONTINUED | OUTPATIENT
Start: 2025-05-29 | End: 2025-06-02

## 2025-05-29 RX ORDER — BUTALBITAL, ACETAMINOPHEN AND CAFFEINE 50; 325; 40 MG/1; MG/1; MG/1
1 TABLET ORAL EVERY 4 HOURS PRN
Status: DISCONTINUED | OUTPATIENT
Start: 2025-05-29 | End: 2025-06-06 | Stop reason: HOSPADM

## 2025-05-29 RX ORDER — INSULIN ASPART 100 [IU]/ML
0-10 INJECTION, SOLUTION INTRAVENOUS; SUBCUTANEOUS EVERY 6 HOURS PRN
Status: DISCONTINUED | OUTPATIENT
Start: 2025-05-29 | End: 2025-06-06 | Stop reason: HOSPADM

## 2025-05-29 RX ORDER — GLUCAGON 1 MG
1 KIT INJECTION
Status: DISCONTINUED | OUTPATIENT
Start: 2025-05-29 | End: 2025-06-06 | Stop reason: HOSPADM

## 2025-05-29 RX ADMIN — ACETAMINOPHEN 650 MG: 325 TABLET ORAL at 09:05

## 2025-05-29 RX ADMIN — EZETIMIBE 10 MG: 10 TABLET ORAL at 05:05

## 2025-05-29 RX ADMIN — IOHEXOL 100 ML: 350 INJECTION, SOLUTION INTRAVENOUS at 09:05

## 2025-05-29 RX ADMIN — DOCUSATE SODIUM 100 MG: 100 CAPSULE, LIQUID FILLED ORAL at 09:05

## 2025-05-29 RX ADMIN — LEVETIRACETAM 500 MG: 100 INJECTION, SOLUTION INTRAVENOUS at 09:05

## 2025-05-29 RX ADMIN — LEVETIRACETAM INJECTION 2000 MG: 10 INJECTION INTRAVENOUS at 09:05

## 2025-05-29 RX ADMIN — ACETAMINOPHEN 1000 MG: 10 INJECTION, SOLUTION INTRAVENOUS at 12:05

## 2025-05-29 RX ADMIN — SODIUM CHLORIDE, POTASSIUM CHLORIDE, SODIUM LACTATE AND CALCIUM CHLORIDE: 600; 310; 30; 20 INJECTION, SOLUTION INTRAVENOUS at 05:05

## 2025-05-29 RX ADMIN — BUSPIRONE HYDROCHLORIDE 15 MG: 5 TABLET ORAL at 09:05

## 2025-05-29 RX ADMIN — BUTALBITAL, ACETAMINOPHEN, AND CAFFEINE 1 TABLET: 325; 50; 40 TABLET ORAL at 05:05

## 2025-05-29 RX ADMIN — NORTRIPTYLINE HYDROCHLORIDE 25 MG: 25 CAPSULE ORAL at 09:05

## 2025-05-29 RX ADMIN — ATORVASTATIN CALCIUM 80 MG: 40 TABLET, FILM COATED ORAL at 05:05

## 2025-05-29 NOTE — H&P
[facility] Hospital Medicine - H&P Note    Patient Name: Chelle Chandra  : 1965  MRN: [id number]  PCP: Jorge Silver MD  Admitting Physician:  CARLO Jerome MD  Admission Class: IP- Inpatient   Code status: Full              MD addendum-   Recent h/o fall and SDH s/p crani and MMA embolization. H/o left MCA stroke s/p thrombectomy in 2024 admitted for evaluation of acute onset of headache followed by Rt sided weakness and facial droop. Evaluated by stroke neurology and suspected encephalopathy due to left SDH vs focal seizures vs CVA. Deemed not a candidate for TNK due to recent SDH. CT head with residual SDH. MRI brain was ordered per stroke neurology and yet to be done. Pt had been off of plavix following SDH until 25. Neurology suggested to cont to hold plavix, loading keppra followed by 500 mg bid, SLP/PT/OT consult.     Allergies   Aspirin, Ketorolac, Penicillins, Sulfa (sulfonamide antibiotics), and Ozempic [semaglutide]    Chief Complaint   Right sided weakness     History of Present Illness   59 yr old female whose history includes DM, CVA, COPD and recent SDH following a fall while on Plavix. On 25 s/p cerebral angiogram with embolization of left middle meningeal artery. On 25 s/p left frontoparietal craniotomy and evacuation of SDH. Discharged home on  with home health.     Today she presents to ED with c/o acute onset headache, possibly around midnight. Around 0540 she developed right arm and leg weakness with right facial droop. Has not been taking Keppra at home. Seen by neurology who does not suspect a new stroke but rather seizure activity. At the time of my exam she is AAO x 3. Continues to have significant RUE weakness. RLE remains somewhat weak but much improved. Right facial droop persists.     VS on arrival: T 98.2, P 100, R 18, b/P 105/73, Sats 98% on room air. Initial labs: WBC 9.23, Hgb 10.8, Hct 32.7, platelets 527, PT 13.9, INR 1.1,  Na 138, K 3.9, Cl 103, BUN 8.6, creatinine 0.68, glucose 191, TSH 0.309 and otherwise unremarkable.  CT head shows a left cerebral convexity hypodense collection without residual acute hyperdense subacute hematoma and improved post surgical pneumocephalus and otherwise no significant interval change.  CT brain perfusion shows no definite blood flow asymmetry or other findings to suggest significant acute perfusion defects.  There are scattered areas of asymmetrically decreased perfusion to the left MCA territory corresponding to area of known ischemic stroke.  CTA head shows a suspected diminished flow inferior division of the left middle cerebral artery close to the bifurcation with no other hemodynamically significant stenosis identified. Meds given in ED include Keppra 2 g.     ROS   Except as documented, all other systems reviewed and negative     Past Medical History   Left subacute/chronic subdural hematoma - s/p left frontoparietal craniotomy and evacuation of hematoma - 5/19/25  Cerebral angiogram with embolization left middle meningeal artery - 5/16/25  CVA June 2024 - s/p thrombectomy 6/1/24   Diabetes mellitus, type 2  COPD  Dyslipidemia   Lung nodule  GERD  Anxiety    Past Surgical History   left frontoparietal craniotomy and evacuation of hematoma - 5/19/25    Cerebral angiogram and embolization left middle meningeal artery - 5/16/25   Cerebral angiogram and thrombectomy - 6/1/24  Anterior cervical diskectomy, C4-5 with fusion - 6/10/19  Carpal tunnel release - left  Kidney surgery ???  Cardioversion ???  Tonsillectomy and adenoidectomy  Hysterectomy    Social History   Denies alcohol or illicit drug use. Quit smoking one year ago 6/1/24.     Screening for Social Drivers for health:  Patient screened for food insecurity, housing instability, transportation needs, utility difficulties, and interpersonal safety (select all that apply as identified as concern)  []Housing or Food  []Transportation  "Needs  []Utility Difficulties  []Interpersonal safety  [x]None        Family History   Reviewed and negative    Home Medications     Prior to Admission medications    Medication Sig Start Date End Date Taking? Authorizing Provider   atorvastatin (LIPITOR) 80 MG tablet Take 1 tablet (80 mg total) by mouth once daily. 7/4/24 7/4/25  Chucho Hernandez MD   busPIRone (BUSPAR) 15 MG tablet Take 1 tablet (15 mg total) by mouth 2 (two) times daily. 7/4/24 7/4/25  Chucho Hernandez MD   butalbital-acetaminophen-caffeine -40 mg (FIORICET, ESGIC) -40 mg per tablet Take 1 tablet by mouth every 4 (four) hours as needed for Pain. 5/28/25 6/27/25  Jayne Verdin FNP   clopidogreL (PLAVIX) 75 mg tablet Take 1 tablet (75 mg total) by mouth once daily.  Patient not taking: Reported on 5/23/2025 7/4/24 7/4/25  Chucho Hernandez MD   docusate sodium (COLACE) 50 MG capsule Take 2 capsules (100 mg total) by mouth 2 (two) times daily. 5/22/25   Chucho Hernandez MD   ezetimibe (ZETIA) 10 mg tablet Take 1 tablet by mouth once daily 2/24/25   Jorge Silver MD   FARXIGA 5 mg Tab tablet Take 1 tablet by mouth once daily 5/15/25   Jorge Silver MD Martin, Troy M., Chucho Harris MD   levothyroxine (SYNTHROID) 88 MCG tablet Take 1 tablet (88 mcg total) by mouth before breakfast. 5/23/25 5/23/26  Chucho Hernandez MD   metFORMIN (GLUCOPHAGE) 500 MG tablet TAKE 1 TABLET BY MOUTH TWICE DAILY WITH MEALS 4/22/25   Jorge Silver MD   metoprolol succinate (TOPROL-XL) 25 MG 24 hr tablet Take 12.5 mg by mouth. 9/20/24   Provider, Historical   nortriptyline (PAMELOR) 25 MG capsule TAKE 1 CAPSULE BY MOUTH IN THE EVENING 4/28/25   Jayne Verdin FNP Martin, Troy M., MD   paroxetine (PAXIL) 20 MG tablet TAKE 1 TABLET BY MOUTH ONCE DAILY IN THE MORNING 4/22/25   Jorge Silver MD   pen needle, diabetic 32 gauge x 5/32" Ndle 1 each by Misc.(Non-Drug; Combo Route) route once a week.  Patient not taking: Reported " "on 5/23/2025 11/3/22   Carlton Maldonado, Jorge Salcedo MD Martin, Troy M., MD        Physical Exam   Vital Signs  Temp:  [98.2 °F (36.8 °C)]   Pulse:  []   Resp:  [13-20]   BP: (105-116)/(73-80)   SpO2:  [98 %-100 %]    General: Appears comfortable  HEENT: NC/AT  Neck:  No JVD  Chest: CTABL  CVS: Regular rhythm. Normal S1/S2.  Abdomen: nondistended, normoactive BS, soft and non-tender.  MSK: No obvious deformity or joint swelling  Skin: Warm and dry  Neuro: AAOx3, RUE strength 1/5, RLE strength 3/5  Psych: Cooperative    Labs     Recent Labs     05/29/25  0952   WBC 9.23   RBC 3.62*   HGB 10.8*   HCT 32.7*   MCV 90.3   MCH 29.8   MCHC 33.0   RDW 13.5   *     Recent Labs     05/29/25  0952   PROTIME 13.9   INR 1.1      Recent Labs     05/29/25  0952      K 3.9   CO2 22*   BUN 8.6*   CREATININE 0.68   EGFRNORACEVR >60   CALCIUM 9.2   ALBUMIN 2.6*   GLOBULIN 4.3*   ALKPHOS 192*   ALT 42   AST 26   BILITOT 0.3   CHOL 110   TRIG 145*   LDL 53.00   HDL 28*   TSH 0.309*     No results for input(s): "LACTIC" in the last 72 hours.  No results for input(s): "CPK", "TROPONINI" in the last 72 hours.     Microbiology Results (last 7 days)       ** No results found for the last 168 hours. **           Imaging   CTA STROKE MULTI-PHASE (XPD)  Narrative: EXAMINATION:  CTA STROKE MULTI-PHASE (XPD)    CLINICAL HISTORY:  Neuro deficit, acute, stroke suspected    TECHNIQUE:  Brain and imaging was performed from skull base to vertex prior to intravenous contrast. CT Angiogram of head and neck was subsequently performed following intravenous contrast administration.  Coronal and sagittal MPR reconstructions were performed in addition to coronal and sagittal MIP reconstructions.    Dose product length of 1184 mGycm. Automated exposure control was utilized to minimize radiation dose.    COMPARISON:  CT brain without contrast May 29, 2024    FINDINGS:  Carotid artery assessed in accordance with the " NASCET criteria.    Thoracic aorta is remarkable for mild calcified plaque without aneurysmal dilatation or dissection.  There is no hemodynamically significant stenosis of the brachiocephalic trunk and the subclavian arteries.  Bilateral common carotid arteries, internal and the external carotid arteries are without significant stenosis and there is no dissection, intraluminal thrombosis or aneurysmal prominence.  The cavernous and supraclinoid portion internal carotid arteries are patent.  There is unremarkable flow within the anterior cerebral arteries, right middle cerebral artery and the major branches.  The left middle cerebral artery M1 segment is unremarkable.  There appears diminished flow within the inferior division of the left middle cerebral artery close to bifurcation on image 37 series 7.    There is flow bilaterally within the vertebral arteries.  The basilar artery is patent.  There is flow within the posterior cerebral arteries  Impression: 1.  Suspected diminished flow inferior division of the left middle cerebral artery close to bifurcation.    2.  No other hemodynamically significant stenosis identified.    Electronically signed by: Adam Sears  Date:    05/29/2025  Time:    10:57  CT Brain Perfusion inc Post Processing  EXAMINATION  CT BRAIN PERFUSION INC POST PROCESSING    CLINICAL HISTORY  stroke;    TECHNIQUE  Axial perfusion CT images were obtained, with arterial in-flow and venous out-flow ROIs. Post-processing of the CT perfusion data was performed, generating color para-metric maps for cerebral blood volume (CBV), cerebral blood flow (CBF), mean transit time (MTT), time-to-peak (TTP), and time-to-maximum (T-max).  The perfusion maps and hemodynamic curves were transferred to PACS.    CONTRAST  *IV contrast media: Omnipaque 350  *Injected volume: 50 mL  *Injection rate: 5 mL/s    COMPARISON  *No prior CT perfusion exam is available at the time of the initial  interpretation.  *Non-contrast head CT obtained immediately prior was reviewed.    FINDINGS/IMPRESSION  Exam quality: Adequate for evaluation.    No definite blood flow asymmetry or other findings to suggest significant acute perfusion defect identified.  Scattered areas of asymmetrically decreased perfusion through the left MCA territory correspond to area of known ischemic stroke seen on the preceding CT and recent comparison studies.    Prior to the final Radiology report, initial assessment performed by teleradiology service and pertinent communication to the Neuro Interventional Team was accomplished for potential treatment planning; please refer to the corresponding consult and/or intervention notes for additional details.    RADIATION DOSE  Automated tube current modulation, weight-based exposure dosing, and/or iterative reconstruction technique utilized to reach lowest reasonably achievable exposure rate.    DLP: 1528 mGy*cm    Electronically signed by: Zoran Decker  Date:    05/29/2025  Time:    10:28  CT HEAD FOR CODE STROKE  Narrative: EXAMINATION:  CT HEAD FOR CODE STROKE    CLINICAL HISTORY:  Neuro deficit, acute, stroke suspected;stroke alert;;    TECHNIQUE:  Sequential axial images were performed of the brain without contrast.    Dose product length of 1118 mGycm. Automated exposure control was utilized to minimize radiation dose.    COMPARISON:  May 19, 2023..    FINDINGS:  There is left craniotomy related to evacuation of the subdural hematoma.  There is minimal residual left pneumocephalus.  Left cerebral convexity subdural collection is entirely hypodense with thickness of 10 mm without acute hyperdense component.  Extensive in the territory of the middle cerebral artery ischemic changes with associated and cephalo malacia is similar. There is no significant mass effect or midline shift.  No new large vessel territory infarct identified.    Visualized paranasal sinuses are clear without mucosal  thickening, polypoidal abnormality or air-fluid levels. Mastoid air cells aeration is optimal.  Impression: 1.  Left cerebral convexity hypodense collection without residual acute hyperdense subacute hematoma and improved postsurgical pneumocephalus.    2.  Otherwise, no significant interval change.    Electronically signed by: Adam Sears  Date:    05/29/2025  Time:    09:37    Assessment & Plan       Suspected new onset seizures with right sided weakness and facial droop  - monitor telemetry  - Keppra 2 g IV given in ED - continue 500 mg PO BID per neuro recs   - Ativan PRN  - Seizure precautions  - EEG pending   - no need for permissive hypertension   - neuro checks q 4 hours  - stat CTH for any neuro changes   - MRI brain pending   - NPO except meds until cleared by SLP    Recent SDH following all while on Plavix  - s/p left frontoparietal craniotomy and evacuation of hematoma - 5/19/25    - s/p Cerebral angiogram and embolization left middle meningeal artery - 5/16/25   - has not taken Plavix since before procedures last month     Diabetes mellitus, type 2  - monitor glucose per SS  - Hba1c 7.3 - 5/16/25  - hold metformin for at least 72 hours in light of recent contrast    Hypothyroidism  - TSH 0.309  - free T3 and T4 normal  - synthroid resumed    PMH: COPD, dyslipidemia, GERD, lung nodule, CVA, anxiety     VTE Prophylaxis: Jameel MARCH, Nissa Gaston, FNP-BC have discussed this patients case with Dr. Jerome who agrees with the diagnosis and treatment plan.

## 2025-05-29 NOTE — PT/OT/SLP EVAL
Physical Therapy Evaluation    Patient Name:  Chelle Chandra   MRN:  [id number]    Recommendations:     Discharge therapy intensity: High Intensity Therapy   Discharge Equipment Recommendations: wheelchair   Barriers to discharge: Impaired mobility and Ongoing medical needs    Assessment:     Chelle Chandra is a 59 y.o. female admitted with a medical diagnosis of R sided weakness (especially  RUE), possible seizure, MRI brain pending. Pt with recent hx of SDH and s/p crani and MMA embolization.  She presents with the following impairments/functional limitations: weakness, impaired endurance, impaired self care skills, impaired functional mobility, impaired balance, gait instability, decreased upper extremity function, impaired sensation.     Per review of therapy notes from last admission on 5/20/25 pt had normal function and strength of RUE. Today, pt has 2/5 strength in shoulder flexion and 0/5 in elbow, wrist, and finger flexion/extension. Pt reports that at home she was mobilizing with a rollator. Her  and dtr were assisting her 24/7 for ADLs. Currently, pt demonstrates a decline in function and would benefit from high intensity therapy at d/c.     Rehab Prognosis: Good; patient would benefit from acute skilled PT services to address these deficits and reach maximum level of function.    Recent Surgery: * No surgery found *      Plan:     During this hospitalization, patient would benefit from acute PT services 6 x/week to address the identified rehab impairments via gait training, therapeutic activities, therapeutic exercises, neuromuscular re-education and progress toward the following goals:    Plan of Care Expires:  06/29/25    Subjective     Chief Complaint: RUE weakness  Patient/Family Comments/goals: to get better  Pain/Comfort:  Pain Rating 1: 0/10    Patients cultural, spiritual, Oriental orthodox conflicts given the current situation: no    Living Environment:  Lives with , 4 steps with  handrail to enter.  Prior to admission, patients level of function was ambulatory with rollator.  Equipment used at home: rollator, shower chair, grab bar.   Upon discharge, patient will have assistance from dtr and .    Objective:     Communicated with nurse prior to session.  Patient found supine with PureWick, telemetry, peripheral IV  upon PT entry to room.    General Precautions: Standard, fall, aspiration  Orthopedic Precautions:N/A   Braces: N/A  Respiratory Status: Room air    Exams:  Gross Motor Coordination:  impaired RUE and RLE  Sensation:    -       Impaired  light/touch RUE  RUE Strength: Deficits:  2/5 strength in shoulder flexion and 0/5 in elbow, wrist, and finger flexion/extension.  LUE Strength: WNL  RLE ROM: WNL  RLE Strength: Deficits: 3/5 hip flexion  LLE ROM: WNL  LLE Strength: WNL  Skin integrity: Visible skin intact      Functional Mobility:  Bed Mobility:     Supine to Sit: moderate assistance  Sit to Supine: minimum assistance  Transfers:     Sit to Stand:  minimum assistance and of 2 persons with hand-held assist  Gait: 15ft with HHA and Min x2. Slow pace, decreased coordination of RLE. RUE almost completely flaccid.      AM-PAC 6 CLICK MOBILITY  Total Score:15       Treatment & Education:       Patient provided with verbal education education regarding PT role/goals/POC and discharge/DME recommendations.  Understanding was verbalized.     Patient left supine with all lines intact and call button in reach.    GOALS:   Multidisciplinary Problems       Physical Therapy Goals          Problem: Physical Therapy    Goal Priority Disciplines Outcome Interventions   Physical Therapy Goal     PT, PT/OT Progressing    Description: Goals to be met by: 2025     Patient will increase functional independence with mobility by performin. Supine to sit with Contact Guard Assistance  2. Sit to stand transfer with Contact Guard Assistance  3. Gait  x 150 feet with Contact Guard  Assistance using LRAD.   4. Ascend/descend 4 stair with left Handrails Contact Guard Assistance using No Assistive Device.                          History:     Past Medical History:   Diagnosis Date    Accelerated junctional rhythm     Agatston CAC score, <100     Anxiety disorder, unspecified     COPD type A     Diabetes mellitus without complication     GERD (gastroesophageal reflux disease)     History of COVID-19     Insomnia     Lung nodule        Past Surgical History:   Procedure Laterality Date    ANGIOGRAM, CORONARY, WITH LEFT HEART CATHETERIZATION      BREAST LUMPECTOMY Right     CARDIOVERSION  08/01/2013    CARPAL TUNNEL RELEASE  10/23/2013    CRANIOTOMY FOR EVACUATION OF SUBDURAL HEMATOMA Left 5/19/2025    Procedure: CRANIOTOMY, FOR SUBDURAL HEMATOMA EVACUATION;  Surgeon: Ricardo Shelley MD;  Location: [facility];  Service: Neurosurgery;  Laterality: Left;    FUSION OF CERVICAL SPINE BY ANTERIOR APPROACH USING COMPUTER-ASSISTED NAVIGATION  06/10/2019    KIDNEY SURGERY      TONSILLECTOMY AND ADENOIDECTOMY      TOTAL ABDOMINAL HYSTERECTOMY      WRIST SURGERY         Time Tracking:     PT Received On: 05/29/25  PT Start Time: 1428     PT Stop Time: 1441  PT Total Time (min): 13 min     Billable Minutes: Evaluation 13      05/29/2025

## 2025-05-29 NOTE — CONSULTS
[facility] - Emergency Dept  Neurology  Consult Note      Chelle Chandra is a 59 y.o. female who presented to [facility] on 5/29/2025 with reports of acute onset HA at some point during the night (possibly around midnight); woke up at ~0540 with right arm and leg weakness and right facial drooping noticed by her daughter . Onset of symptoms was sudden, not related to any specific activity. Stroke risk factors include diabetes mellitus. Prior stroke history: yes, recent SDH s/p crani and MMA embolization. Patient reports not taking Keppra at home. Suspect possible seizure.    After completion of imaging, patient brought back to ED 16 for reassessment. Her right sided weakness was almost completely resolved with the exception of her  strength. Left gaze preference but able to overcome midline with minimal effort. Questionable right vision loss, appears improved on reassessment. Suspect seizure as cause of new symptoms. Unlikely stroke.       Past Medical History:   Diagnosis Date    Accelerated junctional rhythm     Agatston CAC score, <100     Anxiety disorder, unspecified     COPD type A     Diabetes mellitus without complication     GERD (gastroesophageal reflux disease)     History of COVID-19     Insomnia     Lung nodule      Past Surgical History:   Procedure Laterality Date    ANGIOGRAM, CORONARY, WITH LEFT HEART CATHETERIZATION      BREAST LUMPECTOMY Right     CARDIOVERSION  08/01/2013    CARPAL TUNNEL RELEASE  10/23/2013    CRANIOTOMY FOR EVACUATION OF SUBDURAL HEMATOMA Left 5/19/2025    Procedure: CRANIOTOMY, FOR SUBDURAL HEMATOMA EVACUATION;  Surgeon: Ricardo Shelley MD;  Location: [facility];  Service: Neurosurgery;  Laterality: Left;    FUSION OF CERVICAL SPINE BY ANTERIOR APPROACH USING COMPUTER-ASSISTED NAVIGATION  06/10/2019    KIDNEY SURGERY      TONSILLECTOMY AND ADENOIDECTOMY      TOTAL ABDOMINAL HYSTERECTOMY      WRIST SURGERY       Family History   Problem Relation Name Age of  Onset    Heart attack Mother      Diabetes Father       Social History[1]   Review of patient's allergies indicates:   Allergen Reactions    Aspirin     Ketorolac     Penicillins     Sulfa (sulfonamide antibiotics)     Ozempic [semaglutide] Other (See Comments)     Abdominal pain         STROKE DOCUMENTATION   Acute Stroke Times   Last Known Normal Date: 05/28/25  Last Known Normal Time: 0000  Unknown Normal Time: Unknown Time  Symptom Onset Date: 05/29/25  Symptom Onset Time: 0000  Stroke Team Called Date: 05/29/25  Stroke Team Called Time: 0858  Stroke Team Arrival Date: 05/29/25  Stroke Team Arrival Time: 0900  Thrombolytic Therapy Recommended: No (OOW, unknown last known normal)  Thrombectomy Recommended:  (awaiting CTA results)    NIH Scale:  1a. Level of Consciousness: 0-->Alert, keenly responsive  1b. LOC Questions: 0-->Answers both questions correctly  1c. LOC Commands: 0-->Performs both tasks correctly  2. Best Gaze: 1-->Partial gaze palsy, gaze is abnormal in one or both eyes, but forced deviation or total gaze paresis is not present  3. Visual: 1-->Partial hemianopia  4. Facial Palsy: 1-->Minor paralysis (flattened nasolabial fold, asymmetry on smiling)  5a. Motor Arm, Left: 0-->No drift, limb holds 90 (or 45) degrees for full 10 secs  5b. Motor Arm, Right: 2-->Some effort against gravity, limb cannot get to or maintain (if cued) 90 (or 45) degrees, drifts down to bed, but has some effort against gravity  6a. Motor Leg, Left: 0-->No drift, leg holds 30 degree position for full 5 secs  6b. Motor Leg, Right: 2-->Some effort against gravity, leg falls to bed by 5 secs, but has some effort against gravity  7. Limb Ataxia: 0-->Absent  8. Sensory: 1-->Mild-to-moderate sensory loss, patient feels pinprick is less sharp or is dull on the affected side, or there is a loss of superficial pain with pinprick, but patient is aware of being touched  9. Best Language: 0-->No aphasia, normal  10. Dysarthria:  1-->Mild-to-moderate dysarthria, patient slurs at least some words and, at worst, can be understood with some difficulty  11. Extinction and Inattention (formerly Neglect): 1-->Visual, tactile, auditory, spatial, or personal inattention or extinction to bilateral simultaneous stimulation in one of the sensory modalities  Total (NIH Stroke Scale): 10     **Updated NIHSS after imaging  Current NIH Stroke Score Values      Flowsheet Row Most Recent Value   Interval shift assessment   1a. Level of Consciousness 0-->Alert, keenly responsive   1b. LOC Questions 0-->Answers both questions correctly   1c. LOC Commands 0-->Performs both tasks correctly   2. Best Gaze 0-->Normal   3. Visual 0-->No visual loss   4. Facial Palsy 1-->Minor paralysis (flattened nasolabial fold, asymmetry on smiling)   5a. Motor Arm, Left 0-->No drift, limb holds 90 (or 45) degrees for full 10 secs   5b. Motor Arm, Right 0-->No drift, limb holds 90 (or 45) degrees for full 10 secs   6a. Motor Leg, Left 0-->No drift, leg holds 30 degree position for full 5 secs   6b. Motor Leg, Right 0-->No drift, leg holds 30 degree position for full 5 secs   7. Limb Ataxia 0-->Absent   8. Sensory 1-->Mild-to-moderate sensory loss, patient feels pinprick is less sharp or is dull on the affected side, or there is a loss of superficial pain with pinprick, but patient is aware of being touched   9. Best Language 0-->No aphasia, normal   10. Dysarthria 0-->Normal   11. Extinction and Inattention (formerly Neglect) 0-->No abnormality   Total (NIH Stroke Scale) 2            Modified [place] Score: 3  Tiarra Coma Scale:14     Neurological Exam:   LOC: alert and follows requests  Language: No aphasia  Orientation: Person, Place  Visual Fields (CN II): right hemianopia  EOM (CN III, IV, VI): difficulty overcoming midline on right gaze  Pupils (CN III, IV, VI): PERRL  Facial Sensation (CN V): Symmetric, Corneal reflex intact  Facial Movement (CN VII): right facial  drooping  Motor*: Arm Left:  Normal (5/5), Leg Left:   Normal (5/5), Arm Right:   Paretic:  2/5, Leg Right:   Paretic:  2/5  Sensation: sensory loss to right upper and lower extremities     Laboratory:  BMP:   Lab Results   Component Value Date     05/21/2025    K 4.0 05/21/2025     05/21/2025    CO2 27 05/21/2025    BUN 6.4 (L) 05/21/2025    CREATININE 0.76 05/21/2025    CALCIUM 9.5 05/21/2025     CBC:   Lab Results   Component Value Date    WBC 7.44 05/21/2025    RBC 4.25 05/21/2025    HGB 12.5 05/21/2025    HCT 38.8 05/21/2025    HCT 43 06/01/2024     05/21/2025    MCV 91.3 05/21/2025    MCH 29.4 05/21/2025    MCHC 32.2 (L) 05/21/2025     Lipid Panel:   Lab Results   Component Value Date    CHOL 246 (H) 02/03/2025    HDL 45 02/03/2025    TRIG 301 (H) 02/03/2025     Coagulation:   Lab Results   Component Value Date    INR 1.0 05/19/2025    INR 1.1 06/01/2024    APTT 27.8 05/19/2025     Hgb A1C:   Lab Results   Component Value Date    HGBA1C 7.3 (H) 05/16/2025     TSH:   Lab Results   Component Value Date    TSH 0.204 (L) 05/15/2025    TSH 3.043 02/04/2020       Diagnostic Results  Brain Imaging:   -CTh: 1.  Left cerebral convexity hypodense collection without residual acute hyperdense subacute hematoma and improved postsurgical pneumocephalus.  2.  Otherwise, no significant interval change.    Perfusion:  - CT perfusion: no significant mismatch present  No definite blood flow asymmetry or other findings to suggest significant acute perfusion defect identified. Scattered areas of asymmetrically decreased perfusion through the left MCA territory correspond to area of known ischemic stroke seen on the preceding CT and recent comparison studies.     Cerebrovascular Imaging:   -CTA h/n: 1.  Suspected diminished flow inferior division of the left middle cerebral artery close to bifurcation.  2.  No other hemodynamically significant stenosis identified.      Discussed with neurologist on call:   Julisa notified of stroke alert at 0900  Thrombolysis Candidate? No, Recent intracranial hemorrhage , Recent surgery/trauma (< 14 days)   -Delays to Thrombolysis?  Not Applicable    Interventional Revascularization Candidate? No; No large vessel occlusion identified on imaging  and NIHSS improved to 2.  -Delays to Thrombectomy? Not Applicable  Discussed with Interventional Neurology at --see above for notification of Dr. Egan.  No intervention warranted at this time. No evidence of LVO on CTA h/n. Suspect possible seizure activity prior to hospital arrival.       PLAN:  -low suspicion for stroke; no LVO present on imaging   -NIHSS improved from 10 to 2 during stroke code   -suspect possible seizure  -will load with 2g Keppra IV, then can follow with 500mg BID (patient states she was not on anti-seizure medications at home)  -routine seizure precautions  -stat 1 HR EEG  -awaiting imaging, low suspicion for stroke   -MRI brain to rule out acute stroke   -no need for permissive HTN  -SCD for DVT prophylaxis  -PT/OT/SLP as appropriate  -Rosenbaum prior to PO intake   -Neuro checks Q4H.. stat CTH with any neuro change       Further recommendations to follow from MD Jerilyn Lawrence, P  Neurology  [facility] - Emergency Dept           [1]   Social History  Tobacco Use    Smoking status: Every Day     Current packs/day: 0.50     Types: Cigarettes    Smokeless tobacco: Never   Substance Use Topics    Alcohol use: Never    Drug use: Never

## 2025-05-29 NOTE — ED PROVIDER NOTES
Encounter Date: 5/29/2025    SCRIBE #1 NOTE: I, Jarod Squires, am scribing for, and in the presence of,  Nadeem Feng MD. I have scribed the following portions of the note - Other sections scribed: HPI, ROS, PE.       History     Chief Complaint   Patient presents with    Cerebrovascular Accident     Pt reports she had a bad headache in the middle of the night however arm was still working at that time. Pt woke up with flaccid right arm and trouble with sensation in right arm and leg. Pt has slowed speech per sister. Cbg- 258 in triage. Pt had brain surgery 1 week ago and was supposed to have mri tomorrow.      The patient is a 58 y/o female with a hx of anxiety, COPD, CVA, DM type 2, GERD, and hypothyroidism who presents to the ED activated as a code stroke on arrival due to unilateral weakness beginning this morning. The patient states she was seen by Dr. Egan around 1 week ago for interventional neurology and has been experiencing intermittent headaches since post-op. She states last night around midnight she went to sleep then woke up around 05:00 with right sided weakness, abnormal speech, and facial asymmetry. She states her daughter came to her home at around 05:40 and her right arm was flaccid so she decided to come to the ED. The patient denies any recent visual disturbances.     Chart review shows the patient was seen on 5/15/25 in the ED for gait difficulty and multiple falls and a CTA head and neck was done showing a new intermediate density subdural hemorrhage along the left cerebral convexity with no midline shift. The patient was seen by Dr. Mendoza and Dr. Egan for IR intracranial embolization on 5/16/25 then by Dr. Mendiola with neurosurgery on 5/19/25 for a left craniotomy for subdural hematoma evacuation. Further chart review shows the patient had a left MCA infarct June 1st 2024/ tandem carotid M1 occlusion; had thrombectomy status post aspiration x2 with TICI 2c reperfusion.         The  history is provided by the patient and medical records. No  was used.     Review of patient's allergies indicates:   Allergen Reactions    Aspirin     Ketorolac     Penicillins     Sulfa (sulfonamide antibiotics)     Ozempic [semaglutide] Other (See Comments)     Abdominal pain     Past Medical History:   Diagnosis Date    Accelerated junctional rhythm     Agatston CAC score, <100     Anxiety disorder, unspecified     Asthma     COPD type A     Diabetes mellitus without complication     GERD (gastroesophageal reflux disease)     History of COVID-19     Insomnia     Lung nodule      Past Surgical History:   Procedure Laterality Date    ANGIOGRAM, CORONARY, WITH LEFT HEART CATHETERIZATION      BACK SURGERY      BREAST LUMPECTOMY Right     CARDIOVERSION  08/01/2013    CARPAL TUNNEL RELEASE  10/23/2013    CRANIOTOMY FOR EVACUATION OF SUBDURAL HEMATOMA Left 05/19/2025    Procedure: CRANIOTOMY, FOR SUBDURAL HEMATOMA EVACUATION;  Surgeon: Ricardo Shelley MD;  Location: [facility];  Service: Neurosurgery;  Laterality: Left;    FUSION OF CERVICAL SPINE BY ANTERIOR APPROACH USING COMPUTER-ASSISTED NAVIGATION  06/10/2019    KIDNEY SURGERY      TONSILLECTOMY AND ADENOIDECTOMY      TOTAL ABDOMINAL HYSTERECTOMY      WRIST SURGERY       Family History   Problem Relation Name Age of Onset    Heart attack Mother      Diabetes Father       Social History[1]  Review of Systems   Eyes:  Negative for visual disturbance.   Neurological:  Positive for facial asymmetry, speech difficulty, weakness and headaches.       Physical Exam     Initial Vitals [05/29/25 0855]   BP Pulse Resp Temp SpO2   105/73 100 18 98.2 °F (36.8 °C) 98 %      MAP       --         Physical Exam    Nursing note and vitals reviewed.  Constitutional: She appears well-developed and well-nourished. She is not diaphoretic. No distress.   HENT:   Right Ear: External ear normal.   Left Ear: External ear normal.   Nose: Nose normal.   Surgical site to  the left scalp with staples in place is clean, dry, and intact with no surrounding erythema, edema, or drainage.    Eyes: Conjunctivae and EOM are normal. Pupils are equal, round, and reactive to light. Right eye exhibits no discharge. Left eye exhibits no discharge.   Cardiovascular:  Normal rate, regular rhythm, normal heart sounds and intact distal pulses.     Exam reveals no gallop and no friction rub.       No murmur heard.  Pulmonary/Chest: Breath sounds normal. No respiratory distress. She has no wheezes. She has no rhonchi. She has no rales. She exhibits no tenderness.   Abdominal: Abdomen is soft. Bowel sounds are normal. She exhibits no distension and no mass. There is no abdominal tenderness. There is no rebound and no guarding.   Musculoskeletal:         General: No edema. Normal range of motion.     Neurological: She is alert and oriented to person, place, and time.   Right sided facial droop. Flattening of the nasolabial fold. 2/5  strength to the right upper extremity and 4/5 strength to the right lower extremity. 5/5 strength to the left upper and left lower extremities. The patient answers questions appropriately but is slow with following commands.    Skin: Skin is warm and dry. Capillary refill takes less than 2 seconds. No erythema. No pallor.         ED Course   Critical Care    Date/Time: 5/29/2025 11:53 AM    Performed by: Nadeem Feng MD  Authorized by: Nadeem Feng MD  Direct patient critical care time: 9 minutes  Additional history critical care time: 7 minutes  Ordering / reviewing critical care time: 8 minutes  Documentation critical care time: 5 minutes  Consulting other physicians critical care time: 6 minutes  Total critical care time (exclusive of procedural time) : 35 minutes  Critical care time was exclusive of separately billable procedures and treating other patients.  Critical care was necessary to treat or prevent imminent or life-threatening deterioration of the  following conditions: CNS failure or compromise.  Critical care was time spent personally by me on the following activities: development of treatment plan with patient or surrogate, discussions with consultants, interpretation of cardiac output measurements, evaluation of patient's response to treatment, examination of patient, obtaining history from patient or surrogate, ordering and performing treatments and interventions, ordering and review of laboratory studies, ordering and review of radiographic studies, pulse oximetry, re-evaluation of patient's condition and review of old charts.  Comments: CVA        Labs Reviewed   COMPREHENSIVE METABOLIC PANEL - Abnormal       Result Value    Sodium 138      Potassium 3.9      Chloride 103      CO2 22 (*)     Glucose 191 (*)     Blood Urea Nitrogen 8.6 (*)     Creatinine 0.68      Calcium 9.2      Protein Total 6.9      Albumin 2.6 (*)     Globulin 4.3 (*)     Albumin/Globulin Ratio 0.6 (*)     Bilirubin Total 0.3       (*)     ALT 42      AST 26      eGFR >60      Anion Gap 13.0      BUN/Creatinine Ratio 13     TSH - Abnormal    TSH 0.309 (*)    LIPID PANEL - Abnormal    Cholesterol Total 110      HDL Cholesterol 28 (*)     Triglyceride 145 (*)     Cholesterol/HDL Ratio 4      Very Low Density Lipoprotein 29      LDL Cholesterol 53.00     CBC WITH DIFFERENTIAL - Abnormal    WBC 9.23      RBC 3.62 (*)     Hgb 10.8 (*)     Hct 32.7 (*)     MCV 90.3      MCH 29.8      MCHC 33.0      RDW 13.5      Platelet 527 (*)     MPV 9.5      Neut % 68.8      Lymph % 16.9      Mono % 11.8      Eos % 0.8      Basophil % 0.8      Imm Grans % 0.9      Neut # 6.36      Lymph # 1.56      Mono # 1.09      Eos # 0.07      Baso # 0.07      Imm Gran # 0.08 (*)     NRBC% 0.0     POCT GLUCOSE - Abnormal    POCT Glucose 258 (*)    PROTIME-INR - Normal    PT 13.9      INR 1.1      Narrative:     Protimes are used to monitor anticoagulant agents such as warfarin. PT INR values are based on  the current patient normal mean and the LESLYE value for the specific instrument reagent used.  **Routine theraputic target values for the INR are 2.0-3.0**   CK - Normal    Creatine Kinase 32     T3,FREE (OLG ONLY) - Normal    T3 Free 1.69     T4, FREE - Normal    Thyroxine Free 1.23     CBC W/ AUTO DIFFERENTIAL    Narrative:     The following orders were created for panel order CBC W/ AUTO DIFFERENTIAL.  Procedure                               Abnormality         Status                     ---------                               -----------         ------                     CBC with Differential[[id number]]       Abnormal            Final result                 Please view results for these tests on the individual orders.   POCT GLUCOSE, HAND-HELD DEVICE   POCT GLUCOSE    POCT Glucose 109     POCT GLUCOSE MONITORING CONTINUOUS          Imaging Results              CTA STROKE MULTI-PHASE (XPD) (Final result)  Result time 05/29/25 10:57:56      Final result by Adam Sears MD (05/29/25 10:57:56)                   Impression:      1.  Suspected diminished flow inferior division of the left middle cerebral artery close to bifurcation.    2.  No other hemodynamically significant stenosis identified.      Electronically signed by: Adam Sears  Date:    05/29/2025  Time:    10:57               Narrative:    EXAMINATION:  CTA STROKE MULTI-PHASE (XPD)    CLINICAL HISTORY:  Neuro deficit, acute, stroke suspected    TECHNIQUE:  Brain and imaging was performed from skull base to vertex prior to intravenous contrast. CT Angiogram of head and neck was subsequently performed following intravenous contrast administration.  Coronal and sagittal MPR reconstructions were performed in addition to coronal and sagittal MIP reconstructions.    Dose product length of 1184 mGycm. Automated exposure control was utilized to minimize radiation dose.    COMPARISON:  CT brain without contrast May 29, 2024    FINDINGS:  Carotid artery  assessed in accordance with the NASCET criteria.    Thoracic aorta is remarkable for mild calcified plaque without aneurysmal dilatation or dissection.  There is no hemodynamically significant stenosis of the brachiocephalic trunk and the subclavian arteries.  Bilateral common carotid arteries, internal and the external carotid arteries are without significant stenosis and there is no dissection, intraluminal thrombosis or aneurysmal prominence.  The cavernous and supraclinoid portion internal carotid arteries are patent.  There is unremarkable flow within the anterior cerebral arteries, right middle cerebral artery and the major branches.  The left middle cerebral artery M1 segment is unremarkable.  There appears diminished flow within the inferior division of the left middle cerebral artery close to bifurcation on image 37 series 7.    There is flow bilaterally within the vertebral arteries.  The basilar artery is patent.  There is flow within the posterior cerebral arteries                                       CT Brain Perfusion inc Post Processing (Final result)  Result time 05/29/25 10:28:30      Final result by Zoran Decker MD (05/29/25 10:28:30)                   Narrative:    EXAMINATION  CT BRAIN PERFUSION INC POST PROCESSING    CLINICAL HISTORY  stroke;    TECHNIQUE  Axial perfusion CT images were obtained, with arterial in-flow and venous out-flow ROIs. Post-processing of the CT perfusion data was performed, generating color para-metric maps for cerebral blood volume (CBV), cerebral blood flow (CBF), mean transit time (MTT), time-to-peak (TTP), and time-to-maximum (T-max).  The perfusion maps and hemodynamic curves were transferred to PACS.    CONTRAST  *IV contrast media: Omnipaque 350  *Injected volume: 50 mL  *Injection rate: 5 mL/s    COMPARISON  *No prior CT perfusion exam is available at the time of the initial interpretation.  *Non-contrast head CT obtained immediately prior was  reviewed.    FINDINGS/IMPRESSION  Exam quality: Adequate for evaluation.    No definite blood flow asymmetry or other findings to suggest significant acute perfusion defect identified.  Scattered areas of asymmetrically decreased perfusion through the left MCA territory correspond to area of known ischemic stroke seen on the preceding CT and recent comparison studies.    Prior to the final Radiology report, initial assessment performed by teleradiology service and pertinent communication to the Neuro Interventional Team was accomplished for potential treatment planning; please refer to the corresponding consult and/or intervention notes for additional details.    RADIATION DOSE  Automated tube current modulation, weight-based exposure dosing, and/or iterative reconstruction technique utilized to reach lowest reasonably achievable exposure rate.    DLP: 1528 mGy*cm      Electronically signed by: Zoran Decker  Date:    05/29/2025  Time:    10:28                                     CT HEAD FOR CODE STROKE (Final result)  Result time 05/29/25 09:37:13      Final result by Adam Sears MD (05/29/25 09:37:13)                   Impression:      1.  Left cerebral convexity hypodense collection without residual acute hyperdense subacute hematoma and improved postsurgical pneumocephalus.    2.  Otherwise, no significant interval change.      Electronically signed by: Adam Sears  Date:    05/29/2025  Time:    09:37               Narrative:    EXAMINATION:  CT HEAD FOR CODE STROKE    CLINICAL HISTORY:  Neuro deficit, acute, stroke suspected;stroke alert;;    TECHNIQUE:  Sequential axial images were performed of the brain without contrast.    Dose product length of 1118 mGycm. Automated exposure control was utilized to minimize radiation dose.    COMPARISON:  May 19, 2023..    FINDINGS:  There is left craniotomy related to evacuation of the subdural hematoma.  There is minimal residual left pneumocephalus.  Left cerebral  convexity subdural collection is entirely hypodense with thickness of 10 mm without acute hyperdense component.  Extensive in the territory of the middle cerebral artery ischemic changes with associated and cephalo malacia is similar. There is no significant mass effect or midline shift.  No new large vessel territory infarct identified.    Visualized paranasal sinuses are clear without mucosal thickening, polypoidal abnormality or air-fluid levels. Mastoid air cells aeration is optimal.                                       Medications   sodium chloride 0.9% flush 10 mL (has no administration in time range)   acetaminophen tablet 650 mg (has no administration in time range)   labetaloL injection 10 mg (has no administration in time range)   hydrALAZINE injection 10 mg (has no administration in time range)   ondansetron injection 4 mg (has no administration in time range)   prochlorperazine injection Soln 5 mg (has no administration in time range)   bisacodyL suppository 10 mg (has no administration in time range)   insulin aspart U-100 injection 0-10 Units (has no administration in time range)   dextrose 50% injection 12.5 g (has no administration in time range)   glucagon (human recombinant) injection 1 mg (has no administration in time range)   LORazepam injection 2 mg (has no administration in time range)   albuterol-ipratropium 2.5 mg-0.5 mg/3 mL nebulizer solution 3 mL (has no administration in time range)   atorvastatin tablet 80 mg (80 mg Oral Given 5/29/25 1716)   busPIRone tablet 15 mg (has no administration in time range)   butalbital-acetaminophen-caffeine -40 mg per tablet 1 tablet (1 tablet Oral Given 5/29/25 1716)   docusate sodium capsule 100 mg (has no administration in time range)   ezetimibe tablet 10 mg (10 mg Oral Given 5/29/25 1716)   levothyroxine tablet 88 mcg (has no administration in time range)   nortriptyline capsule 25 mg (has no administration in time range)   paroxetine tablet 20  mg (has no administration in time range)   lactated ringers infusion ( Intravenous New Bag 5/29/25 1733)   levETIRAcetam (Keppra) 500 mg in D5W 100 mL IVPB (MB+) (has no administration in time range)   iohexoL (OMNIPAQUE 350) injection 100 mL (100 mLs Intravenous Given 5/29/25 0920)   levETIRAcetam in NaCl (iso-os) IVPB 2,000 mg (0 mg Intravenous Stopped 5/29/25 1013)   acetaminophen 1,000 mg/100 mL (10 mg/mL) injection 1,000 mg (0 mg Intravenous Stopped 5/29/25 1241)     Medical Decision Making  Differential diagnosis includes but is not limited to:  CVA, TIA, electrolyte abnormality, renal dysfunction, head bleed  , seizure  See ED course for MDM      Amount and/or Complexity of Data Reviewed  External Data Reviewed: radiology and notes.     Details: Chart review shows the patient was seen on 5/15/25 in the ED for gait difficulty and multiple falls and a CTA head and neck was done showing a new intermediate density subdural hemorrhage along the left cerebral convexity with no midline shift. The patient was seen by Dr. Mendoza and Dr. Egan for IR intracranial embolization on 5/16/25 then by Dr. Mendiola with neurosurgery on 5/19/25 for a left craniotomy for subdural hematoma evacuation. Further chart review shows the patient had a left MCA infarct June 1st 2024/ tandem carotid M1 occlusion; had thrombectomy status post aspiration x2 with TICI 2c reperfusion.     Labs: ordered. Decision-making details documented in ED Course.  Radiology: ordered and independent interpretation performed. Decision-making details documented in ED Course.  ECG/medicine tests: ordered and independent interpretation performed. Decision-making details documented in ED Course.    Risk  Decision regarding hospitalization.      Additional MDM:     NIH Stroke Scale:   Interval = with any deterioration/ worsening of neurologic condition  Level of consciousness = 0 - alert  LOC questions = 0 - answers both correctly  LOC commands = 0 - performs  both correctly  Best gaze = 0 - normal  Visual = 1 - partial hemianopia  Facial palsy = 2 - partial  Motor left arm =  0 - no drift  Motor right arm =  3 - no effort against gravity  Motor left leg = 0 - no drift  Motor right leg =  2 - can't resist gravity  Limb ataxia = 2 - present in two limbs  Sensory = 0 - normal  Best language = 0 - no aphasia  Dysarthria = 0 - normal articulation  Extinction and inattention = 0 - no neglect  NIH Stroke Scale Total = 10           Scribe Attestation:   Scribe #1: I performed the above scribed service and the documentation accurately describes the services I performed. I attest to the accuracy of the note.    Attending Attestation:           Physician Attestation for Scribe:  Physician Attestation Statement for Scribe #1: I, Nadeem Feng MD, reviewed documentation, as scribed by Jarod Squires in my presence, and it is both accurate and complete.             ED Course as of 05/29/25 1928   Thu May 29, 2025   0941 Chart review reveals discharge summary from 05/22/2025.  Patient was admitted 05/15/2025 with a subdural hematoma that per note was likely related to s/p Embolization of the left middle meningeal artery using coil and liquid embolic agent 5/16/25, Left mini craniotomy for subdural hematoma evacuation 5/19/25 given persistent.  Prior history of CVA diabetes tobacco dependence.  Cerebral angiogram of the left common carotid left external carotid left middle meningeal artery on 05/16/2024 with Dr. Egan.  Craniotomy for subdural hematoma evacuation with Dr. Mendiola 05/19/2025. [MM]   [address] [facility] medicine [LH]   1008 CBC W/ AUTO DIFFERENTIAL(!)  No significant leukocytosis.  Mild anemia [MM]   1008 POCT Glucose(!): 258 [MM]   1039 EKG done at 1024 shows sinus rhythm rate of 82 normal axis .  No obvious ST elevation or depression. [MM]   1152 Patient is seen evaluated by stroke team here in the emergency department.  Perfusion scan, CTs unrevealing for  any acute process, recent surgical changes noted.  Concern for possible seizure-like activity given Keppra.  Discussed with the stroke team plan for admission, patient is currently in EEG did discuss findings with daughter.  Will admit for further evaluation management discussed with Nissa.  Care to be transferred. [MM]   1153 Patient is not a candidate for TNK given onset of symptoms as well as recent brain surgery [MM]   1927 CT HEAD FOR CODE STROKE  Postop changes , no obvious or acute bleed SDH present [MM]      ED Course User Index  [LH] Jarod Squires  [MM] Nadeem Feng MD                           Clinical Impression:  Final diagnoses:  [R29.818] Acute focal neurological deficit (Primary)  [R53.1] Acute right-sided weakness  [R56.9] Seizure-like activity          ED Disposition Condition    Admit                       [1]   Social History  Tobacco Use    Smoking status: Every Day     Current packs/day: 0.50     Types: Cigarettes    Smokeless tobacco: Never   Vaping Use    Vaping status: Former    Quit date: 6/1/2024   Substance Use Topics    Alcohol use: Not Currently    Drug use: Never        Nadeem Feng MD  05/29/25 1928

## 2025-05-29 NOTE — PROGRESS NOTES
Patient was scheduled for a CT Head tomorrow at [facility] ordered by Sandra.,However, the patient is currently at [facility] in the ED for Stroke/Seizure symptoms and has had this CT done there this morning around 9:15 and the report is in EPIC. CT for tomorrow was canceled.

## 2025-05-29 NOTE — PLAN OF CARE
Problem: Physical Therapy  Goal: Physical Therapy Goal  Description: Goals to be met by: 2025     Patient will increase functional independence with mobility by performin. Supine to sit with Contact Guard Assistance  2. Sit to stand transfer with Contact Guard Assistance  3. Gait  x 150 feet with Contact Guard Assistance using LRAD.   4. Ascend/descend 4 stair with left Handrails Contact Guard Assistance using No Assistive Device.     Outcome: Progressing

## 2025-05-29 NOTE — PLAN OF CARE
Pt is , 4 children, no living will or POA. [facility]. PCP Jorge Silver.    05/29/25 1300   Discharge Assessment   Assessment Type Discharge Planning Assessment   Confirmed/corrected address, phone number and insurance Yes   Confirmed Demographics Correct on Facesheet   Source of Information patient   Communicated DARIAN with patient/caregiver Date not available/Unable to determine   People in Home spouse   Name(s) of People in Home Spouse Roberto Chandra [id number]/ [phone]   Do you expect to return to your current living situation? Yes   Do you have help at home or someone to help you manage your care at home? Yes   Who are your caregiver(s) and their phone number(s)?  Roberto   Prior to hospitilization cognitive status: Unable to Assess   Current cognitive status: Alert/Oriented   Walking or Climbing Stairs Difficulty yes   Walking or Climbing Stairs ambulation difficulty, requires equipment   Mobility Management rollator, walker, grab bars   Dressing/Bathing Difficulty yes   Dressing/Bathing bathing difficulty, requires equipment   Dressing/Bathing Management shower chair   Home Accessibility stairs to enter home   Number of Stairs, Main Entrance four   Home Layout Able to live on 1st floor   Equipment Currently Used at Home glucometer;blood pressure machine;shower chair;rollator   Readmission within 30 days? Yes   Patient currently being followed by outpatient case management? No   Do you currently have service(s) that help you manage your care at home? Yes   Name and Contact number of agency [facility]   Is the pt/caregiver preference to resume services with current agency Yes   Do you take prescription medications? Yes   Do you have prescription coverage? Yes  (BCBS)   Do you have any problems affording any of your prescribed medications? TBD   Is the patient taking medications as prescribed? yes   Who is going to help you get home at discharge? daughter   How do you get to doctors appointments?  family or friend will provide   Are you on dialysis? No   Do you take coumadin? No   Discharge Plan A Other  (TBD)   DME Needed Upon Discharge  other (see comments)  (TBD)   Discharge Plan discussed with: Patient   Transition of Care Barriers None   Physical Activity   On average, how many days per week do you engage in moderate to strenuous exercise (like a brisk walk)? 0 days   On average, how many minutes do you engage in exercise at this level? 0 min   Financial Resource Strain   How hard is it for you to pay for the very basics like food, housing, medical care, and heating? Not very   Housing Stability   In the last 12 months, was there a time when you were not able to pay the mortgage or rent on time? N   At any time in the past 12 months, were you homeless or living in a shelter (including now)? N   Transportation Needs   In the past 12 months, has lack of transportation kept you from medical appointments or from getting medications? no   In the past 12 months, has lack of transportation kept you from meetings, work, or from getting things needed for daily living? No   Food Insecurity   Within the past 12 months, you worried that your food would run out before you got the money to buy more. Never true   Within the past 12 months, the food you bought just didn't last and you didn't have money to get more. Never true   Stress   Do you feel stress - tense, restless, nervous, or anxious, or unable to sleep at night because your mind is troubled all the time - these days? Rather much  (due to health)   Social Isolation   How often do you feel lonely or isolated from those around you?  Never   Alcohol Use   Q1: How often do you have a drink containing alcohol? Never   Q2: How many drinks containing alcohol do you have on a typical day when you are drinking? None   Q3: How often do you have six or more drinks on one occasion? Never   [employer]   In the past 12 months has the electric, gas, oil, or water company  threatened to shut off services in your home? No   Health Literacy   How often do you need to have someone help you when you read instructions, pamphlets, or other written material from your doctor or pharmacy? Rarely

## 2025-05-29 NOTE — PT/OT/SLP EVAL
[facility]  Speech Language Pathology Department  Clinical Swallow Evaluation    Patient Name:  Chelle Chandra   MRN:  [id number]    Recommendations     General recommendations:  Modified Barium Swallow Study  Solid texture recommendation:  NPO  Liquid consistency recommendation: NPO   Medications: in puree  Precautions: aspiration    History     Chelle Chandra is a/n 59 y.o. female admitted with R sided weakness, slurred speech, and R facial droop. Patient with recent admission SDH s/p crani and MMA embolization- ST not consulted during that admission. Pt passed Rosenbaum in ED.    Hx of dysphagia requiring thickened liquids with 3 MBSs over 1.5 months in 2024. a    Past Medical History:   Diagnosis Date    Accelerated junctional rhythm     Agatston CAC score, <100     Anxiety disorder, unspecified     COPD type A     Diabetes mellitus without complication     GERD (gastroesophageal reflux disease)     History of COVID-19     Insomnia     Lung nodule      Past Surgical History:   Procedure Laterality Date    ANGIOGRAM, CORONARY, WITH LEFT HEART CATHETERIZATION      BREAST LUMPECTOMY Right     CARDIOVERSION  08/01/2013    CARPAL TUNNEL RELEASE  10/23/2013    CRANIOTOMY FOR EVACUATION OF SUBDURAL HEMATOMA Left 5/19/2025    Procedure: CRANIOTOMY, FOR SUBDURAL HEMATOMA EVACUATION;  Surgeon: Ricardo Shelley MD;  Location: [facility];  Service: Neurosurgery;  Laterality: Left;    FUSION OF CERVICAL SPINE BY ANTERIOR APPROACH USING COMPUTER-ASSISTED NAVIGATION  06/10/2019    KIDNEY SURGERY      TONSILLECTOMY AND ADENOIDECTOMY      TOTAL ABDOMINAL HYSTERECTOMY      WRIST SURGERY       Home diet texture/consistency: Regular and thin liquids  Current method of nutrition: NPO    Imaging   Results for orders placed during the hospital encounter of 05/15/25    X-Ray Chest 1 View    Narrative  EXAMINATION:  XR CHEST 1 VIEW    CLINICAL HISTORY:  pre-op examination;, .    FINDINGS:  No alveolar  consolidation, effusion, or pneumothorax is seen.   The thoracic aorta is normal  cardiac silhouette, central pulmonary vessels and mediastinum are normal in size and are grossly unremarkable.   visualized osseous structures are grossly unremarkable.    Impression  No acute chest disease is identified.      Electronically signed by: Carlos Mcclain  Date:    05/19/2025  Time:    11:38    No results found for this or any previous visit.    Results for orders placed during the hospital encounter of 07/01/24    MRI Brain Without Contrast    Narrative  EXAMINATION:  MRI BRAIN WITHOUT CONTRAST    CLINICAL HISTORY:  dizziness, off-balance, r/o cva, recent history of cva 1 month ago;    TECHNIQUE:  Multiplanar MRI sequences were performed of the brain without contrast.    COMPARISON:  MRI brain June 2, 2024    FINDINGS:  There are extensive left middle cerebral artery territory frontoparietal lobes and temporooccipital lobes as well as basal ganglia subacute nonhemorrhagic infarcts.  Infarct shows less dense brightness on diffusion-weighted sequence and now is not associated with signal dropout on the ADC map.  Brain edematous changes and sulci effacement is slightly less evident.  There is local mass effect without midline shift.  No hydrocephalus.  There is no new infratentorial or supratentorial brain infarct.  Gradient echo sequence are without altered signal to reflect a previous hemorrhagic byproduct.  Sella and the suprasellar areas are unremarkable.    The cerebellar tonsils are normally positioned.  No acute extra axial fluid collections identified. The mastoid air cells are clear.    Impression  1.  Left cerebral hemisphere extensive subacute infarct without hemorrhagic transformation.    2.  No new findings.      Electronically signed by: Adam Sears  Date:    07/02/2024  Time:    11:36    Subjective     Patient awake and alert. Slurred speech and R facial droop noted.    Patient goals: to eat/drink    Spiritual/Cultural/Oriental orthodox Beliefs/Practices that affect care: no    Pain/Comfort: Pain Rating 1: 0/10    Objective     ORAL MUSCULATURE  Dentition: upper dentures and lower dentures  Facial Movement: reduced right  Oral Labial Strength & Mobility: impaired pursing and impaired seal  Vocal Quality: slurred    PO TRIALS  Consistency Fed By Oral Symptoms Pharyngeal Symptoms   Thin liquid by straw SLP None None   Puree SLP None None     Assessment     Patient presenting with acute R sided weakness and facial droop. MBS warranted due to significant hx of dysphagia. SLP rec: scottie LEE for meds in puree    Outcome Measures     Functional Oral Intake Scale: 1 - Nothing by mouth    Education     Patient and family were provided with verbal education regarding swallow function and SLP POC.  Understanding was verbalized, however additional teaching warranted.    Plan     SLP Follow-Up:  Yes   Plan of Care reviewed with:  patient, family     Time Tracking     SLP Treatment Date:   05/29/25  Speech Start Time:  1335  Speech Stop Time:  1346     Speech Total Time (min):  11 min    Billable minutes:  Swallow and Oral Function Evaluation, 11 minutes     05/29/2025

## 2025-05-30 PROBLEM — R53.1 ACUTE RIGHT-SIDED WEAKNESS: Status: ACTIVE | Noted: 2025-05-30

## 2025-05-30 LAB
ALBUMIN SERPL-MCNC: 2.6 G/DL (ref 3.4–4.8)
ALBUMIN/GLOB SERPL: 0.6 RATIO (ref 1.1–2)
ALP SERPL-CCNC: 176 UNIT/L (ref 40–150)
ALT SERPL-CCNC: 33 UNIT/L (ref 0–55)
ANION GAP SERPL CALC-SCNC: 13 MEQ/L
AORTIC VALVE MEAN VELOCITY: 0.8 M/S
AST SERPL-CCNC: 22 UNIT/L (ref 11–45)
AV INDEX (PROSTH): 0.83
AV LVOT MEAN GRADIENT: 2 MMHG
AV LVOT PEAK GRADIENT: 5 MMHG
AV MEAN GRADIENT: 4 MMHG
AV PEAK GRADIENT: 7 MMHG
AV VALVE AREA BY VELOCITY RATIO: 2.4 CM²
AV VALVE AREA: 2.4 CM²
AV VELOCITY RATIO: 0.85
B PERT.PT PRMT NPH QL NAA+NON-PROBE: NOT DETECTED
BACTERIA #/AREA URNS AUTO: ABNORMAL /HPF
BASOPHILS # BLD AUTO: 0.06 X10(3)/MCL
BASOPHILS NFR BLD AUTO: 0.7 %
BILIRUB SERPL-MCNC: 0.4 MG/DL
BILIRUB UR QL STRIP.AUTO: NEGATIVE
BUN SERPL-MCNC: 7 MG/DL (ref 9.8–20.1)
C PNEUM DNA NPH QL NAA+NON-PROBE: NOT DETECTED
CALCIUM SERPL-MCNC: 9.2 MG/DL (ref 8.4–10.2)
CHLORIDE SERPL-SCNC: 103 MMOL/L (ref 98–107)
CLARITY UR: CLEAR
CO2 SERPL-SCNC: 23 MMOL/L (ref 23–31)
COLOR UR AUTO: ABNORMAL
CREAT SERPL-MCNC: 0.61 MG/DL (ref 0.55–1.02)
CREAT/UREA NIT SERPL: 11
DOP CALC AO PEAK VEL: 1.3 M/S
DOP CALC AO VTI: 23.7 CM
DOP CALC LVOT AREA: 2.8 CM2
DOP CALC LVOT DIAMETER: 1.9 CM
DOP CALC LVOT PEAK VEL: 1.1 M/S
DOP CALC LVOT STROKE VOLUME: 55.8 CM3
DOP CALCLVOT PEAK VEL VTI: 19.7 CM
EOSINOPHIL # BLD AUTO: 0.02 X10(3)/MCL (ref 0–0.9)
EOSINOPHIL NFR BLD AUTO: 0.2 %
ERYTHROCYTE [DISTWIDTH] IN BLOOD BY AUTOMATED COUNT: 13.3 % (ref 11.5–17)
FLUAV AG UPPER RESP QL IA.RAPID: NOT DETECTED
FLUBV AG UPPER RESP QL IA.RAPID: NOT DETECTED
GFR SERPLBLD CREATININE-BSD FMLA CKD-EPI: >60 ML/MIN/1.73/M2
GLOBULIN SER-MCNC: 4.4 GM/DL (ref 2.4–3.5)
GLUCOSE SERPL-MCNC: 142 MG/DL (ref 82–115)
GLUCOSE UR QL STRIP: ABNORMAL
HADV DNA NPH QL NAA+NON-PROBE: NOT DETECTED
HCOV 229E RNA NPH QL NAA+NON-PROBE: NOT DETECTED
HCOV HKU1 RNA NPH QL NAA+NON-PROBE: NOT DETECTED
HCOV NL63 RNA NPH QL NAA+NON-PROBE: NOT DETECTED
HCOV OC43 RNA NPH QL NAA+NON-PROBE: NOT DETECTED
HCT VFR BLD AUTO: 32.9 % (ref 37–47)
HGB BLD-MCNC: 10.8 G/DL (ref 12–16)
HGB UR QL STRIP: NEGATIVE
HMPV RNA NPH QL NAA+NON-PROBE: NOT DETECTED
HPIV1 RNA NPH QL NAA+NON-PROBE: NOT DETECTED
HPIV2 RNA NPH QL NAA+NON-PROBE: NOT DETECTED
HPIV3 RNA NPH QL NAA+NON-PROBE: NOT DETECTED
HPIV4 RNA NPH QL NAA+NON-PROBE: NOT DETECTED
HR MV ECHO: 82 BPM
IMM GRANULOCYTES # BLD AUTO: 0.06 X10(3)/MCL (ref 0–0.04)
IMM GRANULOCYTES NFR BLD AUTO: 0.7 %
KETONES UR QL STRIP: ABNORMAL
LACTATE SERPL-SCNC: 0.8 MMOL/L (ref 0.5–2.2)
LEFT CCA DIST DIAS: 13 CM/S
LEFT CCA DIST SYS: 75 CM/S
LEFT CCA PROX DIAS: 12 CM/S
LEFT CCA PROX SYS: 71 CM/S
LEFT ECA DIAS: 19 CM/S
LEFT ECA SYS: 68 CM/S
LEFT ICA DIST DIAS: 16 CM/S
LEFT ICA DIST SYS: 69 CM/S
LEFT ICA MID DIAS: 20 CM/S
LEFT ICA MID SYS: 78 CM/S
LEFT ICA PROX DIAS: 14 CM/S
LEFT ICA PROX SYS: 82 CM/S
LEFT VERTEBRAL DIAS: 9 CM/S
LEFT VERTEBRAL SYS: 46 CM/S
LEUKOCYTE ESTERASE UR QL STRIP: NEGATIVE
LYMPHOCYTES # BLD AUTO: 2 X10(3)/MCL (ref 0.6–4.6)
LYMPHOCYTES NFR BLD AUTO: 22.2 %
M PNEUMO DNA NPH QL NAA+NON-PROBE: NOT DETECTED
MAGNESIUM SERPL-MCNC: 1.6 MG/DL (ref 1.6–2.6)
MCH RBC QN AUTO: 29.7 PG (ref 27–31)
MCHC RBC AUTO-ENTMCNC: 32.8 G/DL (ref 33–36)
MCV RBC AUTO: 90.4 FL (ref 80–94)
MITRAL VALVE MEAN VELOCITY: 6.4 M/S
MITRAL VALVE PEAK VELOCITY: 9 CM/S
MONOCYTES # BLD AUTO: 1.17 X10(3)/MCL (ref 0.1–1.3)
MONOCYTES NFR BLD AUTO: 13 %
MV MEAN GRADIENT: 2 MMHG
MV PEAK GRADIENT: 3 MMHG
MV STENOSIS PRESSURE HALF TIME: 50 MS
MV VALVE AREA P 1/2 METHOD: 4.4 CM2
NEUTROPHILS # BLD AUTO: 5.69 X10(3)/MCL (ref 2.1–9.2)
NEUTROPHILS NFR BLD AUTO: 63.2 %
NITRITE UR QL STRIP: NEGATIVE
NRBC BLD AUTO-RTO: 0 %
OHS CV CAROTID RIGHT ICA EDV HIGHEST: 25
OHS CV CAROTID ULTRASOUND LEFT ICA/CCA RATIO: 1.09
OHS CV CAROTID ULTRASOUND RIGHT ICA/CCA RATIO: 1.34
OHS CV PV CAROTID LEFT HIGHEST CCA: 75
OHS CV PV CAROTID LEFT HIGHEST ICA: 82
OHS CV PV CAROTID RIGHT HIGHEST CCA: 87
OHS CV PV CAROTID RIGHT HIGHEST ICA: 86
OHS CV US CAROTID LEFT HIGHEST EDV: 20
PH UR STRIP: 6 [PH]
PLATELET # BLD AUTO: 544 X10(3)/MCL (ref 130–400)
PMV BLD AUTO: 9.8 FL (ref 7.4–10.4)
POCT GLUCOSE: 140 MG/DL (ref 70–110)
POCT GLUCOSE: 146 MG/DL (ref 70–110)
POCT GLUCOSE: 149 MG/DL (ref 70–110)
POTASSIUM SERPL-SCNC: 3.5 MMOL/L (ref 3.5–5.1)
PROT SERPL-MCNC: 7 GM/DL (ref 5.8–7.6)
PROT UR QL STRIP: NEGATIVE
PV PEAK GRADIENT: 5 MMHG
PV PEAK VELOCITY: 1.11 M/S
RA PRESSURE ESTIMATED: 3 MMHG
RBC # BLD AUTO: 3.64 X10(6)/MCL (ref 4.2–5.4)
RBC #/AREA URNS AUTO: ABNORMAL /HPF
RIGHT CCA DIST DIAS: 17 CM/S
RIGHT CCA DIST SYS: 64 CM/S
RIGHT CCA PROX DIAS: 15 CM/S
RIGHT CCA PROX SYS: 87 CM/S
RIGHT ECA DIAS: 18 CM/S
RIGHT ECA SYS: 86 CM/S
RIGHT ICA DIST DIAS: 25 CM/S
RIGHT ICA DIST SYS: 86 CM/S
RIGHT ICA MID DIAS: 19 CM/S
RIGHT ICA MID SYS: 74 CM/S
RIGHT ICA PROX DIAS: 13 CM/S
RIGHT ICA PROX SYS: 67 CM/S
RIGHT VERTEBRAL DIAS: 6 CM/S
RIGHT VERTEBRAL SYS: 25 CM/S
RSV A 5' UTR RNA NPH QL NAA+PROBE: NOT DETECTED
RSV RNA NPH QL NAA+NON-PROBE: NOT DETECTED
RV+EV RNA NPH QL NAA+NON-PROBE: NOT DETECTED
SARS-COV-2 RNA RESP QL NAA+PROBE: NOT DETECTED
SODIUM SERPL-SCNC: 139 MMOL/L (ref 136–145)
SP GR UR STRIP.AUTO: 1.02 (ref 1–1.03)
SQUAMOUS #/AREA URNS LPF: ABNORMAL /HPF
TRICUSPID ANNULAR PLANE SYSTOLIC EXCURSION: 1.8 CM
UROBILINOGEN UR STRIP-ACNC: NORMAL
WBC # BLD AUTO: 9 X10(3)/MCL (ref 4.5–11.5)
WBC #/AREA URNS AUTO: ABNORMAL /HPF

## 2025-05-30 PROCEDURE — 21400001 HC TELEMETRY ROOM

## 2025-05-30 PROCEDURE — 99233 SBSQ HOSP IP/OBS HIGH 50: CPT | Mod: FS,,, | Performed by: PSYCHIATRY & NEUROLOGY

## 2025-05-30 PROCEDURE — 97166 OT EVAL MOD COMPLEX 45 MIN: CPT

## 2025-05-30 PROCEDURE — 97550 CAREGIVER TRAING 1ST 30 MIN: CPT

## 2025-05-30 PROCEDURE — 80053 COMPREHEN METABOLIC PANEL: CPT | Performed by: NURSE PRACTITIONER

## 2025-05-30 PROCEDURE — 81001 URINALYSIS AUTO W/SCOPE: CPT

## 2025-05-30 PROCEDURE — 36415 COLL VENOUS BLD VENIPUNCTURE: CPT

## 2025-05-30 PROCEDURE — 92611 MOTION FLUOROSCOPY/SWALLOW: CPT

## 2025-05-30 PROCEDURE — 87486 CHLMYD PNEUM DNA AMP PROBE: CPT

## 2025-05-30 PROCEDURE — 25500020 PHARM REV CODE 255: Performed by: INTERNAL MEDICINE

## 2025-05-30 PROCEDURE — 25000003 PHARM REV CODE 250: Performed by: NURSE PRACTITIONER

## 2025-05-30 PROCEDURE — 97530 THERAPEUTIC ACTIVITIES: CPT | Mod: CQ

## 2025-05-30 PROCEDURE — 83735 ASSAY OF MAGNESIUM: CPT | Performed by: NURSE PRACTITIONER

## 2025-05-30 PROCEDURE — 83605 ASSAY OF LACTIC ACID: CPT

## 2025-05-30 PROCEDURE — 87040 BLOOD CULTURE FOR BACTERIA: CPT

## 2025-05-30 PROCEDURE — 63600175 PHARM REV CODE 636 W HCPCS: Performed by: NURSE PRACTITIONER

## 2025-05-30 PROCEDURE — A9698 NON-RAD CONTRAST MATERIALNOC: HCPCS | Performed by: INTERNAL MEDICINE

## 2025-05-30 PROCEDURE — 85025 COMPLETE CBC W/AUTO DIFF WBC: CPT | Performed by: NURSE PRACTITIONER

## 2025-05-30 PROCEDURE — 63600175 PHARM REV CODE 636 W HCPCS: Performed by: INTERNAL MEDICINE

## 2025-05-30 PROCEDURE — 0241U COVID/RSV/FLU A&B PCR: CPT

## 2025-05-30 PROCEDURE — 25000003 PHARM REV CODE 250: Performed by: INTERNAL MEDICINE

## 2025-05-30 RX ORDER — NORTRIPTYLINE HYDROCHLORIDE 25 MG/1
25 CAPSULE ORAL NIGHTLY
Status: DISCONTINUED | OUTPATIENT
Start: 2025-05-30 | End: 2025-06-04

## 2025-05-30 RX ORDER — MAGNESIUM SULFATE HEPTAHYDRATE 40 MG/ML
2 INJECTION, SOLUTION INTRAVENOUS ONCE
Status: COMPLETED | OUTPATIENT
Start: 2025-05-30 | End: 2025-05-31

## 2025-05-30 RX ORDER — EZETIMIBE 10 MG/1
10 TABLET ORAL DAILY
Status: DISCONTINUED | OUTPATIENT
Start: 2025-05-31 | End: 2025-06-04

## 2025-05-30 RX ORDER — DIAZEPAM 5 MG/1
10 TABLET ORAL ONCE
Status: COMPLETED | OUTPATIENT
Start: 2025-05-30 | End: 2025-05-30

## 2025-05-30 RX ORDER — BUSPIRONE HYDROCHLORIDE 5 MG/1
15 TABLET ORAL 2 TIMES DAILY
Status: DISCONTINUED | OUTPATIENT
Start: 2025-05-30 | End: 2025-06-04

## 2025-05-30 RX ORDER — ATORVASTATIN CALCIUM 40 MG/1
80 TABLET, FILM COATED ORAL DAILY
Status: DISCONTINUED | OUTPATIENT
Start: 2025-05-31 | End: 2025-06-04

## 2025-05-30 RX ORDER — DOCUSATE SODIUM 50 MG/5ML
100 LIQUID ORAL 2 TIMES DAILY
Status: DISCONTINUED | OUTPATIENT
Start: 2025-05-30 | End: 2025-06-04

## 2025-05-30 RX ORDER — LEVOTHYROXINE SODIUM 88 UG/1
88 TABLET ORAL
Status: DISCONTINUED | OUTPATIENT
Start: 2025-05-31 | End: 2025-06-04

## 2025-05-30 RX ORDER — DIATRIZOATE MEGLUMINE AND DIATRIZOATE SODIUM 660; 100 MG/ML; MG/ML
30 SOLUTION ORAL; RECTAL
Status: COMPLETED | OUTPATIENT
Start: 2025-05-30 | End: 2025-05-30

## 2025-05-30 RX ORDER — PAROXETINE 20 MG/1
20 TABLET, FILM COATED ORAL EVERY MORNING
Status: DISCONTINUED | OUTPATIENT
Start: 2025-05-31 | End: 2025-06-04

## 2025-05-30 RX ORDER — ENOXAPARIN SODIUM 100 MG/ML
40 INJECTION SUBCUTANEOUS EVERY 24 HOURS
Status: DISCONTINUED | OUTPATIENT
Start: 2025-05-30 | End: 2025-05-30

## 2025-05-30 RX ADMIN — LEVETIRACETAM 500 MG: 100 INJECTION, SOLUTION INTRAVENOUS at 08:05

## 2025-05-30 RX ADMIN — IOHEXOL 94 ML: 350 INJECTION, SOLUTION INTRAVENOUS at 01:05

## 2025-05-30 RX ADMIN — DOCUSATE SODIUM 100 MG: 100 CAPSULE, LIQUID FILLED ORAL at 08:05

## 2025-05-30 RX ADMIN — ACETAMINOPHEN 650 MG: 325 TABLET ORAL at 08:05

## 2025-05-30 RX ADMIN — ATORVASTATIN CALCIUM 80 MG: 40 TABLET, FILM COATED ORAL at 08:05

## 2025-05-30 RX ADMIN — BUTALBITAL, ACETAMINOPHEN, AND CAFFEINE 1 TABLET: 325; 50; 40 TABLET ORAL at 07:05

## 2025-05-30 RX ADMIN — SODIUM CHLORIDE, POTASSIUM CHLORIDE, SODIUM LACTATE AND CALCIUM CHLORIDE: 600; 310; 30; 20 INJECTION, SOLUTION INTRAVENOUS at 05:05

## 2025-05-30 RX ADMIN — ACETAMINOPHEN 650 MG: 325 TABLET ORAL at 02:05

## 2025-05-30 RX ADMIN — PAROXETINE HYDROCHLORIDE 20 MG: 20 TABLET, FILM COATED ORAL at 05:05

## 2025-05-30 RX ADMIN — DOCUSATE SODIUM LIQUID 100 MG: 100 LIQUID ORAL at 09:05

## 2025-05-30 RX ADMIN — BARIUM SULFATE 5 ML: 0.81 POWDER, FOR SUSPENSION ORAL at 02:05

## 2025-05-30 RX ADMIN — ONDANSETRON 4 MG: 2 INJECTION INTRAMUSCULAR; INTRAVENOUS at 11:05

## 2025-05-30 RX ADMIN — LEVOTHYROXINE SODIUM 88 MCG: 0.09 TABLET ORAL at 05:05

## 2025-05-30 RX ADMIN — LEVETIRACETAM 500 MG: 100 INJECTION, SOLUTION INTRAVENOUS at 09:05

## 2025-05-30 RX ADMIN — BUSPIRONE HYDROCHLORIDE 15 MG: 5 TABLET ORAL at 08:05

## 2025-05-30 RX ADMIN — EZETIMIBE 10 MG: 10 TABLET ORAL at 08:05

## 2025-05-30 RX ADMIN — PIPERACILLIN SODIUM AND TAZOBACTAM SODIUM 4.5 G: 4; .5 INJECTION, POWDER, LYOPHILIZED, FOR SOLUTION INTRAVENOUS at 03:05

## 2025-05-30 RX ADMIN — DIAZEPAM 10 MG: 5 TABLET ORAL at 12:05

## 2025-05-30 RX ADMIN — BUTALBITAL, ACETAMINOPHEN, AND CAFFEINE 1 TABLET: 325; 50; 40 TABLET ORAL at 02:05

## 2025-05-30 RX ADMIN — DIATRIZOATE MEGLUMINE AND DIATRIZOATE SODIUM 30 ML: 660; 100 LIQUID ORAL; RECTAL at 01:05

## 2025-05-30 RX ADMIN — NORTRIPTYLINE HYDROCHLORIDE 25 MG: 25 CAPSULE ORAL at 09:05

## 2025-05-30 RX ADMIN — BUSPIRONE HYDROCHLORIDE 15 MG: 5 TABLET ORAL at 09:05

## 2025-05-30 RX ADMIN — ACETAMINOPHEN 650 MG: 325 TABLET ORAL at 03:05

## 2025-05-30 RX ADMIN — MAGNESIUM SULFATE HEPTAHYDRATE 2 G: 40 INJECTION, SOLUTION INTRAVENOUS at 10:05

## 2025-05-30 NOTE — PT/OT/SLP PROGRESS
Physical Therapy Treatment    Patient Name:  Chelle Chandra   MRN:  [id number]    Recommendations:     Discharge therapy intensity: High Intensity Therapy   Discharge Equipment Recommendations: wheelchair  Barriers to discharge: Impaired mobility and Ongoing medical needs    Assessment:     Chelle Chandra is a 59 y.o. female admitted with a medical diagnosis of R sided weakness (especially  RUE), possible seizure, MRI brain pending. Pt with recent hx of SDH and s/p crani and MMA embolization.  She presents with the following impairments/functional limitations: weakness, impaired endurance, impaired self care skills, impaired functional mobility, impaired balance, gait instability, decreased upper extremity function, impaired sensation. Pt assisted back to bed, radiology present to take pt to MRI.    Rehab Prognosis: Good; patient would benefit from acute skilled PT services to address these deficits and reach maximum level of function.    Recent Surgery: * No surgery found *      Plan:     During this hospitalization, patient would benefit from acute PT services 6 x/week to address the identified rehab impairments via gait training, therapeutic activities, therapeutic exercises, neuromuscular re-education and progress toward the following goals:    Plan of Care Expires:  06/29/25    Subjective     Chief Complaint: none stated  Patient/Family Comments/goals: none stated  Pain/Comfort:  Pain Rating 1: 0/10      Objective:     Communicated with nursing prior to session.  Patient found up in chair with peripheral IV, PureWick, telemetry upon PT entry to room.     General Precautions: Standard, aspiration  Orthopedic Precautions: N/A  Braces: N/A  Respiratory Status: Room air  Blood Pressure: NT    Functional Mobility:  Bed Mobility:     Sit to Supine: minimum assistance  Transfers:     Sit to Stand:  minimum assistance with rolling walker  Chair to mat: minimum assistance with  rolling walker  using  Step  Transfer    Therapeutic Activities/Exercises:      Education:  Patient and daughter/s were provided with verbal education education regarding PT role/goals/POC.  Understanding was verbalized, however additional teaching warranted.     Patient left supine with all lines intact, call button in reach, RN present, and radiology present to take pt to MRI.    GOALS:   Multidisciplinary Problems       Physical Therapy Goals          Problem: Physical Therapy    Goal Priority Disciplines Outcome Interventions   Physical Therapy Goal     PT, PT/OT Progressing    Description: Goals to be met by: 2025     Patient will increase functional independence with mobility by performin. Supine to sit with Contact Guard Assistance  2. Sit to stand transfer with Contact Guard Assistance  3. Gait  x 150 feet with Contact Guard Assistance using LRAD.   4. Ascend/descend 4 stair with left Handrails Contact Guard Assistance using No Assistive Device.                          Time Tracking:     PT Received On: 25  PT Start Time: 1230     PT Stop Time: 1238  PT Total Time (min): 8 min     Billable Minutes: Therapeutic Activity 1 unit    Treatment Type: Treatment  PT/PTA: PTA     Number of PTA visits since last PT visit: 2025

## 2025-05-30 NOTE — PROGRESS NOTES
[facility] Hospital Medicine Progress Note        Chief Complaint: Inpatient Follow-up for Rt sided weakness     HPI:   60 yo female with PMHx of  DM2, left MCA stroke s/p thrombectomy in 6/2024 was on Plavix, Recent SDH following a fall while on Plavix on 5/16/25 s/p cerebral angiogram with embolization of left middle meningeal artery and on  5/19/25 s/p left frontoparietal craniotomy and evacuation of SDH. Pt was discharged home on 5/22 with home health. Today 5/29/25 Pt  presents to ED with c/o acute onset headache, possibly around midnight. Then round 0540 she developed right arm and leg weakness with right facial droop. Has not been taking Keppra at home. Seen by Stroke Neurology in the ED who does not suspect a new stroke but rather seizure activity. RLE weakness already improved however RUE  weakness and right facial droop persists.     VS on arrival: T 98.2, P 100, R 18, b/P 105/73, Sats 98% on room air. Initial labs: WBC 9.23, Hgb 10.8, platelets 527, PT 13.9, INR 1.1, Na 138, K 3.9, Cl 103, creatinine 0.68, glucose 191, TSH 0.309 and otherwise unremarkable.  CT head shows a left cerebral convexity hypodense collection without residual acute hyperdense subacute hematoma and improved post surgical pneumocephalus and otherwise no significant interval change.  CT brain perfusion shows no definite blood flow asymmetry or other findings to suggest significant acute perfusion defects.  There are scattered areas of asymmetrically decreased perfusion to the left MCA territory corresponding to area of known ischemic stroke.  CTA head shows a suspected diminished flow inferior division of the left middle cerebral artery close to the bifurcation with no other hemodynamically significant stenosis identified. Pt was loaded with IV Keppra 2 gram in the ED. Admitted to  service. Stroke Neurology was consulted. EEG neg for seizures.     New onset fever of 102 overnight on 5/29/25.  Infectious workup initiated. MRI brain on 5/30/25 with no acute infarct but showed Left MCA territory large encephalomalacia combined with gliotic changes. Infectious workup was neg however aspiration suspected on MBS study. SLP suggested strict NPO for now. NG tube ordered. Pt was started on Zosyn to cover aspiration pneumonitis.    Interval Hx:   Febrile overnight.  Remains with Rt facial droop and RUE weakness   Moving RLE better. Speech slightly dysarthric.    Hemodynamics are stable, on RA    Case was discussed with patient's nurse and  on the floor.    Objective/physical exam:  General: In no acute distress, febrile overnight, on RA  Chest: Clear to auscultation bilaterally  Heart: RRR, +S1, S2, no appreciable murmur  Abdomen: Soft, nontender, BS +  MSK: Warm, no lower extremity edema, no clubbing or cyanosis  Neurologic: Alert and oriented x3, Rt facial droop, RUE 0/5, RLE 3+/5, Left U/L ext 5/5      VITAL SIGNS: 24 HRS MIN & MAX LAST   Temp  Min: 98.2 °F (36.8 °C)  Max: 102.5 °F (39.2 °C) 98.5 °F (36.9 °C)   BP  Min: 109/71  Max: 130/74 124/83   Pulse  Min: 70  Max: 87  75   Resp  Min: 16  Max: 16 16   SpO2  Min: 94 %  Max: 96 % 95 %     I have reviewed the following labs:  Recent Labs   Lab 05/29/25  0952 05/30/25  0348   WBC 9.23 9.00   RBC 3.62* 3.64*   HGB 10.8* 10.8*   HCT 32.7* 32.9*   MCV 90.3 90.4   MCH 29.8 29.7   MCHC 33.0 32.8*   RDW 13.5 13.3   * 544*   MPV 9.5 9.8     Recent Labs   Lab 05/29/25  0952 05/30/25  0348    139   K 3.9 3.5    103   CO2 22* 23   BUN 8.6* 7.0*   CREATININE 0.68 0.61   * 142*   CALCIUM 9.2 9.2   MG  --  1.60   ALBUMIN 2.6* 2.6*   PROT 6.9 7.0   ALKPHOS 192* 176*   ALT 42 33   AST 26 22   BILITOT 0.3 0.4     Microbiology Results (last 7 days)       Procedure Component Value Units Date/Time    Blood Culture [[id number]] Collected: 05/30/25 0348    Order Status: Resulted Specimen: Blood, Venous Updated: 05/30/25 0352    Blood Culture  [[id number]] Collected: 05/30/25 0348    Order Status: Resulted Specimen: Blood, Venous Updated: 05/30/25 0352    Respiratory Culture [[id number]]     Order Status: Sent Specimen: Sputum              See below for Radiology    Assessment/Plan:  Possible Left MCA territory post stroke recrudescence, POA  Oropharyngeal Dysphagia, POA   Left MCA territory large encephalomalacia with seizure in differential, POA  Recent h/o fall and SDH s/p left MMA embolization and left frontoparietal craniotomy in 5/2025  H/O Left MCA stroke , s/p  thrombectomy 6/1/24   Normocytic anemia , mild -POA  Acute fever- 5/29/25  Suspected Aspiration pneumonitis - 5/30/35    Plan-  SLP suggested strict NPO  Place NG tube   Start meds and nutrition via NG tube   Per Stroke Neurology, hold plavix until June 1st   Continue Statin   SBP goal under 160   Continue Keppra   SLP/PT/OT  Monitor course       VTE prophylaxis:  Dr. Egan cleared for Heparin but will start after Neurosurgery eval     Patient condition:  Fair     Anticipated discharge and Disposition:     TBD    All diagnosis and differential diagnosis have been reviewed; assessment and plan has been documented; I have personally reviewed the labs and test results that are presently available; I have reviewed the patients medication list; I have reviewed the consulting providers response and recommendations. I have reviewed or attempted to review medical records based upon their availability    All of the patient's questions have been  addressed and answered. Patient's is agreeable to the above stated plan. I will continue to monitor closely and make adjustments to medical management as needed.    Portions of this note dictated using EMR integrated voice recognition software, and may be subject to voice recognition errors not corrected at proofreading. Please contact writer for clarification if needed.   _____________________________________________________________________    Malnutrition  Status:  Nutrition consulted. Most recent weight and BMI monitored-     Measurements:  Wt Readings from Last 1 Encounters:   05/29/25 46.5 kg (102 lb 8.2 oz)   Body mass index is 16.55 kg/m².    Patient has been screened and assessed by RD.    Malnutrition Type:  Context:    Level:      Malnutrition Characteristic Summary:       Interventions/Recommendations (treatment strategy):        Scheduled Med:   [START ON 5/31/2025] atorvastatin  80 mg Per NG tube Daily    busPIRone  15 mg Per NG tube BID    docusate  100 mg Per NG tube BID    [START ON 5/31/2025] ezetimibe  10 mg Per NG tube Daily    levETIRAcetam (Keppra) IV (PEDS and ADULTS)  500 mg Intravenous Q12H    [START ON 5/31/2025] levothyroxine  88 mcg Per NG tube Before breakfast    nortriptyline  25 mg Per NG tube QHS    [START ON 5/31/2025] paroxetine  20 mg Per NG tube QAM    piperacillin-tazobactam (Zosyn) IV (PEDS and ADULTS) (extended infusion is not appropriate)  4.5 g Intravenous Q8H      Continuous Infusions:   lactated ringers   Intravenous Continuous 75 mL/hr at 05/30/25 0541 New Bag at 05/30/25 0541      PRN Meds:    Current Facility-Administered Medications:     acetaminophen, 650 mg, Oral, Q6H PRN    albuterol-ipratropium, 3 mL, Nebulization, Q4H PRN    bisacodyL, 10 mg, Rectal, Daily PRN    butalbital-acetaminophen-caffeine -40 mg, 1 tablet, Oral, Q4H PRN    dextrose 50%, 12.5 g, Intravenous, PRN    glucagon (human recombinant), 1 mg, Intramuscular, PRN    hydrALAZINE, 10 mg, Intravenous, Q6H PRN    insulin aspart U-100, 0-10 Units, Subcutaneous, Q6H PRN    labetalol, 10 mg, Intravenous, Q2H PRN    lorazepam, 2 mg, Intravenous, Q4H PRN    ondansetron, 4 mg, Intravenous, Q4H PRN    prochlorperazine, 5 mg, Intravenous, Q6H PRN    sodium chloride 0.9%, 10 mL, Intravenous, PRN         Argentina Jerome MD  Department of Hospital Medicine   [facility]   05/30/2025

## 2025-05-30 NOTE — PROGRESS NOTES
[facility]  Neurology  Progress Note    Patient Name: Chelle Chandra  MRN: [id number]  Admission Date: 5/29/2025  Hospital Length of Stay: 1 days  Code Status: Full Code   Attending Provider: Argentina Jerome MD  Primary Care Physician: Jorge Silver MD   Principal Problem:Acute focal neurological deficit    HPI:   No notes on file    Overview/Hospital Course:  No notes on file        Subjective:     Interval History: Fever last night 102.5. most recent 100.5.   Recurrence RUE weakness/flaccid RUE. R facial droop this morning. Unable to cross midline to right gaze that had previously improved after presentation yesterday. MRI brain remains pending, spoke to MRI staff, patient is at top of list once MRI is available.     Current Neurological Medications: Keppra     Current Medications[1]    Review of Systems   Neurological:  Positive for weakness (RUE) and headaches. Negative for dizziness and speech difficulty.     Objective:     Vital Signs (Most Recent):  Temp: (!) 100.5 °F (38.1 °C) (05/30/25 0840)  Pulse: 87 (05/30/25 0739)  Resp: 16 (05/29/25 2358)  BP: 109/71 (05/30/25 0739)  SpO2: (!) 94 % (05/30/25 0739) Vital Signs (24h Range):  Temp:  [98.1 °F (36.7 °C)-102.5 °F (39.2 °C)] 100.5 °F (38.1 °C)  Pulse:  [77-91] 87  Resp:  [13-16] 16  SpO2:  [94 %-100 %] 94 %  BP: (107-130)/(64-80) 109/71     Weight: 46.5 kg (102 lb 8.2 oz)  Body mass index is 16.55 kg/m².     Physical Exam  Vitals and nursing note reviewed.   HENT:      Nose: Nose normal.      Mouth/Throat:      Mouth: Mucous membranes are moist.   Eyes:      General: Visual field deficit present.   Cardiovascular:      Rate and Rhythm: Normal rate.      Pulses: Normal pulses.   Abdominal:      Palpations: Abdomen is soft.   Skin:     General: Skin is warm and dry.   Neurological:      Mental Status: She is alert and oriented to person, place, and time.      Cranial Nerves: Facial asymmetry (right facial droop) present. No  dysarthria.      Sensory: Sensory deficit (RUE) present.      Motor: Weakness (RUE flaccid) present.   Psychiatric:         Mood and Affect: Mood normal.         Behavior: Behavior normal.          NEUROLOGICAL EXAMINATION:     MENTAL STATUS   Oriented to person, place, and time.     CRANIAL NERVES     CN II   Right visual field deficit: upper temporal and lower temporal quadrant(s)  Left visual field deficit: none       Significant Labs: All pertinent lab results from the past 24 hours have been reviewed.    Significant Imaging: I have reviewed all pertinent imaging results/findings within the past 24 hours.    Assessment and Plan:     Acute right-sided weakness  - presented with right arm and leg weakness and right facial drooping   - Stroke RF: HLD, June 2024 Left MCA stroke s/p thrombectomy, SDH s/p 5/19 crani and 5/16 MMA embolization.    - Intervention: Not candidate for TNK recent SDH s/p 5/19/25 crani  - Etiology: TBD    Stroke workup:  -CTh: Left cerebral convexity hypodense collection without residual acute hyperdense subacute hematoma and improved postsurgical pneumocephalus.    -CTA h/n: Suspected diminished flow inferior division of the left middle cerebral artery close to bifurcation. No other hemodynamically significant stenosis identified.   -MRI brain: Impression:  1.  No acute brain infarct.  2.  Left middle cerebral artery territory large encephalomalacia combined with gliotic changes is related to old infarct.   -ECHO: ejection fraction of 55 - 60%. Patient uncooperative for study. Prior study 6/2/24, negative bubble study  -CUS: The right internal carotid artery is patent with less than 50% stenosis.  The left internal carotid artery is patent with less than 50% stenosis.  Bilateral vertebral arteries are patent with antegrade flow.   -LDL: 53   -A1c: 7.3 (5/16/25)   -TSH: 0.309   -home medications include: Plavix 75 mg daily. Keppra 500 mg bid     NIH Stroke Scale      1a  Level of  consciousness: 0=alert; keenly responsive   1b. LOC questions:  0=Answers both tasks correctly   1c. LOC commands: 0=Answers both tasks correctly   2.  Best Gaze: 1=partial gaze palsy   3.  Visual: 1=Partial hemianopia   4. Facial Palsy: 0=Normal symmetric movement   5a.  Motor left arm: 0=No drift, limb holds 90 (or 45) degrees for full 10 seconds   5b.  Motor right arm: 3=No effort against gravity, limb falls   6a. motor left le=No drift, limb holds 90 (or 45) degrees for full 10 seconds   6b  Motor right le=No drift, limb holds 90 (or 45) degrees for full 10 seconds   7. Limb Ataxia: 1=Present in one limb   8.  Sensory: 2=Severe to total sensory loss; patient is not aware of being touched in face, arm, leg   9. Best Language:  0=No aphasia, normal   10. Dysarthria: 0=Normal   11. Extinction and Inattention: 1=Visual, tactile, auditory, spatial or personal inattention or extinction to bilateral simultaneous stimulation in one of the sensory modalities   12. Distal motor function: 0=Normal    Total:   9     Assessment:  - No acute infracts   - Left middle cerebral artery territory large encephalomalacia combined with gliotic changes is related to old infarct.   - waxing and waning RSW   - seizure in differential     Plan:  - continue to hold plavix until , unless otherwise directed by MD  - -180  - PT/OT/speech therapy  - Keppra 500 mg bid    - Other recommendations may follow from MD          VTE Risk Mitigation (From admission, onward)           Ordered     Reason for No Pharmacological VTE Prophylaxis  Once        Question:  Reasons:  Answer:  Risk of Bleeding    25 1157     IP VTE HIGH RISK PATIENT  Once         25 1157     Place sequential compression device  Until discontinued         25 1157                    Tayler Blanca NP  Neurology  [facility]         [1]   Current Facility-Administered Medications   Medication Dose Route  Frequency Provider Last Rate Last Admin    acetaminophen tablet 650 mg  650 mg Oral Q6H PRN Nissa Gaston FNP   650 mg at 05/30/25 0840    albuterol-ipratropium 2.5 mg-0.5 mg/3 mL nebulizer solution 3 mL  3 mL Nebulization Q4H PRN Nissa Gaston FNP        atorvastatin tablet 80 mg  80 mg Oral Daily Argentina Jerome MD   80 mg at 05/30/25 0840    bisacodyL suppository 10 mg  10 mg Rectal Daily PRN Nissa Gaston FNP        busPIRone tablet 15 mg  15 mg Oral BID Argentina Jerome MD   15 mg at 05/30/25 0840    butalbital-acetaminophen-caffeine -40 mg per tablet 1 tablet  1 tablet Oral Q4H PRN Argentina Jerome MD   1 tablet at 05/30/25 0235    dextrose 50% injection 12.5 g  12.5 g Intravenous PRN Nissa Gaston FNP        diazePAM tablet 10 mg  10 mg Oral Once Argentina Jerome MD        docusate sodium capsule 100 mg  100 mg Oral BID Argentina Jerome MD   100 mg at 05/30/25 0840    ezetimibe tablet 10 mg  10 mg Oral Daily Argentina Jerome MD   10 mg at 05/30/25 0841    glucagon (human recombinant) injection 1 mg  1 mg Intramuscular PRN Nissa Gaston, LUIS        hydrALAZINE injection 10 mg  10 mg Intravenous Q6H PRN Nissa Gaston FNP        insulin aspart U-100 injection 0-10 Units  0-10 Units Subcutaneous Q6H PRN Nissa Gaston, LUIS        labetaloL injection 10 mg  10 mg Intravenous Q2H PRN Nissa Gaston, FNP        lactated ringers infusion   Intravenous Continuous Argentina Jerome MD 75 mL/hr at 05/30/25 0541 New Bag at 05/30/25 0541    levETIRAcetam (Keppra) 500 mg in D5W 100 mL IVPB (MB+)  500 mg Intravenous Q12H Argentina Jerome MD   Stopped at 05/30/25 0923    levothyroxine tablet 88 mcg  88 mcg Oral Before breakfast Argentina Jerome MD   88 mcg at 05/30/25 0541    LORazepam injection 2 mg  2 mg Intravenous Q4H PRN Nissa Gaston, FNP        nortriptyline capsule 25 mg  25 mg Oral QHS Argentina Jerome MD   25 mg at 05/29/25 2109    ondansetron injection 4 mg  4 mg  Intravenous Q4H PRN Nissa Gaston, BOZENAP        paroxetine tablet 20 mg  20 mg Oral Argentina Overton MD   20 mg at 05/30/25 0540    prochlorperazine injection Soln 5 mg  5 mg Intravenous Q6H PRN Nissa Gaston, BOZENAP        sodium chloride 0.9% flush 10 mL  10 mL Intravenous PRN Nissa Gaston FNP

## 2025-05-30 NOTE — ASSESSMENT & PLAN NOTE
- presented with right arm and leg weakness and right facial drooping   - Stroke RF: HLD, 2024 Left MCA stroke s/p thrombectomy, SDH s/p  crani and  MMA embolization.    - Intervention: Not candidate for TNK recent SDH s/p 25 crani  - Etiology: TBD    Stroke workup:  -CTh: Left cerebral convexity hypodense collection without residual acute hyperdense subacute hematoma and improved postsurgical pneumocephalus.    -CTA h/n: Suspected diminished flow inferior division of the left middle cerebral artery close to bifurcation. No other hemodynamically significant stenosis identified.   -MRI brain: Impression:  1.  No acute brain infarct.  2.  Left middle cerebral artery territory large encephalomalacia combined with gliotic changes is related to old infarct.   -ECHO: ejection fraction of 55 - 60%. Patient uncooperative for study. Prior study 24, negative bubble study  -CUS: The right internal carotid artery is patent with less than 50% stenosis.  The left internal carotid artery is patent with less than 50% stenosis.  Bilateral vertebral arteries are patent with antegrade flow.   -LDL: 53   -A1c: 7.3 (25)   -TSH: 0.309   -home medications include: Plavix 75 mg daily. Keppra 500 mg bid     NIH Stroke Scale      1a  Level of consciousness: 0=alert; keenly responsive   1b. LOC questions:  0=Answers both tasks correctly   1c. LOC commands: 0=Answers both tasks correctly   2.  Best Gaze: 1=partial gaze palsy   3.  Visual: 1=Partial hemianopia   4. Facial Palsy: 0=Normal symmetric movement   5a.  Motor left arm: 0=No drift, limb holds 90 (or 45) degrees for full 10 seconds   5b.  Motor right arm: 3=No effort against gravity, limb falls   6a. motor left le=No drift, limb holds 90 (or 45) degrees for full 10 seconds   6b  Motor right le=No drift, limb holds 90 (or 45) degrees for full 10 seconds   7. Limb Ataxia: 1=Present in one limb   8.  Sensory: 2=Severe to total sensory loss; patient is  not aware of being touched in face, arm, leg   9. Best Language:  0=No aphasia, normal   10. Dysarthria: 0=Normal   11. Extinction and Inattention: 1=Visual, tactile, auditory, spatial or personal inattention or extinction to bilateral simultaneous stimulation in one of the sensory modalities   12. Distal motor function: 0=Normal    Total:   9     Assessment:  - No acute infracts   - Left middle cerebral artery territory large encephalomalacia combined with gliotic changes is related to old infarct.   - waxing and waning RSW   - seizure in differential     Plan:  - continue to hold plavix until June 1st, unless otherwise directed by MD  - -180  - PT/OT/speech therapy  - Keppra 500 mg bid    - Other recommendations may follow from MD

## 2025-05-30 NOTE — SUBJECTIVE & OBJECTIVE
Subjective:     Interval History: Fever last night 102.5. most recent 100.5.   Recurrence RUE weakness/flaccid RUE. R facial droop this morning. Unable to cross midline to right gaze that had previously improved after presentation yesterday. MRI brain remains pending, spoke to MRI staff, patient is at top of list once MRI is available.     Current Neurological Medications: Keppra     Current Medications[1]    Review of Systems   Neurological:  Positive for weakness (RUE) and headaches. Negative for dizziness and speech difficulty.     Objective:     Vital Signs (Most Recent):  Temp: (!) 100.5 °F (38.1 °C) (05/30/25 0840)  Pulse: 87 (05/30/25 0739)  Resp: 16 (05/29/25 2358)  BP: 109/71 (05/30/25 0739)  SpO2: (!) 94 % (05/30/25 0739) Vital Signs (24h Range):  Temp:  [98.1 °F (36.7 °C)-102.5 °F (39.2 °C)] 100.5 °F (38.1 °C)  Pulse:  [77-91] 87  Resp:  [13-16] 16  SpO2:  [94 %-100 %] 94 %  BP: (107-130)/(64-80) 109/71     Weight: 46.5 kg (102 lb 8.2 oz)  Body mass index is 16.55 kg/m².     Physical Exam  Vitals and nursing note reviewed.   HENT:      Nose: Nose normal.      Mouth/Throat:      Mouth: Mucous membranes are moist.   Eyes:      General: Visual field deficit present.   Cardiovascular:      Rate and Rhythm: Normal rate.      Pulses: Normal pulses.   Abdominal:      Palpations: Abdomen is soft.   Skin:     General: Skin is warm and dry.   Neurological:      Mental Status: She is alert and oriented to person, place, and time.      Cranial Nerves: Facial asymmetry (right facial droop) present. No dysarthria.      Sensory: Sensory deficit (RUE) present.      Motor: Weakness (RUE flaccid) present.   Psychiatric:         Mood and Affect: Mood normal.         Behavior: Behavior normal.          NEUROLOGICAL EXAMINATION:     MENTAL STATUS   Oriented to person, place, and time.     CRANIAL NERVES     CN II   Right visual field deficit: upper temporal and lower temporal quadrant(s)  Left visual field deficit: none        Significant Labs: All pertinent lab results from the past 24 hours have been reviewed.    Significant Imaging: I have reviewed all pertinent imaging results/findings within the past 24 hours.       [1]   Current Facility-Administered Medications   Medication Dose Route Frequency Provider Last Rate Last Admin    acetaminophen tablet 650 mg  650 mg Oral Q6H PRN Nissa Gaston FNP   650 mg at 05/30/25 0840    albuterol-ipratropium 2.5 mg-0.5 mg/3 mL nebulizer solution 3 mL  3 mL Nebulization Q4H PRN Nissa Gaston FNP        atorvastatin tablet 80 mg  80 mg Oral Daily Argentina Jerome MD   80 mg at 05/30/25 0840    bisacodyL suppository 10 mg  10 mg Rectal Daily PRN Nissa Gaston FNP        busPIRone tablet 15 mg  15 mg Oral BID Argentina Jerome MD   15 mg at 05/30/25 0840    butalbital-acetaminophen-caffeine -40 mg per tablet 1 tablet  1 tablet Oral Q4H PRN Argentina Jerome MD   1 tablet at 05/30/25 0235    dextrose 50% injection 12.5 g  12.5 g Intravenous PRN Nissa Gaston FNP        diazePAM tablet 10 mg  10 mg Oral Once Argentina Jerome MD        docusate sodium capsule 100 mg  100 mg Oral BID Argentina Jerome MD   100 mg at 05/30/25 0840    ezetimibe tablet 10 mg  10 mg Oral Daily Argentina Jerome MD   10 mg at 05/30/25 0841    glucagon (human recombinant) injection 1 mg  1 mg Intramuscular PRN Nissa Gaston FNP        hydrALAZINE injection 10 mg  10 mg Intravenous Q6H PRN Nissa Gaston FNP        insulin aspart U-100 injection 0-10 Units  0-10 Units Subcutaneous Q6H PRN Nissa Gaston FNP        labetaloL injection 10 mg  10 mg Intravenous Q2H PRN Nissa Gaston FNP        lactated ringers infusion   Intravenous Continuous Argentina Jerome MD 75 mL/hr at 05/30/25 0541 New Bag at 05/30/25 0541    levETIRAcetam (Keppra) 500 mg in D5W 100 mL IVPB (MB+)  500 mg Intravenous Q12H Argentina Jerome MD   Stopped at 05/30/25 0923    levothyroxine tablet 88 mcg  88 mcg Oral  Before breakfast Argentina Jerome MD   88 mcg at 05/30/25 0541    LORazepam injection 2 mg  2 mg Intravenous Q4H PRN Nissa Gaston FNP        nortriptyline capsule 25 mg  25 mg Oral QHS Argentina Jerome MD   25 mg at 05/29/25 2103    ondansetron injection 4 mg  4 mg Intravenous Q4H PRN Nissa Gaston, LUIS        paroxetine tablet 20 mg  20 mg Oral QAM Argentina Jerome MD   20 mg at 05/30/25 0540    prochlorperazine injection Soln 5 mg  5 mg Intravenous Q6H PRN Nissa Gaston, BOZENAP        sodium chloride 0.9% flush 10 mL  10 mL Intravenous PRN Nissa Gaston FNP

## 2025-05-30 NOTE — NURSING
Order received for CT Abdomen with contrast, clarification obtained due to patient NPO except medication status. Bedside swallow evaluation completed but MBS not completed at this time. Patient did pass Rosenbaum in ED prior to admission to stroke unit.

## 2025-05-30 NOTE — PROCEDURES
[facility]  Speech Language Pathology Department  Modified Barium Swallow (MBS) Study    Patient Name:  Chelle Chandra   MRN:  [id number]    Recommendations     General recommendations:  dysphagia therapy  Repeat MBS study: 5-7 days  Solid texture recommendation:  NPO  Liquid consistency recommendation: NPO   Medications: NPO  General precautions: aspiration    History     Chelle Chandra is a/n 59 y.o. female admitted with R sided weakness, slurred speech, and R facial droop. Patient with recent admission SDH s/p crani and MMA embolization- ST not consulted during that admission. Pt passed Rosenbaum in ED.     Hx of dysphagia requiring thickened liquids with 3 MBSs over 1.5 months in 2024      Past Medical History:   Diagnosis Date    Accelerated junctional rhythm     Agatston CAC score, <100     Anxiety disorder, unspecified     Asthma     COPD type A     Diabetes mellitus without complication     GERD (gastroesophageal reflux disease)     History of COVID-19     Insomnia     Lung nodule      Past Surgical History:   Procedure Laterality Date    ANGIOGRAM, CORONARY, WITH LEFT HEART CATHETERIZATION      BACK SURGERY      BREAST LUMPECTOMY Right     CARDIOVERSION  08/01/2013    CARPAL TUNNEL RELEASE  10/23/2013    CRANIOTOMY FOR EVACUATION OF SUBDURAL HEMATOMA Left 05/19/2025    Procedure: CRANIOTOMY, FOR SUBDURAL HEMATOMA EVACUATION;  Surgeon: Ricardo Shelley MD;  Location: [facility];  Service: Neurosurgery;  Laterality: Left;    FUSION OF CERVICAL SPINE BY ANTERIOR APPROACH USING COMPUTER-ASSISTED NAVIGATION  06/10/2019    KIDNEY SURGERY      TONSILLECTOMY AND ADENOIDECTOMY      TOTAL ABDOMINAL HYSTERECTOMY      WRIST SURGERY       A MBS Study was completed to assess the efficiency of her swallow function, rule out aspiration and make recommendations regarding safe dietary consistencies, effective compensatory strategies, and safe eating environment.     Home diet  texture/consistency: Regular and thin liquids ( However daughter reports patient with poor appetite for solids or liquids over the past few weeks)    Current Method of Nutrition: NPO    Patient complaint: none stated    Imaging   Results for orders placed during the hospital encounter of 05/29/25    X-Ray Chest 1 View    Narrative  EXAMINATION:  XR CHEST 1 VIEW    CLINICAL HISTORY:  aspiration suspected;    TECHNIQUE:  Single frontal view of the chest was performed.    COMPARISON:  05/30/2025    FINDINGS:  LINES AND TUBES: None    MEDIASTINUM AND RUTH: The cardiac silhouette is normal.    LUNGS: No lobar consolidation. No edema.    PLEURA:No pleural effusion. No pneumothorax.    OTHER: Postop ACDF.    Impression  No acute cardiopulmonary abnormality.      Electronically signed by: Kristyn Guzman  Date:    05/30/2025  Time:    14:33    No results found for this or any previous visit.    Results for orders placed during the hospital encounter of 07/01/24    MRI Brain Without Contrast    Narrative  EXAMINATION:  MRI BRAIN WITHOUT CONTRAST    CLINICAL HISTORY:  dizziness, off-balance, r/o cva, recent history of cva 1 month ago;    TECHNIQUE:  Multiplanar MRI sequences were performed of the brain without contrast.    COMPARISON:  MRI brain June 2, 2024    FINDINGS:  There are extensive left middle cerebral artery territory frontoparietal lobes and temporooccipital lobes as well as basal ganglia subacute nonhemorrhagic infarcts.  Infarct shows less dense brightness on diffusion-weighted sequence and now is not associated with signal dropout on the ADC map.  Brain edematous changes and sulci effacement is slightly less evident.  There is local mass effect without midline shift.  No hydrocephalus.  There is no new infratentorial or supratentorial brain infarct.  Gradient echo sequence are without altered signal to reflect a previous hemorrhagic byproduct.  Sella and the suprasellar areas are unremarkable.    The  cerebellar tonsils are normally positioned.  No acute extra axial fluid collections identified. The mastoid air cells are clear.    Impression  1.  Left cerebral hemisphere extensive subacute infarct without hemorrhagic transformation.    2.  No new findings.      Electronically signed by: Adam Sears  Date:    07/02/2024  Time:    11:36    Subjective     Patient increased lethargy compared to yesterday. SLP with verbal stimulation for patient to remain awake at the beginning of the MBS, however became more alert during.    Spiritual/Cultural/Holiness Beliefs/Practices that affect care: no  Pain/Comfort: Pain Rating 1: 0/10    Respiratory Status:  room air    Restraints/positioning devices: none    Nursing notified of NPO status prior to MBS completion, however patient received oral contrast for CT scan prior to MBS completion. After MBS, notified nurse, charge nurse, and physician of recommendations of strict NPO. SOS completed.    Fluoroscopic Findings     Oral Musculature  Dentition: own teeth  Facial Movement: reduced right  Vocal Quality: slurred  Volitional Cough: Productive    Setup  Upright in bed  Adequate head control    Visualization  Lateral view  Cervical hardware noted    Oral Phase:   Loss of bolus control to pyriform sinuses    Pharyngeal Phase:   Reduced base of tongue retraction  Reduced hyolaryngeal excursion  Poor airway protection  Absent epiglottic inversion    Consistency Fed by Laryngeal Penetration Aspiration Residue   Mildly thick liquid by spoon SLP Before the swallow  To the vocal folds SILENT  During the swallow Mild-moderate   Puree SLP During the swallow  Cleared spontaneously None Mild   Moderately thick liquid by spoon SLP During the swallow None Mild-moderate   Moderately thick liquid by cup Self After the swallow of pyriform sinus residue  Throat clear None Mild-moderate     Cervical Esophageal Phase:   UES appeared to accommodate all bolus types without stasis or retrograde  movement visualized    Assessment     Patient exhibited moderate oropharyngeal dysphagia characterized by the findings noted above.  SILENT aspiration of mildly thick liquids. Laryngeal penetration of puree and moderately thick.  Both swallow safety and efficiency are impaired.     Patient appears to be at high risk for aspiration related pneumonia when considering intake of liquids piror to MBS..  Prognosis for behavioral swallow rehabilitation is good.    Results and recommendations reported to nursing, family, and MD. Family with wet vocal quality at bedside during education with family. Oral suction placed in bed with patient.    Outcome Measures     Functional Oral Intake Scale: 1 - Nothing by mouth    Education     Patient and family were provided with verbal education regarding results/recommendations.  Understanding was verbalized.    Plan     SLP Follow-Up:  Yes    Patient to be seen:  daily   Plan of Care expires:  06/06/25  Plan of Care reviewed with:  patient, family     Time Tracking     SLP Treatment Date:   05/30/25  Speech Start Time:  1345  Speech Stop Time:  1415     Speech Total Time (min):  30 min    Billable minutes:   Motion Fluoroscopic Evaluation, Video Recording, 15 minutes   Caregiver education, 15 minutes    05/30/2025

## 2025-05-30 NOTE — PLAN OF CARE
Problem: Occupational Therapy  Goal: Occupational Therapy Goal  Description: Goals to be met by 6/30/25    Pt will complete grooming standing at sink with LRAD with SBA.  Pt will complete UB dressing with SBA.  Pt will complete LB dressing with SBA using LRAD.  Pt will complete toileting with SBA using LRAD.  Pt will complete functional mobility to/from toilet and toilet transfer with SBA using LRAD.   Pt will demo increased strength in R UE to 3/5 MMT for increased functional use during ADL tasks.   Outcome: Progressing

## 2025-05-30 NOTE — PROGRESS NOTES
[facility]                       [address] [address]    PATIENT NAME:       KAREY MILTON  YOB: 1965  CSN:                [id number]   MRN:                [id number]  ADMIT DATE:         2025 09:03:00  PHYSICIAN:          Mariano Cline MD                           TESTING    DATE OF SERVICE:  2025 11:29:34    STUDY PERFORMED:  EEG.    INDICATIONS:  Study was done for seizure workup.    DESCRIPTION:  This electroencephalogram was done using 10/20 systems, using 21   channels.  The background activity is consistent of 8.5 hertz of moderate   amplitude.  Stage 2 sleep was noted with sleep spindles.  Photic stimulation   elicited normal responses.  There is no clear evidence of any epileptiform   performed discharges.    CONCLUSION:  This is a normal awake, drowsy, and sleep EEG.  No evidence of any   epileptiform discharges.  Clinical correlation suggested.        ______________________________  MD EMILY Guerin/FRANCESCO  DD:  2025  Time:  12:24AM  DT:  2025  Time:  01:02AM  Job #:  [id number]       TESTING

## 2025-05-30 NOTE — ASSESSMENT & PLAN NOTE
- presented with right arm and leg weakness and right facial drooping   - Stroke RF: HLD, 2024 Left MCA stroke s/p thrombectomy, SDH s/p  crani and  MMA embolization.    - Intervention: Not candidate for TNK recent SDH s/p 25 crani  - Etiology: TBD    Stroke workup:  -CTh: Left cerebral convexity hypodense collection without residual acute hyperdense subacute hematoma and improved postsurgical pneumocephalus.    -CTA h/n: Suspected diminished flow inferior division of the left middle cerebral artery close to bifurcation. No other hemodynamically significant stenosis identified.   -MRI brain: ordered    -ECHO: ordered   -CUS: The right internal carotid artery is patent with less than 50% stenosis.  The left internal carotid artery is patent with less than 50% stenosis.  Bilateral vertebral arteries are patent with antegrade flow.   -LDL: 53   -A1c: 7.3 (25)   -TSH: 0.309   -home medications include: Plavix 75 mg daily. Keppra 500 mg bid     NIH Stroke Scale      1a  Level of consciousness: 0=alert; keenly responsive   1b. LOC questions:  0=Answers both tasks correctly   1c. LOC commands: 0=Answers both tasks correctly   2.  Best Gaze: 1=partial gaze palsy   3.  Visual: 1=Partial hemianopia   4. Facial Palsy: 0=Normal symmetric movement   5a.  Motor left arm: 0=No drift, limb holds 90 (or 45) degrees for full 10 seconds   5b.  Motor right arm: 3=No effort against gravity, limb falls   6a. motor left le=No drift, limb holds 90 (or 45) degrees for full 10 seconds   6b  Motor right le=No drift, limb holds 90 (or 45) degrees for full 10 seconds   7. Limb Ataxia: 1=Present in one limb   8.  Sensory: 2=Severe to total sensory loss; patient is not aware of being touched in face, arm, leg   9. Best Language:  0=No aphasia, normal   10. Dysarthria: 0=Normal   11. Extinction and Inattention: 1=Visual, tactile, auditory, spatial or personal inattention or extinction to bilateral simultaneous  stimulation in one of the sensory modalities   12. Distal motor function: 0=Normal    Total:   9         Plan:  -continue stroke workup   - continue to hold plavix until June 1st, unless otherwise directed by MD  - -180  - MRI brain to rule out acute infarct  - PT/OT/speech therapy  - Keppra 500 mg bid    - Other recommendations may follow from MD

## 2025-05-30 NOTE — PLAN OF CARE
Problem: Adult Inpatient Plan of Care  Goal: Plan of Care Review  Outcome: Progressing  Goal: Patient-Specific Goal (Individualized)  Outcome: Progressing  Goal: Absence of Hospital-Acquired Illness or Injury  Outcome: Progressing  Goal: Optimal Comfort and Wellbeing  Outcome: Progressing  Goal: Readiness for Transition of Care  Outcome: Progressing     Problem: Infection  Goal: Absence of Infection Signs and Symptoms  Outcome: Progressing     Problem: Stroke, Ischemic (Includes Transient Ischemic Attack)  Goal: Optimal Coping  Outcome: Progressing  Goal: Effective Bowel Elimination  Outcome: Progressing  Goal: Optimal Cerebral Tissue Perfusion  Outcome: Progressing  Goal: Optimal Cognitive Function  Outcome: Progressing  Goal: Improved Communication Skills  Outcome: Progressing  Goal: Optimal Functional Ability  Outcome: Progressing  Goal: Optimal Nutrition Intake  Outcome: Progressing  Goal: Effective Oxygenation and Ventilation  Outcome: Progressing  Goal: Improved Sensorimotor Function  Outcome: Progressing  Goal: Safe and Effective Swallow  Outcome: Progressing  Goal: Effective Urinary Elimination  Outcome: Progressing     Problem: Wound  Goal: Optimal Coping  Outcome: Progressing  Goal: Optimal Functional Ability  Outcome: Progressing  Goal: Absence of Infection Signs and Symptoms  Outcome: Progressing  Goal: Improved Oral Intake  Outcome: Progressing  Goal: Optimal Pain Control and Function  Outcome: Progressing  Goal: Skin Health and Integrity  Outcome: Progressing  Goal: Optimal Wound Healing  Outcome: Progressing

## 2025-05-30 NOTE — PT/OT/SLP EVAL
"Occupational Therapy  Evaluation    Name: Chelle Chandra  MRN: [id number]  Recent Surgery: * No surgery found *      Recommendations:     Discharge therapy intensity: High Intensity Therapy   Discharge Equipment Recommendations:  none  Barriers to discharge:  None    Assessment:     Chelle Chandra is a 59 y.o. female with a medical diagnosis of R sided weakness and facial droop due to L SDH s/p craniotomy and MMA embolization, MRI neg for acute infarct. CT showing mild worsening of left chronic SDH. EEG neg for seizures. She presents with the following performance deficits affecting function: weakness, impaired self care skills, impaired functional mobility, impaired balance, decreased upper extremity function, decreased safety awareness, pain, abnormal tone, decreased lower extremity function, impaired fine motor.     Pt tolerated OT eval well. Pt requires min A for supine to sit, min A for bed to chair t/f using RW and step transfer, and min A for LB dressing. Pt alertness diminished during session, kept reporting needing a nap. Required max verbal cues to maintain sitting balance during EOB assessments. Pt daughter reports before pt's craniotomy she was independent in all ADLs and mobilizing with a rollator. However for the last week before hospital admission she has been min A with ADLs. Recommending high intensity therapy upon d/c to increase independence in ADLs and return to PLOF.     Rehab Prognosis: Good; patient would benefit from acute skilled OT services to address these deficits and reach maximum level of function.       Plan:     Patient to be seen 6 x/week to address the above listed problems via self-care/home management, therapeutic activities, therapeutic exercises, neuromuscular re-education, sensory integration  Plan of Care Expires: 06/30/25  Plan of Care Reviewed with: patient, daughter    Subjective     Chief Complaint: "I want to take a nap"  Patient/Family Comments/goals: regain RUE to " return to PLOF     Occupational Profile:  Living Environment: lives with  in a SLH with 4 steps to enter and handrail on R side going up. Tub shower with shower chair. Pt also reports owning a rollator, RW, grabs bars on shower and hallway, and BP machine.   Previous level of function: prior to pt leaving hospital s/p craniotomy, daughter reports pt was completely independent with ADLs and using rollator for functional mobility. However daughter reports that pt is min A for ADLs for the last week after leaving hospital.   Roles and Routines: mother  Equipment Used at Home: blood pressure machine, rollator, shower chair, grab bar  Assistance upon Discharge: assistance from  or daughter    Pain/Comfort:  Pain Rating 1: 0/10  Pain Rating Post-Intervention 1: 0/10    Patients cultural, spiritual, [employer] conflicts given the current situation: no    Objective:     OT communicated with nurse prior to session.      Patient was found HOB elevated with peripheral IV, PureWick upon OT entry to room.    General Precautions: Standard, fall, seizure (-180)  Orthopedic Precautions:    Braces: N/A    Vital Signs: Blood Pressure: start of session BP at 120/75      Functional Mobility/Transfers:  Bed mobility:    Supine to Sit: minimum assistance  Transfers: Bed to Chair: minimum assistance with hand-held assist using Step Transfer  Functional Mobility: pt performed bed to chair t/f with min A  and HHA    Activities of Daily Living:  Lower Body Dressing: minimum assistance for donning slipper socks. PT able to don sock on L foot using figure 4 position, required assistance to hook sock on R foot using figure 4 position.      AMPAC 6 Click ADL:  AMPAC Total Score: 20    Functional Cognition:  Affect: Lethargic and difficulty to follow commands requiring repetition and tactile cues to remain alert.  Orientation: oriented to Person, Place, and Time    Visual Perceptual Skills:  Visual Attention: Impaired. Pt  "would not demonstrate attn to mvmt in RUE. Would keep eyes closed or look ahead at wall and ask "Is it doing anything?"      Upper Extremity Function:  Right Upper Extremity:   Range of Motion: Impaired. Minimal AROM noted, slight thumb adduction and 2nd digit contraction but unable to achieve composite hand flexion. PROM to about 90 degrees /c pain in anterior shoulder area. Scapular gliding   Strength: Impaired. 2/5  Sensation: Impaired. Pt able to feel light touch at shoulder. Pt unable to feel light touch from bicep to fingertips.    Left Upper Extremity:  Range of Motion: WFL  Strength: WFL  Sensation: WFL    Balance:   Static sitting balance: Impaired. Pt appeared drowsy, required max cues to sit up straight and look forward to achieve neutral sitting posture during EOB testing.       Therapeutic Positioning  Risk for acquired pressure injuries is decreased due to ability to get to BSC/toilet with assist.    OT interventions performed during the course of today's session:   Education was provided on benefits of and recommendations for therapeutic positioning    Skin assessment: all visible skin intact.     OT recommendations for therapeutic positioning throughout hospitalization:   Follow [facility] Pressure Injury Prevention Protocol    Patient Education:  Patient and daughter/s were provided with verbal education education regarding OT role/goals/POC, fall prevention, and safety awareness.  Understanding was verbalized.     Patient left up in chair with call button in reach and family  present.    GOALS:   Multidisciplinary Problems       Occupational Therapy Goals          Problem: Occupational Therapy    Goal Priority Disciplines Outcome Interventions   Occupational Therapy Goal     OT, PT/OT Progressing    Description: Goals to be met by 6/30/25    Pt will complete grooming standing at sink with LRAD with SBA.  Pt will complete UB dressing with SBA.  Pt will complete LB dressing with SBA using LRAD.  Pt will " complete toileting with SBA using LRAD.  Pt will complete functional mobility to/from toilet and toilet transfer with SBA using LRAD.   Pt will demo increased strength in R UE to 3/5 MMT for increased functional use during ADL tasks.                        History:     Past Medical History:   Diagnosis Date    Accelerated junctional rhythm     Agatston CAC score, <100     Anxiety disorder, unspecified     Asthma     COPD type A     Diabetes mellitus without complication     GERD (gastroesophageal reflux disease)     History of COVID-19     Insomnia     Lung nodule          Past Surgical History:   Procedure Laterality Date    ANGIOGRAM, CORONARY, WITH LEFT HEART CATHETERIZATION      BACK SURGERY      BREAST LUMPECTOMY Right     CARDIOVERSION  08/01/2013    CARPAL TUNNEL RELEASE  10/23/2013    CRANIOTOMY FOR EVACUATION OF SUBDURAL HEMATOMA Left 05/19/2025    Procedure: CRANIOTOMY, FOR SUBDURAL HEMATOMA EVACUATION;  Surgeon: Ricardo Shelley MD;  Location: [facility];  Service: Neurosurgery;  Laterality: Left;    FUSION OF CERVICAL SPINE BY ANTERIOR APPROACH USING COMPUTER-ASSISTED NAVIGATION  06/10/2019    KIDNEY SURGERY      TONSILLECTOMY AND ADENOIDECTOMY      TOTAL ABDOMINAL HYSTERECTOMY      WRIST SURGERY         Time Tracking:     OT Date of Treatment:    OT Start Time: 0957  OT Stop Time: 1033  OT Total Time (min): 36 min    Billable Minutes:Evaluation moderate    5/30/2025

## 2025-05-31 PROBLEM — E43 SEVERE MALNUTRITION: Status: ACTIVE | Noted: 2025-05-31

## 2025-05-31 LAB
OHS QRS DURATION: 80 MS
OHS QTC CALCULATION: 460 MS
POC PTINR: 1 (ref 0.9–1.2)
POCT GLUCOSE: 147 MG/DL (ref 70–110)
POCT GLUCOSE: 166 MG/DL (ref 70–110)
SAMPLE: NORMAL

## 2025-05-31 PROCEDURE — 25000003 PHARM REV CODE 250: Performed by: NURSE PRACTITIONER

## 2025-05-31 PROCEDURE — 25000003 PHARM REV CODE 250: Performed by: INTERNAL MEDICINE

## 2025-05-31 PROCEDURE — 21400001 HC TELEMETRY ROOM

## 2025-05-31 PROCEDURE — 63600175 PHARM REV CODE 636 W HCPCS: Performed by: INTERNAL MEDICINE

## 2025-05-31 RX ADMIN — PIPERACILLIN SODIUM AND TAZOBACTAM SODIUM 4.5 G: 4; .5 INJECTION, POWDER, LYOPHILIZED, FOR SOLUTION INTRAVENOUS at 06:05

## 2025-05-31 RX ADMIN — NORTRIPTYLINE HYDROCHLORIDE 25 MG: 25 CAPSULE ORAL at 09:05

## 2025-05-31 RX ADMIN — BUTALBITAL, ACETAMINOPHEN, AND CAFFEINE 1 TABLET: 325; 50; 40 TABLET ORAL at 10:05

## 2025-05-31 RX ADMIN — EZETIMIBE 10 MG: 10 TABLET ORAL at 10:05

## 2025-05-31 RX ADMIN — PIPERACILLIN SODIUM AND TAZOBACTAM SODIUM 4.5 G: 4; .5 INJECTION, POWDER, LYOPHILIZED, FOR SOLUTION INTRAVENOUS at 11:05

## 2025-05-31 RX ADMIN — DOCUSATE SODIUM LIQUID 100 MG: 100 LIQUID ORAL at 09:05

## 2025-05-31 RX ADMIN — PAROXETINE HYDROCHLORIDE 20 MG: 20 TABLET, FILM COATED ORAL at 06:05

## 2025-05-31 RX ADMIN — BUSPIRONE HYDROCHLORIDE 15 MG: 5 TABLET ORAL at 09:05

## 2025-05-31 RX ADMIN — LEVETIRACETAM 500 MG: 100 INJECTION, SOLUTION INTRAVENOUS at 10:05

## 2025-05-31 RX ADMIN — PIPERACILLIN SODIUM AND TAZOBACTAM SODIUM 4.5 G: 4; .5 INJECTION, POWDER, LYOPHILIZED, FOR SOLUTION INTRAVENOUS at 01:05

## 2025-05-31 RX ADMIN — BISACODYL 10 MG: 10 SUPPOSITORY RECTAL at 04:05

## 2025-05-31 RX ADMIN — ATORVASTATIN CALCIUM 80 MG: 40 TABLET, FILM COATED ORAL at 10:05

## 2025-05-31 RX ADMIN — BUTALBITAL, ACETAMINOPHEN, AND CAFFEINE 1 TABLET: 325; 50; 40 TABLET ORAL at 04:05

## 2025-05-31 RX ADMIN — DOCUSATE SODIUM LIQUID 100 MG: 100 LIQUID ORAL at 11:05

## 2025-05-31 RX ADMIN — BUTALBITAL, ACETAMINOPHEN, AND CAFFEINE 1 TABLET: 325; 50; 40 TABLET ORAL at 02:05

## 2025-05-31 RX ADMIN — BUSPIRONE HYDROCHLORIDE 15 MG: 5 TABLET ORAL at 10:05

## 2025-05-31 RX ADMIN — LEVOTHYROXINE SODIUM 88 MCG: 0.09 TABLET ORAL at 06:05

## 2025-05-31 NOTE — PT/OT/SLP PROGRESS
Attempted to see pt for PT tx. Per nurse pt with neuro change and awaiting sx Monday. PT to f/u as appropriate.

## 2025-05-31 NOTE — CONSULTS
Inpatient consult to Neurosurgery  Consult performed by: Tg Graham AGACNP-BC  Consult ordered by: Argentina Jerome MD          Duplicate please see note

## 2025-05-31 NOTE — CONSULTS
[facility]                       [address] [address]    PATIENT NAME:       KAREY MILTON  YOB: 1965  CSN:                [id number]   MRN:                [id number]  ADMIT DATE:         05/29/2025 09:03:00  PHYSICIAN:          James Rankin MD                            CONSULTATION    DATE OF CONSULT:      Thank you, Dr. Jerome, for letting me see Ms. Karey Milton, who is a lady, who   is a well-known patient of mine.  I had done neck surgery twice.  She also had   recent subdural by Dr. Mendiola, who now presents with weakness in the right arm,   more progressive, more than it was before.  She had previous stroke as well.    She was seen by a neurologist, who recommended possibly another surgery by Dr. Egan for a diony hole for subdural.  I spent some time with them looking at the   pictures, examining her.  She clearly is weak, fairly weak in the right arm,   but she moves the fingers some.  She can move her left arm and legs well.  I   looked at the head.  The staples are still there from previous surgery.    I looked at the scans, clearly show chronic subdural with pressure on the brain   with mass effect  on that side.    IMPRESSION:  Karey Milton wants to proceed with surgery with left diony hole   for drainage of subdural.  Consent and risks were discussed.  Risks of bleeding,   infection, weakness, paralysis, reoperation, and hemorrhage were discussed.  We   spent some time going over all the risks and benefits.  They want me to proceed   with surgery.  Her  was present.  He remembered me and grateful that I   am taking care of her.        ______________________________  James Rankin MD    IM/AQS  DD:  05/31/2025  Time:  07:34AM  DT:  05/31/2025  Time:  08:07AM  Job #:  [id number]      CONSULTATION

## 2025-05-31 NOTE — PT/OT/SLP PROGRESS
Occupational Therapy      Patient Name:  Chelle Chandra   MRN:  [id number]    Patient not seen today secondary to neuro change, surgery scheduled for Monday. Will follow-up as appropriate.    5/31/2025

## 2025-05-31 NOTE — PLAN OF CARE
Problem: Adult Inpatient Plan of Care  Goal: Plan of Care Review  Outcome: Progressing  Goal: Patient-Specific Goal (Individualized)  Outcome: Progressing  Goal: Absence of Hospital-Acquired Illness or Injury  Outcome: Progressing  Goal: Optimal Comfort and Wellbeing  Outcome: Progressing  Goal: Readiness for Transition of Care  Outcome: Progressing     Problem: Infection  Goal: Absence of Infection Signs and Symptoms  Outcome: Progressing     Problem: Stroke, Ischemic (Includes Transient Ischemic Attack)  Goal: Optimal Coping  Outcome: Progressing  Goal: Effective Bowel Elimination  Outcome: Progressing  Goal: Optimal Cerebral Tissue Perfusion  Outcome: Progressing  Goal: Optimal Cognitive Function  Outcome: Progressing  Goal: Improved Communication Skills  Outcome: Progressing  Goal: Optimal Functional Ability  Outcome: Progressing  Goal: Optimal Nutrition Intake  Outcome: Progressing  Goal: Effective Oxygenation and Ventilation  Outcome: Progressing  Goal: Improved Sensorimotor Function  Outcome: Progressing  Goal: Safe and Effective Swallow  Outcome: Progressing  Goal: Effective Urinary Elimination  Outcome: Progressing     Problem: Diabetes Comorbidity  Goal: Blood Glucose Level Within Targeted Range  Outcome: Progressing     Problem: Fall Injury Risk  Goal: Absence of Fall and Fall-Related Injury  Outcome: Progressing     Problem: Skin Injury Risk Increased  Goal: Skin Health and Integrity  Outcome: Progressing

## 2025-05-31 NOTE — PLAN OF CARE
Problem: Adult Inpatient Plan of Care  Goal: Plan of Care Review  Outcome: Progressing  Goal: Patient-Specific Goal (Individualized)  Outcome: Progressing  Goal: Absence of Hospital-Acquired Illness or Injury  Outcome: Progressing  Intervention: Identify and Manage Fall Risk  Flowsheets (Taken 5/30/2025 0830)  Safety Promotion/Fall Prevention:   bed alarm set   bedside commode chair  Goal: Optimal Comfort and Wellbeing  Outcome: Progressing  Goal: Readiness for Transition of Care  Outcome: Progressing     Problem: Infection  Goal: Absence of Infection Signs and Symptoms  Outcome: Progressing     Problem: Stroke, Ischemic (Includes Transient Ischemic Attack)  Goal: Optimal Coping  Outcome: Progressing  Goal: Effective Bowel Elimination  Outcome: Progressing  Goal: Optimal Cerebral Tissue Perfusion  Outcome: Progressing  Goal: Optimal Cognitive Function  Outcome: Progressing  Intervention: Optimize Cognitive Function  Flowsheets (Taken 5/30/2025 0830)  Sensory Stimulation Regulation: care clustered  Goal: Improved Communication Skills  Outcome: Progressing  Intervention: Optimize Communication Skills  Flowsheets (Taken 5/30/2025 0830)  Communication Enhancement Strategies:   call light answered in person   communication board used  Goal: Optimal Functional Ability  Outcome: Progressing  Intervention: Optimize Functional Ability  Flowsheets (Taken 5/30/2025 0830)  Activity Management: Arm raise - L1  Goal: Optimal Nutrition Intake  Outcome: Progressing  Goal: Effective Oxygenation and Ventilation  Outcome: Progressing  Goal: Improved Sensorimotor Function  Outcome: Progressing  Goal: Safe and Effective Swallow  Outcome: Progressing  Goal: Effective Urinary Elimination  Outcome: Progressing     Problem: Diabetes Comorbidity  Goal: Blood Glucose Level Within Targeted Range  Outcome: Progressing     Problem: Wound  Goal: Optimal Coping  Outcome: Progressing  Goal: Optimal Functional Ability  Outcome: Progressing  Goal:  Absence of Infection Signs and Symptoms  Outcome: Progressing  Goal: Improved Oral Intake  Outcome: Progressing  Goal: Optimal Pain Control and Function  Outcome: Progressing  Goal: Skin Health and Integrity  Outcome: Progressing  Goal: Optimal Wound Healing  Outcome: Progressing     Problem: Fall Injury Risk  Goal: Absence of Fall and Fall-Related Injury  Outcome: Progressing     Problem: Skin Injury Risk Increased  Goal: Skin Health and Integrity  Outcome: Progressing

## 2025-05-31 NOTE — CONSULTS
Inpatient Nutrition Assessment    Admit Date: 5/29/2025   Total duration of encounter: 2 days   Patient Age: 59 y.o.    Nutrition Recommendation/Prescription     Diet NPO ordered, advance a medically feasible  TF recommendations:  Diabetisource @ 60 mL/hr + 50 mL free water flushes Q4H  Kcal: 1440 kcal/day (~103% est min kcal needs)  Protein: 72 g/day (~104% est protein needs)  Fluid: 1270 mL/day (~92% est min fluid needs)  Monitor energy intake, weight, and labs    Communication of Recommendations: reviewed with nurse, reviewed with patient, and reviewed with family    Nutrition Assessment     Malnutrition Assessment/Nutrition-Focused Physical Exam    Malnutrition Context: acute illness or injury (05/31/25 1536)  Malnutrition Level: severe (05/31/25 1536)  Energy Intake (Malnutrition): less than or equal to 50% for greater than or equal to 5 days (05/31/25 1536)  Weight Loss (Malnutrition): 7.5% in 3 months (05/31/25 1536)  Subcutaneous Fat (Malnutrition): moderate depletion (05/31/25 1536)  Orbital Region (Subcutaneous Fat Loss): moderate depletion  Upper Arm Region (Subcutaneous Fat Loss): moderate depletion     Muscle Mass (Malnutrition): moderate depletion (05/31/25 1536)     Clavicle Bone Region (Muscle Loss): moderate depletion  Clavicle and Acromion Bone Region (Muscle Loss): moderate depletion                          A minimum of two characteristics is recommended for diagnosis of either severe or non-severe malnutrition.    Chart Review    Reason Seen: physician consult for TF    Malnutrition Screening Tool Results   Have you recently lost weight without trying?: Yes: Unsure how much  Have you been eating poorly because of a decreased appetite?: Yes   MST Score: 3   Diagnosis:  Possible Left MCA territory post stroke recrudescence, POA  Oropharyngeal Dysphagia, POA   Left MCA territory large encephalomalacia with seizure in differential, POA  Recent h/o fall and SDH s/p left MMA embolization and left  frontoparietal craniotomy in 5/2025  H/O Left MCA stroke , s/p  thrombectomy 6/1/24   Normocytic anemia , mild -POA  Acute fever- 5/29/25  Suspected Aspiration pneumonitis - 5/30/35    Relevant Medical History:   Left subacute/chronic subdural hematoma - s/p left frontoparietal craniotomy and evacuation of hematoma - 5/19/25  Cerebral angiogram with embolization left middle meningeal artery - 5/16/25  CVA June 2024 - s/p thrombectomy 6/1/24   Diabetes mellitus, type 2  COPD  Dyslipidemia   Lung nodule  GERD  Anxiety    Scheduled Medications:  atorvastatin, 80 mg, Daily  busPIRone, 15 mg, BID  docusate, 100 mg, BID  ezetimibe, 10 mg, Daily  levETIRAcetam (Keppra) IV (PEDS and ADULTS), 500 mg, Q12H  levothyroxine, 88 mcg, Before breakfast  nortriptyline, 25 mg, QHS  paroxetine, 20 mg, QAM  piperacillin-tazobactam (Zosyn) IV (PEDS and ADULTS) (extended infusion is not appropriate), 4.5 g, Q8H    Continuous Infusions:   PRN Medications:  acetaminophen, 650 mg, Q6H PRN  albuterol-ipratropium, 3 mL, Q4H PRN  bisacodyL, 10 mg, Daily PRN  butalbital-acetaminophen-caffeine -40 mg, 1 tablet, Q4H PRN  dextrose 50%, 12.5 g, PRN  glucagon (human recombinant), 1 mg, PRN  hydrALAZINE, 10 mg, Q6H PRN  insulin aspart U-100, 0-10 Units, Q6H PRN  labetalol, 10 mg, Q2H PRN  lorazepam, 2 mg, Q4H PRN  ondansetron, 4 mg, Q4H PRN  prochlorperazine, 5 mg, Q6H PRN  sodium chloride 0.9%, 10 mL, PRN    Calorie Containing IV Medications: no significant kcals from medications at this time    Recent Labs   Lab 05/29/25  0952 05/30/25  0348    139   K 3.9 3.5   CALCIUM 9.2 9.2   MG  --  1.60    103   CO2 22* 23   BUN 8.6* 7.0*   CREATININE 0.68 0.61   EGFRNORACEVR >60 >60   * 142*   BILITOT 0.3 0.4   ALKPHOS 192* 176*   ALT 42 33   AST 26 22   ALBUMIN 2.6* 2.6*   TRIG 145*  --    WBC 9.23 9.00   HGB 10.8* 10.8*   HCT 32.7* 32.9*     Nutrition Orders:  Diet NPO Except for: Medication  Diet NPO Except for: Medication, Sips  "with Medication  Tube Feedings/Formulas 60; 1,200; Diabetisource AC; NG; 50; Every 4 hours    Appetite/Oral Intake: NPO/NPO  Factors Affecting Nutritional Intake: difficulty/impaired swallowing and NPO  Social Needs Impacting Access to Food: none identified  Food/Hinduism/Cultural Preferences: none reported  Food Allergies: none reported  Last Bowel Movement: 25  Wound(s):  none noted    Comments    2025: Pt and family reports a poor PO intake due to swallowing difficulties for ~2 weeks prior to admit. Pt NPO with NGT. Provided TF recommendations. Pt denies N/V and chewing difficulties. Pt reports swallowing difficulties, SLP following. Pt wears dentures denies problems. Last BM noted. Per EMR, pt weighed 50.3 kg on 2025 (7.5% wt loss in 3 months, significant). Will monitor.    Anthropometrics    Height: 5' 6" (167.6 cm), Height Method: Stated  Last Weight: 46.5 kg (102 lb 8.2 oz) (25),    BMI (Calculated): 16.6  BMI Classification: underweight (BMI less than 18.5)     Ideal Body Weight (IBW), Female: 130 lb     % Ideal Body Weight, Female (lb): 78.85 %                    Usual Body Weight (UBW), k.3 kg  % Usual Body Weight: 92.64     Usual Weight Provided By: EMR weight history    Wt Readings from Last 5 Encounters:   25 46.5 kg (102 lb 8.2 oz)   05/15/25 49 kg (108 lb 0.4 oz)   25 50.8 kg (112 lb)   25 50.3 kg (110 lb 14.3 oz)   25 50.3 kg (111 lb)     Weight Change(s) Since Admission:   2025: 46.5 kg  Wt Readings from Last 1 Encounters:   25 46.5 kg (102 lb 8.2 oz)   25 46.5 kg (102 lb 8.2 oz)   Admit Weight: 46.5 kg (102 lb 8.2 oz) (25),      Estimated Needs    Weight Used For Calorie Calculations: 46.5 kg (102 lb 8.2 oz)  Energy Calorie Requirements (kcal): 8793-4505 (30-35 kcal/kg)  Energy Need Method: Kcal/kg  Weight Used For Protein Calculations: 46.5 kg (102 lb 8.2 oz)  Protein Requirements: 55-69 (1.2-1.5 " g/kg)  Fluid Requirements (mL): 1395 (1 mL/kcal)  CHO Requirement: 156-191 g/day (~45-55% est min kcal needs)     Enteral Nutrition     Patient not receiving enteral nutrition at this time.    Parenteral Nutrition     Patient not receiving parenteral nutrition support at this time.    Evaluation of Received Nutrient Intake    Calories: not meeting estimated needs  Protein: not meeting estimated needs    Patient Education     Not applicable.    Nutrition Diagnosis       PES: Severe acute disease or injury related malnutrition Related to acute illness As Evidenced by:  - weight loss: 7.5% in 3 months - energy intake: <= 50% for 2 weeks (meets criteria for <= 50% >= 5 days (severe - acute)) - muscle mass depletion: 5 areas of moderate muscle loss (Trapezius, Clavicle, Acromion, Pectoralis, Deltoid) - loss of subcutaneous fat: 2 areas of moderate fat loss (Triceps Skinfold, Infraorbital) new    Nutrition Interventions     Intervention(s): modified composition of enteral nutrition and modified rate of enteral nutrition  Intervention(s):      Goal: Meet greater than 80% of nutritional needs by follow-up. (new)  Goal: Tolerate enteral feeding at goal rate by follow-up. (new)    Nutrition Goals & Monitoring     Dietitian will monitor: energy intake, enteral nutrition intake, weight, electrolyte/renal panel, glucose/endocrine profile, and gastrointestinal profile  Discharge planning: tube feeding (Diabetisource @ 60 mL/hr)  Nutrition Risk/Follow-Up: patient at increased nutrition risk; dietitian will follow-up twice weekly   Please consult if re-assessment needed sooner.

## 2025-05-31 NOTE — PROGRESS NOTES
[facility] Hospital Medicine Progress Note        Chief Complaint: Inpatient Follow-up for RT sided weakness     HPI:   60 yo female with PMHx of  DM2, left MCA stroke s/p thrombectomy in 6/2024 was on Plavix, Recent SDH following a fall while on Plavix on 5/16/25 s/p cerebral angiogram with embolization of left middle meningeal artery and on  5/19/25 s/p left frontoparietal craniotomy and evacuation of SDH. Pt was discharged home on 5/22 with home health. Today 5/29/25 Pt  presents to ED with c/o acute onset headache, possibly around midnight. Then around 0540 she developed right arm and leg weakness with right facial droop. Has not been taking Keppra at home. Seen by Stroke Neurology in the ED who does not suspect a new stroke but rather seizure activity. RLE weakness already improved however RUE  weakness and right facial droop persists.      VS on arrival: T 98.2, P 100, R 18, b/P 105/73, Sats 98% on room air. Initial labs: WBC 9.23, Hgb 10.8, platelets 527, PT 13.9, INR 1.1, Na 138, K 3.9, Cl 103, creatinine 0.68, glucose 191, TSH 0.309 and otherwise unremarkable.  CT head shows a left cerebral convexity hypodense collection without residual acute hyperdense subacute hematoma and improved post surgical pneumocephalus and otherwise no significant interval change.  CT brain perfusion shows no definite blood flow asymmetry or other findings to suggest significant acute perfusion defects.  There are scattered areas of asymmetrically decreased perfusion to the left MCA territory corresponding to area of known ischemic stroke.  CTA head shows a suspected diminished flow inferior division of the left middle cerebral artery close to the bifurcation with no other hemodynamically significant stenosis identified. Pt was loaded with IV Keppra 2 gram in the ED. Admitted to  service. Stroke Neurology was consulted. EEG neg for seizures.      New onset fever of 102 overnight on 5/29/25.  Infectious workup initiated. MRI brain on 5/30/25 with no acute infarct but showed Left MCA territory large encephalomalacia combined with gliotic changes. Infectious workup was neg however aspiration suspected on MBS study. SLP suggested strict NPO for now. NG tube ordered. Pt was started on Zosyn to cover aspiration pneumonitis. Due to persistent Rt facial droop and RUE weakness, Stroke Neurology recommended Neurosurgery consult for evaluation of chronic left SDH.       Interval Hx:   Neurosurgery evaluated Pt this morning.  Pt remains with Rt facial droop, RUE weakness and mild dysarthria.   Neurosurgery feels left diony hole with draining of SDH is indicated.     No further fever reported today , Hemodynamics are stable , on RA    Case was discussed with patient's nurse and  on the floor.    Objective/physical exam:  General: In no acute distress, afebrile,  on RA  Chest: Clear to auscultation bilaterally  Heart: RRR, +S1, S2, no appreciable murmur  Abdomen: Soft, nontender, BS +  MSK: Warm, no lower extremity edema, no clubbing or cyanosis  Neurologic: Alert and oriented x3, Rt facial droop, RUE 0/5, RLE 3+/5, Left U/L ext 5/5    VITAL SIGNS: 24 HRS MIN & MAX LAST   Temp  Min: 97.5 °F (36.4 °C)  Max: 99.7 °F (37.6 °C) 98.5 °F (36.9 °C)   BP  Min: 104/61  Max: 117/71 107/69   Pulse  Min: 72  Max: 85  81   No data recorded 16   SpO2  Min: 94 %  Max: 96 % (!) 94 %     I have reviewed the following labs:  Recent Labs   Lab 05/29/25  0952 05/30/25  0348   WBC 9.23 9.00   RBC 3.62* 3.64*   HGB 10.8* 10.8*   HCT 32.7* 32.9*   MCV 90.3 90.4   MCH 29.8 29.7   MCHC 33.0 32.8*   RDW 13.5 13.3   * 544*   MPV 9.5 9.8     Recent Labs   Lab 05/29/25  0952 05/30/25  0348    139   K 3.9 3.5    103   CO2 22* 23   BUN 8.6* 7.0*   CREATININE 0.68 0.61   * 142*   CALCIUM 9.2 9.2   MG  --  1.60   ALBUMIN 2.6* 2.6*   PROT 6.9 7.0   ALKPHOS 192* 176*   ALT 42 33   AST 26 22   BILITOT 0.3 0.4      Microbiology Results (last 7 days)       Procedure Component Value Units Date/Time    Blood Culture [[id number]]  (Normal) Collected: 05/30/25 0348    Order Status: Completed Specimen: Blood, Venous Updated: 05/31/25 0407     Blood Culture No Growth At 24 Hours    Blood Culture [[id number]]  (Normal) Collected: 05/30/25 0348    Order Status: Completed Specimen: Blood, Venous Updated: 05/31/25 0407     Blood Culture No Growth At 24 Hours    Respiratory Culture [[id number]]     Order Status: Sent Specimen: Sputum              See below for Radiology    Assessment/Plan:  Chronic left subdural hematoma, POA  Possible Left MCA territory post stroke recrudescence, POA  Oropharyngeal Dysphagia, POA - on NG tube  Left MCA territory large encephalomalacia with seizure in differential, POA  Recent h/o fall and SDH s/p left MMA embolization and left frontoparietal craniotomy in 5/2025  H/O Left MCA stroke , s/p  thrombectomy 6/1/24   Normocytic anemia , mild -POA  Acute fever- 5/29/25  Suspected Aspiration pneumonitis - 5/30/35     Plan-  Neurosurgery feels left diony hole with draining of SDH is indicated.   Pt and family want to proceed with surgery   Continue to hold Plavix. Was initially planned to restart on June 1   SLP suggested strict NPO  NG tube placed on 5/30/25  Start meds and nutrition via NG tube   Continue Statin   SBP goal under 160   Continue Keppra   SLP/PT/OT  Monitor course      VTE prophylaxis:  Dr. Egan cleared for Heparin but will start after Neurosurgery eval . SCDs for now      Patient condition:  Fair      Anticipated discharge and Disposition:     TBD      All diagnosis and differential diagnosis have been reviewed; assessment and plan has been documented; I have personally reviewed the labs and test results that are presently available; I have reviewed the patients medication list; I have reviewed the consulting providers response and recommendations. I have reviewed or attempted to review  medical records based upon their availability    All of the patient's questions have been  addressed and answered. Patient's is agreeable to the above stated plan. I will continue to monitor closely and make adjustments to medical management as needed.    Portions of this note dictated using EMR integrated voice recognition software, and may be subject to voice recognition errors not corrected at proofreading. Please contact writer for clarification if needed.   _____________________________________________________________________    Malnutrition Status:  Nutrition consulted. Most recent weight and BMI monitored-     Measurements:  Wt Readings from Last 1 Encounters:   05/30/25 46.5 kg (102 lb 8.2 oz)   Body mass index is 16.55 kg/m².    Patient has been screened and assessed by RD.    Malnutrition Type:  Context: acute illness or injury  Level: severe    Malnutrition Characteristic Summary:  Weight Loss (Malnutrition): 7.5% in 3 months  Energy Intake (Malnutrition): less than or equal to 50% for greater than or equal to 5 days  Subcutaneous Fat (Malnutrition): moderate depletion  Muscle Mass (Malnutrition): moderate depletion    Interventions/Recommendations (treatment strategy):        Scheduled Med:   atorvastatin  80 mg Per NG tube Daily    busPIRone  15 mg Per NG tube BID    docusate  100 mg Per NG tube BID    ezetimibe  10 mg Per NG tube Daily    levETIRAcetam (Keppra) IV (PEDS and ADULTS)  500 mg Intravenous Q12H    levothyroxine  88 mcg Per NG tube Before breakfast    nortriptyline  25 mg Per NG tube QHS    paroxetine  20 mg Per NG tube QAM    piperacillin-tazobactam (Zosyn) IV (PEDS and ADULTS) (extended infusion is not appropriate)  4.5 g Intravenous Q8H      Continuous Infusions:     PRN Meds:    Current Facility-Administered Medications:     acetaminophen, 650 mg, Oral, Q6H PRN    albuterol-ipratropium, 3 mL, Nebulization, Q4H PRN    bisacodyL, 10 mg, Rectal, Daily PRN     butalbital-acetaminophen-caffeine -40 mg, 1 tablet, Oral, Q4H PRN    dextrose 50%, 12.5 g, Intravenous, PRN    glucagon (human recombinant), 1 mg, Intramuscular, PRN    hydrALAZINE, 10 mg, Intravenous, Q6H PRN    insulin aspart U-100, 0-10 Units, Subcutaneous, Q6H PRN    labetalol, 10 mg, Intravenous, Q2H PRN    lorazepam, 2 mg, Intravenous, Q4H PRN    ondansetron, 4 mg, Intravenous, Q4H PRN    prochlorperazine, 5 mg, Intravenous, Q6H PRN    sodium chloride 0.9%, 10 mL, Intravenous, PRN         Argentina Jerome MD  Department of Hospital Medicine   [facility]   05/31/2025

## 2025-06-01 LAB
ANION GAP SERPL CALC-SCNC: 11 MEQ/L
ANION GAP SERPL CALC-SCNC: 22 MMOL/L (ref 8–16)
APTT PPP: 36.1 SECONDS (ref 23.2–33.7)
BASOPHILS # BLD AUTO: 0.08 X10(3)/MCL
BASOPHILS NFR BLD AUTO: 1 %
BUN SERPL-MCNC: 7 MG/DL (ref 6–30)
BUN SERPL-MCNC: 7.8 MG/DL (ref 9.8–20.1)
CALCIUM SERPL-MCNC: 9.1 MG/DL (ref 8.4–10.2)
CHLORIDE SERPL-SCNC: 102 MMOL/L (ref 98–107)
CHLORIDE SERPL-SCNC: 98 MMOL/L (ref 95–110)
CO2 SERPL-SCNC: 26 MMOL/L (ref 23–31)
CREAT SERPL-MCNC: 0.5 MG/DL (ref 0.5–1.4)
CREAT SERPL-MCNC: 0.68 MG/DL (ref 0.55–1.02)
CREAT/UREA NIT SERPL: 11
EOSINOPHIL # BLD AUTO: 0.12 X10(3)/MCL (ref 0–0.9)
EOSINOPHIL NFR BLD AUTO: 1.5 %
ERYTHROCYTE [DISTWIDTH] IN BLOOD BY AUTOMATED COUNT: 13.4 % (ref 11.5–17)
GFR SERPLBLD CREATININE-BSD FMLA CKD-EPI: >60 ML/MIN/1.73/M2
GLUCOSE SERPL-MCNC: 166 MG/DL (ref 82–115)
GLUCOSE SERPL-MCNC: 254 MG/DL (ref 70–110)
GROUP & RH: NORMAL
HCT VFR BLD AUTO: 34 % (ref 37–47)
HCT VFR BLD CALC: 37 %PCV (ref 36–54)
HGB BLD-MCNC: 11.2 G/DL (ref 12–16)
HGB BLD-MCNC: 13 G/DL
IMM GRANULOCYTES # BLD AUTO: 0.04 X10(3)/MCL (ref 0–0.04)
IMM GRANULOCYTES NFR BLD AUTO: 0.5 %
INDIRECT COOMBS: NORMAL
INR PPP: 1.2
LYMPHOCYTES # BLD AUTO: 1.78 X10(3)/MCL (ref 0.6–4.6)
LYMPHOCYTES NFR BLD AUTO: 21.7 %
MAGNESIUM SERPL-MCNC: 2.1 MG/DL (ref 1.6–2.6)
MCH RBC QN AUTO: 29.8 PG (ref 27–31)
MCHC RBC AUTO-ENTMCNC: 32.9 G/DL (ref 33–36)
MCV RBC AUTO: 90.4 FL (ref 80–94)
MONOCYTES # BLD AUTO: 0.93 X10(3)/MCL (ref 0.1–1.3)
MONOCYTES NFR BLD AUTO: 11.4 %
NEUTROPHILS # BLD AUTO: 5.24 X10(3)/MCL (ref 2.1–9.2)
NEUTROPHILS NFR BLD AUTO: 63.9 %
NRBC BLD AUTO-RTO: 0 %
PHOSPHATE SERPL-MCNC: 3.7 MG/DL (ref 2.3–4.7)
PLATELET # BLD AUTO: 655 X10(3)/MCL (ref 130–400)
PMV BLD AUTO: 9.2 FL (ref 7.4–10.4)
POC IONIZED CALCIUM: 1.25 MMOL/L (ref 1.06–1.42)
POC TCO2 (MEASURED): 23 MMOL/L (ref 23–29)
POCT GLUCOSE: 200 MG/DL (ref 70–110)
POCT GLUCOSE: 204 MG/DL (ref 70–110)
POCT GLUCOSE: 214 MG/DL (ref 70–110)
POTASSIUM BLD-SCNC: 3.7 MMOL/L (ref 3.5–5.1)
POTASSIUM SERPL-SCNC: 3.4 MMOL/L (ref 3.5–5.1)
PROTHROMBIN TIME: 14.6 SECONDS (ref 12.5–14.5)
RBC # BLD AUTO: 3.76 X10(6)/MCL (ref 4.2–5.4)
SAMPLE: ABNORMAL
SODIUM BLD-SCNC: 138 MMOL/L (ref 136–145)
SODIUM SERPL-SCNC: 139 MMOL/L (ref 136–145)
SPECIMEN OUTDATE: NORMAL
WBC # BLD AUTO: 8.19 X10(3)/MCL (ref 4.5–11.5)

## 2025-06-01 PROCEDURE — 21400001 HC TELEMETRY ROOM

## 2025-06-01 PROCEDURE — 36415 COLL VENOUS BLD VENIPUNCTURE: CPT

## 2025-06-01 PROCEDURE — 86850 RBC ANTIBODY SCREEN: CPT

## 2025-06-01 PROCEDURE — 87070 CULTURE OTHR SPECIMN AEROBIC: CPT

## 2025-06-01 PROCEDURE — 25000003 PHARM REV CODE 250: Performed by: INTERNAL MEDICINE

## 2025-06-01 PROCEDURE — 63600175 PHARM REV CODE 636 W HCPCS: Performed by: INTERNAL MEDICINE

## 2025-06-01 PROCEDURE — 85730 THROMBOPLASTIN TIME PARTIAL: CPT

## 2025-06-01 PROCEDURE — 84100 ASSAY OF PHOSPHORUS: CPT | Performed by: INTERNAL MEDICINE

## 2025-06-01 PROCEDURE — 85610 PROTHROMBIN TIME: CPT

## 2025-06-01 PROCEDURE — 83735 ASSAY OF MAGNESIUM: CPT | Performed by: INTERNAL MEDICINE

## 2025-06-01 PROCEDURE — 80048 BASIC METABOLIC PNL TOTAL CA: CPT | Performed by: INTERNAL MEDICINE

## 2025-06-01 PROCEDURE — 63600175 PHARM REV CODE 636 W HCPCS: Performed by: NURSE PRACTITIONER

## 2025-06-01 PROCEDURE — 85025 COMPLETE CBC W/AUTO DIFF WBC: CPT | Performed by: INTERNAL MEDICINE

## 2025-06-01 RX ORDER — ACETAMINOPHEN 10 MG/ML
1000 INJECTION, SOLUTION INTRAVENOUS ONCE
Status: COMPLETED | OUTPATIENT
Start: 2025-06-01 | End: 2025-06-01

## 2025-06-01 RX ADMIN — PIPERACILLIN SODIUM AND TAZOBACTAM SODIUM 4.5 G: 4; .5 INJECTION, POWDER, LYOPHILIZED, FOR SOLUTION INTRAVENOUS at 01:06

## 2025-06-01 RX ADMIN — PIPERACILLIN SODIUM AND TAZOBACTAM SODIUM 4.5 G: 4; .5 INJECTION, POWDER, LYOPHILIZED, FOR SOLUTION INTRAVENOUS at 05:06

## 2025-06-01 RX ADMIN — BUTALBITAL, ACETAMINOPHEN, AND CAFFEINE 1 TABLET: 325; 50; 40 TABLET ORAL at 02:06

## 2025-06-01 RX ADMIN — POTASSIUM BICARBONATE 25 MEQ: 978 TABLET, EFFERVESCENT ORAL at 09:06

## 2025-06-01 RX ADMIN — BUSPIRONE HYDROCHLORIDE 15 MG: 5 TABLET ORAL at 09:06

## 2025-06-01 RX ADMIN — DOCUSATE SODIUM LIQUID 100 MG: 100 LIQUID ORAL at 10:06

## 2025-06-01 RX ADMIN — LEVOTHYROXINE SODIUM 88 MCG: 0.09 TABLET ORAL at 06:06

## 2025-06-01 RX ADMIN — ATORVASTATIN CALCIUM 80 MG: 40 TABLET, FILM COATED ORAL at 09:06

## 2025-06-01 RX ADMIN — NORTRIPTYLINE HYDROCHLORIDE 25 MG: 25 CAPSULE ORAL at 08:06

## 2025-06-01 RX ADMIN — DOCUSATE SODIUM LIQUID 100 MG: 100 LIQUID ORAL at 08:06

## 2025-06-01 RX ADMIN — EZETIMIBE 10 MG: 10 TABLET ORAL at 09:06

## 2025-06-01 RX ADMIN — PIPERACILLIN SODIUM AND TAZOBACTAM SODIUM 4.5 G: 4; .5 INJECTION, POWDER, LYOPHILIZED, FOR SOLUTION INTRAVENOUS at 10:06

## 2025-06-01 RX ADMIN — LEVETIRACETAM 500 MG: 100 INJECTION, SOLUTION INTRAVENOUS at 09:06

## 2025-06-01 RX ADMIN — BUTALBITAL, ACETAMINOPHEN, AND CAFFEINE 1 TABLET: 325; 50; 40 TABLET ORAL at 09:06

## 2025-06-01 RX ADMIN — BUTALBITAL, ACETAMINOPHEN, AND CAFFEINE 1 TABLET: 325; 50; 40 TABLET ORAL at 07:06

## 2025-06-01 RX ADMIN — BUTALBITAL, ACETAMINOPHEN, AND CAFFEINE 1 TABLET: 325; 50; 40 TABLET ORAL at 11:06

## 2025-06-01 RX ADMIN — PAROXETINE HYDROCHLORIDE 20 MG: 20 TABLET, FILM COATED ORAL at 06:06

## 2025-06-01 RX ADMIN — ACETAMINOPHEN 1000 MG: 10 INJECTION, SOLUTION INTRAVENOUS at 10:06

## 2025-06-01 RX ADMIN — BUSPIRONE HYDROCHLORIDE 15 MG: 5 TABLET ORAL at 08:06

## 2025-06-01 NOTE — CONSULTS
Consults  [facility]    Cardiology  Consult Note    Patient Name: Chelle Chandra  MRN: [id number]  Admission Date: 5/29/2025  Hospital Length of Stay: 3 days  Code Status: Full Code   Attending Provider: Argentina Jerome MD   Consulting Provider: NICHOLAS Sun  Primary Care Physician: Jorge Silver MD  Principal Problem:Acute focal neurological deficit    Patient information was obtained from patient and ER records.     Subjective:     Chief Complaint/Reason for Consult: Initial Stroke-like symptoms; Consult: [facility] for Neurosurgery    HPI:  59-year-old female known to Dr. Mcfarland with a past medical history of SVT, hyperlipidemia, DM, CVA, hypothyroidism, asthma, and bipolar disorder who was noted to have a recent subdural hematoma following a fall while on Plavix on 5.16.25 s/p cerebral angiogram with embolization of left middle meningeal artery and s/p left frontal parietal craniotomy and evacuation of subdural hematoma on 5.19.25.  Patient was discharged on 5.22.25 with home health, but presented back to the emergency room on 5.29.25 with complaints of acute onset headache.  The morning of her arrival to the ER, she developed right arm and leg weakness with right facial droop.  CT of the head showed left cerebral convexity hypodense collection without residual acute hyperdense subacute hematoma and improved post surgical pneumocephalus and otherwise no significant interval change.  CTA of the head showed a suspected diminished flow inferior division of the left middle cerebral artery close to the bifurcation with no other hemodynamically significant stenosis identified.  MRI of the brain on 5.30.25 showed no acute infarct but showed left MCA territory large encephalomalacia combined with gliotic changes.  Neurology recommended Neurosurgery consult for evaluation of chronic left subdural hematoma.  [facility] has been consulted for cardiac risk assessment for  neurosurgery.      PMH: SVT, Hyperlipidemia, DM, CVA, Hypothyroidism, Asthma, Bipolar disorder   PSH:  Hysterectomy, Neck Surgery, Tonsillectomy, Lumpectomy  Family History:  Father - DM2; Mother - MI  Social History:  Former Smoker. Denies Alcohol Use. Denies Illicit Drug Use.     Previous Cardiac Diagnostics:   Echocardiogram 5.30.25:    Left Ventricle: The left ventricle is normal in size. Normal wall thickness. There is normal systolic function with a visually estimated ejection fraction of 55 - 60%.    Right Ventricle: The right ventricle is normal in size Systolic function is normal. TAPSE is 1.8 cm.    IVC/SVC: Normal venous pressure at 3 mmHg.    Carotid US 5.29.25:  The right internal carotid artery is patent with less than 50% stenosis.  The left internal carotid artery is patent with less than 50% stenosis.  Bilateral vertebral arteries are patent with antegrade flow.     MPI 3.30.20:  This is probably a normal perfusion study. There is no evidence of ischemia. Small mild fixed distal anterior defect that might be artifact.    This scan is suggestive of low risk for future cardiovascular events.   Small fixed perfusion abnormality of mild intensity in the apical anterior segment.   The left ventricular cavity is noted to be normal on the stress study. The left ventricular ejection fraction was calculated to be 60% and left ventricular global function is normal.   The study quality is average.     [facility] 8.23.13:  The left main divided into the left anterior descending artery and the circumflex artery.  There is a large caliber left anterior descending artery and circumflex, both are free of any disease.  It is a left dominant system.  The right coronary artery small to medium size and free of any disease.      Past Medical History:   Diagnosis Date    Accelerated junctional rhythm     Agatston CAC score, <100     Anxiety disorder, unspecified     Asthma     COPD type A     Diabetes mellitus without  complication     GERD (gastroesophageal reflux disease)     History of COVID-19     Insomnia     Lung nodule      Past Surgical History:   Procedure Laterality Date    ANGIOGRAM, CORONARY, WITH LEFT HEART CATHETERIZATION      BACK SURGERY      BREAST LUMPECTOMY Right     CARDIOVERSION  08/01/2013    CARPAL TUNNEL RELEASE  10/23/2013    CRANIOTOMY FOR EVACUATION OF SUBDURAL HEMATOMA Left 05/19/2025    Procedure: CRANIOTOMY, FOR SUBDURAL HEMATOMA EVACUATION;  Surgeon: Ricardo Shelley MD;  Location: [facility] OR;  Service: Neurosurgery;  Laterality: Left;    FUSION OF CERVICAL SPINE BY ANTERIOR APPROACH USING COMPUTER-ASSISTED NAVIGATION  06/10/2019    KIDNEY SURGERY      TONSILLECTOMY AND ADENOIDECTOMY      TOTAL ABDOMINAL HYSTERECTOMY      WRIST SURGERY       Review of patient's allergies indicates:   Allergen Reactions    Aspirin     Ketorolac     Penicillins     Sulfa (sulfonamide antibiotics)     Ozempic [semaglutide] Other (See Comments)     Abdominal pain     No current facility-administered medications on file prior to encounter.     Current Outpatient Medications on File Prior to Encounter   Medication Sig    atorvastatin (LIPITOR) 80 MG tablet Take 1 tablet (80 mg total) by mouth once daily.    busPIRone (BUSPAR) 15 MG tablet Take 1 tablet (15 mg total) by mouth 2 (two) times daily.    docusate sodium (COLACE) 50 MG capsule Take 2 capsules (100 mg total) by mouth 2 (two) times daily.    ezetimibe (ZETIA) 10 mg tablet Take 1 tablet by mouth once daily    FARXIGA 5 mg Tab tablet Take 1 tablet by mouth once daily    levothyroxine (SYNTHROID) 88 MCG tablet Take 1 tablet (88 mcg total) by mouth before breakfast.    metFORMIN (GLUCOPHAGE) 500 MG tablet TAKE 1 TABLET BY MOUTH TWICE DAILY WITH MEALS    metoprolol succinate (TOPROL-XL) 25 MG 24 hr tablet Take 12.5 mg by mouth once daily.    nortriptyline (PAMELOR) 25 MG capsule TAKE 1 CAPSULE BY MOUTH IN THE EVENING    butalbital-acetaminophen-caffeine -40 mg  "(FIORICET, ESGIC) -40 mg per tablet Take 1 tablet by mouth every 4 (four) hours as needed for Pain.    clopidogreL (PLAVIX) 75 mg tablet Take 1 tablet (75 mg total) by mouth once daily. (Patient not taking: Reported on 5/23/2025)    levETIRAcetam (KEPPRA) 500 MG Tab Take 1 tablet (500 mg total) by mouth 2 (two) times daily. for 4 days    ondansetron (ZOFRAN) 8 MG tablet Take 1 tablet (8 mg total) by mouth every 6 (six) hours as needed for Nausea.    pen needle, diabetic 32 gauge x 5/32" Ndle 1 each by Misc.(Non-Drug; Combo Route) route once a week. (Patient not taking: Reported on 5/23/2025)     Family History       Problem Relation (Age of Onset)    Diabetes Father    Heart attack Mother          Tobacco Use    Smoking status: Every Day     Current packs/day: 0.50     Types: Cigarettes    Smokeless tobacco: Never   Vaping Use    Vaping status: Former    Quit date: 6/1/2024   Substance and Sexual Activity    Alcohol use: Not Currently    Drug use: Never    Sexual activity: Not on file       Review of Systems   Constitutional: Negative.    Respiratory:  Negative for shortness of breath.    Cardiovascular:  Negative for chest pain.   Neurological:         Right facial droop     Objective:     Vital Signs (Most Recent):  Temp: 98 °F (36.7 °C) (06/01/25 0748)  Pulse: 81 (06/01/25 0748)  Resp: 16 (05/29/25 2358)  BP: 111/77 (06/01/25 0748)  SpO2: 95 % (06/01/25 0748) Vital Signs (24h Range):  Temp:  [98 °F (36.7 °C)-99.1 °F (37.3 °C)] 98 °F (36.7 °C)  Pulse:  [72-82] 81  SpO2:  [94 %-96 %] 95 %  BP: (100-111)/(62-77) 111/77   Weight: 46.5 kg (102 lb 8.2 oz)  Body mass index is 16.55 kg/m².  SpO2: 95 %       Intake/Output Summary (Last 24 hours) at 6/1/2025 0852  Last data filed at 5/31/2025 1700  Gross per 24 hour   Intake --   Output 750 ml   Net -750 ml     Lines/Drains/Airways       Drain  Duration                  NG/OG Tube 05/30/25 1300 Right nostril 1 day              Peripheral Intravenous Line  Duration "                  Peripheral IV - Single Lumen 05/30/25 2100 20 G No Anterior;Right Forearm 1 day                  Significant Labs:   Chemistries:   Recent Labs   Lab 05/29/25  0952 05/30/25 0348 06/01/25 0527    139 139   K 3.9 3.5 3.4*    103 102   CO2 22* 23 26   BUN 8.6* 7.0* 7.8*   CREATININE 0.68 0.61 0.68   CALCIUM 9.2 9.2 9.1   PROT 6.9 7.0  --    BILITOT 0.3 0.4  --    ALKPHOS 192* 176*  --    ALT 42 33  --    AST 26 22  --    MG  --  1.60 2.10   PHOS  --   --  3.7        CBC/Anemia Labs: Coags:    Recent Labs   Lab 05/29/25  0952 05/30/25 0348 06/01/25 0527   WBC 9.23 9.00 8.19   HGB 10.8* 10.8* 11.2*   HCT 32.7* 32.9* 34.0*   * 544* 655*   MCV 90.3 90.4 90.4   RDW 13.5 13.3 13.4    Recent Labs   Lab 05/29/25  0918 05/29/25 0952 06/01/25 0527   INR 1.0 1.1 1.2   APTT  --   --  36.1*        Significant Imaging:  Imaging Results              MRI Brain Limited Without Contrast (Final result)  Result time 05/30/25 13:08:35      Final result by Adam Sears MD (05/30/25 13:08:35)                   Impression:      1.  No acute brain infarct.    2.  Left middle cerebral artery territory large encephalomalacia combined with gliotic changes is related to old infarct.      Electronically signed by: Adam Sears  Date:    05/30/2025  Time:    13:08               Narrative:    EXAMINATION:  MRI brain limited without contrast    CLINICAL HISTORY:  Rule out stroke.    TECHNIQUE:  Limited MRI brain axial FLAIR, gradient echo, diffusion-weighted and ADC map images are performed without contrast.    COMPARISON:  CT brain without contrast May 29, 2025    FINDINGS:  There is no diffusion weighted restriction or signal dropout on the ADC map to indicate an acute infarct.  There is left middle cerebral artery territory encephalomalacia combined with gliotic changes related to old infarct.  Chronic microvascular ischemic change are mild with periventricular and deep white matter T2 FLAIR  hyperintense signals.  There is no acute intracranial hemorrhage, hydrocephalus, midline shift or mass effect    Left cerebral convexity chronic subdural hematoma or hygroma is with similar appearance.  There are several left dural based calcifications which show gradient echo sequence dephasing artifacts.                                       CTA STROKE MULTI-PHASE (XPD) (Final result)  Result time 05/29/25 10:57:56      Final result by Adam Sears MD (05/29/25 10:57:56)                   Impression:      1.  Suspected diminished flow inferior division of the left middle cerebral artery close to bifurcation.    2.  No other hemodynamically significant stenosis identified.      Electronically signed by: Adam Sears  Date:    05/29/2025  Time:    10:57               Narrative:    EXAMINATION:  CTA STROKE MULTI-PHASE (XPD)    CLINICAL HISTORY:  Neuro deficit, acute, stroke suspected    TECHNIQUE:  Brain and imaging was performed from skull base to vertex prior to intravenous contrast. CT Angiogram of head and neck was subsequently performed following intravenous contrast administration.  Coronal and sagittal MPR reconstructions were performed in addition to coronal and sagittal MIP reconstructions.    Dose product length of 1184 mGycm. Automated exposure control was utilized to minimize radiation dose.    COMPARISON:  CT brain without contrast May 29, 2024    FINDINGS:  Carotid artery assessed in accordance with the NASCET criteria.    Thoracic aorta is remarkable for mild calcified plaque without aneurysmal dilatation or dissection.  There is no hemodynamically significant stenosis of the brachiocephalic trunk and the subclavian arteries.  Bilateral common carotid arteries, internal and the external carotid arteries are without significant stenosis and there is no dissection, intraluminal thrombosis or aneurysmal prominence.  The cavernous and supraclinoid portion internal carotid arteries are patent.  There is  unremarkable flow within the anterior cerebral arteries, right middle cerebral artery and the major branches.  The left middle cerebral artery M1 segment is unremarkable.  There appears diminished flow within the inferior division of the left middle cerebral artery close to bifurcation on image 37 series 7.    There is flow bilaterally within the vertebral arteries.  The basilar artery is patent.  There is flow within the posterior cerebral arteries                                       CT Brain Perfusion inc Post Processing (Final result)  Result time 05/29/25 10:28:30      Final result by Zoran Decker MD (05/29/25 10:28:30)                   Narrative:    EXAMINATION  CT BRAIN PERFUSION INC POST PROCESSING    CLINICAL HISTORY  stroke;    TECHNIQUE  Axial perfusion CT images were obtained, with arterial in-flow and venous out-flow ROIs. Post-processing of the CT perfusion data was performed, generating color para-metric maps for cerebral blood volume (CBV), cerebral blood flow (CBF), mean transit time (MTT), time-to-peak (TTP), and time-to-maximum (T-max).  The perfusion maps and hemodynamic curves were transferred to PACS.    CONTRAST  *IV contrast media: Omnipaque 350  *Injected volume: 50 mL  *Injection rate: 5 mL/s    COMPARISON  *No prior CT perfusion exam is available at the time of the initial interpretation.  *Non-contrast head CT obtained immediately prior was reviewed.    FINDINGS/IMPRESSION  Exam quality: Adequate for evaluation.    No definite blood flow asymmetry or other findings to suggest significant acute perfusion defect identified.  Scattered areas of asymmetrically decreased perfusion through the left MCA territory correspond to area of known ischemic stroke seen on the preceding CT and recent comparison studies.    Prior to the final Radiology report, initial assessment performed by teleradiology service and pertinent communication to the Neuro Interventional Team was accomplished for  potential treatment planning; please refer to the corresponding consult and/or intervention notes for additional details.    RADIATION DOSE  Automated tube current modulation, weight-based exposure dosing, and/or iterative reconstruction technique utilized to reach lowest reasonably achievable exposure rate.    DLP: 1528 mGy*cm      Electronically signed by: Zoran Decker  Date:    05/29/2025  Time:    10:28                                     CT HEAD FOR CODE STROKE (Final result)  Result time 05/29/25 09:37:13      Final result by Adam Sears MD (05/29/25 09:37:13)                   Impression:      1.  Left cerebral convexity hypodense collection without residual acute hyperdense subacute hematoma and improved postsurgical pneumocephalus.    2.  Otherwise, no significant interval change.      Electronically signed by: Adam Sears  Date:    05/29/2025  Time:    09:37               Narrative:    EXAMINATION:  CT HEAD FOR CODE STROKE    CLINICAL HISTORY:  Neuro deficit, acute, stroke suspected;stroke alert;;    TECHNIQUE:  Sequential axial images were performed of the brain without contrast.    Dose product length of 1118 mGycm. Automated exposure control was utilized to minimize radiation dose.    COMPARISON:  May 19, 2023..    FINDINGS:  There is left craniotomy related to evacuation of the subdural hematoma.  There is minimal residual left pneumocephalus.  Left cerebral convexity subdural collection is entirely hypodense with thickness of 10 mm without acute hyperdense component.  Extensive in the territory of the middle cerebral artery ischemic changes with associated and cephalo malacia is similar. There is no significant mass effect or midline shift.  No new large vessel territory infarct identified.    Visualized paranasal sinuses are clear without mucosal thickening, polypoidal abnormality or air-fluid levels. Mastoid air cells aeration is optimal.                                    EKG:     Telemetry:       Physical Exam  Cardiovascular:      Rate and Rhythm: Normal rate and regular rhythm.   Pulmonary:      Effort: Pulmonary effort is normal.      Breath sounds: Normal breath sounds.   Abdominal:      Palpations: Abdomen is soft.   Musculoskeletal:      Cervical back: Normal range of motion and neck supple.   Skin:     General: Skin is warm and dry.   Neurological:      Mental Status: She is alert and oriented to person, place, and time.      Comments: Right facial droop   Psychiatric:         Mood and Affect: Mood normal.         Behavior: Behavior normal.       Home Medications:   Medications Ordered Prior to Encounter[1]  Current Schedule Inpatient Medications:   atorvastatin  80 mg Per NG tube Daily    busPIRone  15 mg Per NG tube BID    docusate  100 mg Per NG tube BID    ezetimibe  10 mg Per NG tube Daily    levETIRAcetam (Keppra) IV (PEDS and ADULTS)  500 mg Intravenous Q12H    levothyroxine  88 mcg Per NG tube Before breakfast    nortriptyline  25 mg Per NG tube QHS    paroxetine  20 mg Per NG tube QAM    piperacillin-tazobactam (Zosyn) IV (PEDS and ADULTS) (extended infusion is not appropriate)  4.5 g Intravenous Q8H    potassium bicarbonate  25 mEq Per NG tube Once     Continuous Infusions:    Assessment:   Cardiac Risk Assessment for Neurosurgery (left diony hole for drainage of subdural)  - EF 55-60% per Echo 5.30.25  SVT  Hyperlipidemia  DM  Hx of CVA (Left MCA Stroke)  - s/p thrombectomy 06/2024  Hypothyroidism  Mild TONY per CUS 5.29.25  Asthma  Bipolar Disorder       Plan:   Pt with normal EF per Echo 5.30.25.  EKG 5.29.25 showed NSR with no acute ischemic changes. According to the revised cardiac risk index (Eduardo Criteria), patient is low risk for cardiac events for low risk neurosurgery procedure.  Okay to proceed if patient is willing to accept the cardiac risk.         Thank you for your consult.     Kelley Hill, ANP  Cardiology  [facility]     Pt seen and  examined by me, Good Mclean MD. I have reviewed the NP's note, assessment and plan. I have personally interviewed and examined the patient at bedside and agree with the findings. Medical decision making listed above were done under my guidance.     Proceed with the emergent neurosurgery without additional cardiac workup.  Patient is considered low cardiac risk as described above         [1]   No current facility-administered medications on file prior to encounter.     Current Outpatient Medications on File Prior to Encounter   Medication Sig Dispense Refill    atorvastatin (LIPITOR) 80 MG tablet Take 1 tablet (80 mg total) by mouth once daily. 90 tablet 3    busPIRone (BUSPAR) 15 MG tablet Take 1 tablet (15 mg total) by mouth 2 (two) times daily. 60 tablet 11    docusate sodium (COLACE) 50 MG capsule Take 2 capsules (100 mg total) by mouth 2 (two) times daily. 60 capsule 0    ezetimibe (ZETIA) 10 mg tablet Take 1 tablet by mouth once daily 90 tablet 0    FARXIGA 5 mg Tab tablet Take 1 tablet by mouth once daily 90 tablet 0    levothyroxine (SYNTHROID) 88 MCG tablet Take 1 tablet (88 mcg total) by mouth before breakfast. 30 tablet 11    metFORMIN (GLUCOPHAGE) 500 MG tablet TAKE 1 TABLET BY MOUTH TWICE DAILY WITH MEALS 180 tablet 0    metoprolol succinate (TOPROL-XL) 25 MG 24 hr tablet Take 12.5 mg by mouth once daily.      nortriptyline (PAMELOR) 25 MG capsule TAKE 1 CAPSULE BY MOUTH IN THE EVENING 30 capsule 0    butalbital-acetaminophen-caffeine -40 mg (FIORICET, ESGIC) -40 mg per tablet Take 1 tablet by mouth every 4 (four) hours as needed for Pain. 30 tablet 0    clopidogreL (PLAVIX) 75 mg tablet Take 1 tablet (75 mg total) by mouth once daily. (Patient not taking: Reported on 5/23/2025) 30 tablet 11    levETIRAcetam (KEPPRA) 500 MG Tab Take 1 tablet (500 mg total) by mouth 2 (two) times daily. for 4 days 8 tablet 0    ondansetron (ZOFRAN) 8 MG tablet Take 1 tablet (8 mg total) by mouth every 6  "(six) hours as needed for Nausea. 20 tablet 1    pen needle, diabetic 32 gauge x 5/32" Ndle 1 each by Misc.(Non-Drug; Combo Route) route once a week. (Patient not taking: Reported on 5/23/2025) 100 each 1     "

## 2025-06-01 NOTE — PROGRESS NOTES
[facility] Hospital Medicine Progress Note        Chief Complaint: Inpatient Follow-up for RT sided weakness     HPI:   60 yo female with PMHx of  DM2, left MCA stroke s/p thrombectomy in 6/2024 was on Plavix, Recent SDH following a fall while on Plavix on 5/16/25 s/p cerebral angiogram with embolization of left middle meningeal artery and on  5/19/25 s/p left frontoparietal craniotomy and evacuation of SDH. Pt was discharged home on 5/22 with home health. Today 5/29/25 Pt  presents to ED with c/o acute onset headache, possibly around midnight. Then around 0540 she developed right arm and leg weakness with right facial droop. Has not been taking Keppra at home. Seen by Stroke Neurology in the ED who does not suspect a new stroke but rather seizure activity. RLE weakness already improved however RUE  weakness and right facial droop persists.      VS on arrival: T 98.2, P 100, R 18, b/P 105/73, Sats 98% on room air. Initial labs: WBC 9.23, Hgb 10.8, platelets 527, PT 13.9, INR 1.1, Na 138, K 3.9, Cl 103, creatinine 0.68, glucose 191, TSH 0.309 and otherwise unremarkable.  CT head shows a left cerebral convexity hypodense collection without residual acute hyperdense subacute hematoma and improved post surgical pneumocephalus and otherwise no significant interval change.  CT brain perfusion shows no definite blood flow asymmetry or other findings to suggest significant acute perfusion defects.  There are scattered areas of asymmetrically decreased perfusion to the left MCA territory corresponding to area of known ischemic stroke.  CTA head shows a suspected diminished flow inferior division of the left middle cerebral artery close to the bifurcation with no other hemodynamically significant stenosis identified. Pt was loaded with IV Keppra 2 gram in the ED. Admitted to  service. Stroke Neurology was consulted. EEG neg for seizures.      New onset fever of 102 overnight on 5/29/25.  Infectious workup initiated. MRI brain on 5/30/25 with no acute infarct but showed Left MCA territory large encephalomalacia combined with gliotic changes. Infectious workup was neg however aspiration suspected on MBS study. SLP suggested strict NPO for now. NG tube ordered. Pt was started on Zosyn to cover aspiration pneumonitis. Due to persistent Rt facial droop and RUE weakness, Stroke Neurology recommended Neurosurgery consult for evaluation of chronic left SDH.       Interval Hx:   Pt noted able to lift her RUE up and and able to use her Rt hand some today though still weak.   Jber hole surgery planned for tomorrow   NG tube remains in place for medication and nutrition.  Afebrile x 48h. Hemodynamics are stable , on RA    Case was discussed with patient's nurse and  on the floor.    Objective/physical exam:  General: In no acute distress, afebrile,  on RA  Chest: Clear to auscultation bilaterally  Heart: RRR, +S1, S2, no appreciable murmur  Abdomen: Soft, nontender, BS +  MSK: Warm, no lower extremity edema, no clubbing or cyanosis  Neurologic: Alert and oriented x3, Rt facial droop, RUE 3/5, RLE 4/5, Left U/L ext 5/5        VITAL SIGNS: 24 HRS MIN & MAX LAST   Temp  Min: 98 °F (36.7 °C)  Max: 99.1 °F (37.3 °C) 98 °F (36.7 °C)   BP  Min: 100/67  Max: 115/72 115/72   Pulse  Min: 74  Max: 82  74   No data recorded 16   SpO2  Min: 94 %  Max: 95 % 95 %     I have reviewed the following labs:  Recent Labs   Lab 05/29/25 0952 05/30/25 0348 06/01/25 0527   WBC 9.23 9.00 8.19   RBC 3.62* 3.64* 3.76*   HGB 10.8* 10.8* 11.2*   HCT 32.7* 32.9* 34.0*   MCV 90.3 90.4 90.4   MCH 29.8 29.7 29.8   MCHC 33.0 32.8* 32.9*   RDW 13.5 13.3 13.4   * 544* 655*   MPV 9.5 9.8 9.2     Recent Labs   Lab 05/29/25 0952 05/30/25 0348 06/01/25  0527    139 139   K 3.9 3.5 3.4*    103 102   CO2 22* 23 26   BUN 8.6* 7.0* 7.8*   CREATININE 0.68 0.61 0.68   * 142* 166*   CALCIUM 9.2 9.2 9.1   MG  --   1.60 2.10   ALBUMIN 2.6* 2.6*  --    PROT 6.9 7.0  --    ALKPHOS 192* 176*  --    ALT 42 33  --    AST 26 22  --    BILITOT 0.3 0.4  --      Microbiology Results (last 7 days)       Procedure Component Value Units Date/Time    Blood Culture [[id number]]  (Normal) Collected: 05/30/25 0348    Order Status: Completed Specimen: Blood, Venous Updated: 06/01/25 0500     Blood Culture No Growth At 48 Hours    Blood Culture [[id number]]  (Normal) Collected: 05/30/25 0348    Order Status: Completed Specimen: Blood, Venous Updated: 06/01/25 0500     Blood Culture No Growth At 48 Hours    Respiratory Culture [[id number]]     Order Status: Sent Specimen: Sputum              See below for Radiology    Assessment/Plan:  Chronic left subdural hematoma, POA  Possible Left MCA territory post stroke recrudescence, POA  Oropharyngeal Dysphagia, POA - on NG tube  Left MCA territory large encephalomalacia with seizure in differential, POA  Recent h/o fall and SDH s/p left MMA embolization and left frontoparietal craniotomy in 5/2025  H/O Left MCA stroke , s/p  thrombectomy 6/1/24   Normocytic anemia , mild -POA  Acute fever- 5/29/25  Suspected Aspiration pneumonitis - 5/30/35     Plan-  Neurosurgery feels left diony hole with draining of SDH is indicated and plans for tomorrow   Pt and family want to proceed with surgery   Continue to hold Plavix. Was initially planned to restart on June 1   SLP suggested strict NPO  NG tube placed on 5/30/25  Start meds and nutrition via NG tube   Continue Statin   SBP goal under 160   Continue Keppra   SLP/PT/OT  Monitor course      VTE prophylaxis:  Dr. Egan cleared for Heparin but will start after Neurosurgery eval . SCDs for now      Patient condition:  Fair      Anticipated discharge and Disposition:     TBD       All diagnosis and differential diagnosis have been reviewed; assessment and plan has been documented; I have personally reviewed the labs and test results that are presently available;  I have reviewed the patients medication list; I have reviewed the consulting providers response and recommendations. I have reviewed or attempted to review medical records based upon their availability    All of the patient's questions have been  addressed and answered. Patient's is agreeable to the above stated plan. I will continue to monitor closely and make adjustments to medical management as needed.    Portions of this note dictated using EMR integrated voice recognition software, and may be subject to voice recognition errors not corrected at proofreading. Please contact writer for clarification if needed.   _____________________________________________________________________    Malnutrition Status:  Nutrition consulted. Most recent weight and BMI monitored-     Measurements:  Wt Readings from Last 1 Encounters:   05/30/25 46.5 kg (102 lb 8.2 oz)   Body mass index is 16.55 kg/m².    Patient has been screened and assessed by RD.    Malnutrition Type:  Context: acute illness or injury  Level: severe    Malnutrition Characteristic Summary:  Weight Loss (Malnutrition): 7.5% in 3 months  Energy Intake (Malnutrition): less than or equal to 50% for greater than or equal to 5 days  Subcutaneous Fat (Malnutrition): moderate depletion  Muscle Mass (Malnutrition): moderate depletion    Interventions/Recommendations (treatment strategy):        Scheduled Med:   atorvastatin  80 mg Per NG tube Daily    busPIRone  15 mg Per NG tube BID    docusate  100 mg Per NG tube BID    ezetimibe  10 mg Per NG tube Daily    levETIRAcetam (Keppra) IV (PEDS and ADULTS)  500 mg Intravenous Q12H    levothyroxine  88 mcg Per NG tube Before breakfast    nortriptyline  25 mg Per NG tube QHS    paroxetine  20 mg Per NG tube QAM    piperacillin-tazobactam (Zosyn) IV (PEDS and ADULTS) (extended infusion is not appropriate)  4.5 g Intravenous Q8H      Continuous Infusions:     PRN Meds:    Current Facility-Administered Medications:      acetaminophen, 650 mg, Oral, Q6H PRN    albuterol-ipratropium, 3 mL, Nebulization, Q4H PRN    bisacodyL, 10 mg, Rectal, Daily PRN    butalbital-acetaminophen-caffeine -40 mg, 1 tablet, Oral, Q4H PRN    dextrose 50%, 12.5 g, Intravenous, PRN    glucagon (human recombinant), 1 mg, Intramuscular, PRN    hydrALAZINE, 10 mg, Intravenous, Q6H PRN    insulin aspart U-100, 0-10 Units, Subcutaneous, Q6H PRN    labetalol, 10 mg, Intravenous, Q2H PRN    lorazepam, 2 mg, Intravenous, Q4H PRN    ondansetron, 4 mg, Intravenous, Q4H PRN    prochlorperazine, 5 mg, Intravenous, Q6H PRN    sodium chloride 0.9%, 10 mL, Intravenous, PRN         Argentina Jerome MD  Department of Hospital Medicine   [facility]   06/01/2025

## 2025-06-01 NOTE — PT/OT/SLP PROGRESS
Patient with neuro change yesterday. Neuro surgery with plans for burrhole with drainage of fluid tomorrow. SLP to follow up after, as patient is appropriate. Continue strict NPO.

## 2025-06-01 NOTE — PLAN OF CARE
Problem: Adult Inpatient Plan of Care  Goal: Plan of Care Review  Outcome: Progressing  Goal: Patient-Specific Goal (Individualized)  Outcome: Progressing  Goal: Absence of Hospital-Acquired Illness or Injury  Outcome: Progressing  Goal: Optimal Comfort and Wellbeing  Outcome: Progressing  Goal: Readiness for Transition of Care  Outcome: Progressing     Problem: Infection  Goal: Absence of Infection Signs and Symptoms  Outcome: Progressing     Problem: Stroke, Ischemic (Includes Transient Ischemic Attack)  Goal: Optimal Coping  Outcome: Progressing  Goal: Effective Bowel Elimination  Outcome: Progressing  Goal: Optimal Cerebral Tissue Perfusion  Outcome: Progressing  Goal: Optimal Cognitive Function  Outcome: Progressing  Goal: Improved Communication Skills  Outcome: Progressing  Goal: Optimal Functional Ability  Outcome: Progressing  Goal: Optimal Nutrition Intake  Outcome: Progressing  Goal: Effective Oxygenation and Ventilation  Outcome: Progressing  Goal: Improved Sensorimotor Function  Outcome: Progressing  Goal: Safe and Effective Swallow  Outcome: Progressing  Goal: Effective Urinary Elimination  Outcome: Progressing     Problem: Diabetes Comorbidity  Goal: Blood Glucose Level Within Targeted Range  Outcome: Progressing     Problem: Wound  Goal: Optimal Coping  Outcome: Progressing  Goal: Optimal Functional Ability  Outcome: Progressing  Goal: Absence of Infection Signs and Symptoms  Outcome: Progressing  Goal: Improved Oral Intake  Outcome: Progressing  Goal: Optimal Pain Control and Function  Outcome: Progressing  Goal: Skin Health and Integrity  Outcome: Progressing  Goal: Optimal Wound Healing  Outcome: Progressing     Problem: Fall Injury Risk  Goal: Absence of Fall and Fall-Related Injury  Outcome: Progressing     Problem: Skin Injury Risk Increased  Goal: Skin Health and Integrity  Outcome: Progressing

## 2025-06-01 NOTE — PROGRESS NOTES
No new issues.  Right arm still weak.  For surgery and burrhole for drainage of fluid.  Consent and risks discussed in detail.

## 2025-06-02 ENCOUNTER — ANESTHESIA (OUTPATIENT)
Dept: SURGERY | Facility: HOSPITAL | Age: 60
End: 2025-06-02
Payer: COMMERCIAL

## 2025-06-02 ENCOUNTER — ANESTHESIA EVENT (OUTPATIENT)
Dept: SURGERY | Facility: HOSPITAL | Age: 60
End: 2025-06-02
Payer: COMMERCIAL

## 2025-06-02 LAB
POCT GLUCOSE: 172 MG/DL (ref 70–110)
POCT GLUCOSE: 194 MG/DL (ref 70–110)
POCT GLUCOSE: 210 MG/DL (ref 70–110)

## 2025-06-02 PROCEDURE — 25000003 PHARM REV CODE 250: Performed by: INTERNAL MEDICINE

## 2025-06-02 PROCEDURE — 37000009 HC ANESTHESIA EA ADD 15 MINS: Performed by: NEUROLOGICAL SURGERY

## 2025-06-02 PROCEDURE — 00C40ZZ EXTIRPATION OF MATTER FROM INTRACRANIAL SUBDURAL SPACE, OPEN APPROACH: ICD-10-PCS | Performed by: NEUROLOGICAL SURGERY

## 2025-06-02 PROCEDURE — 94799 UNLISTED PULMONARY SVC/PX: CPT

## 2025-06-02 PROCEDURE — D9220A PRA ANESTHESIA: Mod: CRNA,,, | Performed by: NURSE ANESTHETIST, CERTIFIED REGISTERED

## 2025-06-02 PROCEDURE — 009430Z DRAINAGE OF INTRACRANIAL SUBDURAL SPACE WITH DRAINAGE DEVICE, PERCUTANEOUS APPROACH: ICD-10-PCS | Performed by: NEUROLOGICAL SURGERY

## 2025-06-02 PROCEDURE — 63600175 PHARM REV CODE 636 W HCPCS: Performed by: ANESTHESIOLOGY

## 2025-06-02 PROCEDURE — 71000033 HC RECOVERY, INTIAL HOUR: Performed by: NEUROLOGICAL SURGERY

## 2025-06-02 PROCEDURE — 27201423 OPTIME MED/SURG SUP & DEVICES STERILE SUPPLY: Performed by: NEUROLOGICAL SURGERY

## 2025-06-02 PROCEDURE — 27000221 HC OXYGEN, UP TO 24 HOURS

## 2025-06-02 PROCEDURE — 25000003 PHARM REV CODE 250: Performed by: NURSE ANESTHETIST, CERTIFIED REGISTERED

## 2025-06-02 PROCEDURE — 63600175 PHARM REV CODE 636 W HCPCS

## 2025-06-02 PROCEDURE — D9220A PRA ANESTHESIA: Mod: ANES,,, | Performed by: ANESTHESIOLOGY

## 2025-06-02 PROCEDURE — 63600175 PHARM REV CODE 636 W HCPCS: Performed by: NURSE ANESTHETIST, CERTIFIED REGISTERED

## 2025-06-02 PROCEDURE — 36620 INSERTION CATHETER ARTERY: CPT | Mod: 59,,, | Performed by: NURSE ANESTHETIST, CERTIFIED REGISTERED

## 2025-06-02 PROCEDURE — 99900031 HC PATIENT EDUCATION (STAT)

## 2025-06-02 PROCEDURE — 21400001 HC TELEMETRY ROOM

## 2025-06-02 PROCEDURE — 99900035 HC TECH TIME PER 15 MIN (STAT)

## 2025-06-02 PROCEDURE — 25000003 PHARM REV CODE 250

## 2025-06-02 PROCEDURE — 25000003 PHARM REV CODE 250: Performed by: NEUROLOGICAL SURGERY

## 2025-06-02 PROCEDURE — 63600175 PHARM REV CODE 636 W HCPCS: Performed by: NEUROLOGICAL SURGERY

## 2025-06-02 PROCEDURE — 11000001 HC ACUTE MED/SURG PRIVATE ROOM

## 2025-06-02 PROCEDURE — 37000008 HC ANESTHESIA 1ST 15 MINUTES: Performed by: NEUROLOGICAL SURGERY

## 2025-06-02 PROCEDURE — C1713 ANCHOR/SCREW BN/BN,TIS/BN: HCPCS | Performed by: NEUROLOGICAL SURGERY

## 2025-06-02 PROCEDURE — 63600175 PHARM REV CODE 636 W HCPCS: Performed by: INTERNAL MEDICINE

## 2025-06-02 PROCEDURE — 36000710: Performed by: NEUROLOGICAL SURGERY

## 2025-06-02 PROCEDURE — 63600175 PHARM REV CODE 636 W HCPCS: Performed by: NURSE PRACTITIONER

## 2025-06-02 PROCEDURE — 71000039 HC RECOVERY, EACH ADD'L HOUR: Performed by: NEUROLOGICAL SURGERY

## 2025-06-02 PROCEDURE — 36000711: Performed by: NEUROLOGICAL SURGERY

## 2025-06-02 RX ORDER — MIDAZOLAM HYDROCHLORIDE 1 MG/ML
INJECTION INTRAMUSCULAR; INTRAVENOUS
Status: DISCONTINUED | OUTPATIENT
Start: 2025-06-02 | End: 2025-06-02

## 2025-06-02 RX ORDER — LIDOCAINE HYDROCHLORIDE 20 MG/ML
INJECTION, SOLUTION EPIDURAL; INFILTRATION; INTRACAUDAL; PERINEURAL
Status: DISCONTINUED | OUTPATIENT
Start: 2025-06-02 | End: 2025-06-02

## 2025-06-02 RX ORDER — ROCURONIUM BROMIDE 10 MG/ML
INJECTION, SOLUTION INTRAVENOUS
Status: DISCONTINUED | OUTPATIENT
Start: 2025-06-02 | End: 2025-06-02

## 2025-06-02 RX ORDER — PHENYLEPHRINE HCL IN 0.9% NACL 1 MG/10 ML
SYRINGE (ML) INTRAVENOUS
Status: DISCONTINUED | OUTPATIENT
Start: 2025-06-02 | End: 2025-06-02

## 2025-06-02 RX ORDER — ACETAMINOPHEN 325 MG/1
650 TABLET ORAL EVERY 4 HOURS PRN
Status: DISCONTINUED | OUTPATIENT
Start: 2025-06-02 | End: 2025-06-03

## 2025-06-02 RX ORDER — CLINDAMYCIN PHOSPHATE 900 MG/50ML
900 INJECTION, SOLUTION INTRAVENOUS
Status: COMPLETED | OUTPATIENT
Start: 2025-06-02 | End: 2025-06-03

## 2025-06-02 RX ORDER — ONDANSETRON HYDROCHLORIDE 2 MG/ML
INJECTION, SOLUTION INTRAMUSCULAR; INTRAVENOUS
Status: DISCONTINUED | OUTPATIENT
Start: 2025-06-02 | End: 2025-06-02

## 2025-06-02 RX ORDER — BACITRACIN 500 [USP'U]/G
OINTMENT TOPICAL
Status: DISCONTINUED | OUTPATIENT
Start: 2025-06-02 | End: 2025-06-02 | Stop reason: HOSPADM

## 2025-06-02 RX ORDER — ACETAMINOPHEN 650 MG/1
650 SUPPOSITORY RECTAL EVERY 4 HOURS PRN
Status: DISCONTINUED | OUTPATIENT
Start: 2025-06-02 | End: 2025-06-03

## 2025-06-02 RX ORDER — HYDROMORPHONE HYDROCHLORIDE 2 MG/ML
0.4 INJECTION, SOLUTION INTRAMUSCULAR; INTRAVENOUS; SUBCUTANEOUS EVERY 5 MIN PRN
Status: DISCONTINUED | OUTPATIENT
Start: 2025-06-02 | End: 2025-06-02 | Stop reason: HOSPADM

## 2025-06-02 RX ORDER — FAMOTIDINE 10 MG/ML
20 INJECTION, SOLUTION INTRAVENOUS EVERY 12 HOURS
Status: DISCONTINUED | OUTPATIENT
Start: 2025-06-02 | End: 2025-06-04

## 2025-06-02 RX ORDER — HYDROCODONE BITARTRATE AND ACETAMINOPHEN 5; 325 MG/1; MG/1
1 TABLET ORAL EVERY 4 HOURS PRN
Refills: 0 | Status: DISCONTINUED | OUTPATIENT
Start: 2025-06-02 | End: 2025-06-06 | Stop reason: HOSPADM

## 2025-06-02 RX ORDER — DEXAMETHASONE SODIUM PHOSPHATE 4 MG/ML
INJECTION, SOLUTION INTRA-ARTICULAR; INTRALESIONAL; INTRAMUSCULAR; INTRAVENOUS; SOFT TISSUE
Status: DISCONTINUED | OUTPATIENT
Start: 2025-06-02 | End: 2025-06-02

## 2025-06-02 RX ORDER — ACETAMINOPHEN 10 MG/ML
INJECTION, SOLUTION INTRAVENOUS
Status: DISCONTINUED | OUTPATIENT
Start: 2025-06-02 | End: 2025-06-02

## 2025-06-02 RX ORDER — MORPHINE SULFATE 4 MG/ML
2 INJECTION, SOLUTION INTRAMUSCULAR; INTRAVENOUS EVERY 4 HOURS PRN
Refills: 0 | Status: DISCONTINUED | OUTPATIENT
Start: 2025-06-02 | End: 2025-06-03

## 2025-06-02 RX ORDER — SODIUM CHLORIDE 9 MG/ML
INJECTION, SOLUTION INTRAVENOUS CONTINUOUS
Status: DISCONTINUED | OUTPATIENT
Start: 2025-06-02 | End: 2025-06-04

## 2025-06-02 RX ORDER — PROPOFOL 10 MG/ML
VIAL (ML) INTRAVENOUS
Status: DISCONTINUED | OUTPATIENT
Start: 2025-06-02 | End: 2025-06-02

## 2025-06-02 RX ORDER — FENTANYL CITRATE 50 UG/ML
INJECTION, SOLUTION INTRAMUSCULAR; INTRAVENOUS
Status: DISCONTINUED | OUTPATIENT
Start: 2025-06-02 | End: 2025-06-02

## 2025-06-02 RX ADMIN — BUTALBITAL, ACETAMINOPHEN, AND CAFFEINE 1 TABLET: 325; 50; 40 TABLET ORAL at 05:06

## 2025-06-02 RX ADMIN — ROCURONIUM BROMIDE 60 MG: 10 SOLUTION INTRAVENOUS at 01:06

## 2025-06-02 RX ADMIN — LEVETIRACETAM 500 MG: 100 INJECTION, SOLUTION INTRAVENOUS at 09:06

## 2025-06-02 RX ADMIN — DOCUSATE SODIUM LIQUID 100 MG: 100 LIQUID ORAL at 08:06

## 2025-06-02 RX ADMIN — LEVETIRACETAM 500 MG: 100 INJECTION, SOLUTION INTRAVENOUS at 08:06

## 2025-06-02 RX ADMIN — PROPOFOL 80 MG: 10 INJECTION, EMULSION INTRAVENOUS at 01:06

## 2025-06-02 RX ADMIN — SODIUM CHLORIDE: 9 INJECTION, SOLUTION INTRAVENOUS at 06:06

## 2025-06-02 RX ADMIN — PIPERACILLIN SODIUM AND TAZOBACTAM SODIUM 4.5 G: 4; .5 INJECTION, POWDER, LYOPHILIZED, FOR SOLUTION INTRAVENOUS at 12:06

## 2025-06-02 RX ADMIN — LEVOTHYROXINE SODIUM 88 MCG: 0.09 TABLET ORAL at 05:06

## 2025-06-02 RX ADMIN — INSULIN ASPART 2 UNITS: 100 INJECTION, SOLUTION INTRAVENOUS; SUBCUTANEOUS at 09:06

## 2025-06-02 RX ADMIN — Medication 200 MCG: at 02:06

## 2025-06-02 RX ADMIN — LIDOCAINE HYDROCHLORIDE 40 MG: 20 INJECTION, SOLUTION EPIDURAL; INFILTRATION; INTRACAUDAL; PERINEURAL at 01:06

## 2025-06-02 RX ADMIN — PAROXETINE HYDROCHLORIDE 20 MG: 20 TABLET, FILM COATED ORAL at 05:06

## 2025-06-02 RX ADMIN — SODIUM CHLORIDE, SODIUM GLUCONATE, SODIUM ACETATE, POTASSIUM CHLORIDE AND MAGNESIUM CHLORIDE: 526; 502; 368; 37; 30 INJECTION, SOLUTION INTRAVENOUS at 01:06

## 2025-06-02 RX ADMIN — FAMOTIDINE 20 MG: 10 INJECTION, SOLUTION INTRAVENOUS at 09:06

## 2025-06-02 RX ADMIN — ACETAMINOPHEN 700 MG: 10 INJECTION, SOLUTION INTRAVENOUS at 01:06

## 2025-06-02 RX ADMIN — MIDAZOLAM HYDROCHLORIDE 1 MG: 1 INJECTION, SOLUTION INTRAMUSCULAR; INTRAVENOUS at 01:06

## 2025-06-02 RX ADMIN — DOCUSATE SODIUM LIQUID 100 MG: 100 LIQUID ORAL at 09:06

## 2025-06-02 RX ADMIN — SUGAMMADEX 200 MG: 100 INJECTION, SOLUTION INTRAVENOUS at 02:06

## 2025-06-02 RX ADMIN — ONDANSETRON 4 MG: 2 INJECTION INTRAMUSCULAR; INTRAVENOUS at 01:06

## 2025-06-02 RX ADMIN — HYDROMORPHONE HYDROCHLORIDE 0.4 MG: 2 INJECTION, SOLUTION INTRAMUSCULAR; INTRAVENOUS; SUBCUTANEOUS at 03:06

## 2025-06-02 RX ADMIN — CLINDAMYCIN PHOSPHATE 900 MG: 900 INJECTION, SOLUTION INTRAVENOUS at 11:06

## 2025-06-02 RX ADMIN — HYDROCODONE BITARTRATE AND ACETAMINOPHEN 1 TABLET: 5; 325 TABLET ORAL at 09:06

## 2025-06-02 RX ADMIN — PIPERACILLIN SODIUM AND TAZOBACTAM SODIUM 4.5 G: 4; .5 INJECTION, POWDER, LYOPHILIZED, FOR SOLUTION INTRAVENOUS at 06:06

## 2025-06-02 RX ADMIN — HYDROMORPHONE HYDROCHLORIDE 0.4 MG: 2 INJECTION, SOLUTION INTRAMUSCULAR; INTRAVENOUS; SUBCUTANEOUS at 02:06

## 2025-06-02 RX ADMIN — BUTALBITAL, ACETAMINOPHEN, AND CAFFEINE 1 TABLET: 325; 50; 40 TABLET ORAL at 11:06

## 2025-06-02 RX ADMIN — DEXAMETHASONE SODIUM PHOSPHATE 4 MG: 4 INJECTION, SOLUTION INTRA-ARTICULAR; INTRALESIONAL; INTRAMUSCULAR; INTRAVENOUS; SOFT TISSUE at 01:06

## 2025-06-02 RX ADMIN — BUSPIRONE HYDROCHLORIDE 15 MG: 5 TABLET ORAL at 09:06

## 2025-06-02 RX ADMIN — EZETIMIBE 10 MG: 10 TABLET ORAL at 08:06

## 2025-06-02 RX ADMIN — BUTALBITAL, ACETAMINOPHEN, AND CAFFEINE 1 TABLET: 325; 50; 40 TABLET ORAL at 06:06

## 2025-06-02 RX ADMIN — FENTANYL CITRATE 50 MCG: 50 INJECTION, SOLUTION INTRAMUSCULAR; INTRAVENOUS at 01:06

## 2025-06-02 RX ADMIN — BUSPIRONE HYDROCHLORIDE 15 MG: 5 TABLET ORAL at 08:06

## 2025-06-02 RX ADMIN — FENTANYL CITRATE 100 MCG: 50 INJECTION, SOLUTION INTRAMUSCULAR; INTRAVENOUS at 02:06

## 2025-06-02 RX ADMIN — NORTRIPTYLINE HYDROCHLORIDE 25 MG: 25 CAPSULE ORAL at 09:06

## 2025-06-02 RX ADMIN — PIPERACILLIN SODIUM AND TAZOBACTAM SODIUM 4.5 G: 4; .5 INJECTION, POWDER, LYOPHILIZED, FOR SOLUTION INTRAVENOUS at 09:06

## 2025-06-02 RX ADMIN — ONDANSETRON 4 MG: 2 INJECTION INTRAMUSCULAR; INTRAVENOUS at 03:06

## 2025-06-02 RX ADMIN — ATORVASTATIN CALCIUM 80 MG: 40 TABLET, FILM COATED ORAL at 08:06

## 2025-06-02 NOTE — BRIEF OP NOTE
[facility] - Periop Services  Brief Operative Note    SUMMARY     Surgery Date: 6/2/2025     Surgeons and Role:     * James Rankin MD - Primary    Assisting Surgeon: Henrik Sandoval PA-C    Pre-op Diagnosis:  Subdural hemorrhage following injury with open intracranial wound and no loss of consciousness, initial encounter [S06.5X0A, S01.90XA]    Post-op Diagnosis:  Post-Op Diagnosis Codes:     * Subdural hemorrhage following injury with open intracranial wound and no loss of consciousness, initial encounter [S06.5X0A, S01.90XA]    Procedure(s) (LRB):  CREATION, CRANIAL TERRI HOLE, WITH HEMATOMA EVACUATION (Left)    Left terri hole for subdural evacuation.     Anesthesia: General    Implants:  * No implants in log *    Operative Findings: Dictated    Estimated Blood Loss: * No values recorded between 6/2/2025  1:11 PM and 6/2/2025  2:22 PM *    Estimated Blood Loss has been documented.         Specimens:   Specimen (24h ago, onward)      None          * No specimens in log *    [id number]

## 2025-06-02 NOTE — PROGRESS NOTES
[facility] Hospital Medicine Progress Note        Chief Complaint: Inpatient Follow-up for Rt sided weakness     HPI:   58 yo female with PMHx of  DM2, left MCA stroke s/p thrombectomy in 6/2024 was on Plavix, Recent SDH following a fall while on Plavix on 5/16/25 s/p cerebral angiogram with embolization of left middle meningeal artery and on  5/19/25 s/p left frontoparietal craniotomy and evacuation of SDH. Pt was discharged home on 5/22 with home health. Today 5/29/25 Pt  presents to ED with c/o acute onset headache, possibly around midnight. Then around 0540 she developed right arm and leg weakness with right facial droop. Has not been taking Keppra at home. Seen by Stroke Neurology in the ED who does not suspect a new stroke but rather seizure activity. RLE weakness already improved however RUE  weakness and right facial droop persisted.      VS on arrival: T 98.2, P 100, R 18, b/P 105/73, Sats 98% on room air. Initial labs: WBC 9.23, Hgb 10.8, platelets 527, PT 13.9, INR 1.1, Na 138, K 3.9, Cl 103, creatinine 0.68, glucose 191, TSH 0.309 and otherwise unremarkable.  CT head shows a left cerebral convexity hypodense collection without residual acute hyperdense subacute hematoma and improved post surgical pneumocephalus and otherwise no significant interval change.  CT brain perfusion shows no definite blood flow asymmetry or other findings to suggest significant acute perfusion defects.  There are scattered areas of asymmetrically decreased perfusion to the left MCA territory corresponding to area of known ischemic stroke.  CTA head shows a suspected diminished flow inferior division of the left middle cerebral artery close to the bifurcation with no other hemodynamically significant stenosis identified. Pt was loaded with IV Keppra 2 gram in the ED. Admitted to  service. Stroke Neurology was consulted. EEG neg for seizures.      New onset fever of 102 overnight on 5/29/25.  Infectious workup initiated. MRI brain on 5/30/25 with no acute infarct but showed Left MCA territory large encephalomalacia combined with gliotic changes. Infectious workup was neg however aspiration suspected on MBS study. SLP suggested strict NPO for now. NG tube ordered. Pt was started on Zosyn to cover aspiration pneumonitis. Due to persistent Rt facial droop and RUE weakness, Stroke Neurology recommended Neurosurgery consult for evaluation of chronic left SDH. Neurosurgery felt  left diony hole with draining of SDH was indicated. Cardiology was consulted for Cardiac risk assessment. Pt was cleared with low risk. Scheduled for left diony hole for drainage of SDH on 6/2/25.      Interval Hx:   NPO for left diony hole today   Rt facial droop is better. RUE strength is improving .   Pre op vitals are stable.  Labs from yesterday stable at baseline.     Case was discussed with patient's nurse and  on the floor.    Objective/physical exam:  General: In no acute distress, afebrile,  on RA  Chest: Clear to auscultation bilaterally  Heart: RRR, +S1, S2, no appreciable murmur  Abdomen: Soft, nontender, BS +  MSK: Warm, no lower extremity edema, no clubbing or cyanosis  Neurologic: Alert and oriented x3, Rt facial droop, RUE 3/5, RLE 4/5, Left U/L ext 5/5       VITAL SIGNS: 24 HRS MIN & MAX LAST   Temp  Min: 97.7 °F (36.5 °C)  Max: 99.5 °F (37.5 °C) 98.2 °F (36.8 °C)   BP  Min: 97/69  Max: 120/76 120/76   Pulse  Min: 73  Max: 84  81   Resp  Min: 16  Max: 16 16   SpO2  Min: 93 %  Max: 97 % (!) 94 % (RA)     I have reviewed the following labs:  Recent Labs   Lab 05/29/25  0952 05/30/25  0348 06/01/25  0527   WBC 9.23 9.00 8.19   RBC 3.62* 3.64* 3.76*   HGB 10.8* 10.8* 11.2*   HCT 32.7* 32.9* 34.0*   MCV 90.3 90.4 90.4   MCH 29.8 29.7 29.8   MCHC 33.0 32.8* 32.9*   RDW 13.5 13.3 13.4   * 544* 655*   MPV 9.5 9.8 9.2     Recent Labs   Lab 05/29/25  0952 05/30/25  0348 06/01/25  0527    139 139   K 3.9  3.5 3.4*    103 102   CO2 22* 23 26   BUN 8.6* 7.0* 7.8*   CREATININE 0.68 0.61 0.68   * 142* 166*   CALCIUM 9.2 9.2 9.1   MG  --  1.60 2.10   ALBUMIN 2.6* 2.6*  --    PROT 6.9 7.0  --    ALKPHOS 192* 176*  --    ALT 42 33  --    AST 26 22  --    BILITOT 0.3 0.4  --      Microbiology Results (last 7 days)       Procedure Component Value Units Date/Time    Respiratory Culture [[id number]] Collected: 06/01/25 1833    Order Status: Completed Specimen: Sputum Updated: 06/02/25 0806     Respiratory Culture Normal respiratory aaron     GRAM STAIN Quality 1+      Few Gram Positive Rods      Few Gram positive cocci    Blood Culture [[id number]]  (Normal) Collected: 05/30/25 0348    Order Status: Completed Specimen: Blood, Venous Updated: 06/02/25 0405     Blood Culture No Growth At 72 Hours    Blood Culture [[id number]]  (Normal) Collected: 05/30/25 0348    Order Status: Completed Specimen: Blood, Venous Updated: 06/02/25 0405     Blood Culture No Growth At 72 Hours             See below for Radiology    Assessment/Plan:  Chronic left subdural hematoma, POA  Headaches due to above, POA  Possible Left MCA territory post stroke recrudescence, POA  Oropharyngeal Dysphagia, POA - on NG tube  Left MCA territory large encephalomalacia with seizure in differential, POA  Recent h/o fall and SDH s/p left MMA embolization and left frontoparietal craniotomy in 5/2025  H/O Left MCA stroke , s/p  thrombectomy 6/1/24   Normocytic anemia , mild -POA  Acute fever- 5/29/25- resolved   Suspected Aspiration pneumonitis - 5/30/35     Plan-  Going for left diony home today   Pt will be admitted to ICU post op   Continue to hold Plavix. Was initially planned to restart on June 1. Resume once okay with Neurosurgery post op.   SLP suggested strict NPO  NG tube placed on 5/30/25  Start meds and nutrition via NG tube   Continue Statin   SBP goal under 160   Continue Keppra   SLP/PT/OT  Monitor course      VTE prophylaxis:  Dr. Egan  cleared for Heparin but will start once recommended per  Neurosurgery post op.       Patient condition:  Fair      Anticipated discharge and Disposition:     TBD      All diagnosis and differential diagnosis have been reviewed; assessment and plan has been documented; I have personally reviewed the labs and test results that are presently available; I have reviewed the patients medication list; I have reviewed the consulting providers response and recommendations. I have reviewed or attempted to review medical records based upon their availability    All of the patient's questions have been  addressed and answered. Patient's is agreeable to the above stated plan. I will continue to monitor closely and make adjustments to medical management as needed.    Portions of this note dictated using EMR integrated voice recognition software, and may be subject to voice recognition errors not corrected at proofreading. Please contact writer for clarification if needed.   _____________________________________________________________________    Malnutrition Status:  Nutrition consulted. Most recent weight and BMI monitored-     Measurements:  Wt Readings from Last 1 Encounters:   05/30/25 46.5 kg (102 lb 8.2 oz)   Body mass index is 16.55 kg/m².    Patient has been screened and assessed by RD.    Malnutrition Type:  Context: acute illness or injury  Level: severe    Malnutrition Characteristic Summary:  Weight Loss (Malnutrition): 7.5% in 3 months  Energy Intake (Malnutrition): less than or equal to 50% for greater than or equal to 5 days  Subcutaneous Fat (Malnutrition): moderate depletion  Muscle Mass (Malnutrition): moderate depletion    Interventions/Recommendations (treatment strategy):        Scheduled Med:   atorvastatin  80 mg Per NG tube Daily    busPIRone  15 mg Per NG tube BID    clindamycin IV (PEDS and ADULTS)  900 mg Intravenous Q8H    docusate  100 mg Per NG tube BID    ezetimibe  10 mg Per NG tube Daily     famotidine (PF)  20 mg Intravenous Q12H    levETIRAcetam (Keppra) IV (PEDS and ADULTS)  500 mg Intravenous Q12H    levothyroxine  88 mcg Per NG tube Before breakfast    nortriptyline  25 mg Per NG tube QHS    paroxetine  20 mg Per NG tube QAM    piperacillin-tazobactam (Zosyn) IV (PEDS and ADULTS) (extended infusion is not appropriate)  4.5 g Intravenous Q8H      Continuous Infusions:   0.9% NaCl   Intravenous Continuous          PRN Meds:    Current Facility-Administered Medications:     acetaminophen, 650 mg, Rectal, Q4H PRN    acetaminophen, 650 mg, Oral, Q4H PRN    albuterol-ipratropium, 3 mL, Nebulization, Q4H PRN    bisacodyL, 10 mg, Rectal, Daily PRN    butalbital-acetaminophen-caffeine -40 mg, 1 tablet, Oral, Q4H PRN    dextrose 50%, 12.5 g, Intravenous, PRN    glucagon (human recombinant), 1 mg, Intramuscular, PRN    hydrALAZINE, 10 mg, Intravenous, Q6H PRN    HYDROcodone-acetaminophen, 1 tablet, Oral, Q4H PRN    insulin aspart U-100, 0-10 Units, Subcutaneous, Q6H PRN    labetalol, 10 mg, Intravenous, Q2H PRN    lorazepam, 2 mg, Intravenous, Q4H PRN    morphine, 2 mg, Intravenous, Q4H PRN    ondansetron, 4 mg, Intravenous, Q4H PRN    prochlorperazine, 5 mg, Intravenous, Q6H PRN    sodium chloride 0.9%, 10 mL, Intravenous, PRN         Argentina Jerome MD  Department of Hospital Medicine   [facility]   06/02/2025

## 2025-06-02 NOTE — PLAN OF CARE
Problem: Infection  Goal: Absence of Infection Signs and Symptoms  Outcome: Progressing     Problem: Stroke, Ischemic (Includes Transient Ischemic Attack)  Goal: Optimal Coping  Outcome: Progressing  Goal: Effective Bowel Elimination  Outcome: Progressing  Goal: Optimal Cerebral Tissue Perfusion  Outcome: Progressing  Goal: Optimal Cognitive Function  Outcome: Progressing  Goal: Improved Communication Skills  Outcome: Progressing  Goal: Optimal Functional Ability  Outcome: Progressing  Goal: Optimal Nutrition Intake  Outcome: Progressing  Goal: Effective Oxygenation and Ventilation  Outcome: Progressing  Goal: Improved Sensorimotor Function  Outcome: Progressing  Goal: Safe and Effective Swallow  Outcome: Progressing  Goal: Effective Urinary Elimination  Outcome: Progressing     Problem: Diabetes Comorbidity  Goal: Blood Glucose Level Within Targeted Range  Outcome: Progressing     Problem: Wound  Goal: Optimal Coping  Outcome: Progressing  Goal: Optimal Functional Ability  Outcome: Progressing  Goal: Absence of Infection Signs and Symptoms  Outcome: Progressing  Goal: Improved Oral Intake  Outcome: Progressing  Goal: Optimal Pain Control and Function  Outcome: Progressing  Goal: Skin Health and Integrity  Outcome: Progressing  Goal: Optimal Wound Healing  Outcome: Progressing     Problem: Fall Injury Risk  Goal: Absence of Fall and Fall-Related Injury  Outcome: Progressing     Problem: Skin Injury Risk Increased  Goal: Skin Health and Integrity  Outcome: Progressing

## 2025-06-02 NOTE — OP NOTE
[facility]                       [address] [id number]    PATIENT NAME:      KAREY MILTON  YOB: 1965  CSN:               [id number]  MRN:               [id number]  ADMIT DATE:        05/29/2025 09:03:00  PHYSICIAN:         James Rankin MD                          OPERATIVE REPORT      DATE OF SURGERY:    06/02/2025 03:55:42    SURGEON:  Jamse Rankin MD    ASSISTANT:  Henrik Sandoval.    PREOPERATIVE DIAGNOSIS:  Left chronic subdural hematoma with mass effect with   possible weakness in the right arm.    POSTOPERATIVE DIAGNOSIS:  Left chronic subdural hematoma with mass effect with   possible weakness in the right arm.    PROCEDURE:  Left diony hole for placement of subdural drain using Stealth system.    There were no issues.  No complication.    INDICATIONS FOR PROCEDURE:  The patient is a 59-year-old with subdural.  I was   asked to see over the weekend.  Another neurosurgeon performed surgery.  She has   done well, then came back with more subudral fluid..  After multiple discussions including seen   by neurologist, who recommended diony hole for drainage of some of the fluid.  I   did explain her weakness in the right arm and is now also having a seizure.  We   spent some time.  She knows me from previous surgery.  Consent and risks were   discussed.  Risks of bleeding, infection, weakness, probability of CSF leak,   reoperation, hemorrhage discussed in detail.  They want to proceed with surgery.    OPERATIVE PROCEDURE:  Brought to operating room, intubated.  Head put in pins.    Bump put on the left side.  We used the Stealth to register exactly where we   need to be and then an incision was made below the previous work.  I made the   incision after shaving the head, sterilely prepping and draping the head.  A   diony hole was made.  We then tunneled the drain to 10 round drain and this   allowed us  to open the dura and we put a drain in nicely into the subdural   space.  The drain was secured in place and was working very well and the fluid   coming through the JOHNATHAN.  Wound was irrigated multiple times.  Gelfoam placed and   we put a diony hole cover plate and this allowed us place  screws.  Wound was   irrigated multiple times.  Wound was closed with 3-0 Vicryl and chromic and the   previous incision with the staples were removed and also closed with chromic.    There were no issues.  No complications.  The patient was then taken out of   pins.        ______________________________  MD RODDY Agosto/FRANCESCO  DD:  06/02/2025  Time:  02:15PM  DT:  06/02/2025  Time:  05:35PM  Job #:  [id number]      OPERATIVE REPORT

## 2025-06-02 NOTE — PT/OT/SLP PROGRESS
Physical Therapy      Patient Name:  Chelle Chandra   MRN:  [id number]    Patient not seen today secondary to off floor for neurosurgery ([place] hole). Will follow-up for PT reassessment as appropriate.      6/2/2025

## 2025-06-02 NOTE — PT/OT/SLP PROGRESS
Occupational Therapy      Patient Name:  Chelle Chandra   MRN:  [id number]    Patient off floor for neurosurgery ([place] hole). Will follow-up for OT reassessment as appropriate.      6/2/2025

## 2025-06-02 NOTE — PT/OT/SLP PROGRESS
Patient scheduled for neuro surgery ([place] hole) today. SLP rec: continue strict NPO. SLP to reassess s/p surgery as pt is appropriate.

## 2025-06-03 LAB
BACTERIA SPEC CULT: NORMAL
GRAM STN SPEC: NORMAL
POCT GLUCOSE: 143 MG/DL (ref 70–110)
POCT GLUCOSE: 218 MG/DL (ref 70–110)

## 2025-06-03 PROCEDURE — 21400001 HC TELEMETRY ROOM

## 2025-06-03 PROCEDURE — 25000003 PHARM REV CODE 250: Performed by: NEUROLOGICAL SURGERY

## 2025-06-03 PROCEDURE — 97116 GAIT TRAINING THERAPY: CPT

## 2025-06-03 PROCEDURE — 63600175 PHARM REV CODE 636 W HCPCS: Performed by: INTERNAL MEDICINE

## 2025-06-03 PROCEDURE — 92523 SPEECH SOUND LANG COMPREHEN: CPT

## 2025-06-03 PROCEDURE — 63600175 PHARM REV CODE 636 W HCPCS: Performed by: NURSE PRACTITIONER

## 2025-06-03 PROCEDURE — 25000003 PHARM REV CODE 250

## 2025-06-03 PROCEDURE — 94799 UNLISTED PULMONARY SVC/PX: CPT

## 2025-06-03 PROCEDURE — 25000003 PHARM REV CODE 250: Performed by: INTERNAL MEDICINE

## 2025-06-03 PROCEDURE — 97164 PT RE-EVAL EST PLAN CARE: CPT

## 2025-06-03 PROCEDURE — 97168 OT RE-EVAL EST PLAN CARE: CPT

## 2025-06-03 PROCEDURE — 63600175 PHARM REV CODE 636 W HCPCS

## 2025-06-03 RX ORDER — ACETAMINOPHEN 325 MG/1
650 TABLET ORAL EVERY 6 HOURS PRN
Status: DISCONTINUED | OUTPATIENT
Start: 2025-06-03 | End: 2025-06-06 | Stop reason: HOSPADM

## 2025-06-03 RX ORDER — HYDROCODONE BITARTRATE AND ACETAMINOPHEN 10; 325 MG/1; MG/1
1 TABLET ORAL EVERY 4 HOURS PRN
Refills: 0 | Status: DISCONTINUED | OUTPATIENT
Start: 2025-06-03 | End: 2025-06-06 | Stop reason: HOSPADM

## 2025-06-03 RX ORDER — ACETAMINOPHEN 650 MG/1
650 SUPPOSITORY RECTAL EVERY 6 HOURS PRN
Status: DISCONTINUED | OUTPATIENT
Start: 2025-06-03 | End: 2025-06-06 | Stop reason: HOSPADM

## 2025-06-03 RX ADMIN — NORTRIPTYLINE HYDROCHLORIDE 25 MG: 25 CAPSULE ORAL at 08:06

## 2025-06-03 RX ADMIN — LEVETIRACETAM 500 MG: 100 INJECTION, SOLUTION INTRAVENOUS at 08:06

## 2025-06-03 RX ADMIN — PAROXETINE HYDROCHLORIDE 20 MG: 20 TABLET, FILM COATED ORAL at 06:06

## 2025-06-03 RX ADMIN — HYDROCODONE BITARTRATE AND ACETAMINOPHEN 1 TABLET: 5; 325 TABLET ORAL at 01:06

## 2025-06-03 RX ADMIN — ATORVASTATIN CALCIUM 80 MG: 40 TABLET, FILM COATED ORAL at 09:06

## 2025-06-03 RX ADMIN — BUSPIRONE HYDROCHLORIDE 15 MG: 5 TABLET ORAL at 08:06

## 2025-06-03 RX ADMIN — BUTALBITAL, ACETAMINOPHEN, AND CAFFEINE 1 TABLET: 325; 50; 40 TABLET ORAL at 05:06

## 2025-06-03 RX ADMIN — LEVOTHYROXINE SODIUM 88 MCG: 0.09 TABLET ORAL at 05:06

## 2025-06-03 RX ADMIN — PIPERACILLIN SODIUM AND TAZOBACTAM SODIUM 4.5 G: 4; .5 INJECTION, POWDER, LYOPHILIZED, FOR SOLUTION INTRAVENOUS at 11:06

## 2025-06-03 RX ADMIN — EZETIMIBE 10 MG: 10 TABLET ORAL at 09:06

## 2025-06-03 RX ADMIN — HYDROCODONE BITARTRATE AND ACETAMINOPHEN 1 TABLET: 10; 325 TABLET ORAL at 03:06

## 2025-06-03 RX ADMIN — INSULIN ASPART 4 UNITS: 100 INJECTION, SOLUTION INTRAVENOUS; SUBCUTANEOUS at 06:06

## 2025-06-03 RX ADMIN — BUSPIRONE HYDROCHLORIDE 15 MG: 5 TABLET ORAL at 09:06

## 2025-06-03 RX ADMIN — PIPERACILLIN SODIUM AND TAZOBACTAM SODIUM 4.5 G: 4; .5 INJECTION, POWDER, LYOPHILIZED, FOR SOLUTION INTRAVENOUS at 01:06

## 2025-06-03 RX ADMIN — BUTALBITAL, ACETAMINOPHEN, AND CAFFEINE 1 TABLET: 325; 50; 40 TABLET ORAL at 09:06

## 2025-06-03 RX ADMIN — CLINDAMYCIN PHOSPHATE 900 MG: 900 INJECTION, SOLUTION INTRAVENOUS at 06:06

## 2025-06-03 RX ADMIN — FAMOTIDINE 20 MG: 10 INJECTION, SOLUTION INTRAVENOUS at 08:06

## 2025-06-03 RX ADMIN — LEVETIRACETAM 500 MG: 100 INJECTION, SOLUTION INTRAVENOUS at 09:06

## 2025-06-03 RX ADMIN — HYDROCODONE BITARTRATE AND ACETAMINOPHEN 1 TABLET: 10; 325 TABLET ORAL at 08:06

## 2025-06-03 RX ADMIN — PIPERACILLIN SODIUM AND TAZOBACTAM SODIUM 4.5 G: 4; .5 INJECTION, POWDER, LYOPHILIZED, FOR SOLUTION INTRAVENOUS at 06:06

## 2025-06-03 RX ADMIN — HYDROCODONE BITARTRATE AND ACETAMINOPHEN 1 TABLET: 10; 325 TABLET ORAL at 06:06

## 2025-06-03 RX ADMIN — FAMOTIDINE 20 MG: 10 INJECTION, SOLUTION INTRAVENOUS at 09:06

## 2025-06-03 NOTE — PT/OT/SLP EVAL
[facility]  Speech Language Pathology Department  Cognitive-Communication Evaluation    Patient Name:  Chelle Chandra   MRN:  [id number]    Recommendations     General recommendations:  Modified Barium Swallow Study pending consistent alertness  Communication strategies:  none    Discharge therapy intensity: High Intensity Therapy  Barriers to safe discharge: none    History     Chelle Chandra is a/n 59 y.o. female admitted for R sided weakness, facial droop, and slurred speech. Multiple neuro changes throughout stay. Patient with recent admission SDH s/p crani and MMA embolization.    [facility] 5/30: strict NPO    Patient with Terri hole surgery 6/2 with drainage.     Past Medical History:   Diagnosis Date    Accelerated junctional rhythm     Agatston CAC score, <100     Anxiety disorder, unspecified     Asthma     COPD type A     Diabetes mellitus without complication     GERD (gastroesophageal reflux disease)     History of COVID-19     Insomnia     Lung nodule      Past Surgical History:   Procedure Laterality Date    ANGIOGRAM, CORONARY, WITH LEFT HEART CATHETERIZATION      BACK SURGERY      BREAST LUMPECTOMY Right     CARDIOVERSION  08/01/2013    CARPAL TUNNEL RELEASE  10/23/2013    CRANIOTOMY FOR EVACUATION OF SUBDURAL HEMATOMA Left 05/19/2025    Procedure: CRANIOTOMY, FOR SUBDURAL HEMATOMA EVACUATION;  Surgeon: Ricardo Shelley MD;  Location: [facility];  Service: Neurosurgery;  Laterality: Left;    CREATION OF TERRI HOLE WITH EVACUATION OF HEMATOMA Left 6/2/2025    Procedure: CREATION, CRANIAL TERRI HOLE, WITH HEMATOMA EVACUATION;  Surgeon: James Rankin MD;  Location: [facility];  Service: Neurosurgery;  Laterality: Left;  LEFT BURRHOLE FOR SUBDURAL HEMATOMA EVACUATION // STEALTH // SHERRI PARTIDA // DRILL    FUSION OF CERVICAL SPINE BY ANTERIOR APPROACH USING COMPUTER-ASSISTED NAVIGATION  06/10/2019    KIDNEY SURGERY      TONSILLECTOMY AND ADENOIDECTOMY      TOTAL ABDOMINAL  HYSTERECTOMY      WRIST SURGERY         Previous level of Function  Lives: with spouse  Handed: Right  Glasses: yes  Hearing Aids: no  Home Responsibilities: laundry, shopping, cleaning, and independent with ADLs    Imaging   Results for orders placed during the hospital encounter of 07/01/24    MRI Brain Without Contrast    Narrative  EXAMINATION:  MRI BRAIN WITHOUT CONTRAST    CLINICAL HISTORY:  dizziness, off-balance, r/o cva, recent history of cva 1 month ago;    TECHNIQUE:  Multiplanar MRI sequences were performed of the brain without contrast.    COMPARISON:  MRI brain June 2, 2024    FINDINGS:  There are extensive left middle cerebral artery territory frontoparietal lobes and temporooccipital lobes as well as basal ganglia subacute nonhemorrhagic infarcts.  Infarct shows less dense brightness on diffusion-weighted sequence and now is not associated with signal dropout on the ADC map.  Brain edematous changes and sulci effacement is slightly less evident.  There is local mass effect without midline shift.  No hydrocephalus.  There is no new infratentorial or supratentorial brain infarct.  Gradient echo sequence are without altered signal to reflect a previous hemorrhagic byproduct.  Sella and the suprasellar areas are unremarkable.    The cerebellar tonsils are normally positioned.  No acute extra axial fluid collections identified. The mastoid air cells are clear.    Impression  1.  Left cerebral hemisphere extensive subacute infarct without hemorrhagic transformation.    2.  No new findings.      Electronically signed by: Adam Sears  Date:    07/02/2024  Time:    11:36    Subjective     Patient awake and alert.  Patient goals: to eat/drink   Spiritual/Cultural/Gnosticism Beliefs/Practices that affect care: no    Pain/Comfort: Pain Rating 1: 0/10    Respiratory Status:  room air    Objective     ORAL MUSCULATURE  Dentition: upper dentures and lower dentures  Facial Movement: WFL  Buccal Strength & Mobility:  WFL  Mandibular Strength & Mobility: WFL  Oral Labial Strength & Mobility: WFL  Lingual Strength & Mobility: WFL    SPEECH PRODUCTION  Phoneme Production: adequate  Voice Quality: adequate  Voice Production: adequate  Speech Rate: appropriate  Loudness: acceptable  Respiration: WFL for speech  Resonance: adequate  Prosody: adequate  Speech Intelligibility  Known Context: Greater that 90%  Unknown Context: Greater that 90%    AUDITORY COMPREHENSION  Identification:  Objects: 100%  Following Directions:  1-Step: 100%  2-Step: 100%  Yes/No Questions:  Biographical: 100%  Environmental: 100%  Simple: 100%  Complex: 100%    VERBAL EXPRESSION  Automatic Speech:  Days of the week: 100%  Wh- Questions:  Object name: 100%  Object function: 100%    COGNITION  Orientation:  Person: yes  Place: yes  Time: yes  Situation: yes   Attention:  Focused: 100%  Memory:  Immediate: 100%  Delayed: 100%  Long Term: 100%  Problem Solving  Functional simple: 100%  Organization:  Convergent thinkin%  Divergent thinkin%    Assessment     Patient is presenting with speech and cognitive abilities WFL. Slurred speech is resolved and R sided weakness is improved. Patient remains NPO with NG tube placement. SLP with plans to repeat MBS tomorrow, pt in agreeance.     Patient Education     Patient provided with verbal education regarding SLP POC.  Understanding was verbalized.    Plan     SLP Follow-Up:  Yes   Patient to be seen:  daily   Plan of Care expires:  25  Plan of Care reviewed with:  patient      Time Tracking     SLP Treatment Date:   25  Speech Start Time:  932  Speech Stop Time:  947     Speech Total Time (min):  15 min    Billable minutes:  Evaluation of Speech Sound Production with Comprehension and Expression, 15 minutes     2025

## 2025-06-03 NOTE — PLAN OF CARE
Problem: Occupational Therapy  Goal: Occupational Therapy Goal  Description: Goals to be met by 7/3/25    Pt will complete grooming standing at sink with LRAD with SBA.  Pt will complete UB dressing with SBA.  Pt will complete LB dressing with SBA using LRAD.  Pt will complete toileting with SBA using LRAD.  Pt will complete functional mobility to/from toilet and toilet transfer with SBA using LRAD.   Pt will demo increased strength in R UE to 3/5 MMT for increased functional use during ADL tasks. - Met   Outcome: Progressing

## 2025-06-03 NOTE — PROGRESS NOTES
[facility] Hospital Medicine Progress Note        Chief Complaint: Inpatient Follow-up for Rt sided weakness     HPI:   58 yo female with PMHx of  DM2, left MCA stroke s/p thrombectomy in 6/2024 was on Plavix, Recent SDH following a fall while on Plavix on 5/16/25 s/p cerebral angiogram with embolization of left middle meningeal artery and on  5/19/25 s/p left frontoparietal craniotomy and evacuation of SDH. Pt was discharged home on 5/22 with home health. Today 5/29/25 Pt  presents to ED with c/o acute onset headache, possibly around midnight. Then around 0540 she developed right arm and leg weakness with right facial droop. Has not been taking Keppra at home. Seen by Stroke Neurology in the ED who does not suspect a new stroke but rather seizure activity. RLE weakness already improved however RUE  weakness and right facial droop persisted.      VS on arrival: T 98.2, P 100, R 18, b/P 105/73, Sats 98% on room air. Initial labs: WBC 9.23, Hgb 10.8, platelets 527, PT 13.9, INR 1.1, Na 138, K 3.9, Cl 103, creatinine 0.68, glucose 191, TSH 0.309 and otherwise unremarkable.  CT head shows a left cerebral convexity hypodense collection without residual acute hyperdense subacute hematoma and improved post surgical pneumocephalus and otherwise no significant interval change.  CT brain perfusion shows no definite blood flow asymmetry or other findings to suggest significant acute perfusion defects.  There are scattered areas of asymmetrically decreased perfusion to the left MCA territory corresponding to area of known ischemic stroke.  CTA head shows a suspected diminished flow inferior division of the left middle cerebral artery close to the bifurcation with no other hemodynamically significant stenosis identified. Pt was loaded with IV Keppra 2 gram in the ED. Admitted to  service. Stroke Neurology was consulted. EEG neg for seizures.      New onset fever of 102 overnight on 5/29/25.  Infectious workup initiated. MRI brain on 5/30/25 with no acute infarct but showed Left MCA territory large encephalomalacia combined with gliotic changes. Infectious workup was neg however aspiration suspected on MBS study. SLP suggested strict NPO for now. NG tube placed. Pt was started on Zosyn to cover aspiration pneumonitis. Due to persistent Rt facial droop and RUE weakness, Stroke Neurology recommended Neurosurgery consult for evaluation of chronic left SDH. Neurosurgery felt  left diony hole with draining of SDH was indicated. Cardiology was consulted for Cardiac risk assessment. Pt was cleared with low risk. Pt underwent left diony hole with hematoma evacuation on 6/2/25 with Dr. Rankin.     Interval Hx:   POD #1  S/P left diony home and hematoma evacuation   C/O some headaches but improved with medication   RUE weakness cont to improve with better  strength   Afebrile. Hemodynamics are stable.  SLP plans for MBS study tomorrow     Case was discussed with patient's nurse and  on the floor.    Objective/physical exam:  General: In no acute distress, afebrile,  on RA  Chest: Clear to auscultation bilaterally  Heart: RRR, +S1, S2, no appreciable murmur  Abdomen: Soft, nontender, BS +  MSK: Warm, no lower extremity edema, no clubbing or cyanosis  Neurologic: Alert and oriented x3, Rt facial droop, RUE 0/5, RLE 3+/5, Left U/L ext 5/5      VITAL SIGNS: 24 HRS MIN & MAX LAST   Temp  Min: 97 °F (36.1 °C)  Max: 97.8 °F (36.6 °C) 97.6 °F (36.4 °C)   BP  Min: 96/65  Max: 128/78 117/70   Pulse  Min: 64  Max: 82  67   Resp  Min: 11  Max: 19 19   SpO2  Min: 92 %  Max: 100 % 100 %     I have reviewed the following labs:  Recent Labs   Lab 05/29/25  0952 05/30/25  0348 06/01/25  0527   WBC 9.23 9.00 8.19   RBC 3.62* 3.64* 3.76*   HGB 10.8* 10.8* 11.2*   HCT 32.7* 32.9* 34.0*   MCV 90.3 90.4 90.4   MCH 29.8 29.7 29.8   MCHC 33.0 32.8* 32.9*   RDW 13.5 13.3 13.4   * 544* 655*   MPV 9.5 9.8 9.2     Recent  Labs   Lab 05/29/25  0952 05/30/25  0348 06/01/25  0527    139 139   K 3.9 3.5 3.4*    103 102   CO2 22* 23 26   BUN 8.6* 7.0* 7.8*   CREATININE 0.68 0.61 0.68   * 142* 166*   CALCIUM 9.2 9.2 9.1   MG  --  1.60 2.10   ALBUMIN 2.6* 2.6*  --    PROT 6.9 7.0  --    ALKPHOS 192* 176*  --    ALT 42 33  --    AST 26 22  --    BILITOT 0.3 0.4  --      Microbiology Results (last 7 days)       Procedure Component Value Units Date/Time    Respiratory Culture [[id number]] Collected: 06/01/25 1833    Order Status: Completed Specimen: Sputum Updated: 06/03/25 0731     Respiratory Culture Normal respiratory aaron     GRAM STAIN Quality 1+      Few Gram Positive Rods      Few Gram positive cocci    Blood Culture [[id number]]  (Normal) Collected: 05/30/25 0348    Order Status: Completed Specimen: Blood, Venous Updated: 06/03/25 0424     Blood Culture No Growth At 96 Hours    Blood Culture [[id number]]  (Normal) Collected: 05/30/25 0348    Order Status: Completed Specimen: Blood, Venous Updated: 06/03/25 0424     Blood Culture No Growth At 96 Hours             See below for Radiology    Assessment/Plan:  Chronic left subdural hematoma, POA, s/p diony hole on 6/2/25  Possible Left MCA territory post stroke recrudescence, POA  Oropharyngeal Dysphagia, POA - on NG tube  Left MCA territory large encephalomalacia with seizure in differential, POA  Recent h/o fall and SDH s/p left MMA embolization and left frontoparietal craniotomy in 5/2025  H/O Left MCA stroke , s/p  thrombectomy 6/1/24   Normocytic anemia , mild -POA  Acute fever- 5/29/25  Suspected Aspiration pneumonitis - 5/30/35     Plan-  S/P left diony hole on 6/2/25  Continue to hold Plavix. Was initially planned to restart on June 1. Now will wait for Neurosurgery clearance.   SLP suggested strict NPO  NG tube placed on 5/30/25  Start meds and nutrition via NG tube   MBS planned for tomorrow   Continue Statin via NG tube  SBP goal under 160   Continue Keppra    Continue PT/OT service   Initially high intensity therapy was suggested.   Monitor course      VTE prophylaxis:  Dr. Egan cleared for Heparin but will start after Neurosurgery eval . SCDs for now      Patient condition:  Fair      Anticipated discharge and Disposition:     TBD      All diagnosis and differential diagnosis have been reviewed; assessment and plan has been documented; I have personally reviewed the labs and test results that are presently available; I have reviewed the patients medication list; I have reviewed the consulting providers response and recommendations. I have reviewed or attempted to review medical records based upon their availability    All of the patient's questions have been  addressed and answered. Patient's is agreeable to the above stated plan. I will continue to monitor closely and make adjustments to medical management as needed.    Portions of this note dictated using EMR integrated voice recognition software, and may be subject to voice recognition errors not corrected at proofreading. Please contact writer for clarification if needed.   _____________________________________________________________________    Malnutrition Status:  Nutrition consulted. Most recent weight and BMI monitored-     Measurements:  Wt Readings from Last 1 Encounters:   06/03/25 46.3 kg (102 lb)   Body mass index is 16.46 kg/m².    Patient has been screened and assessed by RD.    Malnutrition Type:  Context: acute illness or injury  Level: severe    Malnutrition Characteristic Summary:  Weight Loss (Malnutrition): 7.5% in 3 months  Energy Intake (Malnutrition): less than or equal to 50% for greater than or equal to 5 days  Subcutaneous Fat (Malnutrition): moderate depletion  Muscle Mass (Malnutrition): moderate depletion    Interventions/Recommendations (treatment strategy):        Scheduled Med:   atorvastatin  80 mg Per NG tube Daily    busPIRone  15 mg Per NG tube BID    docusate  100 mg Per NG  tube BID    ezetimibe  10 mg Per NG tube Daily    famotidine (PF)  20 mg Intravenous Q12H    levETIRAcetam (Keppra) IV (PEDS and ADULTS)  500 mg Intravenous Q12H    levothyroxine  88 mcg Per NG tube Before breakfast    nortriptyline  25 mg Per NG tube QHS    paroxetine  20 mg Per NG tube QAM    piperacillin-tazobactam (Zosyn) IV (PEDS and ADULTS) (extended infusion is not appropriate)  4.5 g Intravenous Q8H      Continuous Infusions:   0.9% NaCl   Intravenous Continuous 100 mL/hr at 06/02/25 1820 New Bag at 06/02/25 1820      PRN Meds:    Current Facility-Administered Medications:     acetaminophen, 650 mg, Rectal, Q6H PRN    acetaminophen, 650 mg, Oral, Q6H PRN    albuterol-ipratropium, 3 mL, Nebulization, Q4H PRN    bisacodyL, 10 mg, Rectal, Daily PRN    butalbital-acetaminophen-caffeine -40 mg, 1 tablet, Oral, Q4H PRN    dextrose 50%, 12.5 g, Intravenous, PRN    glucagon (human recombinant), 1 mg, Intramuscular, PRN    hydrALAZINE, 10 mg, Intravenous, Q6H PRN    HYDROcodone-acetaminophen, 1 tablet, Oral, Q4H PRN    HYDROcodone-acetaminophen, 1 tablet, Oral, Q4H PRN    insulin aspart U-100, 0-10 Units, Subcutaneous, Q6H PRN    labetalol, 10 mg, Intravenous, Q2H PRN    lorazepam, 2 mg, Intravenous, Q4H PRN    ondansetron, 4 mg, Intravenous, Q4H PRN    prochlorperazine, 5 mg, Intravenous, Q6H PRN    sodium chloride 0.9%, 10 mL, Intravenous, PRN         Argentina Jerome MD  Department of Hospital Medicine   [facility]   06/03/2025

## 2025-06-03 NOTE — PROGRESS NOTES
No complains.  Less headaches.  Efren-10.  Right arm stronger now.  Cat scan shows good removal of subdural.  Will follow.  Thank you.

## 2025-06-03 NOTE — PT/OT/SLP EVAL
Physical Therapy Re-Evaluation    Patient Name:  Chelle Chandra   MRN:  [id number]    Recommendations:     Discharge therapy intensity: High Intensity Therapy   Discharge Equipment Recommendations: none   Barriers to discharge: Impaired mobility    Assessment:     Chelle Chandra is a 59 y.o. female admitted with a medical diagnosis of R sided weakness (especially RUE), possible seizure, s/p Terri hole with hematoma evacuation.  She presents with the following impairments/functional limitations: gait instability, weakness, impaired balance, impaired endurance, decreased safety awareness, impaired functional mobility. The pt tolerated session well. She is able to perform all mobility with min A and Rw. Weakness noted to R side, especially hand/wrist. Will progress as able, she would benefit from HIGH intensity PT.    Rehab Prognosis: Good; patient would benefit from acute skilled PT services to address these deficits and reach maximum level of function.    Recent Surgery: Procedure(s) (LRB):  CREATION, CRANIAL TERRI HOLE, WITH HEMATOMA EVACUATION (Left) 1 Day Post-Op    Plan:     During this hospitalization, patient would benefit from acute PT services 6 x/week to address the identified rehab impairments via gait training, therapeutic activities, therapeutic exercises and progress toward the following goals:    Plan of Care Expires:  07/03/25    Subjective     Chief Complaint: none  Patient/Family Comments/goals: return to PLOF  Pain/Comfort:       Patients cultural, spiritual, Adventist conflicts given the current situation: no    Objective:     Communicated with NSG prior to session.  Patient found supine with NG tube, peripheral IV, alvarez catheter, telemetry, oxygen  upon PT entry to room.    General Precautions: Standard, fall (SBP<160, seizure)  Orthopedic Precautions:N/A   Braces: N/A  Respiratory Status: Room air  Blood Pressure: NA      Exams:  RLE ROM: WFL  RLE Strength: 4-/5  LLE ROM: WFL  LLE Strength:  4+/5  Skin integrity: Visible skin intact      Functional Mobility:  Bed Mobility:     Scooting: minimum assistance  Supine to Sit: minimum assistance  Sit to Supine: minimum assistance  Transfers:     Sit to Stand:  minimum assistance with rolling walker  Gait: pt ambulates x min A and Rw, slight veering to R, likely 2/2 R sided weakness, bumping carts, etc with her RW, able to correct, x 40 feet.    Patient provided with verbal education education regarding PT role/goals/POC, safety awareness, and discharge/DME recommendations.  Understanding was verbalized.     Patient left supine with all lines intact, call button in reach, and NSG notified.    GOALS:   Multidisciplinary Problems       Physical Therapy Goals          Problem: Physical Therapy    Goal Priority Disciplines Outcome Interventions   Physical Therapy Goal     PT, PT/OT Progressing    Description: Goals to be met by: 2025     Patient will increase functional independence with mobility by performin. Supine to sit with Contact Guard Assistance  2. Sit to stand transfer with Contact Guard Assistance  3. Gait  x 150 feet with Contact Guard Assistance using LRAD.   4. Ascend/descend 4 stair with left Handrails Contact Guard Assistance using No Assistive Device.                          History:     Past Medical History:   Diagnosis Date    Accelerated junctional rhythm     Agatston CAC score, <100     Anxiety disorder, unspecified     Asthma     COPD type A     Diabetes mellitus without complication     GERD (gastroesophageal reflux disease)     History of COVID-19     Insomnia     Lung nodule        Past Surgical History:   Procedure Laterality Date    ANGIOGRAM, CORONARY, WITH LEFT HEART CATHETERIZATION      BACK SURGERY      BREAST LUMPECTOMY Right     CARDIOVERSION  2013    CARPAL TUNNEL RELEASE  10/23/2013    CRANIOTOMY FOR EVACUATION OF SUBDURAL HEMATOMA Left 2025    Procedure: CRANIOTOMY, FOR SUBDURAL HEMATOMA EVACUATION;   Surgeon: Ricardo Shelley MD;  Location: [facility] OR;  Service: Neurosurgery;  Laterality: Left;    CREATION OF TERRI HOLE WITH EVACUATION OF HEMATOMA Left 6/2/2025    Procedure: CREATION, CRANIAL TERRI HOLE, WITH HEMATOMA EVACUATION;  Surgeon: James Rankin MD;  Location: [facility] OR;  Service: Neurosurgery;  Laterality: Left;  LEFT BURRHOLE FOR SUBDURAL HEMATOMA EVACUATION // STEALTH // SHERRI PARTIDA // DRILL    FUSION OF CERVICAL SPINE BY ANTERIOR APPROACH USING COMPUTER-ASSISTED NAVIGATION  06/10/2019    KIDNEY SURGERY      TONSILLECTOMY AND ADENOIDECTOMY      TOTAL ABDOMINAL HYSTERECTOMY      WRIST SURGERY         Time Tracking:     PT Received On: 06/03/25  PT Start Time: 1304     PT Stop Time: 1330  PT Total Time (min): 26 min     Billable Minutes: Re-eval 15 and Gait Training 11      06/03/2025

## 2025-06-03 NOTE — PT/OT/SLP RE-EVAL
Occupational Therapy   Re-evaluation    Name: Chelle Chandra  MRN: [id number]    Recommendations:     Discharge therapy intensity: High Intensity Therapy   Discharge Equipment Recommendations:  none  Barriers to discharge:   ongoing medical needs    Assessment:     Chelle Chandra is a 59 y.o. female with a medical diagnosis of R sided weakness and facial droop due to L SDH s/p craniotomy and MMA embolization. On 5/30 patient with subdural hemorrhage s/p L [place] hole. She presents with the following performance deficits affecting function: weakness, impaired self care skills, impaired functional mobility, impaired balance, decreased upper extremity function, decreased safety awareness. Patient completed all bed and functional mobility tasks with Min A using RW. Patient demonstrated RUE weakness indicated by 3/5 shoulder flexion and 3/5 digit flexion. Patient would benefit from high intensity therapy upon d/c.    Rehab Prognosis: Good; patient would benefit from acute skilled OT services to address these deficits and reach maximum level of function.       Plan:     Patient to be seen 6 x/week to address the above listed problems via self-care/home management, therapeutic activities, therapeutic exercises, neuromuscular re-education, sensory integration  Plan of Care Expires: 06/30/25  Plan of Care Reviewed with: patient, daughter    Subjective     Chief Complaint: none reported  Patient/Family Comments/goals: to return to PLOF    Pain/Comfort:  Pain Rating 1: 0/10    Patients cultural, spiritual, Yazidism conflicts given the current situation: no    Objective:     OT communicated with NSG prior to session.      Patient was found HOB elevated with NG tube, peripheral IV upon OT entry to room.    General Precautions: Standard, fall (SBP<160, seizure)  Orthopedic Precautions: N/A  Braces: N/A    Vital Signs: Supplemental 02: 2L    Functional Mobility/Transfers:  Bed mobility:    Supine to Sit: minimum  "assistance  Transfers: Sit to Stand: minimum assistance with rolling walker  Bed to Chair: minimum assistance with rolling walker using Step Transfer  Toilet Transfer: minimum assistance with rolling walker using Step Transfer  Patient ambulated ~30 feet down parker with Min A using RW; quick fatigue noted requiring seated rest break.    Activities of Daily Living:  Lower Body Dressing: moderate assistance to don socks    [facility] 6 Click ADL:  [facility] Total Score: 15    Functional Cognition:  Intact    Visual Perceptual Skills:  Reports some "floaters" in L eye    Upper Extremity Function:  Right Upper Extremity:   Range of Motion: WFL  Strength: Impaired. 3/5 shoulder flexion, 3/5 digit flexion  Coordination: Impaired. Poor coordination in finger to nose test  Sensation: WFL    Left Upper Extremity:  WFL    Therapeutic Positioning  Risk for acquired pressure injuries is decreased due to ability to get to BSC/toilet with assist.    OT interventions performed during the course of today's session in an effort to prevent and/or reduce acquired pressure injuries:   Education was provided on benefits of and recommendations for therapeutic positioning  Therapeutic positioning was provided at the conclusion of session to offload all bony prominences for the prevention and/or reduction of pressure injuries    Skin assessment: full body skin assessment was performed    Findings: no redness or breakdown noted    OT recommendations for therapeutic positioning throughout hospitalization:   Follow [facility] Pressure Injury Prevention Protocol    Patient Education:  Patient and family were provided with verbal education education regarding OT role/goals/POC, fall prevention, safety awareness, Discharge/DME recommendations, and pressure ulcer prevention.  Understanding was verbalized.     Patient left up in chair with all lines intact, call button in reach, chair alarm on, NSG notified, and family present    GOALS:   Multidisciplinary " Problems       Occupational Therapy Goals          Problem: Occupational Therapy    Goal Priority Disciplines Outcome Interventions   Occupational Therapy Goal     OT, PT/OT Progressing    Description: Goals to be met by 7/3/25    Pt will complete grooming standing at sink with LRAD with SBA.  Pt will complete UB dressing with SBA.  Pt will complete LB dressing with SBA using LRAD.  Pt will complete toileting with SBA using LRAD.  Pt will complete functional mobility to/from toilet and toilet transfer with SBA using LRAD.   Pt will demo increased strength in R UE to 3/5 MMT for increased functional use during ADL tasks.                        History:     Past Medical History:   Diagnosis Date    Accelerated junctional rhythm     Agatston CAC score, <100     Anxiety disorder, unspecified     Asthma     COPD type A     Diabetes mellitus without complication     GERD (gastroesophageal reflux disease)     History of COVID-19     Insomnia     Lung nodule          Past Surgical History:   Procedure Laterality Date    ANGIOGRAM, CORONARY, WITH LEFT HEART CATHETERIZATION      BACK SURGERY      BREAST LUMPECTOMY Right     CARDIOVERSION  08/01/2013    CARPAL TUNNEL RELEASE  10/23/2013    CRANIOTOMY FOR EVACUATION OF SUBDURAL HEMATOMA Left 05/19/2025    Procedure: CRANIOTOMY, FOR SUBDURAL HEMATOMA EVACUATION;  Surgeon: Ricardo Shelley MD;  Location: [facility];  Service: Neurosurgery;  Laterality: Left;    CREATION OF TERRI HOLE WITH EVACUATION OF HEMATOMA Left 6/2/2025    Procedure: CREATION, CRANIAL TERRI HOLE, WITH HEMATOMA EVACUATION;  Surgeon: James Rankin MD;  Location: [facility];  Service: Neurosurgery;  Laterality: Left;  LEFT BURRHOLE FOR SUBDURAL HEMATOMA EVACUATION // STEALTH // [facility] // DRILL    FUSION OF CERVICAL SPINE BY ANTERIOR APPROACH USING COMPUTER-ASSISTED NAVIGATION  06/10/2019    KIDNEY SURGERY      TONSILLECTOMY AND ADENOIDECTOMY      TOTAL ABDOMINAL HYSTERECTOMY      WRIST SURGERY          Time Tracking:     OT Date of Treatment: 06/03/25  OT Start Time: 1303  OT Stop Time: 1330  OT Total Time (min): 27 min    Billable Minutes:Re-gina 2    6/3/2025

## 2025-06-04 LAB
ANION GAP SERPL CALC-SCNC: 7 MEQ/L
BACTERIA BLD CULT: NORMAL
BACTERIA BLD CULT: NORMAL
BASOPHILS # BLD AUTO: 0.06 X10(3)/MCL
BASOPHILS NFR BLD AUTO: 0.9 %
BUN SERPL-MCNC: 9.4 MG/DL (ref 9.8–20.1)
CALCIUM SERPL-MCNC: 8.7 MG/DL (ref 8.4–10.2)
CHLORIDE SERPL-SCNC: 106 MMOL/L (ref 98–107)
CO2 SERPL-SCNC: 27 MMOL/L (ref 23–31)
CREAT SERPL-MCNC: 0.62 MG/DL (ref 0.55–1.02)
CREAT/UREA NIT SERPL: 15
EOSINOPHIL # BLD AUTO: 0.16 X10(3)/MCL (ref 0–0.9)
EOSINOPHIL NFR BLD AUTO: 2.3 %
ERYTHROCYTE [DISTWIDTH] IN BLOOD BY AUTOMATED COUNT: 13.7 % (ref 11.5–17)
FERRITIN SERPL-MCNC: 698.39 NG/ML (ref 4.63–204)
GFR SERPLBLD CREATININE-BSD FMLA CKD-EPI: >60 ML/MIN/1.73/M2
GLUCOSE SERPL-MCNC: 193 MG/DL (ref 82–115)
HCT VFR BLD AUTO: 34.5 % (ref 37–47)
HGB BLD-MCNC: 11.1 G/DL (ref 12–16)
IMM GRANULOCYTES # BLD AUTO: 0.05 X10(3)/MCL (ref 0–0.04)
IMM GRANULOCYTES NFR BLD AUTO: 0.7 %
IRON SATN MFR SERPL: 20 % (ref 20–50)
IRON SERPL-MCNC: 32 UG/DL (ref 50–170)
LYMPHOCYTES # BLD AUTO: 1.52 X10(3)/MCL (ref 0.6–4.6)
LYMPHOCYTES NFR BLD AUTO: 21.7 %
MAGNESIUM SERPL-MCNC: 2 MG/DL (ref 1.6–2.6)
MCH RBC QN AUTO: 29.4 PG (ref 27–31)
MCHC RBC AUTO-ENTMCNC: 32.2 G/DL (ref 33–36)
MCV RBC AUTO: 91.5 FL (ref 80–94)
MONOCYTES # BLD AUTO: 0.69 X10(3)/MCL (ref 0.1–1.3)
MONOCYTES NFR BLD AUTO: 9.9 %
NEUTROPHILS # BLD AUTO: 4.51 X10(3)/MCL (ref 2.1–9.2)
NEUTROPHILS NFR BLD AUTO: 64.5 %
NRBC BLD AUTO-RTO: 0 %
PHOSPHATE SERPL-MCNC: 2.3 MG/DL (ref 2.3–4.7)
PLATELET # BLD AUTO: 767 X10(3)/MCL (ref 130–400)
PMV BLD AUTO: 9.2 FL (ref 7.4–10.4)
POCT GLUCOSE: 160 MG/DL (ref 70–110)
POCT GLUCOSE: 217 MG/DL (ref 70–110)
POCT GLUCOSE: 258 MG/DL (ref 70–110)
POCT GLUCOSE: 264 MG/DL (ref 70–110)
POTASSIUM SERPL-SCNC: 3.6 MMOL/L (ref 3.5–5.1)
RBC # BLD AUTO: 3.77 X10(6)/MCL (ref 4.2–5.4)
SODIUM SERPL-SCNC: 140 MMOL/L (ref 136–145)
TIBC SERPL-MCNC: 131 UG/DL (ref 70–310)
TIBC SERPL-MCNC: 163 UG/DL (ref 250–450)
TRANSFERRIN SERPL-MCNC: 145 MG/DL (ref 180–382)
VIT B12 SERPL-MCNC: 635 PG/ML (ref 213–816)
WBC # BLD AUTO: 6.99 X10(3)/MCL (ref 4.5–11.5)

## 2025-06-04 PROCEDURE — 99900035 HC TECH TIME PER 15 MIN (STAT)

## 2025-06-04 PROCEDURE — 36415 COLL VENOUS BLD VENIPUNCTURE: CPT | Performed by: INTERNAL MEDICINE

## 2025-06-04 PROCEDURE — 25500020 PHARM REV CODE 255: Performed by: INTERNAL MEDICINE

## 2025-06-04 PROCEDURE — 21400001 HC TELEMETRY ROOM

## 2025-06-04 PROCEDURE — 25000003 PHARM REV CODE 250: Performed by: NEUROLOGICAL SURGERY

## 2025-06-04 PROCEDURE — 97116 GAIT TRAINING THERAPY: CPT | Mod: CQ

## 2025-06-04 PROCEDURE — 94799 UNLISTED PULMONARY SVC/PX: CPT

## 2025-06-04 PROCEDURE — 84100 ASSAY OF PHOSPHORUS: CPT | Performed by: INTERNAL MEDICINE

## 2025-06-04 PROCEDURE — 80048 BASIC METABOLIC PNL TOTAL CA: CPT | Performed by: INTERNAL MEDICINE

## 2025-06-04 PROCEDURE — 99900031 HC PATIENT EDUCATION (STAT)

## 2025-06-04 PROCEDURE — 85025 COMPLETE CBC W/AUTO DIFF WBC: CPT | Performed by: INTERNAL MEDICINE

## 2025-06-04 PROCEDURE — 63600175 PHARM REV CODE 636 W HCPCS

## 2025-06-04 PROCEDURE — 97535 SELF CARE MNGMENT TRAINING: CPT

## 2025-06-04 PROCEDURE — 25000003 PHARM REV CODE 250: Performed by: INTERNAL MEDICINE

## 2025-06-04 PROCEDURE — A9698 NON-RAD CONTRAST MATERIALNOC: HCPCS | Performed by: INTERNAL MEDICINE

## 2025-06-04 PROCEDURE — 83735 ASSAY OF MAGNESIUM: CPT | Performed by: INTERNAL MEDICINE

## 2025-06-04 PROCEDURE — 63600175 PHARM REV CODE 636 W HCPCS: Performed by: NURSE PRACTITIONER

## 2025-06-04 PROCEDURE — 92611 MOTION FLUOROSCOPY/SWALLOW: CPT

## 2025-06-04 PROCEDURE — 63600175 PHARM REV CODE 636 W HCPCS: Performed by: INTERNAL MEDICINE

## 2025-06-04 PROCEDURE — 83550 IRON BINDING TEST: CPT | Performed by: INTERNAL MEDICINE

## 2025-06-04 PROCEDURE — 82728 ASSAY OF FERRITIN: CPT | Performed by: INTERNAL MEDICINE

## 2025-06-04 PROCEDURE — 82607 VITAMIN B-12: CPT | Performed by: INTERNAL MEDICINE

## 2025-06-04 RX ORDER — DOCUSATE SODIUM 100 MG/1
200 CAPSULE, LIQUID FILLED ORAL 2 TIMES DAILY
Status: DISCONTINUED | OUTPATIENT
Start: 2025-06-04 | End: 2025-06-06 | Stop reason: HOSPADM

## 2025-06-04 RX ORDER — LEVETIRACETAM 500 MG/1
500 TABLET ORAL 2 TIMES DAILY
Status: DISCONTINUED | OUTPATIENT
Start: 2025-06-04 | End: 2025-06-05

## 2025-06-04 RX ORDER — NORTRIPTYLINE HYDROCHLORIDE 25 MG/1
25 CAPSULE ORAL NIGHTLY
Status: DISCONTINUED | OUTPATIENT
Start: 2025-06-04 | End: 2025-06-06 | Stop reason: HOSPADM

## 2025-06-04 RX ORDER — LEVOTHYROXINE SODIUM 88 UG/1
88 TABLET ORAL
Status: DISCONTINUED | OUTPATIENT
Start: 2025-06-05 | End: 2025-06-06 | Stop reason: HOSPADM

## 2025-06-04 RX ORDER — PAROXETINE 20 MG/1
20 TABLET, FILM COATED ORAL EVERY MORNING
Status: DISCONTINUED | OUTPATIENT
Start: 2025-06-05 | End: 2025-06-06 | Stop reason: HOSPADM

## 2025-06-04 RX ORDER — EZETIMIBE 10 MG/1
10 TABLET ORAL DAILY
Status: DISCONTINUED | OUTPATIENT
Start: 2025-06-05 | End: 2025-06-06 | Stop reason: HOSPADM

## 2025-06-04 RX ORDER — BUSPIRONE HYDROCHLORIDE 5 MG/1
15 TABLET ORAL 2 TIMES DAILY
Status: DISCONTINUED | OUTPATIENT
Start: 2025-06-04 | End: 2025-06-06 | Stop reason: HOSPADM

## 2025-06-04 RX ORDER — ATORVASTATIN CALCIUM 40 MG/1
80 TABLET, FILM COATED ORAL DAILY
Status: DISCONTINUED | OUTPATIENT
Start: 2025-06-05 | End: 2025-06-06 | Stop reason: HOSPADM

## 2025-06-04 RX ADMIN — HYDROCODONE BITARTRATE AND ACETAMINOPHEN 1 TABLET: 10; 325 TABLET ORAL at 03:06

## 2025-06-04 RX ADMIN — FAMOTIDINE 20 MG: 10 INJECTION, SOLUTION INTRAVENOUS at 10:06

## 2025-06-04 RX ADMIN — HYDROCODONE BITARTRATE AND ACETAMINOPHEN 1 TABLET: 10; 325 TABLET ORAL at 10:06

## 2025-06-04 RX ADMIN — PAROXETINE HYDROCHLORIDE 20 MG: 20 TABLET, FILM COATED ORAL at 06:06

## 2025-06-04 RX ADMIN — BUTALBITAL, ACETAMINOPHEN, AND CAFFEINE 1 TABLET: 325; 50; 40 TABLET ORAL at 07:06

## 2025-06-04 RX ADMIN — METOPROLOL SUCCINATE 12.5 MG: 25 TABLET, EXTENDED RELEASE ORAL at 03:06

## 2025-06-04 RX ADMIN — PIPERACILLIN SODIUM AND TAZOBACTAM SODIUM 4.5 G: 4; .5 INJECTION, POWDER, LYOPHILIZED, FOR SOLUTION INTRAVENOUS at 03:06

## 2025-06-04 RX ADMIN — INSULIN ASPART 6 UNITS: 100 INJECTION, SOLUTION INTRAVENOUS; SUBCUTANEOUS at 03:06

## 2025-06-04 RX ADMIN — LEVOTHYROXINE SODIUM 88 MCG: 0.09 TABLET ORAL at 06:06

## 2025-06-04 RX ADMIN — HYDROCODONE BITARTRATE AND ACETAMINOPHEN 1 TABLET: 10; 325 TABLET ORAL at 06:06

## 2025-06-04 RX ADMIN — INSULIN ASPART 3 UNITS: 100 INJECTION, SOLUTION INTRAVENOUS; SUBCUTANEOUS at 08:06

## 2025-06-04 RX ADMIN — DOCUSATE SODIUM 200 MG: 100 CAPSULE, LIQUID FILLED ORAL at 08:06

## 2025-06-04 RX ADMIN — EZETIMIBE 10 MG: 10 TABLET ORAL at 10:06

## 2025-06-04 RX ADMIN — ATORVASTATIN CALCIUM 80 MG: 40 TABLET, FILM COATED ORAL at 10:06

## 2025-06-04 RX ADMIN — BUSPIRONE HYDROCHLORIDE 15 MG: 5 TABLET ORAL at 08:06

## 2025-06-04 RX ADMIN — BARIUM SULFATE 5 ML: 0.81 POWDER, FOR SUSPENSION ORAL at 11:06

## 2025-06-04 RX ADMIN — LEVETIRACETAM 500 MG: 500 TABLET, FILM COATED ORAL at 08:06

## 2025-06-04 RX ADMIN — HYDROCODONE BITARTRATE AND ACETAMINOPHEN 1 TABLET: 10; 325 TABLET ORAL at 12:06

## 2025-06-04 RX ADMIN — BUSPIRONE HYDROCHLORIDE 15 MG: 5 TABLET ORAL at 10:06

## 2025-06-04 RX ADMIN — LEVETIRACETAM 500 MG: 100 INJECTION, SOLUTION INTRAVENOUS at 10:06

## 2025-06-04 RX ADMIN — INSULIN ASPART 1 UNITS: 100 INJECTION, SOLUTION INTRAVENOUS; SUBCUTANEOUS at 12:06

## 2025-06-04 RX ADMIN — DOCUSATE SODIUM LIQUID 100 MG: 100 LIQUID ORAL at 10:06

## 2025-06-04 RX ADMIN — INSULIN ASPART 4 UNITS: 100 INJECTION, SOLUTION INTRAVENOUS; SUBCUTANEOUS at 06:06

## 2025-06-04 NOTE — PT/OT/SLP PROGRESS
Physical Therapy Treatment    Patient Name:  Chelle Chandra   MRN:  [id number]    Recommendations:     Discharge therapy intensity: High Intensity Therapy   Discharge Equipment Recommendations: none  Barriers to discharge: Decreased caregiver support, Impaired mobility, and Ongoing medical needs    Assessment:     Chelle Chandra is a 59 y.o. female admitted with a medical diagnosis of R sided weakness (especially RUE), possible seizure, s/p [place] hole with hematoma evacuation.  She presents with the following impairments/functional limitations: gait instability, weakness, impaired balance, impaired endurance, decreased safety awareness, impaired functional mobility. The pt tolerated session well. She is able to perform all mobility with min A and Rw. Weakness noted to R side, especially hand/wrist. Will progress as able, she would benefit from HIGH intensity PT.     Rehab Prognosis: Good; patient would benefit from acute skilled PT services to address these deficits and reach maximum level of function.    Recent Surgery: Procedure(s) (LRB):  CREATION, CRANIAL TERRI HOLE, WITH HEMATOMA EVACUATION (Left) 2 Days Post-Op    Plan:     During this hospitalization, patient would benefit from acute PT services 6 x/week to address the identified rehab impairments via gait training, therapeutic activities, therapeutic exercises and progress toward the following goals:    Plan of Care Expires:  07/03/25    Subjective     Chief Complaint: I want to eat.     Objective:     Communicated with nurse prior to session.  Patient found up in chair with NG tube, peripheral IV, alvarez catheter, telemetry, oxygen upon PT entry to room.     General Precautions: Standard, fall (SBP>160, seizure)  Orthopedic Precautions: N/A  Braces: N/A  Respiratory Status: Room air  Blood Pressure: 114/68  Skin Integrity: Visible skin intact      Functional Mobility:  Min assist transfers  Gait 80 ft RW and 80 ft min HHA.   Cues to increase ALEJANDRO and to  increase right swing to normalize gait sequence.     Education Provided:  Role and goals of PT, transfer training, bed mobility, gait training, balance training, safety awareness, assistive device, strengthening exercises, and importance of participating in PT to return to PLOF.     Patient left up in chair with all lines intact, call button in reach, chair alarm on, and family present    GOALS:   Multidisciplinary Problems       Physical Therapy Goals          Problem: Physical Therapy    Goal Priority Disciplines Outcome Interventions   Physical Therapy Goal     PT, PT/OT Progressing    Description: Goals to be met by: 2025     Patient will increase functional independence with mobility by performin. Supine to sit with Contact Guard Assistance  2. Sit to stand transfer with Contact Guard Assistance  3. Gait  x 150 feet with Contact Guard Assistance using LRAD.   4. Ascend/descend 4 stair with left Handrails Contact Guard Assistance using No Assistive Device.                          Time Tracking:     PT Received On: 25  PT Start Time: 0740     PT Stop Time: 0803  PT Total Time (min): 23 min     Billable Minutes: Gait Training 23    Treatment Type: Treatment  PT/PTA: PTA     Number of PTA visits since last PT visit: 2025

## 2025-06-04 NOTE — PROGRESS NOTES
No new issues.  Right arm continues to improve.  JOHNATHAN minimal-will remove drain today.  Thank you.

## 2025-06-04 NOTE — PROCEDURES
[facility]  Speech Language Pathology Department  Modified Barium Swallow (MBS) Study    Patient Name:  Chelle Chandra   MRN:  [id number]    Recommendations     General recommendations:  dysphagia therapy  Repeat MBS study: 5-7 days  Solid texture recommendation:  Minced & Moist Diet - IDDSI Level 5  Liquid consistency recommendation: Moderately thick liquids - IDDSI Level 3   Medications: NPO  General precautions: aspiration    History     Chelle Chandra is a/n 59 y.o. female admitted for R sided weakness, facial droop, and slurred speech. Multiple neuro changes throughout stay. Patient with recent admission SDH s/p crani and MMA embolization.     MBS 5/30: strict NPO     Patient with Terri hole surgery 6/2 with drainage. Patient continues to present with weakness, however significantly improved.      Past Medical History:   Diagnosis Date    Accelerated junctional rhythm     Agatston CAC score, <100     Anxiety disorder, unspecified     Asthma     COPD type A     Diabetes mellitus without complication     GERD (gastroesophageal reflux disease)     History of COVID-19     Insomnia     Lung nodule      Past Surgical History:   Procedure Laterality Date    ANGIOGRAM, CORONARY, WITH LEFT HEART CATHETERIZATION      BACK SURGERY      BREAST LUMPECTOMY Right     CARDIOVERSION  08/01/2013    CARPAL TUNNEL RELEASE  10/23/2013    CRANIOTOMY FOR EVACUATION OF SUBDURAL HEMATOMA Left 05/19/2025    Procedure: CRANIOTOMY, FOR SUBDURAL HEMATOMA EVACUATION;  Surgeon: Ricardo Shelley MD;  Location: [facility];  Service: Neurosurgery;  Laterality: Left;    CREATION OF TERRI HOLE WITH EVACUATION OF HEMATOMA Left 6/2/2025    Procedure: CREATION, CRANIAL TERRI HOLE, WITH HEMATOMA EVACUATION;  Surgeon: James Rankin MD;  Location: [facility] OR;  Service: Neurosurgery;  Laterality: Left;  LEFT BURRHOLE FOR SUBDURAL HEMATOMA EVACUATION // STEALTH // [facility] // DRILL    FUSION OF CERVICAL SPINE BY  ANTERIOR APPROACH USING COMPUTER-ASSISTED NAVIGATION  06/10/2019    KIDNEY SURGERY      TONSILLECTOMY AND ADENOIDECTOMY      TOTAL ABDOMINAL HYSTERECTOMY      WRIST SURGERY       A MBS Study was completed to assess the efficiency of her swallow function, rule out aspiration and make recommendations regarding safe dietary consistencies, effective compensatory strategies, and safe eating environment.     Home diet texture/consistency: Regular and thin liquids ( However daughter reports patient with poor appetite for solids or liquids over the past few weeks)    Current Method of Nutrition: NPO, NG tube    Patient complaint: none stated    Imaging   Results for orders placed during the hospital encounter of 05/29/25    X-Ray Chest 1 View    Narrative  EXAMINATION:  XR CHEST 1 VIEW    CLINICAL HISTORY:  aspiration suspected;    TECHNIQUE:  Single frontal view of the chest was performed.    COMPARISON:  05/30/2025    FINDINGS:  LINES AND TUBES: None    MEDIASTINUM AND RUTH: The cardiac silhouette is normal.    LUNGS: No lobar consolidation. No edema.    PLEURA:No pleural effusion. No pneumothorax.    OTHER: Postop ACDF.    Impression  No acute cardiopulmonary abnormality.      Electronically signed by: Kristyn Guzman  Date:    05/30/2025  Time:    14:33    No results found for this or any previous visit.    Results for orders placed during the hospital encounter of 07/01/24    MRI Brain Without Contrast    Narrative  EXAMINATION:  MRI BRAIN WITHOUT CONTRAST    CLINICAL HISTORY:  dizziness, off-balance, r/o cva, recent history of cva 1 month ago;    TECHNIQUE:  Multiplanar MRI sequences were performed of the brain without contrast.    COMPARISON:  MRI brain June 2, 2024    FINDINGS:  There are extensive left middle cerebral artery territory frontoparietal lobes and temporooccipital lobes as well as basal ganglia subacute nonhemorrhagic infarcts.  Infarct shows less dense brightness on diffusion-weighted sequence and now  is not associated with signal dropout on the ADC map.  Brain edematous changes and sulci effacement is slightly less evident.  There is local mass effect without midline shift.  No hydrocephalus.  There is no new infratentorial or supratentorial brain infarct.  Gradient echo sequence are without altered signal to reflect a previous hemorrhagic byproduct.  Sella and the suprasellar areas are unremarkable.    The cerebellar tonsils are normally positioned.  No acute extra axial fluid collections identified. The mastoid air cells are clear.    Impression  1.  Left cerebral hemisphere extensive subacute infarct without hemorrhagic transformation.    2.  No new findings.      Electronically signed by: Adam Sears  Date:    07/02/2024  Time:    11:36    Subjective     Patient awake and alert.    Spiritual/Cultural/Tenriism Beliefs/Practices that affect care: no  Pain/Comfort: Pain Rating 1: 0/10    Respiratory Status:  room air    Restraints/positioning devices: none    Fluoroscopic Findings     Oral Musculature  Dentition: own teeth  Facial Movement: WFL  Vocal Quality: adequate  Volitional Cough: Productive    Setup  Upright in bed  Adequate head control    Visualization  Lateral view  Cervical hardware noted    Oral Phase:   Prolonged mastication  Loss of bolus control to pyriform sinuses    Pharyngeal Phase:   Swallow delay with spill to the pyriform sinuses  Reduced base of tongue retraction  Reduced hyolaryngeal excursion  Reduced epiglottic inversion suspected to be due to NG    Consistency Fed by Laryngeal Penetration Aspiration Residue   Moderately thick liquid by spoon SLP None None None   Moderately thick liquid by straw SLP None None Mild  Cleared with additional swallow   Puree SLP None None Mild  Cleared with additional swallow   Mildly thick liquid by spoon SLP Before the swallow  During the swallow  Majority clears None Mild  Mild-moderate   Mildly thick by straw SLP After of pyriform sinus residue None  Mild     Cervical Esophageal Phase:   UES appeared to accommodate all bolus types without stasis or retrograde movement visualized    Assessment     Patient exhibited mild-moderate oropharyngeal dysphagia characterized by the findings noted above.  Laryngeal penetration of mildly thick liquids.  Both swallow safety and efficiency are impaired.     SLP rec: minced and moist solids, moderately thick liquids, meds crushed in puree    Outcome Measures     Functional Oral Intake Scale: 5 - Total oral diet with multiple consistencies, by requiring special preparation or compensations    Education     Patient and family were provided with verbal education regarding results/recommendations.  Understanding was verbalized.    Plan     SLP Follow-Up:  Yes    Patient to be seen:  5 x/week   Plan of Care expires:  06/18/25  Plan of Care reviewed with:  patient     Time Tracking     SLP Treatment Date:   06/04/25  Speech Start Time:  1130  Speech Stop Time:  1150     Speech Total Time (min):  20 min    Billable minutes:   Motion Fluoroscopic Evaluation, Video Recording, 12 minutes  Self Care/Home Management, 8 minutes     06/04/2025

## 2025-06-04 NOTE — PROGRESS NOTES
[facility] Hospital Medicine Progress Note        Chief Complaint: Inpatient Follow-up for  Rt sided weakness        HPI:     58 yo female with PMHx of  DM2, left MCA stroke s/p thrombectomy in 6/2024 was on Plavix, Recent SDH following a fall while on Plavix on 5/16/25 s/p cerebral angiogram with embolization of left middle meningeal artery and on  5/19/25 s/p left frontoparietal craniotomy and evacuation of SDH. Pt was discharged home on 5/22 with home health. Today 5/29/25 Pt  presents to ED with c/o acute onset headache, possibly around midnight. Then around 0540 she developed right arm and leg weakness with right facial droop. Has not been taking Keppra at home. Seen by Stroke Neurology in the ED who does not suspect a new stroke but rather seizure activity. RLE weakness already improved however RUE  weakness and right facial droop persisted.      VS on arrival: T 98.2, P 100, R 18, b/P 105/73, Sats 98% on room air. Initial labs: WBC 9.23, Hgb 10.8, platelets 527, PT 13.9, INR 1.1, Na 138, K 3.9, Cl 103, creatinine 0.68, glucose 191, TSH 0.309 and otherwise unremarkable.  CT head shows a left cerebral convexity hypodense collection without residual acute hyperdense subacute hematoma and improved post surgical pneumocephalus and otherwise no significant interval change.  CT brain perfusion shows no definite blood flow asymmetry or other findings to suggest significant acute perfusion defects.  There are scattered areas of asymmetrically decreased perfusion to the left MCA territory corresponding to area of known ischemic stroke.  CTA head shows a suspected diminished flow inferior division of the left middle cerebral artery close to the bifurcation with no other hemodynamically significant stenosis identified. Pt was loaded with IV Keppra 2 gram in the ED. Admitted to  service. Stroke Neurology was consulted. EEG neg for seizures.      New onset fever of 102 overnight on 5/29/25.  Infectious workup initiated. MRI brain on 5/30/25 with no acute infarct but showed Left MCA territory large encephalomalacia combined with gliotic changes. Infectious workup was neg however aspiration suspected on MBS study. SLP suggested strict NPO for now. NG tube placed. Pt was started on Zosyn to cover aspiration pneumonitis. Due to persistent Rt facial droop and RUE weakness, Stroke Neurology recommended Neurosurgery consult for evaluation of chronic left SDH. Neurosurgery felt  left diony hole with draining of SDH was indicated. Cardiology was consulted for Cardiac risk assessment. Pt was cleared with low risk. Pt underwent left diony hole with hematoma evacuation on 6/2/25 with Dr. Rankin.    Rt upper ext weakness cont to improve. SLP is following. PT/OT suggested high intensity therapy.     Interval Hx:   POD #2, S/P left diony hole and hematoma evacuation.   Underwent MBS today   Cleared for diet.   Pt has no new c/o today. RUE weakness cont to improve. Still has some headaches.     Remains afebrile. Hemodynamics are stable, on RA  Labs today WBC 6.9, Hgb 11.1, Plt 767, Cr 0.6,       Case was discussed with patient's nurse and  on the floor.    Objective/physical exam:  General: In no acute distress, afebrile,  on RA  Chest: Clear to auscultation bilaterally  Heart: RRR, +S1, S2, no appreciable murmur  Abdomen: Soft, nontender, BS +  MSK: Warm, no lower extremity edema, no clubbing or cyanosis  Neurologic: Alert and oriented x3, RUE 3+/5, RLE 4/5, Left U/L ext 5/5      VITAL SIGNS: 24 HRS MIN & MAX LAST   Temp  Min: 97.7 °F (36.5 °C)  Max: 98.4 °F (36.9 °C) 98.1 °F (36.7 °C)   BP  Min: 114/68  Max: 128/76 118/83   Pulse  Min: 66  Max: 91  89   Resp  Min: 16  Max: 20 18   SpO2  Min: 94 %  Max: 96 % 96 %     I have reviewed the following labs:  Recent Labs   Lab 05/30/25  0348 06/01/25  0527 06/04/25  0515   WBC 9.00 8.19 6.99   RBC 3.64* 3.76* 3.77*   HGB 10.8* 11.2* 11.1*   HCT 32.9* 34.0*  34.5*   MCV 90.4 90.4 91.5   MCH 29.7 29.8 29.4   MCHC 32.8* 32.9* 32.2*   RDW 13.3 13.4 13.7   * 655* 767*   MPV 9.8 9.2 9.2     Recent Labs   Lab 05/29/25  0952 05/30/25  0348 06/01/25  0527 06/04/25  0515    139 139 140   K 3.9 3.5 3.4* 3.6    103 102 106   CO2 22* 23 26 27   BUN 8.6* 7.0* 7.8* 9.4*   CREATININE 0.68 0.61 0.68 0.62   * 142* 166* 193*   CALCIUM 9.2 9.2 9.1 8.7   MG  --  1.60 2.10 2.00   ALBUMIN 2.6* 2.6*  --   --    PROT 6.9 7.0  --   --    ALKPHOS 192* 176*  --   --    ALT 42 33  --   --    AST 26 22  --   --    BILITOT 0.3 0.4  --   --      Microbiology Results (last 7 days)       Procedure Component Value Units Date/Time    Blood Culture [[id number]]  (Normal) Collected: 05/30/25 0348    Order Status: Completed Specimen: Blood, Venous Updated: 06/04/25 0429     Blood Culture No Growth at 5 days    Blood Culture [[id number]]  (Normal) Collected: 05/30/25 0348    Order Status: Completed Specimen: Blood, Venous Updated: 06/04/25 0429     Blood Culture No Growth at 5 days    Respiratory Culture [[id number]] Collected: 06/01/25 1833    Order Status: Completed Specimen: Sputum Updated: 06/03/25 0731     Respiratory Culture Normal respiratory aaron     GRAM STAIN Quality 1+      Few Gram Positive Rods      Few Gram positive cocci             See below for Radiology    Assessment/Plan:  Chronic left subdural hematoma, POA, s/p diony hole on 6/2/25  Possible Left MCA territory post stroke recrudescence, POA  Oropharyngeal Dysphagia, POA - on modified diet.  Left MCA territory large encephalomalacia with seizure in differential, POA  Recent h/o fall and SDH s/p left MMA embolization and left frontoparietal craniotomy in 5/2025  H/O Left MCA stroke , s/p  thrombectomy 6/1/24   Normocytic anemia , mild -POA  Acute fever- 5/29/25- resolved   Suspected Aspiration pneumonitis - 5/30/35- treated      Plan-  S/P left diony hole on 6/2/25  S/P MBS today. Pt is cleared for diet  Plavix is  currently on hold. Was initially planned to restart on June 1. Now will wait for Neurosurgery clearance.   Continue stain.  Continue Keppra   Other home meds are reviewed and resumed as appropriate   Continue PT/OT service   High intensity therapy suggested.  Will consult CM to assist with DC planning   Pt can go to placement MD at this point .        VTE prophylaxis:  SCDs for now . Pharmacologic prophylaxis once cleared by Neurosurgery. I sent message to Dr. Rankin both for Plavix and Lovenox via Epic chat. Awaiting response.        Patient condition:  Fair      Anticipated discharge and Disposition:     Rehab placement       All diagnosis and differential diagnosis have been reviewed; assessment and plan has been documented; I have personally reviewed the labs and test results that are presently available; I have reviewed the patients medication list; I have reviewed the consulting providers response and recommendations. I have reviewed or attempted to review medical records based upon their availability    All of the patient's questions have been  addressed and answered. Patient's is agreeable to the above stated plan. I will continue to monitor closely and make adjustments to medical management as needed.    Portions of this note dictated using EMR integrated voice recognition software, and may be subject to voice recognition errors not corrected at proofreading. Please contact writer for clarification if needed.   _____________________________________________________________________    Malnutrition Status:  Nutrition consulted. Most recent weight and BMI monitored-     Measurements:  Wt Readings from Last 1 Encounters:   06/03/25 46.3 kg (102 lb)   Body mass index is 16.46 kg/m².    Patient has been screened and assessed by RD.    Malnutrition Type:  Context: acute illness or injury  Level: severe    Malnutrition Characteristic Summary:  Weight Loss (Malnutrition): 7.5% in 3 months  Energy Intake  (Malnutrition): less than or equal to 50% for greater than or equal to 5 days  Subcutaneous Fat (Malnutrition): moderate depletion  Muscle Mass (Malnutrition): moderate depletion    Interventions/Recommendations (treatment strategy):        Scheduled Med:   atorvastatin  80 mg Per NG tube Daily    busPIRone  15 mg Per NG tube BID    docusate  100 mg Per NG tube BID    ezetimibe  10 mg Per NG tube Daily    famotidine (PF)  20 mg Intravenous Q12H    levETIRAcetam (Keppra) IV (PEDS and ADULTS)  500 mg Intravenous Q12H    levothyroxine  88 mcg Per NG tube Before breakfast    nortriptyline  25 mg Per NG tube QHS    paroxetine  20 mg Per NG tube QAM      Continuous Infusions:     PRN Meds:    Current Facility-Administered Medications:     acetaminophen, 650 mg, Rectal, Q6H PRN    acetaminophen, 650 mg, Oral, Q6H PRN    albuterol-ipratropium, 3 mL, Nebulization, Q4H PRN    bisacodyL, 10 mg, Rectal, Daily PRN    butalbital-acetaminophen-caffeine -40 mg, 1 tablet, Oral, Q4H PRN    dextrose 50%, 12.5 g, Intravenous, PRN    glucagon (human recombinant), 1 mg, Intramuscular, PRN    hydrALAZINE, 10 mg, Intravenous, Q6H PRN    HYDROcodone-acetaminophen, 1 tablet, Oral, Q4H PRN    HYDROcodone-acetaminophen, 1 tablet, Oral, Q4H PRN    insulin aspart U-100, 0-10 Units, Subcutaneous, Q6H PRN    labetalol, 10 mg, Intravenous, Q2H PRN    lorazepam, 2 mg, Intravenous, Q4H PRN    ondansetron, 4 mg, Intravenous, Q4H PRN    prochlorperazine, 5 mg, Intravenous, Q6H PRN    sodium chloride 0.9%, 10 mL, Intravenous, PRN         Argentina Jerome MD  Department of Hospital Medicine   [facility]   06/04/2025

## 2025-06-04 NOTE — PLAN OF CARE
Met with pt at bedside to discuss d/c POC and IPR placement. Provided list of facilities and FOC with contact info. Pt requested to speak with her family prior to making decision on facility.     Peggy Champion LCSW

## 2025-06-04 NOTE — PT/OT/SLP PROGRESS
Occupational Therapy   Treatment    Name: Chelle Chandra  MRN: [id number]    Recommendations:     Recommended therapy intensity at discharge: High Intensity Therapy   Discharge Equipment Recommendations:  none  Barriers to discharge:   ongoing medical needs    Assessment:     Chelle Chandra is a 59 y.o. female with a medical diagnosis of R sided weakness and facial droop due to L SDH s/p craniotomy and MMA embolization. On 5/30 patient with subdural hemorrhage s/p L diony hole. Performance deficits affecting function are weakness, impaired endurance, impaired self care skills, impaired functional mobility, gait instability, impaired balance, decreased coordination, decreased upper extremity function, decreased ROM. Patient required SBA for sit<>stand from bedside chair and toilet, Min A required for ambulation, and Mod A required for dynamic standing balance during oral hygiene due to frequent R LOB. Slight improvements with RUE functional use during hygiene tasks with Min A required to maintain lateral pinch. Patient would benefit from high intensity therapy.    Rehab Prognosis:  Good; patient would benefit from acute skilled OT services to address these deficits and reach maximum level of function.       Plan:     Patient to be seen 6 x/week to address the above listed problems via self-care/home management, therapeutic activities, therapeutic exercises, neuromuscular re-education, cognitive retraining  Plan of Care Expires: 06/30/25  Plan of Care Reviewed with: patient, daughter    Subjective     Pain/Comfort:  Pain Rating 1: 0/10    Objective:     Communicated with: ADRY prior to session.  Patient found up in chair with NG tube, peripheral IV upon OT entry to room.    General Precautions: Standard, fall, seizure (SBP<160)    Orthopedic Precautions:N/A  Braces: N/A  Respiratory Status: Room air  Vital Signs: Respiratory Status: on room air     Occupational Performance:     Functional  Mobility/Transfers:  Transfers: Sit to Stand: contact guard assistance with rolling walker  Toilet Transfer: contact guard assistance with rolling walker using Step Transfer  Patient ambulated to/from bathroom with Min A using RW due to slight LOB throughout    Activities of Daily Living:  Grooming: minimum-moderate assistance to complete oral hygiene while standing; Min A to maintain grasp with RUE on brush/paste, Mod A for dynamic standing balance during hygiene task due to frequent LOB to R and R lateral lean.  Toileting: minimum assistance to doff brief and SBA to complete anterior pericare    Therapeutic Positioning    OT interventions performed during the course of today's session in an effort to prevent and/or reduce acquired pressure injuries:   Education was provided on benefits of and recommendations for therapeutic positioning  Therapeutic positioning was provided at the conclusion of session to offload all bony prominences for the prevention and/or reduction of pressure injuries    [facility] 6 Click ADL: 15    Patient Education:  Patient and family were provided with verbal education education regarding OT role/goals/POC, fall prevention, safety awareness, Discharge/DME recommendations, and pressure ulcer prevention.  Understanding was verbalized.      Patient left up in chair with all lines intact, chair alarm, call button in reach, NSG notified, and family present.    GOALS:   Multidisciplinary Problems       Occupational Therapy Goals          Problem: Occupational Therapy    Goal Priority Disciplines Outcome Interventions   Occupational Therapy Goal     OT, PT/OT Progressing    Description: Goals to be met by 7/3/25    Pt will complete grooming standing at sink with LRAD with SBA.  Pt will complete UB dressing with SBA.  Pt will complete LB dressing with SBA using LRAD.  Pt will complete toileting with SBA using LRAD.  Pt will complete functional mobility to/from toilet and toilet transfer with SBA using  LRAD.   Pt will demo increased strength in R UE to 3/5 MMT for increased functional use during ADL tasks.                        Time Tracking:     OT Date of Treatment: 06/04/25  OT Start Time: 1003  OT Stop Time: 1030  OT Total Time (min): 27 min    Billable Minutes:Self Care/Home Management 2    Supervising Occupational Therapist: LISA Hernandez  OT/EDILSON: OT     Number of EDILSON visits since last OT visit: 1    6/4/2025

## 2025-06-04 NOTE — PLAN OF CARE
Problem: Adult Inpatient Plan of Care  Goal: Absence of Hospital-Acquired Illness or Injury  Outcome: Progressing  Intervention: Identify and Manage Fall Risk  Flowsheets (Taken 6/4/2025 1708)  Safety Promotion/Fall Prevention:   assistive device/personal item within reach   lighting adjusted   medications reviewed   instructed to call staff for mobility   toileting scheduled  Intervention: Prevent Skin Injury  Flowsheets (Taken 6/4/2025 1708)  Body Position: position changed independently  Skin Protection: incontinence pads utilized  Device Skin Pressure Protection: positioning supports utilized  Goal: Optimal Comfort and Wellbeing  Outcome: Progressing  Intervention: Monitor Pain and Promote Comfort  Flowsheets (Taken 6/4/2025 1708)  Pain Management Interventions:   around-the-clock dosing utilized   care clustered   medication offered   position adjusted

## 2025-06-05 LAB
POCT GLUCOSE: 182 MG/DL (ref 70–110)
POCT GLUCOSE: 183 MG/DL (ref 70–110)
POCT GLUCOSE: 217 MG/DL (ref 70–110)

## 2025-06-05 PROCEDURE — 97116 GAIT TRAINING THERAPY: CPT | Mod: CQ

## 2025-06-05 PROCEDURE — 94760 N-INVAS EAR/PLS OXIMETRY 1: CPT

## 2025-06-05 PROCEDURE — 63600175 PHARM REV CODE 636 W HCPCS: Performed by: NURSE PRACTITIONER

## 2025-06-05 PROCEDURE — 99900035 HC TECH TIME PER 15 MIN (STAT)

## 2025-06-05 PROCEDURE — 25000003 PHARM REV CODE 250: Performed by: INTERNAL MEDICINE

## 2025-06-05 PROCEDURE — 21400001 HC TELEMETRY ROOM

## 2025-06-05 PROCEDURE — 97110 THERAPEUTIC EXERCISES: CPT | Mod: CQ

## 2025-06-05 PROCEDURE — 97535 SELF CARE MNGMENT TRAINING: CPT | Mod: CO

## 2025-06-05 PROCEDURE — 94799 UNLISTED PULMONARY SVC/PX: CPT

## 2025-06-05 PROCEDURE — 25000003 PHARM REV CODE 250: Performed by: NEUROLOGICAL SURGERY

## 2025-06-05 PROCEDURE — 99900031 HC PATIENT EDUCATION (STAT)

## 2025-06-05 RX ORDER — LEVETIRACETAM 100 MG/ML
500 SOLUTION ORAL 2 TIMES DAILY
Status: DISCONTINUED | OUTPATIENT
Start: 2025-06-05 | End: 2025-06-06 | Stop reason: HOSPADM

## 2025-06-05 RX ORDER — CLOPIDOGREL BISULFATE 75 MG/1
75 TABLET ORAL DAILY
Status: DISCONTINUED | OUTPATIENT
Start: 2025-06-05 | End: 2025-06-06 | Stop reason: HOSPADM

## 2025-06-05 RX ADMIN — EZETIMIBE 10 MG: 10 TABLET ORAL at 09:06

## 2025-06-05 RX ADMIN — DOCUSATE SODIUM 200 MG: 100 CAPSULE, LIQUID FILLED ORAL at 09:06

## 2025-06-05 RX ADMIN — LEVETIRACETAM 500 MG: 500 SOLUTION ORAL at 08:06

## 2025-06-05 RX ADMIN — HYDROCODONE BITARTRATE AND ACETAMINOPHEN 1 TABLET: 10; 325 TABLET ORAL at 06:06

## 2025-06-05 RX ADMIN — INSULIN ASPART 2 UNITS: 100 INJECTION, SOLUTION INTRAVENOUS; SUBCUTANEOUS at 05:06

## 2025-06-05 RX ADMIN — LEVETIRACETAM 500 MG: 500 TABLET, FILM COATED ORAL at 09:06

## 2025-06-05 RX ADMIN — CLOPIDOGREL 75 MG: 75 TABLET ORAL at 05:06

## 2025-06-05 RX ADMIN — INSULIN ASPART 2 UNITS: 100 INJECTION, SOLUTION INTRAVENOUS; SUBCUTANEOUS at 06:06

## 2025-06-05 RX ADMIN — HYDROCODONE BITARTRATE AND ACETAMINOPHEN 1 TABLET: 10; 325 TABLET ORAL at 08:06

## 2025-06-05 RX ADMIN — HYDROCODONE BITARTRATE AND ACETAMINOPHEN 1 TABLET: 10; 325 TABLET ORAL at 11:06

## 2025-06-05 RX ADMIN — BUSPIRONE HYDROCHLORIDE 15 MG: 5 TABLET ORAL at 08:06

## 2025-06-05 RX ADMIN — BUSPIRONE HYDROCHLORIDE 15 MG: 5 TABLET ORAL at 09:06

## 2025-06-05 RX ADMIN — HYDROCODONE BITARTRATE AND ACETAMINOPHEN 1 TABLET: 10; 325 TABLET ORAL at 05:06

## 2025-06-05 RX ADMIN — PAROXETINE HYDROCHLORIDE 20 MG: 20 TABLET, FILM COATED ORAL at 06:06

## 2025-06-05 RX ADMIN — LEVOTHYROXINE SODIUM 88 MCG: 88 TABLET ORAL at 06:06

## 2025-06-05 RX ADMIN — METOPROLOL SUCCINATE 12.5 MG: 25 TABLET, EXTENDED RELEASE ORAL at 09:06

## 2025-06-05 RX ADMIN — INSULIN ASPART 4 UNITS: 100 INJECTION, SOLUTION INTRAVENOUS; SUBCUTANEOUS at 12:06

## 2025-06-05 RX ADMIN — ATORVASTATIN CALCIUM 80 MG: 40 TABLET, FILM COATED ORAL at 09:06

## 2025-06-05 NOTE — PROGRESS NOTES
Inpatient Nutrition Assessment    Admit Date: 5/29/2025   Total duration of encounter: 7 days   Patient Age: 59 y.o.    Nutrition Recommendation/Prescription     Continue oral diet per SLP recs: currently Diet Minced & Moist (IDDSI Level 5) Moderately Thick Liquids (IDDSI Level 3) ; would rec to keep carbohydrate restriction off until intake improves    Boost VHC provides 530 kcal, 22 gm protein per container  Magic Cup (when in stock) provides 290 kcal, 9 gm protein per container    Communication of Recommendations: reviewed with nurse, reviewed with patient, and reviewed with family    Nutrition Assessment     Malnutrition Assessment/Nutrition-Focused Physical Exam    Malnutrition Context: acute illness or injury (06/05/25 1126)  Malnutrition Level: severe (06/05/25 1126)  Energy Intake (Malnutrition): less than or equal to 50% for greater than or equal to 5 days (06/05/25 1126)  Weight Loss (Malnutrition): 7.5% in 3 months (06/05/25 1126)  Subcutaneous Fat (Malnutrition): moderate depletion (06/05/25 1126)  Orbital Region (Subcutaneous Fat Loss): moderate depletion  Upper Arm Region (Subcutaneous Fat Loss): moderate depletion     Muscle Mass (Malnutrition): moderate depletion (06/05/25 1126)     Clavicle Bone Region (Muscle Loss): moderate depletion  Clavicle and Acromion Bone Region (Muscle Loss): moderate depletion                          A minimum of two characteristics is recommended for diagnosis of either severe or non-severe malnutrition.    Chart Review    Reason Seen: physician consult for TF and follow-up    Malnutrition Screening Tool Results   Have you recently lost weight without trying?: Yes: Unsure how much  Have you been eating poorly because of a decreased appetite?: Yes   MST Score: 3   Diagnosis:  Possible Left MCA territory post stroke recrudescence, POA  Oropharyngeal Dysphagia, POA   Left MCA territory large encephalomalacia with seizure in differential, POA  Recent h/o fall and SDH s/p  left MMA embolization and left frontoparietal craniotomy in 5/2025  H/O Left MCA stroke , s/p  thrombectomy 6/1/24   Normocytic anemia , mild -POA  Acute fever- 5/29/25  Suspected Aspiration pneumonitis - 5/30/35    Relevant Medical History:   Left subacute/chronic subdural hematoma - s/p left frontoparietal craniotomy and evacuation of hematoma - 5/19/25  Cerebral angiogram with embolization left middle meningeal artery - 5/16/25  CVA June 2024 - s/p thrombectomy 6/1/24   Diabetes mellitus, type 2  COPD  Dyslipidemia   Lung nodule  GERD  Anxiety    Scheduled Medications:  atorvastatin, 80 mg, Daily  busPIRone, 15 mg, BID  docusate sodium, 200 mg, BID  ezetimibe, 10 mg, Daily  levETIRAcetam, 500 mg, BID  levothyroxine, 88 mcg, Before breakfast  metoprolol succinate, 12.5 mg, Daily  nortriptyline, 25 mg, QHS  paroxetine, 20 mg, QAM    Continuous Infusions:   PRN Medications:  acetaminophen, 650 mg, Q6H PRN  acetaminophen, 650 mg, Q6H PRN  albuterol-ipratropium, 3 mL, Q4H PRN  bisacodyL, 10 mg, Daily PRN  butalbital-acetaminophen-caffeine -40 mg, 1 tablet, Q4H PRN  dextrose 50%, 12.5 g, PRN  glucagon (human recombinant), 1 mg, PRN  hydrALAZINE, 10 mg, Q6H PRN  HYDROcodone-acetaminophen, 1 tablet, Q4H PRN  HYDROcodone-acetaminophen, 1 tablet, Q4H PRN  insulin aspart U-100, 0-10 Units, Q6H PRN  labetalol, 10 mg, Q2H PRN  lorazepam, 2 mg, Q4H PRN  ondansetron, 4 mg, Q4H PRN  prochlorperazine, 5 mg, Q6H PRN  sodium chloride 0.9%, 10 mL, PRN    Calorie Containing IV Medications: no significant kcals from medications at this time    Recent Labs   Lab 05/30/25  0348 06/01/25  0527 06/04/25  0515    139 140   K 3.5 3.4* 3.6   CALCIUM 9.2 9.1 8.7   PHOS  --  3.7 2.3   MG 1.60 2.10 2.00    102 106   CO2 23 26 27   BUN 7.0* 7.8* 9.4*   CREATININE 0.61 0.68 0.62   EGFRNORACEVR >60 >60 >60   * 166* 193*   BILITOT 0.4  --   --    ALKPHOS 176*  --   --    ALT 33  --   --    AST 22  --   --    ALBUMIN 2.6*   "--   --    WBC 9.00 8.19 6.99   HGB 10.8* 11.2* 11.1*   HCT 32.9* 34.0* 34.5*     Nutrition Orders:  Diet Minced & Moist (IDDSI Level 5) Moderately Thick Liquids (IDDSI Level 3)  Dietary nutrition supplements All Meals; Magic Cup - Any flavor, Boost Very High Calorie Nutritional Drink - Strawberry    Appetite/Oral Intake: good/25-50% of meals  Factors Affecting Nutritional Intake: difficulty/impaired swallowing and dislike of food on diet  Social Needs Impacting Access to Food: none identified  Food/Congregational/Cultural Preferences: none reported  Food Allergies: none reported  Last Bowel Movement: 25  Wound(s):  none noted    Comments    2025: Pt and family reports a poor PO intake due to swallowing difficulties for ~2 weeks prior to admit. Pt NPO with NGT. Provided TF recommendations. Pt denies N/V and chewing difficulties. Pt reports swallowing difficulties, SLP following. Pt wears dentures denies problems. Last BM noted. Per EMR, pt weighed 50.3 kg on 2025 (7.5% wt loss in 3 months, significant). Will monitor.    2025: Tube feedings started on , unsure if achieved goal rate. No reported intolerances.  TF on hold today for surgery.     25 Pt on oral diet, good appetite reported, but not eating much due to diet restrictions and dislike of food items being served;  from kitchen is visiting this afternoon to get menu selections from pt and family. Will add pudding and applesauce with meals as well as Boost [facility] for additional nutrition. Currently kitchen out of Magic Cup.    Anthropometrics    Height: 5' 6" (167.6 cm), Height Method: Stated  Last Weight: 46.3 kg (102 lb) (25 1113), Weight Method: Bed Scale  BMI (Calculated): 16.5  BMI Classification: underweight (BMI less than 18.5)     Ideal Body Weight (IBW), Female: 130 lb     % Ideal Body Weight, Female (lb): 78.85 %                    Usual Body Weight (UBW), k.3 kg  % Usual Body Weight: 92.64     Usual Weight " Provided By: EMR weight history    Wt Readings from Last 5 Encounters:   06/03/25 46.3 kg (102 lb)   05/15/25 49 kg (108 lb 0.4 oz)   05/06/25 50.8 kg (112 lb)   02/04/25 50.3 kg (110 lb 14.3 oz)   02/03/25 50.3 kg (111 lb)     Weight Change(s) Since Admission:   5/30/2025: 46.5 kg  Wt Readings from Last 1 Encounters:   06/03/25 1113 46.3 kg (102 lb)   05/30/25 1905 46.5 kg (102 lb 8.2 oz)   05/29/25 0902 46.5 kg (102 lb 8.2 oz)   Admit Weight: 46.5 kg (102 lb 8.2 oz) (05/29/25 0902), Weight Method: Bed Scale    Estimated Needs    Weight Used For Calorie Calculations: 46.5 kg (102 lb 8.2 oz)  Energy Calorie Requirements (kcal): 0508-4769 (30-35 kcal/kg)  Energy Need Method: Kcal/kg  Weight Used For Protein Calculations: 46.5 kg (102 lb 8.2 oz)  Protein Requirements: 55-69 (1.2-1.5 g/kg)  Fluid Requirements (mL): 1395 (1 mL/kcal)  CHO Requirement: 156-191 g/day (~45-55% est min kcal needs)     Enteral Nutrition     Patient not receiving enteral nutrition support at this time.    Parenteral Nutrition     Patient not receiving parenteral nutrition support at this time.    Evaluation of Received Nutrient Intake    Calories: not meeting estimated needs  Protein: not meeting estimated needs    Patient Education     Not applicable.    Nutrition Diagnosis     PES: Inadequate enteral nutrition infusion related to acute illness as evidenced by on hold since 6/2 for surgery. (discontinued)     PES: Severe acute disease or injury related malnutrition Related to acute illness As Evidenced by:  - weight loss: 7.5% in 3 months - energy intake: <= 50% for 2 weeks (meets criteria for <= 50% >= 5 days (severe - acute)) - muscle mass depletion: 5 areas of moderate muscle loss (Trapezius, Clavicle, Acromion, Pectoralis, Deltoid) - loss of subcutaneous fat: 2 areas of moderate fat loss (Triceps Skinfold, Infraorbital) active    Nutrition Interventions     Intervention(s): modified composition of enteral nutrition, modified rate of  enteral nutrition, and collaboration with other providers  Intervention(s):      Goal: Meet greater than 80% of nutritional needs by follow-up. (goal not met)  Goal: Tolerate enteral feeding at goal rate by follow-up. (goal discontinued)    Nutrition Goals & Monitoring     Dietitian will monitor: food and beverage intake, weight, electrolyte/renal panel, glucose/endocrine profile, and gastrointestinal profile  Discharge planning: too early to determine; pending clinical course  Nutrition Risk/Follow-Up: patient at increased nutrition risk; dietitian will follow-up twice weekly   Please consult if re-assessment needed sooner.

## 2025-06-05 NOTE — PROGRESS NOTES
Awake, alert. Moves right side more.  Wound C/I.  Placement. Follow up in 6 weeks with repeat CT head.  Thank you.

## 2025-06-05 NOTE — PT/OT/SLP PROGRESS
[facility]  Speech Language Pathology Department  Diet Tolerance Follow-up    Patient Name:  Chelle Chandra   MRN:  [id number]    Recommendations     General recommendations:  dysphagia therapy  Solid texture recommendation:  Minced & Moist Diet - IDDSI Level 5  Liquid consistency recommendation: Moderately thick liquids - IDDSI Level 3   Medications: crushed in puree  Precautions: aspiration    Diet Tolerance     Nursing reports no difficulty regarding diet tolerance.    Plan     SLP to follow up for dysphagia therapy.    06/05/2025

## 2025-06-05 NOTE — PT/OT/SLP PROGRESS
Occupational Therapy   Treatment    Name: Chelle Chandra  MRN: [id number]    Recommendations:     Recommended therapy intensity at discharge: High Intensity Therapy   Discharge Equipment Recommendations:  none  Barriers to discharge:       Assessment:     Chelle Chandra is a 59 y.o. female with a medical diagnosis of R sided weakness and facial droop due to L SDH s/p craniotomy and MMA embolization. On 5/30 patient with subdural hemorrhage s/p L diony hole. Performance deficits affecting function are weakness, impaired endurance, impaired self care skills, impaired functional mobility, gait instability, impaired balance, decreased coordination, decreased upper extremity function, decreased ROM. Pt in good spirits and agreeable to participate. Would benefit from high intensity therapy at d/c.     Rehab Prognosis:  Good; patient would benefit from acute skilled OT services to address these deficits and reach maximum level of function.       Plan:     Patient to be seen 6 x/week to address the above listed problems via self-care/home management, therapeutic activities, therapeutic exercises, neuromuscular re-education, cognitive retraining  Plan of Care Expires: 06/30/25  Plan of Care Reviewed with: patient    Subjective     Pain/Comfort:  Pain Rating 1: 0/10    Objective:     Communicated with: RN prior to session.  Patient found HOB elevated upon OT entry to room.    General Precautions: Standard, fall, seizure (SBP<160)    Orthopedic Precautions:N/A  Braces: N/A  Respiratory Status: Room air  Vital Signs: Blood Pressure: 110/75     Occupational Performance:     Functional Mobility/Transfers:  Bed mobility:    Supine to Sit: stand by assistance  Sit to Supine: stand by assistance  Transfers: Sit to Stand: contact guard assistance with rolling walker  Toilet Transfer: minimum assistance with grab bars using Step Transfer    Activities of Daily Living:  Toileting: stand by assistance for management of underwear and  seated pericare after +void.   Pt given foam block and theraputty for R  strengthening to increase independence with ADLs. Pt verbalized understanding on use.     Balance:   Static Sitting Balance: No UE support: Normal: Patient able to maintain steady balance without handhold support.  Dynamic Sitting Balance: No UE support: Normal: Patient accepts maximal challenge and can shift weight easily within full range in all directions.      Therapeutic Positioning    OT interventions performed during the course of today's session in an effort to prevent and/or reduce acquired pressure injuries:   Education was provided on benefits of and recommendations for therapeutic positioning    [facility] [address] 6 Click ADL: 18    Patient Education:  Patient provided with verbal education education regarding OT role/goals/POC, fall prevention, and safety awareness.  Understanding was verbalized.      Patient left HOB elevated with all lines intact and call button in reach.    GOALS:   Multidisciplinary Problems       Occupational Therapy Goals          Problem: Occupational Therapy    Goal Priority Disciplines Outcome Interventions   Occupational Therapy Goal     OT, PT/OT Progressing    Description: Goals to be met by 7/3/25    Pt will complete grooming standing at sink with LRAD with SBA.  Pt will complete UB dressing with SBA.  Pt will complete LB dressing with SBA using LRAD.  Pt will complete toileting with SBA using LRAD.  Pt will complete functional mobility to/from toilet and toilet transfer with SBA using LRAD.   Pt will demo increased strength in R UE to 3/5 MMT for increased functional use during ADL tasks.                        Time Tracking:     OT Date of Treatment: 06/05/25  OT Start Time: 1433  OT Stop Time: 1456  OT Total Time (min): 23 min    Billable Minutes:Self Care/Home Management 23    Supervising Occupational Therapist: LISA Mora  OT/EDILSON: EDILSON     Number of EDILSON visits since last OT visit: 2    6/5/2025

## 2025-06-05 NOTE — PROGRESS NOTES
"[facility] Hospital Medicine Progress Note        Chief Complaint: Inpatient Follow-up for  Rt sided weakness        HPI per admitting team: "58 yo female with PMHx of  DM2, left MCA stroke s/p thrombectomy in 6/2024 was on Plavix, Recent SDH following a fall while on Plavix on 5/16/25 s/p cerebral angiogram with embolization of left middle meningeal artery and on  5/19/25 s/p left frontoparietal craniotomy and evacuation of SDH. Pt was discharged home on 5/22 with home health. Today 5/29/25 Pt  presents to ED with c/o acute onset headache, possibly around midnight. Then around 0540 she developed right arm and leg weakness with right facial droop. Has not been taking Keppra at home. Seen by Stroke Neurology in the ED who does not suspect a new stroke but rather seizure activity. RLE weakness already improved however RUE  weakness and right facial droop persisted.   VS on arrival: T 98.2, P 100, R 18, b/P 105/73, Sats 98% on room air. Initial labs: WBC 9.23, Hgb 10.8, platelets 527, PT 13.9, INR 1.1, Na 138, K 3.9, Cl 103, creatinine 0.68, glucose 191, TSH 0.309 and otherwise unremarkable.  CT head shows a left cerebral convexity hypodense collection without residual acute hyperdense subacute hematoma and improved post surgical pneumocephalus and otherwise no significant interval change.  CT brain perfusion shows no definite blood flow asymmetry or other findings to suggest significant acute perfusion defects.  There are scattered areas of asymmetrically decreased perfusion to the left MCA territory corresponding to area of known ischemic stroke.  CTA head shows a suspected diminished flow inferior division of the left middle cerebral artery close to the bifurcation with no other hemodynamically significant stenosis identified. Pt was loaded with IV Keppra 2 gram in the ED. Admitted to  service. Stroke Neurology was consulted. EEG neg for seizures.   New onset fever of 102 overnight " "on 5/29/25. Infectious workup initiated. MRI brain on 5/30/25 with no acute infarct but showed Left MCA territory large encephalomalacia combined with gliotic changes. Infectious workup was neg however aspiration suspected on MBS study. SLP suggested strict NPO for now. NG tube placed. Pt was started on Zosyn to cover aspiration pneumonitis. Due to persistent Rt facial droop and RUE weakness, Stroke Neurology recommended Neurosurgery consult for evaluation of chronic left SDH. Neurosurgery felt  left diony hole with draining of SDH was indicated. Cardiology was consulted for Cardiac risk assessment. Pt was cleared with low risk. Pt underwent left diony hole with hematoma evacuation on 6/2/25 with Dr. Rankin  Rt upper ext weakness cont to improve. SLP is following. PT/OT suggested high intensity therapy"     Interval Hx:   Laying in bed, reports she feels better, only complaint is left-sided headache and pain at the surgical site  Discussed  is working on rehab  No family at bedside  Case was discussed with patient's nurse and  on the floor.    Objective/physical exam:  General: In no acute distress, afebrile,  on RA  Chest: Clear to auscultation bilaterally  Heart: RRR, +S1, S2, no appreciable murmur  Abdomen: Soft, nontender, BS +  MSK: Warm, no lower extremity edema, no clubbing or cyanosis  Neurologic: Alert and oriented x3, able to move all 4 extremities      VITAL SIGNS: 24 HRS MIN & MAX LAST   Temp  Min: 98.1 °F (36.7 °C)  Max: 99.2 °F (37.3 °C) 98.5 °F (36.9 °C)   BP  Min: 103/64  Max: 129/83 (!) 116/59   Pulse  Min: 71  Max: 86  71   Resp  Min: 10  Max: 18 10   SpO2  Min: 94 %  Max: 97 % 96 %     I have reviewed the following labs:  Recent Labs   Lab 05/30/25  0348 06/01/25  0527 06/04/25  0515   WBC 9.00 8.19 6.99   RBC 3.64* 3.76* 3.77*   HGB 10.8* 11.2* 11.1*   HCT 32.9* 34.0* 34.5*   MCV 90.4 90.4 91.5   MCH 29.7 29.8 29.4   MCHC 32.8* 32.9* 32.2*   RDW 13.3 13.4 13.7   * " 655* 767*   MPV 9.8 9.2 9.2     Recent Labs   Lab 05/30/25  0348 06/01/25  0527 06/04/25  0515    139 140   K 3.5 3.4* 3.6    102 106   CO2 23 26 27   BUN 7.0* 7.8* 9.4*   CREATININE 0.61 0.68 0.62   * 166* 193*   CALCIUM 9.2 9.1 8.7   MG 1.60 2.10 2.00   ALBUMIN 2.6*  --   --    PROT 7.0  --   --    ALKPHOS 176*  --   --    ALT 33  --   --    AST 22  --   --    BILITOT 0.4  --   --      Microbiology Results (last 7 days)       Procedure Component Value Units Date/Time    Blood Culture [[id number]]  (Normal) Collected: 05/30/25 0348    Order Status: Completed Specimen: Blood, Venous Updated: 06/04/25 0429     Blood Culture No Growth at 5 days    Blood Culture [[id number]]  (Normal) Collected: 05/30/25 0348    Order Status: Completed Specimen: Blood, Venous Updated: 06/04/25 0429     Blood Culture No Growth at 5 days    Respiratory Culture [[id number]] Collected: 06/01/25 1833    Order Status: Completed Specimen: Sputum Updated: 06/03/25 0731     Respiratory Culture Normal respiratory aaron     GRAM STAIN Quality 1+      Few Gram Positive Rods      Few Gram positive cocci             See below for Radiology    Intake/Output:  Intake/Output Summary (Last 24 hours) at 6/5/2025 1547  Last data filed at 6/5/2025 1010  Gross per 24 hour   Intake 240 ml   Output 301 ml   Net -61 ml          Assessment/Plan:  Chronic left subdural hematoma- POA--> s/p diony hole on 6/2/25  Possible previous Left MCA territory Post Stroke Recrudescence (PSR)- POA  Oropharyngeal Dysphagia, POA - on modified diet  Recent h/o fall and SDH s/p left MMA embolization and left frontoparietal craniotomy in 5/2025  H/O Left MCA stroke - s/p  thrombectomy 6/1/24   Normocytic anemia - mild -POA  Acute fever- 5/29/25- resolved   Suspected Aspiration pneumonitis - 5/30/35- treated  Severe malbnutrition        S/P left diony hole on 6/2/25  Passed MBS- started on minced and moist diet with moderately thickened liquids  Plavix resumed  06/05/2025 with Neurosurgery recommendations  Continue home stain, Keppra   Other home meds are reviewed and resumed as appropriate   PT/OT service -High intensity therapy   CM on board         VTE prophylaxis:  SCDs for now . Pharmacologic prophylaxis once cleared by Neurosurgery.    Patient condition:  Fair      Anticipated discharge and Disposition:   Rehab placement once cleared per N.Sx      All diagnosis and differential diagnosis have been reviewed; assessment and plan has been documented; I have personally reviewed the labs and test results that are presently available; I have reviewed the patients medication list; I have reviewed the consulting providers response and recommendations. I have reviewed or attempted to review medical records based upon their availability    All of the patient's questions have been  addressed and answered. Patient's is agreeable to the above stated plan. I will continue to monitor closely and make adjustments to medical management as needed.    Portions of this note dictated using EMR integrated voice recognition software, and may be subject to voice recognition errors not corrected at proofreading. Please contact writer for clarification if needed.   _____________________________________________________________________    Malnutrition Status:  Nutrition consulted. Most recent weight and BMI monitored-     Measurements:  Wt Readings from Last 1 Encounters:   06/03/25 46.3 kg (102 lb)   Body mass index is 16.46 kg/m².    Patient has been screened and assessed by RD.    Malnutrition Type:  Context: acute illness or injury  Level: severe    Malnutrition Characteristic Summary:  Weight Loss (Malnutrition): 7.5% in 3 months  Energy Intake (Malnutrition): less than or equal to 50% for greater than or equal to 5 days  Subcutaneous Fat (Malnutrition): moderate depletion  Muscle Mass (Malnutrition): moderate depletion    Interventions/Recommendations (treatment strategy):         Scheduled Med:   atorvastatin  80 mg Oral Daily    busPIRone  15 mg Oral BID    clopidogreL  75 mg Oral Daily    docusate sodium  200 mg Oral BID    ezetimibe  10 mg Oral Daily    levETIRAcetam  500 mg Oral BID    levothyroxine  88 mcg Oral Before breakfast    metoprolol succinate  12.5 mg Oral Daily    nortriptyline  25 mg Oral QHS    paroxetine  20 mg Oral QAM      Continuous Infusions:     PRN Meds:  Current Facility-Administered Medications:     acetaminophen, 650 mg, Rectal, Q6H PRN    acetaminophen, 650 mg, Oral, Q6H PRN    albuterol-ipratropium, 3 mL, Nebulization, Q4H PRN    bisacodyL, 10 mg, Rectal, Daily PRN    butalbital-acetaminophen-caffeine -40 mg, 1 tablet, Oral, Q4H PRN    dextrose 50%, 12.5 g, Intravenous, PRN    glucagon (human recombinant), 1 mg, Intramuscular, PRN    hydrALAZINE, 10 mg, Intravenous, Q6H PRN    HYDROcodone-acetaminophen, 1 tablet, Oral, Q4H PRN    HYDROcodone-acetaminophen, 1 tablet, Oral, Q4H PRN    insulin aspart U-100, 0-10 Units, Subcutaneous, Q6H PRN    labetalol, 10 mg, Intravenous, Q2H PRN    lorazepam, 2 mg, Intravenous, Q4H PRN    ondansetron, 4 mg, Intravenous, Q4H PRN    prochlorperazine, 5 mg, Intravenous, Q6H PRN    sodium chloride 0.9%, 10 mL, Intravenous, PRN         Jake Paulino MD  Department of Hospital Medicine   [facility]   06/05/2025

## 2025-06-05 NOTE — PLAN OF CARE
1030  Spoke to pt and chantelle re: choices for rehab:  1) [facility] 2) [facility] and 3) [facility].  Referrals to be sent thru [facility] .  Pt would like to know her her coverage for a rehab stay- will communicate with referral info sent.

## 2025-06-05 NOTE — PT/OT/SLP PROGRESS
Physical Therapy Treatment    Patient Name:  Chelle Chandra   MRN:  [id number]    Recommendations:     Discharge therapy intensity: High Intensity Therapy   Discharge Equipment Recommendations: none  Barriers to discharge: Decreased caregiver support, Impaired mobility, and Ongoing medical needs    Assessment:     Chelle Chandra is a 59 y.o. female admitted with a medical diagnosis of R sided weakness (especially RUE), possible seizure, s/p [place] hole with hematoma evacuation. She presents with the following impairments/functional limitations: gait instability, weakness, impaired balance, impaired endurance, decreased safety awareness, impaired functional mobility. The pt tolerated session well. She is able to perform all mobility with min A and Rw. Weakness noted to R side, especially hand/wrist. Will progress as able, she would benefit from HIGH intensity PT.     Rehab Prognosis: Good; patient would benefit from acute skilled PT services to address these deficits and reach maximum level of function.    Recent Surgery: Procedure(s) (LRB):  CREATION, CRANIAL TERRI HOLE, WITH HEMATOMA EVACUATION (Left) 3 Days Post-Op    Plan:     During this hospitalization, patient would benefit from acute PT services 6 x/week to address the identified rehab impairments via gait training, therapeutic activities, therapeutic exercises and progress toward the following goals:    Plan of Care Expires:  07/03/25    Subjective     Chief Complaint: none    Objective:     Communicated with nurse prior to session.  Patient found supine with NG tube, peripheral IV, alvarez catheter, telemetry, oxygen upon PT entry to room.     General Precautions: Standard, fall (SBP>160, seizure)  Orthopedic Precautions: N/A  Braces: N/A  Respiratory Status: Room air  Blood Pressure: 103/64  Skin Integrity: Visible skin intact      Functional Mobility:  Independent to get EOB   CGA STS and min assist transfers  Gait 145 ft x 2 min HHA.   Pt performed dynamic  balance activities to improve righting reactions  to prevent LOB/falls. .   Pt performed LE PRE's to increase strenth, ROM, and endurance to improve overall independence.     Patient left up in chair with call button in reach and daughter present    GOALS:   Multidisciplinary Problems       Physical Therapy Goals          Problem: Physical Therapy    Goal Priority Disciplines Outcome Interventions   Physical Therapy Goal     PT, PT/OT Progressing    Description: Goals to be met by: 2025     Patient will increase functional independence with mobility by performin. Supine to sit with Contact Guard Assistance  2. Sit to stand transfer with Contact Guard Assistance  3. Gait  x 150 feet with Contact Guard Assistance using LRAD.   4. Ascend/descend 4 stair with left Handrails Contact Guard Assistance using No Assistive Device.                          Time Tracking:     PT Received On: 25  PT Start Time: 0845     PT Stop Time: 0915  PT Total Time (min): 30 min     Billable Minutes: Gait Training 15 and Therapeutic Exercise 15    Treatment Type: Treatment  PT/PTA: PTA     Number of PTA visits since last PT visit: 2     2025

## 2025-06-06 ENCOUNTER — HOSPITAL ENCOUNTER (INPATIENT)
Facility: HOSPITAL | Age: 60
LOS: 5 days | Discharge: ANOTHER HOSPITAL | DRG: 056 | End: 2025-06-11
Attending: INTERNAL MEDICINE | Admitting: INTERNAL MEDICINE
Payer: COMMERCIAL

## 2025-06-06 VITALS
HEIGHT: 66 IN | OXYGEN SATURATION: 95 % | BODY MASS INDEX: 16.39 KG/M2 | HEART RATE: 80 BPM | SYSTOLIC BLOOD PRESSURE: 103 MMHG | WEIGHT: 102 LBS | TEMPERATURE: 98 F | DIASTOLIC BLOOD PRESSURE: 70 MMHG | RESPIRATION RATE: 18 BRPM

## 2025-06-06 DIAGNOSIS — S06.5XAA SUBDURAL HEMATOMA: Primary | ICD-10-CM

## 2025-06-06 DIAGNOSIS — I62.00 NONTRAUMATIC SUBDURAL HEMORRHAGE, UNSPECIFIED: ICD-10-CM

## 2025-06-06 PROBLEM — F31.9 BIPOLAR DEPRESSION: Status: ACTIVE | Noted: 2019-06-04

## 2025-06-06 PROBLEM — Z98.890 S/P CRANIOTOMY: Status: ACTIVE | Noted: 2025-06-06

## 2025-06-06 PROBLEM — R13.10 DYSPHAGIA: Status: ACTIVE | Noted: 2025-06-06

## 2025-06-06 PROBLEM — R47.01 APHASIA: Status: ACTIVE | Noted: 2025-06-06

## 2025-06-06 LAB
POCT GLUCOSE: 129 MG/DL (ref 70–110)
POCT GLUCOSE: 175 MG/DL (ref 70–110)
POCT GLUCOSE: 196 MG/DL (ref 70–110)
POCT GLUCOSE: 235 MG/DL (ref 70–110)

## 2025-06-06 PROCEDURE — 63600175 PHARM REV CODE 636 W HCPCS: Performed by: NURSE PRACTITIONER

## 2025-06-06 PROCEDURE — 99900031 HC PATIENT EDUCATION (STAT)

## 2025-06-06 PROCEDURE — 25000003 PHARM REV CODE 250: Performed by: NURSE PRACTITIONER

## 2025-06-06 PROCEDURE — 11800000 HC REHAB PRIVATE ROOM

## 2025-06-06 PROCEDURE — 99223 1ST HOSP IP/OBS HIGH 75: CPT | Mod: ,,, | Performed by: NURSE PRACTITIONER

## 2025-06-06 PROCEDURE — 25000003 PHARM REV CODE 250: Performed by: INTERNAL MEDICINE

## 2025-06-06 PROCEDURE — 94799 UNLISTED PULMONARY SVC/PX: CPT

## 2025-06-06 PROCEDURE — 25000003 PHARM REV CODE 250: Performed by: NEUROLOGICAL SURGERY

## 2025-06-06 PROCEDURE — 97535 SELF CARE MNGMENT TRAINING: CPT | Mod: CO

## 2025-06-06 PROCEDURE — 94761 N-INVAS EAR/PLS OXIMETRY MLT: CPT

## 2025-06-06 RX ORDER — DOCUSATE SODIUM 100 MG/1
100 CAPSULE, LIQUID FILLED ORAL 2 TIMES DAILY
Status: DISCONTINUED | OUTPATIENT
Start: 2025-06-06 | End: 2025-06-14 | Stop reason: HOSPADM

## 2025-06-06 RX ORDER — HYDROCODONE BITARTRATE AND ACETAMINOPHEN 5; 325 MG/1; MG/1
1 TABLET ORAL DAILY PRN
Status: DISCONTINUED | OUTPATIENT
Start: 2025-06-06 | End: 2025-06-14 | Stop reason: HOSPADM

## 2025-06-06 RX ORDER — GLUCAGON 1 MG
1 KIT INJECTION
OUTPATIENT
Start: 2025-06-06

## 2025-06-06 RX ORDER — ONDANSETRON 4 MG/1
4 TABLET, ORALLY DISINTEGRATING ORAL EVERY 6 HOURS PRN
Status: DISCONTINUED | OUTPATIENT
Start: 2025-06-06 | End: 2025-06-14 | Stop reason: HOSPADM

## 2025-06-06 RX ORDER — BUTALBITAL, ACETAMINOPHEN AND CAFFEINE 50; 325; 40 MG/1; MG/1; MG/1
1 TABLET ORAL EVERY 4 HOURS PRN
Status: DISCONTINUED | OUTPATIENT
Start: 2025-06-06 | End: 2025-06-14 | Stop reason: HOSPADM

## 2025-06-06 RX ORDER — LABETALOL HCL 20 MG/4 ML
10 SYRINGE (ML) INTRAVENOUS EVERY 4 HOURS PRN
Status: DISCONTINUED | OUTPATIENT
Start: 2025-06-06 | End: 2025-06-09

## 2025-06-06 RX ORDER — ONDANSETRON HYDROCHLORIDE 2 MG/ML
4 INJECTION, SOLUTION INTRAVENOUS EVERY 4 HOURS PRN
OUTPATIENT
Start: 2025-06-06

## 2025-06-06 RX ORDER — IPRATROPIUM BROMIDE AND ALBUTEROL SULFATE 2.5; .5 MG/3ML; MG/3ML
3 SOLUTION RESPIRATORY (INHALATION) EVERY 4 HOURS PRN
Status: DISCONTINUED | OUTPATIENT
Start: 2025-06-06 | End: 2025-06-14 | Stop reason: HOSPADM

## 2025-06-06 RX ORDER — HYDRALAZINE HYDROCHLORIDE 20 MG/ML
10 INJECTION INTRAMUSCULAR; INTRAVENOUS EVERY 4 HOURS PRN
Status: DISCONTINUED | OUTPATIENT
Start: 2025-06-06 | End: 2025-06-09

## 2025-06-06 RX ORDER — ATORVASTATIN CALCIUM 40 MG/1
80 TABLET, FILM COATED ORAL DAILY
OUTPATIENT
Start: 2025-06-07

## 2025-06-06 RX ORDER — BISACODYL 10 MG/1
10 SUPPOSITORY RECTAL DAILY PRN
OUTPATIENT
Start: 2025-06-06

## 2025-06-06 RX ORDER — ACETAMINOPHEN 325 MG/1
650 TABLET ORAL EVERY 6 HOURS PRN
OUTPATIENT
Start: 2025-06-06

## 2025-06-06 RX ORDER — ONDANSETRON 4 MG/1
8 TABLET, ORALLY DISINTEGRATING ORAL EVERY 8 HOURS PRN
Status: DISCONTINUED | OUTPATIENT
Start: 2025-06-06 | End: 2025-06-14 | Stop reason: HOSPADM

## 2025-06-06 RX ORDER — IPRATROPIUM BROMIDE AND ALBUTEROL SULFATE 2.5; .5 MG/3ML; MG/3ML
3 SOLUTION RESPIRATORY (INHALATION) EVERY 4 HOURS PRN
Status: CANCELLED | OUTPATIENT
Start: 2025-06-06

## 2025-06-06 RX ORDER — NITROGLYCERIN 0.4 MG/1
0.4 TABLET SUBLINGUAL EVERY 5 MIN PRN
Status: DISCONTINUED | OUTPATIENT
Start: 2025-06-06 | End: 2025-06-14 | Stop reason: HOSPADM

## 2025-06-06 RX ORDER — EZETIMIBE 10 MG/1
10 TABLET ORAL DAILY
OUTPATIENT
Start: 2025-06-07

## 2025-06-06 RX ORDER — POLYETHYLENE GLYCOL 3350 17 G/17G
17 POWDER, FOR SOLUTION ORAL 2 TIMES DAILY PRN
Status: DISCONTINUED | OUTPATIENT
Start: 2025-06-06 | End: 2025-06-14 | Stop reason: HOSPADM

## 2025-06-06 RX ORDER — HYDROCODONE BITARTRATE AND ACETAMINOPHEN 10; 325 MG/1; MG/1
1 TABLET ORAL EVERY 4 HOURS PRN
Refills: 0 | Status: CANCELLED | OUTPATIENT
Start: 2025-06-06

## 2025-06-06 RX ORDER — LORAZEPAM 2 MG/ML
2 INJECTION INTRAMUSCULAR EVERY 4 HOURS PRN
Status: DISCONTINUED | OUTPATIENT
Start: 2025-06-06 | End: 2025-06-14 | Stop reason: HOSPADM

## 2025-06-06 RX ORDER — ACETAMINOPHEN 325 MG/1
650 TABLET ORAL EVERY 6 HOURS PRN
Status: DISCONTINUED | OUTPATIENT
Start: 2025-06-06 | End: 2025-06-14 | Stop reason: HOSPADM

## 2025-06-06 RX ORDER — IBUPROFEN 200 MG
16 TABLET ORAL
Status: DISCONTINUED | OUTPATIENT
Start: 2025-06-06 | End: 2025-06-14 | Stop reason: HOSPADM

## 2025-06-06 RX ORDER — HYDRALAZINE HYDROCHLORIDE 20 MG/ML
10 INJECTION INTRAMUSCULAR; INTRAVENOUS EVERY 6 HOURS PRN
OUTPATIENT
Start: 2025-06-06

## 2025-06-06 RX ORDER — HYDROCODONE BITARTRATE AND ACETAMINOPHEN 5; 325 MG/1; MG/1
1 TABLET ORAL EVERY 4 HOURS PRN
Refills: 0 | OUTPATIENT
Start: 2025-06-06

## 2025-06-06 RX ORDER — NORTRIPTYLINE HYDROCHLORIDE 25 MG/1
25 CAPSULE ORAL NIGHTLY
Status: DISCONTINUED | OUTPATIENT
Start: 2025-06-06 | End: 2025-06-14 | Stop reason: HOSPADM

## 2025-06-06 RX ORDER — GLUCAGON 1 MG
1 KIT INJECTION
Status: DISCONTINUED | OUTPATIENT
Start: 2025-06-06 | End: 2025-06-14 | Stop reason: HOSPADM

## 2025-06-06 RX ORDER — INSULIN ASPART 100 [IU]/ML
0-10 INJECTION, SOLUTION INTRAVENOUS; SUBCUTANEOUS EVERY 6 HOURS PRN
OUTPATIENT
Start: 2025-06-06

## 2025-06-06 RX ORDER — CLOPIDOGREL BISULFATE 75 MG/1
75 TABLET ORAL DAILY
OUTPATIENT
Start: 2025-06-07

## 2025-06-06 RX ORDER — METFORMIN HYDROCHLORIDE 500 MG/1
500 TABLET ORAL 2 TIMES DAILY WITH MEALS
Status: DISCONTINUED | OUTPATIENT
Start: 2025-06-06 | End: 2025-06-14 | Stop reason: HOSPADM

## 2025-06-06 RX ORDER — BUTALBITAL, ACETAMINOPHEN AND CAFFEINE 50; 325; 40 MG/1; MG/1; MG/1
1 TABLET ORAL EVERY 4 HOURS PRN
Status: CANCELLED | OUTPATIENT
Start: 2025-06-06

## 2025-06-06 RX ORDER — PAROXETINE 20 MG/1
20 TABLET, FILM COATED ORAL DAILY
Status: DISCONTINUED | OUTPATIENT
Start: 2025-06-06 | End: 2025-06-14 | Stop reason: HOSPADM

## 2025-06-06 RX ORDER — LEVETIRACETAM 500 MG/1
500 TABLET ORAL 2 TIMES DAILY
Status: DISCONTINUED | OUTPATIENT
Start: 2025-06-06 | End: 2025-06-08

## 2025-06-06 RX ORDER — LEVOTHYROXINE SODIUM 88 UG/1
88 TABLET ORAL
OUTPATIENT
Start: 2025-06-07

## 2025-06-06 RX ORDER — NORTRIPTYLINE HYDROCHLORIDE 25 MG/1
25 CAPSULE ORAL NIGHTLY
OUTPATIENT
Start: 2025-06-06

## 2025-06-06 RX ORDER — IBUPROFEN 200 MG
24 TABLET ORAL
Status: DISCONTINUED | OUTPATIENT
Start: 2025-06-06 | End: 2025-06-14 | Stop reason: HOSPADM

## 2025-06-06 RX ORDER — PROCHLORPERAZINE EDISYLATE 5 MG/ML
5 INJECTION INTRAMUSCULAR; INTRAVENOUS EVERY 6 HOURS PRN
OUTPATIENT
Start: 2025-06-06

## 2025-06-06 RX ORDER — ONDANSETRON HYDROCHLORIDE 2 MG/ML
4 INJECTION, SOLUTION INTRAVENOUS EVERY 8 HOURS PRN
Status: DISCONTINUED | OUTPATIENT
Start: 2025-06-06 | End: 2025-06-14 | Stop reason: HOSPADM

## 2025-06-06 RX ORDER — ATORVASTATIN CALCIUM 40 MG/1
80 TABLET, FILM COATED ORAL DAILY
Status: DISCONTINUED | OUTPATIENT
Start: 2025-06-07 | End: 2025-06-14 | Stop reason: HOSPADM

## 2025-06-06 RX ORDER — LEVOTHYROXINE SODIUM 88 UG/1
88 TABLET ORAL
Status: DISCONTINUED | OUTPATIENT
Start: 2025-06-07 | End: 2025-06-06 | Stop reason: SDUPTHER

## 2025-06-06 RX ORDER — ALPRAZOLAM 0.25 MG/1
0.25 TABLET ORAL 3 TIMES DAILY PRN
Status: DISCONTINUED | OUTPATIENT
Start: 2025-06-06 | End: 2025-06-14 | Stop reason: HOSPADM

## 2025-06-06 RX ORDER — BENZONATATE 100 MG/1
100 CAPSULE ORAL 3 TIMES DAILY PRN
Status: DISCONTINUED | OUTPATIENT
Start: 2025-06-06 | End: 2025-06-14 | Stop reason: HOSPADM

## 2025-06-06 RX ORDER — DOCUSATE SODIUM 100 MG/1
200 CAPSULE, LIQUID FILLED ORAL 2 TIMES DAILY
OUTPATIENT
Start: 2025-06-06

## 2025-06-06 RX ORDER — METOPROLOL TARTRATE 1 MG/ML
10 INJECTION, SOLUTION INTRAVENOUS
Status: DISCONTINUED | OUTPATIENT
Start: 2025-06-06 | End: 2025-06-14 | Stop reason: HOSPADM

## 2025-06-06 RX ORDER — DOCUSATE SODIUM 100 MG/1
100 CAPSULE, LIQUID FILLED ORAL 2 TIMES DAILY
Status: DISCONTINUED | OUTPATIENT
Start: 2025-06-06 | End: 2025-06-06 | Stop reason: SDUPTHER

## 2025-06-06 RX ORDER — LEVOTHYROXINE SODIUM 88 UG/1
88 TABLET ORAL
Status: DISCONTINUED | OUTPATIENT
Start: 2025-06-07 | End: 2025-06-14 | Stop reason: HOSPADM

## 2025-06-06 RX ORDER — EZETIMIBE 10 MG/1
10 TABLET ORAL DAILY
Status: DISCONTINUED | OUTPATIENT
Start: 2025-06-07 | End: 2025-06-14 | Stop reason: HOSPADM

## 2025-06-06 RX ORDER — BUSPIRONE HYDROCHLORIDE 5 MG/1
15 TABLET ORAL 2 TIMES DAILY
OUTPATIENT
Start: 2025-06-06

## 2025-06-06 RX ORDER — LEVETIRACETAM 100 MG/ML
500 SOLUTION ORAL 2 TIMES DAILY
OUTPATIENT
Start: 2025-06-06

## 2025-06-06 RX ORDER — HYDROXYZINE PAMOATE 50 MG/1
50 CAPSULE ORAL NIGHTLY PRN
Status: DISCONTINUED | OUTPATIENT
Start: 2025-06-06 | End: 2025-06-14 | Stop reason: HOSPADM

## 2025-06-06 RX ORDER — LABETALOL HYDROCHLORIDE 5 MG/ML
10 INJECTION, SOLUTION INTRAVENOUS
OUTPATIENT
Start: 2025-06-06

## 2025-06-06 RX ORDER — PAROXETINE 20 MG/1
20 TABLET, FILM COATED ORAL EVERY MORNING
OUTPATIENT
Start: 2025-06-07

## 2025-06-06 RX ORDER — INSULIN ASPART 100 [IU]/ML
0-5 INJECTION, SOLUTION INTRAVENOUS; SUBCUTANEOUS
Status: DISCONTINUED | OUTPATIENT
Start: 2025-06-06 | End: 2025-06-14 | Stop reason: HOSPADM

## 2025-06-06 RX ORDER — HYDROCODONE BITARTRATE AND ACETAMINOPHEN 10; 325 MG/1; MG/1
1 TABLET ORAL EVERY 4 HOURS PRN
Status: DISCONTINUED | OUTPATIENT
Start: 2025-06-06 | End: 2025-06-14 | Stop reason: HOSPADM

## 2025-06-06 RX ORDER — ACETAMINOPHEN 650 MG/1
650 SUPPOSITORY RECTAL EVERY 6 HOURS PRN
OUTPATIENT
Start: 2025-06-06

## 2025-06-06 RX ORDER — CLOPIDOGREL BISULFATE 75 MG/1
75 TABLET ORAL DAILY
Status: DISCONTINUED | OUTPATIENT
Start: 2025-06-07 | End: 2025-06-14 | Stop reason: HOSPADM

## 2025-06-06 RX ORDER — SODIUM CHLORIDE 0.9 % (FLUSH) 0.9 %
10 SYRINGE (ML) INJECTION
OUTPATIENT
Start: 2025-06-06

## 2025-06-06 RX ADMIN — NORTRIPTYLINE HYDROCHLORIDE 25 MG: 25 CAPSULE ORAL at 08:06

## 2025-06-06 RX ADMIN — HYDROCODONE BITARTRATE AND ACETAMINOPHEN 1 TABLET: 10; 325 TABLET ORAL at 08:06

## 2025-06-06 RX ADMIN — METOPROLOL SUCCINATE 12.5 MG: 25 TABLET, EXTENDED RELEASE ORAL at 08:06

## 2025-06-06 RX ADMIN — BUSPIRONE HYDROCHLORIDE 15 MG: 10 TABLET ORAL at 08:06

## 2025-06-06 RX ADMIN — LEVOTHYROXINE SODIUM 88 MCG: 88 TABLET ORAL at 06:06

## 2025-06-06 RX ADMIN — HYDROCODONE BITARTRATE AND ACETAMINOPHEN 1 TABLET: 10; 325 TABLET ORAL at 06:06

## 2025-06-06 RX ADMIN — HYDROCODONE BITARTRATE AND ACETAMINOPHEN 1 TABLET: 10; 325 TABLET ORAL at 04:06

## 2025-06-06 RX ADMIN — DOCUSATE SODIUM 100 MG: 100 CAPSULE, LIQUID FILLED ORAL at 08:06

## 2025-06-06 RX ADMIN — EZETIMIBE 10 MG: 10 TABLET ORAL at 08:06

## 2025-06-06 RX ADMIN — INSULIN ASPART 1 UNITS: 100 INJECTION, SOLUTION INTRAVENOUS; SUBCUTANEOUS at 08:06

## 2025-06-06 RX ADMIN — LEVETIRACETAM 500 MG: 500 TABLET, FILM COATED ORAL at 08:06

## 2025-06-06 RX ADMIN — CLOPIDOGREL 75 MG: 75 TABLET ORAL at 08:06

## 2025-06-06 RX ADMIN — HYDROCODONE BITARTRATE AND ACETAMINOPHEN 1 TABLET: 5; 325 TABLET ORAL at 12:06

## 2025-06-06 RX ADMIN — LEVETIRACETAM 500 MG: 500 SOLUTION ORAL at 08:06

## 2025-06-06 RX ADMIN — INSULIN ASPART 2 UNITS: 100 INJECTION, SOLUTION INTRAVENOUS; SUBCUTANEOUS at 06:06

## 2025-06-06 RX ADMIN — PAROXETINE HYDROCHLORIDE 20 MG: 20 TABLET, FILM COATED ORAL at 12:06

## 2025-06-06 RX ADMIN — ATORVASTATIN CALCIUM 80 MG: 40 TABLET, FILM COATED ORAL at 08:06

## 2025-06-06 RX ADMIN — PAROXETINE HYDROCHLORIDE 20 MG: 20 TABLET, FILM COATED ORAL at 06:06

## 2025-06-06 RX ADMIN — METFORMIN HYDROCHLORIDE 500 MG: 500 TABLET, FILM COATED ORAL at 04:06

## 2025-06-06 RX ADMIN — BUTALBITAL, ACETAMINOPHEN, AND CAFFEINE 1 TABLET: 325; 50; 40 TABLET ORAL at 08:06

## 2025-06-06 RX ADMIN — BUSPIRONE HYDROCHLORIDE 15 MG: 5 TABLET ORAL at 08:06

## 2025-06-06 NOTE — H&P
[facility] ([facility])  Rehab Admission H&P    Patient Name: Chelle Chandra  MRN: [id number]  Age: 59 y.o. Sex: female  : 1965  Hospital Length of Stay: 0 days  Date of Service: 2025   Chief Complaint: Subdural hematoma s/p left craniotomy with hematoma evacuation on , s/p left diony hole drainage of SDH on 2025    Subjective:   History of Present Illness   59-year-old white female presented to [facility] ED on 05/15/2025 after multiple falls.  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  JOHNATHAN drain removed on .  Discharge home with home health on .  Presented to [facility] ED on  with headache, slurred speech, facial droop, and right-sided weakness.  CTA head and neck significant for suspected diminished flow inferior division of left MCA close to bifurcation.  CT head significant cord left cerebral convexity hypodense subacute hematoma and improved postsurgical pneumocephalus.  Transferred to [facility] for neurology/neurosurgery consultation.  Not a candidate for TNK due to recent intracranial hemorrhage.  Neurology recommended holding Plavix in initiating Keppra 500 mg b.i.d..  EKG noted for evidence of any epileptiform discharges.  MRI brain with no evidence of new infarct.  MBS completed.  Speech 30 recommended moderately thickened liquids due to high risk of aspiration.  Neurosurgery evaluated him and recommended for drainage.  Tolerated left diony hole for drainage of SDH due to increased intracranial pressure with mass effect on  without perioperative complications.  Repeat CT head significant for removal of SDH.  Reported increase strength to right arm.  Headache improved.  Recommended high-intensity therapy.  Speech therapy recommended minced and moist diet  with moderately thickened liquids.  Plavix resumed per Neurosurgery recommendations on .  Tolerated transfer to [facility] inpatient rehab unit on  without incident.    Lying in bed.  Reports fair appetite, but improving.  Last BM today.  Sleep hygiene is good.  Vital signs at goal.  H&H stable.  Recent iron level 32-low.  Recent BMP unremarkable.  Recent imaging reported below.    Past Medical History:  SDH s/p left craniotomy on , s/p left diony hole drainage of SDH on , major depressive disorder, generalized anxiety disorder, HLD, history of CVA, hypothyroidism, HTN, GERALD, steatosis, DM type 2, asthma, COPD  Procedure history:  Intracranial thrombectomy in , intracranial mobilization 2025, left craniotomy 2025, left diony hole/drainage of SDH on 2025, ACDF, right breast lumpectomy, cardioversion, carpal tunnel release, tonsillectomy and adenoidectomy, total abdominal hysterectomy  Family History:  Mother: Mi +CHF + in her 40s.  Father: ESRD + at age 63.  Social History:   (-) TOB. 1 PPD quit smoking in     (-) ETOH.   (-) Illicit drug use.   Completed GED.  Quit working 1 year ago after her hemorrhagic CVA.   to Roberto.  Roberto works full-time.  He drives, grocery shops, manages finances, and manages patient's medications.  Prior level of function:  Independent ADLs, cooks, does chores, does laundry with assistance.  Her stepson and grandson do yd work.  She does not have a medic Alert.  She does not drive.  She uses a Rollator for mobility.  Residence:  Lives in [place] and single-story home with 4 steps with railing going up on the right side.  She has really in the hallway bleeding to the bathroom.  She has a tub/shower combination with grab bars and handheld shower.  She does not have an elevated toilet.  No grab bars adjacent.  DME:  BP machine, tub transfer bench, shower chair, Rollator, rolling walker, grab bars, and hand-held  "shower  Anticipated discharge destination:  Home with home health    Review of patient's allergies indicates:   Allergen Reactions    Aspirin     Ketorolac     Penicillins     Sulfa (sulfonamide antibiotics)     Ozempic [semaglutide] Other (See Comments)     Abdominal pain      Current Medications[1]     Review of Systems   Complete 12-point review of symptoms negative except for what's mentioned in HPI     Objective:     BP 97/60   Pulse 80   Temp 97.7 °F (36.5 °C) (Oral)   Resp 18   Ht 5' 6" (1.676 m)   Wt 46.9 kg (103 lb 6.3 oz)   SpO2 96%   BMI 16.69 kg/m²     Physical Exam  Constitutional:       Appearance: Normal appearance. She is underweight.   HENT:      Head: Normocephalic.      Comments: Left cranial incision sites clean intact     Mouth/Throat:      Mouth: Mucous membranes are moist.   Eyes:      Pupils: Pupils are equal, round, and reactive to light.      Comments: Wearing eyeglasses   Cardiovascular:      Rate and Rhythm: Normal rate and regular rhythm.      Heart sounds: Normal heart sounds.   Pulmonary:      Effort: Pulmonary effort is normal.      Breath sounds: Normal breath sounds.   Abdominal:      General: Bowel sounds are normal.      Palpations: Abdomen is soft.   Musculoskeletal:      Cervical back: Neck supple.      Comments: Right-sided weakness with diffuse muscle atrophy   Skin:     General: Skin is warm and dry.   Neurological:      Mental Status: She is alert and oriented to person, place, and time.      Motor: Weakness present.   Psychiatric:         Mood and Affect: Mood normal.         Behavior: Behavior normal. Behavior is cooperative.         Thought Content: Thought content normal.         Judgment: Judgment normal.     *MD performed and documented physical examination       Lines/Drains/Airways       Peripheral Intravenous Line  Duration                  Peripheral IV - Single Lumen 06/03/25 1610 20 G Anterior;Left;Proximal Forearm 3 days         Peripheral IV - Single " Lumen 06/03/25 1615 20 G Anterior;Proximal;Right Antecubital 3 days                    Labs  WBC 4.50 - 11.50 x10(3)/mcL 6.99   RBC 4.20 - 5.40 x10(6)/mcL 3.77 Low    Hgb 12.0 - 16.0 g/dL 11.1 Low    Hct 37.0 - 47.0 % 34.5 Low    MCV 80.0 - 94.0 fL 91.5   MCH 27.0 - 31.0 pg 29.4   MCHC 33.0 - 36.0 g/dL 32.2 Low    RDW 11.5 - 17.0 % 13.7   Platelet 130 - 400 x10(3)/mcL 767 High    MPV 7.4 - 10.4 fL 9.2   Neut % % 64.5   Lymph % % 21.7   Mono % % 9.9   Eos % % 2.3   Basophil % % 0.9   Imm Grans % % 0.7   Neut # 2.1 - 9.2 x10(3)/mcL 4.51   Lymph # 0.6 - 4.6 x10(3)/mcL 1.52   Mono # 0.1 - 1.3 x10(3)/mcL 0.69   Eos # 0 - 0.9 x10(3)/mcL 0.16   Baso # <=0.2 x10(3)/mcL 0.06   Imm Gran # 0.00 - 0.04 x10(3)/mcL 0.05 High    NRBC% % 0.0     Iron Binding Capacity Unsaturated 70 - 310 ug/dL 131   Iron Level 50 - 170 ug/dL 32 Low    Transferrin 180 - 382 mg/dL 145 Low    Iron Binding Capacity Total 250 - 450 ug/dL 163 Low    Iron Saturation 20 - 50 % 20     Sodium 136 - 145 mmol/L 140   Potassium 3.5 - 5.1 mmol/L 3.6   Chloride 98 - 107 mmol/L 106   CO2 23 - 31 mmol/L 27   Glucose 82 - 115 mg/dL 193 High    Blood Urea Nitrogen 9.8 - 20.1 mg/dL 9.4 Low    Creatinine 0.55 - 1.02 mg/dL 0.62   BUN/Creatinine Ratio  15   Calcium 8.4 - 10.2 mg/dL 8.7   Anion Gap mEq/L 7.0   eGFR mL/min/1.73/m2 >60     Radiology  CT head without contrast on 06/03/2025, IMPRESSION:  Significant interval reduction in volume of the subdural hematoma tracking along the left cerebral convexity status post evacuation.  Radiology   Transthoracic echo on 05/30/2025, IMPRESSION:  LVEF 55-60%.  Right ventricle is normal in size, systolic function is normal.  Normal venous pressure at 3 mm Hg.  Radiology   MRI brain without contrast on 05/29/2025, IMPRESSION:  There is no diffusion weighted restriction or signal dropout on ADC map to indicate an acute infarct.  There is left middle cerebral artery territory encephalomalacia combined with gliotic changes related to  old infarct.  Chronic microvascular ischemic changes or mild with periventricular and deep white matter T2 FLAIR hyperintense signals.  There is no acute intracranial hemorrhage, hydrocephalus, midline shift or mass effect.  Left cerebral convexity chronic subdural hematoma or hygroma is with similar appearance.  There are several left pleural base calcifications which showed gradient echo sequence dephasing artifacts.  Radiology  Carotid ultrasound on 05/29/2025:  IMPRESSION:  Right ICA is patent with less than 50% stenosis.  Left ICA is patent with less than 50% stenosis.  Bilateral vertebral arteries are patent with antegrade flow.  Assessment/Plan:     59 y.o. WF admitted on 6/6/2025    Subdural hematoma   - s/p left craniotomy with hematoma evacuation on 05/19  - s/p left diony hole drainage of SDH on 06/02/2025  - repeat CT head with resolution of SDH  - continue    Keppra 500 mg b.i.d.    Major depressive disorder  - stable  - continue    Nortriptyline 25 mg at bedtime     Paxil 20 mg daily    Generalized anxiety disorder  - stable  - continue    BuSpar 15 mg b.i.d.    HLD  - FLP outpatient  - continue    Lipitor 80 mg daily     Zetia 10 mg daily     History of CVA  - stable  - not on ASA due to allergy  - continue    Plavix 75 mg daily    Lipitor 80 mg daily    Hypothyroidism  - TSH with next labs  - continue    Levothyroxine 88 mcg before breakfast     HTN  - BP at goal  - continue    Metoprolol succinate 12.5 mg daily     Hydralazine 10 mg every 2 hours as needed for BP > 160/90    Labetalol 10 mg every 2 hours as needed for BP > 160/90  - low sodium diet    Iron-deficiency anemia  - asymptomatic  - iron level low  - initiate    Ferrous sulfate 325 mg daily (initiated 6/7)  - H/H stable   - no evidence of active bleeds  - will closely monitor and transfuse if needed     Thrombocytosis  - trending up  - continue to monitor    Constipation  - stable   - continue  Colace 100 mg BID   Miralax 17 gm BID PRN  -  encourage oral fluid hydration    DM type II  - HgA1c with next labs  - continue    Metformin 500 mg twice daily    ISS   - CBGs AC/HS    Moderate protein calorie malnutrition  - active  - encourage oral nutrition  - registered dietitian following    MEDICAL PLAN:  - Admit to [facility]  - Medical reconciliation completed and included in the electronic health record  - Draw admit labs including CBC, CMP, and Prealbumin  - Maintain weightbearing status as ordered  - Monitor postoperative surgical incision changing dressing daily  - Teach skin protection and wound prevention techniques  - Initiate fall precaution  - Initiate bowel training program  - Initiate bladder training as indicated  - Pain management  - IS q2 hours while awake    THERAPEUTIC PLAN:  - Physical therapy 60-90 minutes 5 to week to increase functional mobility, maintain weightbearing precautions, increase lower extremity restrengthening, increase overall activity tolerance, teach balance and safety measures as well as fall precautions, make equipment and orthotic recommendations, be involved in family training and discharge planning, monitor pain levels and vital signs during therapy adjusting treatment accordingly  - Occupational therapy 60-90 minutes 5 times a week to increase functional independence with activities of daily living, maintain weightbearing precautions, teach use of adaptive equipment, increase upper extremity restrengthening, increase overall activity tolerance, teach balance and safety measures as well as fall precautions, make equipment and orthotic recommendations, be involved in family training and discharge planning, monitor pain levels and vital signs during therapy adjusting treatment accordingly  - Speech and language pathology will do an in-depth evaluation of patient's cognition, phonation, and swallowing. They will initiate therapy 30- 60 minutes 5 times a week as  indicated  - Recreational therapy will incorporate all patient's functional abilities  into recreational activities that will require visual, spatial, and perceptual abilities; gross and fine motor coordination skills; the cognition to follow multistep commands; dynamic and static balance and reaching abilities. They will also incorporate animal assisted therapy into their weekly program  - Rehabilitation nursing will act as patient family physician liaison. They will integrate patient's functional abilities, after discussions with therapist, into everyday activities with the CNA's,  LPN's and RNs that will include but are not limited to dressing, bathing, feeding, grooming, toileting, transfers, hygiene etc. They will administer medications watching for wanted as well as unwanted effects. They will initiate DVT/VTE prophylaxis as ordered. Will monitor vital signs, lab values, and pain levels, making appropriate physician notification. They will monitor skin integrity including postop incision, making daily dressing changes. They will teach skin protection and wound prevention techniques. They will initiate bowel training They will initiate bladder training as indicated. They will initiate fall precautions  - Rehabilitation counseling/case management will act as patient and family liaison. They will set up family conferences, family training, aid in discharge planning, and aid in the procurement of assistive devices  - Patient will have 24 hour availability to physiatric care  - Patient will have nutritional services involved, monitoring oral input and visceral protein stores. They will make dietary and supplement recommendations and follow-up as necessary  - Patient will have weekly interdisciplinary team conferences  - Patient will have initial family conference and follow up conferences as indicated  - Patient will have family training prior to discharge     Estimated length of stay - 14-17 days  Anticipated  discharge destination - Home with   Medical status - stabilizing postoperatively; will need to monitor pain levels, postoperative skin integrity, watch for evidence of deep vein thrombosis, monitor postoperative H&H, monitor vital signs closely.  Medical prognosis - Good for post-op healing and functional improvement  Previous functional Status:  Independent  Present Functional Status:  Setup to mod assist    VTE Prophylaxis: SCDs  COVID-19 testing:  Unknown  COVID-19 vaccination status:  Vaccinated (Pfizer):  06/12/2021, 07/14/2021    POA:  No  Living will:  No  Contacts:  Roberto Chandra ([facility]  [phone]    CODE STATUS:  Full code  Internal Medicine (attending): Aldair rAmenta MD  Physiatry (consulting):  Lazarus Mills MD    OUTPATIENT PROVIDERS  PCP: Jorge Silver MD   Neurosurgery: James Rankin MD  Neurology: LUIS Berumen    DISPOSITION: Condition stable. Tolerated transfer.  Recent labs and imaging reviewed.  Rehab admission orders initiated.  Med reconciliation completed.  Consult physiatry for rehab and pain management.  PT/OT/RT/ST to eval and treat.  Obtain admission lab work in the morning.  Monitor closely.  Notify of acute changes.    PHYSICIAN ATTESTATION: I have been involved in the patient's rehabilitation prescreening process and I have now completed the post admission physician evaluation. Patient is in need of, appropriate for, and should tolerate the above outlined inpatient rehabilitation plan. I believe this is necessary to reach maximal postoperative healing and maximal functional abilities. I do not believe this would be possible in a timely fashion in a less intensive setting      Shaheen Nava NP conducted independent physical examination and assisted with medical documentation.    Total time spent on this encounter including chart review and direct MD + NP 1-on-1 patient interaction: 105 minutes   Over 50% of this time was spent in counseling and coordination of care                [1] No current facility-administered medications for this encounter.  No current outpatient medications on file.    Facility-Administered Medications Ordered in Other Encounters:     acetaminophen suppository 650 mg, 650 mg, Rectal, Q6H PRN, James Rankin MD    acetaminophen tablet 650 mg, 650 mg, Oral, Q6H PRN, James Rankin MD    albuterol-ipratropium 2.5 mg-0.5 mg/3 mL nebulizer solution 3 mL, 3 mL, Nebulization, Q4H PRN, Nissa Gaston FNP    atorvastatin tablet 80 mg, 80 mg, Oral, Daily, Argentina Jerome MD, 80 mg at 06/06/25 0845    bisacodyL suppository 10 mg, 10 mg, Rectal, Daily PRN, Nissa Gaston FNP, 10 mg at 05/31/25 1629    busPIRone tablet 15 mg, 15 mg, Oral, BID, Argentina Jerome MD, 15 mg at 06/06/25 0845    butalbital-acetaminophen-caffeine -40 mg per tablet 1 tablet, 1 tablet, Oral, Q4H PRN, Argentina Jerome MD, 1 tablet at 06/06/25 0845    clopidogreL tablet 75 mg, 75 mg, Oral, Daily, Jake Paulino MD, 75 mg at 06/06/25 0845    dextrose 50% injection 12.5 g, 12.5 g, Intravenous, PRN, Nissa Gaston FNP    docusate sodium capsule 200 mg, 200 mg, Oral, BID, Argentina Jerome MD, 200 mg at 06/05/25 0906    ezetimibe tablet 10 mg, 10 mg, Oral, Daily, Argentina Jerome MD, 10 mg at 06/06/25 0845    glucagon (human recombinant) injection 1 mg, 1 mg, Intramuscular, PRN, Nissa Gaston FNP    hydrALAZINE injection 10 mg, 10 mg, Intravenous, Q6H PRN, Nissa Gaston FNP    HYDROcodone-acetaminophen  mg per tablet 1 tablet, 1 tablet, Oral, Q4H PRN, James Rankin MD, 1 tablet at 06/06/25 0605    HYDROcodone-acetaminophen 5-325 mg per tablet 1 tablet, 1 tablet, Oral, Q4H PRN, Henrik Sandoval PA, 1 tablet at 06/03/25 0156    insulin aspart U-100 injection 0-10 Units, 0-10 Units, Subcutaneous, Q6H PRN, Nissa Gsaton, LUIS, 2 Units at 06/06/25 0613    labetaloL injection 10 mg, 10 mg, Intravenous, Q2H PRN, Nissa Gaston, LUIS    levetiracetam 500 mg/5 mL (5 mL)  liquid Soln 500 mg, 500 mg, Oral, BID, Argentina Jerome MD, 500 mg at 06/06/25 0845    levothyroxine tablet 88 mcg, 88 mcg, Oral, Before breakfast, Argentina Jerome MD, 88 mcg at 06/06/25 0605    LORazepam injection 2 mg, 2 mg, Intravenous, Q4H PRN, Nissa Gaston, BOZENAP    metoprolol succinate (TOPROL-XL) 24 hr split tablet 12.5 mg, 12.5 mg, Oral, Daily, Argentina Jerome MD, 12.5 mg at 06/06/25 0845    nortriptyline capsule 25 mg, 25 mg, Oral, QHS, Argentina Jerome MD    ondansetron injection 4 mg, 4 mg, Intravenous, Q4H PRN, Nissa Gaston FNP, 4 mg at 06/02/25 1511    paroxetine tablet 20 mg, 20 mg, Oral, QAM, Argentina Jerome MD, 20 mg at 06/06/25 0605    prochlorperazine injection Soln 5 mg, 5 mg, Intravenous, Q6H PRN, Nissa Gaston, BOZENAP    sodium chloride 0.9% flush 10 mL, 10 mL, Intravenous, PRN, Nissa Gaston FNP

## 2025-06-06 NOTE — PLAN OF CARE
06/06/25 0958   Final Note   Assessment Type Final Discharge Note   Anticipated Discharge Disposition Rehab   Hospital Resources/Appts/Education Provided Post-Acute resouces added to AVS   Post-Acute Status   Post-Acute Authorization Placement   Post-Acute Placement Status Set-up Complete/Auth obtained   Discharge Delays None known at this time     Patient will dc to [facility] inpatient rehab today. Report information given to nurse. Transportation arranged with [facility] for 11am.

## 2025-06-06 NOTE — DISCHARGE SUMMARY
"[facility] Hospital Medicine Discharge Summary    Admit Date: 5/29/2025  Discharge Date and Time: 6/6/20259:52 AM  Admitting Physician:  Team  Discharging Physician: Jake Paulino MD.  Primary Care Physician: Jorge Silver MD  Consults: Cardiology, Neurology, and Neurosurgery    Discharge Diagnoses:  Chronic left subdural hematoma- POA--> s/p diony hole on 6/2/25  Possible previous Left MCA territory Post Stroke Recrudescence (PSR)- POA  Oropharyngeal Dysphagia, POA - on modified diet  Recent h/o fall and SDH s/p left MMA embolization and left frontoparietal craniotomy in 5/2025  H/O Left MCA stroke - s/p  thrombectomy 6/1/24   Normocytic anemia - mild -POA  Acute fever- 5/29/25- resolved   Suspected Aspiration pneumonitis - 5/30/35- treated  Severe malbnutrition     Hospital Course:   "60 yo female with PMHx of  DM2, left MCA stroke s/p thrombectomy in 6/2024 was on Plavix, Recent SDH following a fall while on Plavix on 5/16/25 s/p cerebral angiogram with embolization of left middle meningeal artery and on  5/19/25 s/p left frontoparietal craniotomy and evacuation of SDH. Pt was discharged home on 5/22 with home health. Today 5/29/25 Pt  presents to ED with c/o acute onset headache, possibly around midnight. Then around 0540 she developed right arm and leg weakness with right facial droop. Has not been taking Keppra at home. Seen by Stroke Neurology in the ED who does not suspect a new stroke but rather seizure activity. RLE weakness already improved however RUE  weakness and right facial droop persisted.   VS on arrival: T 98.2, P 100, R 18, b/P 105/73, Sats 98% on room air. Initial labs: WBC 9.23, Hgb 10.8, platelets 527, PT 13.9, INR 1.1, Na 138, K 3.9, Cl 103, creatinine 0.68, glucose 191, TSH 0.309 and otherwise unremarkable.  CT head shows a left cerebral convexity hypodense collection without residual acute hyperdense subacute hematoma and improved post surgical " "pneumocephalus and otherwise no significant interval change.  CT brain perfusion shows no definite blood flow asymmetry or other findings to suggest significant acute perfusion defects.  There are scattered areas of asymmetrically decreased perfusion to the left MCA territory corresponding to area of known ischemic stroke.  CTA head shows a suspected diminished flow inferior division of the left middle cerebral artery close to the bifurcation with no other hemodynamically significant stenosis identified. Pt was loaded with IV Keppra 2 gram in the ED. Admitted to  service. Stroke Neurology was consulted. EEG neg for seizures.   New onset fever of 102 overnight on 5/29/25. Infectious workup initiated. MRI brain on 5/30/25 with no acute infarct but showed Left MCA territory large encephalomalacia combined with gliotic changes. Infectious workup was neg however aspiration suspected on MBS study. SLP suggested strict NPO for now. NG tube placed. Pt was started on Zosyn to cover aspiration pneumonitis. Due to persistent Rt facial droop and RUE weakness, Stroke Neurology recommended Neurosurgery consult for evaluation of chronic left SDH. Neurosurgery felt  left diony hole with draining of SDH was indicated. Cardiology was consulted for Cardiac risk assessment. Pt was cleared with low risk. Pt underwent left diony hole with hematoma evacuation on 6/2/25 with Dr. Rankin  Rt upper ext weakness cont to improve. SLP is following. PT/OT suggested high intensity therapy"   Passed MBS- started on minced and moist diet with moderately thickened liquids  Plavix resumed 06/05/2025 with Neurosurgery recommendations  Continue home stain, Keppra   Patient is accepted to  Ortho rehab.  Patient is cleared for discharge    Pt was seen and examined on the day of discharge  Vitals:  VITAL SIGNS: 24 HRS MIN & MAX LAST   Temp  Min: 98.2 °F (36.8 °C)  Max: 98.7 °F (37.1 °C) 98.4 °F (36.9 °C)   BP  Min: 103/70  Max: 139/83 103/70   Pulse  " Min: 66  Max: 81  80   Resp  Min: 10  Max: 19 18   SpO2  Min: 95 %  Max: 97 % 95 %       Physical Exam:  General: In no acute distress, afebrile,  on RA  Chest: Clear to auscultation bilaterally  Heart: RRR, +S1, S2, no appreciable murmur  Abdomen: Soft, nontender, BS +  MSK: Warm, no lower extremity edema, no clubbing or cyanosis  Neurologic: Alert and oriented x3, able to move all 4 extremities    Recent Labs   Lab 06/01/25 0527 06/04/25  0515   WBC 8.19 6.99   RBC 3.76* 3.77*   HGB 11.2* 11.1*   HCT 34.0* 34.5*   MCV 90.4 91.5   MCH 29.8 29.4   MCHC 32.9* 32.2*   RDW 13.4 13.7   * 767*   MPV 9.2 9.2       Recent Labs   Lab 06/01/25 0527 06/04/25 0515    140   K 3.4* 3.6    106   CO2 26 27   BUN 7.8* 9.4*   CREATININE 0.68 0.62   * 193*   CALCIUM 9.1 8.7   MG 2.10 2.00        Microbiology Results (last 7 days)       Procedure Component Value Units Date/Time    Blood Culture [[id number]]  (Normal) Collected: 05/30/25 0348    Order Status: Completed Specimen: Blood, Venous Updated: 06/04/25 0429     Blood Culture No Growth at 5 days    Blood Culture [[id number]]  (Normal) Collected: 05/30/25 0348    Order Status: Completed Specimen: Blood, Venous Updated: 06/04/25 0429     Blood Culture No Growth at 5 days    Respiratory Culture [[id number]] Collected: 06/01/25 1833    Order Status: Completed Specimen: Sputum Updated: 06/03/25 0731     Respiratory Culture Normal respiratory aaron     GRAM STAIN Quality 1+      Few Gram Positive Rods      Few Gram positive cocci             Fl Modified Barium Swallow Speech  See procedure notes from Speech Pathologist.    This procedure was auto-finalized.         Medication List        ASK your doctor about these medications      atorvastatin 80 MG tablet  Commonly known as: LIPITOR  Take 1 tablet (80 mg total) by mouth once daily.     busPIRone 15 MG tablet  Commonly known as: BUSPAR  Take 1 tablet (15 mg total) by mouth 2 (two) times daily.    "  butalbital-acetaminophen-caffeine -40 mg -40 mg per tablet  Commonly known as: FIORICET, ESGIC  Take 1 tablet by mouth every 4 (four) hours as needed for Pain.     clopidogreL 75 mg tablet  Commonly known as: PLAVIX  Take 1 tablet (75 mg total) by mouth once daily.     docusate sodium 50 MG capsule  Commonly known as: COLACE  Take 2 capsules (100 mg total) by mouth 2 (two) times daily.     ezetimibe 10 mg tablet  Commonly known as: ZETIA  Take 1 tablet by mouth once daily     FARXIGA 5 mg Tab tablet  Generic drug: dapagliflozin propanediol  Take 1 tablet by mouth once daily     levETIRAcetam 500 MG Tab  Commonly known as: KEPPRA  Take 1 tablet (500 mg total) by mouth 2 (two) times daily. for 4 days     levothyroxine 88 MCG tablet  Commonly known as: SYNTHROID  Take 1 tablet (88 mcg total) by mouth before breakfast.     metFORMIN 500 MG tablet  Commonly known as: GLUCOPHAGE  TAKE 1 TABLET BY MOUTH TWICE DAILY WITH MEALS     metoprolol succinate 25 MG 24 hr tablet  Commonly known as: TOPROL-XL     nortriptyline 25 MG capsule  Commonly known as: PAMELOR  TAKE 1 CAPSULE BY MOUTH IN THE EVENING     ondansetron 8 MG tablet  Commonly known as: ZOFRAN  Take 1 tablet (8 mg total) by mouth every 6 (six) hours as needed for Nausea.     pen needle, diabetic 32 gauge x 5/32" Ndle  1 each by Misc.(Non-Drug; Combo Route) route once a week.               Explained in detail to the patient about the discharge plan, medications, and follow-up visits. Pt understands and agrees with the treatment plan  Discharge Disposition: [facility]  Discharged Condition: stable  Diet-   Dietary Orders (From admission, onward)       Start     Ordered    06/05/25 1108  Dietary nutrition supplements All Meals; Magic Cup - Any flavor, Boost Very High Calorie Nutritional Drink - Strawberry  Continuous        Question Answer Comment   Frequency: All Meals    Select PO Supplement: Magic Cup - Any flavor    Select PO Supplement: Boost Very " High Calorie Nutritional Drink - Strawberry        06/05/25 1107    06/04/25 1257  Diet Minced & Moist (IDDSI Level 5) Moderately Thick Liquids (IDDSI Level 3)  Diet effective now        Comments: Meds crushed in puree   Question:  Consistency:  Answer:  Moderately Thick Liquids (IDDSI Level 3)    06/04/25 1256                   Medications Per DC med rec  Activities as tolerated   Follow-up Information       James Rankin MD Follow up on 7/22/2025.    Specialty: Neurosurgery  Why: at 9am  Contact information:  [address]  [place] [address] [phone]                           For further questions contact hospitalist office    Discharge time 34 minutes    For worsening symptoms, chest pain, shortness of breath, increased abdominal pain, high grade fever, stroke or stroke like symptoms, immediately go to the nearest Emergency Room or call 911 as soon as possible.    Portions of this note dictated using EMR integrated voice recognition software, and may be subject to voice recognition errors not corrected at proofreading. Please contact writer for clarification if needed    Jake Paulino MD  Department of Hospital Medicine   [facility]   06/06/2025 9:52 AM

## 2025-06-06 NOTE — PLAN OF CARE
06/06/25 1309   Depression Screen (Over the Past Two Weeks)   Have You Felt Down, Depressed or Hopeless? no   Have You Felt Little Interest or Pleasure in Doing Things? no     Admission PHQ

## 2025-06-06 NOTE — NURSING
Report given to nurse at [facility]. All questions answered, verbalized understanding. IVS left in. Tele box removed. Vitals stable. Patient discharged via [facility]

## 2025-06-06 NOTE — PLAN OF CARE
06/06/25 1233   Discharge Reassessment   Assessment Type Discharge Planning Assessment   Did the patient's condition or plan change since previous assessment? No   Discharge Plan discussed with: Patient   Communicated DARIAN with patient/caregiver Date not available/Unable to determine   Discharge Plan A Home Health   DME Needed Upon Discharge  other (see comments)  (TBD by rehab team)   Transition of Care Barriers None   Why the patient remains in the hospital Requires continued medical care   Post-Acute Status   Patient choice form signed by patient/caregiver List from [facility]   Discharge Delays None known at this time      still works

## 2025-06-06 NOTE — CONSULTS
Inpatient Nutrition Assessment    Admit Date: 6/6/2025   Total duration of encounter: 1 day   Patient Age: 59 y.o.    Nutrition Recommendation/Prescription     -Diet Minced & Moist (IDDSI Level 5) Moderately Thick Liquids (IDDSI Level 3). Texture modifications per SLP.  -Rec'd keep diet liberalized until po intake improves.  -Encouraged adequate intake.  -Boost VHC TID (provides 530 kcal, 22 g protein per container).  -Magic Cup TID (provides 290 kcal, 9 g protein per container).  -RD to monitor wt, labs, and po intake.    Communication of Recommendations: reviewed with patient    Nutrition Assessment     Malnutrition Assessment/Nutrition-Focused Physical Exam    Malnutrition Context: acute illness or injury (06/06/25 1306)  Malnutrition Level: severe (06/06/25 1306)  Energy Intake (Malnutrition): less than or equal to 50% for greater than or equal to 5 days (06/06/25 1306)  Weight Loss (Malnutrition): greater than 5% in 1 month (06/06/25 1306)  Subcutaneous Fat (Malnutrition): moderate depletion (06/06/25 1306)  Orbital Region (Subcutaneous Fat Loss): moderate depletion  Upper Arm Region (Subcutaneous Fat Loss): moderate depletion     Muscle Mass (Malnutrition): moderate depletion (06/06/25 1306)     Clavicle Bone Region (Muscle Loss): moderate depletion  Clavicle and Acromion Bone Region (Muscle Loss): moderate depletion                 Fluid Accumulation (Malnutrition): other (see comments) (Not present) (06/06/25 1306)     Hand  Strength, Right (Malnutrition): Measurably reduced for age/gender (06/06/25 1306)  A minimum of two characteristics is recommended for diagnosis of either severe or non-severe malnutrition.    Chart Review    Reason Seen: physician consult for evaluate and treat    Malnutrition Screening Tool Results              Diagnosis:  Subdural hematoma     Relevant Medical History:    Accelerated junctional rhythm      Agatston CAC score, <100      Anxiety disorder, unspecified      Asthma       COPD type A      Diabetes mellitus without complication      GERD (gastroesophageal reflux disease)      History of COVID-19      Insomnia      Lung nodule        Scheduled Medications:  [START ON 6/7/2025] atorvastatin, 80 mg, Daily  busPIRone, 15 mg, BID  [START ON 6/7/2025] clopidogreL, 75 mg, Daily  docusate sodium, 100 mg, BID  [START ON 6/7/2025] ezetimibe, 10 mg, Daily  levETIRAcetam, 500 mg, BID  [START ON 6/7/2025] levothyroxine, 88 mcg, Before breakfast  metFORMIN, 500 mg, BID WM  [START ON 6/7/2025] metoprolol succinate, 12.5 mg, Daily  nortriptyline, 25 mg, QHS  paroxetine, 20 mg, Daily    Continuous Infusions:   PRN Medications:  acetaminophen, 650 mg, Q6H PRN  albuterol-ipratropium, 3 mL, Q4H PRN  ALPRAZolam, 0.25 mg, TID PRN  benzonatate, 100 mg, TID PRN  butalbital-acetaminophen-caffeine -40 mg, 1 tablet, Q4H PRN  dextrose 50%, 12.5 g, PRN  dextrose 50%, 25 g, PRN  glucagon (human recombinant), 1 mg, PRN  glucose, 16 g, PRN  glucose, 24 g, PRN  hydrALAZINE, 10 mg, Q4H PRN  HYDROcodone-acetaminophen, 1 tablet, Q4H PRN  HYDROcodone-acetaminophen, 1 tablet, Daily PRN  hydrOXYzine pamoate, 50 mg, Nightly PRN  insulin aspart U-100, 0-5 Units, QID (AC + HS) PRN  labetalol, 10 mg, Q4H PRN  LORazepam, 2 mg, Q4H PRN  metoprolol, 10 mg, Q2H PRN  nitroGLYCERIN, 0.4 mg, Q5 Min PRN  ondansetron, 4 mg, Q6H PRN  ondansetron, 8 mg, Q8H PRN  ondansetron, 4 mg, Q8H PRN  polyethylene glycol, 17 g, BID PRN    Calorie Containing IV Medications: no significant kcals from medications at this time    Recent Labs   Lab 06/01/25  0527 06/04/25  0515    140   K 3.4* 3.6   CALCIUM 9.1 8.7   PHOS 3.7 2.3   MG 2.10 2.00    106   CO2 26 27   BUN 7.8* 9.4*   CREATININE 0.68 0.62   EGFRNORACEVR >60 >60   * 193*   WBC 8.19 6.99   HGB 11.2* 11.1*   HCT 34.0* 34.5*     Nutrition Orders:  Diet Minced & Moist (IDDSI Level 5) Moderately Thick Liquids (IDDSI Level 3)  Dietary nutrition supplements All Meals;  "Magic Cup - Any flavor, Boost Very High Calorie Nutritional Drink - Strawberry    Appetite/Oral Intake: fair/50-75% of meals  Factors Affecting Nutritional Intake: difficulty/impaired swallowing and dislike of food on diet  Social Needs Impacting Access to Food: none identified  Food/Anabaptist/Cultural Preferences: likes red beans and rice, gumbo, scrambled eggs  Food Allergies: none reported  Last Bowel Movement: 25  Wound(s):  none noted    Comments    25: Pt reports decreased appetite and intake since hospital admit 2/2 disliking the food on current diet. Reports she does like certain things served and eats what she can; food preferences noted. Reports she has been drinking the Boost and eating Magic Cup and enjoys both. Pt reports recent unintentional wt loss, noted about an 8% wt loss in last month. Moderate muscle and fat wasting noted per NFPE. Pt denies any gi symptoms at this time, says she is having regular Bms. Labs/meds reviewed. Encouraged adequate intake.     Anthropometrics    Height: 5' 6" (167.6 cm), Height Method: Stated  Last Weight: 46.9 kg (103 lb 6.3 oz) (25 1122), Weight Method: Standard Scale  BMI (Calculated): 16.7  BMI Classification: underweight (BMI less than 18.5)     Ideal Body Weight (IBW), Female: 130 lb     % Ideal Body Weight, Female (lb): 79.54 %                    Usual Body Weight (UBW), k.3 kg  % Usual Body Weight: 93.44     Usual Weight Provided By: patient and EMR weight history    Wt Readings from Last 5 Encounters:   25 46.9 kg (103 lb 6.3 oz)   25 46.3 kg (102 lb)   05/15/25 49 kg (108 lb 0.4 oz)   25 50.8 kg (112 lb)   25 50.3 kg (110 lb 14.3 oz)     Weight Change(s) Since Admission:   : 46.9 kg  Wt Readings from Last 1 Encounters:   25 1122 46.9 kg (103 lb 6.3 oz)   Admit Weight: 46.9 kg (103 lb 6.3 oz) (25 1122), Weight Method: Standard Scale    Estimated Needs    Weight Used For Calorie Calculations: 46.9 kg " (103 lb 6.3 oz)  Energy Calorie Requirements (kcal): 9385-4204 kcal (30-35 kcal/kg)  Energy Need Method: Kcal/kg  Weight Used For Protein Calculations: 47 kg (103 lb 9.5 oz)  Protein Requirements: 67-71 g (1.2-1.5 g/kg)  Fluid Requirements (mL): 1407 mL/d (1 mL/kcal)  CHO Requirement: 325958 g/d (45-55% of EER)     Enteral Nutrition     Patient not receiving enteral nutrition support at this time.    Parenteral Nutrition     Patient not receiving parenteral nutrition support at this time.    Evaluation of Received Nutrient Intake    Calories: not meeting estimated needs  Protein: not meeting estimated needs    Patient Education     Not applicable.    Nutrition Diagnosis     PES: Inadequate oral intake related to acute illness as evidenced by pt reports decreased intake 2/2 dislike of food. (new)     PES: Severe acute disease or injury related malnutrition Related to acute illness As Evidenced by:  - weight loss: > 5% in 1 month - energy intake: <= 50% for 8 days (meets criteria for <= 50% >= 5 days (severe - acute)) - muscle mass depletion: 2 areas of moderate muscle loss (Acromion, Clavicle) - loss of subcutaneous fat: 2 areas of moderate fat loss (Triceps Skinfold, Infraorbital) -  strength: Measurably reduced for age/gender new    Nutrition Interventions     Intervention(s): modified composition of meals/snacks, commercial beverage, and collaboration with other providers  Intervention(s): Oral nutritional supplement    Goal: Maintain weight throughout hospitalization. (new)  Goal: Consume % of meals/snacks by follow-up. (new)    Nutrition Goals & Monitoring     Dietitian will monitor: food and beverage intake, weight, electrolyte/renal panel, glucose/endocrine profile, and gastrointestinal profile  Discharge planning: diabetic diet with texture modifications per SLP + Boost or equivalent  Nutrition Risk/Follow-Up: patient at increased nutrition risk; dietitian will follow-up twice weekly   Please  consult if re-assessment needed sooner.

## 2025-06-06 NOTE — CONSULTS
[facility] - Rehab Inpatient Services  Inpatient Rehab Prescreen    PATIENT INFORMATION     Assessment date/time: 6/6/25 0734    Name: Chelle Chandra Phone: [phone]   Address:   [address] [place] [address] SSN:    YOB: 1965 Age: 59 y.o. Gender: female   Race: White   Marital Status:    Advance Directives: Full code     COVERAGE INFORMATION     Patient Medicare #:      Primary Insurance Type: BLUE CROSS BLUE SHIELD/[place] PPO  /  Secondary Insurance Type:  /    Policy #:  [id number]   Policy #:     Insurance contact name/number: [employer]: Radha Pritchard approved rehab starting on 6/5/25- 6/12/25 ref# [id number] Insurance contact name/number:    Authorization #: [id number] Authorization #:    Verified Coverage: Insurance Carrier   Pending Coverage:    Prescription Coverage:  Insurance details/comments:      PHYSICIAN/REFERRAL INFORMATION     Primary Care Physician: Jorge Silver MD Attending Physician: Aldair Armenta MD   Consulting physician/specialist:  Referring Physician:    Referring facility:  Referring contact name/phone:    Physician details/comments:      CONTACT INFORMATION   Extended Emergency Contact Information  Primary Emergency Contact: Roberto Chandra  Mobile Phone: [phone]  Relation: Spouse  Preferred language: English   needed? No  Secondary Emergency Contact: Molly Camacho  Mobile Phone: [phone]  Relation: Daughter    PRIOR LIVING SITUATION    Patient lives with  in a single story home with 4 steps to enter with left sided railing     Bedroom location/setup: single story; railing in hallway to room    Bathroom location/setup: tub/shower combo with railings with hand-held shower head, normal height commode without railing   Equipment at home: rolling  walker, rollator walker, tub transfer bench, shower chair   Prior device use:  rollator walker, shower chair     PRIOR LEVEL OF FUNCTION     Did a helper  need to assist with the following activities prior to the current illness, exacerbation, or injury?   Self-care:  No, modified independent    Indoor mobility:  No, modified independent with use of rollator    Stairs: No, independent    Functional Cognition: No, independent    Comments: Patient was modified independent for mobility and ADLs,   Caregivers providing assistance: Roberto Chandra- - [phone]; Molly Camacho- daughter- [phone]   Pre-hospital home care service: Yes  Name of Agency: [facility]   Home/personal responsibilities: personal ADLs, cooks, cleans, does laundry.  drives, grocery shops, manages finances, and manages medications   Pre-hospital vocational category: Not working   Pre-hospital vocational effort: Not working   Occupation/profession:  Return to work/school plan:    Educational history:    Hobbies/leisure activities:  Resources used prior to admission:    Available resources:  Resource information comments:      REHABILITATION DIAGNOSIS     History of present illness: This is a 59 year old female with a PMH of anxiety, asthma, CVA, hypothyroidism, COPD, DM type 2, GERD, and a recent admission at [facility] on 5/15/25 due to multiple falls. During that stay, CTA head and neck was done showing a new intermediate density subdural hemorrhage along the left cerebral convexity with no midline shift. The patient was seen by Dr. Mendoza and Dr. Egan for IR intracranial embolization on 5/16/25 then by Dr. Mendiola with neurosurgery on 5/19/25 for a left craniotomy for subdural hematoma evacuation. Further chart review shows the patient had a left MCA infarct June 1st 2024/ tandem carotid M1 occlusion; had thrombectomy status post aspiration x2 with TICI 2c reperfusion. Patient presented back to the ED at [facility] on 5/29/25 with complaints of bad headache with right sided weakness, abnormal speech, and facial asymmetry. On arrival at ED, patient had right sided facial droop,  flattening of the nasolabial fold, 2/5  strength to right upper extremity, 4/5 strength to the right lower extremity. Patient was able to answer questions appropriately, but is slow with following commands. CTA stroke protocol showed suspected diminished flow inferior division of the left middle cerebral artery close to bifurcation, no other hemodynamically significant stenosis identified. CT head stroke showed left cerebral convexity hydodense subacute hematoma and improved postsurgical pneumocephalus. NIHSS of 10 noted. Patient was transferred to [facility]. Neurology consulted, and patient is not a candidate for TNK due to recent intracranial hemorrhage. Neurology recommended to hold plavix, -180, MRI of brain ordered, loaded on Keppra followed by Keppra 500mg BID. EEG showed a normal awake, drowsy, and sleep EEG with no evidence of any epileptiform discharges. Speech swallow eval completed with recommendation for NPO and MBS needed. PT eval completed with deficits noted with recommendation for high intensity therapy needed. MRI of brain showed no evidence of new infarct. On 5/30, neurology recommended to hold Plavix until cleared by NSGY, continue Keppra, and signed off. MBS completed with recommendation for NPO due to moderate oropharyngeal dysphagia with Silent Aspiration of mildly thick liquids with high risk for aspiration. OT eval completed with deficits noted with recommendation for high intensity therapy needed. On 5/31, Dr Rankin consulted with recommendation for left diony hole for drainage of subdural due to pressure on the brain with mass effect on that side. NG tube placed for nutrition. On 6/1, cardiology consulted for surgery clearance with low risk for cardiac events. On 6/2, patient underwent a left diony hole for placement of subdural drain using Stealth system by Dr Rankin with no complications. On 6/3, JOHNATHAN drain remained in place, right arm stronger, and CT of head showed good removal of  subdural. Patient complained of headache but improves with medications. PT/OT evals repeated after surgery with deficits noted with recommendation for high intensity therapy needed. On 6/4, Labs showed low H&H of 11.1 & 34.6, elevated PLT of 767, low iron of 32, low TIBC of 163, low transferrin of 145, and elevated ferritin of 698.39. JOHNATHAN drain was removed. MBS repeated with recommendation for minced and moist diet with moderately thick liquids, and NPO for meds. Vitals stable. On 6/5, neurosurgery recommended follow up in 6 weeks with repeat CT of head. Speech approved medications crushed in puree with continued aspiration precautions with minced and moist diet with moderately thick liquids. Patient complained of headache rating at 7/10. Prior to hospitalization, patient was living with  in a single story home with 4 steps to enter with left sided railing, has railings down hallway inside home, tub/shower combo with hand-held shower head and shower chair with railings, and normal height commode without railing. Patient reported she was modified independent for mobility with use of rollator walker, and modified independent for ADLs with use of shower chair for baths, still cooking, cleaning, and doing laundry. Patient has not driven since CVA in 2024.  still works, drives, grocery shops, manages finances, and manages medications. Patient's daughter is able to help care for her while  is at work every day. Currently, patient is AAOx4; minimal to moderate assist for grooming while standing, standby assist for bed mobility, contact guard assist to minimal assist for transfers with RW, minimal assist for toilet transfer with grab bars, stand by assist for management of underwear and seated pericare after void with toileting, walked 145 ft with HHA x2 at minimal assist due to balance deficits, moderate assist for lower body dressing, and setup assist for eating. Pt. Requires acute inpatient rehab  admission with 24-hour nursing and active physician oversight to monitor and manage acute medical comorbid conditions, labs, pain, and functional deficits.  Patient/family will also require teaching and integration of improving functional skills into daily living.  She will also require an individualized, interdisciplinary approach to her care, receiving PT, OT, ST services 3 hours per day, 5 days per week.    Required care cannot be provided at a lower level of care. Patient is anticipated to require approximately 10-12 days LOS with expected discharge home with spouse and daughter with HH   Impairment group (IGC):   Non-Traumatic 02.1 Etiologic diagnosis/description: Left chronic subdural hematoma with mass effect with right sided weakness s/p left diony hole with placement of subdural drain   Date of onset:  5/29/25 Date of surgery: 6/2/25   Allergies: Aspirin, Ketorolac, Penicillins, Sulfa (sulfonamide antibiotics), and Ozempic [semaglutide]   Comorbid condition: Anemia, Anxiety, Diabetes, Dysphagia, Hypothyroidism, and Stroke   Medical/functional conditions requiring inpatient rehabilitation: This patient requires medical management/24-hour nursing of complex   comorbidities (Left chronic subdural hematoma with mass effect with right sided weakness s/p left diony hole with placement of subdural drain, Anemia, Anxiety, Diabetes, Dysphagia, Hypothyroidism, and Stroke ), labs, medications (see medications list), pain, sleep hygiene, anticoagulation, nutrition, hydration, neurological, pulmonary, cardiac status, and preventive healthcare.      This patient requires intense therapy and an integrated,   interdisciplinary approach to address safety, impaired mobility,   impaired ADL's (dressing, toileting, grooming, showering), dysphagia,   Surgical wound care, pulmonary insufficiency, bowel/bladder problems,   preventive healthcare, medication management, integration of   improving functional skills into daily living,  and home caregiver   support and training.   Risk for medical/clinical complications: This patient is at risk for the following complications: DVT/PE, pneumonia,   malnutrition, neurological decline, respiratory insufficiency,   worsening activity intolerance, complications from anticoagulation,   Infection, falls, skin breakdown, inadequate   sleep, recurring stroke, and constipation.     SPECIAL REHABILITATION NEEDS     IV: 20 G left proximal forearm; 20 G right AC Preferred Language: english         Peripheral IV - Single Lumen 06/03/25 1610 20 G Anterior;Left;Proximal Forearm (Active)   Site Assessment Clean;Dry;Intact;No redness;No swelling 06/05/25 1547   Line Securement Device Secured with sutureless device 06/05/25 0800   Extremity Assessment Distal to IV No abnormal discoloration 06/05/25 0800   Line Status Saline locked 06/05/25 2100   Dressing Status Clean;Dry;Intact 06/05/25 2100   Dressing Intervention Integrity maintained 06/05/25 2100            Peripheral IV - Single Lumen 06/03/25 1615 20 G Anterior;Proximal;Right Antecubital (Active)   Site Assessment Clean;Dry;Intact;No redness;No swelling 06/05/25 1547   Line Securement Device Secured with sutureless device 06/05/25 0800   Extremity Assessment Distal to IV No abnormal discoloration 06/05/25 0800   Line Status Saline locked 06/05/25 2100   Dressing Status Clean;Dry;Intact 06/05/25 2100   Dressing Intervention Integrity maintained 06/05/25 2100          PRECAUTIONS     Aspiration precautions: dysphagia diet , Safety/fall precautions: High fall risk, and Seizure precautions: on Keppra      PAST MEDICAL, SOCIAL, FAMILY HISTORY     Pertinent past medical history:   Past Medical History:   Diagnosis Date    Accelerated junctional rhythm     Agatston CAC score, <100     Anxiety disorder, unspecified     Asthma     COPD type A     Diabetes mellitus without complication     GERD (gastroesophageal reflux disease)     History of COVID-19     Insomnia      "Lung nodule       Has the patient had two or more falls in the past year or any fall with injury in the past year: Yes    Has the patient had major surgery during the 100 days prior to admission: Yes    Family Medical History:   Family History   Problem Relation Name Age of Onset    Heart attack Mother      Diabetes Father        Substance use history:   Social History     Substance and Sexual Activity   Alcohol Use Not Currently     Tobacco Use History[1]  Social History     Substance and Sexual Activity   Drug Use Never      VITALS   BP:  110/68     Temp: 98.7 °F (37.1 °C)     HR: 73     Resp: 16     SpO2: 95 %     Height: 5' 6" (1.676 m)     Weight: 46.3 kg (102 lb)     BMI: 16.5          MED/LABS/DIAGNOSITICS   Current Medications[2]   Pertinent lab results:   Lab Results   Component Value Date    WBC 6.99 06/04/2025    HGB 11.1 (L) 06/04/2025    HCT 34.5 (L) 06/04/2025     (H) 06/04/2025     06/04/2025    K 3.6 06/04/2025    BUN 9.4 (L) 06/04/2025    CO2 27 06/04/2025     06/04/2025      Pertinent diagnostic studies:    Additional labs and diagnostic studies required prior to admission:      QI: GG Care Tool     QI: GG Care Tool  Therapy Evaluation   Swallowing/  Nutritional Status   Diet/Feeding/  Swallowing Setup assist    Eating       Oral Hygiene   Grooming  minimum-moderate assistance to complete oral hygiene while standing; Min A to maintain grasp with RUE on brush/paste, Mod A for dynamic standing balance during hygiene task due to frequent LOB to R and R lateral lean.    Toileting Hygiene       Shower/Bathe   Bathing    Upper Body Dressing   Dressing Upper    Lower Body Dressing   Dressing Lower moderate assistance to don socks    Putting On/Taking Off Footwear       Toilet Transfer   Toileting stand by assistance for management of underwear and seated pericare after +void.    Bladder Continence Status   Bladder     Bowel Continence Status   Bowel     Roll Left and Right   Bed " Mobility Supine to Sit: stand by assistance  Sit to Supine: stand by assistance   Sit to Lying       Lying to Sitting on Side of Bed       Sit to Stand       Chair/Bed-to- Chair   Transfers Sit to Stand: contact guard assistance with rolling walker  Toilet Transfer: minimum assistance with grab bars using Step Transfer     Car Transfer       Walk 10 Feet   Equipment      Walk 50 Feet with Two Turns   Balance Static Sitting Balance: No UE support: Normal: Patient able to maintain steady balance without handhold support.  Dynamic Sitting Balance: No UE support: Normal: Patient accepts maximal challenge and can shift weight easily within full range in all directions.   Walk 150 Feet   Endurance    Walk 10 Feet on Uneven Surfaces   Gait Gait 145 ft x 2 minimal assist with HHA.    Picking up an Object       Wheel 50 Feet with Two Turns   Wheelchair    Wheel 150 Feet       1 Step (Curb)   Stairs    4 Steps       12 Steps       Expression of Ideas and Wants   Communication Independent    Understanding  Verbal and Non-Verbal Content       Cognitive Patterns   Cognition Independent       Safety Precautions       Lower Extremity      Strength BLE 4/5       ROM BLE WFL       Upper Extremity      Strength Right Upper Extremity:   Range of Motion: WFL  Strength: Impaired. 3/5 shoulder flexion, 3/5 digit flexion  Coordination: Impaired. Poor coordination in finger to nose test  Sensation: WFL     Left Upper Extremity:  WFL      ROM      Care Score Value Definitions  6: Independent. [place] provides no assistance with tasks. A device may or may not have been used.  5: Set-up or clean-up assistance. [place] sets up or cleans up, but does not assist with tasks. [place] may have assisted prior to or following the activity.  4: Supervision or touching assistance. [place] provides verbal cues or touching/steadying or contact guard assistance. Assistance may be provided throughout the activity or intermittently.  3: Partial/moderate  assistance. [place] does less than half the effort. [place] lifts, holds, or supports trunk or limbs, but provides less than half the effort.  2: Substantial/maximal assistance. [place] does more than half the effort. [place] lifts or holds trunk or limbs, and provides more than half the effort.  1: Dependent. [place] does all of the effort, or the assistance of two or more helpers is required for the patient to complete the activity.  Care Score Activity Not Attempted Value Definitions  7: Patient refused.  9: Not applicable. Not attempted and the patient did not perform this activity prior to the current illness, exacerbation, or injury.  10: Not attempted due to environmental limitations (e.g., lack of equipment, weather constraints).  88: Not attempted due to medical condition or safety concerns.    DISCHARGE GOALS/ANTICPATED INTERVENTIONS/SERVICES     Expected Level of Improvement for Safe Discharge: Patient/caregivers anticipate discharge home at or near baseline level of functioning and/or decreased burden of care.   Patient/Family/Caregiver Goals: Patient desires to increase current level of functioning to modified independence for mobility and all ADLs so that she is able to discharge home safely with .    Required Treatments/Services: Rehabilitation Nursing, Dietitian/nutrition, Social , and Case Management   Required Therapy Therapy Type Min/Day Days/Week Duration of Therapy   Physical Therapy 60 5 10-12 days    Occupational Therapy 60 5 10-12 days   Speech and Language Pathology 60  5 10-12 days   Prosthetic/Orthotics      Recreational Therapy      Anticipated Services upon Discharge:  home health with therapy    Additional rehabilitation needs: none    Expected Discharge Destination:  home with  and daughter helping    Barriers to Discharge: None   Discharge Support: Patient has a caregiver available, Discharge plan has been verified with patient's caregiver, and Caregiver is in agreement with  the discharge plan   Patient/Family/Caregiver Orientation: Patient/family/caregiver oriented and agreeable to inpatient rehabilitation plan   Estimated Length of Stay:10-12 days    Projected Admission Date: 6/6/25   Medical Prognosis: good   Physicians Review and Admission Determination:   I have discussed patient with Nurse Liaison. I have reviewed the prescreen. Patient is in need of, appropriate for and should tolerate and benefit from an aggressive IRF stay. Patient has functional and medical needs best addressed at this level of care.            [1]   Social History  Tobacco Use   Smoking Status Every Day    Current packs/day: 0.50    Types: Cigarettes   Smokeless Tobacco Never   [2]   No current facility-administered medications for this encounter.     No current outpatient medications on file.     Facility-Administered Medications Ordered in Other Encounters   Medication Dose Route Frequency Provider Last Rate Last Admin    acetaminophen suppository 650 mg  650 mg Rectal Q6H PRN James Rankin MD        acetaminophen tablet 650 mg  650 mg Oral Q6H PRN James Rankin MD        albuterol-ipratropium 2.5 mg-0.5 mg/3 mL nebulizer solution 3 mL  3 mL Nebulization Q4H PRN Nissa Gaston FNP        atorvastatin tablet 80 mg  80 mg Oral Daily Argentina Jerome MD   80 mg at 06/05/25 0907    bisacodyL suppository 10 mg  10 mg Rectal Daily PRN Nissa Gaston FNP   10 mg at 05/31/25 1629    busPIRone tablet 15 mg  15 mg Oral BID Argentina Jerome MD   15 mg at 06/05/25 2054    butalbital-acetaminophen-caffeine -40 mg per tablet 1 tablet  1 tablet Oral Q4H PRN Argentina Jerome MD   1 tablet at 06/04/25 1917    clopidogreL tablet 75 mg  75 mg Oral Daily Jake Paulino MD   75 mg at 06/05/25 1707    dextrose 50% injection 12.5 g  12.5 g Intravenous PRN Nissa Gaston FNP        docusate sodium capsule 200 mg  200 mg Oral BID Argentina Jerome MD   200 mg at 06/05/25 0906    ezetimibe tablet 10 mg  10 mg Oral  Daily Argentina Jerome MD   10 mg at 06/05/25 0907    glucagon (human recombinant) injection 1 mg  1 mg Intramuscular PRN Nissa Gaston FNP        hydrALAZINE injection 10 mg  10 mg Intravenous Q6H PRN Nissa Gaston FNP        HYDROcodone-acetaminophen  mg per tablet 1 tablet  1 tablet Oral Q4H PRN James Rankin MD   1 tablet at 06/06/25 0605    HYDROcodone-acetaminophen 5-325 mg per tablet 1 tablet  1 tablet Oral Q4H PRN Henrik Sandoval PA   1 tablet at 06/03/25 0156    insulin aspart U-100 injection 0-10 Units  0-10 Units Subcutaneous Q6H PRN Nissa Gaston FNP   2 Units at 06/06/25 0613    labetaloL injection 10 mg  10 mg Intravenous Q2H PRN Nissa Gaston FNP        levetiracetam 500 mg/5 mL (5 mL) liquid Soln 500 mg  500 mg Oral BID Argentina Jerome MD   500 mg at 06/05/25 2059    levothyroxine tablet 88 mcg  88 mcg Oral Before breakfast Argentina Jerome MD   88 mcg at 06/06/25 0605    LORazepam injection 2 mg  2 mg Intravenous Q4H PRN Nissa Gaston FNP        metoprolol succinate (TOPROL-XL) 24 hr split tablet 12.5 mg  12.5 mg Oral Daily Argentina Jerome MD   12.5 mg at 06/05/25 0906    nortriptyline capsule 25 mg  25 mg Oral QHS Argentina Jerome MD        ondansetron injection 4 mg  4 mg Intravenous Q4H PRN Nissa Gaston FNP   4 mg at 06/02/25 1511    paroxetine tablet 20 mg  20 mg Oral QAM Argentina Jerome MD   20 mg at 06/06/25 0605    prochlorperazine injection Soln 5 mg  5 mg Intravenous Q6H PRN Nissa Gaston FNP        sodium chloride 0.9% flush 10 mL  10 mL Intravenous PRN Nissa Gaston FNP

## 2025-06-06 NOTE — PROGRESS NOTES
Subjective  HPI: 59 year old WF with a PMH of anxiety, asthma, CVA, hypothyroidism, COPD, DM type 2, GERD, and a recent admission at [facility] on 5/15/25 due to multiple falls and subsequent SDH presented to [facility] ED on 5/29/25 with c/o HA and Right sided weakness. During that stay, CTA head and neck was done showing a new intermediate density subdural hemorrhage along the left cerebral convexity with no midline shift. The patient was seen by Dr. Mendoza and Dr. Egan for IR intracranial embolization on 5/16 then by Dr. Mendiola with neurosurgery on 5/19 for a left craniotomy for subdural hematoma evacuation. Further chart review shows the patient had a left MCA infarct June 1st 2024/ tandem carotid M1 occlusion; had thrombectomy status post aspiration x2 with TICI 2c reperfusion. Patient presented back to the ED at [facility] on 5/29 with complaints of bad headache with right sided weakness, abnormal speech, and facial asymmetry. On arrival at ED, patient had right sided facial droop, flattening of the nasolabial fold, 2/5  strength to right upper extremity, 4/5 strength to the right lower extremity. Patient was able to answer questions appropriately, but is slow with following commands. CTA stroke protocol showed suspected diminished flow inferior division of the left middle cerebral artery close to bifurcation, no other hemodynamically significant stenosis identified. CT head stroke showed left cerebral convexity hydodense subacute hematoma and improved postsurgical pneumocephalus. NIHSS of 10 noted. Patient was transferred to [facility]. Neurology consulted, and patient is not a candidate for TNK due to recent intracranial hemorrhage. Neurology recommended to hold plavix, -180, MRI of brain ordered, loaded on Keppra followed by Keppra 500mg BID. EEG showed a normal awake, drowsy, and sleep EEG with no evidence of any epileptiform discharges. Speech swallow eval completed with recommendation for NPO and MBS needed. PT  eval completed with deficits noted with recommendation for high intensity therapy needed. MRI of brain showed no evidence of new infarct. On 5/30, neurology recommended to hold Plavix until cleared by NSGY, continue Keppra, and signed off. MBS completed with recommendation for NPO due to moderate oropharyngeal dysphagia with Silent Aspiration of mildly thick liquids with high risk for aspiration. OT eval completed with deficits noted with recommendation for high intensity therapy needed. On 5/31, Dr Rankin consulted with recommendation for left diony hole for drainage of subdural due to pressure on the brain with mass effect on that side. NG tube placed for nutrition. On 6/1, cardiology consulted for surgery clearance with low risk for cardiac events. On 6/2, patient underwent a left diony hole for placement of subdural drain using Stealth system by Dr Rankin with no complications. On 6/3, JOHNATHAN drain remained in place, right arm stronger, and CT of head showed good removal of subdural. Patient complained of headache but improves with medications. PT/OT evals repeated after surgery with deficits noted with recommendation for high intensity therapy needed. On 6/4, Labs showed low H&H of 11.1 & 34.6, elevated PLT of 767, low iron of 32, low TIBC of 163, low transferrin of 145, and elevated ferritin of 698.39. JOHNATHAN drain was removed. MBS repeated with recommendation for minced and moist diet with moderately thick liquids, and NPO for meds. Vitals stable. On 6/5, neurosurgery recommended follow up in 6 weeks with repeat CT of head. Speech approved medications crushed in puree with continued aspiration precautions with minced and moist diet with moderately thick liquids. Patient complained of headache rating at 7/10. Patient is AAOx4.  Participating with therapy. Functional status includes minimal to moderate assist needed for grooming while standing, standby assist for bed mobility, contact guard assist to minimal assist for  transfers with RW, minimal assist for toilet transfer with grab bars, stand by assist for management of underwear and seated pericare after void with toileting, walked 145 ft with HHA x2 at minimal assist due to balance deficits, moderate assist for lower body dressing, and setup assist for eating. Patient was evaluated, accepted, and admitted to inpatient rehab to improve functional status. Transferred to [facility] on 6/6 without incident.      6/7: Seen in patient room with OT, seated in recliner with BLE elevated after showering. States that she feels fresh. Reports good sleep overnight. Asking about what we can do to get her RUE working again. Discussed therapy plans. Reports head pain at 8.5/10 and nursing with medication. States that she didn't like the food for breakfast but she is drinking all the liquids including her protein supplement. Medication crushed in Puree. Change Keppra to liquid. Participating in therapy evaluations. VSSAF.                Review of Systems  Psychiatric:  Has a history of anxiety, depression, and bipolar disorder. She quit smoking 1 year ago.    Depression/Anxiety: no current complaint     busPIRone tablet 15 mg BID  paroxetine tablet 20 mg qd  ALPRAZolam tablet 0.25 mg TID PRN Anxiety  Pain: HA-improving     acetaminophen tablet 650 mg q6h PRN   HYDROcodone-acetaminophen  mg 1 tablet q4h PRN mod/severe pain  Bowels/Bladder: LBM 6/7     Appetite: poor with Dysphagia Diet, but drinking all liquids and supplements     Sleep: good      nortriptyline capsule 25 mg qHS  hydrOXYzine pamoate capsule 50 mg qHS PRN Itching/Sleep            Physical Exam  General: well-developed, thin, in no acute distress  Eye: EOMI, clear conjunctiva, eyelids normal, glasses, left peripheral visual flurries  Respiratory: equal chest rise, no SOB, no audible wheeze  Cardiovascular: regular rate and rhythm, no edema  Gastrointestinal: soft, non-tender, non-distended   Musculoskeletal: right sided  weakness  Integumentary: chest rash,  Left Crani nafzsqirj-pxwwbdo-o/d/i  Neurologic: cranial nerves intact, right sided weakness, dysphagia, aphasia            Labs:   Latest Reference Range & Units 06/07/25 04:30 06/07/25 06:41 06/07/25 06:44   WBC 4.50 - 11.50 x10(3)/mcL  8.64    RBC 4.20 - 5.40 x10(6)/mcL  3.71 (L)    Hemoglobin 12.0 - 16.0 g/dL  11.0 (L)    Hematocrit 37.0 - 47.0 %  33.9 (L)    MCV 80.0 - 94.0 fL  91.4    MCH 27.0 - 31.0 pg  29.6    MCHC 33.0 - 36.0 g/dL  32.4 (L)    RDW 11.5 - 17.0 %  14.5    Platelet Count 130 - 400 x10(3)/mcL  794 (H)    Immature Platelet Fraction 0.9 - 11.2 %  1.0    MPV 7.4 - 10.4 fL  9.4    Neut % %  66.7    LYMPH % %  20.6    Mono % %  10.1    Eos % %  1.4    Basophil % %  0.5    Immature Granulocytes %  0.7    Neut # 2.1 - 9.2 x10(3)/mcL  5.77    Lymph # 0.6 - 4.6 x10(3)/mcL  1.78    Mono # 0.1 - 1.3 x10(3)/mcL  0.87    Eos # 0 - 0.9 x10(3)/mcL  0.12    Baso # <=0.2 x10(3)/mcL  0.04    Immature Grans (Abs) 0.00 - 0.04 x10(3)/mcL  0.06 (H)    nRBC %  0.0    Iron 50 - 170 ug/dL   36 (L)   TIBC 250 - 450 ug/dL   191 (L)   UIBC 70 - 310 ug/dL   155   Transferrin 180 - 382 mg/dL   166 (L)   Ferritin 4.63 - 204.00 ng/mL   578.36 (H)   Iron Saturation 20 - 50 %   19 (L)   Sodium 136 - 145 mmol/L 142     Potassium 3.5 - 5.1 mmol/L 3.8     Chloride 98 - 107 mmol/L 107     CO2 23 - 31 mmol/L 26     Anion Gap mEq/L 9.0     BUN 9.8 - 20.1 mg/dL 16.2     Creatinine 0.55 - 1.02 mg/dL 0.64     BUN/CREAT RATIO  25     eGFR mL/min/1.73/m2 >60     Glucose 82 - 115 mg/dL 174 (H)     Calcium 8.4 - 10.2 mg/dL 9.1     ALP 40 - 150 unit/L 141     PROTEIN TOTAL 5.8 - 7.6 gm/dL 6.6     Albumin 3.4 - 4.8 g/dL 2.5 (L)     Albumin/Globulin Ratio 1.1 - 2.0 ratio 0.6 (L)     Prealbumin 14.0 - 37.0 mg/dL 20.6     BILIRUBIN TOTAL <=1.5 mg/dL 0.2     AST 11 - 45 unit/L 21     ALT 0 - 55 unit/L 32     Globulin, Total 2.4 - 3.5 gm/dL 4.1 (H)     (L): Data is abnormally low  (H): Data is abnormally  high                  Assessment/Plan  Hospital   Subdural hematoma   Acute focal neurological deficit   Acute right-sided weakness   Severe malnutrition   S/P craniotomy   Dysphagia   Aphasia     Non-Hospital   Anxiety disorder, unspecified   Asthma   Depressive disorder   Inadequately controlled diabetes mellitus   GERD (gastroesophageal reflux disease)   HLD.   HYPOTHYROIDISM.   INSOMNIA.   COPD type A   Agatston CAC score, <100   History of COVID-19   Tobacco use   History of stroke   Dizziness   Tobacco dependency   Accelerated junctional rhythm   Lung nodule   Bipolar depression       Wounds: Left Crani bnmtrtrji-pshkcru-v/d/I, rash-chest  Dr. Mendoza and Dr. Egan for IR intracranial embolization on 5/16 then by Dr. Mendiola with neurosurgery on 5/19 for a left craniotomy for subdural hematoma evacuation  On 6/2, patient underwent a left diony hole for placement of subdural drain using Stealth system by Dr Rankin  Precautions: seizure  Bracing: TBD by therapy  Swallowing: Dysphagia minced and moist diet with moderately thick liquids, and medications crushed in puree   Function: Tolerating therapy. Continue PT/OT  VTE Prophylaxis: SCDs  Code Status: FULL CODE   Discharge: Lives with her spouse in [place] in a single-story home with 4 steps with a railing going up on the right. She has a railing in the hallway leading to the bedroom. Has her GED. She has no  history. Pt. Is not working. She quit working 1 year ago after her stroke.   works full time. He drives, grocery shops, manages finances, and manages patient medications. Children: (2 biological, 2 stepchildren). Date pending.

## 2025-06-06 NOTE — PLAN OF CARE
Chelle Chandra age: 59 *right sided weakness, dysphagia       Dx: Chronic left SDH with mass effect and right sided weakness s/p bur hole 6/2/2025. Pt. Has a past medical history of DM2, left MCA stroke s/p thrombectomy 6/2025 on Plavix, recent SDH following a fall on Plavix, s/p cerebral angiogram with embolization 5/19/2025, and s/p left frontoparietal crani & evacuation of SDH 5/19/2025.  *See chart for full and complete medical history*         Hx mental health/substance abuse: Pt. Has a history of anxiety, depression, and bipolar disorder. She quit smoking 1 year ago.       Insurance: [facility] [place]       Is there evidence of an acute change in mental status from the patients baseline? No      Education//Work Hx: Pt. Has her GED. She has no  history. Pt. Is not working. She quit working 1 year ago after her stroke.       Pt. Is  to Roberto Chandra ([phone]). Roberto works full time. He drives, grocery shops, manages finances, and manages patient medications.      Children: (2 biological, 2 stepchildren)/ (Friends/Family): 1) Roberto Chandra, spouse ([phone]) 2) Molly Camacho, daughter ([phone])      Power of  (no) Living Will (no)       Prior Level of Fx: Independent ADLs. She cooks, does chores, and does laundry with assistance. Her stepson and grandson do yard work. She does not have a medic alert. She does not drive. She uses a rollator for mobility.       Residence: Pt. Lives with her spouse in [place] in a single-story home with 4 steps with a railing going up on the right. She has a railing in the hallway leading to the bedroom. Pt. Has a tub/shower combination with grab bars and HHS. She does not have an elevated toilet. No grab bars.       DME: BP machine, TTB, shower chair, rollator, RW, grab bars, HHS. Will use DME company approved per insurance.      HH/OP tx: Pt. Is current with [facility]. Would like to continue using [facility].      FOC  obtained for [employer] company approved per insurance and [facility] for .       Pharmacy: [facility] in [facility] / (has prescription drug coverage)         MD(s): PCP: Dr. Jorge Silver ([phone]); Neurosurgery: Dr. James Rankin ([phone]). Follow-up appointment 7/22/2025 at 9 AM. Neurology: Jayne Verdin NP.

## 2025-06-06 NOTE — PT/OT/SLP PROGRESS
Occupational Therapy   Treatment    Name: Chelle Chandar  MRN: [id number]    Recommendations:     Recommended therapy intensity at discharge: High Intensity Therapy   Discharge Equipment Recommendations:  none  Barriers to discharge:       Assessment:     Chelle Chandra is a 59 y.o. female with a medical diagnosis of R sided weakness and facial droop due to L SDH s/p craniotomy and MMA embolization. On 5/30 patient with subdural hemorrhage s/p L diony hole. Performance deficits affecting function are weakness, impaired endurance, impaired self care skills, impaired functional mobility, gait instability, impaired balance, decreased coordination, decreased upper extremity function, decreased ROM.     Rehab Prognosis:  Good; patient would benefit from acute skilled OT services to address these deficits and reach maximum level of function.       Plan:     Patient to be seen 6 x/week to address the above listed problems via self-care/home management, therapeutic activities, therapeutic exercises, neuromuscular re-education, cognitive retraining  Plan of Care Expires: 06/30/25  Plan of Care Reviewed with: patient    Subjective     Pain/Comfort:  Pain Rating 1: 0/10    Objective:     Communicated with: RN prior to session.  Patient found HOB elevated upon OT entry to room.    General Precautions: Standard, fall, seizure (SBP<160)    Orthopedic Precautions:N/A  Braces: N/A  Respiratory Status: Room air  Vital Signs: Blood Pressure: 120/71     Occupational Performance:     Functional Mobility/Transfers:  Bed mobility:    Scooting: stand by assistance  Supine to Sit: stand by assistance  Transfers: Sit to Stand: contact guard assistance with rolling walker  Toilet Transfer: contact guard assistance with rolling walker using Step Transfer  Ambulated to bathroom with CGA and RW. No LOB.     Activities of Daily Living:  Grooming: minimum assistance to complete oral hygiene while standing at sink. Required assist to open  toothpaste and apply to brush 2/2 RUE weakness. Also noted to drop toothbrush several times during task when using R hand to brush.   Lower Body Dressing: stand by assistance to don slippers.   Toileting: stand by assistance for seated posterior pericare after +BM on toilet.     Balance:   Static Sitting Balance: No UE support: Normal: Patient able to maintain steady balance without handhold support.  Dynamic Sitting Balance: No UE support: Normal: Patient accepts maximal challenge and can shift weight easily within full range in all directions.    Therapeutic Positioning    OT interventions performed during the course of today's session in an effort to prevent and/or reduce acquired pressure injuries:   Education was provided on benefits of and recommendations for therapeutic positioning    [facility] 6 Click ADL: 18    Co-Treatment: No    Patient Education:  Patient provided with verbal education education regarding OT role/goals/POC, fall prevention, and safety awareness.  Understanding was verbalized.      Patient left up in chair with all lines intact, call button in reach, and chair alarm on.    GOALS:   Multidisciplinary Problems       Occupational Therapy Goals          Problem: Occupational Therapy    Goal Priority Disciplines Outcome Interventions   Occupational Therapy Goal     OT, PT/OT Progressing    Description: Goals to be met by 7/3/25    Pt will complete grooming standing at sink with LRAD with SBA.  Pt will complete UB dressing with SBA.  Pt will complete LB dressing with SBA using LRAD.  Pt will complete toileting with SBA using LRAD.  Pt will complete functional mobility to/from toilet and toilet transfer with SBA using LRAD.   Pt will demo increased strength in R UE to 3/5 MMT for increased functional use during ADL tasks.                        Time Tracking:     OT Date of Treatment: 06/06/25  OT Start Time: 0753  OT Stop Time: 0826  OT Total Time (min): 33 min    Billable Minutes:Self Care/Home  Management 33    Supervising Occupational Therapist: LISA Mora  OT/EDILSON: EDILSON     Number of EDILSON visits since last OT visit: 3    6/6/2025

## 2025-06-06 NOTE — CONSULTS
Dos 6/9/25  I have evaluated the patient on initial IRF admit. I have been involved in rehab prescreen. I have reviewed records.I have reviewed admit orders.  I find the patient appropriate for, in need of and should tolerate an aggressive IRF program with good potential for functional improvement.  I am in agreement with initial Plan of Care  I have been involved in the creation of this initial PM&R consult with Sobeida Mills MD  Chief Complaint  SDH s/p Left Craniotomy and additional diony hole    Reason for Consultation  Physiatry    History of Present Illness  Admit MD: Aldair Armenta MD  Consult Physiatry: Lazarus Mills MD  HPI: 59 year old WF with a PMH of anxiety, asthma, CVA, hypothyroidism, COPD, DM type 2, GERD, and a recent admission at [facility] on 5/15/25 due to multiple falls and subsequent SDH presented to [facility] ED on 5/29/25 with c/o HA and Right sided weakness. During that stay, CTA head and neck was done showing a new intermediate density subdural hemorrhage along the left cerebral convexity with no midline shift. The patient was seen by Dr. Mendoza and Dr. Egan for IR intracranial embolization on 5/16 then by Dr. Mendiola with neurosurgery on 5/19 for a left craniotomy for subdural hematoma evacuation. Further chart review shows the patient had a left MCA infarct June 1st 2024/ tandem carotid M1 occlusion; had thrombectomy status post aspiration x2 with TICI 2c reperfusion. Patient presented back to the ED at [facility] on 5/29 with complaints of bad headache with right sided weakness, abnormal speech, and facial asymmetry. On arrival at ED, patient had right sided facial droop, flattening of the nasolabial fold, 2/5  strength to right upper extremity, 4/5 strength to the right lower extremity. Patient was able to answer questions appropriately, but is slow with following commands. CTA stroke protocol showed suspected diminished flow inferior division of the left middle cerebral artery  close to bifurcation, no other hemodynamically significant stenosis identified. CT head stroke showed left cerebral convexity hydodense subacute hematoma and improved postsurgical pneumocephalus. NIHSS of 10 noted. Patient was transferred to [facility]. Neurology consulted, and patient is not a candidate for TNK due to recent intracranial hemorrhage. Neurology recommended to hold plavix, -180, MRI of brain ordered, loaded on Keppra followed by Keppra 500mg BID. EEG showed a normal awake, drowsy, and sleep EEG with no evidence of any epileptiform discharges. Speech swallow eval completed with recommendation for NPO and MBS needed. PT eval completed with deficits noted with recommendation for high intensity therapy needed. MRI of brain showed no evidence of new infarct. On 5/30, neurology recommended to hold Plavix until cleared by NSGY, continue Keppra, and signed off. MBS completed with recommendation for NPO due to moderate oropharyngeal dysphagia with Silent Aspiration of mildly thick liquids with high risk for aspiration. OT eval completed with deficits noted with recommendation for high intensity therapy needed. On 5/31, Dr Rankin consulted with recommendation for left diony hole for drainage of subdural due to pressure on the brain with mass effect on that side. NG tube placed for nutrition. On 6/1, cardiology consulted for surgery clearance with low risk for cardiac events. On 6/2, patient underwent a left diony hole for placement of subdural drain using Stealth system by Dr Rankin with no complications. On 6/3, JOHNATHAN drain remained in place, right arm stronger, and CT of head showed good removal of subdural. Patient complained of headache but improves with medications. PT/OT evals repeated after surgery with deficits noted with recommendation for high intensity therapy needed. On 6/4, Labs showed low H&H of 11.1 & 34.6, elevated PLT of 767, low iron of 32, low TIBC of 163, low transferrin of 145, and elevated  ferritin of 698.39. JOHNATHAN drain was removed. MBS repeated with recommendation for minced and moist diet with moderately thick liquids, and NPO for meds. Vitals stable. On 6/5, neurosurgery recommended follow up in 6 weeks with repeat CT of head. Speech approved medications crushed in puree with continued aspiration precautions with minced and moist diet with moderately thick liquids. Patient complained of headache rating at 7/10. Patient is AAOx4.  Participating with therapy. Functional status includes minimal to moderate assist needed for grooming while standing, standby assist for bed mobility, contact guard assist to minimal assist for transfers with RW, minimal assist for toilet transfer with grab bars, stand by assist for management of underwear and seated pericare after void with toileting, walked 145 ft with HHA x2 at minimal assist due to balance deficits, moderate assist for lower body dressing, and setup assist for eating. Patient was evaluated, accepted, and admitted to inpatient rehab to improve functional status. Transferred to [facility] on 6/6 without incident.      6/6: Seen in patient room upon arrival, resting comfortable in bed. Reports feeling better and headaches decreasing. Tells me that the medication is helpful. Sleeping well. Bowels moved after lunch. Denies constipation, and/or nausea but admits that her appetite is down. States that she just really wants to get back to a normal diet, but has been ok with all the thickened liquids and drinking Boost. Discussed Dysphagia and importance of specialized diets and Speech Therapy. Therapy to evaluate. Pleasant. Admits to some visual flurries seen in left periphery, with explanation of having bleeding behind her eye. AAO. Reminded patient to call for assistance to go the restroom, and for any other needs. VSSAF.                 Review of Systems  Barriers to Discharge:  Social: Has her GED. She has no  history. Pt. Is not working. She quit working  1 year ago after her stroke.   works full time. He drives, grocery shops, manages finances, and manages patient medications. Children: (2 biological, 2 stepchildren).    Medical: SDH s/p Left Craniotomy and additional diony hole, right sided weakness, dysphasia, aphasia, multiple falls, Bipolar depression, anxiety, asthma, CVA Hx (6/2024), hypothyroidism, COPD, DM type 2, GERD, lung nodule    Functional:   Prior Level of Fx: Independent ADLs. She cooks, does chores, and does laundry with assistance. Her stepson and grandson do yard work. She does not have a medic alert. She does not drive. She uses a rollator for mobility.    Residence:  Lives with her spouse in [place] in a single-story home with 4 steps with a railing going up on the right. She has a railing in the hallway leading to the bedroom. Pt. Has a tub/shower combination with grab bars and HHS. She does not have an elevated toilet. No grab bars.    DME: BP machine, TTB, shower chair, rollator, RW, grab bars, HHS.    Psychiatric:  Has a history of anxiety, depression, and bipolar disorder. She quit smoking 1 year ago.    Depression/Anxiety: no current complaint     busPIRone tablet 15 mg BID  paroxetine tablet 20 mg qd  ALPRAZolam tablet 0.25 mg TID PRN Anxiety  Pain: HA-improving     acetaminophen tablet 650 mg q6h PRN   HYDROcodone-acetaminophen  mg 1 tablet q4h PRN mod/severe pain  Bowels/Bladder: LBM 6/6     Appetite: poor with Dysphagia Diet     Sleep: good      nortriptyline capsule 25 mg qHS  hydrOXYzine pamoate capsule 50 mg qHS PRN Itching/Sleep            Physical Exam  General: well-developed, thin, in no acute distress  Eye: EOMI, clear conjunctiva, eyelids normal, glasses, left peripheral visual flurries  HENT: normocephalic, oropharynx and nasal mucosal surfaces moist  Neck: full range of motion, supple  Respiratory: equal chest rise, no SOB, no audible wheeze  Cardiovascular: regular rate and rhythm, no edema  Gastrointestinal:  soft, non-tender, non-distended   Musculoskeletal: right sided weakness  Integumentary: chest rash,  Left Crani kowrbtszz-[id number]-y/d/i  Neurologic: cranial nerves intact, right sided weakness, dysphagia, aphasia  *MD performed and documented physical examination           Labs:   Latest Reference Range & Units 06/04/25 05:15   WBC 4.50 - 11.50 x10(3)/mcL 6.99   RBC 4.20 - 5.40 x10(6)/mcL 3.77 (L)   Hemoglobin 12.0 - 16.0 g/dL 11.1 (L)   Hematocrit 37.0 - 47.0 % 34.5 (L)   MCV 80.0 - 94.0 fL 91.5   MCH 27.0 - 31.0 pg 29.4   MCHC 33.0 - 36.0 g/dL 32.2 (L)   RDW 11.5 - 17.0 % 13.7   Platelet Count 130 - 400 x10(3)/mcL 767 (H)   MPV 7.4 - 10.4 fL 9.2   Neut % % 64.5   LYMPH % % 21.7   Mono % % 9.9   Eos % % 2.3   Basophil % % 0.9   Immature Granulocytes % 0.7   Neut # 2.1 - 9.2 x10(3)/mcL 4.51   Lymph # 0.6 - 4.6 x10(3)/mcL 1.52   Mono # 0.1 - 1.3 x10(3)/mcL 0.69   Eos # 0 - 0.9 x10(3)/mcL 0.16   Baso # <=0.2 x10(3)/mcL 0.06   Immature Grans (Abs) 0.00 - 0.04 x10(3)/mcL 0.05 (H)   nRBC % 0.0   Iron 50 - 170 ug/dL 32 (L)   TIBC 250 - 450 ug/dL 163 (L)   UIBC 70 - 310 ug/dL 131   Transferrin 180 - 382 mg/dL 145 (L)   Ferritin 4.63 - 204.00 ng/mL 698.39 (H)   Vitamin B12 213 - 816 pg/mL 635   Iron Saturation 20 - 50 % 20   Sodium 136 - 145 mmol/L 140   Potassium 3.5 - 5.1 mmol/L 3.6   Chloride 98 - 107 mmol/L 106   CO2 23 - 31 mmol/L 27   Anion Gap mEq/L 7.0   BUN 9.8 - 20.1 mg/dL 9.4 (L)   Creatinine 0.55 - 1.02 mg/dL 0.62   BUN/CREAT RATIO  15   eGFR mL/min/1.73/m2 >60   Glucose 82 - 115 mg/dL 193 (H)   Calcium 8.4 - 10.2 mg/dL 8.7   Phosphorus Level 2.3 - 4.7 mg/dL 2.3   Magnesium  1.60 - 2.60 mg/dL 2.00   (L): Data is abnormally low  (H): Data is abnormally high            Diagnostics:  CT HEAD FOR CODE STROKE   FINDINGS:  There is left craniotomy related to evacuation of the subdural hematoma.  There is minimal residual left pneumocephalus.  Left cerebral convexity subdural collection is entirely hypodense with  thickness of 10 mm without acute hyperdense component.  Extensive in the territory of the middle cerebral artery ischemic changes with associated and cephalo malacia is similar. There is no significant mass effect or midline shift.  No new large vessel territory infarct identified.  Visualized paranasal sinuses are clear without mucosal thickening, polypoidal abnormality or air-fluid levels. Mastoid air cells aeration is optimal.  Impression:  1.  Left cerebral convexity hypodense collection without residual acute hyperdense subacute hematoma and improved postsurgical pneumocephalus.  2.  Otherwise, no significant interval change.  Date:                                            05/29/2025  Time:                                           09:37      CT BRAIN PERFUSION INC POST PROCESSING   FINDINGS/IMPRESSION  No definite blood flow asymmetry or other findings to suggest significant acute perfusion defect identified.  Scattered areas of asymmetrically decreased perfusion through the left MCA territory correspond to area of known ischemic stroke seen on the preceding CT and recent comparison studies.  Date:                                            05/29/2025  Time:                                           10:28      CT CHEST ABDOMEN PELVIS WITH IV CONTRAST   Impression:  1. Distended urinary bladder with some air, recommend correlation with urinalysis.  2. Otherwise no acute findings chest, abdomen or pelvis.  Date:                                            05/30/2025  Time:                                           13:34      CT HEAD WITHOUT CONTRAST   FINDINGS:  Status post left parietal calvarial diony hole with pre-existing posterior left frontal craniotomy changes.  Interval reduction in volume associated with the hypodense subdural fluid collection tracking along the left cerebral convexity now measuring up to 8 mm in maximum thickness, previously measuring up to 13 mm in maximum thickness.  Redemonstration  of extensive encephalomalacia of the left cerebral hemisphere with ex vacuo dilatation of the left lateral ventricle as well as a small focus of probable vasogenic edema in the left perirolandic cortex.  No evidence of herniation or hydrocephalus.  Chronic lacunar infarct of the left cerebellar hemisphere.  Impression:  Significant interval reduction in volume of the subdural hematoma tracking along the left cerebral convexity status post evacuation.  Date:                                            06/03/2025  Time:                                           07:47                Assessment/Plan  Hospital   Subdural hematoma   Acute focal neurological deficit   Acute right-sided weakness   Severe malnutrition   S/P craniotomy   Dysphagia   Aphasia     Non-Hospital   Anxiety disorder, unspecified   Asthma   Depressive disorder   Inadequately controlled diabetes mellitus   GERD (gastroesophageal reflux disease)   HLD.   HYPOTHYROIDISM.   INSOMNIA.   COPD type A   Agatston CAC score, <100   History of COVID-19   Tobacco use   History of stroke   Dizziness   Tobacco dependency   Accelerated junctional rhythm   Lung nodule   Bipolar depression       Wounds: Left Crani xurblpvdx-ugunkux-p/d/I, rash-chest  Dr. Mendoza and Dr. Egan for IR intracranial embolization on 5/16 then by Dr. Mendiola with neurosurgery on 5/19 for a left craniotomy for subdural hematoma evacuation  On 6/2, patient underwent a left diony hole for placement of subdural drain using Stealth system by Dr Rankin  Precautions: seizure  Bracing: TBD by therapy  Swallowing: Dysphagia minced and moist diet with moderately thick liquids, and medications crushed in puree   Function: Tolerating therapy. Continue PT/OT  VTE Prophylaxis: SCDs  Code Status: FULL CODE   Discharge: Lives with her spouse in [place] in a single-story home with 4 steps with a railing going up on the right. She has a railing in the hallway leading to the bedroom. Has her GED. She has no   history. Pt. Is not working. She quit working 1 year ago after her stroke.   works full time. He drives, grocery shops, manages finances, and manages patient medications. Children: (2 biological, 2 stepchildren). Date pending.               Nadine Cummins NP, conducted additional independent physical examination and assisted with medical documentation.

## 2025-06-07 LAB
ALBUMIN SERPL-MCNC: 2.5 G/DL (ref 3.4–4.8)
ALBUMIN/GLOB SERPL: 0.6 RATIO (ref 1.1–2)
ALP SERPL-CCNC: 141 UNIT/L (ref 40–150)
ALT SERPL-CCNC: 32 UNIT/L (ref 0–55)
ANION GAP SERPL CALC-SCNC: 9 MEQ/L
AST SERPL-CCNC: 21 UNIT/L (ref 11–45)
BASOPHILS # BLD AUTO: 0.04 X10(3)/MCL
BASOPHILS NFR BLD AUTO: 0.5 %
BILIRUB SERPL-MCNC: 0.2 MG/DL
BUN SERPL-MCNC: 16.2 MG/DL (ref 9.8–20.1)
CALCIUM SERPL-MCNC: 9.1 MG/DL (ref 8.4–10.2)
CHLORIDE SERPL-SCNC: 107 MMOL/L (ref 98–107)
CO2 SERPL-SCNC: 26 MMOL/L (ref 23–31)
CREAT SERPL-MCNC: 0.64 MG/DL (ref 0.55–1.02)
CREAT/UREA NIT SERPL: 25
EOSINOPHIL # BLD AUTO: 0.12 X10(3)/MCL (ref 0–0.9)
EOSINOPHIL NFR BLD AUTO: 1.4 %
ERYTHROCYTE [DISTWIDTH] IN BLOOD BY AUTOMATED COUNT: 14.5 % (ref 11.5–17)
FERRITIN SERPL-MCNC: 578.36 NG/ML (ref 4.63–204)
GFR SERPLBLD CREATININE-BSD FMLA CKD-EPI: >60 ML/MIN/1.73/M2
GLOBULIN SER-MCNC: 4.1 GM/DL (ref 2.4–3.5)
GLUCOSE SERPL-MCNC: 174 MG/DL (ref 82–115)
HCT VFR BLD AUTO: 33.9 % (ref 37–47)
HGB BLD-MCNC: 11 G/DL (ref 12–16)
IMM GRANULOCYTES # BLD AUTO: 0.06 X10(3)/MCL (ref 0–0.04)
IMM GRANULOCYTES NFR BLD AUTO: 0.7 %
IRON SATN MFR SERPL: 19 % (ref 20–50)
IRON SERPL-MCNC: 36 UG/DL (ref 50–170)
LYMPHOCYTES # BLD AUTO: 1.78 X10(3)/MCL (ref 0.6–4.6)
LYMPHOCYTES NFR BLD AUTO: 20.6 %
MCH RBC QN AUTO: 29.6 PG (ref 27–31)
MCHC RBC AUTO-ENTMCNC: 32.4 G/DL (ref 33–36)
MCV RBC AUTO: 91.4 FL (ref 80–94)
MONOCYTES # BLD AUTO: 0.87 X10(3)/MCL (ref 0.1–1.3)
MONOCYTES NFR BLD AUTO: 10.1 %
NEUTROPHILS # BLD AUTO: 5.77 X10(3)/MCL (ref 2.1–9.2)
NEUTROPHILS NFR BLD AUTO: 66.7 %
NRBC BLD AUTO-RTO: 0 %
PLATELET # BLD AUTO: 794 X10(3)/MCL (ref 130–400)
PLATELETS.RETICULATED NFR BLD AUTO: 1 % (ref 0.9–11.2)
PMV BLD AUTO: 9.4 FL (ref 7.4–10.4)
POCT GLUCOSE: 156 MG/DL (ref 70–110)
POCT GLUCOSE: 190 MG/DL (ref 70–110)
POCT GLUCOSE: 203 MG/DL (ref 70–110)
POCT GLUCOSE: 218 MG/DL (ref 70–110)
POTASSIUM SERPL-SCNC: 3.8 MMOL/L (ref 3.5–5.1)
PREALB SERPL-MCNC: 20.6 MG/DL (ref 14–37)
PROT SERPL-MCNC: 6.6 GM/DL (ref 5.8–7.6)
RBC # BLD AUTO: 3.71 X10(6)/MCL (ref 4.2–5.4)
SODIUM SERPL-SCNC: 142 MMOL/L (ref 136–145)
TIBC SERPL-MCNC: 155 UG/DL (ref 70–310)
TIBC SERPL-MCNC: 191 UG/DL (ref 250–450)
TRANSFERRIN SERPL-MCNC: 166 MG/DL (ref 180–382)
WBC # BLD AUTO: 8.64 X10(3)/MCL (ref 4.5–11.5)

## 2025-06-07 PROCEDURE — 84134 ASSAY OF PREALBUMIN: CPT | Performed by: NURSE PRACTITIONER

## 2025-06-07 PROCEDURE — 94761 N-INVAS EAR/PLS OXIMETRY MLT: CPT

## 2025-06-07 PROCEDURE — 36415 COLL VENOUS BLD VENIPUNCTURE: CPT | Performed by: NURSE PRACTITIONER

## 2025-06-07 PROCEDURE — 11800000 HC REHAB PRIVATE ROOM

## 2025-06-07 PROCEDURE — 85025 COMPLETE CBC W/AUTO DIFF WBC: CPT | Performed by: NURSE PRACTITIONER

## 2025-06-07 PROCEDURE — 97166 OT EVAL MOD COMPLEX 45 MIN: CPT

## 2025-06-07 PROCEDURE — 97530 THERAPEUTIC ACTIVITIES: CPT

## 2025-06-07 PROCEDURE — 25000003 PHARM REV CODE 250: Performed by: INTERNAL MEDICINE

## 2025-06-07 PROCEDURE — 99233 SBSQ HOSP IP/OBS HIGH 50: CPT | Mod: ,,, | Performed by: NURSE PRACTITIONER

## 2025-06-07 PROCEDURE — 63600175 PHARM REV CODE 636 W HCPCS: Performed by: NURSE PRACTITIONER

## 2025-06-07 PROCEDURE — 25000003 PHARM REV CODE 250: Performed by: NURSE PRACTITIONER

## 2025-06-07 PROCEDURE — 94799 UNLISTED PULMONARY SVC/PX: CPT

## 2025-06-07 PROCEDURE — 80053 COMPREHEN METABOLIC PANEL: CPT | Performed by: NURSE PRACTITIONER

## 2025-06-07 PROCEDURE — 83550 IRON BINDING TEST: CPT | Performed by: NURSE PRACTITIONER

## 2025-06-07 PROCEDURE — 97162 PT EVAL MOD COMPLEX 30 MIN: CPT

## 2025-06-07 PROCEDURE — 99900031 HC PATIENT EDUCATION (STAT)

## 2025-06-07 PROCEDURE — 82728 ASSAY OF FERRITIN: CPT | Performed by: NURSE PRACTITIONER

## 2025-06-07 PROCEDURE — 92610 EVALUATE SWALLOWING FUNCTION: CPT

## 2025-06-07 PROCEDURE — 97535 SELF CARE MNGMENT TRAINING: CPT

## 2025-06-07 PROCEDURE — 97116 GAIT TRAINING THERAPY: CPT

## 2025-06-07 RX ORDER — LANOLIN ALCOHOL/MO/W.PET/CERES
1 CREAM (GRAM) TOPICAL DAILY
Status: DISCONTINUED | OUTPATIENT
Start: 2025-06-07 | End: 2025-06-14 | Stop reason: HOSPADM

## 2025-06-07 RX ADMIN — LEVETIRACETAM 500 MG: 500 TABLET, FILM COATED ORAL at 07:06

## 2025-06-07 RX ADMIN — BUSPIRONE HYDROCHLORIDE 15 MG: 10 TABLET ORAL at 07:06

## 2025-06-07 RX ADMIN — METFORMIN HYDROCHLORIDE 500 MG: 500 TABLET, FILM COATED ORAL at 07:06

## 2025-06-07 RX ADMIN — EZETIMIBE 10 MG: 10 TABLET ORAL at 07:06

## 2025-06-07 RX ADMIN — INSULIN ASPART 1 UNITS: 100 INJECTION, SOLUTION INTRAVENOUS; SUBCUTANEOUS at 08:06

## 2025-06-07 RX ADMIN — HYDROCODONE BITARTRATE AND ACETAMINOPHEN 1 TABLET: 10; 325 TABLET ORAL at 05:06

## 2025-06-07 RX ADMIN — METOPROLOL SUCCINATE 12.5 MG: 25 TABLET, EXTENDED RELEASE ORAL at 07:06

## 2025-06-07 RX ADMIN — INSULIN ASPART 2 UNITS: 100 INJECTION, SOLUTION INTRAVENOUS; SUBCUTANEOUS at 11:06

## 2025-06-07 RX ADMIN — PAROXETINE HYDROCHLORIDE 20 MG: 20 TABLET, FILM COATED ORAL at 07:06

## 2025-06-07 RX ADMIN — HYDROCODONE BITARTRATE AND ACETAMINOPHEN 1 TABLET: 10; 325 TABLET ORAL at 08:06

## 2025-06-07 RX ADMIN — FERROUS SULFATE TAB 325 MG (65 MG ELEMENTAL FE) 1 EACH: 325 (65 FE) TAB at 11:06

## 2025-06-07 RX ADMIN — CLOPIDOGREL BISULFATE 75 MG: 75 TABLET, FILM COATED ORAL at 07:06

## 2025-06-07 RX ADMIN — METFORMIN HYDROCHLORIDE 500 MG: 500 TABLET, FILM COATED ORAL at 04:06

## 2025-06-07 RX ADMIN — DOCUSATE SODIUM 100 MG: 100 CAPSULE, LIQUID FILLED ORAL at 07:06

## 2025-06-07 RX ADMIN — ATORVASTATIN CALCIUM 80 MG: 40 TABLET, FILM COATED ORAL at 07:06

## 2025-06-07 RX ADMIN — LEVOTHYROXINE SODIUM 88 MCG: 0.09 TABLET ORAL at 06:06

## 2025-06-07 RX ADMIN — HYDROCODONE BITARTRATE AND ACETAMINOPHEN 1 TABLET: 10; 325 TABLET ORAL at 10:06

## 2025-06-07 RX ADMIN — NORTRIPTYLINE HYDROCHLORIDE 25 MG: 25 CAPSULE ORAL at 07:06

## 2025-06-07 NOTE — PT/OT/SLP EVAL
Physical Therapy Rehab Evaluation    Patient Name:  Chelle Chandra   MRN:  [id number]    Recommendations:     Discharge Recommendations:  Low Intensity Therapy   Discharge Equipment Recommendations: none   Barriers to discharge: severity of deficits    Assessment:     Chelle Chandra is a 59 y.o. female admitted with a medical diagnosis of Acute right-sided weakness.  She presents with the following impairments/functional limitations:  weakness, impaired endurance, impaired self care skills, impaired functional mobility, gait instability, impaired balance, decreased coordination, decreased upper extremity function, decreased lower extremity function, decreased safety awareness, pain, impaired coordination .    Rehab Diagnosis: L Subdural Hematoma    Recent Surgery: * No surgery found *      General Precautions: Standard, aspiration, fall, seizure     Orthopedic Precautions: N/A     Braces: N/A    Rehab Prognosis: Good; patient would benefit from acute skilled PT services to address these deficits and reach maximum level of function.      History:     Past Medical History:   Diagnosis Date    Accelerated junctional rhythm     Agatston CAC score, <100     Anxiety disorder, unspecified     Asthma     COPD type A     Diabetes mellitus without complication     GERD (gastroesophageal reflux disease)     History of COVID-19     Insomnia     Lung nodule        Past Surgical History:   Procedure Laterality Date    ANGIOGRAM, CORONARY, WITH LEFT HEART CATHETERIZATION      BACK SURGERY      BREAST LUMPECTOMY Right     CARDIOVERSION  08/01/2013    CARPAL TUNNEL RELEASE  10/23/2013    CRANIOTOMY FOR EVACUATION OF SUBDURAL HEMATOMA Left 05/19/2025    Procedure: CRANIOTOMY, FOR SUBDURAL HEMATOMA EVACUATION;  Surgeon: Ricardo Shelley MD;  Location: [facility];  Service: Neurosurgery;  Laterality: Left;    CREATION OF TERRI HOLE WITH EVACUATION OF HEMATOMA Left 6/2/2025    Procedure: CREATION, CRANIAL TERRI HOLE, WITH HEMATOMA  EVACUATION;  Surgeon: James Rankin MD;  Location: [facility] OR;  Service: Neurosurgery;  Laterality: Left;  LEFT BURRHOLE FOR SUBDURAL HEMATOMA EVACUATION // STEALTH // SHERRI SKYTRON // DRILL    FUSION OF CERVICAL SPINE BY ANTERIOR APPROACH USING COMPUTER-ASSISTED NAVIGATION  06/10/2019    KIDNEY SURGERY      TONSILLECTOMY AND ADENOIDECTOMY      TOTAL ABDOMINAL HYSTERECTOMY      WRIST SURGERY         Subjective     Chief Complaint: States she is very tired   Patient/Family Comments/goals: to get better and go home    Patients cultural, spiritual, Scientologist conflicts given the current situation: no       Living Environment  People in Home: child(jose enrique), adult, spouse  Living Arrangements: house  Number of Stairs, Main Entrance: four  Stairs Comment, Main Entrance: L HR  Home Layout: Able to live on 1st floor    Prior Level of Function  Ambulation Skills: needs device  Stairs: independent  Transfer Skills: needs device  ADL Skills: needs device and assist  Work/Leisure Activity: needs device  Cognitive Communication: needs assist    Equipment used at home:  .  DME owned (not currently used): rolling walker and rollator.      Upon discharge, patient will have assistance from  and daughter.    Objective:     Communicated with pt prior to session.  Patient found up in chair with peripheral IV  upon PT entry to room.    Vitals   Vitals at Rest  /71   HR (!) 114   O2 Sat     Pain Pain Rating 1: 0/10     Vitals With Activity  /70      O2 Sat     Pain Pain Rating 1: 0/10     Respiratory Status: Room air    Exams  RLE ROM:          -       WFL  RLE Strength:          -       WFL, except generalized weakness 3+/5  LLE ROM:          -       WFL  LLE Strength:          -       WFL        GGs   Admit Current   Status Goal   Functional Area: Care Score: Care Score: Care Score:   Roll Left and Right 4 4 Independent   Sit to Lying 4 4 Independent   Lying to Sitting on Side of Bed 4 4 Independent   Sit to  "Stand 4 4 Independent   Chair/Bed-to-Chair Transfer 4 4 Independent   Car Transfer 4 4 Independent   Walk 10 Feet 3 3 Independent   Walk 50 Feet with Two Turns 3 3 Independent   Walk 150 Feet 3 3     Walk 10 Feet Uneven Surface 3 3 Independent   1 Step (Curb) 3 3 Independent   4 Steps 3 3 Independent   12 Steps 9 9     Picking Up Object 3 3 Independent   Wheel 50 Feet with Two Turns         Wheel 150 Feet           Therapeutic Activities and Exercises:  Tolerated session well. Completed all Ggs, pt Rafael with functional mobility. Ambulates with NBOS almost scissoring gait. Rafael during turns due to LOB. Cues for hand placement with transfers. Unable to complete all minutes due to extreme fatigue during session. Pt fell asleep during RB, pt reports due to pain meds.     Activity Tolerance: Good    Patient left HOB elevated with all lines intact, call button in reach, and bed alarm on.    Education Provided: roles and goals of PT/PTA, transfer training, bed mob, gait training, stair training, balance training, safety awareness, body mechanics, and assistive device    Expected compliance: High compliance    GOALS:   Multidisciplinary Problems       Physical Therapy Goals          Problem: Physical Therapy    Goal Priority Disciplines Outcome Interventions   Physical Therapy Goal     PT, PT/OT Progressing    Description: Bed Mobility:  Roll left and right independently.   Sit to supine transfer independently.   Supine to sit transfer independently.     Transfers:  Sit to stand transfer independently using RW.   Bed to chair transfer independently Stand Step  using RW.   Car transfer independently using RW.    an object from the ground in standing position independently using RW.     Mobility:  Ambulate 250 feet independently using RW.  Ambulate 15 feet on uneven surfaces/ramps independently using RW.   Pt ascended/descended a 6" inch curb independently using RW.   Ascend/descend 4 stairs independently using left " "handrail.                         Plan:     During this hospitalization, patient to be seen 5 x/week to address the identified rehab impairments via gait training, therapeutic activities, therapeutic exercises, neuromuscular re-education and progress toward the following goals:    Plan of Care Expires:  06/13/25  PT Next Visit Date: 06/13/25  Plan of Care reviewed with: patient      Additional Infomation:           Time Tracking:     Therapy Time   PT Received On: 06/07/25  PT Start Time: 0900  PT Stop Time: 1000  PT Total Time (min): 60 min  PT Individual: 60  Missed Time: 30 Minutes  Time Missed due to: Patient fatigue, Other (Comment) (Pt states "they gave her NORCO and its really kicking in")    Billable Minutes: Evaluation 15, Gait Training 15, and Therapeutic Activity 30    06/07/2025  "

## 2025-06-07 NOTE — PROGRESS NOTES
[facility]  Rehab Progress Note    Patient Name: Chelle Chandra  MRN: [id number]  Age: 59 y.o. Sex: female  : 1965  Hospital Length of Stay: 1 days  Date of Service: 2025   Chief Complaint: Acute right-sided weakness    Subjective:     Basic Information  Admit Information: 59-year-old white female presented to [facility] ED on 05/15/2025 after multiple falls.  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  JOHNATHAN drain removed on .  Discharge home with home health on ... Presented to [facility] ED on  with headache, slurred speech, facial droop, and right-sided weakness.  CTA head and neck significant for suspected diminished flow inferior division of left MCA close to bifurcation.  CT head significant cord left cerebral convexity hypodense subacute hematoma and improved postsurgical pneumocephalus.  Transferred to [facility] for neurology/neurosurgery consultation.  Not a candidate for TNK due to recent intracranial hemorrhage.  Neurology recommended holding Plavix in initiating Keppra 500 mg b.i.d..  EKG noted for evidence of any epileptiform discharges.  MRI brain with no evidence of new infarct.  MBS completed.  Speech 30 recommended moderately thickened liquids due to high risk of aspiration.  Neurosurgery evaluated him and recommended for drainage.  Tolerated left diony hole for drainage of SDH due to increased intracranial pressure with mass effect on  without perioperative complications.  Repeat CT head significant for removal of SDH.  Reported increase strength to right arm.  Headache improved.  Recommended high-intensity therapy.  Speech therapy recommended minced and moist diet with moderately thickened liquids.  Plavix resumed per Neurosurgery recommendations  "on 06/05.  Tolerated transfer to [facility] inpatient rehab unit on 06/06 without incident.  Today's Information: No acute events overnight.  Laying comfortably in bed.  Pleasant mood.  No complaints.  Reporting good sleep and appetite.  Compliant with therapies.  Adequate pain control.  No bowel or bladder issues reported.  Last reported BM on 06/07.  Vitals stable with no recent recorded fevers.  Adequate glycemic control.  Labs reviewed.  H&H 11.0/33.9 (from 11.1/34.5).  Platelets 794 (from 767).  Iron 36 (from 32).  Iron saturation 19% (from 20%).  Albumin 2.5.  Prealbumin 20.6.  Labs otherwise unremarkable.  No new imaging.    Review of patient's allergies indicates:   Allergen Reactions    Aspirin     Ketorolac     Penicillins     Sulfa (sulfonamide antibiotics)     Ozempic [semaglutide] Other (See Comments)     Abdominal pain      Current Medications[1]     Review of Systems   Complete 12-point review of symptoms negative except for what's mentioned in HPI     Objective:     /67   Pulse 93   Temp 98.3 °F (36.8 °C) (Oral)   Resp 18   Ht 5' 6" (1.676 m)   Wt 46.9 kg (103 lb 6.3 oz)   SpO2 (!) 94%   BMI 16.69 kg/m²      Physical Exam  Constitutional:       General: She is not in acute distress.     Appearance: She is underweight.   HENT:      Head:      Comments: Left cranial incision sites clean intact  Eyes:      Pupils: Pupils are equal, round, and reactive to light.      Comments: Wearing corrective lenses   Cardiovascular:      Rate and Rhythm: Normal rate and regular rhythm.   Pulmonary:      Effort: No respiratory distress.      Breath sounds: No wheezing or rhonchi.   Abdominal:      General: Bowel sounds are normal.      Palpations: Abdomen is soft.      Tenderness: There is no abdominal tenderness. There is no guarding.   Musculoskeletal:         General: No deformity.      Cervical back: Neck supple.      Right lower leg: No edema.      Left lower leg: No edema.      Comments: Diffuse muscle " atrophy with right sided weakness   Skin:     General: Skin is warm.   Neurological:      Motor: Weakness present.   Psychiatric:         Mood and Affect: Mood normal.         Behavior: Behavior normal.         Thought Content: Thought content normal.         Judgment: Judgment normal.       Lines/Drains/Airways       Peripheral Intravenous Line  Duration                  Peripheral IV - Single Lumen 06/03/25 1615 20 G Anterior;Proximal;Right Antecubital 3 days                Labs  Admission on 06/06/2025   Component Date Value Ref Range Status    POCT Glucose 06/06/2025 196 (H)  70 - 110 mg/dL Final    POCT Glucose 06/06/2025 235 (H)  70 - 110 mg/dL Final    Sodium 06/07/2025 142  136 - 145 mmol/L Final    Potassium 06/07/2025 3.8  3.5 - 5.1 mmol/L Final    Chloride 06/07/2025 107  98 - 107 mmol/L Final    CO2 06/07/2025 26  23 - 31 mmol/L Final    Glucose 06/07/2025 174 (H)  82 - 115 mg/dL Final    Blood Urea Nitrogen 06/07/2025 16.2  9.8 - 20.1 mg/dL Final    Creatinine 06/07/2025 0.64  0.55 - 1.02 mg/dL Final    Calcium 06/07/2025 9.1  8.4 - 10.2 mg/dL Final    Protein Total 06/07/2025 6.6  5.8 - 7.6 gm/dL Final    Albumin 06/07/2025 2.5 (L)  3.4 - 4.8 g/dL Final    Globulin 06/07/2025 4.1 (H)  2.4 - 3.5 gm/dL Final    Albumin/Globulin Ratio 06/07/2025 0.6 (L)  1.1 - 2.0 ratio Final    Bilirubin Total 06/07/2025 0.2  <=1.5 mg/dL Final    ALP 06/07/2025 141  40 - 150 unit/L Final    ALT 06/07/2025 32  0 - 55 unit/L Final    AST 06/07/2025 21  11 - 45 unit/L Final    eGFR 06/07/2025 >60  mL/min/1.73/m2 Final    Estimated GFR calculated using the CKD-EPI creatinine (2021) equation.    Anion Gap 06/07/2025 9.0  mEq/L Final    BUN/Creatinine Ratio 06/07/2025 25   Final    Prealbumin 06/07/2025 20.6  14.0 - 37.0 mg/dL Final    Ferritin Level 06/07/2025 578.36 (H)  4.63 - 204.00 ng/mL Final    Iron Binding Capacity Unsaturated 06/07/2025 155  70 - 310 ug/dL Final    Iron Level 06/07/2025 36 (L)  50 - 170 ug/dL Final     Transferrin 06/07/2025 166 (L)  180 - 382 mg/dL Final    Iron Binding Capacity Total 06/07/2025 191 (L)  250 - 450 ug/dL Final    Iron Saturation 06/07/2025 19 (L)  20 - 50 % Final    WBC 06/07/2025 8.64  4.50 - 11.50 x10(3)/mcL Final    RBC 06/07/2025 3.71 (L)  4.20 - 5.40 x10(6)/mcL Final    Hgb 06/07/2025 11.0 (L)  12.0 - 16.0 g/dL Final    Hct 06/07/2025 33.9 (L)  37.0 - 47.0 % Final    MCV 06/07/2025 91.4  80.0 - 94.0 fL Final    MCH 06/07/2025 29.6  27.0 - 31.0 pg Final    MCHC 06/07/2025 32.4 (L)  33.0 - 36.0 g/dL Final    RDW 06/07/2025 14.5  11.5 - 17.0 % Final    Platelet 06/07/2025 794 (H)  130 - 400 x10(3)/mcL Final    MPV 06/07/2025 9.4  7.4 - 10.4 fL Final    IPF 06/07/2025 1.0  0.9 - 11.2 % Final    Neut % 06/07/2025 66.7  % Final    Lymph % 06/07/2025 20.6  % Final    Mono % 06/07/2025 10.1  % Final    Eos % 06/07/2025 1.4  % Final    Basophil % 06/07/2025 0.5  % Final    Imm Grans % 06/07/2025 0.7  % Final    Neut # 06/07/2025 5.77  2.1 - 9.2 x10(3)/mcL Final    Lymph # 06/07/2025 1.78  0.6 - 4.6 x10(3)/mcL Final    Mono # 06/07/2025 0.87  0.1 - 1.3 x10(3)/mcL Final    Eos # 06/07/2025 0.12  0 - 0.9 x10(3)/mcL Final    Baso # 06/07/2025 0.04  <=0.2 x10(3)/mcL Final    Imm Gran # 06/07/2025 0.06 (H)  0.00 - 0.04 x10(3)/mcL Final    NRBC% 06/07/2025 0.0  % Final    POCT Glucose 06/07/2025 156 (H)  70 - 110 mg/dL Final    POCT Glucose 06/07/2025 203 (H)  70 - 110 mg/dL Final     Radiology  CT head without contrast on 06/03/2025, IMPRESSION:  Significant interval reduction in volume of the subdural hematoma tracking along the left cerebral convexity status post evacuation.  Radiology   Transthoracic echo on 05/30/2025, IMPRESSION:  LVEF 55-60%.  Right ventricle is normal in size, systolic function is normal.  Normal venous pressure at 3 mm Hg.  Radiology   MRI brain without contrast on 05/29/2025, IMPRESSION:  There is no diffusion weighted restriction or signal dropout on ADC map to indicate an acute  infarct.  There is left middle cerebral artery territory encephalomalacia combined with gliotic changes related to old infarct.  Chronic microvascular ischemic changes or mild with periventricular and deep white matter T2 FLAIR hyperintense signals.  There is no acute intracranial hemorrhage, hydrocephalus, midline shift or mass effect.  Left cerebral convexity chronic subdural hematoma or hygroma is with similar appearance.  There are several left pleural base calcifications which showed gradient echo sequence dephasing artifacts.  Radiology  Carotid ultrasound on 05/29/2025:  IMPRESSION:  Right ICA is patent with less than 50% stenosis.  Left ICA is patent with less than 50% stenosis.  Bilateral vertebral arteries are patent with antegrade flow.    Assessment/Plan:     59 y.o. White admitted on 6/6/2025    Subdural hematoma   - s/p left craniotomy with hematoma evacuation on 05/19  - s/p left diony hole drainage of SDH on 06/02/2025  - repeat CT head with resolution of SDH  - continue                Keppra 500 mg b.i.d.     Major depressive disorder  - stable  - continue                Nortriptyline 25 mg at bedtime                 Paxil 20 mg daily     Generalized anxiety disorder  - stable  - continue                BuSpar 15 mg b.i.d.     HLD  - FLP outpatient  - continue                Lipitor 80 mg daily                 Zetia 10 mg daily      History of CVA  - stable  - not on ASA due to allergy  - continue                Plavix 75 mg daily                Lipitor 80 mg daily     Hypothyroidism  - TSH with next labs  - continue                Levothyroxine 88 mcg before breakfast      HTN  - BP at goal  - continue                Metoprolol succinate 12.5 mg daily                 Hydralazine 10 mg every 2 hours as needed for BP > 160/90                Labetalol 10 mg every 2 hours as needed for BP > 160/90  - low sodium diet     Iron-deficiency anemia  - asymptomatic  - iron level low  - continue                 Ferrous sulfate 325 mg daily (initiated 6/7)  - H/H stable   - no evidence of active bleeds  - will closely monitor and transfuse if needed      Thrombocytosis  - stable  - continue to monitor     Constipation  - stable   - continue  Colace 100 mg BID   Miralax 17 gm BID PRN  - encourage oral fluid hydration     DM type II  - HgA1c with next labs  - continue                Metformin 500 mg twice daily                ISS   - CBGs AC/HS     Moderate protein calorie malnutrition  - active  - encourage oral nutrition  - registered dietitian following     VTE Prophylaxis: SCDs  COVID-19 testing:  Unknown  COVID-19 vaccination status:  Vaccinated (Pfizer):  06/12/2021, 07/14/2021     POA:  No  Living will:  No  Contacts:  Roberto Chandra ([facility]  [phone]     CODE STATUS:  Full code  Internal Medicine (attending): Aldair Armenta MD  Physiatry (consulting):  Lazarus Mills MD     OUTPATIENT PROVIDERS  PCP: Jorge Silver MD   Neurosurgery: James Rankin MD  Neurology: LUIS Berumen     DISPOSITION: Condition stable.  Sleep hygiene, bowel maintenance, and appetite at goal.  Compliant with therapies.  Adequate pain control.  Last reported BM on 06/07.  Vitals stable with no recent recorded fevers.  Adequate glycemic control.  Labs reviewed.  H&H 11.0/33.9 (from 11.1/34.5).  Platelets 794 (from 767).  Iron 36 (from 32).  Iron saturation 19% (from 20%).  Albumin 2.5.  Prealbumin 20.6.  Labs otherwise unremarkable.  No new imaging.    H&H and thrombocytosis stable.  Continue oral iron replacement.  Encourage frequent pressure offloading to minimize risk of pressure ulcer formation.  Out of bed WA as tolerated.  Strongly encourage nutrition and hydration.  Registered dietician following.  Encourage compliance with hourly incentive spirometer use WA.  Supplemental oxygen as needed to maintain saturations >92%.  Continue aggressive therapies and nutritional support.  Monitor closely and notify with any acute changes.   Repeat labs on 6/9.     Total time spent on this encounter including chart review and direct 1-on-1 patient interaction: 50 minutes   Over 50% of this time was spent in counseling and coordination of care       [1]   Current Facility-Administered Medications:     acetaminophen tablet 650 mg, 650 mg, Oral, Q6H PRN, Elijah, Robin A, FNP    albuterol-ipratropium 2.5 mg-0.5 mg/3 mL nebulizer solution 3 mL, 3 mL, Nebulization, Q4H PRN, Jake Paulino MD    ALPRAZolam tablet 0.25 mg, 0.25 mg, Oral, TID PRN, Elijah, Robin A, FNP    atorvastatin tablet 80 mg, 80 mg, Oral, Daily, Elijah, Robin A, FNP, 80 mg at 06/07/25 0730    benzonatate capsule 100 mg, 100 mg, Oral, TID PRN, Elijah, Robin A, FNP    busPIRone tablet 15 mg, 15 mg, Oral, BID, Elijah, Robin A, FNP, 15 mg at 06/07/25 0729    butalbital-acetaminophen-caffeine -40 mg per tablet 1 tablet, 1 tablet, Oral, Q4H PRN, Jake Paulino MD    clopidogreL tablet 75 mg, 75 mg, Oral, Daily, Elijah, Robin A, FNP, 75 mg at 06/07/25 0730    dextrose 50% injection 12.5 g, 12.5 g, Intravenous, PRN, Elijah, Robin A, FNP    dextrose 50% injection 25 g, 25 g, Intravenous, PRN, Elijah, Robin A, FNP    docusate sodium capsule 100 mg, 100 mg, Oral, BID, Elijah, Robin A, FNP, 100 mg at 06/07/25 0730    ezetimibe tablet 10 mg, 10 mg, Oral, Daily, Elijah, Robin A, FNP, 10 mg at 06/07/25 0730    ferrous sulfate tablet 1 each, 1 tablet, Oral, Daily, Elijah, Robin A, FNP, 1 each at 06/07/25 1103    glucagon (human recombinant) injection 1 mg, 1 mg, Intramuscular, PRN, Elijah, Robin A, FNP    glucose chewable tablet 16 g, 16 g, Oral, PRN, Elijah, Robin A, FNP    glucose chewable tablet 24 g, 24 g, Oral, PRN, Robin Nava A, FNP    hydrALAZINE injection 10 mg, 10 mg, Intravenous, Q4H PRN, Robin Nava A, FNP    HYDROcodone-acetaminophen  mg per tablet 1 tablet, 1 tablet, Oral, Q4H PRN, Jake Paulino MD, 1 tablet  at 06/07/25 0825    HYDROcodone-acetaminophen 5-325 mg per tablet 1 tablet, 1 tablet, Oral, Daily PRN, Elijah, Robin A, FNP, 1 tablet at 06/06/25 1218    hydrOXYzine pamoate capsule 50 mg, 50 mg, Oral, Nightly PRN, Elijah, Robin A, FNP    insulin aspart U-100 injection 0-5 Units, 0-5 Units, Subcutaneous, QID (AC + HS) PRN, Elijah, Robin A, FNP, 2 Units at 06/07/25 1103    labetalol 20 mg/4 mL (5 mg/mL) IV syring, 10 mg, Intravenous, Q4H PRN, Elijah, Robin A, FNP    levETIRAcetam tablet 500 mg, 500 mg, Oral, BID, Elijah, Robin A, FNP, 500 mg at 06/07/25 0730    levothyroxine tablet 88 mcg, 88 mcg, Oral, Before breakfast, Elijah, Robin A, FNP, 88 mcg at 06/07/25 0600    LORazepam injection 2 mg, 2 mg, Intravenous, Q4H PRN, Elijah, Robin A, FNP    metFORMIN tablet 500 mg, 500 mg, Oral, BID WM, Elijah, Robin A, FNP, 500 mg at 06/07/25 0730    metoprolol injection 10 mg, 10 mg, Intravenous, Q2H PRN, Elijah, Robin A, FNP    metoprolol succinate (TOPROL-XL) 24 hr split tablet 12.5 mg, 12.5 mg, Oral, Daily, Elijah, Robin A, FNP, 12.5 mg at 06/07/25 0730    nitroGLYCERIN SL tablet 0.4 mg, 0.4 mg, Sublingual, Q5 Min PRN, Elijah, Robin A, FNP    nortriptyline capsule 25 mg, 25 mg, Oral, QHS, Elijah, Robin A, FNP, 25 mg at 06/06/25 2048    ondansetron disintegrating tablet 4 mg, 4 mg, Oral, Q6H PRN, Elijah, Robin A, FNP    ondansetron disintegrating tablet 8 mg, 8 mg, Oral, Q8H PRN, Elijah, Robin A, BOZENAP    ondansetron injection 4 mg, 4 mg, Intravenous, Q8H PRN, Robin Nava, BOZENAP    paroxetine tablet 20 mg, 20 mg, Oral, Daily, Robin Nava, LUIS, 20 mg at 06/07/25 0719    polyethylene glycol packet 17 g, 17 g, Oral, BID PRN, Robin Nava FNP

## 2025-06-07 NOTE — PLAN OF CARE
Problem: Rehabilitation (IRF) Plan of Care  Goal: Absence of New-Onset Illness or Injury  Outcome: Progressing  Goal: Optimal Comfort and Wellbeing  Outcome: Progressing     Problem: Wound  Goal: Optimal Coping  Outcome: Progressing  Goal: Optimal Functional Ability  Outcome: Progressing  Goal: Absence of Infection Signs and Symptoms  Outcome: Progressing  Goal: Improved Oral Intake  Outcome: Progressing  Goal: Optimal Pain Control and Function  Outcome: Progressing     Problem: Fall Injury Risk  Goal: Absence of Fall and Fall-Related Injury  Outcome: Progressing

## 2025-06-07 NOTE — PT/OT/SLP EVAL
Occupational Therapy Inpatient Rehab Evaluation    Name: Chelle Chandra  MRN: [id number]    Recommendations:     Discharge Recommendations:  Low Intensity Therapy   Discharge Equipment Recommendations: none   Barriers to discharge: None    Assessment:  Chelle Chandra is a 59 y.o. female admitted with a medical diagnosis of Acute right-sided weakness.  She presents with the following impairments/functional limitations:  impaired endurance, weakness, impaired self care skills, impaired functional mobility, gait instability, impaired balance, pain.    General Precautions: Standard, seizure, aspiration, fall     Orthopedic Precautions:N/A     Braces: N/A    Rehab Prognosis: Good; patient would benefit from acute skilled OT services to address these deficits and reach maximum level of function.      History:     Past Medical History:   Diagnosis Date    Accelerated junctional rhythm     Agatston CAC score, <100     Anxiety disorder, unspecified     Asthma     COPD type A     Diabetes mellitus without complication     GERD (gastroesophageal reflux disease)     History of COVID-19     Insomnia     Lung nodule      Past Surgical History:   Procedure Laterality Date    ANGIOGRAM, CORONARY, WITH LEFT HEART CATHETERIZATION      BACK SURGERY      BREAST LUMPECTOMY Right     CARDIOVERSION  08/01/2013    CARPAL TUNNEL RELEASE  10/23/2013    CRANIOTOMY FOR EVACUATION OF SUBDURAL HEMATOMA Left 05/19/2025    Procedure: CRANIOTOMY, FOR SUBDURAL HEMATOMA EVACUATION;  Surgeon: Ricardo Shelley MD;  Location: [facility] OR;  Service: Neurosurgery;  Laterality: Left;    CREATION OF TERRI HOLE WITH EVACUATION OF HEMATOMA Left 6/2/2025    Procedure: CREATION, CRANIAL TERRI HOLE, WITH HEMATOMA EVACUATION;  Surgeon: James Rankin MD;  Location: [facility] OR;  Service: Neurosurgery;  Laterality: Left;  LEFT BURRHOLE FOR SUBDURAL HEMATOMA EVACUATION // STEALTH // SHERRI PARTIDA // DRILL    FUSION OF CERVICAL SPINE BY ANTERIOR APPROACH USING  COMPUTER-ASSISTED NAVIGATION  06/10/2019    KIDNEY SURGERY      TONSILLECTOMY AND ADENOIDECTOMY      TOTAL ABDOMINAL HYSTERECTOMY      WRIST SURGERY       Subjective     Orientation: Oriented x4    Chief Complaint: Pain on L side of head      Patient/Family Comments/goals: To get stronger     Vitals  Vitals at Rest  /64      O2 Sat 95 %   Pain Location - Side 1: Left  Location 1: head  Pain Addressed 1: Nurse notified     Vitals With Activity  /66      O2 Sat 95 %   Pain Location - Side 1: Left  Location 1: head  Pain Addressed 1: Nurse notified     Respiratory Status: Room air    Living Environment    Patient lives with spouse in single story home, 4 steps with rail   Shower Setup: Tub/Shower combo with grab bars and HHS. Pt reports she has shower chair. Standard toilet     Prior Level of Function  BADL: Independent    IADL: Pts  manages her medications, finances, grocery shops and drives her places.     Equipment used at home: rollator, shower chair .  DME owned (not currently used): none.      Upon discharge, patient will have assistance from  and daughter.    Objective:     Patient found in bathroom with peripheral IV  upon OT entry to room.    Mobility   Patient completed:  Sit to Stand Transfer with stand by assistance with rolling walker  Stand to Sit Transfer with stand by assistance with rolling walker  Toilet Transfer Step Transfer technique with stand by assistance with  rolling walker  Tub Transfer Step Transfer technique with contact guard assistance with rolling walker    Functional Mobility   Pt performed short FM in room to perform ADLs with SBA with RW    ADLs     Current Status   Eating 6   Oral Hygiene 4   Shower, Bathe Self 3 Min assist    Upper Body Dressing 3 Assistance to pull shirt over head due to pain on L side of head    Lower Body Dressing 4 SBA    Toileting Hygiene 4 SBA for safety when managing clothing    Toilet Transfer 4 SBA with RW   Putting On,  Taking Off Footwear 3 Assistance to tie shoes      Limiting Factors for ADLs: motor, endurance, limited ROM, balance, weakness, and pain    Exams     ROM:          -       WFL    Hand Dominance: Right    ROM Hand  Left Hand: WFL  Right Hand: WFL    Strength  Overall Strength:          -       WFL    Coordination:      -       Intact    Visual/Perceptual  Wears glasses     Cognition:   WFL, except pt reports some memory issues since previous stroke June 2024     Balance    Sitting  Sitting Surface: TTB  Static: No UE Support, Good  Dynamic: No UE extremity support, Good    Standing  Static: No UE Support, Good (-)  Dynamic: No UE extremity support, Good (-)    Patient left up in chair with all lines intact and call button in reach.    Education provided: Roles and goals of OT, ADLs, transfer training, modified goals, sequencing, safety precautions, fall prevention, and home safety    Multidisciplinary Problems       Occupational Therapy Goals          Problem: Occupational Therapy    Goal Priority Disciplines Outcome Interventions   Occupational Therapy Goal     OT, PT/OT     Description: ADLs:  Pt to perform grooming tasks with independence standing at sink with RW by discharge  Pt to perform UB dressing with independence by discharge.   Pt to perform LB dressing with independence by discharge.  Pt to perform putting on/off footwear task with independence by discharge.   Pt to perform toileting with independence by d/c.   Pt to perform bathing with independence by d/c.    Functional Transfers:  Pt to perform toilet transfers with independence by d/c.     Pt to perform a tub transfer with independence by d/c.     IADLs:  Pt to perform simple meal prep with independence by d/c.      Balance, Strengthening, Endurance, Balance:  Pt to demonstrate good dynamic standing balance as required to perform ADL's from standing level.  Pt to demonstrate good BUE strength during functional task   Pt to demonstrate consistent  adherence to breathing control and energy conservation techniques as educated by OT.                      Plan     During this hospitalization, patient to be seen 5 x/week (5-6x) to address the identified rehab impairments via self-care/home management, therapeutic activities, therapeutic exercises and progress toward the following goals:    Plan of Care Expires:  06/13/25    Time Tracking     OT Received On: 06/07/25  Time In 0730     Time Out 0830  Total Time 60 min  Therapy Time: OT Individual: 60  Missed Time:    Missed Time Reason:      Billable Minutes: Evaluation 30 and Self Care/Home Management 30    06/07/2025

## 2025-06-07 NOTE — PLAN OF CARE
Problem: SLP  Goal: SLP Goal  Description: LTG: The pt will tolerate least restrictive diet with no overt s/sx of aspiration.    STGs:  The pt will complete tongue base/laryngeal elevation exercises with occ cues.  The pt will tolerate 4 oz of mildly liquids via cup with no overt s/sx of aspiration.  The pt will tolerate half meal of soft and bite-sized solids with no overt s/sx of aspiration.    Outcome: Progressing

## 2025-06-07 NOTE — PT/OT/SLP EVAL
[facility] - Rehabilitation Unit  Speech Language Pathology Department  Clinical Swallow Evaluation    Patient Name:  Chelle Chandra   MRN:  [id number]    Recommendations     General recommendations:  repeat Modified Barium Swallow Study as clinically appropriate and dysphagia therapy  Solid texture recommendation:  Minced & Moist Diet - IDDSI Level 5  Liquid consistency recommendation: Moderately thick/Honey thick liquids - IDDSI Level 3   Medications: crushed in puree  Swallow strategies/precautions: small bites/sips, slow rate, and alternate solids/liquids  Precautions: seizure, aspiration, fall    History     Chelle Chandra is a/n 59 y.o. female admitted to [facility] for R sided weakness, facial droop, and slurred speech. Multiple neuro changes throughout stay. Patient with recent admission SDH s/p crani and MMA embolization.     [facility] 6/4: diet upgrade from NPO to minced and moist solids and moderately thick liquids.     Patient with Terri hole surgery 6/2 with drainage. Patient continues to present with weakness, however significantly improved.  Transferred to [facility] on 6/6 for rehabilitation.     Past Medical History:   Diagnosis Date    Accelerated junctional rhythm     Agatston CAC score, <100     Anxiety disorder, unspecified     Asthma     COPD type A     Diabetes mellitus without complication     GERD (gastroesophageal reflux disease)     History of COVID-19     Insomnia     Lung nodule      Past Surgical History:   Procedure Laterality Date    ANGIOGRAM, CORONARY, WITH LEFT HEART CATHETERIZATION      BACK SURGERY      BREAST LUMPECTOMY Right     CARDIOVERSION  08/01/2013    CARPAL TUNNEL RELEASE  10/23/2013    CRANIOTOMY FOR EVACUATION OF SUBDURAL HEMATOMA Left 05/19/2025    Procedure: CRANIOTOMY, FOR SUBDURAL HEMATOMA EVACUATION;  Surgeon: Ricardo Shelley MD;  Location: [facility];  Service: Neurosurgery;  Laterality: Left;    CREATION OF TERRI HOLE WITH EVACUATION OF  HEMATOMA Left 6/2/2025    Procedure: CREATION, CRANIAL TERRI HOLE, WITH HEMATOMA EVACUATION;  Surgeon: James Rankin MD;  Location: [facility] OR;  Service: Neurosurgery;  Laterality: Left;  LEFT BURRHOLE FOR SUBDURAL HEMATOMA EVACUATION // STEALTH // SHERRI SKYTRON // DRILL    FUSION OF CERVICAL SPINE BY ANTERIOR APPROACH USING COMPUTER-ASSISTED NAVIGATION  06/10/2019    KIDNEY SURGERY      TONSILLECTOMY AND ADENOIDECTOMY      TOTAL ABDOMINAL HYSTERECTOMY      WRIST SURGERY         Home diet texture/consistency: Regular and thin liquids  Current method of nutrition: PO diet (minced and moist solids, moderately thickened liquids, meds crushed in puree)    Imaging   Results for orders placed during the hospital encounter of 05/29/25    X-Ray Chest 1 View    Narrative  EXAMINATION:  XR CHEST 1 VIEW    CLINICAL HISTORY:  aspiration suspected;    TECHNIQUE:  Single frontal view of the chest was performed.    COMPARISON:  05/30/2025    FINDINGS:  LINES AND TUBES: None    MEDIASTINUM AND RUTH: The cardiac silhouette is normal.    LUNGS: No lobar consolidation. No edema.    PLEURA:No pleural effusion. No pneumothorax.    OTHER: Postop ACDF.    Impression  No acute cardiopulmonary abnormality.      Electronically signed by: Kristyn Guzman  Date:    05/30/2025  Time:    14:33    No results found for this or any previous visit.    Results for orders placed during the hospital encounter of 07/01/24    MRI Brain Without Contrast    Narrative  EXAMINATION:  MRI BRAIN WITHOUT CONTRAST    CLINICAL HISTORY:  dizziness, off-balance, r/o cva, recent history of cva 1 month ago;    TECHNIQUE:  Multiplanar MRI sequences were performed of the brain without contrast.    COMPARISON:  MRI brain June 2, 2024    FINDINGS:  There are extensive left middle cerebral artery territory frontoparietal lobes and temporooccipital lobes as well as basal ganglia subacute nonhemorrhagic infarcts.  Infarct shows less dense brightness on diffusion-weighted  sequence and now is not associated with signal dropout on the ADC map.  Brain edematous changes and sulci effacement is slightly less evident.  There is local mass effect without midline shift.  No hydrocephalus.  There is no new infratentorial or supratentorial brain infarct.  Gradient echo sequence are without altered signal to reflect a previous hemorrhagic byproduct.  Sella and the suprasellar areas are unremarkable.    The cerebellar tonsils are normally positioned.  No acute extra axial fluid collections identified. The mastoid air cells are clear.    Impression  1.  Left cerebral hemisphere extensive subacute infarct without hemorrhagic transformation.    2.  No new findings.      Electronically signed by: Adam Sears  Date:    07/02/2024  Time:    11:36    Subjective     Patient awake, calm, and cooperative.    Patient goals: to eat regular food   Spiritual/Cultural/Zoroastrianism Beliefs/Practices that affect care: no    Pain/Comfort: Pain Rating 1: 0/10  Respiratory Status:  room air    Objective     ORAL MUSCULATURE  Dentition: own teeth and upper dentures  Secretion Management: adequate  Mucosal Quality: good  Facial Movement: WFL  Buccal Strength & Mobility: WFL  Mandibular Strength & Mobility: WFL  Oral Labial Strength & Mobility: WFL  Lingual Strength & Mobility: WFL  Vocal Quality: adequate    PO TRIALS  Consistency Fed By Oral Symptoms Pharyngeal Symptoms   Mildly thick liquid by cup Self None Throat clear following swallow   Moderately thick liquid by cup Self None None   Moderately thick liquid by straw Self None None   Puree Self None None   Minced & Moist solid SLP None None   Chewable solid SLP Prolonged bolus formation/mastication Reduced laryngeal movement to palpation     Assessment     Patient presents with oropharyngeal phase dysphagia warranting dysphagia intervention and modified diet to reduce aspiration risk and return to PLOF.     Outcome Measures     Functional Oral Intake Scale: 5 -  Total oral diet with multiple consistencies, by requiring special preparation or compensations     Goals     Multidisciplinary Problems       SLP Goals          Problem: SLP    Goal Priority Disciplines Outcome   SLP Goal     SLP Progressing   Description: LTG: The pt will tolerate least restrictive diet with no overt s/sx of aspiration.    STGs:  The pt will complete tongue base/laryngeal elevation exercises with occ cues.  The pt will tolerate 4 oz of mildly liquids via cup with no overt s/sx of aspiration.  The pt will tolerate half meal of soft and bite-sized solids with no overt s/sx of aspiration.                       Education     Patient provided with verbal education regarding swallow function, risks of aspiration, diet modifications, swallow strategies, and SLP POC.  Understanding was verbalized.    Plan     SLP Follow-Up:  Yes   Patient to be seen:  5 x/week   Plan of Care expires:  06/14/25  Plan of Care reviewed with:  patient     Time Tracking     SLP Treatment Date:   06/07/25  Speech Start Time:  0830  Speech Stop Time:  0900     Speech Total Time (min):  30 min    Billable minutes:  Swallow and Oral Function Evaluation, 30 minutes     06/07/2025

## 2025-06-08 LAB
POCT GLUCOSE: 118 MG/DL (ref 70–110)
POCT GLUCOSE: 131 MG/DL (ref 70–110)
POCT GLUCOSE: 140 MG/DL (ref 70–110)
POCT GLUCOSE: 180 MG/DL (ref 70–110)

## 2025-06-08 PROCEDURE — 94761 N-INVAS EAR/PLS OXIMETRY MLT: CPT

## 2025-06-08 PROCEDURE — 99900031 HC PATIENT EDUCATION (STAT)

## 2025-06-08 PROCEDURE — 11800000 HC REHAB PRIVATE ROOM

## 2025-06-08 PROCEDURE — 25000003 PHARM REV CODE 250: Performed by: NURSE PRACTITIONER

## 2025-06-08 PROCEDURE — 25000003 PHARM REV CODE 250: Performed by: INTERNAL MEDICINE

## 2025-06-08 PROCEDURE — 94799 UNLISTED PULMONARY SVC/PX: CPT

## 2025-06-08 RX ORDER — LEVETIRACETAM 100 MG/ML
500 SOLUTION ORAL 2 TIMES DAILY
Status: DISCONTINUED | OUTPATIENT
Start: 2025-06-08 | End: 2025-06-14 | Stop reason: HOSPADM

## 2025-06-08 RX ADMIN — LEVETIRACETAM 500 MG: 100 SOLUTION ORAL at 07:06

## 2025-06-08 RX ADMIN — BUSPIRONE HYDROCHLORIDE 15 MG: 10 TABLET ORAL at 08:06

## 2025-06-08 RX ADMIN — HYDROCODONE BITARTRATE AND ACETAMINOPHEN 1 TABLET: 10; 325 TABLET ORAL at 08:06

## 2025-06-08 RX ADMIN — BUSPIRONE HYDROCHLORIDE 15 MG: 10 TABLET ORAL at 07:06

## 2025-06-08 RX ADMIN — DOCUSATE SODIUM 100 MG: 100 CAPSULE, LIQUID FILLED ORAL at 07:06

## 2025-06-08 RX ADMIN — EZETIMIBE 10 MG: 10 TABLET ORAL at 07:06

## 2025-06-08 RX ADMIN — METFORMIN HYDROCHLORIDE 500 MG: 500 TABLET, FILM COATED ORAL at 07:06

## 2025-06-08 RX ADMIN — LEVETIRACETAM 500 MG: 100 SOLUTION ORAL at 08:06

## 2025-06-08 RX ADMIN — ATORVASTATIN CALCIUM 80 MG: 40 TABLET, FILM COATED ORAL at 07:06

## 2025-06-08 RX ADMIN — PAROXETINE HYDROCHLORIDE 20 MG: 20 TABLET, FILM COATED ORAL at 07:06

## 2025-06-08 RX ADMIN — FERROUS SULFATE TAB 325 MG (65 MG ELEMENTAL FE) 1 EACH: 325 (65 FE) TAB at 07:06

## 2025-06-08 RX ADMIN — NORTRIPTYLINE HYDROCHLORIDE 25 MG: 25 CAPSULE ORAL at 08:06

## 2025-06-08 RX ADMIN — HYDROCODONE BITARTRATE AND ACETAMINOPHEN 1 TABLET: 10; 325 TABLET ORAL at 06:06

## 2025-06-08 RX ADMIN — HYDROCODONE BITARTRATE AND ACETAMINOPHEN 1 TABLET: 10; 325 TABLET ORAL at 02:06

## 2025-06-08 RX ADMIN — ACETAMINOPHEN 650 MG: 325 TABLET ORAL at 08:06

## 2025-06-08 RX ADMIN — LEVOTHYROXINE SODIUM 88 MCG: 0.09 TABLET ORAL at 05:06

## 2025-06-08 RX ADMIN — METFORMIN HYDROCHLORIDE 500 MG: 500 TABLET, FILM COATED ORAL at 04:06

## 2025-06-08 RX ADMIN — METOPROLOL SUCCINATE 12.5 MG: 25 TABLET, EXTENDED RELEASE ORAL at 07:06

## 2025-06-08 RX ADMIN — CLOPIDOGREL BISULFATE 75 MG: 75 TABLET, FILM COATED ORAL at 07:06

## 2025-06-08 RX ADMIN — DOCUSATE SODIUM 100 MG: 100 CAPSULE, LIQUID FILLED ORAL at 08:06

## 2025-06-08 NOTE — PLAN OF CARE
Problem: Diabetes Comorbidity  Goal: Blood Glucose Level Within Targeted Range  Outcome: Progressing     Problem: Rehabilitation (IRF) Plan of Care  Goal: Plan of Care Review  Outcome: Progressing     Problem: Rehabilitation (IRF) Plan of Care  Goal: Patient-Specific Goal (Individualized)  Outcome: Progressing     Problem: Rehabilitation (IRF) Plan of Care  Goal: Absence of New-Onset Illness or Injury  Outcome: Progressing     Problem: Rehabilitation (IRF) Plan of Care  Goal: Optimal Comfort and Wellbeing  Outcome: Progressing     Problem: Wound  Goal: Optimal Coping  Outcome: Progressing     Problem: Wound  Goal: Optimal Functional Ability  Outcome: Progressing     Problem: Wound  Goal: Absence of Infection Signs and Symptoms  Outcome: Progressing     Problem: Wound  Goal: Improved Oral Intake  Outcome: Progressing     Problem: Wound  Goal: Optimal Pain Control and Function  Outcome: Progressing     Problem: Wound  Goal: Skin Health and Integrity  Outcome: Progressing     Problem: Wound  Goal: Optimal Wound Healing  Outcome: Progressing     Problem: Fall Injury Risk  Goal: Absence of Fall and Fall-Related Injury  Outcome: Progressing

## 2025-06-09 LAB
ALBUMIN SERPL-MCNC: 2.7 G/DL (ref 3.4–4.8)
ALBUMIN/GLOB SERPL: 0.6 RATIO (ref 1.1–2)
ALP SERPL-CCNC: 139 UNIT/L (ref 40–150)
ALT SERPL-CCNC: 26 UNIT/L (ref 0–55)
ANION GAP SERPL CALC-SCNC: 10 MEQ/L
AST SERPL-CCNC: 14 UNIT/L (ref 11–45)
BASOPHILS # BLD AUTO: 0.03 X10(3)/MCL
BASOPHILS NFR BLD AUTO: 0.5 %
BILIRUB SERPL-MCNC: 0.4 MG/DL
BUN SERPL-MCNC: 12 MG/DL (ref 9.8–20.1)
CALCIUM SERPL-MCNC: 9.3 MG/DL (ref 8.4–10.2)
CHLORIDE SERPL-SCNC: 104 MMOL/L (ref 98–107)
CO2 SERPL-SCNC: 28 MMOL/L (ref 23–31)
CREAT SERPL-MCNC: 0.65 MG/DL (ref 0.55–1.02)
CREAT/UREA NIT SERPL: 18
EOSINOPHIL # BLD AUTO: 0.18 X10(3)/MCL (ref 0–0.9)
EOSINOPHIL NFR BLD AUTO: 2.9 %
ERYTHROCYTE [DISTWIDTH] IN BLOOD BY AUTOMATED COUNT: 14.3 % (ref 11.5–17)
GFR SERPLBLD CREATININE-BSD FMLA CKD-EPI: >60 ML/MIN/1.73/M2
GLOBULIN SER-MCNC: 4.3 GM/DL (ref 2.4–3.5)
GLUCOSE SERPL-MCNC: 170 MG/DL (ref 82–115)
HCT VFR BLD AUTO: 35.3 % (ref 37–47)
HGB BLD-MCNC: 11.1 G/DL (ref 12–16)
IMM GRANULOCYTES # BLD AUTO: 0.03 X10(3)/MCL (ref 0–0.04)
IMM GRANULOCYTES NFR BLD AUTO: 0.5 %
LYMPHOCYTES # BLD AUTO: 2.33 X10(3)/MCL (ref 0.6–4.6)
LYMPHOCYTES NFR BLD AUTO: 37.4 %
MAGNESIUM SERPL-MCNC: 1.7 MG/DL (ref 1.6–2.6)
MCH RBC QN AUTO: 29.4 PG (ref 27–31)
MCHC RBC AUTO-ENTMCNC: 31.4 G/DL (ref 33–36)
MCV RBC AUTO: 93.4 FL (ref 80–94)
MONOCYTES # BLD AUTO: 0.61 X10(3)/MCL (ref 0.1–1.3)
MONOCYTES NFR BLD AUTO: 9.8 %
NEUTROPHILS # BLD AUTO: 3.05 X10(3)/MCL (ref 2.1–9.2)
NEUTROPHILS NFR BLD AUTO: 48.9 %
NRBC BLD AUTO-RTO: 0 %
PHOSPHATE SERPL-MCNC: 3.7 MG/DL (ref 2.3–4.7)
PLATELET # BLD AUTO: 667 X10(3)/MCL (ref 130–400)
PMV BLD AUTO: 9.3 FL (ref 7.4–10.4)
POCT GLUCOSE: 160 MG/DL (ref 70–110)
POCT GLUCOSE: 166 MG/DL (ref 70–110)
POTASSIUM SERPL-SCNC: 3.7 MMOL/L (ref 3.5–5.1)
PREALB SERPL-MCNC: 20.9 MG/DL (ref 14–37)
PROT SERPL-MCNC: 7 GM/DL (ref 5.8–7.6)
RBC # BLD AUTO: 3.78 X10(6)/MCL (ref 4.2–5.4)
SODIUM SERPL-SCNC: 142 MMOL/L (ref 136–145)
WBC # BLD AUTO: 6.23 X10(3)/MCL (ref 4.5–11.5)

## 2025-06-09 PROCEDURE — 11800000 HC REHAB PRIVATE ROOM

## 2025-06-09 PROCEDURE — 25000003 PHARM REV CODE 250: Performed by: NURSE PRACTITIONER

## 2025-06-09 PROCEDURE — 84134 ASSAY OF PREALBUMIN: CPT | Performed by: NURSE PRACTITIONER

## 2025-06-09 PROCEDURE — 25000003 PHARM REV CODE 250: Performed by: INTERNAL MEDICINE

## 2025-06-09 PROCEDURE — 97110 THERAPEUTIC EXERCISES: CPT

## 2025-06-09 PROCEDURE — 99900031 HC PATIENT EDUCATION (STAT)

## 2025-06-09 PROCEDURE — 97530 THERAPEUTIC ACTIVITIES: CPT | Mod: CQ

## 2025-06-09 PROCEDURE — 84100 ASSAY OF PHOSPHORUS: CPT | Performed by: NURSE PRACTITIONER

## 2025-06-09 PROCEDURE — 92526 ORAL FUNCTION THERAPY: CPT

## 2025-06-09 PROCEDURE — 94799 UNLISTED PULMONARY SVC/PX: CPT

## 2025-06-09 PROCEDURE — 97116 GAIT TRAINING THERAPY: CPT | Mod: CQ

## 2025-06-09 PROCEDURE — 83735 ASSAY OF MAGNESIUM: CPT | Performed by: NURSE PRACTITIONER

## 2025-06-09 PROCEDURE — 36415 COLL VENOUS BLD VENIPUNCTURE: CPT | Performed by: NURSE PRACTITIONER

## 2025-06-09 PROCEDURE — 94761 N-INVAS EAR/PLS OXIMETRY MLT: CPT

## 2025-06-09 PROCEDURE — 99233 SBSQ HOSP IP/OBS HIGH 50: CPT | Mod: ,,, | Performed by: NURSE PRACTITIONER

## 2025-06-09 PROCEDURE — 80053 COMPREHEN METABOLIC PANEL: CPT | Performed by: NURSE PRACTITIONER

## 2025-06-09 PROCEDURE — 85025 COMPLETE CBC W/AUTO DIFF WBC: CPT | Performed by: NURSE PRACTITIONER

## 2025-06-09 PROCEDURE — 97530 THERAPEUTIC ACTIVITIES: CPT

## 2025-06-09 RX ORDER — HYDRALAZINE HYDROCHLORIDE 20 MG/ML
10 INJECTION INTRAMUSCULAR; INTRAVENOUS EVERY 4 HOURS PRN
Status: DISCONTINUED | OUTPATIENT
Start: 2025-06-09 | End: 2025-06-14 | Stop reason: HOSPADM

## 2025-06-09 RX ORDER — LABETALOL HCL 20 MG/4 ML
10 SYRINGE (ML) INTRAVENOUS EVERY 4 HOURS PRN
Status: DISCONTINUED | OUTPATIENT
Start: 2025-06-09 | End: 2025-06-14 | Stop reason: HOSPADM

## 2025-06-09 RX ORDER — METOPROLOL TARTRATE 25 MG/1
12.5 TABLET ORAL ONCE
Status: COMPLETED | OUTPATIENT
Start: 2025-06-09 | End: 2025-06-09

## 2025-06-09 RX ORDER — METOPROLOL SUCCINATE 25 MG/1
25 TABLET, EXTENDED RELEASE ORAL DAILY
Status: DISCONTINUED | OUTPATIENT
Start: 2025-06-10 | End: 2025-06-10

## 2025-06-09 RX ADMIN — LEVETIRACETAM 500 MG: 100 SOLUTION ORAL at 07:06

## 2025-06-09 RX ADMIN — HYDROCODONE BITARTRATE AND ACETAMINOPHEN 1 TABLET: 10; 325 TABLET ORAL at 01:06

## 2025-06-09 RX ADMIN — NORTRIPTYLINE HYDROCHLORIDE 25 MG: 25 CAPSULE ORAL at 08:06

## 2025-06-09 RX ADMIN — ATORVASTATIN CALCIUM 80 MG: 40 TABLET, FILM COATED ORAL at 07:06

## 2025-06-09 RX ADMIN — BUSPIRONE HYDROCHLORIDE 15 MG: 10 TABLET ORAL at 07:06

## 2025-06-09 RX ADMIN — LEVETIRACETAM 500 MG: 100 SOLUTION ORAL at 08:06

## 2025-06-09 RX ADMIN — BUTALBITAL, ACETAMINOPHEN, AND CAFFEINE 1 TABLET: 325; 50; 40 TABLET ORAL at 08:06

## 2025-06-09 RX ADMIN — METOPROLOL SUCCINATE 12.5 MG: 25 TABLET, EXTENDED RELEASE ORAL at 07:06

## 2025-06-09 RX ADMIN — DOCUSATE SODIUM 100 MG: 100 CAPSULE, LIQUID FILLED ORAL at 08:06

## 2025-06-09 RX ADMIN — BUSPIRONE HYDROCHLORIDE 15 MG: 10 TABLET ORAL at 08:06

## 2025-06-09 RX ADMIN — ACETAMINOPHEN 650 MG: 325 TABLET ORAL at 07:06

## 2025-06-09 RX ADMIN — EZETIMIBE 10 MG: 10 TABLET ORAL at 07:06

## 2025-06-09 RX ADMIN — POLYETHYLENE GLYCOL 3350 17 G: 17 POWDER, FOR SOLUTION ORAL at 01:06

## 2025-06-09 RX ADMIN — LEVOTHYROXINE SODIUM 88 MCG: 0.09 TABLET ORAL at 05:06

## 2025-06-09 RX ADMIN — CLOPIDOGREL BISULFATE 75 MG: 75 TABLET, FILM COATED ORAL at 07:06

## 2025-06-09 RX ADMIN — HYDROCODONE BITARTRATE AND ACETAMINOPHEN 1 TABLET: 10; 325 TABLET ORAL at 11:06

## 2025-06-09 RX ADMIN — BUTALBITAL, ACETAMINOPHEN, AND CAFFEINE 1 TABLET: 325; 50; 40 TABLET ORAL at 02:06

## 2025-06-09 RX ADMIN — METFORMIN HYDROCHLORIDE 500 MG: 500 TABLET, FILM COATED ORAL at 04:06

## 2025-06-09 RX ADMIN — METFORMIN HYDROCHLORIDE 500 MG: 500 TABLET, FILM COATED ORAL at 07:06

## 2025-06-09 RX ADMIN — FERROUS SULFATE TAB 325 MG (65 MG ELEMENTAL FE) 1 EACH: 325 (65 FE) TAB at 07:06

## 2025-06-09 RX ADMIN — PAROXETINE HYDROCHLORIDE 20 MG: 20 TABLET, FILM COATED ORAL at 07:06

## 2025-06-09 RX ADMIN — METOPROLOL TARTRATE 12.5 MG: 25 TABLET, FILM COATED ORAL at 09:06

## 2025-06-09 NOTE — PROGRESS NOTES
[facility] ([facility])  Rehab Progress Note    Patient Name: Chelle Chandra  MRN: [id number]  Age: 59 y.o. Sex: female  : 1965  Hospital Length of Stay: 3 days  Date of Service: 2025   Chief Complaint: Subdural hematoma s/p left craniotomy with hematoma evacuation on , s/p left diony hole drainage of SDH on 2025     Subjective:     Basic Information  Admit Information: 59-year-old white female presented to [facility] ED on 05/15/2025 after multiple falls.  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  JOHNATHAN drain removed on .  Discharge home with home health on ... Presented to [facility] ED on  with headache, slurred speech, facial droop, and right-sided weakness.  CTA head and neck significant for suspected diminished flow inferior division of left MCA close to bifurcation.  CT head significant cord left cerebral convexity hypodense subacute hematoma and improved postsurgical pneumocephalus.  Transferred to [facility] for neurology/neurosurgery consultation.  Not a candidate for TNK due to recent intracranial hemorrhage.  Neurology recommended holding Plavix in initiating Keppra 500 mg b.i.d..  EKG noted for evidence of any epileptiform discharges.  MRI brain with no evidence of new infarct.  MBS completed.  Speech 30 recommended moderately thickened liquids due to high risk of aspiration.  Neurosurgery evaluated him and recommended for drainage.  Tolerated left diony hole for drainage of SDH due to increased intracranial pressure with mass effect on  without perioperative complications.  Repeat CT head significant for removal of SDH.  Reported increase strength to right arm.  Headache improved.  Recommended high-intensity therapy.  Speech therapy recommended minced and  "moist diet with moderately thickened liquids.  Plavix resumed per Neurosurgery recommendations on 06/05.  Tolerated transfer to [facility] inpatient rehab unit on 06/06 without incident.  Today's Information: No acute events overnight.  Sitting up in bed.  Reports good sleep and appetite.  Last BM 6/7.  Mild tachycardia reported today.  Good glycemic control.  Lab work stable.  Thrombocytosis resolving.  CMP unremarkable.  No new imaging today.    Review of patient's allergies indicates:   Allergen Reactions    Aspirin     Ketorolac     Penicillins     Sulfa (sulfonamide antibiotics)     Ozempic [semaglutide] Other (See Comments)     Abdominal pain      Current Medications[1]     Review of Systems   Complete 12-point review of symptoms negative except for what's mentioned in HPI     Objective:     /67   Pulse 99   Temp 97.7 °F (36.5 °C) (Oral)   Resp 18   Ht 5' 6" (1.676 m)   Wt 46.9 kg (103 lb 6.3 oz)   SpO2 96%   BMI 16.69 kg/m²      Physical Exam  Constitutional:       General: She is not in acute distress.     Appearance: She is underweight.   HENT:      Head:      Comments: Left cranial incision sites clean intact  Eyes:      Pupils: Pupils are equal, round, and reactive to light.      Comments: Wearing corrective lenses   Cardiovascular:      Rate and Rhythm: Regular rhythm. Tachycardia present.   Pulmonary:      Effort: No respiratory distress.      Breath sounds: No wheezing or rhonchi.   Abdominal:      General: Bowel sounds are normal.      Palpations: Abdomen is soft.      Tenderness: There is no abdominal tenderness. There is no guarding.   Musculoskeletal:         General: No deformity.      Cervical back: Neck supple.      Right lower leg: No edema.      Left lower leg: No edema.      Comments: Diffuse muscle atrophy with right sided weakness   Skin:     General: Skin is warm.   Neurological:      Motor: Weakness present.   Psychiatric:         Mood and Affect: Mood normal.         Behavior: " Behavior normal.         Thought Content: Thought content normal.         Judgment: Judgment normal.     *MD performed and documented physical examination       Lines/Drains/Airways       Peripheral Intravenous Line  Duration                  Peripheral IV - Single Lumen 06/03/25 1615 20 G Anterior;Proximal;Right Antecubital 5 days                Labs  Recent Results (from the past 24 hours)   POCT glucose    Collection Time: 06/08/25  4:23 PM   Result Value Ref Range    POCT Glucose 118 (H) 70 - 110 mg/dL   POCT glucose    Collection Time: 06/08/25  8:27 PM   Result Value Ref Range    POCT Glucose 180 (H) 70 - 110 mg/dL   POCT glucose    Collection Time: 06/09/25  5:56 AM   Result Value Ref Range    POCT Glucose 166 (H) 70 - 110 mg/dL   Comprehensive Metabolic Panel    Collection Time: 06/09/25  6:10 AM   Result Value Ref Range    Sodium 142 136 - 145 mmol/L    Potassium 3.7 3.5 - 5.1 mmol/L    Chloride 104 98 - 107 mmol/L    CO2 28 23 - 31 mmol/L    Glucose 170 (H) 82 - 115 mg/dL    Blood Urea Nitrogen 12.0 9.8 - 20.1 mg/dL    Creatinine 0.65 0.55 - 1.02 mg/dL    Calcium 9.3 8.4 - 10.2 mg/dL    Protein Total 7.0 5.8 - 7.6 gm/dL    Albumin 2.7 (L) 3.4 - 4.8 g/dL    Globulin 4.3 (H) 2.4 - 3.5 gm/dL    Albumin/Globulin Ratio 0.6 (L) 1.1 - 2.0 ratio    Bilirubin Total 0.4 <=1.5 mg/dL     40 - 150 unit/L    ALT 26 0 - 55 unit/L    AST 14 11 - 45 unit/L    eGFR >60 mL/min/1.73/m2    Anion Gap 10.0 mEq/L    BUN/Creatinine Ratio 18    Prealbumin    Collection Time: 06/09/25  6:10 AM   Result Value Ref Range    Prealbumin 20.9 14.0 - 37.0 mg/dL   Magnesium    Collection Time: 06/09/25  6:10 AM   Result Value Ref Range    Magnesium Level 1.70 1.60 - 2.60 mg/dL   Phosphorus    Collection Time: 06/09/25  6:10 AM   Result Value Ref Range    Phosphorus Level 3.7 2.3 - 4.7 mg/dL   CBC with Differential    Collection Time: 06/09/25  6:10 AM   Result Value Ref Range    WBC 6.23 4.50 - 11.50 x10(3)/mcL    RBC 3.78 (L) 4.20 -  5.40 x10(6)/mcL    Hgb 11.1 (L) 12.0 - 16.0 g/dL    Hct 35.3 (L) 37.0 - 47.0 %    MCV 93.4 80.0 - 94.0 fL    MCH 29.4 27.0 - 31.0 pg    MCHC 31.4 (L) 33.0 - 36.0 g/dL    RDW 14.3 11.5 - 17.0 %    Platelet 667 (H) 130 - 400 x10(3)/mcL    MPV 9.3 7.4 - 10.4 fL    Neut % 48.9 %    Lymph % 37.4 %    Mono % 9.8 %    Eos % 2.9 %    Basophil % 0.5 %    Imm Grans % 0.5 %    Neut # 3.05 2.1 - 9.2 x10(3)/mcL    Lymph # 2.33 0.6 - 4.6 x10(3)/mcL    Mono # 0.61 0.1 - 1.3 x10(3)/mcL    Eos # 0.18 0 - 0.9 x10(3)/mcL    Baso # 0.03 <=0.2 x10(3)/mcL    Imm Gran # 0.03 0.00 - 0.04 x10(3)/mcL    NRBC% 0.0 %     Radiology  CT head without contrast on 06/03/2025, IMPRESSION:  Significant interval reduction in volume of the subdural hematoma tracking along the left cerebral convexity status post evacuation.  Radiology   Transthoracic echo on 05/30/2025, IMPRESSION:  LVEF 55-60%.  Right ventricle is normal in size, systolic function is normal.  Normal venous pressure at 3 mm Hg.  Radiology   MRI brain without contrast on 05/29/2025, IMPRESSION:  There is no diffusion weighted restriction or signal dropout on ADC map to indicate an acute infarct.  There is left middle cerebral artery territory encephalomalacia combined with gliotic changes related to old infarct.  Chronic microvascular ischemic changes or mild with periventricular and deep white matter T2 FLAIR hyperintense signals.  There is no acute intracranial hemorrhage, hydrocephalus, midline shift or mass effect.  Left cerebral convexity chronic subdural hematoma or hygroma is with similar appearance.  There are several left pleural base calcifications which showed gradient echo sequence dephasing artifacts.  Radiology  Carotid ultrasound on 05/29/2025:  IMPRESSION:  Right ICA is patent with less than 50% stenosis.  Left ICA is patent with less than 50% stenosis.  Bilateral vertebral arteries are patent with antegrade flow.    Assessment/Plan:     59 y.o. WF admitted on  6/6/2025    Subdural hematoma   - s/p left craniotomy with hematoma evacuation on 05/19  - s/p left diony hole drainage of SDH on 06/02/2025  - repeat CT head with resolution of SDH  - sutures removed on 06/09  - continue                Keppra 500 mg b.i.d.     Major depressive disorder  - stable  - continue                Nortriptyline 25 mg at bedtime                 Paxil 20 mg daily     Generalized anxiety disorder  - stable  - continue                BuSpar 15 mg b.i.d.     HLD  - FLP outpatient  - continue                Lipitor 80 mg daily                 Zetia 10 mg daily      History of CVA  - stable  - not on ASA due to allergy  - continue                Plavix 75 mg daily                Lipitor 80 mg daily     Hypothyroidism  - TSH with next labs  - continue                Levothyroxine 88 mcg before breakfast      HTN  - BP at goal  - continue                Metoprolol succinate 25 mg daily (increased 6/9)                Hydralazine 10 mg every 2 hours as needed for BP > 160/90                Labetalol 10 mg every 2 hours as needed for BP > 160/90  - low sodium diet    Tachycardia  - active  - continue   Metoprolol succinate 25 mg daily (increased 6/9)     Iron-deficiency anemia  - asymptomatic  - iron level low  - continue                Ferrous sulfate 325 mg daily (initiated 6/7)  - H/H stable   - no evidence of active bleeds  - will closely monitor and transfuse if needed      Thrombocytosis  - stable  - continue to monitor     Constipation  - stable   - continue  Colace 100 mg BID   Miralax 17 gm BID PRN  - encourage oral fluid hydration     DM type II  - HgA1c with next labs  - continue                Metformin 500 mg twice daily                ISS   - CBGs AC/HS     Moderate protein calorie malnutrition  - active  - encourage oral nutrition  - registered dietitian following     VTE Prophylaxis: SCDs  COVID-19 testing:  Unknown  COVID-19 vaccination status:  Vaccinated (Pfizer):  06/12/2021,  07/14/2021     POA:  No  Living will:  No  Contacts:  Roberto Chandra ([facility]  [phone]     CODE STATUS:  Full code  Internal Medicine (attending): Aldair Armenta MD  Physiatry (consulting):  Lazarus Mills MD     OUTPATIENT PROVIDERS  PCP: Jorge Silver MD   Neurosurgery: James Rankin MD  Neurology: LUIS Berumen     DISPOSITION:  Sleep hygiene, bowel maintenance, and appetite at goal.  Vital signs at goal with no recorded fevers other than elevated heart rate after therapy this morning.  Lab work overall stable.  Thrombocytosis trending down.  No new imaging today.  Metoprolol increase 25 mg daily.  Continue aggressive mobilization as tolerated.  Suture removal to craniotomy site scheduled for this afternoon.  Monitor closely.  Notify of acute changes.     Shaheen Nava NP conducted independent physical examination and assisted with medical documentation.    Total time spent on this encounter including chart review and direct MD + NP 1-on-1 patient interaction: 52 minutes   Over 50% of this time was spent in counseling and coordination of care            [1]   Current Facility-Administered Medications:     acetaminophen tablet 650 mg, 650 mg, Oral, Q6H PRN, Elijah, Orbin A, FNP, 650 mg at 06/09/25 0756    albuterol-ipratropium 2.5 mg-0.5 mg/3 mL nebulizer solution 3 mL, 3 mL, Nebulization, Q4H PRN, Jake Paulino MD    ALPRAZolam tablet 0.25 mg, 0.25 mg, Oral, TID PRN, Elijah, Robin A, FNP    atorvastatin tablet 80 mg, 80 mg, Oral, Daily, Elijah, Robin A, FNP, 80 mg at 06/09/25 0756    benzonatate capsule 100 mg, 100 mg, Oral, TID PRN, Elijah, Robin A, FNP    busPIRone tablet 15 mg, 15 mg, Oral, BID, Elijah, Robin A, FNP, 15 mg at 06/09/25 0756    butalbital-acetaminophen-caffeine -40 mg per tablet 1 tablet, 1 tablet, Oral, Q4H PRN, Jake Paulino MD, 1 tablet at 06/09/25 1408    clopidogreL tablet 75 mg, 75 mg, Oral, Daily, Elijah, Robin A, BOZENAP, 75 mg at 06/09/25 0758     dextrose 50% injection 12.5 g, 12.5 g, Intravenous, PRN, Elijah, Robin A, FNP    dextrose 50% injection 25 g, 25 g, Intravenous, PRN, Elijah, Robin A, FNP    docusate sodium capsule 100 mg, 100 mg, Oral, BID, Elijah, Robin A, FNP, 100 mg at 06/09/25 0800    ezetimibe tablet 10 mg, 10 mg, Oral, Daily, Elijah, Robin A, FNP, 10 mg at 06/09/25 0756    ferrous sulfate tablet 1 each, 1 tablet, Oral, Daily, Elijah, Robin A, FNP, 1 each at 06/09/25 0756    glucagon (human recombinant) injection 1 mg, 1 mg, Intramuscular, PRN, Elijah, Robin A, FNP    glucose chewable tablet 16 g, 16 g, Oral, PRN, Elijah, Robin A, FNP    glucose chewable tablet 24 g, 24 g, Oral, PRN, Elijah, Robin A, FNP    hydrALAZINE injection 10 mg, 10 mg, Intravenous, Q4H PRN, [place], Nadine A, FNP    HYDROcodone-acetaminophen  mg per tablet 1 tablet, 1 tablet, Oral, Q4H PRN, Jake Paulino MD, 1 tablet at 06/09/25 1105    HYDROcodone-acetaminophen 5-325 mg per tablet 1 tablet, 1 tablet, Oral, Daily PRN, Elijah, Robin A, FNP, 1 tablet at 06/06/25 1218    hydrOXYzine pamoate capsule 50 mg, 50 mg, Oral, Nightly PRN, Elijah, Robin A, FNP    insulin aspart U-100 injection 0-5 Units, 0-5 Units, Subcutaneous, QID (AC + HS) PRN, Elijah, Robin A, FNP, 1 Units at 06/07/25 2003    labetalol 20 mg/4 mL (5 mg/mL) IV syring, 10 mg, Intravenous, Q4H PRN, [place] Nadine A, FNP    levetiracetam 500 mg/5 mL (5 mL) liquid Soln 500 mg, 500 mg, Oral, BID, Plac[place] Nadine A, FNP, 500 mg at 06/09/25 0756    levothyroxine tablet 88 mcg, 88 mcg, Oral, Before breakfast, Robin Nava FNP, 88 mcg at 06/09/25 0550    LORazepam injection 2 mg, 2 mg, Intravenous, Q4H PRN, Robin Nava, FNP    metFORMIN tablet 500 mg, 500 mg, Oral, BID WM, Robin Nava, FNP, 500 mg at 06/09/25 0756    metoprolol injection 10 mg, 10 mg, Intravenous, Q2H PRN, Robin Nava FNP    [START ON 6/10/2025] metoprolol  succinate (TOPROL-XL) 24 hr tablet 25 mg, 25 mg, Oral, Daily, Elijah, Robin A, FNP    nitroGLYCERIN SL tablet 0.4 mg, 0.4 mg, Sublingual, Q5 Min PRN, Elijah, Robin A, FNP    nortriptyline capsule 25 mg, 25 mg, Oral, QHS, Elijah, Robin A, FNP, 25 mg at 06/08/25 2013    ondansetron disintegrating tablet 4 mg, 4 mg, Oral, Q6H PRN, Elijah, Robin A, FNP    ondansetron disintegrating tablet 8 mg, 8 mg, Oral, Q8H PRN, Elijah, Robin A, FNP    ondansetron injection 4 mg, 4 mg, Intravenous, Q8H PRN, Elijah, Robin A, FNP    paroxetine tablet 20 mg, 20 mg, Oral, Daily, Elijah, Robin A, FNP, 20 mg at 06/09/25 2286    polyethylene glycol packet 17 g, 17 g, Oral, BID PRN, Elijah, Robin A, FNP, 17 g at 06/09/25 1340

## 2025-06-09 NOTE — PT/OT/SLP EVAL
Recreational Therapy Evaluation      Date of Treatment: 06/09/25  Start Time: 1300  Stop Time: 1330  Total Time: 30 min  Missed Time:     Assessment      Chelle Chandra is a 59 y.o. female admitted with a medical diagnosis of Acute right-sided weakness.  She presents with the following impairments/functional limitations:  weakness, impaired endurance, impaired functional mobility, gait instability, impaired balance, visual deficits, impaired cognition, decreased coordination, decreased upper extremity function, decreased lower extremity function, decreased safety awareness .    Rehab Diagnosis:     Recent Surgery:     General Precautions: Standard, fall, aspiration, seizure     Orthopedic Precautions:N/A     Braces: N/A    Rehab Prognosis: Good; patient would benefit from acute skilled Recreational Therapy services to address these deficits and reach maximum level of function.      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, bur hole, DM, L mCA CVA, thrombectomyFall, cerebral angiogram with embolization , L frontoparietal crani, evacuation of SDH  Orientation: Oriented x4  Affect/Behavior: Appropriate, Cooperative, Anxious, Impulsive, and Distracted  Safety/Judgement: impaired   Basic Command Following: impaired  Spiritual Cultural: no        History     Past Medical History:   Diagnosis Date    Accelerated junctional rhythm     Agatston CAC score, <100     Anxiety disorder, unspecified     Asthma     COPD type A     Diabetes mellitus without complication     GERD (gastroesophageal reflux disease)     History of COVID-19     Insomnia     Lung nodule        Past Surgical  History:   Procedure Laterality Date    ANGIOGRAM, CORONARY, WITH LEFT HEART CATHETERIZATION      BACK SURGERY      BREAST LUMPECTOMY Right     CARDIOVERSION  08/01/2013    CARPAL TUNNEL RELEASE  10/23/2013    CRANIOTOMY FOR EVACUATION OF SUBDURAL HEMATOMA Left 05/19/2025    Procedure: CRANIOTOMY, FOR SUBDURAL HEMATOMA EVACUATION;  Surgeon: Ricardo Shelley MD;  Location: [facility] OR;  Service: Neurosurgery;  Laterality: Left;    CREATION OF TERRI HOLE WITH EVACUATION OF HEMATOMA Left 6/2/2025    Procedure: CREATION, CRANIAL TERRI HOLE, WITH HEMATOMA EVACUATION;  Surgeon: James Rankin MD;  Location: [facility] OR;  Service: Neurosurgery;  Laterality: Left;  LEFT BURRHOLE FOR SUBDURAL HEMATOMA EVACUATION // STEALTH // SHERRI SKYTRON // DRILL    FUSION OF CERVICAL SPINE BY ANTERIOR APPROACH USING COMPUTER-ASSISTED NAVIGATION  06/10/2019    KIDNEY SURGERY      TONSILLECTOMY AND ADENOIDECTOMY      TOTAL ABDOMINAL HYSTERECTOMY      WRIST SURGERY         Home Environment     Admit Date: 06/06/25  Living Situation  People in Home: spouse  Lives in: house  Patients Responsibilities: Laundry, Leisure/play/hobbies, Meal preparation, Retired  Number of Children: 2  Occupation:Retired: [employer]age[employer] at [employer]    Instrumental Activities of Daily Living     Previous Hand Dominance: Right Current Hand Dominance:  (RUE weakness)     Other iADL Information:        Cognitive Skills Building         Cognitive Observation Activity Assist Position Equipment Response            Comment:      Dynamic Activities      Activity Assist Position Equipment Response   Activity 1 Bean bag toos stand by assistance Standing Rolling walker and Bean bags good   Comment: Bed to w/c transfer was supervision . Verbal cues needed for safety awareness  Sit to stand was supervision as was dynamic standing balance/reaching.  Standing tolerance was 3 minutes with sitting rest breaks. RUE coordination was supervision and LUE coordination was setup.  Repeated  "instruction needed for coordination to step while tossing bean bag to target. Verbal cues needed for safety with sit to stand and where to reach . Impulsive and hearing deficits noted       Fine Motor Activities      Activity Assist Position Equipment Response           Comment:        Goals     Patient Goals  Patient Goal 1: "To be able to function and do for myself."    Short Term Goals    Goal  Goal Status   Will increase sit to stand to supervision Initiated   Will improve dynamic standing balance/reaching to supervision Initiated   Will follow 2-3 step directions at supervision Initiated           Long Term Goals    Goal Goal Status   Will increase standing tolerance to5 minutes Initiated   Will improve dynamic standing balance/reaching to setup Initiated   Will follow multi-step directions to setup Initiated               Plan       Patient to be seen: Daily  Duration: 2 weeks  Treatments planned: Balance training, Cognitive training, Coordination, Energy conservation training, Fine motor, Safety education  Treatment plan/goals established with Patient/Caregiver: Yes     "

## 2025-06-09 NOTE — PT/OT/SLP PROGRESS
Physical Therapy Inpatient Rehab Treatment    Patient Name:  Chelle Chandra   MRN:  [id number]    Recommendations:     Discharge Recommendations:  Low Intensity Therapy   Discharge Equipment Recommendations:     Barriers to discharge: Increased level of assist, Decreased caregiver support, Ongoing medical treatment, Impaired functional mobility , and Severity of Deficits     Assessment:     Chelle Chandra is a 59 y.o. female admitted with a medical diagnosis of Acute right-sided weakness.  She presents with the following impairments/functional limitations:  weakness, impaired endurance, impaired self care skills, impaired functional mobility, gait instability, impaired balance, decreased coordination, decreased upper extremity function, decreased lower extremity function, decreased safety awareness, pain, impaired coordination .    Rehab Diagnosis: L Subdural Hematoma    General Precautions: Standard, fall, aspiration, seizure, other (see comments) (BP < 140/90)     Orthopedic Precautions:N/A     Braces: N/A    Rehab Prognosis: Good; patient would benefit from acute skilled PT services to address these deficits and reach maximum level of function.      History:     Past Medical History:   Diagnosis Date    Accelerated junctional rhythm     Agatston CAC score, <100     Anxiety disorder, unspecified     Asthma     COPD type A     Diabetes mellitus without complication     GERD (gastroesophageal reflux disease)     History of COVID-19     Insomnia     Lung nodule        Past Surgical History:   Procedure Laterality Date    ANGIOGRAM, CORONARY, WITH LEFT HEART CATHETERIZATION      BACK SURGERY      BREAST LUMPECTOMY Right     CARDIOVERSION  08/01/2013    CARPAL TUNNEL RELEASE  10/23/2013    CRANIOTOMY FOR EVACUATION OF SUBDURAL HEMATOMA Left 05/19/2025    Procedure: CRANIOTOMY, FOR SUBDURAL HEMATOMA EVACUATION;  Surgeon: Ricardo Shelley MD;  Location: [facility];  Service: Neurosurgery;  Laterality: Left;     "CREATION OF TERRI HOLE WITH EVACUATION OF HEMATOMA Left 6/2/2025    Procedure: CREATION, CRANIAL TERRI HOLE, WITH HEMATOMA EVACUATION;  Surgeon: James Rankin MD;  Location: [facility] OR;  Service: Neurosurgery;  Laterality: Left;  LEFT BURRHOLE FOR SUBDURAL HEMATOMA EVACUATION // STEALTH // SHERRI SKYTRON // DRILL    FUSION OF CERVICAL SPINE BY ANTERIOR APPROACH USING COMPUTER-ASSISTED NAVIGATION  06/10/2019    KIDNEY SURGERY      TONSILLECTOMY AND ADENOIDECTOMY      TOTAL ABDOMINAL HYSTERECTOMY      WRIST SURGERY         Subjective     Patient comments: "I have a head ache near my incision "    Respiratory Status: Room air    Patients cultural, spiritual, Faith conflicts given the current situation: no    Objective:     Communicated with nursing  prior to session.  Patient found up in chair with peripheral IV  upon PT entry to room.    Pt is Oriented x3 and Alert, Cooperative, Easily distractible, Agitated, and Impulsive.    Vitals    Bp at 0730 104/66 hr 97            0830 107/73 hr 120 nursing and NP informed and aware high hr            1130 105/70 hr 88  BP 97/66 at 1200   HR 97   O2 Sat     Pain Pain Rating 1: other (see comments) (unable to give number c/o head ache nursing informed and in with pain meds)  Location - Side 1: Bilateral  Location 1: head  Pain Addressed 1: Pre-medicate for activity, Reposition, Distraction, Nurse notified       Functional Mobility:      Current   Status  Discharge   Goal   Functional Area: Care Score:    Roll Left and Right   Independent   Sit to Lying   Independent   Lying to Sitting on Side of Bed 6 Independent   Sit to Stand 4  Use of rw Sba with vc for hand placement for safety  Independent   Chair/Bed-to-Chair Transfer 4  Use of rw vc for hand placement  Independent   Car Transfer   Independent   Walk 10 Feet 4 Independent   Walk 50 Feet with Two Turns 4 Independent   Walk 150 Feet 4  Use of rw cga to sba 300ft then in pm tx 50ft to get to and from rooms in tx, pt " required occasional vc for increase ALEJANDRO to improve safety with ambulation. Few times ambulation short distance  without assistive device to focus on balance cga slow eva      Walk 10 Feet Uneven Surface   Independent   1 Step (Curb) 4 Independent   4 Steps 4 Independent   12 Steps 4  Use of left rail only per home environment cga 12 steps      Picking Up Object   Independent   Wheel 50 Feet with Two Turns       Wheel 150 Feet           Therapeutic Activities and Exercises:  Dynamic standing balance with use of rw for donning of solo step harness increased time required   Timed Up & Go Test (TUG)  Assistive Device and/or Bracing Used: Rolling Walker  TUG Time: 34.55 seconds from regular chair BUE assist   Test time of > or equal to 12 sec. are associated with high fall risk.    Gait speed   Total Time 0.36 m/s use of rw in sole step   Norms:  > 1 m/s   >1.1 m/s predictive of completing yard work   >1.3 m/s Climb flight of stairs   < 1 m/s (increased fall risk)  Benefit from fall prevention   >0.67 m/s to complete self care   >0.89 m/s for household activities   < 0.60 m/s   Predicts future risk of falls and hospitalization   Tend to require assistance with ADL and IADL  >0.49 m/s to cross street   < 0.40 m/s  Longer length of stay in acute care   Likely to discharge to skilled nursing, inpatient rehab, or nursing home setting or with home health      Dynamic standing balance activities without assistive device cga marching , side stepping with big steps , cone taps first one leg then alternating LE, single leg standing pt required seated rest breaks with changing of activity   Gt without assist device cga focus on improving balance   Dynamic sitting balance unsupported sitting EOB for pt to self moira/doff shoes supervision   Activity Tolerance: Good    Patient left first tx pt in recliner then in later tx pt to OT then in dinning room for lunch  with call button in reach and pt karel tx well requiring rest breaks  "but over all use of rw cga to sba with occasional vc for safety requiring assist of one person .    Education provided: transfer training, bed mob, gait training, stair training, balance training, safety awareness, body mechanics, assistive device, and fall prevention    Expected compliance: High compliance and Moderate compliance    Plan:     During this hospitalization, patient to be seen 5 x/week to address the identified rehab impairments via gait training, therapeutic activities, therapeutic exercises, neuromuscular re-education and progress toward the following goals:    GOALS:   Multidisciplinary Problems       Physical Therapy Goals          Problem: Physical Therapy    Goal Priority Disciplines Outcome Interventions   Physical Therapy Goal     PT, PT/OT Progressing    Description: Bed Mobility:  Roll left and right independently.   Sit to supine transfer independently.   Supine to sit transfer independently.     Transfers:  Sit to stand transfer independently using RW.   Bed to chair transfer independently Stand Step  using RW.   Car transfer independently using RW.    an object from the ground in standing position independently using RW.     Mobility:  Ambulate 250 feet independently using RW.  Ambulate 15 feet on uneven surfaces/ramps independently using RW.   Pt ascended/descended a 6" inch curb independently using RW.   Ascend/descend 4 stairs independently using left handrail.                         Plan of Care Expires:  06/13/25  PT Next Visit Date: 06/13/25  Plan of Care reviewed with: patient    Additional Information:         Time Tracking:     Therapy Time  PT Received On: 06/09/25  PT Start Time:  (0730;1130)  PT Stop Time:  (0830;1200)   PT Individual: 90  Missed Time:    Time Missed due to:      Billable Minutes: Gait Training 30min and Therapeutic Activity 60min    06/09/2025  "

## 2025-06-09 NOTE — PROGRESS NOTES
Dos 6/9/25  Patient seen and evaluated in room today.  Therapy and progress discussed with patient  Reviewed present barriers to progress  Reviewed chart  Discussed with primary medicine team, nursing staff as well as my NP, Sobeida Cummins  Agree with present POC  Subjective  HPI: 59 year old WF with a PMH of anxiety, asthma, CVA, hypothyroidism, COPD, DM type 2, GERD, and a recent admission at [facility] on 5/15/25 due to multiple falls and subsequent SDH presented to [facility] ED on 5/29/25 with c/o HA and Right sided weakness. During that stay, CTA head and neck was done showing a new intermediate density subdural hemorrhage along the left cerebral convexity with no midline shift. The patient was seen by Dr. Mendoza and Dr. Egan for IR intracranial embolization on 5/16 then by Dr. Mendiola with neurosurgery on 5/19 for a left craniotomy for subdural hematoma evacuation. Further chart review shows the patient had a left MCA infarct June 1st 2024/ tandem carotid M1 occlusion; had thrombectomy status post aspiration x2 with TICI 2c reperfusion. Patient presented back to the ED at [facility] on 5/29 with complaints of bad headache with right sided weakness, abnormal speech, and facial asymmetry. On arrival at ED, patient had right sided facial droop, flattening of the nasolabial fold, 2/5  strength to right upper extremity, 4/5 strength to the right lower extremity. Patient was able to answer questions appropriately, but is slow with following commands. CTA stroke protocol showed suspected diminished flow inferior division of the left middle cerebral artery close to bifurcation, no other hemodynamically significant stenosis identified. CT head stroke showed left cerebral convexity hydodense subacute hematoma and improved postsurgical pneumocephalus. NIHSS of 10 noted. Patient was transferred to [facility]. Neurology consulted, and patient is not a candidate for TNK due to recent intracranial hemorrhage. Neurology recommended to hold  plavix, -180, MRI of brain ordered, loaded on Keppra followed by Keppra 500mg BID. EEG showed a normal awake, drowsy, and sleep EEG with no evidence of any epileptiform discharges. Speech swallow eval completed with recommendation for NPO and MBS needed. PT eval completed with deficits noted with recommendation for high intensity therapy needed. MRI of brain showed no evidence of new infarct. On 5/30, neurology recommended to hold Plavix until cleared by NSGY, continue Keppra, and signed off. MBS completed with recommendation for NPO due to moderate oropharyngeal dysphagia with Silent Aspiration of mildly thick liquids with high risk for aspiration. OT eval completed with deficits noted with recommendation for high intensity therapy needed. On 5/31, Dr Rankin consulted with recommendation for left diony hole for drainage of subdural due to pressure on the brain with mass effect on that side. NG tube placed for nutrition. On 6/1, cardiology consulted for surgery clearance with low risk for cardiac events. On 6/2, patient underwent a left diony hole for placement of subdural drain using Stealth system by Dr Rankin with no complications. On 6/3, JOHNATHAN drain remained in place, right arm stronger, and CT of head showed good removal of subdural. Patient complained of headache but improves with medications. PT/OT evals repeated after surgery with deficits noted with recommendation for high intensity therapy needed. On 6/4, Labs showed low H&H of 11.1 & 34.6, elevated PLT of 767, low iron of 32, low TIBC of 163, low transferrin of 145, and elevated ferritin of 698.39. JOHNATHAN drain was removed. MBS repeated with recommendation for minced and moist diet with moderately thick liquids, and NPO for meds. Vitals stable. On 6/5, neurosurgery recommended follow up in 6 weeks with repeat CT of head. Speech approved medications crushed in puree with continued aspiration precautions with minced and moist diet with moderately thick  liquids. Patient complained of headache rating at 7/10. Patient is AAOx4.  Participating with therapy. Functional status includes minimal to moderate assist needed for grooming while standing, standby assist for bed mobility, contact guard assist to minimal assist for transfers with RW, minimal assist for toilet transfer with grab bars, stand by assist for management of underwear and seated pericare after void with toileting, walked 145 ft with HHA x2 at minimal assist due to balance deficits, moderate assist for lower body dressing, and setup assist for eating. Patient was evaluated, accepted, and admitted to inpatient rehab to improve functional status. Transferred to [facility] on 6/6 without incident.      6/9: Seen with PT, Amb w/RW at [facility] for safety. Doing well. Slight redness noted to incision line.  Healing nicely. Remove sutures. Peripheral IV noted to be out. DC. Nursing notes Thin liquids in patient's room within reach this morning. Patient unsure of how that happened, but she is tolerating thickened liquids without complaint. No SSI needed over the weekend. Decrease CBG to BID. Progressing in therapy. VSSAF.                Review of Systems  Psychiatric:  Has a history of anxiety, depression, and bipolar disorder. She quit smoking 1 year ago.    Depression/Anxiety: no current complaint     busPIRone tablet 15 mg BID  paroxetine tablet 20 mg qd  ALPRAZolam tablet 0.25 mg TID PRN Anxiety  Pain: HA-improving     acetaminophen tablet 650 mg q6h PRN   HYDROcodone-acetaminophen  mg 1 tablet q4h PRN mod/severe pain  Bowels/Bladder: LBM 6/7     Appetite: poor with Dysphagia Diet, but drinking all liquids and supplements. Dietician to F/U     Sleep: good      nortriptyline capsule 25 mg qHS  hydrOXYzine pamoate capsule 50 mg qHS PRN Itching/Sleep            Physical Exam  General: well-developed, thin, in no acute distress  Eye: EOMI, clear conjunctiva, eyelids normal, glasses, left peripheral visual  flurries  Respiratory: equal chest rise, no SOB, no audible wheeze  Cardiovascular: regular rate and rhythm, no edema  Gastrointestinal: soft, non-tender, non-distended   Musculoskeletal: right sided weakness  Integumentary: chest rash,  Left Crani [id number]-w/d/i  Neurologic: cranial nerves intact, right sided weakness(mostly hand), dysphagia, aphasia  *MD performed and documented physical examination           Labs:   Latest Reference Range & Units 06/09/25 06:10   WBC 4.50 - 11.50 x10(3)/mcL 6.23   RBC 4.20 - 5.40 x10(6)/mcL 3.78 (L)   Hemoglobin 12.0 - 16.0 g/dL 11.1 (L)   Hematocrit 37.0 - 47.0 % 35.3 (L)   MCV 80.0 - 94.0 fL 93.4   MCH 27.0 - 31.0 pg 29.4   MCHC 33.0 - 36.0 g/dL 31.4 (L)   RDW 11.5 - 17.0 % 14.3   Platelet Count 130 - 400 x10(3)/mcL 667 (H)   MPV 7.4 - 10.4 fL 9.3   Neut % % 48.9   LYMPH % % 37.4   Mono % % 9.8   Eos % % 2.9   Basophil % % 0.5   Immature Granulocytes % 0.5   Neut # 2.1 - 9.2 x10(3)/mcL 3.05   Lymph # 0.6 - 4.6 x10(3)/mcL 2.33   Mono # 0.1 - 1.3 x10(3)/mcL 0.61   Eos # 0 - 0.9 x10(3)/mcL 0.18   Baso # <=0.2 x10(3)/mcL 0.03   Immature Grans (Abs) 0.00 - 0.04 x10(3)/mcL 0.03   nRBC % 0.0   Sodium 136 - 145 mmol/L 142   Potassium 3.5 - 5.1 mmol/L 3.7   Chloride 98 - 107 mmol/L 104   CO2 23 - 31 mmol/L 28   Anion Gap mEq/L 10.0   BUN 9.8 - 20.1 mg/dL 12.0   Creatinine 0.55 - 1.02 mg/dL 0.65   BUN/CREAT RATIO  18   eGFR mL/min/1.73/m2 >60   Glucose 82 - 115 mg/dL 170 (H)   Calcium 8.4 - 10.2 mg/dL 9.3   Phosphorus Level 2.3 - 4.7 mg/dL 3.7   Magnesium  1.60 - 2.60 mg/dL 1.70   ALP 40 - 150 unit/L 139   PROTEIN TOTAL 5.8 - 7.6 gm/dL 7.0   Albumin 3.4 - 4.8 g/dL 2.7 (L)   Albumin/Globulin Ratio 1.1 - 2.0 ratio 0.6 (L)   Prealbumin 14.0 - 37.0 mg/dL 20.9   BILIRUBIN TOTAL <=1.5 mg/dL 0.4   AST 11 - 45 unit/L 14   ALT 0 - 55 unit/L 26   Globulin, Total 2.4 - 3.5 gm/dL 4.3 (H)   (L): Data is abnormally low  (H): Data is abnormally high                Assessment/Plan  Hospital    Subdural hematoma   Acute focal neurological deficit   Acute right-sided weakness   Severe malnutrition   S/P craniotomy   Dysphagia   Aphasia     Non-Hospital   Anxiety disorder, unspecified   Asthma   Depressive disorder   Inadequately controlled diabetes mellitus   GERD (gastroesophageal reflux disease)   HLD.   HYPOTHYROIDISM.   INSOMNIA.   COPD type A   Agatston CAC score, <100   History of COVID-19   Tobacco use   History of stroke   Dizziness   Tobacco dependency   Accelerated junctional rhythm   Lung nodule   Bipolar depression       Wounds: Left Crani gilutthoi-qaerrfu-x/d/I, rash-chest  Dr. Mendoza and Dr. Egan for IR intracranial embolization on 5/16 then by Dr. Mendiola with neurosurgery on 5/19 for a left craniotomy for subdural hematoma evacuation  On 6/2, patient underwent a left diony hole for placement of subdural drain using Stealth system by Dr Rankin  Precautions: seizure  Bracing: TBD by therapy  Swallowing: Dysphagia minced and moist diet with moderately thick liquids, and medications crushed in puree   Function: Tolerating therapy. Continue PT/OT  VTE Prophylaxis: SCDs  Code Status: FULL CODE   Discharge: Lives with her spouse in [place] in a single-story home with 4 steps with a railing going up on the right. She has a railing in the hallway leading to the bedroom. Has her GED. She has no  history. Pt. Is not working. She quit working 1 year ago after her stroke.   works full time. He drives, grocery shops, manages finances, and manages patient medications. Children: (2 biological, 2 stepchildren). Date pending.                 Nadine Cummins NP, conducted additional independent physical examination and assisted with medical documentation.

## 2025-06-09 NOTE — PT/OT/SLP PROGRESS
[facility] - Rehabilitation Unit  Speech Language Pathology Department  Dysphagia Therapy Progress Note    Patient Name:  Chelle Chandra   MRN:  [id number]  Admitting Diagnosis: Acute right sided weakness.     Recommendations     General recommendations:  repeat Modified Barium Swallow Study as clinically appropriate and dysphagia therapy  Solid texture recommendation:  Minced & Moist Diet - IDDSI Level 5  Liquid consistency recommendation: Moderately thick/Honey thick liquids - IDDSI Level 3   Medications: crushed in puree  Aspiration precautions: small bites/sips, slow rate, and alternate solids/liquids    Discharge therapy intensity: High Intensity Therapy   Barriers to safe discharge:  acuity of illness and severity of impairment    Subjective     Patient awake, alert, oriented x4, and cooperative.  Spiritual/Cultural/Jainism Beliefs/Practices that affect care: no    Pain/Comfort:  0/10    Respiratory Status:  room air    Objective     Therapeutic Activities:  Pt completed base of tongue exercises x90 with occasional cues.  Pt completed laryngeal elevation exercises x60 with no occasional cues    Assessment     Pt continues to present with oropharyngeal dysphagia requiring diet modification to improve swallow safety and efficiency.    Outcome Measures     Functional Oral Intake Scale: 7 - Total oral diet with no restrictions    Goals     Multidisciplinary Problems       SLP Goals          Problem: SLP    Goal Priority Disciplines Outcome   SLP Goal     SLP Progressing   Description: LTG: The pt will tolerate least restrictive diet with no overt s/sx of aspiration.    STGs:  The pt will complete tongue base/laryngeal elevation exercises with occ cues.  The pt will tolerate 4 oz of mildly liquids via cup with no overt s/sx of aspiration.  The pt will tolerate half meal of soft and bite-sized solids with no overt s/sx of aspiration.                       Patient Education      Patient provided with verbal education regarding SLP POC.  Understanding was verbalized.    Plan     Will continue to follow and tx as appropriate.    SLP Follow-Up:  Yes   Patient to be seen:  5 x/week   Plan of Care expires:  06/14/25  Plan of Care reviewed with:  patient       Time Tracking     SLP Treatment Date:   06/09/25  Speech Start Time:  1000  Speech Stop Time:  1030     Speech Total Time (min):  30 min    Billable minutes:  Treatment of Swallow Dysfunction, 30 minutes       06/09/2025

## 2025-06-09 NOTE — PT/OT/SLP PROGRESS
Occupational Therapy Inpatient Rehab Treatment    Name: Chelle Chandra  MRN: [id number]    Assessment:  Chelle Chandra is a 59 y.o. female admitted with a medical diagnosis of Acute right-sided weakness.  She presents with the following impairments/functional limitations:  weakness, impaired endurance, impaired self care skills, impaired balance, visual deficits, impaired cognition, decreased coordination, decreased upper extremity function, decreased lower extremity function, decreased safety awareness, pain, impaired fine motor.    General Precautions: Standard, aspiration, fall, seizure     Orthopedic Precautions:N/A     Braces: N/A    Rehab Prognosis: Good; patient would benefit from acute skilled OT services to address these deficits and reach maximum level of function.      History:     Past Medical History:   Diagnosis Date    Accelerated junctional rhythm     Agatston CAC score, <100     Anxiety disorder, unspecified     Asthma     COPD type A     Diabetes mellitus without complication     GERD (gastroesophageal reflux disease)     History of COVID-19     Insomnia     Lung nodule        Past Surgical History:   Procedure Laterality Date    ANGIOGRAM, CORONARY, WITH LEFT HEART CATHETERIZATION      BACK SURGERY      BREAST LUMPECTOMY Right     CARDIOVERSION  08/01/2013    CARPAL TUNNEL RELEASE  10/23/2013    CRANIOTOMY FOR EVACUATION OF SUBDURAL HEMATOMA Left 05/19/2025    Procedure: CRANIOTOMY, FOR SUBDURAL HEMATOMA EVACUATION;  Surgeon: Ricardo Shelley MD;  Location: [facility];  Service: Neurosurgery;  Laterality: Left;    CREATION OF TERRI HOLE WITH EVACUATION OF HEMATOMA Left 6/2/2025    Procedure: CREATION, CRANIAL TERRI HOLE, WITH HEMATOMA EVACUATION;  Surgeon: James Rankin MD;  Location: [facility] OR;  Service: Neurosurgery;  Laterality: Left;  LEFT BURRHOLE FOR SUBDURAL HEMATOMA EVACUATION // STEALTH // [facility] // [facility]LL    FUSION OF CERVICAL SPINE BY ANTERIOR APPROACH USING COMPUTER-ASSISTED  "NAVIGATION  06/10/2019    KIDNEY SURGERY      TONSILLECTOMY AND ADENOIDECTOMY      TOTAL ABDOMINAL HYSTERECTOMY      WRIST SURGERY         Subjective     Orientation: Oriented x4    Chief Complaint: Pt reported debilitating headaches    Patient/Family Comments/goals: "I want to be able to cook again."    Vitals   Vitals at 830  /73        Vitals at 930  /68      NOTE: due to increased HR (fluctuated from 120-124 for majority of session), pt was continuously monitored during session and all activities were non strenuous and performed in W/C    PAIN: pt unable to quantify but reported 4 instances of debilitating pain in her head throughout session. She stated that it comes and goes in waves       Respiratory Status: Room air    Patients cultural, spiritual, Taoist conflicts given the current situation: no       Objective:     Patient found up in chair upon OT entry to room.    Limiting Factors for ADLs: motor, sensory, limited ROM, balance, weakness, visual, perceptual, coordination, cognition, safety awareness, and pain     Therapeutic Activities  Due to increased heart rate the following activities were performed seated in W/C:  To address cognition, sequencing, and visual scanning pt participated in a game of ChatIDNo. Pt able to follow directions but required min verbal cueing to identify suites.   To focus on pincer grasp, problem-solving, and strength, pt played two rounds of peg dominoes with R hand. Pt demonstrated slight coordination impairments and required verbal cueing to reach into cup to retrieve pegs without tipping over cup. Pt tolerated task well and stated it felt good to strength her R hand.   NOTE: Between activities, nurse arrived and directed pt to blow through a syringe to decrease HR.   Pt experienced 4 instances of severe head aches during session and was provided education on utilizing purposeful breath to help increase pain tolerance.    Patient left up in chair " with call button in reach.     Education provided: Roles and goals of OT, ADLs, sequencing, and safety precautions    Multidisciplinary Problems       Occupational Therapy Goals          Problem: Occupational Therapy    Goal Priority Disciplines Outcome Interventions   Occupational Therapy Goal     OT, PT/OT     Description: ADLs:  Pt to perform grooming tasks with independence standing at sink with RW by discharge  Pt to perform UB dressing with independence by discharge.   Pt to perform LB dressing with independence by discharge.  Pt to perform putting on/off footwear task with independence by discharge.   Pt to perform toileting with independence by d/c.   Pt to perform bathing with independence by d/c.    Functional Transfers:  Pt to perform toilet transfers with independence by d/c.     Pt to perform a tub transfer with independence by d/c.     IADLs:  Pt to perform simple meal prep with independence by d/c.      Balance, Strengthening, Endurance, Balance:  Pt to demonstrate good dynamic standing balance as required to perform ADL's from standing level.  Pt to demonstrate good BUE strength during functional task   Pt to demonstrate consistent adherence to breathing control and energy conservation techniques as educated by OT.                        Time Tracking     OT Received On: 06/09/25  Time In 0830     Time Out 0930  Total Time 60 min  Therapy Time: OT Individual: 60  Missed Time:    Missed Time Reason:      Billable Minutes: Therapeutic Activity 60    06/09/2025

## 2025-06-10 LAB
POCT GLUCOSE: 104 MG/DL (ref 70–110)
POCT GLUCOSE: 122 MG/DL (ref 70–110)
POCT GLUCOSE: 137 MG/DL (ref 70–110)
POCT GLUCOSE: 148 MG/DL (ref 70–110)

## 2025-06-10 PROCEDURE — 97530 THERAPEUTIC ACTIVITIES: CPT

## 2025-06-10 PROCEDURE — 25000003 PHARM REV CODE 250: Performed by: NURSE PRACTITIONER

## 2025-06-10 PROCEDURE — 94799 UNLISTED PULMONARY SVC/PX: CPT

## 2025-06-10 PROCEDURE — 92526 ORAL FUNCTION THERAPY: CPT

## 2025-06-10 PROCEDURE — 99900031 HC PATIENT EDUCATION (STAT)

## 2025-06-10 PROCEDURE — 97116 GAIT TRAINING THERAPY: CPT | Mod: CQ

## 2025-06-10 PROCEDURE — 25000003 PHARM REV CODE 250: Performed by: INTERNAL MEDICINE

## 2025-06-10 PROCEDURE — 97110 THERAPEUTIC EXERCISES: CPT

## 2025-06-10 PROCEDURE — 11800000 HC REHAB PRIVATE ROOM

## 2025-06-10 PROCEDURE — 94761 N-INVAS EAR/PLS OXIMETRY MLT: CPT

## 2025-06-10 RX ORDER — BISACODYL 10 MG/1
10 SUPPOSITORY RECTAL DAILY PRN
Status: DISCONTINUED | OUTPATIENT
Start: 2025-06-10 | End: 2025-06-14 | Stop reason: HOSPADM

## 2025-06-10 RX ORDER — METOPROLOL TARTRATE 25 MG/1
12.5 TABLET ORAL 2 TIMES DAILY
Status: DISCONTINUED | OUTPATIENT
Start: 2025-06-10 | End: 2025-06-14 | Stop reason: HOSPADM

## 2025-06-10 RX ADMIN — BUSPIRONE HYDROCHLORIDE 15 MG: 10 TABLET ORAL at 07:06

## 2025-06-10 RX ADMIN — BUTALBITAL, ACETAMINOPHEN, AND CAFFEINE 1 TABLET: 325; 50; 40 TABLET ORAL at 08:06

## 2025-06-10 RX ADMIN — PAROXETINE HYDROCHLORIDE 20 MG: 20 TABLET, FILM COATED ORAL at 07:06

## 2025-06-10 RX ADMIN — NORTRIPTYLINE HYDROCHLORIDE 25 MG: 25 CAPSULE ORAL at 08:06

## 2025-06-10 RX ADMIN — LEVETIRACETAM 500 MG: 100 SOLUTION ORAL at 07:06

## 2025-06-10 RX ADMIN — BISACODYL 10 MG: 10 SUPPOSITORY RECTAL at 01:06

## 2025-06-10 RX ADMIN — EZETIMIBE 10 MG: 10 TABLET ORAL at 07:06

## 2025-06-10 RX ADMIN — BUTALBITAL, ACETAMINOPHEN, AND CAFFEINE 1 TABLET: 325; 50; 40 TABLET ORAL at 03:06

## 2025-06-10 RX ADMIN — METFORMIN HYDROCHLORIDE 500 MG: 500 TABLET, FILM COATED ORAL at 07:06

## 2025-06-10 RX ADMIN — METFORMIN HYDROCHLORIDE 500 MG: 500 TABLET, FILM COATED ORAL at 04:06

## 2025-06-10 RX ADMIN — LEVETIRACETAM 500 MG: 100 SOLUTION ORAL at 08:06

## 2025-06-10 RX ADMIN — METOPROLOL TARTRATE 12.5 MG: 25 TABLET, FILM COATED ORAL at 08:06

## 2025-06-10 RX ADMIN — METOPROLOL SUCCINATE 25 MG: 25 TABLET, EXTENDED RELEASE ORAL at 07:06

## 2025-06-10 RX ADMIN — FERROUS SULFATE TAB 325 MG (65 MG ELEMENTAL FE) 1 EACH: 325 (65 FE) TAB at 07:06

## 2025-06-10 RX ADMIN — CLOPIDOGREL BISULFATE 75 MG: 75 TABLET, FILM COATED ORAL at 07:06

## 2025-06-10 RX ADMIN — DOCUSATE SODIUM 100 MG: 100 CAPSULE, LIQUID FILLED ORAL at 08:06

## 2025-06-10 RX ADMIN — BUSPIRONE HYDROCHLORIDE 15 MG: 10 TABLET ORAL at 08:06

## 2025-06-10 RX ADMIN — ATORVASTATIN CALCIUM 80 MG: 40 TABLET, FILM COATED ORAL at 07:06

## 2025-06-10 RX ADMIN — DOCUSATE SODIUM 100 MG: 100 CAPSULE, LIQUID FILLED ORAL at 07:06

## 2025-06-10 RX ADMIN — LEVOTHYROXINE SODIUM 88 MCG: 0.09 TABLET ORAL at 06:06

## 2025-06-10 NOTE — PT/OT/SLP PROGRESS
Physical Therapy Inpatient Rehab Treatment    Patient Name:  Chelle Chandra   MRN:  [id number]    Recommendations:     Discharge Recommendations:  Low Intensity Therapy   Discharge Equipment Recommendations:     Barriers to discharge: Increased level of assist, Decreased caregiver support, Ongoing medical treatment, Impaired functional mobility , and Severity of Deficits     Assessment:     Chelle Chandra is a 59 y.o. female admitted with a medical diagnosis of Acute right-sided weakness.  She presents with the following impairments/functional limitations:  weakness, impaired endurance, impaired self care skills, impaired functional mobility, gait instability, impaired balance, decreased coordination, decreased upper extremity function, decreased lower extremity function, decreased safety awareness, pain, impaired coordination .    Rehab Diagnosis: L Subdural Hematoma    General Precautions: Standard, fall, aspiration, seizure, other (see comments) (BP < 140/90)     Orthopedic Precautions:N/A     Braces: N/A    Rehab Prognosis: Good; patient would benefit from acute skilled PT services to address these deficits and reach maximum level of function.      History:     Past Medical History:   Diagnosis Date    Accelerated junctional rhythm     Agatston CAC score, <100     Anxiety disorder, unspecified     Asthma     COPD type A     Diabetes mellitus without complication     GERD (gastroesophageal reflux disease)     History of COVID-19     Insomnia     Lung nodule        Past Surgical History:   Procedure Laterality Date    ANGIOGRAM, CORONARY, WITH LEFT HEART CATHETERIZATION      BACK SURGERY      BREAST LUMPECTOMY Right     CARDIOVERSION  08/01/2013    CARPAL TUNNEL RELEASE  10/23/2013    CRANIOTOMY FOR EVACUATION OF SUBDURAL HEMATOMA Left 05/19/2025    Procedure: CRANIOTOMY, FOR SUBDURAL HEMATOMA EVACUATION;  Surgeon: Ricardo Shelley MD;  Location: [facility];  Service: Neurosurgery;  Laterality: Left;     "CREATION OF TERRI HOLE WITH EVACUATION OF HEMATOMA Left 6/2/2025    Procedure: CREATION, CRANIAL TERRI HOLE, WITH HEMATOMA EVACUATION;  Surgeon: James Rankin MD;  Location: [facility] OR;  Service: Neurosurgery;  Laterality: Left;  LEFT BURRHOLE FOR SUBDURAL HEMATOMA EVACUATION // STEALTH // [facility] // [facility]    FUSION OF CERVICAL SPINE BY ANTERIOR APPROACH USING COMPUTER-ASSISTED NAVIGATION  06/10/2019    KIDNEY SURGERY      TONSILLECTOMY AND ADENOIDECTOMY      TOTAL ABDOMINAL HYSTERECTOMY      WRIST SURGERY         Subjective     Patient comments: "I have pain in my head but took medicine before tx "    Respiratory Status: Room air    Patients cultural, spiritual, Mormonism conflicts given the current situation: no    Objective:     Communicated with nursing  prior to session.  Patient found up in chair with peripheral IV  upon PT entry to room.    Pt is Oriented x3 and Alert and Cooperative.    Vitals   Vitals at Rest  BP    HR    O2 Sat    Pain      Vitals With Activity  /67   HR 93   O2 Sat     Pain Pain Rating 1: other (see comments) (unable to give level but stated "some pain in head")  Pain Addressed 1: Nurse notified, Pre-medicate for activity, Reposition, Distraction       Functional Mobility:   Transfers:     Toilet Transfer: Supervision or touching assistance  with  no AD and hand-held assist  using  Step Transfer and vc for positioning for safety (awareness of wall on rt side) + void pt able to manage clothing increased time      Current   Status  Discharge   Goal   Functional Area: Care Score:    Roll Left and Right   Independent   Sit to Lying   Independent   Lying to Sitting on Side of Bed   Independent   Sit to Stand 4  Cga with occasional vc for hand placement  Independent   Chair/Bed-to-Chair Transfer 4  With use of rw cga with occasional vc for hand placement  Independent   Car Transfer   Independent   Walk 10 Feet 4 Independent   Walk 50 Feet with Two Turns 4 Independent   Walk 150 Feet " 4  Use of rw 200ft cga with occasional vc for correction of rt leaning and to look forward for improved posture also for rtle clearance with gt      Walk 10 Feet Uneven Surface   Independent   1 Step (Curb)   Independent   4 Steps   Independent   12 Steps       Picking Up Object   Independent   Wheel 50 Feet with Two Turns       Wheel 150 Feet           Therapeutic Activities and Exercises:  Dynamic standing balance no assistive device cga with few vc for midline correction with grooming activities increased rt leaning at times increased time required.   Pregait activity ambulation without assistive device cga 150ft with vc for occasional correction of rt leaning and rtle clearance   BLE strengthening squats use of rw for safety 2 sets 15reps chair close behind   TKEs rt knee standing position use of rw red t-band 2 sets 15times for improve control of rt knee   Activity Tolerance: Good    Patient left up in chair with call button in reach, chair alarm on, and pt karel tx well noted slight increase rt leaning today verse previous txs with rest breaks improved . Pt requiring use of rw at this time for fall prevention and assist of one person .    Education provided: transfer training, gait training, balance training, safety awareness, body mechanics, assistive device, strengthening exercises, and fall prevention    Expected compliance: High compliance    Plan:     During this hospitalization, patient to be seen 5 x/week to address the identified rehab impairments via gait training, therapeutic activities, therapeutic exercises, neuromuscular re-education and progress toward the following goals:    GOALS:   Multidisciplinary Problems       Physical Therapy Goals          Problem: Physical Therapy    Goal Priority Disciplines Outcome Interventions   Physical Therapy Goal     PT, PT/OT Progressing    Description: Bed Mobility:  Roll left and right independently.   Sit to supine transfer independently.   Supine to sit  "transfer independently.     Transfers:  Sit to stand transfer independently using RW.   Bed to chair transfer independently Stand Step  using RW.   Car transfer independently using RW.    an object from the ground in standing position independently using RW.     Mobility:  Ambulate 250 feet independently using RW.  Ambulate 15 feet on uneven surfaces/ramps independently using RW.   Pt ascended/descended a 6" inch curb independently using RW.   Ascend/descend 4 stairs independently using left handrail.                         Plan of Care Expires:  06/13/25  PT Next Visit Date: 06/13/25  Plan of Care reviewed with: patient    Additional Information:         Time Tracking:     Therapy Time  PT Received On: 06/10/25  PT Start Time: 0730  PT Stop Time: 0830  PT Total Time (min): 60 min   PT Individual: 60  Missed Time:    Time Missed due to:      Billable Minutes: Gait Training 30min and Therapeutic Activity 30min    06/10/2025  "

## 2025-06-10 NOTE — PT/OT/SLP PROGRESS
Occupational Therapy Inpatient Rehab Treatment    Name: Chelle Chandra  MRN: [id number]    Assessment:  Chelle Chandra is a 59 y.o. female admitted with a medical diagnosis of Acute right-sided weakness.  She presents with the following impairments/functional limitations:  weakness, impaired endurance, impaired sensation, impaired self care skills, impaired functional mobility, gait instability, impaired balance, visual deficits, decreased coordination, decreased upper extremity function, decreased lower extremity function, decreased safety awareness, pain, impaired coordination.    General Precautions: Standard, fall, aspiration, minced and moist diet, moderately thick liquid    Orthopedic Precautions:N/A     Braces: N/A    Rehab Prognosis: Good; patient would benefit from acute skilled OT services to address these deficits and reach maximum level of function.      History:     Past Medical History:   Diagnosis Date    Accelerated junctional rhythm     Agatston CAC score, <100     Anxiety disorder, unspecified     Asthma     COPD type A     Diabetes mellitus without complication     GERD (gastroesophageal reflux disease)     History of COVID-19     Insomnia     Lung nodule        Past Surgical History:   Procedure Laterality Date    ANGIOGRAM, CORONARY, WITH LEFT HEART CATHETERIZATION      BACK SURGERY      BREAST LUMPECTOMY Right     CARDIOVERSION  08/01/2013    CARPAL TUNNEL RELEASE  10/23/2013    CRANIOTOMY FOR EVACUATION OF SUBDURAL HEMATOMA Left 05/19/2025    Procedure: CRANIOTOMY, FOR SUBDURAL HEMATOMA EVACUATION;  Surgeon: Ricardo Shelley MD;  Location: [facility] OR;  Service: Neurosurgery;  Laterality: Left;    CREATION OF TERRI HOLE WITH EVACUATION OF HEMATOMA Left 6/2/2025    Procedure: CREATION, CRANIAL TERRI HOLE, WITH HEMATOMA EVACUATION;  Surgeon: James Rankin MD;  Location: [facility] OR;  Service: Neurosurgery;  Laterality: Left;  LEFT BURRHOLE FOR SUBDURAL HEMATOMA EVACUATION // STEALTH // [facility]  "[facility] // [facility]    FUSION OF CERVICAL SPINE BY ANTERIOR APPROACH USING COMPUTER-ASSISTED NAVIGATION  06/10/2019    KIDNEY SURGERY      TONSILLECTOMY AND ADENOIDECTOMY      TOTAL ABDOMINAL HYSTERECTOMY      WRIST SURGERY         Subjective     Orientation: Oriented x4    Chief Complaint: Pt reported feeling tired upon arrival    Patient/Family Comments/goals: Pt reported proprioception issues "Make me feel drunk!"    Vitals   Vitals at Rest  /74        Vitals With Activity  BP 99/68   HR 97   Pain: Pt experienced 3 waves of brief debilitating headaches during session even though she was given medication an hour before session. Pt was instructed to pause and take deep breaths with each onset.    Respiratory Status: Room air    Patients cultural, spiritual, Faith conflicts given the current situation: no       Objective:     Patient found up in chair upon OT entry to room.    Mobility   Patient completed:  Sit to Stand Transfer with supervision with rolling walker. Required substantial verbal cueing due to increased R sided lean   Stand to Sit Transfer with supervision with rolling walker. Pt required verbal cueing to reach back with arms of chair   Toilet Transfer Step Transfer technique with minimum assistance with rolling walker due to increased R sided lean.     Functional Mobility  Short in room FM from recliner <> toilet with RW and minimum assist due to increased R sided lean    ADLs   Current Status   Upper Body Dressing 4 SPV to sit up straight due to R sided lean    Lower Body Dressing 4 Pt required SPV for safety and sequencing - In standing with RW, after pulling underwear up, she attempted to doff shirt in standing with her pants still below hips. Demonstrated no safety awareness   Toileting Hygiene 4 SPV for safety due to R sided lean   Toilet Transfer 3 Pt required min assist due to increased R sided lean   Putting On, Taking Off Footwear 5 Set-up. Pt struggled with lacing so SOT " modified shoes laces and she was able to doff/don herself     Limiting Factors for ADLs: motor, sensory, endurance, limited ROM, balance, weakness, visual, perceptual, coordination, safety awareness, and pain     Patient left up in chair with call button in reach.     Education provided: Roles and goals of OT, ADLs, transfer training, body mechanics, sequencing, safety precautions, and fall prevention    Multidisciplinary Problems       Occupational Therapy Goals          Problem: Occupational Therapy    Goal Priority Disciplines Outcome Interventions   Occupational Therapy Goal     OT, PT/OT     Description: ADLs:  Pt to perform grooming tasks with independence standing at sink with RW by discharge  Pt to perform UB dressing with independence by discharge.   Pt to perform LB dressing with independence by discharge.  Pt to perform putting on/off footwear task with independence by discharge.   Pt to perform toileting with independence by d/c.   Pt to perform bathing with independence by d/c.    Functional Transfers:  Pt to perform toilet transfers with independence by d/c.     Pt to perform a tub transfer with independence by d/c.     IADLs:  Pt to perform simple meal prep with independence by d/c.      Balance, Strengthening, Endurance, Balance:  Pt to demonstrate good dynamic standing balance as required to perform ADL's from standing level.  Pt to demonstrate good BUE strength during functional task   Pt to demonstrate consistent adherence to breathing control and energy conservation techniques as educated by OT.                        Time Tracking     OT Received On: 06/10/25  Time In 0830     Time Out 0930  Total Time 60 min  Therapy Time: OT Individual: 45  Missed Time: 15 Minutes  Missed Time Reason: Patient fatigue    Billable Minutes: Self Care/Home Management 45    06/10/2025

## 2025-06-10 NOTE — PROGRESS NOTES
Inpatient Nutrition Assessment    Admit Date: 6/6/2025   Total duration of encounter: 4 days   Patient Age: 59 y.o.    Nutrition Recommendation/Prescription     -Diet Minced & Moist (IDDSI Level 5) Moderately Thick Liquids (IDDSI Level 3). Texture modifications per SLP.  -Rec'd keep diet liberalized until po intake improves.  -Encouraged adequate intake.  -Boost VHC TID (provides 530 kcal, 22 g protein per container).   -Must be moderately thickened per rehab staff.  -Magic Cup TID (provides 290 kcal, 9 g protein per container).  -Rec'd consider appetite stimulant as medically feasible if pt continues w/ inadequate intake.  -RD to monitor wt, labs, and po intake.    Communication of Recommendations: reviewed with patient    Nutrition Assessment     Malnutrition Assessment/Nutrition-Focused Physical Exam    Malnutrition Context: acute illness or injury (06/06/25 1306)  Malnutrition Level: severe (06/06/25 1306)  Energy Intake (Malnutrition): less than or equal to 50% for greater than or equal to 5 days (06/06/25 1306)  Weight Loss (Malnutrition): greater than 5% in 1 month (06/06/25 1306)  Subcutaneous Fat (Malnutrition): moderate depletion (06/06/25 1306)  Orbital Region (Subcutaneous Fat Loss): moderate depletion  Upper Arm Region (Subcutaneous Fat Loss): moderate depletion     Muscle Mass (Malnutrition): moderate depletion (06/06/25 1306)     Clavicle Bone Region (Muscle Loss): moderate depletion  Clavicle and Acromion Bone Region (Muscle Loss): moderate depletion                 Fluid Accumulation (Malnutrition): other (see comments) (Not present) (06/06/25 1306)     Hand  Strength, Right (Malnutrition): Measurably reduced for age/gender (06/06/25 1306)  A minimum of two characteristics is recommended for diagnosis of either severe or non-severe malnutrition.    Chart Review    Reason Seen: follow-up    Malnutrition Screening Tool Results   Have you recently lost weight without trying?: Yes: Unsure how  much  Have you been eating poorly because of a decreased appetite?: No   MST Score: 2   Diagnosis:  Subdural hematoma     Relevant Medical History:    Accelerated junctional rhythm      Agatston CAC score, <100      Anxiety disorder, unspecified      Asthma      COPD type A      Diabetes mellitus without complication      GERD (gastroesophageal reflux disease)      History of COVID-19      Insomnia      Lung nodule        Scheduled Medications:  atorvastatin, 80 mg, Daily  busPIRone, 15 mg, BID  clopidogreL, 75 mg, Daily  docusate sodium, 100 mg, BID  ezetimibe, 10 mg, Daily  ferrous sulfate, 1 tablet, Daily  levetiracetam, 500 mg, BID  levothyroxine, 88 mcg, Before breakfast  metFORMIN, 500 mg, BID WM  metoprolol tartrate, 12.5 mg, BID  nortriptyline, 25 mg, QHS  paroxetine, 20 mg, Daily    Continuous Infusions:   PRN Medications:  acetaminophen, 650 mg, Q6H PRN  albuterol-ipratropium, 3 mL, Q4H PRN  ALPRAZolam, 0.25 mg, TID PRN  benzonatate, 100 mg, TID PRN  bisacodyL, 10 mg, Daily PRN  butalbital-acetaminophen-caffeine -40 mg, 1 tablet, Q4H PRN  dextrose 50%, 12.5 g, PRN  dextrose 50%, 25 g, PRN  glucagon (human recombinant), 1 mg, PRN  glucose, 16 g, PRN  glucose, 24 g, PRN  hydrALAZINE, 10 mg, Q4H PRN  HYDROcodone-acetaminophen, 1 tablet, Q4H PRN  HYDROcodone-acetaminophen, 1 tablet, Daily PRN  hydrOXYzine pamoate, 50 mg, Nightly PRN  insulin aspart U-100, 0-5 Units, QID (AC + HS) PRN  labetalol, 10 mg, Q4H PRN  LORazepam, 2 mg, Q4H PRN  metoprolol, 10 mg, Q2H PRN  nitroGLYCERIN, 0.4 mg, Q5 Min PRN  ondansetron, 4 mg, Q6H PRN  ondansetron, 8 mg, Q8H PRN  ondansetron, 4 mg, Q8H PRN  polyethylene glycol, 17 g, BID PRN    Calorie Containing IV Medications: no significant kcals from medications at this time    Recent Labs   Lab 06/04/25  0515 06/07/25  0430 06/07/25  0641 06/09/25  0610    142  --  142   K 3.6 3.8  --  3.7   CALCIUM 8.7 9.1  --  9.3   PHOS 2.3  --   --  3.7   MG 2.00  --   --  1.70   CL  "106 107  --  104   CO2 27 26  --  28   BUN 9.4* 16.2  --  12.0   CREATININE 0.62 0.64  --  0.65   EGFRNORACEVR >60 >60  --  >60   * 174*  --  170*   BILITOT  --  0.2  --  0.4   ALKPHOS  --  141  --  139   ALT  --  32  --  26   AST  --  21  --  14   ALBUMIN  --  2.5*  --  2.7*   PREALB  --  20.6  --  20.9   WBC 6.99  --  8.64 6.23   HGB 11.1*  --  11.0* 11.1*   HCT 34.5*  --  33.9* 35.3*     Nutrition Orders:  Diet Minced & Moist (IDDSI Level 5) Moderately Thick Liquids (IDDSI Level 3)  Dietary nutrition supplements All Meals; Magic Cup - Any flavor, Boost Very High Calorie Nutritional Drink - Strawberry    Appetite/Oral Intake: fair/50-75% of meals  Factors Affecting Nutritional Intake: difficulty/impaired swallowing and dislike of food on diet  Social Needs Impacting Access to Food: none identified  Food/Caodaism/Cultural Preferences: likes red beans and rice, gumbo, scrambled eggs  Food Allergies: none reported  Last Bowel Movement: 06/09/25  Wound(s):  none noted    Comments    6/6/25: Pt reports decreased appetite and intake since hospital admit 2/2 disliking the food on current diet. Reports she does like certain things served and eats what she can; food preferences noted. Reports she has been drinking the Boost and eating Magic Cup and enjoys both. Pt reports recent unintentional wt loss, noted about an 8% wt loss in last month. Moderate muscle and fat wasting noted per NFPE. Pt denies any gi symptoms at this time, says she is having regular Bms. Labs/meds reviewed. Encouraged adequate intake.     6/10/25: Pt continues w/ fair intake, still not a big fan of food on SBS6. Still drinking Boost and eating magic Cup. Encourage adequate intake. SLP following. No reported n/v/d/c. Consider appetite stimulant as medically appropriate. Labs/meds reviewed.    Anthropometrics    Height: 5' 6" (167.6 cm), Height Method: Stated  Last Weight: 46.9 kg (103 lb 6.3 oz) (06/06/25 1122), Weight Method: Standard " Scale  BMI (Calculated): 16.7  BMI Classification: underweight (BMI less than 18.5)     Ideal Body Weight (IBW), Female: 130 lb     % Ideal Body Weight, Female (lb): 79.54 %                    Usual Body Weight (UBW), k.3 kg  % Usual Body Weight: 93.44     Usual Weight Provided By: patient and EMR weight history    Wt Readings from Last 5 Encounters:   25 46.9 kg (103 lb 6.3 oz)   25 46.3 kg (102 lb)   05/15/25 49 kg (108 lb 0.4 oz)   25 50.8 kg (112 lb)   25 50.3 kg (110 lb 14.3 oz)     Weight Change(s) Since Admission:   : 46.9 kg  6/10: no new wts  Wt Readings from Last 1 Encounters:   25 1122 46.9 kg (103 lb 6.3 oz)   Admit Weight: 46.9 kg (103 lb 6.3 oz) (25 1122), Weight Method: Standard Scale    Estimated Needs    Weight Used For Calorie Calculations: 46.9 kg (103 lb 6.3 oz)  Energy Calorie Requirements (kcal): 2247-6243 kcal (30-35 kcal/kg)  Energy Need Method: Kcal/kg  Weight Used For Protein Calculations: 47 kg (103 lb 9.5 oz)  Protein Requirements: 67-71 g (1.2-1.5 g/kg)  Fluid Requirements (mL): 1407 mL/d (1 mL/kcal)  CHO Requirement: 312041 g/d (45-55% of EER)     Enteral Nutrition     Patient not receiving enteral nutrition support at this time.    Parenteral Nutrition     Patient not receiving parenteral nutrition support at this time.    Evaluation of Received Nutrient Intake    Calories: not meeting estimated needs  Protein: meeting estimated needs    Patient Education     Not applicable.    Nutrition Diagnosis     PES: Inadequate oral intake related to acute illness as evidenced by pt reports decreased intake 2/2 dislike of food. (active)     PES: Severe acute disease or injury related malnutrition Related to acute illness As Evidenced by:  - weight loss: > 5% in 1 month - energy intake: <= 50% for 8 days (meets criteria for <= 50% >= 5 days (severe - acute)) - muscle mass depletion: 2 areas of moderate muscle loss (Acromion, Clavicle) - loss of  subcutaneous fat: 2 areas of moderate fat loss (Triceps Skinfold, Infraorbital) -  strength: Measurably reduced for age/gender new    Nutrition Interventions     Intervention(s): modified composition of meals/snacks, commercial beverage, and collaboration with other providers  Intervention(s): Oral nutritional supplement    Goal: Maintain weight throughout hospitalization. (goal progressing)  Goal: Consume % of meals/snacks by follow-up. (goal progressing)    Nutrition Goals & Monitoring     Dietitian will monitor: food and beverage intake, weight, electrolyte/renal panel, glucose/endocrine profile, and gastrointestinal profile  Discharge planning: diabetic diet with texture modifications per SLP + Boost or equivalent   Nutrition Risk/Follow-Up: patient at increased nutrition risk; dietitian will follow-up twice weekly   Please consult if re-assessment needed sooner.

## 2025-06-10 NOTE — PT/OT/SLP PROGRESS
Recreational Therapy Treatment    Date of Treatment: 06/10/25  Start Time: 1300  Stop Time: 1330  Total Time: 30 min  Missed Time:     General Precautions: fall, aspiration (minced and moist diet, moderately thick liquid)  Ortho Precautions: N/A  Braces: N/A    Vitals   Vitals at Rest  BP 97/63   HR 95   O2 Sat    Pain      Vitals With Activity  /73   HR 99   O2 Sat    Pain        Treatment     Cognitive Skills Building   Cognitive Observation Activity Assist Position Equipment Response            Comment:          Dynamic Activities   Activity Assist Position Equipment Response   Activity 1 Washer toss stand by assistance and contact guard assistance Standing Rolling walker and Metal washers fair   Comment: Sit to stand was contact guard/supervision as was dynamic standing balance/reaching. She had an increased lean to R this PM.   Standing tolerance was 3 minutes with sitting rest break.  RUE coordination was supervision.  LUE coordination was setup.  Memory and sequencing skills were supervision.    She focused on her diet and wanting thin liquids.  Also, she said she was ready to go home and would like to leave by Friday.        Fine Motor Activities   Activity Assist Position Equipment Response           Comment:          Additional Info: Bed to w/c transfer was contact guard    Goals     Short Term Goals    Goal  Goal Status   Will increase sit to stand to supervision Met   Will improve dynamic standing balance/reaching to supervision Progressing   Will follow 2-3 step directions at supervision Progressing           Long Term Goals    Goal Goal Status   Will increase standing tolerance to5 minutes Progressing   Will improve dynamic standing balance/reaching to setup Progressing   Will follow multi-step directions to setup Progressing

## 2025-06-10 NOTE — PLAN OF CARE
Problem: Rehabilitation (IR[facility]) Plan of Care  Goal: Absence of New-Onset Illness or Injury  Outcome: Progressing  Goal: Optimal Comfort and Wellbeing  Outcome: Progressing     Problem: Wound  Goal: Absence of Infection Signs and Symptoms  Outcome: Progressing  Goal: Optimal Pain Control and Function  Outcome: Progressing  Goal: Optimal Wound Healing  Outcome: Progressing     Problem: Fall Injury Risk  Goal: Absence of Fall and Fall-Related Injury  Outcome: Progressing

## 2025-06-10 NOTE — PT/OT/SLP PROGRESS
Physical Therapy Inpatient Rehab Treatment    Patient Name:  Chelle Chandra   MRN:  [id number]    Recommendations:     Discharge Recommendations:  Low Intensity Therapy   Discharge Equipment Recommendations:     Barriers to discharge: Impaired functional mobility     Assessment:     Chelle Chandra is a 59 y.o. female admitted with a medical diagnosis of Acute right-sided weakness.  She presents with the following impairments/functional limitations:  weakness, impaired endurance, impaired self care skills, impaired functional mobility, gait instability, impaired balance, decreased coordination, decreased upper extremity function, decreased lower extremity function, decreased safety awareness, pain, impaired coordination .    Rehab Diagnosis: L Subdural Hematoma    General Precautions: Standard, fall, aspiration, seizure, other (see comments) (BP < 140/90)     Orthopedic Precautions:N/A     Braces: N/A    Rehab Prognosis: Good; patient would benefit from acute skilled PT services to address these deficits and reach maximum level of function.      History:     Past Medical History:   Diagnosis Date    Accelerated junctional rhythm     Agatston CAC score, <100     Anxiety disorder, unspecified     Asthma     COPD type A     Diabetes mellitus without complication     GERD (gastroesophageal reflux disease)     History of COVID-19     Insomnia     Lung nodule        Past Surgical History:   Procedure Laterality Date    ANGIOGRAM, CORONARY, WITH LEFT HEART CATHETERIZATION      BACK SURGERY      BREAST LUMPECTOMY Right     CARDIOVERSION  08/01/2013    CARPAL TUNNEL RELEASE  10/23/2013    CRANIOTOMY FOR EVACUATION OF SUBDURAL HEMATOMA Left 05/19/2025    Procedure: CRANIOTOMY, FOR SUBDURAL HEMATOMA EVACUATION;  Surgeon: Ricardo Shelley MD;  Location: [facility];  Service: Neurosurgery;  Laterality: Left;    CREATION OF TERRI HOLE WITH EVACUATION OF HEMATOMA Left 6/2/2025    Procedure: CREATION, CRANIAL TERRI HOLE, WITH  HEMATOMA EVACUATION;  Surgeon: James Rankin MD;  Location: [facility];  Service: Neurosurgery;  Laterality: Left;  LEFT BURRHOLE FOR SUBDURAL HEMATOMA EVACUATION // STEALTH // SHERRI SKYTRON // DRILL    FUSION OF CERVICAL SPINE BY ANTERIOR APPROACH USING COMPUTER-ASSISTED NAVIGATION  06/10/2019    KIDNEY SURGERY      TONSILLECTOMY AND ADENOIDECTOMY      TOTAL ABDOMINAL HYSTERECTOMY      WRIST SURGERY         Subjective     Patient comments: N/A    Respiratory Status: Room air    Patients cultural, spiritual, Confucianist conflicts given the current situation: no    Objective:     Communicated with TR prior to session.  Patient found up in chair with peripheral IV  upon PT entry to room.    Pt is Oriented x3 and Alert, Cooperative, and Motivated.    Vitals   Vitals:    06/10/25 0915 06/10/25 0930 06/10/25 0935 06/10/25 0940   BP: 99/68 98/66 97/64 110/70   Patient Position:  Sitting Standing Sitting   Pulse: 97 66 98 98   Resp:       Temp:       TempSrc:       SpO2:       Weight:       Height:              Functional Mobility:     Dynamic standing balance with solo step    Ambulation without AD occasional LOB with min A to recover      Current   Status  Discharge   Goal   Functional Area: Care Score:    Roll Left and Right   Independent   Sit to Lying   Independent   Lying to Sitting on Side of Bed   Independent   Sit to Stand 4 Independent   Chair/Bed-to-Chair Transfer 4 Independent   Car Transfer   Independent   Walk 10 Feet 3 Independent   Walk 50 Feet with Two Turns 3 Independent   Walk 150 Feet 3  ~190' with RW overall CGA to Min A. R sided lean, scissoring, decreased step length and clearance on RLE. Noted occasional Fwd head posture. VC to correct. Min A at times for standing balance.          Therapeutic Activities and Exercises:  Patient educated on role of acute care PT and PT POC and safety while in hospital including calling nurse for mobility  Patient educated about importance of OOB mobility and  "remaining up in chair most of the day.      Activity Tolerance: Good    Patient left up in chair with all lines intact and call button in reach.    Education provided: roles and goals of PT/PTA, transfer training, bed mob, gait training, balance training, safety awareness, assistive device, and fall prevention    Expected compliance: High compliance    Plan:     During this hospitalization, patient to be seen 5 x/week to address the identified rehab impairments via gait training, therapeutic activities, therapeutic exercises, neuromuscular re-education and progress toward the following goals:    GOALS:   Multidisciplinary Problems       Physical Therapy Goals          Problem: Physical Therapy    Goal Priority Disciplines Outcome Interventions   Physical Therapy Goal     PT, PT/OT Progressing    Description: Bed Mobility:  Roll left and right independently.   Sit to supine transfer independently.   Supine to sit transfer independently.     Transfers:  Sit to stand transfer independently using RW.   Bed to chair transfer independently Stand Step  using RW.   Car transfer independently using RW.    an object from the ground in standing position independently using RW.     Mobility:  Ambulate 250 feet independently using RW.  Ambulate 15 feet on uneven surfaces/ramps independently using RW.   Pt ascended/descended a 6" inch curb independently using RW.   Ascend/descend 4 stairs independently using left handrail.                         Plan of Care Expires:  06/13/25  PT Next Visit Date: 06/13/25  Plan of Care reviewed with: patient    Additional Information:         Time Tracking:     Therapy Time  PT Received On: 06/10/25  PT Start Time: 0930  PT Stop Time: 1030  PT Total Time (min): 60 min   PT Individual: 60  Missed Time:    Time Missed due to:      Billable Minutes: Gait Training 30 and Therapeutic Activity 30    06/10/2025  "

## 2025-06-10 NOTE — PROGRESS NOTES
[facility] ([facility])  Rehab Progress Note    Patient Name: Chelle Chandra  MRN: [id number]  Age: 59 y.o. Sex: female  : 1965  Hospital Length of Stay: 4 days  Date of Service: 6/10/2025   Chief Complaint: Subdural hematoma s/p left craniotomy with hematoma evacuation on , s/p left diony hole drainage of SDH on 2025     Subjective:     Basic Information  Admit Information: 59-year-old white female presented to [facility] ED on 05/15/2025 after multiple falls.  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  JOHNATHAN drain removed on .  Discharge home with home health on ... Presented to [facility] ED on  with headache, slurred speech, facial droop, and right-sided weakness.  CTA head and neck significant for suspected diminished flow inferior division of left MCA close to bifurcation.  CT head significant cord left cerebral convexity hypodense subacute hematoma and improved postsurgical pneumocephalus.  Transferred to [facility] for neurology/neurosurgery consultation.  Not a candidate for TNK due to recent intracranial hemorrhage.  Neurology recommended holding Plavix in initiating Keppra 500 mg b.i.d..  EKG noted for evidence of any epileptiform discharges.  MRI brain with no evidence of new infarct.  MBS completed.  Speech 30 recommended moderately thickened liquids due to high risk of aspiration.  Neurosurgery evaluated him and recommended for drainage.  Tolerated left diony hole for drainage of SDH due to increased intracranial pressure with mass effect on  without perioperative complications.  Repeat CT head significant for removal of SDH.  Reported increase strength to right arm.  Headache improved.  Recommended high-intensity therapy.  Speech therapy recommended minced and  "moist diet with moderately thickened liquids.  Plavix resumed per Neurosurgery recommendations on 06/05.  Tolerated transfer to [facility] inpatient rehab unit on 06/06 without incident.  Today's Information: No acute events overnight.  Sitting up in chair.  Reports good sleep and appetite.  Last BM 6/9.  Vital signs overall at goal with no recorded fevers.  No new labs or imaging today.    Review of patient's allergies indicates:   Allergen Reactions    Aspirin     Ketorolac     Penicillins     Sulfa (sulfonamide antibiotics)     Ozempic [semaglutide] Other (See Comments)     Abdominal pain      Current Medications[1]     Review of Systems   Complete 12-point review of symptoms negative except for what's mentioned in HPI     Objective:     /71   Pulse 92   Temp 97.4 °F (36.3 °C) (Oral)   Resp 18   Ht 5' 6" (1.676 m)   Wt 46.9 kg (103 lb 6.3 oz)   SpO2 (!) 94%   BMI 16.69 kg/m²      Physical Exam  Constitutional:       General: She is not in acute distress.     Appearance: She is underweight.   HENT:      Head:      Comments: Left cranial incision sites clean intact  Eyes:      Pupils: Pupils are equal, round, and reactive to light.      Comments: Wearing corrective lenses   Cardiovascular:      Rate and Rhythm: Regular rhythm. Tachycardia present.   Pulmonary:      Effort: No respiratory distress.      Breath sounds: No wheezing or rhonchi.   Abdominal:      General: Bowel sounds are normal.      Palpations: Abdomen is soft.      Tenderness: There is no abdominal tenderness. There is no guarding.   Musculoskeletal:         General: No deformity.      Cervical back: Neck supple.      Right lower leg: No edema.      Left lower leg: No edema.      Comments: Diffuse muscle atrophy with right sided weakness   Skin:     General: Skin is warm.   Neurological:      Motor: Weakness present.   Psychiatric:         Mood and Affect: Mood normal.         Behavior: Behavior normal.         Thought Content: Thought " content normal.         Judgment: Judgment normal.     *MD performed and documented physical examination       Lines/Drains/Airways       None               Labs  Recent Results (from the past 24 hours)   POCT glucose    Collection Time: 06/09/25  4:47 PM   Result Value Ref Range    POCT Glucose 160 (H) 70 - 110 mg/dL   POCT glucose    Collection Time: 06/10/25  6:31 AM   Result Value Ref Range    POCT Glucose 137 (H) 70 - 110 mg/dL     Radiology  CT head without contrast on 06/03/2025, IMPRESSION:  Significant interval reduction in volume of the subdural hematoma tracking along the left cerebral convexity status post evacuation.  Radiology   Transthoracic echo on 05/30/2025, IMPRESSION:  LVEF 55-60%.  Right ventricle is normal in size, systolic function is normal.  Normal venous pressure at 3 mm Hg.  Radiology   MRI brain without contrast on 05/29/2025, IMPRESSION:  There is no diffusion weighted restriction or signal dropout on ADC map to indicate an acute infarct.  There is left middle cerebral artery territory encephalomalacia combined with gliotic changes related to old infarct.  Chronic microvascular ischemic changes or mild with periventricular and deep white matter T2 FLAIR hyperintense signals.  There is no acute intracranial hemorrhage, hydrocephalus, midline shift or mass effect.  Left cerebral convexity chronic subdural hematoma or hygroma is with similar appearance.  There are several left pleural base calcifications which showed gradient echo sequence dephasing artifacts.  Radiology  Carotid ultrasound on 05/29/2025:  IMPRESSION:  Right ICA is patent with less than 50% stenosis.  Left ICA is patent with less than 50% stenosis.  Bilateral vertebral arteries are patent with antegrade flow.    Assessment/Plan:     59 y.o. WF admitted on 6/6/2025    Subdural hematoma   - s/p left craniotomy with hematoma evacuation on 05/19  - s/p left diony hole drainage of SDH on 06/02/2025  - repeat CT head with  resolution of SDH  - sutures removed on 06/09  - continue                Keppra 500 mg b.i.d.     Major depressive disorder  - stable  - continue                Nortriptyline 25 mg at bedtime                 Paxil 20 mg daily     Generalized anxiety disorder  - stable  - continue                BuSpar 15 mg b.i.d.     HLD  - FLP outpatient  - continue                Lipitor 80 mg daily                 Zetia 10 mg daily      History of CVA  - stable  - not on ASA due to allergy  - continue                Plavix 75 mg daily                Lipitor 80 mg daily     Hypothyroidism  - TSH with next labs  - continue                Levothyroxine 88 mcg before breakfast      HTN  - BP at goal  - continue                Metoprolol tartrate 12.5 mg b.i.d. (change 6/10 due to swallowing)                Hydralazine 10 mg every 2 hours as needed for BP > 160/90                Labetalol 10 mg every 2 hours as needed for BP > 160/90  - low sodium diet    Tachycardia  - active  - continue    Metoprolol tartrate 12.5 mg b.i.d. (change 6/10 due to swallowing)     Iron-deficiency anemia  - asymptomatic  - iron level low  - continue                Ferrous sulfate 325 mg daily (initiated 6/7)  - H/H stable   - no evidence of active bleeds  - will closely monitor and transfuse if needed      Thrombocytosis  - stable  - continue to monitor     Constipation  - stable   - continue  Colace 100 mg BID   Miralax 17 gm BID PRN  - encourage oral fluid hydration     DM type II  - HgA1c with next labs  - continue                Metformin 500 mg twice daily                ISS   - CBGs AC/HS     Moderate protein calorie malnutrition  - active  - encourage oral nutrition  - registered dietitian following     VTE Prophylaxis: SCDs  COVID-19 testing:  Unknown  COVID-19 vaccination status:  Vaccinated (Pfizer):  06/12/2021, 07/14/2021     POA:  No  Living will:  No  Contacts:  Roberto Chandra ([facility]  [phone]     CODE STATUS:  Full code  Internal  Medicine (attending): Aldair Armenta MD  Physiatry (consulting):  Lazarus Mills MD     OUTPATIENT PROVIDERS  PCP: Jorge Silver MD   Neurosurgery: James Rankin MD  Neurology: LUIS Berumen     DISPOSITION:  Sleep hygiene, bowel maintenance, and appetite at goal.  Vital signs at goal with no recorded fevers.  No new labs or imaging today.  Limited by swallowing.  Recommend transitioning metoprolol succinate to metoprolol tartrate 12.5 mg b.i.d..  Continue aggressive mobilization as tolerated.  Monitor closely.  Notify of acute changes.    Shaheen Nava NP conducted independent physical examination and assisted with medical documentation.    Total time spent on this encounter including chart review and direct MD + NP 1-on-1 patient interaction: 51 minutes   Over 50% of this time was spent in counseling and coordination of care            [1]   Current Facility-Administered Medications:     acetaminophen tablet 650 mg, 650 mg, Oral, Q6H PRN, Elijah, Robin A, FNP, 650 mg at 06/09/25 0756    albuterol-ipratropium 2.5 mg-0.5 mg/3 mL nebulizer solution 3 mL, 3 mL, Nebulization, Q4H PRN, Jake Paulino MD    ALPRAZolam tablet 0.25 mg, 0.25 mg, Oral, TID PRN, Elijah, Robin A, FNP    atorvastatin tablet 80 mg, 80 mg, Oral, Daily, Elijah, Robin A, FNP, 80 mg at 06/10/25 0738    benzonatate capsule 100 mg, 100 mg, Oral, TID PRN, Elijah, Robin A, FNP    busPIRone tablet 15 mg, 15 mg, Oral, BID, Elijah, Robin A, FNP, 15 mg at 06/10/25 0737    butalbital-acetaminophen-caffeine -40 mg per tablet 1 tablet, 1 tablet, Oral, Q4H PRN, Jake Paulino MD, 1 tablet at 06/10/25 0816    clopidogreL tablet 75 mg, 75 mg, Oral, Daily, Elijah, Robin A, FNP, 75 mg at 06/10/25 0737    dextrose 50% injection 12.5 g, 12.5 g, Intravenous, PRN, Elijah, Robin A, FNP    dextrose 50% injection 25 g, 25 g, Intravenous, PRN, Robin Nava FNP    docusate sodium capsule 100 mg, 100 mg, Oral, BID,  Elijah, Robin A, FNP, 100 mg at 06/10/25 0737    ezetimibe tablet 10 mg, 10 mg, Oral, Daily, Elijah, Robin A, FNP, 10 mg at 06/10/25 0738    ferrous sulfate tablet 1 each, 1 tablet, Oral, Daily, Elijah, Robin A, FNP, 1 each at 06/10/25 0738    glucagon (human recombinant) injection 1 mg, 1 mg, Intramuscular, PRN, Elijah, Robin A, FNP    glucose chewable tablet 16 g, 16 g, Oral, PRN, Elijah, Robin A, FNP    glucose chewable tablet 24 g, 24 g, Oral, PRN, Elijah, Robin A, FNP    hydrALAZINE injection 10 mg, 10 mg, Intravenous, Q4H PRN, [place], Nadine A, FNP    HYDROcodone-acetaminophen  mg per tablet 1 tablet, 1 tablet, Oral, Q4H PRN, Jake Paulino MD, 1 tablet at 06/09/25 1105    HYDROcodone-acetaminophen 5-325 mg per tablet 1 tablet, 1 tablet, Oral, Daily PRN, Elijah, Robin A, FNP, 1 tablet at 06/06/25 1218    hydrOXYzine pamoate capsule 50 mg, 50 mg, Oral, Nightly PRN, Elijah, Robin A, FNP    insulin aspart U-100 injection 0-5 Units, 0-5 Units, Subcutaneous, QID (AC + HS) PRN, Elijah, Robin A, FNP, 1 Units at 06/07/25 2003    labetalol 20 mg/4 mL (5 mg/mL) IV syring, 10 mg, Intravenous, Q4H PRN, [place], Nadine A, FNP    levetiracetam 500 mg/5 mL (5 mL) liquid Soln 500 mg, 500 mg, Oral, BID, [place], Nadine A, FNP, 500 mg at 06/10/25 0737    levothyroxine tablet 88 mcg, 88 mcg, Oral, Before breakfast, Elijah, Robin A, FNP, 88 mcg at 06/10/25 0637    LORazepam injection 2 mg, 2 mg, Intravenous, Q4H PRN, Elijah, Robin A, FNP    metFORMIN tablet 500 mg, 500 mg, Oral, BID WM, Elijah, Robin A, FNP, 500 mg at 06/10/25 0738    metoprolol injection 10 mg, 10 mg, Intravenous, Q2H PRN, Elijah, Robin A, FNP    metoprolol succinate (TOPROL-XL) 24 hr tablet 25 mg, 25 mg, Oral, Daily, Elijah, Robin A, FNP, 25 mg at 06/10/25 0738    nitroGLYCERIN SL tablet 0.4 mg, 0.4 mg, Sublingual, Q5 Min PRN, Elijah, Robin A, FNP    nortriptyline capsule 25 mg, 25  mg, Oral, QHS, Elijah, Robin A, FNP, 25 mg at 06/09/25 2031    ondansetron disintegrating tablet 4 mg, 4 mg, Oral, Q6H PRN, Elijah, Robin A, FNP    ondansetron disintegrating tablet 8 mg, 8 mg, Oral, Q8H PRN, Elijah, Robin A, FNP    ondansetron injection 4 mg, 4 mg, Intravenous, Q8H PRN, Elijah, Robin A, FNP    paroxetine tablet 20 mg, 20 mg, Oral, Daily, Elijah, Robin A, FNP, 20 mg at 06/10/25 0738    polyethylene glycol packet 17 g, 17 g, Oral, BID PRN, Elijah, Robin A, FNP, 17 g at 06/09/25 1340

## 2025-06-10 NOTE — PT/OT/SLP PROGRESS
[facility] - Rehabilitation Unit  Speech Language Pathology Department  Dysphagia Therapy Progress Note    Patient Name:  Chelle Chandra   MRN:  [id number]  Admitting Diagnosis: Acute right-sided weakess    Recommendations     General recommendations:  dysphagia therapy  Solid texture recommendation:  Minced & Moist Diet - IDDSI Level 5  Liquid consistency recommendation: Moderately thick/Honey thick liquids - IDDSI Level 3   Medications: crushed in puree  Aspiration precautions: small bites/sips, slow rate, and alternate solids/liquids    Discharge therapy intensity: High Intensity Therapy   Barriers to safe discharge:  severity of impairment    Subjective     Patient awake, alert, and cooperative.  Spiritual/Cultural/Orthodoxy Beliefs/Practices that affect care: no    Pain/Comfort:  0/10    Respiratory Status:  room air    Objective     Therapeutic Activities:  Pt completed base of tongue exercises x70 with minimal cues.  Pt completed laryngeal exercises x90 with minimal-moderate cues.    Assessment     Pt continues to present with oropharyngeal dysphagia requiring diet modification to reduce the risk of aspiration.    Outcome Measures     Functional Oral Intake Scale: 5 - Total oral diet with multiple consistencies, by requiring special preparation or compensations    Goals     Multidisciplinary Problems       SLP Goals          Problem: SLP    Goal Priority Disciplines Outcome   SLP Goal     SLP Progressing   Description: LTG: The pt will tolerate least restrictive diet with no overt s/sx of aspiration.    STGs:  The pt will complete tongue base/laryngeal elevation exercises with occ cues.  The pt will tolerate 4 oz of mildly liquids via cup with no overt s/sx of aspiration.  The pt will tolerate half meal of soft and bite-sized solids with no overt s/sx of aspiration.                       Patient Education     Patient provided with verbal education regarding SLP POC.   Understanding was verbalized, however additional teaching warranted.    Plan     Will continue to follow and tx as appropriate.    SLP Follow-Up:  Yes   Patient to be seen:  5 x/week   Plan of Care expires:  06/14/25  Plan of Care reviewed with:  patient       Time Tracking     SLP Treatment Date:   06/10/25  Speech Start Time:  1130  Speech Stop Time:  1200     Speech Total Time (min):  30 min    Billable minutes:  Treatment of Swallow Dysfunction, 30 minutes       06/10/2025

## 2025-06-11 ENCOUNTER — HOSPITAL ENCOUNTER (INPATIENT)
Facility: HOSPITAL | Age: 60
LOS: 34 days | Discharge: NURSING, REHAB OR LONG-TERM CARE | DRG: 856 | End: 2025-07-15
Attending: EMERGENCY MEDICINE | Admitting: INTERNAL MEDICINE
Payer: COMMERCIAL

## 2025-06-11 VITALS
TEMPERATURE: 98 F | HEART RATE: 92 BPM | SYSTOLIC BLOOD PRESSURE: 109 MMHG | WEIGHT: 103.38 LBS | BODY MASS INDEX: 16.61 KG/M2 | OXYGEN SATURATION: 95 % | HEIGHT: 66 IN | RESPIRATION RATE: 18 BRPM | DIASTOLIC BLOOD PRESSURE: 68 MMHG

## 2025-06-11 DIAGNOSIS — S06.5X0A: ICD-10-CM

## 2025-06-11 DIAGNOSIS — R53.1 RIGHT SIDED WEAKNESS: Primary | ICD-10-CM

## 2025-06-11 DIAGNOSIS — S01.90XA: ICD-10-CM

## 2025-06-11 DIAGNOSIS — G06.0 ABSCESS OF BRAIN: ICD-10-CM

## 2025-06-11 DIAGNOSIS — R60.9 SWELLING: ICD-10-CM

## 2025-06-11 DIAGNOSIS — G93.89 SUBDURAL FLUID COLLECTION: ICD-10-CM

## 2025-06-11 DIAGNOSIS — R07.9 CHEST PAIN: ICD-10-CM

## 2025-06-11 LAB
ALBUMIN SERPL-MCNC: 2.9 G/DL (ref 3.4–4.8)
ALBUMIN/GLOB SERPL: 0.6 RATIO (ref 1.1–2)
ALP SERPL-CCNC: 160 UNIT/L (ref 40–150)
ALT SERPL-CCNC: 20 UNIT/L (ref 0–55)
ANION GAP SERPL CALC-SCNC: 14 MEQ/L
APTT PPP: 29.2 SECONDS (ref 23.2–33.7)
AST SERPL-CCNC: 12 UNIT/L (ref 11–45)
BASOPHILS # BLD AUTO: 0.07 X10(3)/MCL
BASOPHILS NFR BLD AUTO: 0.9 %
BILIRUB SERPL-MCNC: 0.3 MG/DL
BUN SERPL-MCNC: 14.1 MG/DL (ref 9.8–20.1)
CALCIUM SERPL-MCNC: 9.7 MG/DL (ref 8.4–10.2)
CHLORIDE SERPL-SCNC: 104 MMOL/L (ref 98–107)
CO2 SERPL-SCNC: 24 MMOL/L (ref 23–31)
CREAT SERPL-MCNC: 0.6 MG/DL (ref 0.55–1.02)
CREAT/UREA NIT SERPL: 24
EOSINOPHIL # BLD AUTO: 0.17 X10(3)/MCL (ref 0–0.9)
EOSINOPHIL NFR BLD AUTO: 2.2 %
ERYTHROCYTE [DISTWIDTH] IN BLOOD BY AUTOMATED COUNT: 14.1 % (ref 11.5–17)
GFR SERPLBLD CREATININE-BSD FMLA CKD-EPI: >60 ML/MIN/1.73/M2
GLOBULIN SER-MCNC: 4.7 GM/DL (ref 2.4–3.5)
GLUCOSE SERPL-MCNC: 96 MG/DL (ref 82–115)
HCT VFR BLD AUTO: 36.5 % (ref 37–47)
HGB BLD-MCNC: 11.8 G/DL (ref 12–16)
IMM GRANULOCYTES # BLD AUTO: 0.03 X10(3)/MCL (ref 0–0.04)
IMM GRANULOCYTES NFR BLD AUTO: 0.4 %
INR PPP: 1
LYMPHOCYTES # BLD AUTO: 2.3 X10(3)/MCL (ref 0.6–4.6)
LYMPHOCYTES NFR BLD AUTO: 30.1 %
MCH RBC QN AUTO: 30 PG (ref 27–31)
MCHC RBC AUTO-ENTMCNC: 32.3 G/DL (ref 33–36)
MCV RBC AUTO: 92.9 FL (ref 80–94)
MONOCYTES # BLD AUTO: 0.67 X10(3)/MCL (ref 0.1–1.3)
MONOCYTES NFR BLD AUTO: 8.8 %
NEUTROPHILS # BLD AUTO: 4.4 X10(3)/MCL (ref 2.1–9.2)
NEUTROPHILS NFR BLD AUTO: 57.6 %
NRBC BLD AUTO-RTO: 0 %
PLATELET # BLD AUTO: 634 X10(3)/MCL (ref 130–400)
PMV BLD AUTO: 9.9 FL (ref 7.4–10.4)
POCT GLUCOSE: 124 MG/DL (ref 70–110)
POCT GLUCOSE: 148 MG/DL (ref 70–110)
POTASSIUM SERPL-SCNC: 4 MMOL/L (ref 3.5–5.1)
PROT SERPL-MCNC: 7.6 GM/DL (ref 5.8–7.6)
PROTHROMBIN TIME: 13.1 SECONDS (ref 12.5–14.5)
RBC # BLD AUTO: 3.93 X10(6)/MCL (ref 4.2–5.4)
SODIUM SERPL-SCNC: 142 MMOL/L (ref 136–145)
WBC # BLD AUTO: 7.64 X10(3)/MCL (ref 4.5–11.5)

## 2025-06-11 PROCEDURE — 80053 COMPREHEN METABOLIC PANEL: CPT | Performed by: EMERGENCY MEDICINE

## 2025-06-11 PROCEDURE — 99285 EMERGENCY DEPT VISIT HI MDM: CPT | Mod: 25

## 2025-06-11 PROCEDURE — 94799 UNLISTED PULMONARY SVC/PX: CPT

## 2025-06-11 PROCEDURE — 18500000 HC LEAVE OF ABSENCE HOSPITAL SERVICES

## 2025-06-11 PROCEDURE — 25000003 PHARM REV CODE 250: Performed by: INTERNAL MEDICINE

## 2025-06-11 PROCEDURE — 99900031 HC PATIENT EDUCATION (STAT)

## 2025-06-11 PROCEDURE — 25000003 PHARM REV CODE 250: Performed by: NURSE PRACTITIONER

## 2025-06-11 PROCEDURE — 97535 SELF CARE MNGMENT TRAINING: CPT

## 2025-06-11 PROCEDURE — 94761 N-INVAS EAR/PLS OXIMETRY MLT: CPT

## 2025-06-11 PROCEDURE — 85730 THROMBOPLASTIN TIME PARTIAL: CPT | Performed by: EMERGENCY MEDICINE

## 2025-06-11 PROCEDURE — 85610 PROTHROMBIN TIME: CPT | Performed by: EMERGENCY MEDICINE

## 2025-06-11 PROCEDURE — 82962 GLUCOSE BLOOD TEST: CPT

## 2025-06-11 PROCEDURE — 92523 SPEECH SOUND LANG COMPREHEN: CPT

## 2025-06-11 PROCEDURE — 85025 COMPLETE CBC W/AUTO DIFF WBC: CPT | Performed by: EMERGENCY MEDICINE

## 2025-06-11 PROCEDURE — 11000001 HC ACUTE MED/SURG PRIVATE ROOM

## 2025-06-11 RX ORDER — ACETAMINOPHEN 325 MG/1
650 TABLET ORAL EVERY 4 HOURS PRN
Status: DISCONTINUED | OUTPATIENT
Start: 2025-06-11 | End: 2025-06-11

## 2025-06-11 RX ORDER — NALOXONE HCL 0.4 MG/ML
0.02 VIAL (ML) INJECTION
Status: DISCONTINUED | OUTPATIENT
Start: 2025-06-11 | End: 2025-07-15 | Stop reason: HOSPADM

## 2025-06-11 RX ORDER — SODIUM CHLORIDE 0.9 % (FLUSH) 0.9 %
3 SYRINGE (ML) INJECTION EVERY 12 HOURS PRN
Status: DISCONTINUED | OUTPATIENT
Start: 2025-06-11 | End: 2025-07-15 | Stop reason: HOSPADM

## 2025-06-11 RX ORDER — IBUPROFEN 200 MG
24 TABLET ORAL
Status: DISCONTINUED | OUTPATIENT
Start: 2025-06-11 | End: 2025-06-11

## 2025-06-11 RX ORDER — BUTALBITAL, ACETAMINOPHEN AND CAFFEINE 50; 325; 40 MG/1; MG/1; MG/1
1 TABLET ORAL EVERY 4 HOURS PRN
Status: DISCONTINUED | OUTPATIENT
Start: 2025-06-11 | End: 2025-07-15 | Stop reason: HOSPADM

## 2025-06-11 RX ORDER — GLUCAGON 1 MG
1 KIT INJECTION
Status: DISCONTINUED | OUTPATIENT
Start: 2025-06-11 | End: 2025-06-23

## 2025-06-11 RX ORDER — INSULIN ASPART 100 [IU]/ML
0-10 INJECTION, SOLUTION INTRAVENOUS; SUBCUTANEOUS
Status: DISCONTINUED | OUTPATIENT
Start: 2025-06-11 | End: 2025-06-23

## 2025-06-11 RX ORDER — SODIUM CHLORIDE 0.9 % (FLUSH) 0.9 %
10 SYRINGE (ML) INJECTION
Status: DISCONTINUED | OUTPATIENT
Start: 2025-06-11 | End: 2025-07-15 | Stop reason: HOSPADM

## 2025-06-11 RX ORDER — PROMETHAZINE HYDROCHLORIDE 25 MG/1
25 TABLET ORAL EVERY 6 HOURS PRN
Status: CANCELLED | OUTPATIENT
Start: 2025-06-11

## 2025-06-11 RX ORDER — LEVETIRACETAM 500 MG/1
500 TABLET ORAL 2 TIMES DAILY
Status: DISCONTINUED | OUTPATIENT
Start: 2025-06-11 | End: 2025-06-14

## 2025-06-11 RX ORDER — ONDANSETRON HYDROCHLORIDE 2 MG/ML
4 INJECTION, SOLUTION INTRAVENOUS EVERY 8 HOURS PRN
Status: CANCELLED | OUTPATIENT
Start: 2025-06-11

## 2025-06-11 RX ORDER — LEVOTHYROXINE SODIUM 88 UG/1
88 TABLET ORAL
Status: DISCONTINUED | OUTPATIENT
Start: 2025-06-12 | End: 2025-07-15 | Stop reason: HOSPADM

## 2025-06-11 RX ORDER — IBUPROFEN 200 MG
24 TABLET ORAL
Status: DISCONTINUED | OUTPATIENT
Start: 2025-06-11 | End: 2025-06-23

## 2025-06-11 RX ORDER — ACETAMINOPHEN 325 MG/1
650 TABLET ORAL EVERY 8 HOURS PRN
Status: DISCONTINUED | OUTPATIENT
Start: 2025-06-11 | End: 2025-06-25

## 2025-06-11 RX ORDER — ONDANSETRON HYDROCHLORIDE 2 MG/ML
4 INJECTION, SOLUTION INTRAVENOUS EVERY 8 HOURS PRN
Status: DISCONTINUED | OUTPATIENT
Start: 2025-06-11 | End: 2025-07-15 | Stop reason: HOSPADM

## 2025-06-11 RX ORDER — ATORVASTATIN CALCIUM 40 MG/1
80 TABLET, FILM COATED ORAL DAILY
Status: DISCONTINUED | OUTPATIENT
Start: 2025-06-12 | End: 2025-07-15 | Stop reason: HOSPADM

## 2025-06-11 RX ORDER — EZETIMIBE 10 MG/1
10 TABLET ORAL DAILY
Status: DISCONTINUED | OUTPATIENT
Start: 2025-06-12 | End: 2025-07-15 | Stop reason: HOSPADM

## 2025-06-11 RX ORDER — ACETAMINOPHEN 325 MG/1
650 TABLET ORAL EVERY 8 HOURS PRN
Status: CANCELLED | OUTPATIENT
Start: 2025-06-11

## 2025-06-11 RX ORDER — TALC
6 POWDER (GRAM) TOPICAL NIGHTLY PRN
Status: DISCONTINUED | OUTPATIENT
Start: 2025-06-11 | End: 2025-07-15 | Stop reason: HOSPADM

## 2025-06-11 RX ORDER — IBUPROFEN 200 MG
16 TABLET ORAL
Status: DISCONTINUED | OUTPATIENT
Start: 2025-06-11 | End: 2025-06-11

## 2025-06-11 RX ORDER — BUSPIRONE HYDROCHLORIDE 5 MG/1
15 TABLET ORAL 2 TIMES DAILY
Status: DISCONTINUED | OUTPATIENT
Start: 2025-06-11 | End: 2025-07-15 | Stop reason: HOSPADM

## 2025-06-11 RX ORDER — IBUPROFEN 200 MG
16 TABLET ORAL
Status: DISCONTINUED | OUTPATIENT
Start: 2025-06-11 | End: 2025-06-23

## 2025-06-11 RX ORDER — NORTRIPTYLINE HYDROCHLORIDE 25 MG/1
25 CAPSULE ORAL NIGHTLY
Status: DISCONTINUED | OUTPATIENT
Start: 2025-06-11 | End: 2025-07-15 | Stop reason: HOSPADM

## 2025-06-11 RX ORDER — POLYETHYLENE GLYCOL 3350 17 G/17G
17 POWDER, FOR SOLUTION ORAL DAILY
Status: CANCELLED | OUTPATIENT
Start: 2025-06-11

## 2025-06-11 RX ADMIN — BUSPIRONE HYDROCHLORIDE 15 MG: 10 TABLET ORAL at 08:06

## 2025-06-11 RX ADMIN — METOPROLOL TARTRATE 12.5 MG: 25 TABLET, FILM COATED ORAL at 08:06

## 2025-06-11 RX ADMIN — METFORMIN HYDROCHLORIDE 500 MG: 500 TABLET, FILM COATED ORAL at 08:06

## 2025-06-11 RX ADMIN — LEVOTHYROXINE SODIUM 88 MCG: 0.09 TABLET ORAL at 05:06

## 2025-06-11 RX ADMIN — BUTALBITAL, ACETAMINOPHEN, AND CAFFEINE 1 TABLET: 325; 50; 40 TABLET ORAL at 03:06

## 2025-06-11 RX ADMIN — ATORVASTATIN CALCIUM 80 MG: 40 TABLET, FILM COATED ORAL at 08:06

## 2025-06-11 RX ADMIN — EZETIMIBE 10 MG: 10 TABLET ORAL at 08:06

## 2025-06-11 RX ADMIN — DOCUSATE SODIUM 100 MG: 100 CAPSULE, LIQUID FILLED ORAL at 08:06

## 2025-06-11 RX ADMIN — CLOPIDOGREL BISULFATE 75 MG: 75 TABLET, FILM COATED ORAL at 08:06

## 2025-06-11 RX ADMIN — FERROUS SULFATE TAB 325 MG (65 MG ELEMENTAL FE) 1 EACH: 325 (65 FE) TAB at 08:06

## 2025-06-11 RX ADMIN — ALPRAZOLAM 0.25 MG: 0.25 TABLET ORAL at 12:06

## 2025-06-11 RX ADMIN — LEVETIRACETAM 500 MG: 500 TABLET, FILM COATED ORAL at 09:06

## 2025-06-11 RX ADMIN — PAROXETINE HYDROCHLORIDE 20 MG: 20 TABLET, FILM COATED ORAL at 08:06

## 2025-06-11 RX ADMIN — LEVETIRACETAM 500 MG: 100 SOLUTION ORAL at 08:06

## 2025-06-11 RX ADMIN — BUSPIRONE HYDROCHLORIDE 15 MG: 5 TABLET ORAL at 09:06

## 2025-06-11 RX ADMIN — BUTALBITAL, ACETAMINOPHEN, AND CAFFEINE 1 TABLET: 325; 50; 40 TABLET ORAL at 07:06

## 2025-06-11 NOTE — H&P
[facility]  Hospital Medicine History & Physical Examination       Patient Name: Chelle Chandra  MRN: [id number]  Patient Class: IP- Inpatient   Admission Date: 6/11/2025   Admitting Physician: ALYSA Service   Length of Stay: 0  Attending Physician: Dr. Casper  Primary Care Provider: Jorge Silver MD  Face-to-Face encounter date: 06/11/2025  Code Status:Full  Chief Complaint: Extremity Weakness (Pt sent from rehab @ [facility]. Yesterday patient began having R sided weakness. Hx of crani d/t subdural on 5/19 and diony hole on 5/29 per Dr. Rankin. Had repeat CT this morning which showed subdural is larger than before. GCS 15 on arrival. )      Screening for Social Drivers for health:  Patient screened for food insecurity, housing instability, transportation needs, utility difficulties, and interpersonal safety (select all that apply as identified as concern)  []Housing or Food  []Transportation Needs  []Utility Difficulties  []Interpersonal safety  [x]None      Patient information was obtained from patient, patient's family, past medical records and ER records.  ED records were reviewed in detail and documented below    HISTORY OF PRESENT ILLNESS:   the patient is a 59-year-old  female with a past medical history of anxiety, and asthma, COPD, diabetes, GERD, pulmonary nodule, and subdural hematoma who was sent over from [facility] hospital for right upper extremity weakness; the patient has was just discharged from [facility] on 06/06/2025 and sent to [facility] for rehabilitation.  She did under go left craniotomy for hematoma evacuation on 05/19/2025, left diony hole drainage of SDH on 06/02 2025, and also MMA embolization per Dr. Egan. CT reveals reaccumulation of subdural fluid collection increased from previous CT scan. The patient was seen and evaluated in the ED.   PAST MEDICAL HISTORY:     Past Medical History:   Diagnosis Date     Accelerated junctional rhythm     Agatston CAC score, <100     Anxiety disorder, unspecified     Asthma     COPD type A     Diabetes mellitus without complication     GERD (gastroesophageal reflux disease)     History of COVID-19     Insomnia     Lung nodule        PAST SURGICAL HISTORY:     Past Surgical History:   Procedure Laterality Date    ANGIOGRAM, CORONARY, WITH LEFT HEART CATHETERIZATION      BACK SURGERY      BREAST LUMPECTOMY Right     CARDIOVERSION  08/01/2013    CARPAL TUNNEL RELEASE  10/23/2013    CRANIOTOMY FOR EVACUATION OF SUBDURAL HEMATOMA Left 05/19/2025    Procedure: CRANIOTOMY, FOR SUBDURAL HEMATOMA EVACUATION;  Surgeon: Ricardo Shelley MD;  Location: [facility] OR;  Service: Neurosurgery;  Laterality: Left;    CREATION OF TERRI HOLE WITH EVACUATION OF HEMATOMA Left 6/2/2025    Procedure: CREATION, CRANIAL TERRI HOLE, WITH HEMATOMA EVACUATION;  Surgeon: James Rankin MD;  Location: [facility] OR;  Service: Neurosurgery;  Laterality: Left;  LEFT BURRHOLE FOR SUBDURAL HEMATOMA EVACUATION // STEALTH // HERCULES SKYTRON // DRILL    FUSION OF CERVICAL SPINE BY ANTERIOR APPROACH USING COMPUTER-ASSISTED NAVIGATION  06/10/2019    KIDNEY SURGERY      TONSILLECTOMY AND ADENOIDECTOMY      TOTAL ABDOMINAL HYSTERECTOMY      WRIST SURGERY         ALLERGIES:   Aspirin, Ketorolac, Penicillins, Sulfa (sulfonamide antibiotics), and Ozempic [semaglutide]    FAMILY HISTORY:   Reviewed and negative    SOCIAL HISTORY:     Social History     Tobacco Use    Smoking status: Every Day     Current packs/day: 0.50     Types: Cigarettes    Smokeless tobacco: Never   Substance Use Topics    Alcohol use: Not Currently        HOME MEDICATIONS:     Prior to Admission medications    Medication Sig Start Date End Date Taking? Authorizing Provider   atorvastatin (LIPITOR) 80 MG tablet Take 1 tablet (80 mg total) by mouth once daily. 7/4/24 7/4/25  Chucho Hernandez MD   busPIRone (BUSPAR) 15 MG tablet Take 1 tablet (15 mg total) by mouth 2  "(two) times daily. 7/4/24 7/4/25  Chucho Hernandez MD   butalbital-acetaminophen-caffeine -40 mg (FIORICET, ESGIC) -40 mg per tablet Take 1 tablet by mouth every 4 (four) hours as needed for Pain. 5/28/25 6/27/25  Jayne Verdin FNP   clopidogreL (PLAVIX) 75 mg tablet Take 1 tablet (75 mg total) by mouth once daily.  Patient not taking: Reported on 5/23/2025 7/4/24 7/4/25  Chucho Hernandez MD   docusate sodium (COLACE) 50 MG capsule Take 2 capsules (100 mg total) by mouth 2 (two) times daily. 5/22/25   Chucho Hernandez MD   ezetimibe (ZETIA) 10 mg tablet Take 1 tablet by mouth once daily 2/24/25   Jorge Silver MD   FARXIGA 5 mg Tab tablet Take 1 tablet by mouth once daily 5/15/25   Jorge Silver MD   levETIRAcetam (KEPPRA) 500 MG Tab Take 1 tablet (500 mg total) by mouth 2 (two) times daily. for 4 days 5/22/25 5/26/25  Chucho Hernandez MD   levothyroxine (SYNTHROID) 88 MCG tablet Take 1 tablet (88 mcg total) by mouth before breakfast. 5/23/25 5/23/26  Chucho Hernandez MD   metFORMIN (GLUCOPHAGE) 500 MG tablet TAKE 1 TABLET BY MOUTH TWICE DAILY WITH MEALS 4/22/25   Jorge Silver MD   metoprolol succinate (TOPROL-XL) 25 MG 24 hr tablet Take 12.5 mg by mouth once daily. 9/20/24   Provider, Historical   nortriptyline (PAMELOR) 25 MG capsule TAKE 1 CAPSULE BY MOUTH IN THE EVENING 4/28/25   Jayne Verdin FNP   ondansetron (ZOFRAN) 8 MG tablet Take 1 tablet (8 mg total) by mouth every 6 (six) hours as needed for Nausea. 10/29/24   Jorge Silver MD   pen needle, diabetic 32 gauge x 5/32" Ndle 1 each by Misc.(Non-Drug; Combo Route) route once a week.  Patient not taking: Reported on 5/23/2025 11/3/22   Carlton Maldonado FNP       REVIEW OF SYSTEMS:   Except as documented, all other systems reviewed and negative     PHYSICAL EXAM:     VITAL SIGNS: 24 HRS MIN & MAX LAST   Temp  Min: 97.8 °F (36.6 °C)  Max: 98.2 °F (36.8 °C) 98.1 °F (36.7 °C)   BP  Min: 100/61  Max: 119/75 116/76   Pulse  Min: 82  " Max: 106  86   Resp  Min: 16  Max: 21 (!) 21   SpO2  Min: 95 %  Max: 100 % 100 %     General appearance: Well-developed, well-nourished  female lying on ED stretcher, in no apparent distress.  HENT: Atraumatic head. Moist mucous membranes of oral cavity; left temporal/parietal incision intact  Eyes: Normal extraocular movements. PERRL  Neck: Supple.   Lungs: Clear to auscultation bilaterally. No wheezing present.   Heart: Regular rate and rhythm. S1 and S2 present with no murmurs/gallop/rub. No pedal edema. No JVD present.   Abdomen: Soft, non-distended, non-tender. No rebound tenderness/guarding. Bowel sounds are normal.   Extremities: No cyanosis, clubbing, or edema.  Skin: No Rash.   Neuro: Awake, alert, speech is clear; mild LUE, LLE weakness 4/5 noted; no facial asymmetry Muscle strength 5/5 in all LUE, LLE  Psych/mental status: Appropriate mood and affect. Responds appropriately to questions.     LABS AND IMAGING:     Recent Labs   Lab 06/07/25  0641 06/09/25  0610 06/11/25  1448   WBC 8.64 6.23 7.64   RBC 3.71* 3.78* 3.93*   HGB 11.0* 11.1* 11.8*   HCT 33.9* 35.3* 36.5*   MCV 91.4 93.4 92.9   MCH 29.6 29.4 30.0   MCHC 32.4* 31.4* 32.3*   RDW 14.5 14.3 14.1   * 667* 634*   MPV 9.4 9.3 9.9       Recent Labs   Lab 06/07/25  0430 06/09/25  0610 06/11/25  1448    142 142   K 3.8 3.7 4.0    104 104   CO2 26 28 24   BUN 16.2 12.0 14.1   CREATININE 0.64 0.65 0.60   * 170* 96   CALCIUM 9.1 9.3 9.7   MG  --  1.70  --    ALBUMIN 2.5* 2.7* 2.9*   PROT 6.6 7.0 7.6   ALKPHOS 141 139 160*   ALT 32 26 20   AST 21 14 12   BILITOT 0.2 0.4 0.3       Microbiology Results (last 7 days)       ** No results found for the last 168 hours. **             CT Head Without Contrast  Narrative: EXAMINATION:  CT HEAD WITHOUT CONTRAST    CLINICAL HISTORY:  Subdural hemorrhage; Nontraumatic subdural hemorrhage, unspecified    TECHNIQUE:  Multiple axial images were obtained from the base of the brain to the  vertex without contrast administration.  Sagittal and coronal reconstructions were performed. .Automatic exposure control  (AEC) is utilized to reduce patient radiation exposure.    COMPARISON:  06/03/2025    FINDINGS:  Patient is under recent craniotomy for evacuation of a left sided subdural hematoma.  There is some residual pneumo cephaly seen.  There are some dural base calcifications seen which are stable.  There has been reaccumulation of the subdural fluid collection along the left cerebral hemisphere with maximum thickness on today's examination of 17.5 mm.  The subdural fluid collection is hypoattenuated.  There is some stable encephalomalacia in the left frontal and parietal region.  There is old lacunar infarct in the basal ganglia on the left.  Posterior fossa appears grossly unremarkable.  Paranasal sinuses appear unremarkable.  Impression: Status post recent craniotomy for evacuation of the left-sided subdural hematoma.  There is some residual pneumo cephaly seen.  There has been reaccumulation of the subdural fluid collection which appears predominately hypo attenuated on today's examination.  Maximum thickness of the subdural fluid collection on today's examination is 17.5 mm which is greater than the prior examinations    Other additional chronic findings as outlined above    This report was flagged in Epic as abnormal.    Electronically signed by: Yves Garg  Date:    06/11/2025  Time:    11:05      ASSESSMENT & PLAN:   Recent crani for SDH- [facility] 6/[facility]  Anemia of chronic disease  Major depressive disorder  Hypertension  Hx previous CVA  Hypothyroidism  DM Type II  Protein calorie malnutrition    Plan:  Neurosurgery consult  NPO at this time  Frequent neuro checks    VTE Prophylaxis: will be placed on Heparin/Lovenox/ Xarelto/ SCD for DVT prophylaxis and will be advised to be as mobile as possible and sit in a chair as tolerated    Patient condition:   Stable/Fair/Guarded/ Serious/ Critical    __________________________________________________________________________  INPATIENT LIST OF MEDICATIONS     Scheduled Meds:  Continuous Infusions:  PRN Meds:.  Current Facility-Administered Medications:     butalbital-acetaminophen-caffeine -40 mg, 1 tablet, Oral, Q4H PRN    dextrose 50%, 12.5 g, Intravenous, PRN    dextrose 50%, 25 g, Intravenous, PRN    glucagon (human recombinant), 1 mg, Intramuscular, PRN    glucose, 16 g, Oral, PRN    glucose, 24 g, Oral, PRN    naloxone, 0.02 mg, Intravenous, PRN    sodium chloride 0.9%, 3 mL, Intravenous, Q12H PRN      IDenise, NP/PA have reviewed and discussed the case with Dr. Casper.  Please see the following addendum for further assessment and plan from there attending MD.    06/11/2025    ________________________________________________________________________________    MD Addendum:  IDr. ---assumed care of this patient today at ---am/pm  For the patient encounter, I performed the substantive portion of the visit, I reviewed the NP/PA documentation, treatment plan, and medical decision making.  I had face to face time with this patient     A. History:    B. Physical exam:    C. Medical decision making:    Discharge Planning and Disposition: No mobility needs. Ambulating well. Good social support system.   Anticipated discharge    If patient was admitted under observational status it is with my approval/permission.        All diagnosis and differential diagnosis have been reviewed; assessment and plan has been documented; I have personally reviewed the labs and test results that are presently available; I have reviewed the patients medication list; I have reviewed the consulting providers response and recommendations. I have reviewed or attempted to review medical records based upon their availability.    All of the patient and family questions have been addressed and answered. Patient's is agreeable  to the above stated plan. I will continue to monitor closely and make adjustments to medical management as needed.    If patient was admitted under observational status it is with my approval/permission.      Denies Sin, NICHOLAS   06/11/2025

## 2025-06-11 NOTE — PT/OT/SLP PROGRESS
Occupational Therapy Inpatient Rehab Treatment    Name: Chelle Chandra  MRN: [id number]    Assessment:  Chelle Chandra is a 59 y.o. female admitted with a medical diagnosis of Acute right-sided weakness.  She presents with the following impairments/functional limitations:  weakness, impaired endurance, impaired sensation, impaired self care skills, impaired functional mobility, gait instability, impaired balance, visual deficits, impaired cognition, decreased coordination, decreased upper extremity function, decreased lower extremity function, decreased safety awareness, pain, abnormal tone, decreased ROM, impaired fine motor    General Precautions: Standard, fall     Orthopedic Precautions:N/A     Braces: N/A    Rehab Prognosis: Good and Fair; patient would benefit from acute skilled OT services to address these deficits and reach maximum level of function.      History:     Past Medical History:   Diagnosis Date    Accelerated junctional rhythm     Agatston CAC score, <100     Anxiety disorder, unspecified     Asthma     COPD type A     Diabetes mellitus without complication     GERD (gastroesophageal reflux disease)     History of COVID-19     Insomnia     Lung nodule        Past Surgical History:   Procedure Laterality Date    ANGIOGRAM, CORONARY, WITH LEFT HEART CATHETERIZATION      BACK SURGERY      BREAST LUMPECTOMY Right     CARDIOVERSION  08/01/2013    CARPAL TUNNEL RELEASE  10/23/2013    CRANIOTOMY FOR EVACUATION OF SUBDURAL HEMATOMA Left 05/19/2025    Procedure: CRANIOTOMY, FOR SUBDURAL HEMATOMA EVACUATION;  Surgeon: Ricardo Shelley MD;  Location: [facility];  Service: Neurosurgery;  Laterality: Left;    CREATION OF TERRI HOLE WITH EVACUATION OF HEMATOMA Left 6/2/2025    Procedure: CREATION, CRANIAL TERRI HOLE, WITH HEMATOMA EVACUATION;  Surgeon: James Rankin MD;  Location: [facility] OR;  Service: Neurosurgery;  Laterality: Left;  LEFT BURRHOLE FOR SUBDURAL HEMATOMA EVACUATION // STEALTH // HERCULES  "SKYTRON // DRILL    FUSION OF CERVICAL SPINE BY ANTERIOR APPROACH USING COMPUTER-ASSISTED NAVIGATION  06/10/2019    KIDNEY SURGERY      TONSILLECTOMY AND ADENOIDECTOMY      TOTAL ABDOMINAL HYSTERECTOMY      WRIST SURGERY         Subjective     Orientation: Oriented x4    Chief Complaint: Pt reported feeling tired this morning.    Patient/Family Comments/goals: "I really want to go home."    Vitals   Vitals at Rest  /61      O2 Sat    Pain      Vitals With Activity  /68   HR 92   O2 Sat    Pain    PAIN:  Pt continued to experience brief waves of debilitating head aches. The last two occurred during the last 30 minutes of our session and lasted longer than previous day.    Respiratory Status: Room air    Patients cultural, spiritual, Congregational conflicts given the current situation: no       Objective:     Patient found up in chair upon OT entry to room.    Mobility   Patient completed:  Sit to Stand Transfer with supervision with rolling walker. Required substantial verbal cueing due to R sided lean and lack of safety awareness  Stand to Sit Transfer with supervision with rolling walker. Required verbal cueing to reach back to chair before attempting to sit  Toilet Transfer Step Transfer technique with moderate assistance with rolling walker. Pt demonstrated increased R sided lean and lack of safety awareness and attempted to perform transfer without support from RW  Tub Transfer Stand Pivot technique with supervision with rolling walker due to increased R sided lean    Functional Mobility  Short in room FM from recliner > TTB > W/C with min assist due to R sided lean. Pt demonstrated some R sided inattention (almost walked into doorway) and decreased coordination. Needed assistance with R hand placement on RW    ADLs   Current Status   Oral Hygiene 3 Pt performed in standing with RW. Required min/mod assist with toothpaste application, standing balance, and locating items demonstrating decreased " coordination and processing   Shower, Bathe Self 4 Pt able to clean self but required min SPV for bathing due to R sided lean and decreased sitting balance.     Upper Body Dressing 2 Pt unable to don bra and and needed assistance donning shirt due to increased proprioception deficits    Lower Body Dressing 4 Pt able to don underwear and pants with SPV due to R sided lean   Toileting Hygiene 4 SPV due to increased R sided lean   Toilet Transfer 3 Pt needed min assist for sequencing, coordination, and balance due to increased R sided lean and response time   Putting On, Taking Off Footwear 4 Pt able to don socks and shoes with SPV   NOTE: pt demonstrated increased R sided lean today, delayed processing/coordination, and new presentation of R sided inattention.    Additional Treatments: SOT administered vision testing (smooth pursuits, convergence, and visual field). Pt demonstrated diminished R visual field of B eyes.     NOTE: Pt became increasingly fatigued toward the end of the session and experienced 2 piercing headaches within a 30 min period.     Patient left up in chair with Ravi PIERCE  present.     Education provided: Roles and goals of OT, ADLs, transfer training, body mechanics, modified goals, sequencing, safety precautions, and fall prevention    Multidisciplinary Problems       Occupational Therapy Goals          Problem: Occupational Therapy    Goal Priority Disciplines Outcome Interventions   Occupational Therapy Goal     OT, PT/OT     Description: ADLs:  Pt to perform grooming tasks with independence standing at sink with RW by discharge  Pt to perform UB dressing with independence by discharge.   Pt to perform LB dressing with independence by discharge.  Pt to perform putting on/off footwear task with independence by discharge.   Pt to perform toileting with independence by d/c.   Pt to perform bathing with independence by d/c.    Functional Transfers:  Pt to perform toilet transfers with independence  by d/c.     Pt to perform a tub transfer with independence by d/c.     IADLs:  Pt to perform simple meal prep with independence by d/c.      Balance, Strengthening, Endurance, Balance:  Pt to demonstrate good dynamic standing balance as required to perform ADL's from standing level.  Pt to demonstrate good BUE strength during functional task   Pt to demonstrate consistent adherence to breathing control and energy conservation techniques as educated by OT.                        Time Tracking     OT Received On: 06/11/25  Time In 0830     Time Out 1000  Total Time 90 min  Therapy Time: OT Individual: 90  Missed Time:    Missed Time Reason:      Billable Minutes: Self Care/Home Management 90    06/11/2025

## 2025-06-11 NOTE — PT/OT/SLP EVAL
[facility] - Rehabilitation Unit  Speech Language Pathology Department  Cognitive-Communication Evaluation    Patient Name:  Chelle Chandra   MRN:  [id number]    Recommendations     General recommendations:  dysphagia therapy and standardized cognitive testing  Communication strategies:  none    Discharge therapy intensity: Low Intensity Therapy  Barriers to safe discharge: acuity of illness    History     Chelle Chandra is a/n 59 y.o. female admitted to [facility] for R sided weakness, facial droop, and slurred speech. Multiple neuro changes throughout stay. Patient with recent admission SDH s/p crani and MMA embolization. Patient with Terri hole surgery 6/2 with drainage.Transferred to [facility] on 6/6 for rehabilitation.     Past Medical History:   Diagnosis Date    Accelerated junctional rhythm     Agatston CAC score, <100     Anxiety disorder, unspecified     Asthma     COPD type A     Diabetes mellitus without complication     GERD (gastroesophageal reflux disease)     History of COVID-19     Insomnia     Lung nodule      Past Surgical History:   Procedure Laterality Date    ANGIOGRAM, CORONARY, WITH LEFT HEART CATHETERIZATION      BACK SURGERY      BREAST LUMPECTOMY Right     CARDIOVERSION  08/01/2013    CARPAL TUNNEL RELEASE  10/23/2013    CRANIOTOMY FOR EVACUATION OF SUBDURAL HEMATOMA Left 05/19/2025    Procedure: CRANIOTOMY, FOR SUBDURAL HEMATOMA EVACUATION;  Surgeon: Ricardo Shelley MD;  Location: [facility];  Service: Neurosurgery;  Laterality: Left;    CREATION OF TERRI HOLE WITH EVACUATION OF HEMATOMA Left 6/2/2025    Procedure: CREATION, CRANIAL TERRI HOLE, WITH HEMATOMA EVACUATION;  Surgeon: James Ranikn MD;  Location: [facility];  Service: Neurosurgery;  Laterality: Left;  LEFT BURRHOLE FOR SUBDURAL HEMATOMA EVACUATION // STEALTH // SHERRI PARTIDA // DRILL    FUSION OF CERVICAL SPINE BY ANTERIOR APPROACH USING COMPUTER-ASSISTED NAVIGATION  06/10/2019    KIDNEY SURGERY       TONSILLECTOMY AND ADENOIDECTOMY      TOTAL ABDOMINAL HYSTERECTOMY      WRIST SURGERY       Subjective     Patient awake, alert, and cooperative.  Patient goals: to go home   Spiritual/Cultural/Jehovah's witness Beliefs/Practices that affect care: no    Pain/Comfort:  0/10    Respiratory Status:  room air    Objective     SPEECH PRODUCTION  Phoneme Production: adequate  Voice Quality: adequate  Voice Production: adequate  Speech Rate: appropriate  Loudness: acceptable  Respiration: WFL for speech  Resonance: adequate  Prosody: adequate  Speech Intelligibility  Known Context: Greater that 90%  Unknown Context: Greater that 90%    AUDITORY COMPREHENSION  Following Directions:  1-Step: 100%  2-Step: 100%  Yes/No Questions:  Biographical: 100%  Environmental: 100%  Simple: 100%  Complex: 100%    VERBAL EXPRESSION  Automatic Speech:  Days of the week: Within Functional Limits  Months of the year: Within Functional Limits  Counting: Within Functional Limits  Alphabet: Within Functional Limits  Phrase Completion: 100%  Confrontation Naming  Body Parts: 100%  Objects: 100%  Wh- Questions:  Object name: 100%  Object function: 100%    COGNITION  Orientation:  Person: yes  Place: yes  Time: yes  Situation: yes   Attention:  Focused: Within Functional Limits  Sustained: Within Functional Limits  Memory:  Immediate: Within Functional Limits  Delayed: Within Functional Limits (11/12) on four word memory task)  Long Term: Within Functional Limits  Problem Solving  Functional simple: Within Functional Limits  Organization:  Convergent thinking: Within Functional Limits  Divergent thinking: Within Functional Limits    Assessment     Pt presents with overall functional cognitive linguistic skills however PT/OT report higher level cognitive impairments noted during treatment sessions. Therefore, standardized cognitive testing is recommended at this time. Goals and POC to be updated when testing is complete.     Goals     Multidisciplinary  Problems       SLP Goals          Problem: SLP    Goal Priority Disciplines Outcome   SLP Goal     SLP Progressing   Description: LTG: The pt will tolerate least restrictive diet with no overt s/sx of aspiration.    STGs:  The pt will complete tongue base/laryngeal elevation exercises with occ cues.  The pt will tolerate 4 oz of mildly liquids via cup with no overt s/sx of aspiration.  The pt will tolerate half meal of soft and bite-sized solids with no overt s/sx of aspiration.                       Patient Education     Patient provided with verbal education regarding SLP POC.  Understanding was verbalized, however additional teaching warranted.    Plan     SLP Follow-Up:  Yes   Patient to be seen:  5 x/week   Plan of Care expires:  06/14/25  Plan of Care reviewed with:  patient      Time Tracking     SLP Treatment Date:   06/11/25  Speech Start Time:  1030  Speech Stop Time:  1050     Speech Total Time (min):  20 min    Billable minutes:  Evaluation of Speech Sound Production with Comprehension and Expression, 20 minutes     06/11/2025

## 2025-06-11 NOTE — PLAN OF CARE
Problem: Diabetes Comorbidity  Goal: Blood Glucose Level Within Targeted Range  Outcome: Progressing     Problem: Rehabilitation ([facility]) Plan of Care  Goal: Absence of New-Onset Illness or Injury  Outcome: Progressing     Problem: Wound  Goal: Absence of Infection Signs and Symptoms  Outcome: Progressing

## 2025-06-11 NOTE — ED PROVIDER NOTES
Encounter Date: 6/11/2025    SCRIBE #1 NOTE: I, Kailee Greer, am scribing for, and in the presence of,  Codey Palm MD. I have scribed the following portions of the note - Other sections scribed: HPI, ROS, PE.       History     Chief Complaint   Patient presents with    Extremity Weakness     Pt sent from rehab @ [facility]. Yesterday patient began having R sided weakness. Hx of crani d/t subdural on 5/19 and diony hole on 5/29 per Dr. Rankin. Had repeat CT this morning which showed subdural is larger than before. GCS 15 on arrival.      Patient is a 59 year old female with a history of anxiety, asthma, COPD, DM, GERD, and lung nodule presenting to the ED with right upper extremity weakness. Patient states she had 3 different neuro surgeries in the past month and initially had right sided weakness but then it got better and now its getting worse since yesterday. Patient reports she had a CT scan that showed the subdural is larger than before. Patient is also complaining of intermittent headaches.    The history is provided by the patient and medical records.     Review of patient's allergies indicates:   Allergen Reactions    Aspirin     Ketorolac     Penicillins     Sulfa (sulfonamide antibiotics)     Ozempic [semaglutide] Other (See Comments)     Abdominal pain     Past Medical History:   Diagnosis Date    Accelerated junctional rhythm     Agatston CAC score, <100     Anxiety disorder, unspecified     Asthma     COPD type A     Diabetes mellitus without complication     GERD (gastroesophageal reflux disease)     History of COVID-19     Insomnia     Lung nodule      Past Surgical History:   Procedure Laterality Date    ANGIOGRAM, CORONARY, WITH LEFT HEART CATHETERIZATION      BACK SURGERY      BREAST LUMPECTOMY Right     CARDIOVERSION  08/01/2013    CARPAL TUNNEL RELEASE  10/23/2013    CRANIOTOMY FOR EVACUATION OF SUBDURAL HEMATOMA Left 05/19/2025    Procedure: CRANIOTOMY, FOR SUBDURAL HEMATOMA EVACUATION;  Surgeon:  Ricardo Shelley MD;  Location: [facility] OR;  Service: Neurosurgery;  Laterality: Left;    CREATION OF TERRI HOLE WITH EVACUATION OF HEMATOMA Left 6/2/2025    Procedure: CREATION, CRANIAL TERRI HOLE, WITH HEMATOMA EVACUATION;  Surgeon: James Rankin MD;  Location: [facility] OR;  Service: Neurosurgery;  Laterality: Left;  LEFT BURRHOLE FOR SUBDURAL HEMATOMA EVACUATION // STEALTH // SHERRI SKYTRON // DRILL    FUSION OF CERVICAL SPINE BY ANTERIOR APPROACH USING COMPUTER-ASSISTED NAVIGATION  06/10/2019    KIDNEY SURGERY      TONSILLECTOMY AND ADENOIDECTOMY      TOTAL ABDOMINAL HYSTERECTOMY      WRIST SURGERY       Family History   Problem Relation Name Age of Onset    Heart attack Mother      Diabetes Father       Social History[1]  Review of Systems   Constitutional:  Negative for chills, fatigue and fever.   HENT:  Negative for congestion and sore throat.    Eyes:  Negative for visual disturbance.   Respiratory:  Negative for cough and shortness of breath.    Cardiovascular:  Negative for chest pain.   Gastrointestinal:  Negative for abdominal pain, diarrhea, nausea and vomiting.   Genitourinary:  Negative for dysuria.   Musculoskeletal:  Negative for myalgias.   Skin:  Negative for rash.   Neurological:  Positive for weakness (right sided) and headaches. Negative for numbness.       Physical Exam     Initial Vitals [06/11/25 1314]   BP Pulse Resp Temp SpO2   116/72 88 18 98.1 °F (36.7 °C) 98 %      MAP       --         Physical Exam    Nursing note and vitals reviewed.  Constitutional:   Generally thin, cachexia   HENT:   Head: Normocephalic and atraumatic. Mouth/Throat: Oropharynx is clear and moist.   Craniotomy incision to the left dry, clean, intact with no redness or swelling.   Eyes: Pupils are equal, round, and reactive to light.   Neck: Neck supple.   Normal range of motion.  Cardiovascular:  Normal rate, regular rhythm, normal heart sounds and intact distal pulses.           Pulmonary/Chest: Breath sounds  normal.   Abdominal: Abdomen is soft. Bowel sounds are normal. There is no abdominal tenderness. There is no rebound.   Musculoskeletal:         General: No tenderness. Normal range of motion.      Cervical back: Normal range of motion and neck supple.     Neurological: She is alert and oriented to person, place, and time. No sensory deficit. GCS score is 15. GCS eye subscore is 4. GCS verbal subscore is 5. GCS motor subscore is 6.   Mild diminished to the right upper extremity.   Skin: Skin is warm and dry.   Psychiatric: She has a normal mood and affect.         ED Course   Procedures  Labs Reviewed   CBC W/ AUTO DIFFERENTIAL    Narrative:     The following orders were created for panel order CBC auto differential.  Procedure                               Abnormality         Status                     ---------                               -----------         ------                     CBC with Differential[[id number]]                                                        Please view results for these tests on the individual orders.   COMPREHENSIVE METABOLIC PANEL   PROTIME-INR   APTT   CBC WITH DIFFERENTIAL          Imaging Results    None          Medications - No data to display  Medical Decision Making  The differential diagnosis includes, but is not limited to, intracranial hemorrhage, CVA, metabolic acidosis, brain tumor.      Amount and/or Complexity of Data Reviewed  Labs: ordered. Decision-making details documented in ED Course.  Radiology: ordered. Decision-making details documented in ED Course.  Discussion of management or test interpretation with external provider(s): Consulted Neurosurgery and discussed with hospitalist who accepts for admission    Risk  Decision regarding hospitalization.            Scribe Attestation:   Scribe #1: I performed the above scribed service and the documentation accurately describes the services I performed. I attest to the accuracy of the note.    Attending  Attestation:           Physician Attestation for Scribe:  Physician Attestation Statement for Scribe #1: I, Codey Palm MD, reviewed documentation, as scribed by Kailee Greer in my presence, and it is both accurate and complete.          [unfilled]   ED Course as of 06/11/25 1446   Wed Jun 11, 2025   1351 Patient states she started to have some increased weakness of the right arm and leg today per patient and rehab.  CT this morning showed increased fluid in area of prior subdural evacuation. [MP]   1351 Dr. Rankin consulted for recommendations [MP]   1400 Dr. Rankin recommends admission to the hospitalist, will need to likely place a drain [MP]   1419 Paged hospitalist [MB]   1441 Discussed with hospitalist who accepts for admission [MP]      ED Course User Index  [MB] Edouard Nicholson  [MP] Codey Palm MD                           Clinical Impression:  Final diagnoses:  [R53.1] Right sided weakness (Primary)  [G93.89] Subdural fluid collection          ED Disposition Condition    Admit                       [1]   Social History  Tobacco Use    Smoking status: Every Day     Current packs/day: 0.50     Types: Cigarettes    Smokeless tobacco: Never   Vaping Use    Vaping status: Former    Quit date: 6/1/2024   Substance Use Topics    Alcohol use: Not Currently    Drug use: Never        Codey Palm MD  06/11/25 1445

## 2025-06-11 NOTE — CONSULTS
[facility] - Emergency Dept  Neurosurgery  Consult Note    Inpatient consult to Neurosurgery  Consult performed by: Judith Luo PA  Consult ordered by: Codey Palm MD        Subjective:     Chief Complaint/Reason for Admission: Hygroma, increased since hospital dc    History of Present Illness: Patient is a 59 year old female with a history of anxiety, asthma, COPD, DM, GERD, SDH and lung nodule, who presents to the ED from [facility] with right upper extremity weakness. Patient has had crani for SDH with Dr. Mendiola on 5/19 and repeat on 6/2 with Dr. Rankin as well as a MMA embolization with Dr. Egan. She initially had right sided weakness but then it got better following surgery and now its getting worse since yesterday. Patient reports she had a CT scan that showed the subdural is larger than before. Patient is also complaining of intermittent headaches. Dr. Rankin was consulted for evaluation and treatment recommendations.    On PE today she is sitting up in bed, NAD. She endorses HA. She denies blurred vision and N/V. She continues with right UE weakness. No other complaints at time of rounds.    Review of patient's allergies indicates:   Allergen Reactions    Aspirin     Ketorolac     Penicillins     Sulfa (sulfonamide antibiotics)     Ozempic [semaglutide] Other (See Comments)     Abdominal pain       Past Medical History:   Diagnosis Date    Accelerated junctional rhythm     Agatston CAC score, <100     Anxiety disorder, unspecified     Asthma     COPD type A     Diabetes mellitus without complication     GERD (gastroesophageal reflux disease)     History of COVID-19     Insomnia     Lung nodule      Past Surgical History:   Procedure Laterality Date    ANGIOGRAM, CORONARY, WITH LEFT HEART CATHETERIZATION      BACK SURGERY      BREAST LUMPECTOMY Right     CARDIOVERSION  08/01/2013    CARPAL TUNNEL RELEASE  10/23/2013    CRANIOTOMY FOR EVACUATION OF SUBDURAL HEMATOMA Left 05/19/2025  "   Procedure: CRANIOTOMY, FOR SUBDURAL HEMATOMA EVACUATION;  Surgeon: Ricardo Shelley MD;  Location: [facility] OR;  Service: Neurosurgery;  Laterality: Left;    CREATION OF TERRI HOLE WITH EVACUATION OF HEMATOMA Left 6/2/2025    Procedure: CREATION, CRANIAL TERRI HOLE, WITH HEMATOMA EVACUATION;  Surgeon: James Rankin MD;  Location: [facility] OR;  Service: Neurosurgery;  Laterality: Left;  LEFT BURRHOLE FOR SUBDURAL HEMATOMA EVACUATION // STEALTH // SHERRI SKYTRON // DRILL    FUSION OF CERVICAL SPINE BY ANTERIOR APPROACH USING COMPUTER-ASSISTED NAVIGATION  06/10/2019    KIDNEY SURGERY      TONSILLECTOMY AND ADENOIDECTOMY      TOTAL ABDOMINAL HYSTERECTOMY      WRIST SURGERY       Family History       Problem Relation (Age of Onset)    Diabetes Father    Heart attack Mother          Tobacco Use    Smoking status: Every Day     Current packs/day: 0.50     Types: Cigarettes    Smokeless tobacco: Never   Vaping Use    Vaping status: Former    Quit date: 6/1/2024   Substance and Sexual Activity    Alcohol use: Not Currently    Drug use: Never    Sexual activity: Not on file     Review of Systems  Objective:     Weight: 46.7 kg (103 lb)  Body mass index is 16.62 kg/m².  Vital Signs (Most Recent):  Temp: 98.1 °F (36.7 °C) (06/11/25 1314)  Pulse: 86 (06/11/25 1332)  Resp: 18 (06/11/25 1314)  BP: 118/80 (06/11/25 1332)  SpO2: 97 % (06/11/25 1332) Vital Signs (24h Range):  Temp:  [97.8 °F (36.6 °C)-98.2 °F (36.8 °C)] 98.1 °F (36.7 °C)  Pulse:  [] 86  Resp:  [16-18] 18  SpO2:  [95 %-98 %] 97 %  BP: (100-119)/(61-80) 118/80       Neurosurgery Physical Exam  AFVSS  PERRL, EOMI  Alert, oriented to all spheres. Speech clear.  Follow commands in all ext  -5/5 right UE  5/5 left UE and bilateral LE  Incision c/d/I. No erythema, no active drainage    Significant Labs:  No results for input(s): "GLU", "NA", "K", "CL", "CO2", "BUN", "CREATININE", "CALCIUM", "MG" in the last 48 hours.  No results for input(s): "WBC", "HGB", "HCT", " ""PLT" in the last 48 hours.  No results for input(s): "LABPT", "INR", "APTT" in the last 48 hours.  Microbiology Results (last 7 days)       ** No results found for the last 168 hours. **          Significant Diagnostics:  CT HEAD WITHOUT CONTRAST     CLINICAL HISTORY:  Subdural hemorrhage; Nontraumatic subdural hemorrhage, unspecified     TECHNIQUE:  Multiple axial images were obtained from the base of the brain to the vertex without contrast administration.  Sagittal and coronal reconstructions were performed. .Automatic exposure control  (AEC) is utilized to reduce patient radiation exposure.     COMPARISON:  06/03/2025     FINDINGS:  Patient is under recent craniotomy for evacuation of a left sided subdural hematoma.  There is some residual pneumo cephaly seen.  There are some dural base calcifications seen which are stable.  There has been reaccumulation of the subdural fluid collection along the left cerebral hemisphere with maximum thickness on today's examination of 17.5 mm.  The subdural fluid collection is hypoattenuated.  There is some stable encephalomalacia in the left frontal and parietal region.  There is old lacunar infarct in the basal ganglia on the left.  Posterior fossa appears grossly unremarkable.  Paranasal sinuses appear unremarkable.     Impression:     Status post recent craniotomy for evacuation of the left-sided subdural hematoma.  There is some residual pneumo cephaly seen.  There has been reaccumulation of the subdural fluid collection which appears predominately hypo attenuated on today's examination.  Maximum thickness of the subdural fluid collection on today's examination is 17.5 mm which is greater than the prior examinations    Assessment/Plan:     Hygroma    Patient endorses HA and right UE weakness POD#10 repeat crani for SDH  CT head done today shows increased subdural collection on the left  She will be admitted with Q4 hour neuro exams  BP parameters below 140/90  Keppra " BID  OK for PT/OT  SCDs for DVT prophylaxis; hold anticoagulation for now  Dr. Rankin will likely be putting in a shunt either this week or early next week    Thank you for your consult. I will follow-up with patient. Please contact us if you have any additional questions.    CECILIA Khan  Neurosurgery  [facility] - Emergency Dept

## 2025-06-11 NOTE — NURSING
Pt was showing increased right sided weakness, stat CT ordered. Changes noted in the CT. Pt was sent to main campus ED. Report was called to ED. EMS came to transport pt to ED

## 2025-06-11 NOTE — PT/OT/SLP DISCHARGE
Recreational Therapy Discharge    Pt was not seen for Recreational Therapy due to schedule conflict.    Pt progress, plan of care and discharge plans were discussed during staffing at 0930      Date of Treatment: 06/11/2025  Start Time:   Stop Time:   Total Time:   Missed Time:  Pt will be transferred to Main Hospital due to change in Medical status    Assessment      Chelle Chandra is a 59 y.o. female admitted with a medical diagnosis of Acute right-sided weakness.  She presents with the following impairments/functional limitations:  weakness, impaired endurance, impaired functional mobility, gait instability, impaired balance, visual deficits, impaired cognition, decreased coordination, decreased upper extremity function, decreased lower extremity function, decreased safety awareness .    Rehab Diagnosis:     Recent Surgery:    General Precautions: Standard, fall, aspiration (minced and moist diet, moderately thick liquid)     Orthopedic Precautions:N/A     Braces: N/A    Rehab Prognosis: Fair; patient would benefit from acute skilled Recreational Therapy services to address these deficits and reach maximum level of function.      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, bur hole, DM, L mCA CVA, thrombectomyFall, cerebral angiogram with embolization , L frontoparietal crani, evacuation of SDH  Orientation: Oriented x4  Affect/Behavior: Appropriate, Cooperative, and Impulsive  Safety/Judgement: impaired   Basic Command Following: intact  Spiritual Cultural: no        History     Past Medical History:   Diagnosis Date    Accelerated junctional rhythm     Agatston  CAC score, <100     Anxiety disorder, unspecified     Asthma     COPD type A     Diabetes mellitus without complication     GERD (gastroesophageal reflux disease)     History of COVID-19     Insomnia     Lung nodule        Past Surgical History:   Procedure Laterality Date    ANGIOGRAM, CORONARY, WITH LEFT HEART CATHETERIZATION      BACK SURGERY      BREAST LUMPECTOMY Right     CARDIOVERSION  08/01/2013    CARPAL TUNNEL RELEASE  10/23/2013    CRANIOTOMY FOR EVACUATION OF SUBDURAL HEMATOMA Left 05/19/2025    Procedure: CRANIOTOMY, FOR SUBDURAL HEMATOMA EVACUATION;  Surgeon: Ricardo Shelley MD;  Location: [facility] OR;  Service: Neurosurgery;  Laterality: Left;    CREATION OF TERRI HOLE WITH EVACUATION OF HEMATOMA Left 6/2/2025    Procedure: CREATION, CRANIAL TERRI HOLE, WITH HEMATOMA EVACUATION;  Surgeon: James Rankin MD;  Location: [facility] OR;  Service: Neurosurgery;  Laterality: Left;  LEFT BURRHOLE FOR SUBDURAL HEMATOMA EVACUATION // STEALTH // SHERRI SKYTRON // DRILL    FUSION OF CERVICAL SPINE BY ANTERIOR APPROACH USING COMPUTER-ASSISTED NAVIGATION  06/10/2019    KIDNEY SURGERY      TONSILLECTOMY AND ADENOIDECTOMY      TOTAL ABDOMINAL HYSTERECTOMY      WRIST SURGERY         Home Environment     Admit Date: 06/06/25  Living Situation  People in Home: spouse  Lives in: house  Patients Responsibilities: Laundry, Leisure/play/hobbies, Meal preparation, Retired  Number of Children: 2  Occupation:Retired: [employer] at [employer]    Instrumental Activities of Daily Living     Previous Hand Dominance: Right Current Hand Dominance:  (RUE weakness)     Other iADL Information:        Cognitive Skills Building         Cognitive Observation Activity Assist Position Equipment Response            Comment:      Dynamic Activities      Activity Assist Position Equipment Response           Comment:        Fine Motor Activities      Activity Assist Position Equipment Response           Comment:        Goals     Patient  "Goals  Patient Goal 1: "To be able to function and do for myself."    Short Term Goals    Goal  Goal Status   Will increase sit to stand to supervision Met   Will improve dynamic standing balance/reaching to supervision Met   Will follow 2-3 step directions at supervision Progressing           Long Term Goals    Goal Goal Status   Will increase standing tolerance to5 minutes Progressing   Will improve dynamic standing balance/reaching to setup Progressing   Will follow multi-step directions to setup Progressing               Plan       Patient to be seen: Daily  Duration:1 day  Treatments planned: Balance training, Cognitive training, Coordination, Energy conservation training, Fine motor, Safety education  Treatment plan/goals established with Patient/Caregiver: Yes     "

## 2025-06-11 NOTE — PROGRESS NOTES
[facility] ([facility])  Rehab Progress Note    Patient Name: Chelle Chandra  MRN: [id number]  Age: 59 y.o. Sex: female  : 1965  Hospital Length of Stay: 5 days  Date of Service: 2025   Chief Complaint: Subdural hematoma s/p left craniotomy with hematoma evacuation on , s/p left diony hole drainage of SDH on 2025     Subjective:     Basic Information  Admit Information: 59-year-old white female presented to [facility] ED on 05/15/2025 after multiple falls.  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  JOHNATHAN drain removed on .  Discharge home with home health on ... Presented to [facility] ED on  with headache, slurred speech, facial droop, and right-sided weakness.  CTA head and neck significant for suspected diminished flow inferior division of left MCA close to bifurcation.  CT head significant cord left cerebral convexity hypodense subacute hematoma and improved postsurgical pneumocephalus.  Transferred to [facility] for neurology/neurosurgery consultation.  Not a candidate for TNK due to recent intracranial hemorrhage.  Neurology recommended holding Plavix in initiating Keppra 500 mg b.i.d..  EKG noted for evidence of any epileptiform discharges.  MRI brain with no evidence of new infarct.  MBS completed.  Speech 30 recommended moderately thickened liquids due to high risk of aspiration.  Neurosurgery evaluated him and recommended for drainage.  Tolerated left diony hole for drainage of SDH due to increased intracranial pressure with mass effect on  without perioperative complications.  Repeat CT head significant for removal of SDH.  Reported increase strength to right arm.  Headache improved.  Recommended high-intensity therapy.  Speech therapy recommended minced and  "moist diet with moderately thickened liquids.  Plavix resumed per Neurosurgery recommendations on 06/05.  Tolerated transfer to [facility] inpatient rehab unit on 06/06 without incident.  Today's Information: No acute events overnight.  Lying in bed.  Reports good sleep.  Appetite fair.  Still with intermittent headaches.  Staff reported she is beginning to have a right-sided lean that began yesterday evening.  Increased weakness.  PT/OT reports decline.  Vital signs at goal with no recorded fevers.  Good glycemic control.  No new labs today.  Repeat CT head without contrast significant for s/p recent craniotomy for evacuation of the left-sided subdural hematoma.  There is some residual pneumo cephaly seen.  There has been reaccumulation of the subdural fluid collection which appears predominately hypo attenuated on today's examination.  Maximum thickness of the subdural fluid collection on today's examination is 17.5 mm which is greater than the prior examinations     Review of patient's allergies indicates:   Allergen Reactions    Aspirin     Ketorolac     Penicillins     Sulfa (sulfonamide antibiotics)     Ozempic [semaglutide] Other (See Comments)     Abdominal pain      Current Medications[1]     Review of Systems   Complete 12-point review of symptoms negative except for what's mentioned in HPI     Objective:     /75   Pulse 82   Temp 98.2 °F (36.8 °C) (Oral)   Resp 18   Ht 5' 6" (1.676 m)   Wt 46.9 kg (103 lb 6.3 oz)   SpO2 95%   BMI 16.69 kg/m²      Physical Exam  Constitutional:       General: She is not in acute distress.     Appearance: She is underweight.   HENT:      Head:      Comments: Left cranial incision sites clean intact  Eyes:      Pupils: Pupils are equal, round, and reactive to light.      Comments: Wearing corrective lenses   Cardiovascular:      Rate and Rhythm: Regular rhythm. Tachycardia present.   Pulmonary:      Effort: No respiratory distress.      Breath sounds: No wheezing or " rhonchi.   Abdominal:      General: Bowel sounds are normal.      Palpations: Abdomen is soft.      Tenderness: There is no abdominal tenderness. There is no guarding.   Musculoskeletal:         General: No deformity.      Cervical back: Neck supple.      Right lower leg: No edema.      Left lower leg: No edema.      Comments: Diffuse muscle atrophy with right sided weakness   Skin:     General: Skin is warm.   Neurological:      Motor: Weakness present.   Psychiatric:         Mood and Affect: Mood normal.         Behavior: Behavior normal.         Thought Content: Thought content normal.         Judgment: Judgment normal.     *MD performed and documented physical examination       Lines/Drains/Airways       None               Labs  Recent Results (from the past 24 hours)   POCT glucose    Collection Time: 06/10/25 11:23 AM   Result Value Ref Range    POCT Glucose 104 70 - 110 mg/dL   POCT glucose    Collection Time: 06/10/25  4:39 PM   Result Value Ref Range    POCT Glucose 148 (H) 70 - 110 mg/dL   POCT glucose    Collection Time: 06/10/25  8:43 PM   Result Value Ref Range    POCT Glucose 122 (H) 70 - 110 mg/dL   POCT glucose    Collection Time: 06/11/25  5:36 AM   Result Value Ref Range    POCT Glucose 124 (H) 70 - 110 mg/dL     Radiology  CT head without contrast on 06/11/2025, IMPRESSION:  Status post recent craniotomy for evacuation of the left-sided subdural hematoma.  There is some residual pneumo cephaly seen.  There has been reaccumulation of the subdural fluid collection which appears predominately hypo attenuated on today's examination.  Maximum thickness of the subdural fluid collection on today's examination is 17.5 mm which is greater than the prior examinations   Radiology   Transthoracic echo on 05/30/2025, IMPRESSION:  LVEF 55-60%.  Right ventricle is normal in size, systolic function is normal.  Normal venous pressure at 3 mm Hg.  Radiology   MRI brain without contrast on 05/29/2025, IMPRESSION:   There is no diffusion weighted restriction or signal dropout on ADC map to indicate an acute infarct.  There is left middle cerebral artery territory encephalomalacia combined with gliotic changes related to old infarct.  Chronic microvascular ischemic changes or mild with periventricular and deep white matter T2 FLAIR hyperintense signals.  There is no acute intracranial hemorrhage, hydrocephalus, midline shift or mass effect.  Left cerebral convexity chronic subdural hematoma or hygroma is with similar appearance.  There are several left pleural base calcifications which showed gradient echo sequence dephasing artifacts.  Radiology  Carotid ultrasound on 05/29/2025:  IMPRESSION:  Right ICA is patent with less than 50% stenosis.  Left ICA is patent with less than 50% stenosis.  Bilateral vertebral arteries are patent with antegrade flow.    Assessment/Plan:     59 y.o. WF admitted on 6/6/2025    Subdural hematoma   - s/p left craniotomy with hematoma evacuation on 05/19  - s/p left diony hole drainage of SDH on 06/02/2025  - repeat CT head with resolution of SDH  - sutures removed on 06/09  - continue                Keppra 500 mg b.i.d.  - repeat CT head on 06/11 with reaccumulation of the subdural fluid collection  - neurosurgery recommends transfer to main [place] for removal of subdural fluid     Major depressive disorder  - stable  - continue                Nortriptyline 25 mg at bedtime                 Paxil 20 mg daily     Generalized anxiety disorder  - stable  - continue                BuSpar 15 mg b.i.d.     HLD  - FLP outpatient  - continue                Lipitor 80 mg daily                 Zetia 10 mg daily      History of CVA  - stable  - not on ASA due to allergy  - continue                Plavix 75 mg daily                Lipitor 80 mg daily     Hypothyroidism  - TSH with next labs  - continue                Levothyroxine 88 mcg before breakfast      HTN  - BP at goal  - continue                 Metoprolol tartrate 12.5 mg b.i.d. (change 6/10 due to swallowing)                Hydralazine 10 mg every 2 hours as needed for BP > 160/90                Labetalol 10 mg every 2 hours as needed for BP > 160/90  - low sodium diet    Tachycardia  - active  - continue    Metoprolol tartrate 12.5 mg b.i.d. (change 6/10 due to swallowing)     Iron-deficiency anemia  - asymptomatic  - iron level low  - continue                Ferrous sulfate 325 mg daily (initiated 6/7)  - H/H stable   - no evidence of active bleeds  - will closely monitor and transfuse if needed      Thrombocytosis  - stable  - continue to monitor     Constipation  - stable   - continue  Colace 100 mg BID   Miralax 17 gm BID PRN  - encourage oral fluid hydration     DM type II  - HgA1c with next labs  - continue                Metformin 500 mg twice daily                ISS   - CBGs AC/HS     Moderate protein calorie malnutrition  - active  - encourage oral nutrition  - registered dietitian following     VTE Prophylaxis: SCDs  COVID-19 testing:  Unknown  COVID-19 vaccination status:  Vaccinated (Pfizer):  06/12/2021, 07/14/2021     POA:  No  Living will:  No  Contacts:  Roberto Chandra ([facility]  [phone]     CODE STATUS:  Full code  Internal Medicine (attending): Aldair Armenta MD  Physiatry (consulting):  Lazarus Mills MD     OUTPATIENT PROVIDERS  PCP: Jorge Silver MD   Neurosurgery: James Rankin MD  Neurology: LUIS Berumen     DISPOSITION:  Sleep hygiene and bowel maintenance at goal.  Appetite fair.  Vital signs at goal with no recorded fevers.  No new labs today.  During staffing therapy reported increased rightward wean along with cognitive/therapy decline.  Stat CT head without contrast significant for reaccumulation of subdural fluid collection which appears predominantly hypoattenuated on examination.  Maximum thickness of the subdural fluid collection on today's examination is 17.5 mm which is greater than the prior examination.   Neurosurgery evaluated and recommended transfer to [facility] for removal of subdural fluid.  No beds available at [facility].  Emergently sent through ED. [facility] ED, Neurosurgery, and family notified of transfer.    Staffing 6/11/2025: Continent of bowel and bladder. Sutures removed on 6/9. Increased right rodriguez lean.  Appetite is at 70%. RT: Increased right inattention. Decreased since Monday. PT: CGA for bed mobility. Sit to stand and tranfers. Ambulating 150 feet at mod assist with RW. Significant right sided lean on 6/10. Limited by cognition and right LE proprioception. Needs more time. OT; Min assist with showering and transfers. Noted right sided lean on 6/10 and 6/11. Limited by safety awareness. ST; Motivated. Does not like diet, but understands. Repeat CT head today 2/2 increased deficits. Projected discharge pending.     Shaheen Nava NP conducted independent physical examination and assisted with medical documentation.    Total time spent on this encounter including chart review and direct MD + NP 1-on-1 patient interaction: 57 minutes   Over 50% of this time was spent in counseling and coordination of care            [1]   Current Facility-Administered Medications:     acetaminophen tablet 650 mg, 650 mg, Oral, Q6H PRN, Elijah, Robin A, FNP, 650 mg at 06/09/25 0756    albuterol-ipratropium 2.5 mg-0.5 mg/3 mL nebulizer solution 3 mL, 3 mL, Nebulization, Q4H PRN, Jake Paulino MD    ALPRAZolam tablet 0.25 mg, 0.25 mg, Oral, TID PRN, Elijah, Robin A, FNP    atorvastatin tablet 80 mg, 80 mg, Oral, Daily, Elijah, Robin A, FNP, 80 mg at 06/11/25 0810    benzonatate capsule 100 mg, 100 mg, Oral, TID PRN, Elijah, Robin A, FNP    bisacodyL suppository 10 mg, 10 mg, Rectal, Daily PRN, Elijah, Robin A, FNP, 10 mg at 06/10/25 1342    busPIRone tablet 15 mg, 15 mg, Oral, BID, Elijah, Robin A, FNP, 15 mg at 06/11/25 0810    butalbital-acetaminophen-caffeine -40 mg per tablet 1  tablet, 1 tablet, Oral, Q4H PRN, Jake Paulino MD, 1 tablet at 06/10/25 2045    clopidogreL tablet 75 mg, 75 mg, Oral, Daily, Elijah, Robin A, FNP, 75 mg at 06/11/25 0809    dextrose 50% injection 12.5 g, 12.5 g, Intravenous, PRN, Elijah, Robin A, FNP    dextrose 50% injection 25 g, 25 g, Intravenous, PRN, Elijah, Robin A, FNP    docusate sodium capsule 100 mg, 100 mg, Oral, BID, Elijah, Robin A, FNP, 100 mg at 06/11/25 0811    ezetimibe tablet 10 mg, 10 mg, Oral, Daily, Elijah, Robin A, FNP, 10 mg at 06/11/25 0809    ferrous sulfate tablet 1 each, 1 tablet, Oral, Daily, Elijah, Robin A, FNP, 1 each at 06/11/25 0809    glucagon (human recombinant) injection 1 mg, 1 mg, Intramuscular, PRN, Elijah, Robin A, FNP    glucose chewable tablet 16 g, 16 g, Oral, PRN, Elijah, Robin A, FNP    glucose chewable tablet 24 g, 24 g, Oral, PRN, Elijah, Robin A, FNP    hydrALAZINE injection 10 mg, 10 mg, Intravenous, Q4H PRN, Placido, Nadine A, FNP    HYDROcodone-acetaminophen  mg per tablet 1 tablet, 1 tablet, Oral, Q4H PRN, Jake Paulino MD, 1 tablet at 06/09/25 1105    HYDROcodone-acetaminophen 5-325 mg per tablet 1 tablet, 1 tablet, Oral, Daily PRN, Elijah, Robin A, FNP, 1 tablet at 06/06/25 1218    hydrOXYzine pamoate capsule 50 mg, 50 mg, Oral, Nightly PRN, Elijah, Robin A, FNP    insulin aspart U-100 injection 0-5 Units, 0-5 Units, Subcutaneous, QID (AC + HS) PRN, Elijah, Robin A, FNP, 1 Units at 06/07/25 2003    labetalol 20 mg/4 mL (5 mg/mL) IV syring, 10 mg, Intravenous, Q4H PRN, PlacidoHumeraNadine A, FNP    levetiracetam 500 mg/5 mL (5 mL) liquid Soln 500 mg, 500 mg, Oral, BID, Nadine Cummins, FNP, 500 mg at 06/11/25 0809    levothyroxine tablet 88 mcg, 88 mcg, Oral, Before breakfast, Robin Nava, FNP, 88 mcg at 06/11/25 0532    LORazepam injection 2 mg, 2 mg, Intravenous, Q4H PRN, Robin Nava A, FNP    metFORMIN tablet 500 mg, 500 mg, Oral,  BID WM, Elijah, Robin A, FNP, 500 mg at 06/11/25 0811    metoprolol injection 10 mg, 10 mg, Intravenous, Q2H PRN, Elijah, Robin A, FNP    metoprolol tartrate (LOPRESSOR) split tablet 12.5 mg, 12.5 mg, Oral, BID, Elijah, Robin A, FNP, 12.5 mg at 06/11/25 0800    nitroGLYCERIN SL tablet 0.4 mg, 0.4 mg, Sublingual, Q5 Min PRN, Elijah, Robin A, FNP    nortriptyline capsule 25 mg, 25 mg, Oral, QHS, Elijah, Robin A, FNP, 25 mg at 06/10/25 2045    ondansetron disintegrating tablet 4 mg, 4 mg, Oral, Q6H PRN, Elijah, Robin A, FNP    ondansetron disintegrating tablet 8 mg, 8 mg, Oral, Q8H PRN, Elijah, Robin A, FNP    ondansetron injection 4 mg, 4 mg, Intravenous, Q8H PRN, Elijah, Robin A, FNP    paroxetine tablet 20 mg, 20 mg, Oral, Daily, Elijah, Robin A, FNP, 20 mg at 06/11/25 0809    polyethylene glycol packet 17 g, 17 g, Oral, BID PRN, Elijah, Robin A, FNP, 17 g at 06/09/25 1340

## 2025-06-11 NOTE — PLAN OF CARE
Patient being transferred via EMS for SDH refill on STAT CT.  Will transition to ED and be routed to NS on call.  Patient and  aware.  Pt is upset about not being able to return home right now.

## 2025-06-12 LAB
POCT GLUCOSE: 124 MG/DL (ref 70–110)
POCT GLUCOSE: 159 MG/DL (ref 70–110)
POCT GLUCOSE: 167 MG/DL (ref 70–110)

## 2025-06-12 PROCEDURE — 11000001 HC ACUTE MED/SURG PRIVATE ROOM

## 2025-06-12 PROCEDURE — 25000003 PHARM REV CODE 250: Performed by: INTERNAL MEDICINE

## 2025-06-12 PROCEDURE — 18500000 HC LEAVE OF ABSENCE HOSPITAL SERVICES

## 2025-06-12 PROCEDURE — 21400001 HC TELEMETRY ROOM

## 2025-06-12 RX ORDER — POLYETHYLENE GLYCOL 3350 17 G/17G
17 POWDER, FOR SOLUTION ORAL 2 TIMES DAILY
Status: DISCONTINUED | OUTPATIENT
Start: 2025-06-12 | End: 2025-06-29

## 2025-06-12 RX ADMIN — BUTALBITAL, ACETAMINOPHEN, AND CAFFEINE 1 TABLET: 325; 50; 40 TABLET ORAL at 07:06

## 2025-06-12 RX ADMIN — BUSPIRONE HYDROCHLORIDE 15 MG: 5 TABLET ORAL at 08:06

## 2025-06-12 RX ADMIN — BUTALBITAL, ACETAMINOPHEN, AND CAFFEINE 1 TABLET: 325; 50; 40 TABLET ORAL at 01:06

## 2025-06-12 RX ADMIN — BUSPIRONE HYDROCHLORIDE 15 MG: 5 TABLET ORAL at 09:06

## 2025-06-12 RX ADMIN — NORTRIPTYLINE HYDROCHLORIDE 25 MG: 25 CAPSULE ORAL at 09:06

## 2025-06-12 RX ADMIN — LEVETIRACETAM 500 MG: 500 TABLET, FILM COATED ORAL at 09:06

## 2025-06-12 RX ADMIN — POLYETHYLENE GLYCOL 3350 17 G: 17 POWDER, FOR SOLUTION ORAL at 09:06

## 2025-06-12 RX ADMIN — BUTALBITAL, ACETAMINOPHEN, AND CAFFEINE 1 TABLET: 325; 50; 40 TABLET ORAL at 06:06

## 2025-06-12 RX ADMIN — METOPROLOL SUCCINATE 12.5 MG: 25 TABLET, EXTENDED RELEASE ORAL at 08:06

## 2025-06-12 RX ADMIN — ATORVASTATIN CALCIUM 80 MG: 40 TABLET, FILM COATED ORAL at 08:06

## 2025-06-12 RX ADMIN — EZETIMIBE 10 MG: 10 TABLET ORAL at 08:06

## 2025-06-12 RX ADMIN — LEVETIRACETAM 500 MG: 500 TABLET, FILM COATED ORAL at 08:06

## 2025-06-12 NOTE — PLAN OF CARE
06/12/25 1255   Discharge Assessment   Assessment Type Discharge Planning Assessment   Confirmed/corrected address, phone number and insurance Yes   Confirmed Demographics Correct on Facesheet   Source of Information patient   Communicated DARIAN with patient/caregiver Date not available/Unable to determine   People in Home spouse   Facility Arrived From: [facility]   Do you expect to return to your current living situation? Yes   Do you have help at home or someone to help you manage your care at home? Yes   Who are your caregiver(s) and their phone number(s)? melanie garcia, spouse, [phone]; ehsan valdez, dtr, [phone]   Prior to hospitilization cognitive status: Unable to Assess   Current cognitive status: Alert/Oriented   Home Accessibility stairs to enter home;wheelchair accessible   Number of Stairs, Main Entrance three   Stair Railings, Main Entrance railings safe and in good condition   Home Layout Able to live on 1st floor   Equipment Currently Used at Home wheelchair;rollator;walker, rolling   Readmission within 30 days? Yes   Patient currently being followed by outpatient case management? No   Do you currently have service(s) that help you manage your care at home? No   Do you take prescription medications? Yes   Do you have prescription coverage? Yes   Do you have any problems affording any of your prescribed medications? No   Is the patient taking medications as prescribed? yes   Who is going to help you get home at discharge? family, medical transport   How do you get to doctors appointments? family or friend will provide   Are you on dialysis? No   Do you take coumadin? No   Discharge Plan A Rehab   Discharge Plan B Home Health   DME Needed Upon Discharge  none   Discharge Plan discussed with: Patient   Transition of Care Barriers None     Completed assessment with pt at bedside. Introduced self and explained role as SW. Pt verb understanding to all questions asked. PCP is Jorge Silver. Pt is  transfer back to [facility] from [facility]. Pt is agreeable with going back to [facility] if recommended, but would prefer to go home if possible. Pt to OR with ns[facility] 6/16 for VPS.    ALBERTO BaconW

## 2025-06-12 NOTE — PROGRESS NOTES
Headaches. Some right arm weakness.  Long discussion yesterday and today-will proceed with Subdural/peritoneal shunt.  Consent obtained-risks enumerated.  Thank you.

## 2025-06-12 NOTE — PROGRESS NOTES
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  Patient recently had subdural hematoma in May requiring intracranial embolization, craniotomy with hematoma evacuation.  After this patient was discharged home.  Late may she presented back to the ED with right-sided weakness, facial droop and imaging was concerning for left MCA diminished flow and subacute hematoma.  MRI brain was negative for CVA.  Neurosurgery performed diony hole for drainage of subdural hematoma due to increased intracranial pressure with postop CT showing definitive improvement patient was on Plavix post embolization which was held briefly and resumed per Neurosurgery on 06/05 and she was transferred to rehab on 06/06.  While in rehab patient was noted to have increase right upper extremity weakness and also significant headaches.  CT head revealed reaccumulation of subdural fluid collection, increased from before patient was sent to main [place] for further evaluation/neurosurgery services.  Neurosurgery evaluated, planning for  shunt this hospitalization, home meds resumed as appropriate except for Plavix, symptomatic management for headache    Interval Hx:   Patient seen at bedside, she is comfortably laying in bed, headache is better today, no new complaints, no change in neurological status compared to admit, hemodynamics are stable and she is afebrile    Objective/physical exam:  General: In no acute distress, afebrile  Chest: Clear to auscultation bilaterally  Heart: S1, S2, no appreciable murmur  Abdomen: Soft, nontender, BS +  MSK: Warm, no lower extremity edema, no clubbing or cyanosis  Neurologic: Alert and oriented x4, moving all extremities with good strength      VITAL SIGNS: 24 HRS MIN & MAX LAST   Temp  Min: 97.9 °F (36.6 °C)  Max: 98.4 °F (36.9 °C) 98.1 °F (36.7 °C)   BP  Min: 109/68  Max: 122/81 115/71   Pulse  Min: 82  Max: 97  91   Resp  Min: 17  Max: 25 17   SpO2  Min: 95 %  Max: 100 % 95 %       Recent Labs   Lab 06/11/25  1448   WBC 7.64   RBC 3.93*   HGB 11.8*   HCT 36.5*   MCV 92.9   MCH 30.0   MCHC 32.3*   RDW 14.1   *   MPV 9.9         Recent Labs   Lab 06/09/25  0610 06/11/25  1448    142   K 3.7 4.0    104   CO2 28 24   BUN 12.0 14.1   CREATININE 0.65 0.60   * 96   CALCIUM 9.3 9.7   MG 1.70  --    ALBUMIN 2.7* 2.9*   PROT 7.0 7.6   ALKPHOS 139 160*   ALT 26 20   AST 14 12   BILITOT 0.4 0.3          Microbiology Results (last 7 days)       ** No results found for the last 168 hours. **             Scheduled Med:   atorvastatin  80 mg Oral Daily    busPIRone  15 mg Oral BID    ezetimibe  10 mg Oral Daily    levETIRAcetam  500 mg Oral BID    levothyroxine  88 mcg Oral Before breakfast    metoprolol succinate  12.5 mg Oral Daily    nortriptyline  25 mg Oral QHS          Assessment/Plan:    Subdural hematoma requiring craniotomy with evacuation and MMA embolization early May with recurrence late May requiring diony hole  Increased subdural fluid collection  Right-sided weakness/intractable headache secondary to above  History of 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, 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  Depression/anxiety   HLD  Hypothyroidism  Essential HTN  Type 2 diabetes mellitus  Prophylaxis     Neurosurgery on board   Given recurrent subdural fluid collection planning for  shunt coming Monday [place] for seizure prevention   Holding Plavix  Symptomatic management for headache, better on Fioricet  Continue home meds-statin, BuSpar, Zetia, levothyroxine, Toprol-XL, nortriptyline   Added MiraLax for constipation  DVT prophylaxis-bilateral SCDs      Cat Casper MD   06/12/2025

## 2025-06-13 LAB
POCT GLUCOSE: 151 MG/DL (ref 70–110)
POCT GLUCOSE: 238 MG/DL (ref 70–110)

## 2025-06-13 PROCEDURE — 92610 EVALUATE SWALLOWING FUNCTION: CPT

## 2025-06-13 PROCEDURE — 25000003 PHARM REV CODE 250: Performed by: INTERNAL MEDICINE

## 2025-06-13 PROCEDURE — 25000003 PHARM REV CODE 250: Performed by: NURSE PRACTITIONER

## 2025-06-13 PROCEDURE — 97116 GAIT TRAINING THERAPY: CPT

## 2025-06-13 PROCEDURE — 63600175 PHARM REV CODE 636 W HCPCS: Performed by: INTERNAL MEDICINE

## 2025-06-13 PROCEDURE — 21400001 HC TELEMETRY ROOM

## 2025-06-13 PROCEDURE — 18500000 HC LEAVE OF ABSENCE HOSPITAL SERVICES

## 2025-06-13 PROCEDURE — 97166 OT EVAL MOD COMPLEX 45 MIN: CPT

## 2025-06-13 PROCEDURE — 97162 PT EVAL MOD COMPLEX 30 MIN: CPT

## 2025-06-13 RX ORDER — BISACODYL 10 MG/1
10 SUPPOSITORY RECTAL ONCE
Status: COMPLETED | OUTPATIENT
Start: 2025-06-13 | End: 2025-06-13

## 2025-06-13 RX ADMIN — BUTALBITAL, ACETAMINOPHEN, AND CAFFEINE 1 TABLET: 325; 50; 40 TABLET ORAL at 02:06

## 2025-06-13 RX ADMIN — BUTALBITAL, ACETAMINOPHEN, AND CAFFEINE 1 TABLET: 325; 50; 40 TABLET ORAL at 10:06

## 2025-06-13 RX ADMIN — BISACODYL 10 MG: 10 SUPPOSITORY RECTAL at 10:06

## 2025-06-13 RX ADMIN — EZETIMIBE 10 MG: 10 TABLET ORAL at 10:06

## 2025-06-13 RX ADMIN — ATORVASTATIN CALCIUM 80 MG: 40 TABLET, FILM COATED ORAL at 10:06

## 2025-06-13 RX ADMIN — NORTRIPTYLINE HYDROCHLORIDE 25 MG: 25 CAPSULE ORAL at 09:06

## 2025-06-13 RX ADMIN — LEVETIRACETAM 500 MG: 500 TABLET, FILM COATED ORAL at 09:06

## 2025-06-13 RX ADMIN — POLYETHYLENE GLYCOL 3350 17 G: 17 POWDER, FOR SOLUTION ORAL at 09:06

## 2025-06-13 RX ADMIN — METOPROLOL SUCCINATE 12.5 MG: 25 TABLET, EXTENDED RELEASE ORAL at 10:06

## 2025-06-13 RX ADMIN — BUSPIRONE HYDROCHLORIDE 15 MG: 5 TABLET ORAL at 10:06

## 2025-06-13 RX ADMIN — BUTALBITAL, ACETAMINOPHEN, AND CAFFEINE 1 TABLET: 325; 50; 40 TABLET ORAL at 06:06

## 2025-06-13 RX ADMIN — INSULIN ASPART 4 UNITS: 100 INJECTION, SOLUTION INTRAVENOUS; SUBCUTANEOUS at 07:06

## 2025-06-13 RX ADMIN — POLYETHYLENE GLYCOL 3350 17 G: 17 POWDER, FOR SOLUTION ORAL at 10:06

## 2025-06-13 RX ADMIN — LEVETIRACETAM 500 MG: 500 TABLET, FILM COATED ORAL at 10:06

## 2025-06-13 RX ADMIN — ACETAMINOPHEN 650 MG: 325 TABLET ORAL at 11:06

## 2025-06-13 RX ADMIN — LEVOTHYROXINE SODIUM 88 MCG: 0.09 TABLET ORAL at 06:06

## 2025-06-13 RX ADMIN — BUSPIRONE HYDROCHLORIDE 15 MG: 5 TABLET ORAL at 09:06

## 2025-06-13 NOTE — PROGRESS NOTES
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  Patient recently had subdural hematoma in May requiring intracranial embolization, craniotomy with hematoma evacuation.  After this patient was discharged home.  Late may she presented back to the ED with right-sided weakness, facial droop and imaging was concerning for left MCA diminished flow and subacute hematoma.  MRI brain was negative for CVA.  Neurosurgery performed diony hole for drainage of subdural hematoma due to increased intracranial pressure with postop CT showing definitive improvement patient was on Plavix post embolization which was held briefly and resumed per Neurosurgery on 06/05 and she was transferred to rehab on 06/06.  While in rehab patient was noted to have increase right upper extremity weakness and also significant headaches.  CT head revealed reaccumulation of subdural fluid collection, increased from before patient was sent to main [place] for further evaluation/neurosurgery services.  Neurosurgery evaluated, planning for  shunt this hospitalization, home meds resumed as appropriate except for Plavix, symptomatic management for headache.   shunt scheduled for 6/16.    Interval Hx:     Patient seen at bedside, she was constipated still, requested suppository, had large bowel movements later during the day, no new complaints at the time of my rounds, Fioricet is helping with headache     Objective/physical exam:  General: In no acute distress, afebrile  Chest: Clear to auscultation bilaterally  Heart: S1, S2, no appreciable murmur  Abdomen: Soft, nontender, BS +  MSK: Warm, no lower extremity edema, no clubbing or cyanosis  Neurologic: Alert and oriented x4,  VITAL  SIGNS: 24 HRS MIN & MAX LAST   Temp  Min: 97.8 °F (36.6 °C)  Max: 98.7 °F (37.1 °C) 97.8 °F (36.6 °C)   BP  Min: 99/68  Max: 128/77 99/68   Pulse  Min: 85  Max: 93  92   Resp  Min: 17  Max: 19 18   SpO2  Min: 93 %  Max: 97 % 96 %       Recent Labs   Lab 06/11/25  1448   WBC 7.64   RBC 3.93*   HGB 11.8*   HCT 36.5*   MCV 92.9   MCH 30.0   MCHC 32.3*   RDW 14.1   *   MPV 9.9         Recent Labs   Lab 06/09/25  0610 06/11/25  1448    142   K 3.7 4.0    104   CO2 28 24   BUN 12.0 14.1   CREATININE 0.65 0.60   * 96   CALCIUM 9.3 9.7   MG 1.70  --    ALBUMIN 2.7* 2.9*   PROT 7.0 7.6   ALKPHOS 139 160*   ALT 26 20   AST 14 12   BILITOT 0.4 0.3          Microbiology Results (last 7 days)       ** No results found for the last 168 hours. **             Scheduled Med:   atorvastatin  80 mg Oral Daily    busPIRone  15 mg Oral BID    ezetimibe  10 mg Oral Daily    levETIRAcetam  500 mg Oral BID    levothyroxine  88 mcg Oral Before breakfast    metoprolol succinate  12.5 mg Oral Daily    nortriptyline  25 mg Oral QHS    polyethylene glycol  17 g Oral BID          Assessment/Plan:    Subdural hematoma requiring craniotomy with evacuation and MMA embolization early May with recurrence late May requiring diony hole  Increased subdural fluid collection  Right-sided weakness/intractable headache secondary to above  History of 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, 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  Depression/anxiety   HLD  Hypothyroidism  Essential HTN  Type 2 diabetes mellitus  Prophylaxis     Neurosurgery on board   Given recurrent subdural fluid collection planning for  shunt coming Monday  [facility]   Holding Plavix  Symptomatic management for headache, better on Fioricet  Continue home meds-statin, BuSpar, Zetia, levothyroxine, Toprol-XL, nortriptyline   Constipation resolved today  DVT prophylaxis-bilateral SCDs, I am holding off on chemical prophylaxis given subdural fluid collection and also    shunt placement      Cat Casper MD   06/13/2025

## 2025-06-13 NOTE — PT/OT/SLP EVAL
Occupational Therapy  Evaluation    Name: Chelle Chandra  MRN: [id number]  Recent Surgery: Procedure(s) (LRB):  CRANIOTOMY, FOR SUBDURAL HEMATOMA EVACUATION (Left)      Recommendations:     Discharge therapy intensity: High Intensity Therapy   Discharge Equipment Recommendations:  to be determined by next level of care  Barriers to discharge:  None    Assessment:     Chelle Chandra is a 59 y.o. female with a medical diagnosis of R sided weakness and facial droop due to L SDH s/p craniotomy and MMA embolization. On 5/30 patient with subdural hemorrhage s/p L diony hole. The pt presents with new R sided weakness, with plans for peritoneal shunt on 6/16.  She presents with the following performance deficits affecting function: weakness, impaired self care skills, impaired functional mobility, gait instability, impaired balance, visual deficits, decreased safety awareness.     Pt tolerated eval well. Requires CGA for sit to stand using RW, min A for toilet t/f, and CGA using RW for ambulation and standing grooming activities. Pt demonstrates a R lateral lean during ambulation, sitting, and standing and R sided attention deficits. Requires cueing for safety and attention to R side. At baseline pt is min A in ADLs. Recommending high intensity therapy upon d/c to maximize independence and safety with ADLs and functional mobility to achieve goal of returning home.     Rehab Prognosis: Good; patient would benefit from acute skilled OT services to address these deficits and reach maximum level of function.       Plan:     Patient to be seen 5 x/week to address the above listed problems via self-care/home management, therapeutic activities, therapeutic exercises, neuromuscular re-education, sensory integration  Plan of Care Expires: 07/14/25  Plan of Care Reviewed with: patient    Subjective     Chief Complaint: none  Patient/Family Comments/goals: return home    Occupational Profile:  Living Environment: lives with   in [facility] with 3-4 ARIANNA, with handrail on L going up.   Previous level of function: prior to hospital d/c 5/22 daughter reported she was independent in ADLs, however when returned home, she was requiring min A. Today pt reports that she still requires min A for ADLs and functional mobility tasks.   Roles and Routines: mother, wife  Equipment Used at Home: rollator, grab bar, blood pressure machine, walker, rolling  Assistance upon Discharge: Pt reports her  works, however her daughter can stop in to help and she also has a nearby cousin who can assist. Pt reports she will never be home alone.     Pain/Comfort:  Pain Rating 1: 0/10    Patients cultural, spiritual, Christian conflicts given the current situation: no    Objective:     OT communicated with nurse prior to session.      Patient was found up in chair with peripheral IV upon OT entry to room.    General Precautions: Standard, fall  Orthopedic Precautions:    Braces: N/A    Vital Signs: Blood Pressure: 93/68 at start of session.       Functional Mobility/Transfers:  Transfers: Sit to Stand: contact guard assistance with rolling walker  Toilet Transfer: min A with rolling walker using Step Transfer  Functional Mobility: Pt ambulated from bedside chair to toilet with CGA using RW and performed toilet t/f with min A using RW with verbal cues for safety and to not sit prematurely. Pt demonstrates R lateral lean during sitting, standing, and ambulation. Requires CGA for grooming tasks at sink using 1 UE for support, however dynamic standing balance is poor and requires mod A to maintain balance when using no UE support on sink. Demonstrated LOB 1x.      Activities of Daily Living:  Grooming: contact guard assistance for standing at sink to brush hair and perform denture care.  Pt is CGA for using 1 UE to perform tasks, however has poor balance when standing at sink without UE support.      AMPAC 6 Click ADL:  AMPAC Total Score: 18    Functional  Cognition:  Orientation: oriented to Person, Place, and Time  Safety Awareness: Impaired. Demonstrates inattention to R, veers to R, bumps RW into furniture despite max verbal cues for attn to R side.     Visual Perceptual Skills:  Visual Fields: L peripheral vision intact, R visual field impaired; pt aware of therapist hand in R peripheral visual field but unable to distinguish number of fingers raised.      Upper Extremity Function:  Right Upper Extremity:   Range of Motion: WFL  Strength: WFL; demonstrates as weaker than LUE but not significant.   Sensation: WFL    Left Upper Extremity:  Range of Motion: WFL  Strength: WFL  Sensation: WFL    Balance:   Static sitting balance: WFL  Static standing balance:WFL; stable with UE support on RW  Dynamic standing balance:Impaired. LOB x1 when performing grooming tasks at sink with no UE support.     Therapeutic Positioning  Risk for acquired pressure injuries is decreased due to ability to get to BSC/toilet with assist and intact sensation.    OT interventions performed during the course of today's session:   Education was provided on benefits of and recommendations for therapeutic positioning    Skin assessment: sacral area assessed, slight redness on bottom however no broken skin noted. Known incisional wound to L scalp.      OT recommendations for therapeutic positioning throughout hospitalization:   Follow [facility] Pressure Injury Prevention Protocol    Patient Education:  Patient provided with verbal education education regarding OT role/goals/POC, fall prevention, and safety awareness.  Understanding was verbalized, however additional teaching warranted.     Patient left up in chair with call button in reach and cardiology present.    GOALS:   Multidisciplinary Problems       Occupational Therapy Goals          Problem: Occupational Therapy    Goal Priority Disciplines Outcome Interventions   Occupational Therapy Goal     OT, PT/OT Progressing    Description: Goals:  to be met by 07/14/25    Pt will complete grooming standing at sink with LRAD with mod I.  Pt will complete UB dressing with SBA.  Pt will complete LB dressing with SBA using LRAD.  Pt will complete toileting with SBA using LRAD.  Pt will complete functional mobility to/from toilet and toilet transfer with mod I using LRAD.   Pt will demo visual attention to R side >80% of time with min verbal cues.                         History:     Past Medical History:   Diagnosis Date    Accelerated junctional rhythm     Agatston CAC score, <100     Anxiety disorder, unspecified     Asthma     COPD type A     Diabetes mellitus without complication     GERD (gastroesophageal reflux disease)     History of COVID-19     Insomnia     Lung nodule          Past Surgical History:   Procedure Laterality Date    ANGIOGRAM, CORONARY, WITH LEFT HEART CATHETERIZATION      BACK SURGERY      BREAST LUMPECTOMY Right     CARDIOVERSION  08/01/2013    CARPAL TUNNEL RELEASE  10/23/2013    CRANIOTOMY FOR EVACUATION OF SUBDURAL HEMATOMA Left 05/19/2025    Procedure: CRANIOTOMY, FOR SUBDURAL HEMATOMA EVACUATION;  Surgeon: Ricardo Shelley MD;  Location: [facility] OR;  Service: Neurosurgery;  Laterality: Left;    CREATION OF TERRI HOLE WITH EVACUATION OF HEMATOMA Left 6/2/2025    Procedure: CREATION, CRANIAL TERRI HOLE, WITH HEMATOMA EVACUATION;  Surgeon: James Rankin MD;  Location: [facility] OR;  Service: Neurosurgery;  Laterality: Left;  LEFT BURRHOLE FOR SUBDURAL HEMATOMA EVACUATION // STEALTH // [facility] // DRILL    FUSION OF CERVICAL SPINE BY ANTERIOR APPROACH USING COMPUTER-ASSISTED NAVIGATION  06/10/2019    KIDNEY SURGERY      TONSILLECTOMY AND ADENOIDECTOMY      TOTAL ABDOMINAL HYSTERECTOMY      WRIST SURGERY         Time Tracking:     OT Date of Treatment:    OT Start Time: 1000  OT Stop Time: 1023  OT Total Time (min): 23 min    Billable Minutes:Evaluation moderate    6/13/2025

## 2025-06-13 NOTE — PROGRESS NOTES
[facility]  Neurosurgery  Progress Note    Subjective:     Interval History: NAEs overnight. Resting in bed. Headache improved. No complaints otherwise.     Post-Op Info:  Procedure(s) (LRB):  CRANIOTOMY, FOR SUBDURAL HEMATOMA EVACUATION (Left)          Medications:  Continuous Infusions:  Scheduled Meds:   atorvastatin  80 mg Oral Daily    busPIRone  15 mg Oral BID    ezetimibe  10 mg Oral Daily    levETIRAcetam  500 mg Oral BID    levothyroxine  88 mcg Oral Before breakfast    metoprolol succinate  12.5 mg Oral Daily    nortriptyline  25 mg Oral QHS    polyethylene glycol  17 g Oral BID     PRN Meds:  Current Facility-Administered Medications:     acetaminophen, 650 mg, Oral, Q8H PRN    butalbital-acetaminophen-caffeine -40 mg, 1 tablet, Oral, Q4H PRN    dextrose 50%, 12.5 g, Intravenous, PRN    dextrose 50%, 25 g, Intravenous, PRN    glucagon (human recombinant), 1 mg, Intramuscular, PRN    glucagon (human recombinant), 1 mg, Intramuscular, PRN    glucose, 16 g, Oral, PRN    glucose, 24 g, Oral, PRN    insulin aspart U-100, 0-10 Units, Subcutaneous, QID (AC + HS) PRN    melatonin, 6 mg, Oral, Nightly PRN    naloxone, 0.02 mg, Intravenous, PRN    ondansetron, 4 mg, Intravenous, Q8H PRN    sodium chloride 0.9%, 10 mL, Intravenous, PRN    sodium chloride 0.9%, 3 mL, Intravenous, Q12H PRN     Review of Systems  Objective:     Weight: 49.9 kg (110 lb)  Body mass index is 17.75 kg/m².  Vital Signs (Most Recent):  Temp: 98.2 °F (36.8 °C) (06/13/25 0514)  Pulse: 88 (06/13/25 0514)  Resp: 18 (06/13/25 0514)  BP: 108/73 (06/13/25 0514)  SpO2: 97 % (06/13/25 0514) Vital Signs (24h Range):  Temp:  [98.1 °F (36.7 °C)-98.7 °F (37.1 °C)] 98.2 °F (36.8 °C)  Pulse:  [85-93] 88  Resp:  [17-19] 18  SpO2:  [93 %-97 %] 97 %  BP: (105-128)/(69-77) 108/73                              Neurosurgery Physical Exam  Awake, alert, oriented.   NAD. Resting in bed.   Weakness to right arm, moves left side  well.   EOMI. Speech clear  Scalp incision CDI.     Significant Labs:  Recent Labs   Lab 06/11/25  1448   GLU 96      K 4.0      CO2 24   BUN 14.1   CREATININE 0.60   CALCIUM 9.7     Recent Labs   Lab 06/11/25  1448   WBC 7.64   HGB 11.8*   HCT 36.5*   *     Recent Labs   Lab 06/11/25  1448   INR 1.0   APTT 29.2     Microbiology Results (last 7 days)       ** No results found for the last 168 hours. **              Assessment/Plan:     -Plans for subdural to peritoneal shunt on 6/16/25.   -CT head stealth tomorrow AM  -PTOTST  -Answered all questions regarding surgery Monday  -Consent previously obtained  -NPO midnight 6/16/25.   -Hold anticoagulation after tomorrow  -Will follow     CECILIA Carrillo  Neurosurgery  [facility]

## 2025-06-13 NOTE — CONSULTS
Inpatient consult to Cardiology  Consult performed by: Julien Valle MD  Consult ordered by: Henrik Sandoval PA  Reason for consult: CRA for subdural to peritoneal shunt        [facility]    Cardiology  Consult Note    Patient Name: Chelle Chandra  MRN: [id number]  Admission Date: 6/11/2025  Hospital Length of Stay: 2 days  Code Status: Full Code   Attending Provider: Cat Casper MD   Consulting Provider: Julien Valle MD  Primary Care Physician: Jorge Silver MD  Principal Problem:<principal problem not specified>    Patient information was obtained from patient and ER records.     Subjective:     Chief Complaint/Reason for Consult:  CRA for Neurosurgery    HPI:  Patient is a 59-year-old female known to [facility], Dr. Mcfarland, with a PMH of SVT vs Accelerated [place], HLD, DM2, CVA, Hypothyroidism, Asthma, Bipolar Disorder that was sent to the ER from [facility] rehab on 6/11/25 after developing right upper extremity weakness. Pt was just discharged from [facility] on 6/6/36 after undergoing a left craniotomy for hematoma evacuation on 5/19/25, left diony hole drainage of SDH on 6/2/25, and [facility] emobilization per Dr. Egan. CRA was done per Dr. Mclean on 6/1/25. We are consolted by neurology for CRA for subdural to peritoneal shunt scheduled on 6/16/25.     PMH: SVT vs accelerated junction, Hyperlipidemia, DM, CVA, Hypothyroidism, Asthma, Bipolar disorder   PSH:  Hysterectomy, Neck Surgery, Tonsillectomy, Lumpectomy  Family History:  Father - DM2; Mother - MI  Social History:  Former Smoker. Denies Alcohol Use. Denies Illicit Drug Use.     Previous Cardiac Diagnostics:   Echocardiogram 5.30.25:    Left Ventricle: The left ventricle is normal in size. Normal wall thickness. There is normal systolic function with a visually estimated ejection fraction of 55 - 60%.    Right Ventricle: The right ventricle is normal in size Systolic function is normal. TAPSE is 1.8 cm.    IVC/SVC: Normal venous pressure  at 3 mmHg.    Carotid US 5.29.25:  The right internal carotid artery is patent with less than 50% stenosis.  The left internal carotid artery is patent with less than 50% stenosis.  Bilateral vertebral arteries are patent with antegrade flow.     Holter Monitor: 8.27.24  This is a good quality study.  Predominant rhythm is normal sinus rhythm.   No evidence of AV block is noted  Moderate PACs noted  1.5% PACs  Moderate PVCs noted  1% PVCs  No pauses noted  Potential SVT noted for duration of about 10 min on 8/27 at 10:20AM    MPI 3.30.20:  This is probably a normal perfusion study. There is no evidence of ischemia. Small mild fixed distal anterior defect that might be artifact.    This scan is suggestive of low risk for future cardiovascular events.   Small fixed perfusion abnormality of mild intensity in the apical anterior segment.   The left ventricular cavity is noted to be normal on the stress study. The left ventricular ejection fraction was calculated to be 60% and left ventricular global function is normal.   The study quality is average.     [facility] 8.23.13:  The left main divided into the left anterior descending artery and the circumflex artery.  There is a large caliber left anterior descending artery and circumflex, both are free of any disease.  It is a left dominant system.  The right coronary artery small to medium size and free of any disease.      Past Medical History:   Diagnosis Date    Accelerated junctional rhythm     Agatston CAC score, <100     Anxiety disorder, unspecified     Asthma     COPD type A     Diabetes mellitus without complication     GERD (gastroesophageal reflux disease)     History of COVID-19     Insomnia     Lung nodule      Past Surgical History:   Procedure Laterality Date    ANGIOGRAM, CORONARY, WITH LEFT HEART CATHETERIZATION      BACK SURGERY      BREAST LUMPECTOMY Right     CARDIOVERSION  08/01/2013    CARPAL TUNNEL RELEASE  10/23/2013    CRANIOTOMY FOR EVACUATION OF  SUBDURAL HEMATOMA Left 05/19/2025    Procedure: CRANIOTOMY, FOR SUBDURAL HEMATOMA EVACUATION;  Surgeon: Ricardo Shelley MD;  Location: [facility] OR;  Service: Neurosurgery;  Laterality: Left;    CREATION OF TERRI HOLE WITH EVACUATION OF HEMATOMA Left 6/2/2025    Procedure: CREATION, CRANIAL TERRI HOLE, WITH HEMATOMA EVACUATION;  Surgeon: James Rankin MD;  Location: [facility] OR;  Service: Neurosurgery;  Laterality: Left;  LEFT BURRHOLE FOR SUBDURAL HEMATOMA EVACUATION // STEALTH // SHERRI SKYTRON // DRILL    FUSION OF CERVICAL SPINE BY ANTERIOR APPROACH USING COMPUTER-ASSISTED NAVIGATION  06/10/2019    KIDNEY SURGERY      TONSILLECTOMY AND ADENOIDECTOMY      TOTAL ABDOMINAL HYSTERECTOMY      WRIST SURGERY       Review of patient's allergies indicates:   Allergen Reactions    Aspirin     Ketorolac     Penicillins     Sulfa (sulfonamide antibiotics)     Ozempic [semaglutide] Other (See Comments)     Abdominal pain     Current Facility-Administered Medications on File Prior to Encounter   Medication    [ - Suspended Admission] acetaminophen tablet 650 mg    [ - Suspended Admission] albuterol-ipratropium 2.5 mg-0.5 mg/3 mL nebulizer solution 3 mL    [ - Suspended Admission] ALPRAZolam tablet 0.25 mg    [ - Suspended Admission] atorvastatin tablet 80 mg    [ - Suspended Admission] benzonatate capsule 100 mg    [ - Suspended Admission] bisacodyL suppository 10 mg    [ - Suspended Admission] busPIRone tablet 15 mg    [ - Suspended Admission] butalbital-acetaminophen-caffeine -40 mg per tablet 1 tablet    [ - Suspended Admission] clopidogreL tablet 75 mg    [ - Suspended Admission] dextrose 50% injection 12.5 g    [ - Suspended Admission] dextrose 50% injection 25 g    [ - Suspended Admission] docusate sodium capsule 100 mg    [ - Suspended Admission] ezetimibe tablet 10 mg    [ - Suspended Admission] ferrous sulfate tablet 1 each    [ - Suspended Admission] glucagon (human recombinant) injection 1 mg    [ - Suspended  Admission] glucose chewable tablet 16 g    [ - Suspended Admission] glucose chewable tablet 24 g    [ - Suspended Admission] hydrALAZINE injection 10 mg    [ - Suspended Admission] HYDROcodone-acetaminophen  mg per tablet 1 tablet    [ - Suspended Admission] HYDROcodone-acetaminophen 5-325 mg per tablet 1 tablet    [ - Suspended Admission] hydrOXYzine pamoate capsule 50 mg    [ - Suspended Admission] insulin aspart U-100 injection 0-5 Units    [ - Suspended Admission] labetalol 20 mg/4 mL (5 mg/mL) IV syring    [ - Suspended Admission] levetiracetam 500 mg/5 mL (5 mL) liquid Soln 500 mg    [ - Suspended Admission] levothyroxine tablet 88 mcg    [ - Suspended Admission] LORazepam injection 2 mg    [ - Suspended Admission] metFORMIN tablet 500 mg    [ - Suspended Admission] metoprolol injection 10 mg    [ - Suspended Admission] metoprolol tartrate (LOPRESSOR) split tablet 12.5 mg    [ - Suspended Admission] nitroGLYCERIN SL tablet 0.4 mg    [ - Suspended Admission] nortriptyline capsule 25 mg    [ - Suspended Admission] ondansetron disintegrating tablet 4 mg    [ - Suspended Admission] ondansetron disintegrating tablet 8 mg    [ - Suspended Admission] ondansetron injection 4 mg    [ - Suspended Admission] paroxetine tablet 20 mg    [ - Suspended Admission] polyethylene glycol packet 17 g     Current Outpatient Medications on File Prior to Encounter   Medication Sig    atorvastatin (LIPITOR) 80 MG tablet Take 1 tablet (80 mg total) by mouth once daily.    busPIRone (BUSPAR) 15 MG tablet Take 1 tablet (15 mg total) by mouth 2 (two) times daily.    butalbital-acetaminophen-caffeine -40 mg (FIORICET, ESGIC) -40 mg per tablet Take 1 tablet by mouth every 4 (four) hours as needed for Pain.    clopidogreL (PLAVIX) 75 mg tablet Take 1 tablet (75 mg total) by mouth once daily.    docusate sodium (COLACE) 50 MG capsule Take 2 capsules (100 mg total) by mouth 2 (two) times daily.    ezetimibe (ZETIA) 10 mg  "tablet Take 1 tablet by mouth once daily    FARXIGA 5 mg Tab tablet Take 1 tablet by mouth once daily    levothyroxine (SYNTHROID) 88 MCG tablet Take 1 tablet (88 mcg total) by mouth before breakfast.    metFORMIN (GLUCOPHAGE) 500 MG tablet TAKE 1 TABLET BY MOUTH TWICE DAILY WITH MEALS    metoprolol succinate (TOPROL-XL) 25 MG 24 hr tablet Take 12.5 mg by mouth once daily.    ondansetron (ZOFRAN) 8 MG tablet Take 1 tablet (8 mg total) by mouth every 6 (six) hours as needed for Nausea.    pen needle, diabetic 32 gauge x 5/32" Ndle 1 each by Misc.(Non-Drug; Combo Route) route once a week.    levETIRAcetam (KEPPRA) 500 MG Tab Take 1 tablet (500 mg total) by mouth 2 (two) times daily. for 4 days     Family History       Problem Relation (Age of Onset)    Diabetes Father    Heart attack Mother          Tobacco Use    Smoking status: Former     Current packs/day: 0.50     Types: Cigarettes    Smokeless tobacco: Never   Vaping Use    Vaping status: Former    Quit date: 6/1/2024   Substance and Sexual Activity    Alcohol use: Not Currently    Drug use: Never    Sexual activity: Not on file       Review of Systems   Constitutional: Negative.    HENT: Negative.     Eyes: Negative.    Cardiovascular: Negative.    Gastrointestinal:  Positive for constipation.   Endocrine: Negative.    Genitourinary: Negative.    Musculoskeletal:         Parasthesia to RLE     Skin: Negative.    Allergic/Immunologic: Negative.    Neurological:  Positive for weakness and numbness.        RUE/RLE   Hematological: Negative.    Psychiatric/Behavioral: Negative.       Objective:     Vital Signs (Most Recent):  Temp: 98.2 °F (36.8 °C) (06/13/25 0514)  Pulse: 88 (06/13/25 0514)  Resp: 18 (06/13/25 0514)  BP: 108/73 (06/13/25 0514)  SpO2: 97 % (06/13/25 0514) Vital Signs (24h Range):  Temp:  [98.1 °F (36.7 °C)-98.7 °F (37.1 °C)] 98.2 °F (36.8 °C)  Pulse:  [85-93] 88  Resp:  [17-19] 18  SpO2:  [93 %-97 %] 97 %  BP: (105-128)/(69-77) 108/73   Weight: 49.9 " kg (110 lb)  Body mass index is 17.75 kg/m².  SpO2: 97 %     No intake or output data in the 24 hours ending 06/13/25 0726    Lines/Drains/Airways       Peripheral Intravenous Line  Duration                  Peripheral IV - Single Lumen 06/11/25 1338 20 G Anterior;Right Forearm 1 day                  Significant Labs:   Chemistries:   Recent Labs   Lab 06/07/25  0430 06/09/25  0610 06/11/25  1448    142 142   K 3.8 3.7 4.0    104 104   CO2 26 28 24   BUN 16.2 12.0 14.1   CREATININE 0.64 0.65 0.60   CALCIUM 9.1 9.3 9.7   PROT 6.6 7.0 7.6   BILITOT 0.2 0.4 0.3   ALKPHOS 141 139 160*   ALT 32 26 20   AST 21 14 12   MG  --  1.70  --    PHOS  --  3.7  --         CBC/Anemia Labs: Coags:    Recent Labs   Lab 06/07/25  0641 06/07/25  0644 06/09/25  0610 06/11/25  1448   WBC 8.64  --  6.23 7.64   HGB 11.0*  --  11.1* 11.8*   HCT 33.9*  --  35.3* 36.5*   *  --  667* 634*   MCV 91.4  --  93.4 92.9   RDW 14.5  --  14.3 14.1   IRON  --  36*  --   --    FERRITIN  --  578.36*  --   --     Recent Labs   Lab 06/11/25  1448   INR 1.0   APTT 29.2        Significant Imaging:  Imaging Results    None       EKG:     Telemetry:  NSR    Physical Exam  Constitutional:       Appearance: Normal appearance.   HENT:      Head: Normocephalic.      Nose: Nose normal.      Mouth/Throat:      Mouth: Mucous membranes are moist.   Cardiovascular:      Rate and Rhythm: Normal rate and regular rhythm.      Pulses: Normal pulses.      Heart sounds: Normal heart sounds.   Pulmonary:      Effort: Pulmonary effort is normal.      Breath sounds: Normal breath sounds.   Abdominal:      Palpations: Abdomen is soft.   Musculoskeletal:      Cervical back: Normal range of motion and neck supple.   Skin:     General: Skin is warm and dry.      Capillary Refill: Capillary refill takes less than 2 seconds.   Neurological:      Mental Status: She is alert and oriented to person, place, and time.      Motor: Weakness present.      Comments: Right  facial droop  Slurred speech     Psychiatric:         Mood and Affect: Mood normal.         Behavior: Behavior normal.       Home Medications:   Medications Ordered Prior to Encounter[1]  Current Schedule Inpatient Medications:   atorvastatin  80 mg Oral Daily    busPIRone  15 mg Oral BID    ezetimibe  10 mg Oral Daily    levETIRAcetam  500 mg Oral BID    levothyroxine  88 mcg Oral Before breakfast    metoprolol succinate  12.5 mg Oral Daily    nortriptyline  25 mg Oral QHS    polyethylene glycol  17 g Oral BID     Continuous Infusions:    Assessment:   Cardiac Risk Assessment for Neurosurgery (for subdural to peritoneal shunt)  - EF 55-60% per Echo 5.30.25  SVT  -Toprol XL 12.5  Hyperlipidemia  -atorvastatin  DM2  Hx of CVA (Left MCA Stroke)  - s/p thrombectomy 06/2024 and diony hole  -Plavix last dose 6.11.25   Hypothyroidism  Mild TONY per [facility] 5.29.25  Asthma  Bipolar Disorder       Plan:   Pt with normal EF per Echo 5.30.25.  EKG 6.13.25 showed NSR with no acute ischemic changes. According to the revised cardiac risk index (Eduardo Criteria), patient is low risk for cardiac events for low risk neurosurgery procedure.  Okay to proceed if patient is willing to accept the cardiac risk.   Per cardiology, ok to stop Plavix, initiate after stroke in past if appropriate per Neurology  Cardiology will be signing off.      Thank you for your consult.     Scribe Attestation:   Scribe #1: I performed the above scribed service and the documentation accurately describes the services I performed. I attest to the accuracy of the note.        Jamie Jeansonne, RN  Cardiology  [facility]              [1]   Current Facility-Administered Medications on File Prior to Encounter   Medication Dose Route Frequency Provider Last Rate Last Admin    [ - Suspended Admission] acetaminophen tablet 650 mg  650 mg Oral Q6H PRN Robin Nava FNP   650 mg at 06/09/25 0756    [ - Suspended Admission]  albuterol-ipratropium 2.5 mg-0.5 mg/3 mL nebulizer solution 3 mL  3 mL Nebulization Q4H PRN Jake Paulino MD        [ - Suspended Admission] ALPRAZolam tablet 0.25 mg  0.25 mg Oral TID PRN Robin Nava, FNP   0.25 mg at 06/11/25 1213    [ - Suspended Admission] atorvastatin tablet 80 mg  80 mg Oral Daily Robin Nava, FNP   80 mg at 06/11/25 0810    [ - Suspended Admission] benzonatate capsule 100 mg  100 mg Oral TID PRN Robin Nava, FNP        [ - Suspended Admission] bisacodyL suppository 10 mg  10 mg Rectal Daily PRN Robin Nava, FNP   10 mg at 06/10/25 1342    [ - Suspended Admission] busPIRone tablet 15 mg  15 mg Oral BID Robin Nava, FNP   15 mg at 06/11/25 0810    [ - Suspended Admission] butalbital-acetaminophen-caffeine -40 mg per tablet 1 tablet  1 tablet Oral Q4H PRN Jake Paulino MD   1 tablet at 06/10/25 2045    [ - Suspended Admission] clopidogreL tablet 75 mg  75 mg Oral Daily Robin Nava, FNP   75 mg at 06/11/25 0809    [ - Suspended Admission] dextrose 50% injection 12.5 g  12.5 g Intravenous PRN Robin Nava, FNP        [ - Suspended Admission] dextrose 50% injection 25 g  25 g Intravenous PRN Elijah Robin SANDOVAL, FNP        [ - Suspended Admission] docusate sodium capsule 100 mg  100 mg Oral BID Robin Nava A, FNP   100 mg at 06/11/25 0811    [ - Suspended Admission] ezetimibe tablet 10 mg  10 mg Oral Daily Robin Nava, FNP   10 mg at 06/11/25 0809    [ - Suspended Admission] ferrous sulfate tablet 1 each  1 tablet Oral Daily Eljiah, Robin A, FNP   1 each at 06/11/25 0809    [ - Suspended Admission] glucagon (human recombinant) injection 1 mg  1 mg Intramuscular PRN Elijah Robin SANDOVAL, FNP        [ - Suspended Admission] glucose chewable tablet 16 g  16 g Oral PRN Robin Nava FNP        [ - Suspended Admission] glucose chewable tablet 24 g  24 g Oral PRN Robin Nava FNP        [ - Suspended  Admission] hydrALAZINE injection 10 mg  10 mg Intravenous Q4H PRN Nadine Cummins FNP        [ - Suspended Admission] HYDROcodone-acetaminophen  mg per tablet 1 tablet  1 tablet Oral Q4H PRN Jake Paulino MD   1 tablet at 06/09/25 1105    [ - Suspended Admission] HYDROcodone-acetaminophen 5-325 mg per tablet 1 tablet  1 tablet Oral Daily PRN Robin Nava FNP   1 tablet at 06/06/25 1218    [ - Suspended Admission] hydrOXYzine pamoate capsule 50 mg  50 mg Oral Nightly PRN Robin Nava FNP        [ - Suspended Admission] insulin aspart U-100 injection 0-5 Units  0-5 Units Subcutaneous QID (AC + HS) PRN Robin Nava FNP   1 Units at 06/07/25 2003    [ - Suspended Admission] labetalol 20 mg/4 mL (5 mg/mL) IV syring  10 mg Intravenous Q4H PRN Nadine Cummins FNP        [ - Suspended Admission] levetiracetam 500 mg/5 mL (5 mL) liquid Soln 500 mg  500 mg Oral BID Nadine Cummins FNP   500 mg at 06/11/25 0809    [ - Suspended Admission] levothyroxine tablet 88 mcg  88 mcg Oral Before breakfast Robin Nava FNP   88 mcg at 06/11/25 0532    [ - Suspended Admission] LORazepam injection 2 mg  2 mg Intravenous Q4H PRN Robin Nava FNP        [ - Suspended Admission] metFORMIN tablet 500 mg  500 mg Oral BID WM Robin Nava FNP   500 mg at 06/11/25 0811    [ - Suspended Admission] metoprolol injection 10 mg  10 mg Intravenous Q2H PRN Robin Nava FNP        [ - Suspended Admission] metoprolol tartrate (LOPRESSOR) split tablet 12.5 mg  12.5 mg Oral BID Robin Nava FNP   12.5 mg at 06/11/25 0800    [ - Suspended Admission] nitroGLYCERIN SL tablet 0.4 mg  0.4 mg Sublingual Q5 Min PRN Robin Nava FNP        [ - Suspended Admission] nortriptyline capsule 25 mg  25 mg Oral QHS Robin Nava FNP   25 mg at 06/10/25 2045    [ - Suspended Admission] ondansetron disintegrating tablet 4 mg  4 mg Oral Q6H PRN Robin Nava FNP      "   [ - Suspended Admission] ondansetron disintegrating tablet 8 mg  8 mg Oral Q8H PRN Robin Nava FNP        [ - Suspended Admission] ondansetron injection 4 mg  4 mg Intravenous Q8H PRN Robin Nava FNP        [ - Suspended Admission] paroxetine tablet 20 mg  20 mg Oral Daily Robin Nava FNP   20 mg at 06/11/25 0809    [ - Suspended Admission] polyethylene glycol packet 17 g  17 g Oral BID PRN Robin Nava FNP   17 g at 06/09/25 1340     Current Outpatient Medications on File Prior to Encounter   Medication Sig Dispense Refill    atorvastatin (LIPITOR) 80 MG tablet Take 1 tablet (80 mg total) by mouth once daily. 90 tablet 3    busPIRone (BUSPAR) 15 MG tablet Take 1 tablet (15 mg total) by mouth 2 (two) times daily. 60 tablet 11    butalbital-acetaminophen-caffeine -40 mg (FIORICET, ESGIC) -40 mg per tablet Take 1 tablet by mouth every 4 (four) hours as needed for Pain. 30 tablet 0    clopidogreL (PLAVIX) 75 mg tablet Take 1 tablet (75 mg total) by mouth once daily. 30 tablet 11    docusate sodium (COLACE) 50 MG capsule Take 2 capsules (100 mg total) by mouth 2 (two) times daily. 60 capsule 0    ezetimibe (ZETIA) 10 mg tablet Take 1 tablet by mouth once daily 90 tablet 0    FARXIGA 5 mg Tab tablet Take 1 tablet by mouth once daily 90 tablet 0    levothyroxine (SYNTHROID) 88 MCG tablet Take 1 tablet (88 mcg total) by mouth before breakfast. 30 tablet 11    metFORMIN (GLUCOPHAGE) 500 MG tablet TAKE 1 TABLET BY MOUTH TWICE DAILY WITH MEALS 180 tablet 0    metoprolol succinate (TOPROL-XL) 25 MG 24 hr tablet Take 12.5 mg by mouth once daily.      ondansetron (ZOFRAN) 8 MG tablet Take 1 tablet (8 mg total) by mouth every 6 (six) hours as needed for Nausea. 20 tablet 1    pen needle, diabetic 32 gauge x 5/32" Ndle 1 each by Misc.(Non-Drug; Combo Route) route once a week. 100 each 1    levETIRAcetam (KEPPRA) 500 MG Tab Take 1 tablet (500 mg total) by mouth 2 (two) times " daily. for 4 days 8 tablet 0

## 2025-06-13 NOTE — PLAN OF CARE
Problem: Occupational Therapy  Goal: Occupational Therapy Goal  Description: Goals: to be met by 07/14/25    Pt will complete grooming standing at sink with LRAD with mod I.  Pt will complete UB dressing with SBA.  Pt will complete LB dressing with SBA using LRAD.  Pt will complete toileting with SBA using LRAD.  Pt will complete functional mobility to/from toilet and toilet transfer with mod I using LRAD.   Pt will demo visual attention to R side >80% of time with min verbal cues.    Outcome: Progressing

## 2025-06-13 NOTE — PT/OT/SLP EVAL
[facility]  Speech Language Pathology Department  Clinical Swallow Evaluation    Patient Name:  Chelle Chandra   MRN:  [id number]    Recommendations     General recommendations:  SLP follow up regarding diet tolerance and repeat Modified Barium Swallow Study s/p  shunt  Solid texture recommendation:  Soft & Bite Sized Diet - IDDSI Level 6  Liquid consistency recommendation: Moderately thick liquids - IDDSI Level 3   Medications: crushed in puree  Swallow strategies/precautions: small bites/sips, slow rate, and upright for PO intake  Precautions: fall, aspiration    History     Chelle Chandra is a/n 59 y.o. female admitted for increased weakness and significant headaches. CT revealed reaccumulation of subdural fluid collection, increased since recent admission.     Past Medical History:   Diagnosis Date    Accelerated junctional rhythm     Agatston CAC score, <100     Anxiety disorder, unspecified     Asthma     COPD type A     Diabetes mellitus without complication     GERD (gastroesophageal reflux disease)     History of COVID-19     Insomnia     Lung nodule      Past Surgical History:   Procedure Laterality Date    ANGIOGRAM, CORONARY, WITH LEFT HEART CATHETERIZATION      BACK SURGERY      BREAST LUMPECTOMY Right     CARDIOVERSION  08/01/2013    CARPAL TUNNEL RELEASE  10/23/2013    CRANIOTOMY FOR EVACUATION OF SUBDURAL HEMATOMA Left 05/19/2025    Procedure: CRANIOTOMY, FOR SUBDURAL HEMATOMA EVACUATION;  Surgeon: Ricardo Shelley MD;  Location: [facility] OR;  Service: Neurosurgery;  Laterality: Left;    CREATION OF TERRI HOLE WITH EVACUATION OF HEMATOMA Left 6/2/2025    Procedure: CREATION, CRANIAL TERRI HOLE, WITH HEMATOMA EVACUATION;  Surgeon: James Rankin MD;  Location: [facility] OR;  Service: Neurosurgery;  Laterality: Left;  LEFT BURRHOLE FOR SUBDURAL HEMATOMA EVACUATION // STEALTH // [facility] // [facility]RILL    FUSION OF CERVICAL SPINE BY ANTERIOR APPROACH USING COMPUTER-ASSISTED  "NAVIGATION  06/10/2019    KIDNEY SURGERY      TONSILLECTOMY AND ADENOIDECTOMY      TOTAL ABDOMINAL HYSTERECTOMY      WRIST SURGERY       Home diet texture/consistency: Soft & Bite-sized and moderately thick liquids    Patient complaint: "I want to get off of the thick liquids"    Imaging   Results for orders placed during the hospital encounter of 05/29/25    X-Ray Chest 1 View    Narrative  EXAMINATION:  XR CHEST 1 VIEW    CLINICAL HISTORY:  aspiration suspected;    TECHNIQUE:  Single frontal view of the chest was performed.    COMPARISON:  05/30/2025    FINDINGS:  LINES AND TUBES: None    MEDIASTINUM AND RUTH: The cardiac silhouette is normal.    LUNGS: No lobar consolidation. No edema.    PLEURA:No pleural effusion. No pneumothorax.    OTHER: Postop ACDF.    Impression  No acute cardiopulmonary abnormality.      Electronically signed by: Kristyn Guzman  Date:    05/30/2025  Time:    14:33    No results found for this or any previous visit.    Results for orders placed during the hospital encounter of 07/01/24    MRI Brain Without Contrast    Narrative  EXAMINATION:  MRI BRAIN WITHOUT CONTRAST    CLINICAL HISTORY:  dizziness, off-balance, r/o cva, recent history of cva 1 month ago;    TECHNIQUE:  Multiplanar MRI sequences were performed of the brain without contrast.    COMPARISON:  MRI brain June 2, 2024    FINDINGS:  There are extensive left middle cerebral artery territory frontoparietal lobes and temporooccipital lobes as well as basal ganglia subacute nonhemorrhagic infarcts.  Infarct shows less dense brightness on diffusion-weighted sequence and now is not associated with signal dropout on the ADC map.  Brain edematous changes and sulci effacement is slightly less evident.  There is local mass effect without midline shift.  No hydrocephalus.  There is no new infratentorial or supratentorial brain infarct.  Gradient echo sequence are without altered signal to reflect a previous hemorrhagic byproduct.  Sella " and the suprasellar areas are unremarkable.    The cerebellar tonsils are normally positioned.  No acute extra axial fluid collections identified. The mastoid air cells are clear.    Impression  1.  Left cerebral hemisphere extensive subacute infarct without hemorrhagic transformation.    2.  No new findings.      Electronically signed by: Adam Sears  Date:    07/02/2024  Time:    11:36    Subjective     Patient awake and alert.    Patient goals: to get better   Spiritual/Cultural/Latter day Beliefs/Practices that affect care: no    Pain/Comfort: Pain Rating 1: 0/10    Respiratory Status:  room air    Restraints/positioning devices: none    Objective     ORAL MUSCULATURE  Dentition: own teeth  Secretion Management: adequate  Mucosal Quality: good  Facial Movement: WFL  Vocal Quality: adequate    PO TRIALS  Consistency Fed By Oral Symptoms Pharyngeal Symptoms   Moderately thick liquid by cup Self None None     Patient assisted in making coffee using thickener.    Assessment     Patient is tolerating her current baseline diet. Patient is appropriate for repeat MBS, however pt scheduled for  shunt placement on 6/16. SLP with plans to reassess patient s/p neuro surgery for swallowing and cognition.    SLP rec: continue modified diet of soft and bite sized solids, moderately thick liquids, with meds in puree.    Outcome Measures     Functional Oral Intake Scale: 5 - Total oral diet with multiple consistencies, by requiring special preparation or compensations    Education     Patient provided with verbal education regarding diet modifications and SLP POC.  Understanding was verbalized.    Plan     SLP Follow-Up:  Yes   Patient to be seen:  5 x/week   Plan of Care reviewed with:  patient     Time Tracking     SLP Treatment Date:   06/13/25  Speech Start Time:  0915  Speech Stop Time:  0935     Speech Total Time (min):  20 min    Billable minutes:  Swallow and Oral Function Evaluation, 20 minutes     06/13/2025

## 2025-06-13 NOTE — PT/OT/SLP EVAL
Physical Therapy Evaluation    Patient Name:  Chelle Chandra   MRN:  [id number]    Recommendations:     Discharge therapy intensity: High Intensity Therapy (pending  shunt placement)   Discharge Equipment Recommendations: none   Barriers to discharge: Impaired mobility    Assessment:     Chelle Chandra is a 59 y.o. female admitted with a medical diagnosis of R sided weakness and facial droop due to L SDH s/p craniotomy and MMA embolization. On 5/30 patient with subdural hemorrhage s/p L diony hole. The pt presents with new R sided weakness, with plans for peritoneal shunt on 6/16.  She presents with the following impairments/functional limitations: gait instability, weakness, impaired balance, impaired endurance, impaired functional mobility, decreased safety awareness. The pt tolerated session well. She is able to perform all mobility with CGA/min A and RW. She demos decreased attendance to R side, occasionally bumping into wall with walker on the R. Will continue to progress as able.     Rehab Prognosis: Good; patient would benefit from acute skilled PT services to address these deficits and reach maximum level of function.    Recent Surgery: Procedure(s) (LRB):  CRANIOTOMY, FOR SUBDURAL HEMATOMA EVACUATION (Left)      Plan:     During this hospitalization, patient would benefit from acute PT services 6 x/week to address the identified rehab impairments via gait training, therapeutic activities, therapeutic exercises, neuromuscular re-education and progress toward the following goals:    Plan of Care Expires:  06/13/25    Subjective     Chief Complaint: none  Patient/Family Comments/goals: return to PLOF  Pain/Comfort:  Pain Rating 1: 0/10  Location - Side 1: Left  Location 1: head  Pain Addressed 1: Distraction, Reposition    Patients cultural, spiritual, Adventist conflicts given the current situation:      Living Environment:  Home with spouse, SL[facility], steps to enter with rail.   Prior to admission, patients  level of function was independent.  Equipment used at home: wheelchair, rollator, walker, rolling.  DME owned (not currently used): rolling walker.  Upon discharge, patient will have assistance from spouse.    Objective:     Communicated with NSG prior to session.  Patient found supine with PureWick  upon PT entry to room.    General Precautions: Standard, fall  Orthopedic Precautions:N/A   Braces: N/A  Respiratory Status: Room air  Blood Pressure: NA      Exams:  RLE ROM: WFL  RLE Strength: WFL  LLE ROM: WFL  LLE Strength: WFL  Skin integrity: Visible skin intact      Functional Mobility:  Bed Mobility:     Scooting: contact guard assistance  Supine to Sit: contact guard assistance  Sit to Supine: contact guard assistance  Transfers:     Sit to Stand:  minimum assistance with rolling walker- pt demos LOB with initial stand  Gait: pt ambulates x 120 feet, 110 feet with min A and RW. She demos multiple veers to R side, bumping wall with walker and requiring verbal cues to correct.       AM-PAC 6 CLICK MOBILITY  Total Score:19     Co-Treatment: No      Patient provided with verbal education education regarding PT role/goals/POC, safety awareness, and discharge/DME recommendations.  Understanding was verbalized.     Patient left up in chair with all lines intact, call button in reach, and NSG notified.    GOALS:   Multidisciplinary Problems       Physical Therapy Goals          Problem: Physical Therapy    Goal Priority Disciplines Outcome Interventions   Physical Therapy Goal     PT, PT/OT Progressing    Description: Goals to be met by: 25     Patient will increase functional independence with mobility by performin. Supine to sit with Modified [place]  2. Sit to supine with Modified [place]  3. Sit to stand transfer with Modified [place]  4. Bed to chair transfer with Modified [place] using Rolling Walker  5. Gait  x 200 feet with Modified [place] using Rolling Walker.                           History:     Past Medical History:   Diagnosis Date    Accelerated junctional rhythm     Agatston CAC score, <100     Anxiety disorder, unspecified     Asthma     COPD type A     Diabetes mellitus without complication     GERD (gastroesophageal reflux disease)     History of COVID-19     Insomnia     Lung nodule        Past Surgical History:   Procedure Laterality Date    ANGIOGRAM, CORONARY, WITH LEFT HEART CATHETERIZATION      BACK SURGERY      BREAST LUMPECTOMY Right     CARDIOVERSION  08/01/2013    CARPAL TUNNEL RELEASE  10/23/2013    CRANIOTOMY FOR EVACUATION OF SUBDURAL HEMATOMA Left 05/19/2025    Procedure: CRANIOTOMY, FOR SUBDURAL HEMATOMA EVACUATION;  Surgeon: Ricardo Shelley MD;  Location: [facility] OR;  Service: Neurosurgery;  Laterality: Left;    CREATION OF TERRI HOLE WITH EVACUATION OF HEMATOMA Left 6/2/2025    Procedure: CREATION, CRANIAL TERRI HOLE, WITH HEMATOMA EVACUATION;  Surgeon: James Rankin MD;  Location: [facility] OR;  Service: Neurosurgery;  Laterality: Left;  LEFT BURRHOLE FOR SUBDURAL HEMATOMA EVACUATION // STEALTH // [facility] [place] // [place]RILL    FUSION OF CERVICAL SPINE BY ANTERIOR APPROACH USING COMPUTER-ASSISTED NAVIGATION  06/10/2019    KIDNEY SURGERY      TONSILLECTOMY AND ADENOIDECTOMY      TOTAL ABDOMINAL HYSTERECTOMY      WRIST SURGERY         Time Tracking:     PT Received On: 06/13/25  PT Start Time: 0931     PT Stop Time: 0954  PT Total Time (min): 23 min     Billable Minutes: Evaluation 15 and Gait Training 8      06/13/2025

## 2025-06-13 NOTE — PLAN OF CARE
Problem: Physical Therapy  Goal: Physical Therapy Goal  Description: Goals to be met by: 25     Patient will increase functional independence with mobility by performin. Supine to sit with Modified [place]  2. Sit to supine with Modified [place]  3. Sit to stand transfer with Modified [place]  4. Bed to chair transfer with Modified [place] using Rolling Walker  5. Gait  x 200 feet with Modified [place] using Rolling Walker.     Outcome: Progressing

## 2025-06-14 LAB
APTT PPP: 28.5 SECONDS (ref 23.2–33.7)
GROUP & RH: NORMAL
INDIRECT COOMBS: NORMAL
INR PPP: 1
POCT GLUCOSE: 117 MG/DL (ref 70–110)
POCT GLUCOSE: 133 MG/DL (ref 70–110)
POCT GLUCOSE: 167 MG/DL (ref 70–110)
POCT GLUCOSE: 218 MG/DL (ref 70–110)
PROTHROMBIN TIME: 13.5 SECONDS (ref 12.5–14.5)
SPECIMEN OUTDATE: NORMAL

## 2025-06-14 PROCEDURE — 70450 CT HEAD/BRAIN W/O DYE: CPT | Mod: TC

## 2025-06-14 PROCEDURE — 85730 THROMBOPLASTIN TIME PARTIAL: CPT

## 2025-06-14 PROCEDURE — 36415 COLL VENOUS BLD VENIPUNCTURE: CPT

## 2025-06-14 PROCEDURE — 86900 BLOOD TYPING SEROLOGIC ABO: CPT

## 2025-06-14 PROCEDURE — 85610 PROTHROMBIN TIME: CPT

## 2025-06-14 PROCEDURE — 25000003 PHARM REV CODE 250: Performed by: INTERNAL MEDICINE

## 2025-06-14 PROCEDURE — 21400001 HC TELEMETRY ROOM

## 2025-06-14 PROCEDURE — 25000003 PHARM REV CODE 250: Performed by: NURSE PRACTITIONER

## 2025-06-14 RX ORDER — LEVETIRACETAM 100 MG/ML
500 SOLUTION ORAL 2 TIMES DAILY
Status: DISCONTINUED | OUTPATIENT
Start: 2025-06-14 | End: 2025-06-27

## 2025-06-14 RX ADMIN — BUTALBITAL, ACETAMINOPHEN, AND CAFFEINE 1 TABLET: 325; 50; 40 TABLET ORAL at 11:06

## 2025-06-14 RX ADMIN — METOPROLOL SUCCINATE 12.5 MG: 25 TABLET, EXTENDED RELEASE ORAL at 08:06

## 2025-06-14 RX ADMIN — BUSPIRONE HYDROCHLORIDE 15 MG: 5 TABLET ORAL at 09:06

## 2025-06-14 RX ADMIN — BUTALBITAL, ACETAMINOPHEN, AND CAFFEINE 1 TABLET: 325; 50; 40 TABLET ORAL at 01:06

## 2025-06-14 RX ADMIN — POLYETHYLENE GLYCOL 3350 17 G: 17 POWDER, FOR SOLUTION ORAL at 09:06

## 2025-06-14 RX ADMIN — NORTRIPTYLINE HYDROCHLORIDE 25 MG: 25 CAPSULE ORAL at 09:06

## 2025-06-14 RX ADMIN — Medication 6 MG: at 11:06

## 2025-06-14 RX ADMIN — BUTALBITAL, ACETAMINOPHEN, AND CAFFEINE 1 TABLET: 325; 50; 40 TABLET ORAL at 08:06

## 2025-06-14 RX ADMIN — LEVETIRACETAM 500 MG: 500 SOLUTION ORAL at 09:06

## 2025-06-14 RX ADMIN — LEVETIRACETAM 500 MG: 500 SOLUTION ORAL at 08:06

## 2025-06-14 RX ADMIN — BUTALBITAL, ACETAMINOPHEN, AND CAFFEINE 1 TABLET: 325; 50; 40 TABLET ORAL at 06:06

## 2025-06-14 RX ADMIN — EZETIMIBE 10 MG: 10 TABLET ORAL at 08:06

## 2025-06-14 RX ADMIN — BUSPIRONE HYDROCHLORIDE 15 MG: 5 TABLET ORAL at 08:06

## 2025-06-14 RX ADMIN — ATORVASTATIN CALCIUM 80 MG: 40 TABLET, FILM COATED ORAL at 08:06

## 2025-06-14 RX ADMIN — POLYETHYLENE GLYCOL 3350 17 G: 17 POWDER, FOR SOLUTION ORAL at 08:06

## 2025-06-14 NOTE — PT/OT/SLP PROGRESS
[facility]  Speech Language Pathology Department  Diet Tolerance Follow-up    Patient Name:  Chelle Chandra   MRN:  [id number]  Admitting Diagnosis: subdural fluid collection    Recommendations     General recommendations:  Modified Barium Swallow Study pending  shunt placement  Solid texture recommendation:  Soft & Bite Sized Diet - IDDSI Level 6  Liquid consistency recommendation: Moderately thick liquids - IDDSI Level 3   Medications: crushed in puree  Swallow strategies/precautions: small bites/sips, slow rate, and upright for PO intake  Precautions: fall, aspiration    Diet Tolerance     Nursing reports no difficulty regarding diet tolerance.    Outcome Measures     Functional Oral Intake Scale: 5 - Total oral diet with multiple consistencies, by requiring special preparation or compensations    Plan     SLP Follow-Up:  Yes      06/14/2025

## 2025-06-14 NOTE — CONSULTS
Consults  [facility]    Cardiology  Consult Note    Patient Name: Chelle Chandra  MRN: [id number]  Admission Date: 6/11/2025  Hospital Length of Stay: 3 days  Code Status: Full Code   Attending Provider: Cat Casper MD   Consulting Provider: Mary Sommer RN  Primary Care Physician: Jorge Silver MD  Principal Problem:<principal problem not specified>    Patient information was obtained from patient and ER records.     Subjective:     Chief Complaint/Reason for Consult: Initial Stroke-like symptoms; Consult: [facility] for Neurosurgery    HPI:  59-year-old female known to Dr. Mcfarland with a past medical history of SVT, hyperlipidemia, DM, CVA, hypothyroidism, asthma, and bipolar disorder who was noted to have a recent subdural hematoma following a fall while on Plavix on 5.16.25 s/p cerebral angiogram with embolization of left middle meningeal artery and s/p left frontal parietal craniotomy and evacuation of subdural hematoma on 5.19.25.  Patient was discharged on 5.22.25 with home health, but presented back to the emergency room on 5.29.25 with complaints of acute onset headache.  The morning of her arrival to the ER, she developed right arm and leg weakness with right facial droop.  CT of the head showed left cerebral convexity hypodense collection without residual acute hyperdense subacute hematoma and improved post surgical pneumocephalus and otherwise no significant interval change.  CTA of the head showed a suspected diminished flow inferior division of the left middle cerebral artery close to the bifurcation with no other hemodynamically significant stenosis identified.  MRI of the brain on 5.30.25 showed no acute infarct but showed left MCA territory large encephalomalacia combined with gliotic changes.  Neurology recommended Neurosurgery consult for evaluation of chronic left subdural hematoma.  [facility] has been consulted for cardiac risk assessment for  neurosurgery.      PMH: SVT, Hyperlipidemia, DM, CVA, Hypothyroidism, Asthma, Bipolar disorder   PSH:  Hysterectomy, Neck Surgery, Tonsillectomy, Lumpectomy  Family History:  Father - DM2; Mother - MI  Social History:  Former Smoker. Denies Alcohol Use. Denies Illicit Drug Use.     Previous Cardiac Diagnostics:   Echocardiogram 5.30.25:    Left Ventricle: The left ventricle is normal in size. Normal wall thickness. There is normal systolic function with a visually estimated ejection fraction of 55 - 60%.    Right Ventricle: The right ventricle is normal in size Systolic function is normal. TAPSE is 1.8 cm.    IVC/SVC: Normal venous pressure at 3 mmHg.    Carotid US 5.29.25:  The right internal carotid artery is patent with less than 50% stenosis.  The left internal carotid artery is patent with less than 50% stenosis.  Bilateral vertebral arteries are patent with antegrade flow.     MPI 3.30.20:  This is probably a normal perfusion study. There is no evidence of ischemia. Small mild fixed distal anterior defect that might be artifact.    This scan is suggestive of low risk for future cardiovascular events.   Small fixed perfusion abnormality of mild intensity in the apical anterior segment.   The left ventricular cavity is noted to be normal on the stress study. The left ventricular ejection fraction was calculated to be 60% and left ventricular global function is normal.   The study quality is average.     [facility] 8.23.13:  The left main divided into the left anterior descending artery and the circumflex artery.  There is a large caliber left anterior descending artery and circumflex, both are free of any disease.  It is a left dominant system.  The right coronary artery small to medium size and free of any disease.      Past Medical History:   Diagnosis Date    Accelerated junctional rhythm     Agatston CAC score, <100     Anxiety disorder, unspecified     Asthma     COPD type A     Diabetes mellitus without  complication     GERD (gastroesophageal reflux disease)     History of COVID-19     Insomnia     Lung nodule      Past Surgical History:   Procedure Laterality Date    ANGIOGRAM, CORONARY, WITH LEFT HEART CATHETERIZATION      BACK SURGERY      BREAST LUMPECTOMY Right     CARDIOVERSION  08/01/2013    CARPAL TUNNEL RELEASE  10/23/2013    CRANIOTOMY FOR EVACUATION OF SUBDURAL HEMATOMA Left 05/19/2025    Procedure: CRANIOTOMY, FOR SUBDURAL HEMATOMA EVACUATION;  Surgeon: Ricardo Shelley MD;  Location: [facility] OR;  Service: Neurosurgery;  Laterality: Left;    CREATION OF TERRI HOLE WITH EVACUATION OF HEMATOMA Left 6/2/2025    Procedure: CREATION, CRANIAL TERRI HOLE, WITH HEMATOMA EVACUATION;  Surgeon: James Rankin MD;  Location: [facility] OR;  Service: Neurosurgery;  Laterality: Left;  LEFT BURRHOLE FOR SUBDURAL HEMATOMA EVACUATION // STEALTH // SHERRI SKYTRON // DRILL    FUSION OF CERVICAL SPINE BY ANTERIOR APPROACH USING COMPUTER-ASSISTED NAVIGATION  06/10/2019    KIDNEY SURGERY      TONSILLECTOMY AND ADENOIDECTOMY      TOTAL ABDOMINAL HYSTERECTOMY      WRIST SURGERY       Review of patient's allergies indicates:   Allergen Reactions    Aspirin     Ketorolac     Penicillins     Sulfa (sulfonamide antibiotics)     Ozempic [semaglutide] Other (See Comments)     Abdominal pain     Current Facility-Administered Medications on File Prior to Encounter   Medication    [ - Suspended Admission] acetaminophen tablet 650 mg    [ - Suspended Admission] albuterol-ipratropium 2.5 mg-0.5 mg/3 mL nebulizer solution 3 mL    [ - Suspended Admission] ALPRAZolam tablet 0.25 mg    [ - Suspended Admission] atorvastatin tablet 80 mg    [ - Suspended Admission] benzonatate capsule 100 mg    [ - Suspended Admission] bisacodyL suppository 10 mg    [ - Suspended Admission] busPIRone tablet 15 mg    [ - Suspended Admission] butalbital-acetaminophen-caffeine -40 mg per tablet 1 tablet    [ - Suspended Admission] clopidogreL tablet 75 mg    [  - Suspended Admission] dextrose 50% injection 12.5 g    [ - Suspended Admission] dextrose 50% injection 25 g    [ - Suspended Admission] docusate sodium capsule 100 mg    [ - Suspended Admission] ezetimibe tablet 10 mg    [ - Suspended Admission] ferrous sulfate tablet 1 each    [ - Suspended Admission] glucagon (human recombinant) injection 1 mg    [ - Suspended Admission] glucose chewable tablet 16 g    [ - Suspended Admission] glucose chewable tablet 24 g    [ - Suspended Admission] hydrALAZINE injection 10 mg    [ - Suspended Admission] HYDROcodone-acetaminophen  mg per tablet 1 tablet    [ - Suspended Admission] HYDROcodone-acetaminophen 5-325 mg per tablet 1 tablet    [ - Suspended Admission] hydrOXYzine pamoate capsule 50 mg    [ - Suspended Admission] insulin aspart U-100 injection 0-5 Units    [ - Suspended Admission] labetalol 20 mg/4 mL (5 mg/mL) IV syring    [ - Suspended Admission] levetiracetam 500 mg/5 mL (5 mL) liquid Soln 500 mg    [ - Suspended Admission] levothyroxine tablet 88 mcg    [ - Suspended Admission] LORazepam injection 2 mg    [ - Suspended Admission] metFORMIN tablet 500 mg    [ - Suspended Admission] metoprolol injection 10 mg    [ - Suspended Admission] metoprolol tartrate (LOPRESSOR) split tablet 12.5 mg    [ - Suspended Admission] nitroGLYCERIN SL tablet 0.4 mg    [ - Suspended Admission] nortriptyline capsule 25 mg    [ - Suspended Admission] ondansetron disintegrating tablet 4 mg    [ - Suspended Admission] ondansetron disintegrating tablet 8 mg    [ - Suspended Admission] ondansetron injection 4 mg    [ - Suspended Admission] paroxetine tablet 20 mg    [ - Suspended Admission] polyethylene glycol packet 17 g     Current Outpatient Medications on File Prior to Encounter   Medication Sig    atorvastatin (LIPITOR) 80 MG tablet Take 1 tablet (80 mg total) by mouth once daily.    busPIRone (BUSPAR) 15 MG tablet Take 1 tablet (15 mg total) by mouth 2 (two) times daily.     "butalbital-acetaminophen-caffeine -40 mg (FIORICET, ESGIC) -40 mg per tablet Take 1 tablet by mouth every 4 (four) hours as needed for Pain.    clopidogreL (PLAVIX) 75 mg tablet Take 1 tablet (75 mg total) by mouth once daily.    docusate sodium (COLACE) 50 MG capsule Take 2 capsules (100 mg total) by mouth 2 (two) times daily.    ezetimibe (ZETIA) 10 mg tablet Take 1 tablet by mouth once daily    FARXIGA 5 mg Tab tablet Take 1 tablet by mouth once daily    levothyroxine (SYNTHROID) 88 MCG tablet Take 1 tablet (88 mcg total) by mouth before breakfast.    metFORMIN (GLUCOPHAGE) 500 MG tablet TAKE 1 TABLET BY MOUTH TWICE DAILY WITH MEALS    metoprolol succinate (TOPROL-XL) 25 MG 24 hr tablet Take 12.5 mg by mouth once daily.    ondansetron (ZOFRAN) 8 MG tablet Take 1 tablet (8 mg total) by mouth every 6 (six) hours as needed for Nausea.    pen needle, diabetic 32 gauge x 5/32" Ndle 1 each by Misc.(Non-Drug; Combo Route) route once a week.    levETIRAcetam (KEPPRA) 500 MG Tab Take 1 tablet (500 mg total) by mouth 2 (two) times daily. for 4 days     Family History       Problem Relation (Age of Onset)    Diabetes Father    Heart attack Mother          Tobacco Use    Smoking status: Every Day     Current packs/day: 0.50     Types: Cigarettes    Smokeless tobacco: Never   Vaping Use    Vaping status: Former    Quit date: 6/1/2024   Substance and Sexual Activity    Alcohol use: Not Currently    Drug use: Never    Sexual activity: Not on file       Review of Systems   Constitutional: Negative.    Respiratory:  Negative for shortness of breath.    Cardiovascular:  Negative for chest pain.   Neurological:         Right facial droop     Objective:     Vital Signs (Most Recent):  Temp: 98.9 °F (37.2 °C) (06/14/25 0725)  Pulse: 99 (06/14/25 0725)  Resp: 17 (06/13/25 2332)  BP: 105/73 (06/14/25 0725)  SpO2: 96 % (06/14/25 0725) Vital Signs (24h Range):  Temp:  [97.7 °F (36.5 °C)-99.3 °F (37.4 °C)] 98.9 °F (37.2 " °C)  Pulse:  [89-99] 99  Resp:  [17-18] 17  SpO2:  [96 %-98 %] 96 %  BP: ()/(63-78) 105/73   Weight: 49.9 kg (110 lb)  Body mass index is 17.75 kg/m².  SpO2: 96 %     No intake or output data in the 24 hours ending 06/14/25 0726    Lines/Drains/Airways       Peripheral Intravenous Line  Duration                  Peripheral IV - Single Lumen 06/11/25 1338 20 G Anterior;Right Forearm 2 days                  Significant Labs:   Chemistries:   Recent Labs   Lab 06/09/25  0610 06/11/25  1448    142   K 3.7 4.0    104   CO2 28 24   BUN 12.0 14.1   CREATININE 0.65 0.60   CALCIUM 9.3 9.7   PROT 7.0 7.6   BILITOT 0.4 0.3   ALKPHOS 139 160*   ALT 26 20   AST 14 12   MG 1.70  --    PHOS 3.7  --         CBC/Anemia Labs: Coags:    Recent Labs   Lab 06/09/25  0610 06/11/25  1448   WBC 6.23 7.64   HGB 11.1* 11.8*   HCT 35.3* 36.5*   * 634*   MCV 93.4 92.9   RDW 14.3 14.1    Recent Labs   Lab 06/11/25  1448   INR 1.0   APTT 29.2        Significant Imaging:  Imaging Results    None       EKG:     Telemetry:      Physical Exam  Cardiovascular:      Rate and Rhythm: Normal rate and regular rhythm.   Pulmonary:      Effort: Pulmonary effort is normal.      Breath sounds: Normal breath sounds.   Abdominal:      Palpations: Abdomen is soft.   Musculoskeletal:      Cervical back: Normal range of motion and neck supple.   Skin:     General: Skin is warm and dry.   Neurological:      Mental Status: She is alert and oriented to person, place, and time.      Comments: Right facial droop   Psychiatric:         Mood and Affect: Mood normal.         Behavior: Behavior normal.       Home Medications:   Medications Ordered Prior to Encounter[1]  Current Schedule Inpatient Medications:   atorvastatin  80 mg Oral Daily    busPIRone  15 mg Oral BID    ezetimibe  10 mg Oral Daily    levETIRAcetam  500 mg Oral BID    levothyroxine  88 mcg Oral Before breakfast    metoprolol succinate  12.5 mg Oral Daily    nortriptyline  25 mg  Oral QHS    polyethylene glycol  17 g Oral BID     Continuous Infusions:    Assessment:   Cardiac Risk Assessment for Neurosurgery (subdural to peritoneal shunt on 6/16)  - EF 55-60% per Echo 5.30.25  SVT  Hyperlipidemia  DM  Hx of CVA (Left MCA Stroke)  - s/p thrombectomy 06/2024  Hypothyroidism  Mild TONY per CUS 5.29.25  Asthma  Bipolar Disorder   [facility] is signing off      Plan:   Pt with normal EF per Echo 5.30.25.  EKG 5.29.25 showed NSR with no acute ischemic changes. According to the revised cardiac risk index (Eduardo Criteria), patient is moderate risk for cardiac events for low risk neurosurgery procedure.  Okay to proceed if patient is willing to accept the cardiac risk.         Thank you for your consult.     Mary Sommer RN  Cardiology  [facility]            [1]   Current Facility-Administered Medications on File Prior to Encounter   Medication Dose Route Frequency Provider Last Rate Last Admin    [ - Suspended Admission] acetaminophen tablet 650 mg  650 mg Oral Q6H PRN Robin Nava, FNP   650 mg at 06/09/25 0756    [ - Suspended Admission] albuterol-ipratropium 2.5 mg-0.5 mg/3 mL nebulizer solution 3 mL  3 mL Nebulization Q4H PRN Jake Paulino MD        [ - Suspended Admission] ALPRAZolam tablet 0.25 mg  0.25 mg Oral TID PRN Robin Nava, FNP   0.25 mg at 06/11/25 1213    [ - Suspended Admission] atorvastatin tablet 80 mg  80 mg Oral Daily Robin Nava, FNP   80 mg at 06/11/25 0810    [ - Suspended Admission] benzonatate capsule 100 mg  100 mg Oral TID PRN Robin Nava, FNP        [ - Suspended Admission] bisacodyL suppository 10 mg  10 mg Rectal Daily PRN Robin Nava, FNP   10 mg at 06/10/25 1342    [ - Suspended Admission] busPIRone tablet 15 mg  15 mg Oral BID Robin Nava, FNP   15 mg at 06/11/25 0810    [ - Suspended Admission] butalbital-acetaminophen-caffeine -40 mg per tablet 1 tablet  1 tablet Oral Q4H PRN  Jake Paulino MD   1 tablet at 06/10/25 2045    [ - Suspended Admission] clopidogreL tablet 75 mg  75 mg Oral Daily Robin Nava LORI, FNP   75 mg at 06/11/25 0809    [ - Suspended Admission] dextrose 50% injection 12.5 g  12.5 g Intravenous PRN Robin Nava A, FNP        [ - Suspended Admission] dextrose 50% injection 25 g  25 g Intravenous PRN Shaheen Navaphu SANDOVAL, FNP        [ - Suspended Admission] docusate sodium capsule 100 mg  100 mg Oral BID Shaheen Navaothy A, FNP   100 mg at 06/11/25 0811    [ - Suspended Admission] ezetimibe tablet 10 mg  10 mg Oral Daily Robin Nava A, FNP   10 mg at 06/11/25 0809    [ - Suspended Admission] ferrous sulfate tablet 1 each  1 tablet Oral Daily Elijah, Robin A, FNP   1 each at 06/11/25 0809    [ - Suspended Admission] glucagon (human recombinant) injection 1 mg  1 mg Intramuscular PRN Elijah Robin A, FNP        [ - Suspended Admission] glucose chewable tablet 16 g  16 g Oral PRN Elijah Robin A, FNP        [ - Suspended Admission] glucose chewable tablet 24 g  24 g Oral PRN Elijah Robin A, FNP        [ - Suspended Admission] hydrALAZINE injection 10 mg  10 mg Intravenous Q4H PRN PlacidoNadine FNP        [ - Suspended Admission] HYDROcodone-acetaminophen  mg per tablet 1 tablet  1 tablet Oral Q4H PRN Jake Paulino MD   1 tablet at 06/09/25 1105    [ - Suspended Admission] HYDROcodone-acetaminophen 5-325 mg per tablet 1 tablet  1 tablet Oral Daily PRN Robin Nava LORI, FNP   1 tablet at 06/06/25 1218    [ - Suspended Admission] hydrOXYzine pamoate capsule 50 mg  50 mg Oral Nightly PRN Elijah Robin A, FNP        [ - Suspended Admission] insulin aspart U-100 injection 0-5 Units  0-5 Units Subcutaneous QID (AC + HS) PRN Elijah Robin A, FNP   1 Units at 06/07/25 2003    [ - Suspended Admission] labetalol 20 mg/4 mL (5 mg/mL) IV syring  10 mg Intravenous Q4H PRN Nadine Cummins, FNP        [ - Suspended Admission]  levetiracetam 500 mg/5 mL (5 mL) liquid Soln 500 mg  500 mg Oral BID Nadine Cummins FNP   500 mg at 06/11/25 0809    [ - Suspended Admission] levothyroxine tablet 88 mcg  88 mcg Oral Before breakfast Robin Nava FNP   88 mcg at 06/11/25 0532    [ - Suspended Admission] LORazepam injection 2 mg  2 mg Intravenous Q4H PRN Robin Nava, FNP        [ - Suspended Admission] metFORMIN tablet 500 mg  500 mg Oral BID WM Robin Nava FNP   500 mg at 06/11/25 0811    [ - Suspended Admission] metoprolol injection 10 mg  10 mg Intravenous Q2H PRN Robin Nava, FNP        [ - Suspended Admission] metoprolol tartrate (LOPRESSOR) split tablet 12.5 mg  12.5 mg Oral BID Robin Nava, FNP   12.5 mg at 06/11/25 0800    [ - Suspended Admission] nitroGLYCERIN SL tablet 0.4 mg  0.4 mg Sublingual Q5 Min PRN Robin Nava, FNP        [ - Suspended Admission] nortriptyline capsule 25 mg  25 mg Oral QHS Robin Nava, FNP   25 mg at 06/10/25 2045    [ - Suspended Admission] ondansetron disintegrating tablet 4 mg  4 mg Oral Q6H PRN Robin Nava, FNP        [ - Suspended Admission] ondansetron disintegrating tablet 8 mg  8 mg Oral Q8H PRN Robin Nava, FNP        [ - Suspended Admission] ondansetron injection 4 mg  4 mg Intravenous Q8H PRN Robin Nava, FNP        [ - Suspended Admission] paroxetine tablet 20 mg  20 mg Oral Daily Robin Nava FNP   20 mg at 06/11/25 0809    [ - Suspended Admission] polyethylene glycol packet 17 g  17 g Oral BID PRN Robin Nava FNP   17 g at 06/09/25 1340     Current Outpatient Medications on File Prior to Encounter   Medication Sig Dispense Refill    atorvastatin (LIPITOR) 80 MG tablet Take 1 tablet (80 mg total) by mouth once daily. 90 tablet 3    busPIRone (BUSPAR) 15 MG tablet Take 1 tablet (15 mg total) by mouth 2 (two) times daily. 60 tablet 11    butalbital-acetaminophen-caffeine -40 mg (FIORICET,  "ESGIC) -40 mg per tablet Take 1 tablet by mouth every 4 (four) hours as needed for Pain. 30 tablet 0    clopidogreL (PLAVIX) 75 mg tablet Take 1 tablet (75 mg total) by mouth once daily. 30 tablet 11    docusate sodium (COLACE) 50 MG capsule Take 2 capsules (100 mg total) by mouth 2 (two) times daily. 60 capsule 0    ezetimibe (ZETIA) 10 mg tablet Take 1 tablet by mouth once daily 90 tablet 0    FARXIGA 5 mg Tab tablet Take 1 tablet by mouth once daily 90 tablet 0    levothyroxine (SYNTHROID) 88 MCG tablet Take 1 tablet (88 mcg total) by mouth before breakfast. 30 tablet 11    metFORMIN (GLUCOPHAGE) 500 MG tablet TAKE 1 TABLET BY MOUTH TWICE DAILY WITH MEALS 180 tablet 0    metoprolol succinate (TOPROL-XL) 25 MG 24 hr tablet Take 12.5 mg by mouth once daily.      ondansetron (ZOFRAN) 8 MG tablet Take 1 tablet (8 mg total) by mouth every 6 (six) hours as needed for Nausea. 20 tablet 1    pen needle, diabetic 32 gauge x 5/32" Ndle 1 each by Misc.(Non-Drug; Combo Route) route once a week. 100 each 1    levETIRAcetam (KEPPRA) 500 MG Tab Take 1 tablet (500 mg total) by mouth 2 (two) times daily. for 4 days 8 tablet 0     "

## 2025-06-14 NOTE — PROGRESS NOTES
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  Patient recently had subdural hematoma in May requiring intracranial embolization, craniotomy with hematoma evacuation.  After this patient was discharged home.  Late may she presented back to the ED with right-sided weakness, facial droop and imaging was concerning for left MCA diminished flow and subacute hematoma.  MRI brain was negative for CVA.  Neurosurgery performed diony hole for drainage of subdural hematoma due to increased intracranial pressure with postop CT showing definitive improvement patient was on Plavix post embolization which was held briefly and resumed per Neurosurgery on 06/05 and she was transferred to rehab on 06/06.  While in rehab patient was noted to have increase right upper extremity weakness and also significant headaches.  CT head revealed reaccumulation of subdural fluid collection, increased from before patient was sent to main [place] for further evaluation/neurosurgery services.  Neurosurgery evaluated, planning for  shunt this hospitalization, home meds resumed as appropriate except for Plavix, symptomatic management for headache.   shunt scheduled for 6/16.    Interval Hx:     Patient seen at bedside, she is comfortably laying in bed, no new complaints, no acute events overnight, she is hemodynamically stable, headache is better on Fioricet, no new neurological changes     Objective/physical exam:  General: In no acute distress, afebrile  Chest: Clear to auscultation bilaterally  Heart: S1, S2, no appreciable murmur  Abdomen: Soft, nontender, BS +  MSK: Warm, no lower extremity edema, no clubbing or cyanosis  Neurologic: Alert and oriented x4,  VITAL SIGNS:  24 HRS MIN & MAX LAST   Temp  Min: 97.7 °F (36.5 °C)  Max: 99.3 °F (37.4 °C) 98.9 °F (37.2 °C)   BP  Min: 105/73  Max: 116/74 105/73   Pulse  Min: 89  Max: 99  99   Resp  Min: 17  Max: 18 17   SpO2  Min: 96 %  Max: 98 % 96 %       Recent Labs   Lab 06/11/25  1448   WBC 7.64   RBC 3.93*   HGB 11.8*   HCT 36.5*   MCV 92.9   MCH 30.0   MCHC 32.3*   RDW 14.1   *   MPV 9.9         Recent Labs   Lab 06/09/25  0610 06/11/25  1448    142   K 3.7 4.0    104   CO2 28 24   BUN 12.0 14.1   CREATININE 0.65 0.60   * 96   CALCIUM 9.3 9.7   MG 1.70  --    ALBUMIN 2.7* 2.9*   PROT 7.0 7.6   ALKPHOS 139 160*   ALT 26 20   AST 14 12   BILITOT 0.4 0.3          Microbiology Results (last 7 days)       ** No results found for the last 168 hours. **             Scheduled Med:   atorvastatin  80 mg Oral Daily    busPIRone  15 mg Oral BID    ezetimibe  10 mg Oral Daily    levetiracetam  500 mg Oral BID    levothyroxine  88 mcg Oral Before breakfast    metoprolol succinate  12.5 mg Oral Daily    nortriptyline  25 mg Oral QHS    polyethylene glycol  17 g Oral BID          Assessment/Plan:    Subdural hematoma requiring craniotomy with evacuation and MMA embolization early May with recurrence late May requiring diony hole  Increased subdural fluid collection  Right-sided weakness/intractable headache secondary to above  History of 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, 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  Depression/anxiety   HLD  Hypothyroidism  Essential HTN  Type 2 diabetes mellitus  Prophylaxis     Neurosurgery on board   Given recurrent subdural fluid collection planning for  shunt coming Monday  [facility] for seizure prevention   Holding Plavix (cardiology okay to stop Plavix and once cleared by Neurosurgery Plavix can be restarted if indicated per Neurology for CVA)  Symptomatic management for headache, better on Fioricet  Continue home meds-statin, BuSpar, Zetia, levothyroxine, Toprol-XL, nortriptyline   Latest labs stable  INR  within normal limits  DVT prophylaxis-bilateral SCDs, I am holding off on chemical prophylaxis given subdural fluid collection and also upcoming  shunt placement      Cat Casper MD   06/14/2025

## 2025-06-15 LAB
ALBUMIN SERPL-MCNC: 2.8 G/DL (ref 3.4–4.8)
ALBUMIN/GLOB SERPL: 0.6 RATIO (ref 1.1–2)
ALP SERPL-CCNC: 201 UNIT/L (ref 40–150)
ALT SERPL-CCNC: 27 UNIT/L (ref 0–55)
ANION GAP SERPL CALC-SCNC: 10 MEQ/L
AST SERPL-CCNC: 24 UNIT/L (ref 11–45)
BASOPHILS # BLD AUTO: 0.04 X10(3)/MCL
BASOPHILS NFR BLD AUTO: 0.6 %
BILIRUB SERPL-MCNC: 0.2 MG/DL
BUN SERPL-MCNC: 5.8 MG/DL (ref 9.8–20.1)
CALCIUM SERPL-MCNC: 9.4 MG/DL (ref 8.4–10.2)
CHLORIDE SERPL-SCNC: 102 MMOL/L (ref 98–107)
CO2 SERPL-SCNC: 29 MMOL/L (ref 23–31)
CREAT SERPL-MCNC: 0.7 MG/DL (ref 0.55–1.02)
CREAT/UREA NIT SERPL: 8
EOSINOPHIL # BLD AUTO: 0.13 X10(3)/MCL (ref 0–0.9)
EOSINOPHIL NFR BLD AUTO: 2 %
ERYTHROCYTE [DISTWIDTH] IN BLOOD BY AUTOMATED COUNT: 13.8 % (ref 11.5–17)
GFR SERPLBLD CREATININE-BSD FMLA CKD-EPI: >60 ML/MIN/1.73/M2
GLOBULIN SER-MCNC: 4.6 GM/DL (ref 2.4–3.5)
GLUCOSE SERPL-MCNC: 210 MG/DL (ref 82–115)
HCT VFR BLD AUTO: 37.6 % (ref 37–47)
HGB BLD-MCNC: 11.9 G/DL (ref 12–16)
IMM GRANULOCYTES # BLD AUTO: 0.03 X10(3)/MCL (ref 0–0.04)
IMM GRANULOCYTES NFR BLD AUTO: 0.5 %
INR PPP: 1
LYMPHOCYTES # BLD AUTO: 1.33 X10(3)/MCL (ref 0.6–4.6)
LYMPHOCYTES NFR BLD AUTO: 20.9 %
MCH RBC QN AUTO: 29.5 PG (ref 27–31)
MCHC RBC AUTO-ENTMCNC: 31.6 G/DL (ref 33–36)
MCV RBC AUTO: 93.1 FL (ref 80–94)
MONOCYTES # BLD AUTO: 0.52 X10(3)/MCL (ref 0.1–1.3)
MONOCYTES NFR BLD AUTO: 8.2 %
NEUTROPHILS # BLD AUTO: 4.32 X10(3)/MCL (ref 2.1–9.2)
NEUTROPHILS NFR BLD AUTO: 67.8 %
NRBC BLD AUTO-RTO: 0 %
PLATELET # BLD AUTO: 507 X10(3)/MCL (ref 130–400)
PMV BLD AUTO: 9.7 FL (ref 7.4–10.4)
POCT GLUCOSE: 129 MG/DL (ref 70–110)
POCT GLUCOSE: 129 MG/DL (ref 70–110)
POCT GLUCOSE: 133 MG/DL (ref 70–110)
POTASSIUM SERPL-SCNC: 4.4 MMOL/L (ref 3.5–5.1)
PROT SERPL-MCNC: 7.4 GM/DL (ref 5.8–7.6)
PROTHROMBIN TIME: 13.4 SECONDS (ref 12.5–14.5)
RBC # BLD AUTO: 4.04 X10(6)/MCL (ref 4.2–5.4)
SODIUM SERPL-SCNC: 141 MMOL/L (ref 136–145)
WBC # BLD AUTO: 6.37 X10(3)/MCL (ref 4.5–11.5)

## 2025-06-15 PROCEDURE — 21400001 HC TELEMETRY ROOM

## 2025-06-15 PROCEDURE — 85025 COMPLETE CBC W/AUTO DIFF WBC: CPT | Performed by: INTERNAL MEDICINE

## 2025-06-15 PROCEDURE — 25000003 PHARM REV CODE 250: Performed by: INTERNAL MEDICINE

## 2025-06-15 PROCEDURE — 85610 PROTHROMBIN TIME: CPT | Performed by: INTERNAL MEDICINE

## 2025-06-15 PROCEDURE — 36415 COLL VENOUS BLD VENIPUNCTURE: CPT | Performed by: INTERNAL MEDICINE

## 2025-06-15 PROCEDURE — 25000003 PHARM REV CODE 250: Performed by: NURSE PRACTITIONER

## 2025-06-15 PROCEDURE — 80053 COMPREHEN METABOLIC PANEL: CPT | Performed by: INTERNAL MEDICINE

## 2025-06-15 RX ORDER — BISACODYL 10 MG/1
10 SUPPOSITORY RECTAL ONCE
Status: COMPLETED | OUTPATIENT
Start: 2025-06-15 | End: 2025-06-15

## 2025-06-15 RX ADMIN — LEVOTHYROXINE SODIUM 88 MCG: 0.09 TABLET ORAL at 05:06

## 2025-06-15 RX ADMIN — LEVETIRACETAM 500 MG: 500 SOLUTION ORAL at 09:06

## 2025-06-15 RX ADMIN — BUTALBITAL, ACETAMINOPHEN, AND CAFFEINE 1 TABLET: 325; 50; 40 TABLET ORAL at 09:06

## 2025-06-15 RX ADMIN — BUSPIRONE HYDROCHLORIDE 15 MG: 5 TABLET ORAL at 09:06

## 2025-06-15 RX ADMIN — LEVETIRACETAM 500 MG: 500 SOLUTION ORAL at 08:06

## 2025-06-15 RX ADMIN — TRAZODONE HYDROCHLORIDE 25 MG: 50 TABLET ORAL at 08:06

## 2025-06-15 RX ADMIN — POLYETHYLENE GLYCOL 3350 17 G: 17 POWDER, FOR SOLUTION ORAL at 09:06

## 2025-06-15 RX ADMIN — BISACODYL 10 MG: 10 SUPPOSITORY RECTAL at 02:06

## 2025-06-15 RX ADMIN — BUTALBITAL, ACETAMINOPHEN, AND CAFFEINE 1 TABLET: 325; 50; 40 TABLET ORAL at 02:06

## 2025-06-15 RX ADMIN — BUTALBITAL, ACETAMINOPHEN, AND CAFFEINE 1 TABLET: 325; 50; 40 TABLET ORAL at 05:06

## 2025-06-15 RX ADMIN — BUSPIRONE HYDROCHLORIDE 15 MG: 5 TABLET ORAL at 08:06

## 2025-06-15 RX ADMIN — NORTRIPTYLINE HYDROCHLORIDE 25 MG: 25 CAPSULE ORAL at 08:06

## 2025-06-15 RX ADMIN — EZETIMIBE 10 MG: 10 TABLET ORAL at 09:06

## 2025-06-15 RX ADMIN — ACETAMINOPHEN 650 MG: 325 TABLET ORAL at 04:06

## 2025-06-15 RX ADMIN — ATORVASTATIN CALCIUM 80 MG: 40 TABLET, FILM COATED ORAL at 09:06

## 2025-06-15 RX ADMIN — METOPROLOL SUCCINATE 12.5 MG: 25 TABLET, EXTENDED RELEASE ORAL at 09:06

## 2025-06-15 NOTE — PLAN OF CARE
Problem: Adult Inpatient Plan of Care  Goal: Plan of Care Review  Outcome: Progressing  Flowsheets (Taken 6/15/2025 1806)  Plan of Care Reviewed With: patient  Goal: Patient-Specific Goal (Individualized)  Outcome: Progressing  Goal: Absence of Hospital-Acquired Illness or Injury  Outcome: Progressing  Intervention: Identify and Manage Fall Risk  Flowsheets (Taken 6/15/2025 1806)  Safety Promotion/Fall Prevention:   assistive device/personal item within reach   Fall Risk reviewed with patient/family   Fall Risk signage in place   family expresses understanding of fall risk and prevention   instructed to call staff for mobility   lighting adjusted   medications reviewed   nonskid shoes/socks when out of bed   side rails raised x 2  Intervention: Prevent Skin Injury  Flowsheets (Taken 6/15/2025 1806)  Body Position: position changed independently  Skin Protection:   incontinence pads utilized   silicone foam dressing in place  Device Skin Pressure Protection:   absorbent pad utilized/changed   positioning supports utilized   tubing/devices free from skin contact  Goal: Optimal Comfort and Wellbeing  Outcome: Progressing  Goal: Readiness for Transition of Care  Outcome: Progressing     Problem: Diabetes Comorbidity  Goal: Blood Glucose Level Within Targeted Range  Outcome: Progressing     Problem: Wound  Goal: Optimal Coping  Outcome: Progressing  Goal: Optimal Functional Ability  Outcome: Progressing  Goal: Absence of Infection Signs and Symptoms  Outcome: Progressing  Goal: Improved Oral Intake  Outcome: Progressing  Goal: Optimal Pain Control and Function  Outcome: Progressing  Goal: Skin Health and Integrity  Outcome: Progressing  Goal: Optimal Wound Healing  Outcome: Progressing

## 2025-06-15 NOTE — PROGRESS NOTES
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  Patient recently had subdural hematoma in May requiring intracranial embolization, craniotomy with hematoma evacuation.  After this patient was discharged home.  Late may she presented back to the ED with right-sided weakness, facial droop and imaging was concerning for left MCA diminished flow and subacute hematoma.  MRI brain was negative for CVA.  Neurosurgery performed diony hole for drainage of subdural hematoma due to increased intracranial pressure with postop CT showing definitive improvement patient was on Plavix post embolization which was held briefly and resumed per Neurosurgery on 06/05 and she was transferred to rehab on 06/06.  While in rehab patient was noted to have increase right upper extremity weakness and also significant headaches.  CT head revealed reaccumulation of subdural fluid collection, increased from before patient was sent to main [place] for further evaluation/neurosurgery services.  Neurosurgery evaluated, planning for  shunt this hospitalization, home meds resumed as appropriate except for Plavix, symptomatic management for headache.   shunt scheduled for 6/16.    Interval Hx:     Patient seen at bedside, patient is slightly anxious about tomorrow's procedure, requesting something to sleep tonight, also complaining of constipation and MiraLax is not working, no other new complaints, headache is under control     Objective/physical exam:  General: In no acute distress, afebrile  Chest: Clear to auscultation bilaterally  Heart: S1, S2, no appreciable murmur  Abdomen: Soft, nontender, BS +  MSK: Warm, no lower extremity edema, no clubbing or cyanosis  Neurologic:  Alert and oriented x4,  VITAL SIGNS: 24 HRS MIN & MAX LAST   Temp  Min: 98.1 °F (36.7 °C)  Max: 98.9 °F (37.2 °C) 98.3 °F (36.8 °C)   BP  Min: 101/66  Max: 118/75 104/68   Pulse  Min: 78  Max: 85  82   Resp  Min: 16  Max: 18 18   SpO2  Min: 94 %  Max: 97 % 97 %       Recent Labs   Lab 06/11/25  1448   WBC 7.64   RBC 3.93*   HGB 11.8*   HCT 36.5*   MCV 92.9   MCH 30.0   MCHC 32.3*   RDW 14.1   *   MPV 9.9         Recent Labs   Lab 06/09/25  0610 06/11/25  1448    142   K 3.7 4.0    104   CO2 28 24   BUN 12.0 14.1   CREATININE 0.65 0.60   * 96   CALCIUM 9.3 9.7   MG 1.70  --    ALBUMIN 2.7* 2.9*   PROT 7.0 7.6   ALKPHOS 139 160*   ALT 26 20   AST 14 12   BILITOT 0.4 0.3          Microbiology Results (last 7 days)       ** No results found for the last 168 hours. **             Scheduled Med:   atorvastatin  80 mg Oral Daily    busPIRone  15 mg Oral BID    ezetimibe  10 mg Oral Daily    levetiracetam  500 mg Oral BID    levothyroxine  88 mcg Oral Before breakfast    metoprolol succinate  12.5 mg Oral Daily    nortriptyline  25 mg Oral QHS    polyethylene glycol  17 g Oral BID          Assessment/Plan:    Subdural hematoma requiring craniotomy with evacuation and MMA embolization early May with recurrence late May requiring diony hole  Increased subdural fluid collection  Right-sided weakness/intractable headache secondary to above  History of 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, 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  Depression/anxiety   HLD  Hypothyroidism  Essential HTN  Type 2 diabetes mellitus  Prophylaxis     Neurosurgery on board   Given recurrent subdural fluid collection planning for  shunt tomorrow   NPO midnight  Keppra for seizure prevention   Holding Plavix (cardiology okay to stop Plavix and once cleared by Neurosurgery Plavix can be restarted if indicated per Neurology for CVA)  Symptomatic management for headache, better on Fioricet  Continue home meds-statin, BuSpar, Zetia, levothyroxine,  Toprol-XL, nortriptyline   Labs stable  INR within normal limits  Dulcolax suppository x1 dose for constipation  Trazodone 25 mg q.h.s. for insomnia  DVT prophylaxis-bilateral SCDs, I am holding off on chemical prophylaxis given subdural fluid collection and also upcoming  shunt placement      Cat Casper MD   06/15/2025

## 2025-06-15 NOTE — CARE UPDATE
Patient seen during rounds  She is doing better today, HA better controlled  She is scheduled for subdural shunt tomorrow    NPO after midnight  Hold anticoagulation for surgery  All questions were answered

## 2025-06-16 ENCOUNTER — ANESTHESIA (OUTPATIENT)
Dept: SURGERY | Facility: HOSPITAL | Age: 60
End: 2025-06-16
Payer: COMMERCIAL

## 2025-06-16 ENCOUNTER — ANESTHESIA EVENT (OUTPATIENT)
Dept: SURGERY | Facility: HOSPITAL | Age: 60
End: 2025-06-16
Payer: COMMERCIAL

## 2025-06-16 LAB
CRP SERPL-MCNC: 32.8 MG/L
ERYTHROCYTE [SEDIMENTATION RATE] IN BLOOD: 48 MM/HR (ref 0–30)
GLUCOSE SERPL-MCNC: 188 MG/DL (ref 70–110)
GRAM STN SPEC: NORMAL
INR PPP: 1
POCT GLUCOSE: 138 MG/DL (ref 70–110)
POCT GLUCOSE: 172 MG/DL (ref 70–110)
POCT GLUCOSE: 188 MG/DL (ref 70–110)
POCT GLUCOSE: 207 MG/DL (ref 70–110)
PROTHROMBIN TIME: 12.9 SECONDS (ref 12.5–14.5)

## 2025-06-16 PROCEDURE — 25000003 PHARM REV CODE 250: Performed by: INTERNAL MEDICINE

## 2025-06-16 PROCEDURE — 63600175 PHARM REV CODE 636 W HCPCS

## 2025-06-16 PROCEDURE — 21400001 HC TELEMETRY ROOM

## 2025-06-16 PROCEDURE — 87102 FUNGUS ISOLATION CULTURE: CPT | Performed by: NEUROLOGICAL SURGERY

## 2025-06-16 PROCEDURE — 85652 RBC SED RATE AUTOMATED: CPT

## 2025-06-16 PROCEDURE — 94799 UNLISTED PULMONARY SVC/PX: CPT

## 2025-06-16 PROCEDURE — 71000039 HC RECOVERY, EACH ADD'L HOUR: Performed by: NEUROLOGICAL SURGERY

## 2025-06-16 PROCEDURE — 87070 CULTURE OTHR SPECIMN AEROBIC: CPT | Performed by: NEUROLOGICAL SURGERY

## 2025-06-16 PROCEDURE — 87205 SMEAR GRAM STAIN: CPT | Performed by: NEUROLOGICAL SURGERY

## 2025-06-16 PROCEDURE — 00U20KZ SUPPLEMENT DURA MATER WITH NONAUTOLOGOUS TISSUE SUBSTITUTE, OPEN APPROACH: ICD-10-PCS | Performed by: NEUROLOGICAL SURGERY

## 2025-06-16 PROCEDURE — 97112 NEUROMUSCULAR REEDUCATION: CPT

## 2025-06-16 PROCEDURE — 27201423 OPTIME MED/SURG SUP & DEVICES STERILE SUPPLY: Performed by: NEUROLOGICAL SURGERY

## 2025-06-16 PROCEDURE — 36415 COLL VENOUS BLD VENIPUNCTURE: CPT | Performed by: INTERNAL MEDICINE

## 2025-06-16 PROCEDURE — 25000003 PHARM REV CODE 250

## 2025-06-16 PROCEDURE — 37000008 HC ANESTHESIA 1ST 15 MINUTES: Performed by: NEUROLOGICAL SURGERY

## 2025-06-16 PROCEDURE — 37000009 HC ANESTHESIA EA ADD 15 MINS: Performed by: NEUROLOGICAL SURGERY

## 2025-06-16 PROCEDURE — 63600175 PHARM REV CODE 636 W HCPCS: Performed by: INTERNAL MEDICINE

## 2025-06-16 PROCEDURE — 86140 C-REACTIVE PROTEIN: CPT

## 2025-06-16 PROCEDURE — 82962 GLUCOSE BLOOD TEST: CPT | Performed by: NEUROLOGICAL SURGERY

## 2025-06-16 PROCEDURE — D9220A PRA ANESTHESIA: Mod: ANES,,, | Performed by: ANESTHESIOLOGY

## 2025-06-16 PROCEDURE — 87075 CULTR BACTERIA EXCEPT BLOOD: CPT | Performed by: NEUROLOGICAL SURGERY

## 2025-06-16 PROCEDURE — 99900031 HC PATIENT EDUCATION (STAT)

## 2025-06-16 PROCEDURE — D9220A PRA ANESTHESIA: Mod: CRNA,,,

## 2025-06-16 PROCEDURE — 97535 SELF CARE MNGMENT TRAINING: CPT

## 2025-06-16 PROCEDURE — 63600175 PHARM REV CODE 636 W HCPCS: Performed by: ANESTHESIOLOGY

## 2025-06-16 PROCEDURE — 25000003 PHARM REV CODE 250: Performed by: NEUROLOGICAL SURGERY

## 2025-06-16 PROCEDURE — 85610 PROTHROMBIN TIME: CPT | Performed by: INTERNAL MEDICINE

## 2025-06-16 PROCEDURE — 36000710: Performed by: NEUROLOGICAL SURGERY

## 2025-06-16 PROCEDURE — 63600175 PHARM REV CODE 636 W HCPCS: Performed by: NEUROLOGICAL SURGERY

## 2025-06-16 PROCEDURE — C1894 INTRO/SHEATH, NON-LASER: HCPCS | Performed by: NEUROLOGICAL SURGERY

## 2025-06-16 PROCEDURE — 009400Z DRAINAGE OF INTRACRANIAL SUBDURAL SPACE WITH DRAINAGE DEVICE, OPEN APPROACH: ICD-10-PCS | Performed by: NEUROLOGICAL SURGERY

## 2025-06-16 PROCEDURE — 0NP00JZ REMOVAL OF SYNTHETIC SUBSTITUTE FROM SKULL, OPEN APPROACH: ICD-10-PCS | Performed by: NEUROLOGICAL SURGERY

## 2025-06-16 PROCEDURE — 36000711: Performed by: NEUROLOGICAL SURGERY

## 2025-06-16 PROCEDURE — 36415 COLL VENOUS BLD VENIPUNCTURE: CPT

## 2025-06-16 PROCEDURE — 71000033 HC RECOVERY, INTIAL HOUR: Performed by: NEUROLOGICAL SURGERY

## 2025-06-16 RX ORDER — DIPHENHYDRAMINE HCL 25 MG
25 CAPSULE ORAL ONCE
Status: COMPLETED | OUTPATIENT
Start: 2025-06-16 | End: 2025-06-16

## 2025-06-16 RX ORDER — HYDROMORPHONE HYDROCHLORIDE 2 MG/ML
INJECTION, SOLUTION INTRAMUSCULAR; INTRAVENOUS; SUBCUTANEOUS
Status: DISCONTINUED | OUTPATIENT
Start: 2025-06-16 | End: 2025-06-16

## 2025-06-16 RX ORDER — SODIUM CHLORIDE 9 MG/ML
INJECTION, SOLUTION INTRAVENOUS CONTINUOUS
Status: DISCONTINUED | OUTPATIENT
Start: 2025-06-16 | End: 2025-06-17

## 2025-06-16 RX ORDER — HYDROMORPHONE HYDROCHLORIDE 2 MG/ML
0.4 INJECTION, SOLUTION INTRAMUSCULAR; INTRAVENOUS; SUBCUTANEOUS EVERY 5 MIN PRN
Status: COMPLETED | OUTPATIENT
Start: 2025-06-16 | End: 2025-06-16

## 2025-06-16 RX ORDER — CLINDAMYCIN PHOSPHATE 900 MG/50ML
900 INJECTION, SOLUTION INTRAVENOUS ONCE
Status: COMPLETED | OUTPATIENT
Start: 2025-06-16 | End: 2025-06-16

## 2025-06-16 RX ORDER — HYDROCODONE BITARTRATE AND ACETAMINOPHEN 5; 325 MG/1; MG/1
1 TABLET ORAL EVERY 4 HOURS PRN
Refills: 0 | Status: DISCONTINUED | OUTPATIENT
Start: 2025-06-16 | End: 2025-06-17

## 2025-06-16 RX ORDER — GLUCAGON 1 MG
1 KIT INJECTION
Status: DISCONTINUED | OUTPATIENT
Start: 2025-06-16 | End: 2025-06-16 | Stop reason: HOSPADM

## 2025-06-16 RX ORDER — ACETAMINOPHEN 325 MG/1
650 TABLET ORAL EVERY 4 HOURS PRN
Status: DISCONTINUED | OUTPATIENT
Start: 2025-06-16 | End: 2025-06-25

## 2025-06-16 RX ORDER — ROCURONIUM BROMIDE 10 MG/ML
INJECTION, SOLUTION INTRAVENOUS
Status: DISCONTINUED | OUTPATIENT
Start: 2025-06-16 | End: 2025-06-16

## 2025-06-16 RX ORDER — ACETAMINOPHEN 650 MG/1
650 SUPPOSITORY RECTAL EVERY 4 HOURS PRN
Status: DISCONTINUED | OUTPATIENT
Start: 2025-06-16 | End: 2025-06-25

## 2025-06-16 RX ORDER — SODIUM CHLORIDE 0.9 % (FLUSH) 0.9 %
10 SYRINGE (ML) INJECTION
Status: DISCONTINUED | OUTPATIENT
Start: 2025-06-16 | End: 2025-06-16 | Stop reason: HOSPADM

## 2025-06-16 RX ORDER — DIPHENHYDRAMINE HYDROCHLORIDE 50 MG/ML
12.5 INJECTION, SOLUTION INTRAMUSCULAR; INTRAVENOUS ONCE
Status: COMPLETED | OUTPATIENT
Start: 2025-06-16 | End: 2025-06-16

## 2025-06-16 RX ORDER — FENTANYL CITRATE 50 UG/ML
INJECTION, SOLUTION INTRAMUSCULAR; INTRAVENOUS
Status: DISCONTINUED | OUTPATIENT
Start: 2025-06-16 | End: 2025-06-16

## 2025-06-16 RX ORDER — PHENYLEPHRINE HCL IN 0.9% NACL 1 MG/10 ML
SYRINGE (ML) INTRAVENOUS
Status: DISCONTINUED | OUTPATIENT
Start: 2025-06-16 | End: 2025-06-16

## 2025-06-16 RX ORDER — FAMOTIDINE 10 MG/ML
20 INJECTION, SOLUTION INTRAVENOUS EVERY 12 HOURS
Status: DISCONTINUED | OUTPATIENT
Start: 2025-06-16 | End: 2025-06-20

## 2025-06-16 RX ORDER — ONDANSETRON HYDROCHLORIDE 2 MG/ML
INJECTION, SOLUTION INTRAMUSCULAR; INTRAVENOUS
Status: DISCONTINUED | OUTPATIENT
Start: 2025-06-16 | End: 2025-06-16

## 2025-06-16 RX ORDER — DEXAMETHASONE SODIUM PHOSPHATE 4 MG/ML
INJECTION, SOLUTION INTRA-ARTICULAR; INTRALESIONAL; INTRAMUSCULAR; INTRAVENOUS; SOFT TISSUE
Status: DISCONTINUED | OUTPATIENT
Start: 2025-06-16 | End: 2025-06-16

## 2025-06-16 RX ORDER — BACITRACIN 500 [USP'U]/G
OINTMENT TOPICAL
Status: DISCONTINUED | OUTPATIENT
Start: 2025-06-16 | End: 2025-06-16 | Stop reason: HOSPADM

## 2025-06-16 RX ORDER — LIDOCAINE HYDROCHLORIDE 20 MG/ML
INJECTION, SOLUTION EPIDURAL; INFILTRATION; INTRACAUDAL; PERINEURAL
Status: DISCONTINUED | OUTPATIENT
Start: 2025-06-16 | End: 2025-06-16

## 2025-06-16 RX ORDER — CEFEPIME HYDROCHLORIDE 1 G/1
1 INJECTION, POWDER, FOR SOLUTION INTRAMUSCULAR; INTRAVENOUS
Status: DISCONTINUED | OUTPATIENT
Start: 2025-06-16 | End: 2025-07-03

## 2025-06-16 RX ORDER — PROPOFOL 10 MG/ML
VIAL (ML) INTRAVENOUS
Status: DISCONTINUED | OUTPATIENT
Start: 2025-06-16 | End: 2025-06-16

## 2025-06-16 RX ORDER — CALCIUM CHLORIDE INJECTION 100 MG/ML
INJECTION, SOLUTION INTRAVENOUS
Status: DISCONTINUED | OUTPATIENT
Start: 2025-06-16 | End: 2025-06-16

## 2025-06-16 RX ORDER — CLINDAMYCIN PHOSPHATE 900 MG/50ML
900 INJECTION, SOLUTION INTRAVENOUS
Status: COMPLETED | OUTPATIENT
Start: 2025-06-16 | End: 2025-06-17

## 2025-06-16 RX ORDER — PROCHLORPERAZINE EDISYLATE 5 MG/ML
5 INJECTION INTRAMUSCULAR; INTRAVENOUS EVERY 6 HOURS PRN
Status: DISCONTINUED | OUTPATIENT
Start: 2025-06-16 | End: 2025-07-15 | Stop reason: HOSPADM

## 2025-06-16 RX ORDER — LIDOCAINE HYDROCHLORIDE AND EPINEPHRINE 5; 5 MG/ML; UG/ML
INJECTION, SOLUTION INFILTRATION; PERINEURAL
Status: DISCONTINUED | OUTPATIENT
Start: 2025-06-16 | End: 2025-06-16 | Stop reason: HOSPADM

## 2025-06-16 RX ORDER — MORPHINE SULFATE 4 MG/ML
2 INJECTION, SOLUTION INTRAMUSCULAR; INTRAVENOUS EVERY 4 HOURS PRN
Refills: 0 | Status: DISCONTINUED | OUTPATIENT
Start: 2025-06-16 | End: 2025-06-25

## 2025-06-16 RX ORDER — VANCOMYCIN HYDROCHLORIDE 1 G/20ML
INJECTION, POWDER, LYOPHILIZED, FOR SOLUTION INTRAVENOUS
Status: DISCONTINUED | OUTPATIENT
Start: 2025-06-16 | End: 2025-06-16 | Stop reason: HOSPADM

## 2025-06-16 RX ADMIN — HYDROMORPHONE HYDROCHLORIDE 0.4 MG: 2 INJECTION, SOLUTION INTRAMUSCULAR; INTRAVENOUS; SUBCUTANEOUS at 02:06

## 2025-06-16 RX ADMIN — FAMOTIDINE 20 MG: 10 INJECTION, SOLUTION INTRAVENOUS at 08:06

## 2025-06-16 RX ADMIN — EZETIMIBE 10 MG: 10 TABLET ORAL at 09:06

## 2025-06-16 RX ADMIN — BUTALBITAL, ACETAMINOPHEN, AND CAFFEINE 1 TABLET: 325; 50; 40 TABLET ORAL at 05:06

## 2025-06-16 RX ADMIN — FENTANYL CITRATE 50 MCG: 50 INJECTION, SOLUTION INTRAMUSCULAR; INTRAVENOUS at 01:06

## 2025-06-16 RX ADMIN — ROCURONIUM BROMIDE 50 MG: 10 SOLUTION INTRAVENOUS at 01:06

## 2025-06-16 RX ADMIN — CLINDAMYCIN PHOSPHATE 900 MG: 900 INJECTION, SOLUTION INTRAVENOUS at 11:06

## 2025-06-16 RX ADMIN — CALCIUM CHLORIDE INJECTION 0.5 G: 100 INJECTION, SOLUTION INTRAVENOUS at 01:06

## 2025-06-16 RX ADMIN — SUGAMMADEX 200 MG: 100 INJECTION, SOLUTION INTRAVENOUS at 02:06

## 2025-06-16 RX ADMIN — ATORVASTATIN CALCIUM 80 MG: 40 TABLET, FILM COATED ORAL at 09:06

## 2025-06-16 RX ADMIN — NORTRIPTYLINE HYDROCHLORIDE 25 MG: 25 CAPSULE ORAL at 08:06

## 2025-06-16 RX ADMIN — PROPOFOL 50 MG: 10 INJECTION, EMULSION INTRAVENOUS at 01:06

## 2025-06-16 RX ADMIN — HYDROMORPHONE HYDROCHLORIDE 0.4 MG: 2 INJECTION, SOLUTION INTRAMUSCULAR; INTRAVENOUS; SUBCUTANEOUS at 03:06

## 2025-06-16 RX ADMIN — CEFEPIME 1 G: 1 INJECTION, POWDER, FOR SOLUTION INTRAMUSCULAR; INTRAVENOUS at 05:06

## 2025-06-16 RX ADMIN — CLINDAMYCIN PHOSPHATE 900 MG: 900 INJECTION, SOLUTION INTRAVENOUS at 01:06

## 2025-06-16 RX ADMIN — INSULIN ASPART 4 UNITS: 100 INJECTION, SOLUTION INTRAVENOUS; SUBCUTANEOUS at 07:06

## 2025-06-16 RX ADMIN — BUTALBITAL, ACETAMINOPHEN, AND CAFFEINE 1 TABLET: 325; 50; 40 TABLET ORAL at 08:06

## 2025-06-16 RX ADMIN — BUTALBITAL, ACETAMINOPHEN, AND CAFFEINE 1 TABLET: 325; 50; 40 TABLET ORAL at 09:06

## 2025-06-16 RX ADMIN — DIPHENHYDRAMINE HYDROCHLORIDE 12.5 MG: 50 INJECTION INTRAMUSCULAR; INTRAVENOUS at 03:06

## 2025-06-16 RX ADMIN — Medication 100 MCG: at 01:06

## 2025-06-16 RX ADMIN — SODIUM CHLORIDE: 9 INJECTION, SOLUTION INTRAVENOUS at 08:06

## 2025-06-16 RX ADMIN — LIDOCAINE HYDROCHLORIDE 40 MG: 20 INJECTION, SOLUTION EPIDURAL; INFILTRATION; INTRACAUDAL; PERINEURAL at 01:06

## 2025-06-16 RX ADMIN — BUSPIRONE HYDROCHLORIDE 15 MG: 5 TABLET ORAL at 09:06

## 2025-06-16 RX ADMIN — TRAZODONE HYDROCHLORIDE 25 MG: 50 TABLET ORAL at 08:06

## 2025-06-16 RX ADMIN — DEXAMETHASONE SODIUM PHOSPHATE 8 MG: 4 INJECTION, SOLUTION INTRA-ARTICULAR; INTRALESIONAL; INTRAMUSCULAR; INTRAVENOUS; SOFT TISSUE at 01:06

## 2025-06-16 RX ADMIN — HYDROCODONE BITARTRATE AND ACETAMINOPHEN 1 TABLET: 5; 325 TABLET ORAL at 05:06

## 2025-06-16 RX ADMIN — LEVETIRACETAM 500 MG: 500 SOLUTION ORAL at 09:06

## 2025-06-16 RX ADMIN — METOPROLOL SUCCINATE 12.5 MG: 25 TABLET, EXTENDED RELEASE ORAL at 09:06

## 2025-06-16 RX ADMIN — DIPHENHYDRAMINE HYDROCHLORIDE 25 MG: 25 CAPSULE ORAL at 08:06

## 2025-06-16 RX ADMIN — PROPOFOL 100 MG: 10 INJECTION, EMULSION INTRAVENOUS at 01:06

## 2025-06-16 RX ADMIN — CEFEPIME 1 G: 1 INJECTION, POWDER, FOR SOLUTION INTRAMUSCULAR; INTRAVENOUS at 11:06

## 2025-06-16 RX ADMIN — LEVOTHYROXINE SODIUM 88 MCG: 0.09 TABLET ORAL at 05:06

## 2025-06-16 RX ADMIN — BUSPIRONE HYDROCHLORIDE 15 MG: 5 TABLET ORAL at 08:06

## 2025-06-16 RX ADMIN — POLYETHYLENE GLYCOL 3350 17 G: 17 POWDER, FOR SOLUTION ORAL at 08:06

## 2025-06-16 RX ADMIN — LEVETIRACETAM 500 MG: 500 SOLUTION ORAL at 08:06

## 2025-06-16 RX ADMIN — VANCOMYCIN HYDROCHLORIDE 1250 MG: 1.25 INJECTION, POWDER, LYOPHILIZED, FOR SOLUTION INTRAVENOUS at 08:06

## 2025-06-16 RX ADMIN — ONDANSETRON 4 MG: 2 INJECTION INTRAMUSCULAR; INTRAVENOUS at 02:06

## 2025-06-16 RX ADMIN — SODIUM CHLORIDE, SODIUM GLUCONATE, SODIUM ACETATE, POTASSIUM CHLORIDE AND MAGNESIUM CHLORIDE: 526; 502; 368; 37; 30 INJECTION, SOLUTION INTRAVENOUS at 12:06

## 2025-06-16 NOTE — ANESTHESIA PREPROCEDURE EVALUATION
06/16/2025  Chelle Chandra is a 59 y.o., female.with significant history of hypothyroidism, anxiety, bronchial asthma, COPD, type 2 diabetes mellitus, 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 Patient recently had subdural hematoma in May requiring intracranial embolization, craniotomy with hematoma evacuation. After this patient was discharged home. Late may she presented back to the ED with right-sided weakness, facial droop and imaging was concerning for left MCA diminished flow and subacute hematoma. MRI brain was negative for CVA. Neurosurgery performed diony hole for drainage of subdural hematoma due to increased intracranial pressure with postop CT showing definitive improvement patient was on Plavix post embolization which was held briefly and resumed per Neurosurgery on 06/05 and she was transferred to rehab on 06/06. While in rehab patient was noted to have increase right upper extremity weakness and also significant headaches. CT head revealed reaccumulation of subdural fluid collection, increased from before patient was sent to main [place] for further evaluation/neurosurgery services. Neurosurgery evaluated, planning for  shunt this hospitalization, home meds resumed as appropriate except for Plavix, symptomatic management for headache.  shunt scheduled for 6/16.       Pre-op Assessment    I have reviewed the Patient Summary Reports.     I have reviewed the Nursing Notes. I have reviewed the NPO Status.   I have reviewed the Medications.     Review of Systems  Anesthesia Hx:  No problems with previous Anesthesia                Social:  Former Smoker       Cardiovascular:  Cardiovascular Normal                                              Pulmonary:   COPD, mild Asthma                    Hepatic/GI:     GERD, well controlled                Neurological:   CVA, residual  symptoms                                    Endocrine:  Diabetes, well controlled, type 2 Hypothyroidism          Psych:  Psychiatric History  depression                Physical Exam  General: Cooperative, Alert and Oriented    Airway:  Mallampati: I   Mouth Opening: Normal  TM Distance: Normal  Tongue: Normal  Neck ROM: Normal ROM  Pre-Existing Airway: Oral Endotracheal tube    Dental:  Dentures    Chest/Lungs:  Clear to auscultation, Normal Respiratory Rate    Heart:  Rate: Normal  Rhythm: Regular Rhythm        Anesthesia Plan  Type of Anesthesia, risks & benefits discussed:    Anesthesia Type: Gen ETT  Intra-op Monitoring Plan: Standard ASA Monitors  Post Op Pain Control Plan: multimodal analgesia  Airway Plan: Direct, Post-Induction  Informed Consent: Informed consent signed with the Patient and all parties understand the risks and agree with anesthesia plan.  All questions answered. Patient consented to blood products? Yes  ASA Score: 3  Day of Surgery Review of History & Physical: H&P Update referred to the surgeon/provider.    Ready For Surgery From Anesthesia Perspective.     .

## 2025-06-16 NOTE — ANESTHESIA PROCEDURE NOTES
Intubation    Date/Time: 6/16/2025 1:07 PM    Performed by: Angella Valle CRNA  Authorized by: Dominic Frias MD    Intubation:     Induction:  Intravenous    Intubated:  Postinduction    Mask Ventilation:  Easy mask    Attempts:  1    Attempted By:  CRNA    Method of Intubation:  Video laryngoscopy    Blade:  [place] 3    Laryngeal View Grade: Grade I - full view of cords      Difficult Airway Encountered?: No      Complications:  None    Airway Device:  Oral endotracheal tube    Airway Device Size:  7.0    Style/Cuff Inflation:  Cuffed (inflated to minimal occlusive pressure)    Inflation Amount (mL):  6    Tube secured:  21    Secured at:  The lips    Placement Verified By:  Capnometry    Complicating Factors:  Small mouth    Findings Post-Intubation:  BS equal bilateral and atraumatic/condition of teeth unchanged

## 2025-06-16 NOTE — BRIEF OP NOTE
[facility] - Periop Services  Brief Operative Note    SUMMARY     Surgery Date: 6/16/2025     Surgeons and Role:  Panel 1:     * James Rankin MD - Primary      Assisting Surgeon: Henrik Sandoval PA-C    Pre-op Diagnosis:  Subdural fluid collection    Post-op Diagnosis:  Same    Left redo diony hole for fluid evacuation and drain placement.   Left cranioplasty for subdural fluid evacuation. Cultures obtained.     Anesthesia: General    Implants:  * No implants in log *    Operative Findings: Dictated    Estimated Blood Loss: * No values recorded between 6/16/2025 12:54 PM and 6/16/2025  2:07 PM *    Estimated Blood Loss has been documented.         Specimens:   Specimen (24h ago, onward)      None          ID Type Source Tests Collected by Time Destination   1 : left head wound cultures Tissue Head ANAEROBIC CULTURE [facility], FUNGAL CULTURE [facility], GRAM STAIN [facility], TISSUE CULTURE - AEROBIC [facility] James Rankin MD 6/16/2025 1331    2 : left head bone cultures Bone Head ANAEROBIC CULTURE [facility], FUNGAL CULTURE [facility], GRAM STAIN [facility], TISSUE CULTURE - AEROBIC [facility] James Rankin MD 6/16/2025 1357        [id number]

## 2025-06-16 NOTE — PT/OT/SLP PROGRESS
Physical Therapy Treatment    Patient Name:  Chelle Chandra   MRN:  [id number]    Pt in surgery. PT to re assess tomorrow pending new orders.

## 2025-06-16 NOTE — PT/OT/SLP PROGRESS
Occupational Therapy   Treatment    Name: Chelle Chandra  MRN: [id number]    Recommendations:     Recommended therapy intensity at discharge: High Intensity Therapy   Discharge Equipment Recommendations:  to be determined by next level of care  Barriers to discharge:  None    Assessment:     Chelle Chandra is a 59 y.o. female with a medical diagnosis of R sided weakness and facial droop due to L SDH s/p craniotomy and MMA embolization. On 5/30 patient with subdural hemorrhage s/p L diony hole. The pt presents with new R sided weakness, with plans for peritoneal shunt on 6/16.  She presents with good tolerance for OT treatment. Performance deficits affecting function are weakness, impaired self care skills, impaired functional mobility, gait instability, impaired balance, visual deficits, decreased safety awareness.     Pt tolerated OT treatment well. Pt requires tactile and verbal cues for maintaining upright posture and attention to R side during ambulation. Pt demonstrated R foot drag at today's session, not noted as previous sessions. Pt provided built up handle to place on walker to provide tactile cues to decrease hand slipping from RW handle. Remains appropriate for high intensity therapy upon d/c.    Rehab Prognosis:  Good; patient would benefit from acute skilled OT services to address these deficits and reach maximum level of function.       Plan:     Patient to be seen 5 x/week to address the above listed problems via self-care/home management, therapeutic activities, therapeutic exercises, neuromuscular re-education, sensory integration  Plan of Care Expires: 07/14/25  Plan of Care Reviewed with: patient    Subjective     Pain/Comfort:   No pain reported. Pt reports being hungry.     Objective:     Communicated with: nurse prior to session.  Patient found HOB elevated with peripheral IV upon OT entry to room.    General Precautions: Standard, fall, NPO    Orthopedic Precautions:   Braces:  N/A  Respiratory Status: Room air  Vital Signs: Respiratory Status: on room air     Occupational Performance:     Functional Mobility/Transfers:  Bed mobility:    Scooting: contact guard assistance  Supine to Sit: contact guard assistance  Transfers: Sit to Stand: contact guard assistance with rolling walker  Functional mobility: pt ambulated in hallway with CGA using RW for 100ft before requiring a seated rest break. During ambulation, pt presents with R lateral lean and veering and R foot drag. Requires visual cue on floor to remain in midline of walker and verbal cues for R hand  on walker as well as clearing R foot. Pt provided with built up handle for walker to provide tactile cues when hand begins to fall from RW handle. Pt ambulated for about 80ft with built up handle on RW without hand slipping from RW handle.     Activities of Daily Living:  Upper Body Dressing: minimum assistance to don hospital gown.     Balance:   Static Sitting Balance: Bilateral UE support: Fair: Patient able to maintain balance with handhold support; may require occasional minimal assistance. Pt requiring assistance to maintain upright posture and balance. Demonstrates a R lateral and posterior lean in seated EOB.     Therapeutic Positioning    OT interventions performed during the course of today's session in an effort to prevent and/or reduce acquired pressure injuries:   Education was provided on benefits of and recommendations for therapeutic positioning  Therapeutic positioning was provided at the conclusion of session to promote a more neutral position and address R lateral lean.    [facility] 6 Click ADL: 20    Co-Treatment: No    Patient Education:  Patient and daughter/s were provided with verbal education education regarding OT role/goals/POC, fall prevention, and safety awareness.  Understanding was verbalized, however additional teaching warranted.      Patient left up in chair with call button in reach and family  present.    GOALS:   Multidisciplinary Problems       Occupational Therapy Goals          Problem: Occupational Therapy    Goal Priority Disciplines Outcome Interventions   Occupational Therapy Goal     OT, PT/OT Progressing    Description: Goals: to be met by 07/14/25    Pt will complete grooming standing at sink with LRAD with mod I.  Pt will complete UB dressing with SBA.  Pt will complete LB dressing with SBA using LRAD.  Pt will complete toileting with SBA using LRAD.  Pt will complete functional mobility to/from toilet and toilet transfer with mod I using LRAD.   Pt will demo visual attention to R side >80% of time with min verbal cues.                         Time Tracking:     OT Date of Treatment: 06/16/25  OT Start Time: 1056  OT Stop Time: 1127  OT Total Time (min): 31 min    Billable Minutes:Self Care/Home Management 1 unit   Neuromuscular Re-education 1 unit    Supervising Occupational Therapist: LISA Mallory  OT/EDILSON: OT     Number of EDILSON visits since last OT visit: 0    6/16/2025

## 2025-06-16 NOTE — PROGRESS NOTES
"Pharmacokinetic Initial Assessment: IV Vancomycin    Assessment/Plan:    Initiate intravenous vancomycin with loading dose of 1250 mg once followed by a maintenance dose of vancomycin 1250mg IV every 24 hours  Desired empiric serum trough concentration is 15 to 20 mcg/mL  Draw vancomycin trough level 60 min prior to third dose on 06/18 at approximately 1400  Pharmacy will continue to follow and monitor vancomycin.      Please contact pharmacy at extension [phone] with any questions regarding this assessment.     Thank you for the consult,   Linda Farris       Patient brief summary:  Chelle Chandra is a 59 y.o. female initiated on antimicrobial therapy with IV Vancomycin for treatment of suspected subdural empyema    Drug Allergies:   Review of patient's allergies indicates:   Allergen Reactions    Aspirin     Ketorolac     Penicillins     Sulfa (sulfonamide antibiotics)     Ozempic [semaglutide] Other (See Comments)     Abdominal pain       Actual Body Weight:   Wt Readings from Last 1 Encounters:   06/12/25 49.9 kg (110 lb)       Renal Function:   Estimated Creatinine Clearance: 67.3 mL/min (based on SCr of 0.7 mg/dL).,     Dialysis Method (if applicable):  N/A    CBC (last 72 hours):  Recent Labs   Lab Result Units 06/15/25  1016   WBC x10(3)/mcL 6.37   Hgb g/dL 11.9*   Hct % 37.6   Platelet x10(3)/mcL 507*   Mono % % 8.2   Eos % % 2.0   Basophil % % 0.6       Metabolic Panel (last 72 hours):  Recent Labs   Lab Result Units 06/15/25  1016   Sodium mmol/L 141   Potassium mmol/L 4.4   Chloride mmol/L 102   CO2 mmol/L 29   Glucose mg/dL 210*   Blood Urea Nitrogen mg/dL 5.8*   Creatinine mg/dL 0.70   Albumin g/dL 2.8*   Bilirubin Total mg/dL 0.2   ALP unit/L 201*   AST unit/L 24   ALT unit/L 27       Drug levels (last 3 results):  No results for input(s): "VANCOMYCINRA", "VANCORANDOM", "VANCOMYCINPE", "VANCOPEAK", "VANCOMYCINTR", "VANCOTROUGH" in the last 72 hours.    Microbiologic Results:  Microbiology Results (last 7 " days)       Procedure Component Value Units Date/Time    AFB Smear [[id number]]     Order Status: Sent Specimen: Bone from Head     Mycobacteria and Nocardia Culture [[id number]]     Order Status: Sent Specimen: Bone from Head     AFB Smear [[id number]]     Order Status: Sent Specimen: SWAB from Skull     Mycobacteria and Nocardia Culture [[id number]]     Order Status: Sent Specimen: Tissue from Skull     Anaerobic Culture [[id number]] Collected: 06/16/25 1357    Order Status: Sent Specimen: Bone from Head     Fungal Culture [[id number]] Collected: 06/16/25 1357    Order Status: Sent Specimen: Bone from Head     Gram Stain [[id number]] Collected: 06/16/25 1357    Order Status: Sent Specimen: Bone from Head     Tissue Culture - Aerobic [[id number]] Collected: 06/16/25 1357    Order Status: Sent Specimen: Bone from Head     Anaerobic Culture [[id number]] Collected: 06/16/25 1338    Order Status: Sent Specimen: Tissue from Head     Fungal Culture [[id number]] Collected: 06/16/25 1338    Order Status: Sent Specimen: Tissue from Head     Gram Stain [[id number]] Collected: 06/16/25 1338    Order Status: Sent Specimen: Tissue from Head     Tissue Culture - Aerobic [[id number]] Collected: 06/16/25 1338    Order Status: Sent Specimen: Tissue from Head

## 2025-06-16 NOTE — TRANSFER OF CARE
"Anesthesia Transfer of Care Note    Patient: Chelle Chandra    Procedure(s) Performed: Procedure(s) (LRB):  INSERTION,  SHUNT, W/ COMPUTER-ASSISTED NAVIGATION (N/A)  INSERTION,  SHUNT WITHOUT COMPUTER-ASSISTED NAVIGATION (N/A)    Patient location: PACU    Anesthesia Type: general    Transport from OR: Transported from OR on room air with adequate spontaneous ventilation    Post pain: adequate analgesia    Post assessment: no apparent anesthetic complications    Post vital signs: stable    Level of consciousness: sedated and responds to stimulation    Nausea/Vomiting: no nausea/vomiting    Complications: none    Transfer of care protocol was followed      Last vitals: Visit Vitals  /77 (BP Location: Left arm, Patient Position: Lying)   Pulse 84   Temp 36.5 °C (97.7 °F) (Oral)   Resp 18   Ht 5' 6" (1.676 m)   Wt 49.9 kg (110 lb)   SpO2 97%   BMI 17.75 kg/m²     "

## 2025-06-16 NOTE — PROGRESS NOTES
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  Patient recently had subdural hematoma in May requiring intracranial embolization, craniotomy with hematoma evacuation.  After this patient was discharged home.  Late may she presented back to the ED with right-sided weakness, facial droop and imaging was concerning for left MCA diminished flow and subacute hematoma.  MRI brain was negative for CVA.  Neurosurgery performed diony hole for drainage of subdural hematoma due to increased intracranial pressure with postop CT showing definitive improvement patient was on Plavix post embolization which was held briefly and resumed per Neurosurgery on 06/05 and she was transferred to rehab on 06/06.  While in rehab patient was noted to have increase right upper extremity weakness and also significant headaches.  CT head revealed reaccumulation of subdural fluid collection, increased from before patient was sent to main [place] for further evaluation/neurosurgery services.  Neurosurgery evaluated, planning for  shunt this hospitalization, home meds resumed as appropriate except for Plavix, symptomatic management for headache.   shunt scheduled for 6/16.    Interval Hx:     Patient seen at bedside, she is comfortably sitting up in bed, family members at bedside, hemodynamics stable, had bowel movements yesterday, no new complaints, no acute events overnight, she is NPO awaiting surgery     Objective/physical exam:  General: In no acute distress, afebrile  Chest: Clear to auscultation bilaterally  Heart: S1, S2, no appreciable murmur  Abdomen: Soft, nontender, BS +  MSK: Warm, no lower extremity edema, no clubbing or cyanosis  Neurologic: Alert and oriented  x4,  VITAL SIGNS: 24 HRS MIN & MAX LAST   Temp  Min: 98 °F (36.7 °C)  Max: 98.7 °F (37.1 °C) 98.3 °F (36.8 °C)   BP  Min: 104/68  Max: 129/84 120/78   Pulse  Min: 82  Max: 96  86   Resp  Min: 18  Max: 18 18   SpO2  Min: 92 %  Max: 98 % 96 %       Recent Labs   Lab 06/15/25  1016   WBC 6.37   RBC 4.04*   HGB 11.9*   HCT 37.6   MCV 93.1   MCH 29.5   MCHC 31.6*   RDW 13.8   *   MPV 9.7         Recent Labs   Lab 06/15/25  1016      K 4.4      CO2 29   BUN 5.8*   CREATININE 0.70   *   CALCIUM 9.4   ALBUMIN 2.8*   PROT 7.4   ALKPHOS 201*   ALT 27   AST 24   BILITOT 0.2          Microbiology Results (last 7 days)       ** No results found for the last 168 hours. **             Scheduled Med:   atorvastatin  80 mg Oral Daily    busPIRone  15 mg Oral BID    ezetimibe  10 mg Oral Daily    levetiracetam  500 mg Oral BID    levothyroxine  88 mcg Oral Before breakfast    metoprolol succinate  12.5 mg Oral Daily    nortriptyline  25 mg Oral QHS    polyethylene glycol  17 g Oral BID    traZODone  25 mg Oral QHS          Assessment/Plan:    Subdural hematoma requiring craniotomy with evacuation and MMA embolization early May with recurrence late May requiring diony hole  Increased subdural fluid collection  Right-sided weakness/intractable headache secondary to above  History of 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, 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  Depression/anxiety   HLD  Hypothyroidism  Essential HTN  Type 2 diabetes mellitus  Prophylaxis     Neurosurgery on board   Given recurrent subdural fluid collection planning for  shunt today  She is NPO awaiting the same  Kera for seizure prevention   Plavix been on hold (cardiology okay to stop Plavix and once cleared by Neurosurgery Plavix can be restarted if indicated per Neurology for CVA)  Symptomatic management for headache, better on Fioricet  Continue home meds-statin, BuSpar, Zetia, levothyroxine, Toprol-XL, nortriptyline   Labs stable  INR within normal  limits  Dulcolax suppository x1 dose for constipation  Trazodone 25 mg q.h.s. for insomnia  DVT prophylaxis-bilateral SCDs, no chemical prophylaxis given procedure today, initiate when cleared by Neurosurgery    Cat Casper MD   06/16/2025

## 2025-06-16 NOTE — PT/OT/SLP PROGRESS
Patient is NPO for subdural shunt placement. Patent has a history of dysphagia and is currently on a modified diet. Please keep patient NPO until re-evaluated by SLP after neuro surgery.

## 2025-06-16 NOTE — OP NOTE
[facility]                       [address] [id number]    PATIENT NAME:      KAREY MILTON  YOB: 1965  CSN:               [id number]  MRN:               [id number]  ADMIT DATE:        06/11/2025 13:17:00  PHYSICIAN:         James Rankin MD                          OPERATIVE REPORT      DATE OF SURGERY:    06/16/2025 02:06:16    SURGEON:  James Rankin MD    ASSISTANT:  Henrik Sandoval.    PREOPERATIVE DIAGNOSIS:  Left subdural redo collection with reaccumulated   subdural fluid.    POSTOPERATIVE DIAGNOSIS:  Left subdural redo collection with reaccumulated   subdural fluid.    PROCEDURES:    1. Left exposure of lower diony hole, removal of diony hole plate and drainage of   what looked like subdural empyema with placement of drain and irrigation.  2. Opening of the previous higher up incision and removal of the bone and   plating system done by another neurosurgeon, subsequent closure of both places   after drains placed.    INDICATIONS FOR SURGERY:  The patient is a 59-year-old with previous surgery by   Dr. Mendiola and then by me for diony hole for drainage of subdural.  She recently   came back with increased accumulated fluid, weakness.  After long discussion,   white count being normal, wound being fine, we discussed possibly she may need a   subdural peritoneal shunt.  Consent and risks were obtained and we discussed   all the risks.  In the operating room we found different findings requiring us   to change our process.  The procedure that was done as mentioned above.    OPERATIVE PROCEDURE:  The patient was brought to the operating room, intubated.    Head was registered on the Stealth system to tell exactly where we need to go.    From the lower down incision and now ready for placement of subdural peritoneal   shunt.  At this point immediately we opened up, pus came out and subdural   empyema came  out.  We irrigated it multiple times.  The plating was removed.  We   irrigated multiple times.  Hemostasis obtained.  Kept irrigating and irrigating   and at this point obviously we could not do a shunt.  We tunneled a drain   posteriorly and secured it.  We irrigated the wound multiple times and multiple   cultures were sent.  The wound was closed with 3-0 Vicryl running subcu.  At   this point, decision was made because of previous incision had some issues   previously.  We opened the incision, obtained hemostasis and took the bone flap   and plating system out completely.  We irrigated the wound multiple times.    Hemostasis obtained with DuraGen.  We irrigated wound multiple times, kept   obtaining hemostasis with FloSeal and Avitene and removed all foreign object at this time,   and we put a drain right above and wound was closed with 3-0 Vicryl and staples.    As mentioned I irrigated both wounds well.  We aborted the shunt placement and   any foreign object was removed as well.  This patient was then extubated, taken   to the recovery room and I will have a long discussion with the family and what   was found and the ID antibiotics needed.        ______________________________  MD RODDY Agosto/FRANCESCO  DD:  06/16/2025  Time:  02:09PM  DT:  06/16/2025  Time:  03:51PM  Job #:  [id number]      OPERATIVE REPORT

## 2025-06-17 LAB
ALBUMIN SERPL-MCNC: 2.5 G/DL (ref 3.4–4.8)
ALBUMIN/GLOB SERPL: 0.6 RATIO (ref 1.1–2)
ALP SERPL-CCNC: 172 UNIT/L (ref 40–150)
ALT SERPL-CCNC: 19 UNIT/L (ref 0–55)
ANION GAP SERPL CALC-SCNC: 9 MEQ/L
AST SERPL-CCNC: 12 UNIT/L (ref 11–45)
BASOPHILS # BLD AUTO: 0.05 X10(3)/MCL
BASOPHILS NFR BLD AUTO: 0.7 %
BILIRUB SERPL-MCNC: 0.3 MG/DL
BUN SERPL-MCNC: 6.9 MG/DL (ref 9.8–20.1)
CALCIUM SERPL-MCNC: 8.8 MG/DL (ref 8.4–10.2)
CHLORIDE SERPL-SCNC: 103 MMOL/L (ref 98–107)
CO2 SERPL-SCNC: 28 MMOL/L (ref 23–31)
CREAT SERPL-MCNC: 0.69 MG/DL (ref 0.55–1.02)
CREAT/UREA NIT SERPL: 10
EOSINOPHIL # BLD AUTO: 0.15 X10(3)/MCL (ref 0–0.9)
EOSINOPHIL NFR BLD AUTO: 2.2 %
ERYTHROCYTE [DISTWIDTH] IN BLOOD BY AUTOMATED COUNT: 13.7 % (ref 11.5–17)
GFR SERPLBLD CREATININE-BSD FMLA CKD-EPI: >60 ML/MIN/1.73/M2
GLOBULIN SER-MCNC: 3.9 GM/DL (ref 2.4–3.5)
GLUCOSE SERPL-MCNC: 133 MG/DL (ref 82–115)
HCT VFR BLD AUTO: 33.4 % (ref 37–47)
HGB BLD-MCNC: 10.4 G/DL (ref 12–16)
IMM GRANULOCYTES # BLD AUTO: 0.03 X10(3)/MCL (ref 0–0.04)
IMM GRANULOCYTES NFR BLD AUTO: 0.4 %
LYMPHOCYTES # BLD AUTO: 2.34 X10(3)/MCL (ref 0.6–4.6)
LYMPHOCYTES NFR BLD AUTO: 33.9 %
MCH RBC QN AUTO: 29.6 PG (ref 27–31)
MCHC RBC AUTO-ENTMCNC: 31.1 G/DL (ref 33–36)
MCV RBC AUTO: 95.2 FL (ref 80–94)
MONOCYTES # BLD AUTO: 0.68 X10(3)/MCL (ref 0.1–1.3)
MONOCYTES NFR BLD AUTO: 9.9 %
NEUTROPHILS # BLD AUTO: 3.65 X10(3)/MCL (ref 2.1–9.2)
NEUTROPHILS NFR BLD AUTO: 52.9 %
NRBC BLD AUTO-RTO: 0 %
PLATELET # BLD AUTO: 410 X10(3)/MCL (ref 130–400)
PMV BLD AUTO: 9.7 FL (ref 7.4–10.4)
POCT GLUCOSE: 119 MG/DL (ref 70–110)
POCT GLUCOSE: 157 MG/DL (ref 70–110)
POTASSIUM SERPL-SCNC: 4.2 MMOL/L (ref 3.5–5.1)
PROT SERPL-MCNC: 6.4 GM/DL (ref 5.8–7.6)
RBC # BLD AUTO: 3.51 X10(6)/MCL (ref 4.2–5.4)
SODIUM SERPL-SCNC: 140 MMOL/L (ref 136–145)
WBC # BLD AUTO: 6.9 X10(3)/MCL (ref 4.5–11.5)

## 2025-06-17 PROCEDURE — 11000001 HC ACUTE MED/SURG PRIVATE ROOM

## 2025-06-17 PROCEDURE — 92611 MOTION FLUOROSCOPY/SWALLOW: CPT

## 2025-06-17 PROCEDURE — 21400001 HC TELEMETRY ROOM

## 2025-06-17 PROCEDURE — 85025 COMPLETE CBC W/AUTO DIFF WBC: CPT | Performed by: INTERNAL MEDICINE

## 2025-06-17 PROCEDURE — 25000003 PHARM REV CODE 250: Performed by: INTERNAL MEDICINE

## 2025-06-17 PROCEDURE — 97164 PT RE-EVAL EST PLAN CARE: CPT

## 2025-06-17 PROCEDURE — 94799 UNLISTED PULMONARY SVC/PX: CPT

## 2025-06-17 PROCEDURE — 36415 COLL VENOUS BLD VENIPUNCTURE: CPT | Performed by: INTERNAL MEDICINE

## 2025-06-17 PROCEDURE — 97168 OT RE-EVAL EST PLAN CARE: CPT

## 2025-06-17 PROCEDURE — 80053 COMPREHEN METABOLIC PANEL: CPT | Performed by: INTERNAL MEDICINE

## 2025-06-17 PROCEDURE — 63600175 PHARM REV CODE 636 W HCPCS

## 2025-06-17 PROCEDURE — 25000003 PHARM REV CODE 250

## 2025-06-17 PROCEDURE — 63600175 PHARM REV CODE 636 W HCPCS: Performed by: INTERNAL MEDICINE

## 2025-06-17 RX ORDER — DIPHENHYDRAMINE HCL 25 MG
25 CAPSULE ORAL EVERY 6 HOURS PRN
Status: DISCONTINUED | OUTPATIENT
Start: 2025-06-17 | End: 2025-07-15 | Stop reason: HOSPADM

## 2025-06-17 RX ORDER — DIPHENHYDRAMINE HCL 12.5MG/5ML
25 ELIXIR ORAL
Status: DISCONTINUED | OUTPATIENT
Start: 2025-06-17 | End: 2025-06-17

## 2025-06-17 RX ORDER — OXYCODONE AND ACETAMINOPHEN 10; 325 MG/1; MG/1
1 TABLET ORAL EVERY 4 HOURS PRN
Status: DISCONTINUED | OUTPATIENT
Start: 2025-06-17 | End: 2025-06-25

## 2025-06-17 RX ORDER — METRONIDAZOLE 500 MG/100ML
500 INJECTION, SOLUTION INTRAVENOUS
Status: COMPLETED | OUTPATIENT
Start: 2025-06-17 | End: 2025-06-24

## 2025-06-17 RX ADMIN — BUTALBITAL, ACETAMINOPHEN, AND CAFFEINE 1 TABLET: 325; 50; 40 TABLET ORAL at 07:06

## 2025-06-17 RX ADMIN — METRONIDAZOLE 500 MG: 5 INJECTION, SOLUTION INTRAVENOUS at 05:06

## 2025-06-17 RX ADMIN — BUTALBITAL, ACETAMINOPHEN, AND CAFFEINE 1 TABLET: 325; 50; 40 TABLET ORAL at 08:06

## 2025-06-17 RX ADMIN — SODIUM CHLORIDE: 9 INJECTION, SOLUTION INTRAVENOUS at 11:06

## 2025-06-17 RX ADMIN — BUSPIRONE HYDROCHLORIDE 15 MG: 5 TABLET ORAL at 09:06

## 2025-06-17 RX ADMIN — VANCOMYCIN HYDROCHLORIDE 1250 MG: 1.25 INJECTION, POWDER, LYOPHILIZED, FOR SOLUTION INTRAVENOUS at 09:06

## 2025-06-17 RX ADMIN — CEFEPIME 1 G: 1 INJECTION, POWDER, FOR SOLUTION INTRAMUSCULAR; INTRAVENOUS at 06:06

## 2025-06-17 RX ADMIN — HYDROCODONE BITARTRATE AND ACETAMINOPHEN 1 TABLET: 5; 325 TABLET ORAL at 04:06

## 2025-06-17 RX ADMIN — HYDROCODONE BITARTRATE AND ACETAMINOPHEN 1 TABLET: 5; 325 TABLET ORAL at 05:06

## 2025-06-17 RX ADMIN — EZETIMIBE 10 MG: 10 TABLET ORAL at 09:06

## 2025-06-17 RX ADMIN — FAMOTIDINE 20 MG: 10 INJECTION, SOLUTION INTRAVENOUS at 11:06

## 2025-06-17 RX ADMIN — ATORVASTATIN CALCIUM 80 MG: 40 TABLET, FILM COATED ORAL at 09:06

## 2025-06-17 RX ADMIN — CLINDAMYCIN PHOSPHATE 900 MG: 900 INJECTION, SOLUTION INTRAVENOUS at 06:06

## 2025-06-17 RX ADMIN — METRONIDAZOLE 500 MG: 5 INJECTION, SOLUTION INTRAVENOUS at 11:06

## 2025-06-17 RX ADMIN — NORTRIPTYLINE HYDROCHLORIDE 25 MG: 25 CAPSULE ORAL at 08:06

## 2025-06-17 RX ADMIN — FAMOTIDINE 20 MG: 10 INJECTION, SOLUTION INTRAVENOUS at 09:06

## 2025-06-17 RX ADMIN — CEFEPIME 1 G: 1 INJECTION, POWDER, FOR SOLUTION INTRAMUSCULAR; INTRAVENOUS at 12:06

## 2025-06-17 RX ADMIN — BUTALBITAL, ACETAMINOPHEN, AND CAFFEINE 1 TABLET: 325; 50; 40 TABLET ORAL at 01:06

## 2025-06-17 RX ADMIN — METOPROLOL SUCCINATE 12.5 MG: 25 TABLET, EXTENDED RELEASE ORAL at 09:06

## 2025-06-17 RX ADMIN — POLYETHYLENE GLYCOL 3350 17 G: 17 POWDER, FOR SOLUTION ORAL at 09:06

## 2025-06-17 RX ADMIN — LEVETIRACETAM 500 MG: 500 SOLUTION ORAL at 09:06

## 2025-06-17 RX ADMIN — LEVETIRACETAM 500 MG: 500 SOLUTION ORAL at 08:06

## 2025-06-17 RX ADMIN — DIPHENHYDRAMINE HYDROCHLORIDE 25 MG: 25 CAPSULE ORAL at 05:06

## 2025-06-17 RX ADMIN — CEFEPIME 1 G: 1 INJECTION, POWDER, FOR SOLUTION INTRAMUSCULAR; INTRAVENOUS at 05:06

## 2025-06-17 RX ADMIN — BUTALBITAL, ACETAMINOPHEN, AND CAFFEINE 1 TABLET: 325; 50; 40 TABLET ORAL at 02:06

## 2025-06-17 RX ADMIN — HYDROCODONE BITARTRATE AND ACETAMINOPHEN 1 TABLET: 5; 325 TABLET ORAL at 10:06

## 2025-06-17 RX ADMIN — POLYETHYLENE GLYCOL 3350 17 G: 17 POWDER, FOR SOLUTION ORAL at 08:06

## 2025-06-17 RX ADMIN — TRAZODONE HYDROCHLORIDE 25 MG: 50 TABLET ORAL at 08:06

## 2025-06-17 RX ADMIN — BUSPIRONE HYDROCHLORIDE 15 MG: 5 TABLET ORAL at 08:06

## 2025-06-17 RX ADMIN — LEVOTHYROXINE SODIUM 88 MCG: 0.09 TABLET ORAL at 06:06

## 2025-06-17 NOTE — PROCEDURES
[facility]  Speech Language Pathology Department  Modified Barium Swallow (MBS) Study    Patient Name:  Chelle Chandra   MRN:  [id number]    Recommendations     General recommendations:  SLP follow up regarding diet tolerance and dysphagia therapy  Repeat MBS study: 1-2 weeks, as clinically appropriate  Solid texture recommendation:  Regular Diet - IDDSI Level 7  Liquid consistency recommendation: Moderately thick liquids - IDDSI Level 3   Medications: in puree  Swallow strategies/precautions: small bites/sips, slow rate, alternate solids/liquids, and upright for PO intake  General precautions: aspiration    History     Chelle Chandra is a/n 59 y.o. female admitted for increased weakness and significant headaches. CT revealed reaccumulation of subdural fluid collection, increased since recent admission.     Evacuation and drain placement for subdural fluid on 6/16.    Past Medical History:   Diagnosis Date    Accelerated junctional rhythm     Agatston CAC score, <100     Anxiety disorder, unspecified     Asthma     COPD type A     Diabetes mellitus without complication     GERD (gastroesophageal reflux disease)     History of COVID-19     Insomnia     Lung nodule      Past Surgical History:   Procedure Laterality Date    ANGIOGRAM, CORONARY, WITH LEFT HEART CATHETERIZATION      BACK SURGERY      BREAST LUMPECTOMY Right     CARDIOVERSION  08/01/2013    CARPAL TUNNEL RELEASE  10/23/2013    CRANIECTOMY Left 6/16/2025    Procedure: CRANIECTOMY;  Surgeon: James Rankin MD;  Location: [facility] OR;  Service: Neurosurgery;  Laterality: Left;  left redo-craniectomy for subdural empyema    CRANIOTOMY FOR EVACUATION OF SUBDURAL HEMATOMA Left 05/19/2025    Procedure: CRANIOTOMY, FOR SUBDURAL HEMATOMA EVACUATION;  Surgeon: Ricardo Shelley MD;  Location: [facility] OR;  Service: Neurosurgery;  Laterality: Left;    CREATION OF TERRI HOLE WITH EVACUATION OF HEMATOMA Left 6/2/2025    Procedure: CREATION,  "CRANIAL TERRI HOLE, WITH HEMATOMA EVACUATION;  Surgeon: James Rankin MD;  Location: [facility] OR;  Service: Neurosurgery;  Laterality: Left;  LEFT BURRHOLE FOR SUBDURAL HEMATOMA EVACUATION // STEALTH // SHERRI SKYTRON // DRILL    EVACUATION OF EMPYEMA Left 6/16/2025    Procedure: EVACUATION, EMPYEMA;  Surgeon: James Rankin MD;  Location: [facility] OR;  Service: Neurosurgery;  Laterality: Left;  left craniotomy for subdural empyema    FUSION OF CERVICAL SPINE BY ANTERIOR APPROACH USING COMPUTER-ASSISTED NAVIGATION  06/10/2019    KIDNEY SURGERY      TONSILLECTOMY AND ADENOIDECTOMY      TOTAL ABDOMINAL HYSTERECTOMY      WRIST SURGERY       A MBS Study was completed to assess the efficiency of her swallow function, rule out aspiration and make recommendations regarding safe dietary consistencies, effective compensatory strategies, and safe eating environment.     Home diet texture/consistency: Soft & Bite-sized and moderately thick liquids    Patient complaint: "I don't want thickened liquids anymore"    Imaging   Results for orders placed during the hospital encounter of 05/29/25    X-Ray Chest 1 View    Narrative  EXAMINATION:  XR CHEST 1 VIEW    CLINICAL HISTORY:  aspiration suspected;    TECHNIQUE:  Single frontal view of the chest was performed.    COMPARISON:  05/30/2025    FINDINGS:  LINES AND TUBES: None    MEDIASTINUM AND RUTH: The cardiac silhouette is normal.    LUNGS: No lobar consolidation. No edema.    PLEURA:No pleural effusion. No pneumothorax.    OTHER: Postop ACDF.    Impression  No acute cardiopulmonary abnormality.      Electronically signed by: Kristyn Guzman  Date:    05/30/2025  Time:    14:33    No results found for this or any previous visit.    Results for orders placed during the hospital encounter of 07/01/24    MRI Brain Without Contrast    Narrative  EXAMINATION:  MRI BRAIN WITHOUT CONTRAST    CLINICAL HISTORY:  dizziness, off-balance, r/o cva, recent history of cva 1 month " ago;    TECHNIQUE:  Multiplanar MRI sequences were performed of the brain without contrast.    COMPARISON:  MRI brain June 2, 2024    FINDINGS:  There are extensive left middle cerebral artery territory frontoparietal lobes and temporooccipital lobes as well as basal ganglia subacute nonhemorrhagic infarcts.  Infarct shows less dense brightness on diffusion-weighted sequence and now is not associated with signal dropout on the ADC map.  Brain edematous changes and sulci effacement is slightly less evident.  There is local mass effect without midline shift.  No hydrocephalus.  There is no new infratentorial or supratentorial brain infarct.  Gradient echo sequence are without altered signal to reflect a previous hemorrhagic byproduct.  Sella and the suprasellar areas are unremarkable.    The cerebellar tonsils are normally positioned.  No acute extra axial fluid collections identified. The mastoid air cells are clear.    Impression  1.  Left cerebral hemisphere extensive subacute infarct without hemorrhagic transformation.    2.  No new findings.      Electronically signed by: Adam Sears  Date:    07/02/2024  Time:    11:36    Subjective     Patient awake and alert.    Spiritual/Cultural/Restoration Beliefs/Practices that affect care: no  Pain/Comfort: Pain Rating 1: 0/10    Respiratory Status:  room air    Restraints/positioning devices: none    Fluoroscopic Findings     Oral Musculature  Dentition: own teeth  Secretion Management: adequate  Mucosal Quality: good  Vocal Quality: adequate    Setup  Seated in wheelchair  Able to self feed  Adequate head control    Visualization  Lateral view  Cervical hardware noted    Oral Phase:   Adequate mastication  Loss of bolus control of liquids to pyriform sinuses    Pharyngeal Phase:   Reduced base of tongue retraction  Reduced epiglottic deflection  Reduced hyolaryngeal excursion    Consistency Fed by Laryngeal Penetration Aspiration Residue   Mildly thick liquid by spoon  "SLP Before the swallow  Cued throat clear effective None Mild   Mildly thick liquid by cup Self During the swallow  Cued throat clear effective None Mild   Puree SLP None None Mild-moderate   Moderately thick liquid by straw Self None None Mild-moderate   Mildly thick liquid by straw Self During the swallow  Independent throat clear effective None Mild   Regular solid SLP None None Mild     Cervical Esophageal Phase:   residual material visualized    Additional Comments:  "Moderately thick by cup on Epic was entered in error and meant to be mildly thick by cup.    Assessment     Patient exhibited moderate oropharyngeal dysphagia characterized by the findings noted above.  Laryngeal penetration of mildly thick liquids.  Both swallow safety and efficiency are impaired.     SLP rec: regular solids, moderately thick liquids, meds in puree.    Outcome Measures     Functional Oral Intake Scale: 5 - Total oral diet with multiple consistencies, by requiring special preparation or compensations    Goals     Multidisciplinary Problems       SLP Goals          Problem: SLP    Goal Priority Disciplines Outcome   SLP Goal     SLP    Description: LTG: The pt will tolerate least restrictive diet with no overt s/sx of aspiration.    STGs:  1. Patient will participate CTAR/pharyngeal exercises  2. Patient will participate in laryngeal elevation exercises  3. Patient will participate in repeat MBS when clinically appropriate                     Education     Patient provided with verbal education regarding swallow function, results/recommendations, and diet modifications.  Understanding was verbalized.    Plan     SLP Follow-Up:  Yes    Patient to be seen:  5 x/week   Plan of Care expires:  07/01/25  Plan of Care reviewed with:  patient     Time Tracking     SLP Treatment Date:   06/17/25  Speech Start Time:  1045  Speech Stop Time:  1100     Speech Total Time (min):  15 min    Billable minutes:   Motion Fluoroscopic Evaluation, " Video Recording, 15 minutes     06/17/2025

## 2025-06-17 NOTE — CONSULTS
[facility]  Infectious Disease  Consult Note    Patient Name: Chelle Chandra  MRN: [id number]  Admission Date: 6/11/2025  Hospital Length of Stay: 6 days  Attending Physician: Cat Casper MD  Primary Care Provider: Jorge Silver MD     Isolation Status: No active isolations    Reason for consultation antibiotic recommendations status post subdural hematoma requiring craniectomy clinical suspicion for subdural fluid infection    Inpatient consult to Infectious Diseases  Consult performed by: Michael Machuca DO  Consult ordered by: Henrik Sandoval PA        Assessment/Plan:         Michael Machuca DO  Infectious Disease  [facility]    Subjective:     Principal Problem: <principal problem not specified>    HPI:     The Pt is a 59-year-old  female with a Past Medical of  hypothyroidism, anxiety, bronchial asthma, COPD, type 2 diabetes mellitus, 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  Patient recently had subdural hematoma in May requiring intracranial embolization, craniotomy with hematoma evacuation.  After this patient was discharged home.  Late may she presented back to the ED with right-sided weakness, facial droop and imaging was concerning for left MCA diminished flow and subacute hematoma.  MRI brain was negative for CVA.  Neurosurgery performed diony hole for drainage of subdural hematoma due to increased intracranial pressure with postop CT showing definitive improvement patient was on Plavix post embolization which was held briefly and resumed per Neurosurgery on 06/05 and she was transferred to rehab on 06/06.  While in rehab patient was noted to have increase right upper extremity weakness and also significant headaches.  CT head revealed reaccumulation of subdural fluid collection, increased from before patient was sent to main [facility] for further evaluation/neurosurgery services patient required another  craniectomy with evacuation of subdural fluid collection, the neurosurgery team was a high clinical suspicion for acute infectious process, we were contacted for antibiotic recommendations and guidance    Past Medical History:   Diagnosis Date    Accelerated junctional rhythm     Agatston CAC score, <100     Anxiety disorder, unspecified     Asthma     COPD type A     Diabetes mellitus without complication     GERD (gastroesophageal reflux disease)     History of COVID-19     Insomnia     Lung nodule        Past Surgical History:   Procedure Laterality Date    ANGIOGRAM, CORONARY, WITH LEFT HEART CATHETERIZATION      BACK SURGERY      BREAST LUMPECTOMY Right     CARDIOVERSION  08/01/2013    CARPAL TUNNEL RELEASE  10/23/2013    CRANIECTOMY Left 6/16/2025    Procedure: CRANIECTOMY;  Surgeon: James Rankin MD;  Location: [facility];  Service: Neurosurgery;  Laterality: Left;  left redo-craniectomy for subdural empyema    CRANIOTOMY FOR EVACUATION OF SUBDURAL HEMATOMA Left 05/19/2025    Procedure: CRANIOTOMY, FOR SUBDURAL HEMATOMA EVACUATION;  Surgeon: Ricardo Shelley MD;  Location: [facility] OR;  Service: Neurosurgery;  Laterality: Left;    CREATION OF TERRI HOLE WITH EVACUATION OF HEMATOMA Left 6/2/2025    Procedure: CREATION, CRANIAL TERRI HOLE, WITH HEMATOMA EVACUATION;  Surgeon: James Rankin MD;  Location: [facility] OR;  Service: Neurosurgery;  Laterality: Left;  LEFT BURRHOLE FOR SUBDURAL HEMATOMA EVACUATION // STEALTH // [facility] // [facility]RILL    EVACUATION OF EMPYEMA Left 6/16/2025    Procedure: EVACUATION, EMPYEMA;  Surgeon: James Rankin MD;  Location: [facility] OR;  Service: Neurosurgery;  Laterality: Left;  left craniotomy for subdural empyema    FUSION OF CERVICAL SPINE BY ANTERIOR APPROACH USING COMPUTER-ASSISTED NAVIGATION  06/10/2019    KIDNEY SURGERY      TONSILLECTOMY AND ADENOIDECTOMY      TOTAL ABDOMINAL HYSTERECTOMY      WRIST SURGERY         Review of patient's allergies indicates:   Allergen Reactions     "Aspirin     Ketorolac     Penicillins     Sulfa (sulfonamide antibiotics)     Ozempic [semaglutide] Other (See Comments)     Abdominal pain       Medications:  Medications Prior to Admission   Medication Sig    atorvastatin (LIPITOR) 80 MG tablet Take 1 tablet (80 mg total) by mouth once daily.    busPIRone (BUSPAR) 15 MG tablet Take 1 tablet (15 mg total) by mouth 2 (two) times daily.    butalbital-acetaminophen-caffeine -40 mg (FIORICET, ESGIC) -40 mg per tablet Take 1 tablet by mouth every 4 (four) hours as needed for Pain.    clopidogreL (PLAVIX) 75 mg tablet Take 1 tablet (75 mg total) by mouth once daily.    docusate sodium (COLACE) 50 MG capsule Take 2 capsules (100 mg total) by mouth 2 (two) times daily.    ezetimibe (ZETIA) 10 mg tablet Take 1 tablet by mouth once daily    FARXIGA 5 mg Tab tablet Take 1 tablet by mouth once daily    levothyroxine (SYNTHROID) 88 MCG tablet Take 1 tablet (88 mcg total) by mouth before breakfast.    metFORMIN (GLUCOPHAGE) 500 MG tablet TAKE 1 TABLET BY MOUTH TWICE DAILY WITH MEALS    metoprolol succinate (TOPROL-XL) 25 MG 24 hr tablet Take 12.5 mg by mouth once daily.    ondansetron (ZOFRAN) 8 MG tablet Take 1 tablet (8 mg total) by mouth every 6 (six) hours as needed for Nausea.    pen needle, diabetic 32 gauge x 5/32" Ndle 1 each by Misc.(Non-Drug; Combo Route) route once a week.    levETIRAcetam (KEPPRA) 500 MG Tab Take 1 tablet (500 mg total) by mouth 2 (two) times daily. for 4 days     Antibiotics (From admission, onward)      Start     Stop Route Frequency Ordered    06/16/25 1515  ceFEPIme injection 1 g         -- IV Every 6 hours (non-standard times) 06/16/25 1412 06/16/25 1511  vancomycin - pharmacy to dose  (vancomycin IVPB (PEDS and ADULTS))        Placed in "And" Linked Group    -- IV pharmacy to manage frequency 06/16/25 1412    06/16/25 1500  vancomycin 1,250 mg in D5W 250 mL IVPB (admixture device)         -- IV Every 24 hours (non-standard times) " 06/16/25 1415          Antifungals (From admission, onward)      None          Antivirals (From admission, onward)      None             Immunization History   Administered Date(s) Administered    COVID-19, MRNA, LN-S, PF (Pfizer) (Purple Cap) 06/12/2021, 07/14/2021    Influenza (FLUBLOK) - Quadrivalent - Recombinant - PF *Preferred* (egg allergy) 09/13/2022    Influenza - Quadrivalent 09/21/2023    Influenza - Trivalent - Fluarix, Flulaval, Fluzone, Afluria - PF 03/10/2021    Influenza - Trivalent - Flublok - PF (18 years and older) 10/03/2024    Pneumococcal Conjugate - 13 Valent 01/01/2014    Pneumococcal Conjugate - 20 Valent 10/03/2024    Pneumococcal Polysaccharide - 23 Valent 01/23/2004    Rsv, Bivalent, Rsvpref (Abrysvo) 02/03/2025    Tdap 07/25/2011, 09/21/2023    Zoster Recombinant 05/18/2023, 05/28/2024       Family History       Problem Relation (Age of Onset)    Diabetes Father    Heart attack Mother          Social History     Socioeconomic History    Marital status:    Tobacco Use    Smoking status: Every Day     Current packs/day: 0.50     Types: Cigarettes    Smokeless tobacco: Never   Vaping Use    Vaping status: Former    Quit date: 6/1/2024   Substance and Sexual Activity    Alcohol use: Not Currently    Drug use: Never     Social Drivers of Health     Financial Resource Strain: Low Risk  (5/29/2025)    Overall Financial Resource Strain (CARDIA)     Difficulty of Paying Living Expenses: Not very hard   Food Insecurity: No Food Insecurity (5/29/2025)    Hunger Vital Sign     Worried About Running Out of Food in the Last Year: Never true     Ran Out of Food in the Last Year: Never true   Transportation Needs: No Transportation Needs (6/6/2025)    PRAPARE - Transportation     Lack of Transportation (Medical): No     Lack of Transportation (Non-Medical): No   Physical Activity: Inactive (5/29/2025)    Exercise Vital Sign     Days of Exercise per Week: 0 days     Minutes of Exercise per  Session: 0 min   Stress: Stress Concern Present (5/29/2025)    [employer] - Occupational Stress Questionnaire     Feeling of Stress : Rather much   Housing Stability: Low Risk  (5/29/2025)    Housing Stability Vital Sign     Unable to Pay for Housing in the Last Year: No     Homeless in the Last Year: No         Objective:     Vital Signs (Most Recent):  Temp: 98.3 °F (36.8 °C) (06/17/25 1110)  Pulse: 75 (06/17/25 1110)  Resp: 18 (06/17/25 1110)  BP: 135/76 (06/17/25 1110)  SpO2: 97 % (06/17/25 1110) Vital Signs (24h Range):  Temp:  [97.1 °F (36.2 °C)-98.9 °F (37.2 °C)] 98.3 °F (36.8 °C)  Pulse:  [68-95] 75  Resp:  [12-18] 18  SpO2:  [88 %-99 %] 97 %  BP: (104-135)/(63-93) 135/76     Review of Systems:   General: Patient denies any unexplained weight loss, night sweats, fatigue malaise or lethargy.   HEENT: (Head, Eyes, Ears, Nose and Throat): Patient denies any visual changes, headaches, eye pain, double vision, sore throat, recent, trauma, ear pain, epistaxis, tinnitus, or sinusitis.   Neck: No signs of recent trauma, or swelling.   Heart: No signs of chest pain, palpitations, orthopnea of loss of consciousness.   Lungs: No cough, sputum, wheeze, hemoptysis, shortness of breath, exercise intolerance.   GI: No abdominal pain, unintentional weight loss, nausea/vomiting, diarrhea/constipation, hematemesis, bright red blood per rectum, or melena.   : No incontinence, dysuria, hematuria, nocturia, polyuria, discharge, increased frequency, or urgency.   Vascular: No signs of claudication, leg edema, varicose veins, or thrombosis.   Neurologic: No loss of sensation, No numbness, No tingling, No Paralysis, No history of tremors or seizures.   Endocrine: No heat/cold intolerance, No excessive sweating, polyuria, polydipsia, or polyphagia.   Hematology: No anemia, easy bruising, petechiae or purpura Skin: No rashes, itching skin, open lesions, skin redness, hives or sensitivity to sun  "exposure, no changes in nail, hair or skin color.   Musculoskeletal: Patient denies joint swelling, decreased range of motion, crepitus, functional deficit, or arthritis.   Psychiatric: no signs of depression, anxiety or recent change in mood and sleep patterns.      Weight: 49.9 kg (110 lb)  Body mass index is 17.75 kg/m².    Estimated Creatinine Clearance: 68.3 mL/min (based on SCr of 0.69 mg/dL).    Physical Exam:   General Appearance: Patient is well nourished and developed adult in no acute distress. Alert and Oriented Times 3   HEENT (Head, Eyes, Ears, Nose and Throat): Normocephalic, Nontraumatic. Pupils equal, round, reactive to light, Accommodation present, Extraocular movements and muscles intact. No cervical Lymphadenopathy, Moist Mucus Membranes, No thyromegaly.   Neck: Soft, Supple, No signs of trauma, No carotid bruits.   Cardiovascular: Regular Rate and Rhythm, No murmurs, gallops, clicks or rubs.   Respiratory: Clear to auscultation bilaterally, No wheezing, rales or rhonchi.   Abdominal: Soft, Non-tender, Non-distention, No peritoneal signs, No rebound tenderness, Present Bowel sounds in all four quadrants, No ascites present.   Extremities: No clubbing, No cyanosis, No edema.   Skin: No scars, No Rashes, Skin is Warm and Dry to the touch, Negative for Sacral Decubitus Ulcers.   Vascular Exam: Pulses 3 out of 4 in the brachial, radial and no JVD noticed Lymphatics: No Cervical, Supra/Infraclavicular Axillary, Trochlear, or Inguinal Adenopathy   Rectal Exam: Deferred at this time.   Neurological Exam: Limited Neurological Exam, patient response to physical and verbal stimuli          Antibiotics (From admission, onward)      Start     Stop Route Frequency Ordered    06/16/25 1515  ceFEPIme injection 1 g         -- IV Every 6 hours (non-standard times) 06/16/25 1412    06/16/25 1511  vancomycin - pharmacy to dose  (vancomycin IVPB (PEDS and ADULTS))        Placed in "And" Linked Group    -- IV " pharmacy to manage frequency 06/16/25 1412    06/16/25 1500  vancomycin 1,250 mg in D5W 250 mL IVPB (admixture device)         -- IV Every 24 hours (non-standard times) 06/16/25 1415               Vitals:    06/17/25 1110   BP: 135/76   Pulse: 75   Resp: 18   Temp: 98.3 °F (36.8 °C)          Imaging Results    None              Assessments      S/P Subdural hematoma requiring craniotomy with evacuation and MMA embolization early May with recurrence late May 2025 requiring diony hole  Increased subdural fluid collection 6.11.25  --currently on vancomycin cefepime and Flagyl  --intraoperative culture data shows no growth           Past Medical of  hypothyroidism, anxiety, bronchial asthma, COPD, type 2 diabetes mellitus, 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            Recommendations     Intraoperative culture data shows no growth / the latest white blood cell count 6000 no fevers overnight, neurosurgery does have a clinical suspicion for possible surgical site infection, they initiated IV antibiotic therapy consisting of vancomycin and cefepime vancomycin level goal 15-20, I will add on Flagyl for anaerobic coverage, no additional changes from my standpoint today if all culture data during this hospitalization remains unremarkable our recommend only for 10-14 days of outpatient oral antibiotic therapy upon discharge since there is not enough clinical evidence to warrant the use of prolonged 6 week course of IV antibiotic therapy due to no obvious signs of acute CNS infection...        Thank you, If the consulting physician, specialty physician staff, pharmacy staff, nursing staff or respiratory / occupational therapy staff have any additional questions regarding ID consultation recommendations and treatment plans; please feel free to contact us at anytime.           Michael Machuca D.O., F.I.D.S.A.  Infectious Disease Physician   [facility]  Cell [phone]

## 2025-06-17 NOTE — PT/OT/SLP PROGRESS
Occupational Therapy   Re-evaluation    Name: Chelle Chandra  MRN: [id number]  Recent Surgery: Procedure(s) (LRB):  EVACUATION, EMPYEMA (Left)  CRANIECTOMY (Left) 1 Day Post-Op    Recommendations:     Discharge therapy intensity: High Intensity Therapy   Discharge Equipment Recommendations:  to be determined by next level of care  Barriers to discharge:  None    Assessment:     Chelle Chandra is a 59 y.o. female with a medical diagnosis of  R sided weakness and facial droop due to L SDH s/p craniotomy and MMA embolization. On 5/30 patient with subdural hemorrhage s/p L diony hole. The pt presents with new R sided weakness. Underwent Left redo diony hole for fluid evacuation and drain placement and Left cranioplasty for subdural fluid evacuation on 6/16.  She presents with the following performance deficits affecting function: weakness, impaired self care skills, impaired functional mobility, decreased safety awareness, pain.      Pt tolerated re-eval well. Pt states main discomfort is form HAs. Requires SBA for bed mobility, CGA for functional mobility, and set-up for LB dressing. Pt presents R lateral lean and R sided inattention, however has demonstrated some improvement. Pt remains appropriate for high intensity therapy upon d/c to increase pt independence and safety in performing ADLs and functional mobility tasks. Goals and POC remain appropriate.    Rehab Prognosis: Good; patient would benefit from acute skilled OT services to address these deficits and reach maximum level of function.       Plan:     Patient to be seen 5 x/week to address the above listed problems via self-care/home management, therapeutic activities, therapeutic exercises, neuromuscular re-education, sensory integration, cognitive retraining  Plan of Care Expires: 07/17/25  Plan of Care Reviewed with: patient, daughter, family    Subjective     Chief Complaint: headache   Patient/Family Comments/goals: return to PLOF    Pain/Comfort:  Pain  Rating 1: 10/10 (c/o headache)    Patients cultural, spiritual, Latter day conflicts given the current situation: no    Objective:     OT communicated with nurse prior to session.      Patient was found HOB elevated with peripheral IV, telemetry upon OT entry to room.    General Precautions: Standard, fall (HOB 30 degrees)  Orthopedic Precautions:    Braces: N/A    Vital Signs: Blood Pressure: 135/76    Functional Mobility/Transfers:  Bed mobility:    Scooting: stand by assistance  Supine to Sit: stand by assistance  Sit to Supine: stand by assistance  Transfers: Sit to Stand: contact guard assistance with rolling walker  Functional mobility: pt ambulated to/from bathroom with CGA using RW.     Activities of Daily Living:  Lower Body Dressing: set up assist for donning slippers.       AMPAC 6 Click ADL:  AMPAC Total Score: 20    Functional Cognition:  Orientation: oriented to Person, Place, Time, and Situation    Visual Perceptual Skills:  Pursuits: WFL  Visual Fields: Impaired. R peripheral vision impaired compared to L.     Upper Extremity Function:  Right Upper Extremity:   Range of Motion: WFL  Strength: WFL  Coordination: WFL; performed nose-to-finger assessment with 4/5 accurate tries, undershooting nose.   Sensation: WFL    Left Upper Extremity:  Range of Motion: WFL  Strength: WFL  Coordination: WFL; performed nose-to-finger assessment with 100% accuracy.   Sensation: WFL    Balance:   Static sitting balance: WFL  Dynamic sitting balance:WFL    Therapeutic Positioning  Risk for acquired pressure injuries is decreased due to ability to get to BSC/toilet with assist, intact sensation, and continence.    OT interventions performed during the course of today's session in an effort to prevent and/or reduce acquired pressure injuries:   Education was provided on benefits of and recommendations for therapeutic positioning    Skin assessment: Known incisional wounds to L scalp, L lateral temporal region. Dressing in  "place. Redness/"itching" from telemetry pads, however were replace with hypoallergenic pads yesterday 6/16/25     OT recommendations for therapeutic positioning throughout hospitalization:   Follow [facility] Pressure Injury Prevention Protocol    Co-Treatment: No    Patient Education:  Patient, daughter/s were, and family were provided with verbal education education regarding OT role/goals/POC, fall prevention, and safety awareness.  Understanding was verbalized, however additional teaching warranted.     Patient left HOB elevated with all lines intact, call button in reach, and family present    GOALS:   Multidisciplinary Problems       Occupational Therapy Goals          Problem: Occupational Therapy    Goal Priority Disciplines Outcome Interventions   Occupational Therapy Goal     OT, PT/OT Progressing    Description: Goals: to be met by 07/14/25    Pt will complete grooming standing at sink with LRAD with mod I.  Pt will complete UB dressing with SBA.  Pt will complete LB dressing with SBA using LRAD.  Pt will complete toileting with SBA using LRAD.  Pt will complete functional mobility to/from toilet and toilet transfer with mod I using LRAD.   Pt will demo visual attention to R side >80% of time with min verbal cues.                         History:     Past Medical History:   Diagnosis Date    Accelerated junctional rhythm     Agatston CAC score, <100     Anxiety disorder, unspecified     Asthma     COPD type A     Diabetes mellitus without complication     GERD (gastroesophageal reflux disease)     History of COVID-19     Insomnia     Lung nodule          Past Surgical History:   Procedure Laterality Date    ANGIOGRAM, CORONARY, WITH LEFT HEART CATHETERIZATION      BACK SURGERY      BREAST LUMPECTOMY Right     CARDIOVERSION  08/01/2013    CARPAL TUNNEL RELEASE  10/23/2013    CRANIECTOMY Left 6/16/2025    Procedure: CRANIECTOMY;  Surgeon: James Rankin MD;  Location: [facility];  Service: Neurosurgery;  " Laterality: Left;  left redo-craniectomy for subdural empyema    CRANIOTOMY FOR EVACUATION OF SUBDURAL HEMATOMA Left 05/19/2025    Procedure: CRANIOTOMY, FOR SUBDURAL HEMATOMA EVACUATION;  Surgeon: Ricardo Shelley MD;  Location: [facility] OR;  Service: Neurosurgery;  Laterality: Left;    CREATION OF TERRI HOLE WITH EVACUATION OF HEMATOMA Left 6/2/2025    Procedure: CREATION, CRANIAL TERRI HOLE, WITH HEMATOMA EVACUATION;  Surgeon: James Rankin MD;  Location: [facility] OR;  Service: Neurosurgery;  Laterality: Left;  LEFT BURRHOLE FOR SUBDURAL HEMATOMA EVACUATION // STEALTH // [facility]TR[facility] // [facility]RILL    EVACUATION OF EMPYEMA Left 6/16/2025    Procedure: EVACUATION, EMPYEMA;  Surgeon: James Rankin MD;  Location: [facility] OR;  Service: Neurosurgery;  Laterality: Left;  left craniotomy for subdural empyema    FUSION OF CERVICAL SPINE BY ANTERIOR APPROACH USING COMPUTER-ASSISTED NAVIGATION  06/10/2019    KIDNEY SURGERY      TONSILLECTOMY AND ADENOIDECTOMY      TOTAL ABDOMINAL HYSTERECTOMY      WRIST SURGERY         Time Tracking:     OT Date of Treatment: 06/17/25  OT Start Time: 1127  OT Stop Time: 1149  OT Total Time (min): 22 min    Billable Minutes:Re-eval 1 unit    6/17/2025

## 2025-06-17 NOTE — PROGRESS NOTES
Some headaches.  Jps working.  Moves both sides well.  ON antibiotics.  Cat scan shows post-op changes with less fluid.  Discussed surgery and the findings on her scans.  ID consult.  Will follow.  Thank you.

## 2025-06-17 NOTE — PROGRESS NOTES
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  Patient recently had subdural hematoma in May requiring intracranial embolization, craniotomy with hematoma evacuation.  After this patient was discharged home.  Late may she presented back to the ED with right-sided weakness, facial droop and imaging was concerning for left MCA diminished flow and subacute hematoma.  MRI brain was negative for CVA.  Neurosurgery performed diony hole for drainage of subdural hematoma due to increased intracranial pressure with postop CT showing definitive improvement patient was on Plavix post embolization which was held briefly and resumed per Neurosurgery on 06/05 and she was transferred to rehab on 06/06.  While in rehab patient was noted to have increase right upper extremity weakness and also significant headaches.  CT head revealed reaccumulation of subdural fluid collection, increased from before patient was sent to main [place] for further evaluation/neurosurgery services.  Neurosurgery evaluated, planning for  shunt this hospitalization, home meds resumed as appropriate except for Plavix, symptomatic management for headache.   shunt scheduled for 6/16.    Interval Hx:     Patient seen at bedside, she is complaining of itching in chest wall.  Requesting Benadryl, also complaining of mild headache, otherwise no new complaints, no acute events overnight, she is hemodynamically stable and is afebrile     Objective/physical exam:  General: In no acute distress, afebrile  Chest: Clear to auscultation bilaterally  Heart: S1, S2, no appreciable murmur  Abdomen: Soft, nontender, BS +  MSK: Warm, no lower extremity edema, no clubbing or cyanosis  Neurologic: Alert  and oriented x4,  VITAL SIGNS: 24 HRS MIN & MAX LAST   Temp  Min: 97.1 °F (36.2 °C)  Max: 98.9 °F (37.2 °C) 98.3 °F (36.8 °C)   BP  Min: 104/70  Max: 135/76 135/76   Pulse  Min: 68  Max: 94  75   Resp  Min: 12  Max: 18 18   SpO2  Min: 88 %  Max: 97 % 97 %       Recent Labs   Lab 06/17/25  0817   WBC 6.90   RBC 3.51*   HGB 10.4*   HCT 33.4*   MCV 95.2*   MCH 29.6   MCHC 31.1*   RDW 13.7   *   MPV 9.7         Recent Labs   Lab 06/17/25  0817      K 4.2      CO2 28   BUN 6.9*   CREATININE 0.69   *   CALCIUM 8.8   ALBUMIN 2.5*   PROT 6.4   ALKPHOS 172*   ALT 19   AST 12   BILITOT 0.3          Microbiology Results (last 7 days)       Procedure Component Value Units Date/Time    Tissue Culture - Aerobic [[id number]] Collected: 06/16/25 1338    Order Status: Completed Specimen: Tissue from Head Updated: 06/17/25 0639     Tissue - Aerobic Culture No Growth At 24 Hours    Tissue Culture - Aerobic [[id number]] Collected: 06/16/25 1357    Order Status: Completed Specimen: Bone from Head Updated: 06/17/25 0635     Tissue - Aerobic Culture No Growth At 24 Hours    Gram Stain [[id number]] Collected: 06/16/25 1357    Order Status: Completed Specimen: Bone from Head Updated: 06/16/25 1557     GRAM STAIN Moderate WBC observed      Rare Gram Positive Rods    Gram Stain [[id number]] Collected: 06/16/25 1338    Order Status: Completed Specimen: Tissue from Head Updated: 06/16/25 1556     GRAM STAIN Moderate WBC observed      Rare Gram positive cocci      Rare Gram Positive Rods    Fungal Culture [[id number]] Collected: 06/16/25 1357    Order Status: Sent Specimen: Bone from Head Updated: 06/16/25 1438    Anaerobic Culture [[id number]] Collected: 06/16/25 1357    Order Status: Sent Specimen: Bone from Head Updated: 06/16/25 1438    Fungal Culture [[id number]] Collected: 06/16/25 1338    Order Status: Sent Specimen: Tissue from Head Updated: 06/16/25 1438    Anaerobic Culture [[id number]] Collected: 06/16/25  1338    Order Status: Sent Specimen: Tissue from Head Updated: 06/16/25 1438    AFB Smear [[id number]]     Order Status: Sent Specimen: Bone from Head     Mycobacteria and Nocardia Culture [[id number]]     Order Status: Sent Specimen: Bone from Head     AFB Smear [[id number]]     Order Status: Sent Specimen: SWAB from Skull     Mycobacteria and Nocardia Culture [[id number]]     Order Status: Sent Specimen: Tissue from Skull              Scheduled Med:   atorvastatin  80 mg Oral Daily    busPIRone  15 mg Oral BID    ceFEPime IV (PEDS and ADULTS)  1 g Intravenous Q6H    ezetimibe  10 mg Oral Daily    famotidine (PF)  20 mg Intravenous Q12H    levetiracetam  500 mg Oral BID    levothyroxine  88 mcg Oral Before breakfast    metoprolol succinate  12.5 mg Oral Daily    metroNIDAZOLE IV (PEDS and ADULTS)  500 mg Intravenous Q8H    nortriptyline  25 mg Oral QHS    polyethylene glycol  17 g Oral BID    traZODone  25 mg Oral QHS    vancomycin 1,250 mg in D5W 250 mL IVPB (admixture device)  1,250 mg Intravenous Q24H          Assessment/Plan:    Increased subdural fluid collection Status post left redo diony hole with fluid evacuation, drain placement 6/16  Suspected surgical site infection status post cranioplasty 6/16  Subdural hematoma requiring craniotomy with evacuation and MMA embolization early May with recurrence late May requiring diony hole  Right-sided weakness/intractable headache secondary to above  History of 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, 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  Depression/anxiety   HLD  Hypothyroidism  Essential HTN  Type 2 diabetes mellitus  Prophylaxis       Postop recommendations per Neurosurgery  JOHNATHAN drain in place  Symptomatic management for headache , continue p.r.n. Fioricet  Postop CT non impressive  Intraoperative findings were concerning for infection  Infectious disease consulted and the patient is now on cefepime, Flagyl and vancomycin   Follow intraoperative cultures  Keppra for seizure prevention    Plavix been on hold (cardiology okay to stop Plavix and once cleared by Neurosurgery Plavix can be restarted if indicated per Neurology for CVA)  Continue home meds-statin, BuSpar, Zetia, levothyroxine, Toprol-XL, nortriptyline   Labs stable  INR within normal limits  Trazodone 25 mg q.h.s. for insomnia  DVT prophylaxis-bilateral SCDs, chemical prophylaxis when cleared by Neurosurgery    Cat Casper MD   06/17/2025

## 2025-06-17 NOTE — PT/OT/SLP EVAL
Physical Therapy Re-Evaluation    Patient Name:  Chelle Chandra   MRN:  [id number]    Recommendations:     Discharge therapy intensity: High Intensity Therapy   Discharge Equipment Recommendations: other (see comments) (TBD by next level care facility.)   Barriers to discharge: Impaired mobility    Assessment:     Chelle Chandra is a 59 y.o. female admitted with a medical diagnosis of R sided weakness and facial droop due to L SDH s/p craniotomy and MMA embolization. On 5/30 patient with subdural hemorrhage s/p L diony hole. The pt presents with new R sided weakness, and underwent a redo of diony hole for fluid evacuation/drain placement and a left cranioplasty for subdural fluid evacuation on 6/16.  She presents with the following impairments/functional limitations: weakness, impaired self care skills, impaired balance, decreased safety awareness, impaired endurance, impaired functional mobility, pain, gait instability. Pt tolerated re-eval well with reports of an 8/10 headache. She is able to perform all mobility with CGA-Danny using a RW. Pt's step length, foot clearance, and right path deviation has improved since last session, but still requires occasional verbal cues to improve gait technique. During sit to stand transfer pt demonstrated a posterior trunk lean and required therapist assist to maintain balance. Pt would benefit from HIGH intensity skilled therapy session following d/c from this facility.   Rehab Prognosis: Good; patient would benefit from acute skilled PT services to address these deficits and reach maximum level of function.    Recent Surgery: Procedure(s) (LRB):  EVACUATION, EMPYEMA (Left)  CRANIECTOMY (Left) 1 Day Post-Op    Plan:     During this hospitalization, patient would benefit from acute PT services 6 x/week to address the identified rehab impairments via gait training, therapeutic activities, therapeutic exercises and progress toward the following goals:    Plan of Care Expires:   07/17/25    PT/PTA conference to discuss PT POC and patient's progression towards goals held with Lazarus Funez PTA.     Subjective     Chief Complaint: pain, fatigue  Patient/Family Comments/goals: return home.   Pain/Comfort:  Pain Rating 1: 8/10  Location 1: head  Pain Addressed 1: Pre-medicate for activity, Reposition    Patients cultural, spiritual, Jain conflicts given the current situation:      Objective:     Communicated with NSG prior to session.  Patient found HOB elevated with JOHNATHAN drain, peripheral IV, telemetry  upon PT entry to room.    General Precautions: Standard, fall  Orthopedic Precautions:N/A   Braces: N/A  Respiratory Status: Room air      Exams:  RLE ROM: WFL  RLE Strength: grossly graded 3+/5.   LLE ROM: WFL  LLE Strength: grossly graded 4/5.   Skin integrity: Visible skin intact      Functional Mobility:  Bed Mobility:     Supine to Sit: supervision  Sit to Supine: supervision  Transfers:     Sit to Stand:  minimum assistance with rolling walker  Toilet Transfer: minimum assistance with  rolling walker  using  Step Transfer  Gait: pt ambulated ~120ft using rolling walker. Pt demonstrates decreased step length with onset of fatigue about ~80 ft. Pt requires occasional verbal cues to maintain straight pathway.   Balance: static sitting balance: good. Static standing balance fair+. Dynamic standing balance: fair.       AM-PAC 6 CLICK MOBILITY  Total Score:19     Co-Treatment: No      Patient and daughter/s were provided with verbal education education regarding PT role/goals/POC, fall prevention, and home exercise program.  Understanding was verbalized.     Patient left up in chair with all lines intact and call button in reach with daughter present.    GOALS:   Multidisciplinary Problems       Physical Therapy Goals          Problem: Physical Therapy    Goal Priority Disciplines Outcome Interventions   Physical Therapy Goal     PT, PT/OT Progressing    Description: Goals to be met by:  25     Patient will increase functional independence with mobility by performin. Supine to sit with Modified [place]  2. Sit to supine with Modified [place]  3. Sit to stand transfer with Modified [place]  4. Bed to chair transfer with Modified [place] using Rolling Walker  5. Gait  x 200 feet with Modified [place] using Rolling Walker.                          History:     Past Medical History:   Diagnosis Date    Accelerated junctional rhythm     Agatston CAC score, <100     Anxiety disorder, unspecified     Asthma     COPD type A     Diabetes mellitus without complication     GERD (gastroesophageal reflux disease)     History of COVID-19     Insomnia     Lung nodule        Past Surgical History:   Procedure Laterality Date    ANGIOGRAM, CORONARY, WITH LEFT HEART CATHETERIZATION      BACK SURGERY      BREAST LUMPECTOMY Right     CARDIOVERSION  2013    CARPAL TUNNEL RELEASE  10/23/2013    CRANIECTOMY Left 2025    Procedure: CRANIECTOMY;  Surgeon: James Rankin MD;  Location: [facility];  Service: Neurosurgery;  Laterality: Left;  left redo-craniectomy for subdural empyema    CRANIOTOMY FOR EVACUATION OF SUBDURAL HEMATOMA Left 2025    Procedure: CRANIOTOMY, FOR SUBDURAL HEMATOMA EVACUATION;  Surgeon: Ricardo Shelley MD;  Location[facility];  Service: Neurosurgery;  Laterality: Left;    CREATION OF TERRI HOLE WITH EVACUATION OF HEMATOMA Left 2025    Procedure: CREATION, CRANIAL TERRI HOLE, WITH HEMATOMA EVACUATION;  Surgeon: James Rankin MD;  Location[facility];  Service: Neurosurgery;  Laterality: Left;  LEFT BURRHOLE FOR SUBDURAL HEMATOMA EVACUATION // STEALTH // HERCULES SKYTRON // DRILL    EVACUATION OF EMPYEMA Left 2025    Procedure: EVACUATION, EMPYEMA;  Surgeon: James Rankin MD;  Location: [facility];  Service: Neurosurgery;  Laterality: Left;  left craniotomy for subdural empyema    FUSION OF CERVICAL SPINE BY ANTERIOR APPROACH USING  COMPUTER-ASSISTED NAVIGATION  06/10/2019    KIDNEY SURGERY      TONSILLECTOMY AND ADENOIDECTOMY      TOTAL ABDOMINAL HYSTERECTOMY      WRIST SURGERY         Time Tracking:     PT Received On: 06/17/25  PT Start Time: 0908     PT Stop Time: 0922  PT Total Time (min): 14 min     Billable Minutes: Yaquelin-gina 14      06/17/2025

## 2025-06-18 LAB
ALBUMIN SERPL-MCNC: 2.5 G/DL (ref 3.4–4.8)
ALBUMIN/GLOB SERPL: 0.7 RATIO (ref 1.1–2)
ALP SERPL-CCNC: 168 UNIT/L (ref 40–150)
ALT SERPL-CCNC: 15 UNIT/L (ref 0–55)
ANION GAP SERPL CALC-SCNC: 7 MEQ/L
AST SERPL-CCNC: 11 UNIT/L (ref 11–45)
BASOPHILS # BLD AUTO: 0.04 X10(3)/MCL
BASOPHILS NFR BLD AUTO: 0.6 %
BILIRUB SERPL-MCNC: 0.2 MG/DL
BUN SERPL-MCNC: 3.9 MG/DL (ref 9.8–20.1)
CALCIUM SERPL-MCNC: 8.7 MG/DL (ref 8.4–10.2)
CHLORIDE SERPL-SCNC: 104 MMOL/L (ref 98–107)
CO2 SERPL-SCNC: 28 MMOL/L (ref 23–31)
CREAT SERPL-MCNC: 0.68 MG/DL (ref 0.55–1.02)
CREAT/UREA NIT SERPL: 6
EOSINOPHIL # BLD AUTO: 0.22 X10(3)/MCL (ref 0–0.9)
EOSINOPHIL NFR BLD AUTO: 3.1 %
ERYTHROCYTE [DISTWIDTH] IN BLOOD BY AUTOMATED COUNT: 13.6 % (ref 11.5–17)
GFR SERPLBLD CREATININE-BSD FMLA CKD-EPI: >60 ML/MIN/1.73/M2
GLOBULIN SER-MCNC: 3.8 GM/DL (ref 2.4–3.5)
GLUCOSE SERPL-MCNC: 186 MG/DL (ref 82–115)
HCT VFR BLD AUTO: 32.9 % (ref 37–47)
HGB BLD-MCNC: 10.5 G/DL (ref 12–16)
IMM GRANULOCYTES # BLD AUTO: 0.03 X10(3)/MCL (ref 0–0.04)
IMM GRANULOCYTES NFR BLD AUTO: 0.4 %
LYMPHOCYTES # BLD AUTO: 1.55 X10(3)/MCL (ref 0.6–4.6)
LYMPHOCYTES NFR BLD AUTO: 22 %
MCH RBC QN AUTO: 29.7 PG (ref 27–31)
MCHC RBC AUTO-ENTMCNC: 31.9 G/DL (ref 33–36)
MCV RBC AUTO: 92.9 FL (ref 80–94)
MONOCYTES # BLD AUTO: 0.62 X10(3)/MCL (ref 0.1–1.3)
MONOCYTES NFR BLD AUTO: 8.8 %
NEUTROPHILS # BLD AUTO: 4.57 X10(3)/MCL (ref 2.1–9.2)
NEUTROPHILS NFR BLD AUTO: 65.1 %
NRBC BLD AUTO-RTO: 0 %
PLATELET # BLD AUTO: 405 X10(3)/MCL (ref 130–400)
PMV BLD AUTO: 9.8 FL (ref 7.4–10.4)
POCT GLUCOSE: 133 MG/DL (ref 70–110)
POCT GLUCOSE: 170 MG/DL (ref 70–110)
POCT GLUCOSE: 177 MG/DL (ref 70–110)
POTASSIUM SERPL-SCNC: 4 MMOL/L (ref 3.5–5.1)
PROT SERPL-MCNC: 6.3 GM/DL (ref 5.8–7.6)
RBC # BLD AUTO: 3.54 X10(6)/MCL (ref 4.2–5.4)
SODIUM SERPL-SCNC: 139 MMOL/L (ref 136–145)
WBC # BLD AUTO: 7.03 X10(3)/MCL (ref 4.5–11.5)

## 2025-06-18 PROCEDURE — C1751 CATH, INF, PER/CENT/MIDLINE: HCPCS

## 2025-06-18 PROCEDURE — 21400001 HC TELEMETRY ROOM

## 2025-06-18 PROCEDURE — 76937 US GUIDE VASCULAR ACCESS: CPT

## 2025-06-18 PROCEDURE — 85025 COMPLETE CBC W/AUTO DIFF WBC: CPT | Performed by: INTERNAL MEDICINE

## 2025-06-18 PROCEDURE — 36415 COLL VENOUS BLD VENIPUNCTURE: CPT | Performed by: INTERNAL MEDICINE

## 2025-06-18 PROCEDURE — 63600175 PHARM REV CODE 636 W HCPCS

## 2025-06-18 PROCEDURE — 11000001 HC ACUTE MED/SURG PRIVATE ROOM

## 2025-06-18 PROCEDURE — 36410 VNPNXR 3YR/> PHY/QHP DX/THER: CPT

## 2025-06-18 PROCEDURE — 94799 UNLISTED PULMONARY SVC/PX: CPT

## 2025-06-18 PROCEDURE — 97116 GAIT TRAINING THERAPY: CPT | Mod: CQ

## 2025-06-18 PROCEDURE — 63600175 PHARM REV CODE 636 W HCPCS: Performed by: INTERNAL MEDICINE

## 2025-06-18 PROCEDURE — 80053 COMPREHEN METABOLIC PANEL: CPT | Performed by: INTERNAL MEDICINE

## 2025-06-18 PROCEDURE — 25000003 PHARM REV CODE 250: Performed by: INTERNAL MEDICINE

## 2025-06-18 PROCEDURE — 25000003 PHARM REV CODE 250

## 2025-06-18 RX ORDER — HYDROCORTISONE 1 %
CREAM (GRAM) TOPICAL 2 TIMES DAILY
Status: DISCONTINUED | OUTPATIENT
Start: 2025-06-18 | End: 2025-07-07

## 2025-06-18 RX ORDER — SODIUM CHLORIDE 0.9 % (FLUSH) 0.9 %
10 SYRINGE (ML) INJECTION EVERY 12 HOURS PRN
Status: DISCONTINUED | OUTPATIENT
Start: 2025-06-18 | End: 2025-07-15 | Stop reason: HOSPADM

## 2025-06-18 RX ADMIN — OXYCODONE HYDROCHLORIDE AND ACETAMINOPHEN 1 TABLET: 10; 325 TABLET ORAL at 08:06

## 2025-06-18 RX ADMIN — EZETIMIBE 10 MG: 10 TABLET ORAL at 08:06

## 2025-06-18 RX ADMIN — BUSPIRONE HYDROCHLORIDE 15 MG: 5 TABLET ORAL at 08:06

## 2025-06-18 RX ADMIN — LEVETIRACETAM 500 MG: 500 SOLUTION ORAL at 09:06

## 2025-06-18 RX ADMIN — HYDROCORTISONE: 1 CREAM TOPICAL at 09:06

## 2025-06-18 RX ADMIN — CEFEPIME 1 G: 1 INJECTION, POWDER, FOR SOLUTION INTRAMUSCULAR; INTRAVENOUS at 05:06

## 2025-06-18 RX ADMIN — BUSPIRONE HYDROCHLORIDE 15 MG: 5 TABLET ORAL at 09:06

## 2025-06-18 RX ADMIN — BUTALBITAL, ACETAMINOPHEN, AND CAFFEINE 1 TABLET: 325; 50; 40 TABLET ORAL at 01:06

## 2025-06-18 RX ADMIN — LEVOTHYROXINE SODIUM 88 MCG: 0.09 TABLET ORAL at 05:06

## 2025-06-18 RX ADMIN — METRONIDAZOLE 500 MG: 5 INJECTION, SOLUTION INTRAVENOUS at 08:06

## 2025-06-18 RX ADMIN — METRONIDAZOLE 500 MG: 5 INJECTION, SOLUTION INTRAVENOUS at 02:06

## 2025-06-18 RX ADMIN — VANCOMYCIN HYDROCHLORIDE 1250 MG: 1.25 INJECTION, POWDER, LYOPHILIZED, FOR SOLUTION INTRAVENOUS at 09:06

## 2025-06-18 RX ADMIN — METOPROLOL SUCCINATE 12.5 MG: 25 TABLET, EXTENDED RELEASE ORAL at 08:06

## 2025-06-18 RX ADMIN — LEVETIRACETAM 500 MG: 500 SOLUTION ORAL at 08:06

## 2025-06-18 RX ADMIN — OXYCODONE HYDROCHLORIDE AND ACETAMINOPHEN 1 TABLET: 10; 325 TABLET ORAL at 04:06

## 2025-06-18 RX ADMIN — OXYCODONE HYDROCHLORIDE AND ACETAMINOPHEN 1 TABLET: 10; 325 TABLET ORAL at 02:06

## 2025-06-18 RX ADMIN — BUTALBITAL, ACETAMINOPHEN, AND CAFFEINE 1 TABLET: 325; 50; 40 TABLET ORAL at 11:06

## 2025-06-18 RX ADMIN — METRONIDAZOLE 500 MG: 5 INJECTION, SOLUTION INTRAVENOUS at 11:06

## 2025-06-18 RX ADMIN — FAMOTIDINE 20 MG: 10 INJECTION, SOLUTION INTRAVENOUS at 08:06

## 2025-06-18 RX ADMIN — CEFEPIME 1 G: 1 INJECTION, POWDER, FOR SOLUTION INTRAMUSCULAR; INTRAVENOUS at 08:06

## 2025-06-18 RX ADMIN — FAMOTIDINE 20 MG: 10 INJECTION, SOLUTION INTRAVENOUS at 10:06

## 2025-06-18 RX ADMIN — BUTALBITAL, ACETAMINOPHEN, AND CAFFEINE 1 TABLET: 325; 50; 40 TABLET ORAL at 06:06

## 2025-06-18 RX ADMIN — CEFEPIME 1 G: 1 INJECTION, POWDER, FOR SOLUTION INTRAMUSCULAR; INTRAVENOUS at 01:06

## 2025-06-18 RX ADMIN — HYDROCORTISONE: 1 CREAM TOPICAL at 11:06

## 2025-06-18 RX ADMIN — OXYCODONE HYDROCHLORIDE AND ACETAMINOPHEN 1 TABLET: 10; 325 TABLET ORAL at 09:06

## 2025-06-18 RX ADMIN — ATORVASTATIN CALCIUM 80 MG: 40 TABLET, FILM COATED ORAL at 08:06

## 2025-06-18 RX ADMIN — POLYETHYLENE GLYCOL 3350 17 G: 17 POWDER, FOR SOLUTION ORAL at 08:06

## 2025-06-18 RX ADMIN — BUTALBITAL, ACETAMINOPHEN, AND CAFFEINE 1 TABLET: 325; 50; 40 TABLET ORAL at 05:06

## 2025-06-18 RX ADMIN — TRAZODONE HYDROCHLORIDE 25 MG: 50 TABLET ORAL at 09:06

## 2025-06-18 RX ADMIN — CEFEPIME 1 G: 1 INJECTION, POWDER, FOR SOLUTION INTRAMUSCULAR; INTRAVENOUS at 11:06

## 2025-06-18 RX ADMIN — NORTRIPTYLINE HYDROCHLORIDE 25 MG: 25 CAPSULE ORAL at 09:06

## 2025-06-18 RX ADMIN — POLYETHYLENE GLYCOL 3350 17 G: 17 POWDER, FOR SOLUTION ORAL at 09:06

## 2025-06-18 NOTE — PROGRESS NOTES
Inpatient Nutrition Assessment    Admit Date: 6/11/2025   Total duration of encounter: 7 days   Patient Age: 59 y.o.    Nutrition Recommendation/Prescription     Continue current diet (Diet Consistent Carbohydrate 2000 Calories (up to 75 gm per meal); Moderately Thick Liquids (IDDSI Level 3); Standard Tray) as tolerated. Texture per SLP.   Monitor PO intakes, labs, and wt changes    Communication of Recommendations: reviewed with patient and reviewed with family    Nutrition Assessment     Malnutrition Assessment/Nutrition-Focused Physical Exam    Malnutrition Context: acute illness or injury (06/18/25 1545)  Malnutrition Level: severe (06/18/25 1545)  Energy Intake (Malnutrition): other (see comments) (Does not meet criteria) (06/18/25 1545)  Weight Loss (Malnutrition): greater than 5% in 1 month (06/18/25 1545)  Subcutaneous Fat (Malnutrition): moderate depletion (06/18/25 1545)  Orbital Region (Subcutaneous Fat Loss): moderate depletion        Muscle Mass (Malnutrition): moderate depletion (06/18/25 1545)  [place] Region (Muscle Loss): moderate depletion  Clavicle Bone Region (Muscle Loss): moderate depletion                    Fluid Accumulation (Malnutrition): other (see comments) (Not present) (06/18/25 1545)     Hand  Strength, Right (Malnutrition): Unable to assess (06/18/25 1545)  A minimum of two characteristics is recommended for diagnosis of either severe or non-severe malnutrition.    Chart Review    Reason Seen: length of stay    Malnutrition Screening Tool Results   Have you recently lost weight without trying?: Yes: 2-13 lbs  Have you been eating poorly because of a decreased appetite?: No   MST Score: 1   Diagnosis:  Increased subdural fluid collection Status post left redo diony hole with fluid evacuation, drain placement 6/16  Suspected surgical site infection status post cranioplasty 6/16  Subdural hematoma requiring craniotomy with evacuation and MMA embolization early May with recurrence late  May requiring diony hole  Right-sided weakness/intractable headache secondary to above    Relevant Medical History:   hypothyroidism, anxiety, bronchial asthma, COPD, type 2 diabetes mellitus, 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, 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, GERD     Scheduled Medications:  atorvastatin, 80 mg, Daily  busPIRone, 15 mg, BID  ceFEPime IV (PEDS and ADULTS), 1 g, Q6H  ezetimibe, 10 mg, Daily  famotidine (PF), 20 mg, Q12H  hydrocortisone, , BID  levetiracetam, 500 mg, BID  levothyroxine, 88 mcg, Before breakfast  metoprolol succinate, 12.5 mg, Daily  metroNIDAZOLE IV (PEDS and ADULTS), 500 mg, Q8H  nortriptyline, 25 mg, QHS  polyethylene glycol, 17 g, BID  traZODone, 25 mg, QHS  vancomycin 1,250 mg in D5W 250 mL IVPB (admixture device), 1,250 mg, Q24H    Continuous Infusions:   PRN Medications:  acetaminophen, 650 mg, Q4H PRN  acetaminophen, 650 mg, Q8H PRN  acetaminophen, 650 mg, Q4H PRN  butalbital-acetaminophen-caffeine -40 mg, 1 tablet, Q4H PRN  dextrose 50%, 12.5 g, PRN  dextrose 50%, 25 g, PRN  diphenhydrAMINE, 25 mg, Q6H PRN  glucagon (human recombinant), 1 mg, PRN  glucagon (human recombinant), 1 mg, PRN  glucose, 16 g, PRN  glucose, 24 g, PRN  insulin aspart U-100, 0-10 Units, QID (AC + HS) PRN  melatonin, 6 mg, Nightly PRN  morphine, 2 mg, Q4H PRN  naloxone, 0.02 mg, PRN  ondansetron, 4 mg, Q8H PRN  oxyCODONE-acetaminophen, 1 tablet, Q4H PRN  prochlorperazine, 5 mg, Q6H PRN  sodium chloride 0.9%, 10 mL, PRN  sodium chloride 0.9%, 3 mL, Q12H PRN  vancomycin - pharmacy to dose, , pharmacy to manage frequency    Calorie Containing IV Medications: no significant kcals from medications at this time    Recent Labs   Lab 06/15/25  1016 06/16/25  1618 06/17/25  0817 06/18/25  0516     --  140 139   K 4.4  --  4.2 4.0   CALCIUM 9.4  --  8.8 8.7     --  103 104   CO2 29  --  28 28   BUN 5.8*  --  6.9* 3.9*   CREATININE 0.70  --  0.69 0.68   EGFRNORACEVR >60  --  >60 >60   *  --  133* 186*  "  BILITOT 0.2  --  0.3 0.2   ALKPHOS 201*  --  172* 168*   ALT 27  --  19 15   AST 24  --  12 11   ALBUMIN 2.8*  --  2.5* 2.5*   CRP  --  32.80*  --   --    WBC 6.37  --  6.90 7.03   HGB 11.9*  --  10.4* 10.5*   HCT 37.6  --  33.4* 32.9*     Nutrition Orders:  Diet Consistent Carbohydrate 2000 Calories (up to 75 gm per meal); Moderately Thick Liquids (IDDSI Level 3); Standard Tray      Appetite/Oral Intake: fair/25-50% of meals  Factors Affecting Nutritional Intake: pain  Social Needs Impacting Access to Food: none identified  Food/Yarsani/Cultural Preferences: none reported  Food Allergies: no known food allergies  Last Bowel Movement: 06/15/25  Wound(s):  scalp and temporal incisions noted.     Comments    6/18/25: Spoke to family members at bedside who report pt's appetite has been fluctuating since admit depending on her pain levels. PO intake varies between %. Pt ate 75% of her breakfast this morning but did not eat much of her lunch. Family reports UBW of 112 lbs, last weighing that about 1 month ago. Family reports wt at admit was about 100 lbs which would indicate a 10% wt loss x1 month, nutritionally significant. Most recent bedscale wt from 6/12 is 110 lbs, will monitor for accuracy/trends.     Anthropometrics    Height: 5' 6" (167.6 cm), Height Method: Measured  Last Weight: 49.9 kg (110 lb) (06/12/25 0223), Weight Method: Bed Scale  BMI (Calculated): 17.8  BMI Classification: underweight (BMI less than 18.5)     Ideal Body Weight (IBW), Female: 130 lb     % Ideal Body Weight, Female (lb): 84.62 %                             Usual Weight Provided By: patient    Wt Readings from Last 5 Encounters:   06/12/25 49.9 kg (110 lb)   06/06/25 46.9 kg (103 lb 6.3 oz)   06/03/25 46.3 kg (102 lb)   05/15/25 49 kg (108 lb 0.4 oz)   05/06/25 50.8 kg (112 lb)     Weight Change(s) Since Admission:   Wt Readings from Last 1 Encounters:   06/12/25 0223 49.9 kg (110 lb)   06/11/25 1314 46.7 kg (103 lb)   Admit " Weight: 46.7 kg (103 lb) (06/11/25 1314), Weight Method: Stated    Estimated Needs    Weight Used For Calorie Calculations: 49.9 kg (110 lb 0.2 oz)  Energy Calorie Requirements (kcal): 1644-3779 kcals (30-35 kcal/kg)  Energy Need Method: Kcal/kg  Weight Used For Protein Calculations: 49.9 kg (110 lb 0.2 oz)  Protein Requirements: 60-75 g (1.2-1.5 g/kg)  Fluid Requirements (mL): 1497 mL (30 mL/kg)  CHO Requirement: N/A     Enteral Nutrition     Patient not receiving enteral nutrition at this time.    Parenteral Nutrition     Patient not receiving parenteral nutrition support at this time.    Evaluation of Received Nutrient Intake    Calories: not meeting estimated needs  Protein: not meeting estimated needs    Patient Education     Not applicable.    Nutrition Diagnosis     PES: Inadequate energy intake related to acute illness as evidenced by meeting <75% EEN. (new)     PES: Severe acute disease or injury related malnutrition Related to  As Evidenced by:  - weight loss: > 5% in 1 month - muscle mass depletion: 2 areas of moderate muscle loss (Clavicle, Temporalis) - loss of subcutaneous fat: 2 areas of moderate fat loss (Infraorbital, Buccal) new    Nutrition Interventions     Intervention(s): modified composition of meals/snacks and collaboration with other providers  Intervention(s): Oral diet/nutrient modifications    Goal: Meet greater than 80% of nutritional needs by follow-up. (new)  Goal: Maintain weight throughout hospitalization. (new)    Nutrition Goals & Monitoring     Dietitian will monitor: energy intake, weight, and gastrointestinal profile  Discharge planning: too early to determine; pending clinical course  Nutrition Risk/Follow-Up: patient at increased nutrition risk; dietitian will follow-up twice weekly   Please consult if re-assessment needed sooner.

## 2025-06-18 NOTE — ANESTHESIA POSTPROCEDURE EVALUATION
Anesthesia Post Evaluation    Patient: Chelle Chandra    Procedure(s) Performed: Procedure(s) (LRB):  EVACUATION, EMPYEMA (Left)  CRANIECTOMY (Left)    Final Anesthesia Type: general      Patient location during evaluation: PACU  Patient participation: Yes- Able to Participate  Level of consciousness: awake and alert  Post-procedure vital signs: reviewed and stable  Pain management: adequate  Airway patency: patent    PONV status at discharge: No PONV  Anesthetic complications: no      Cardiovascular status: hemodynamically stable  Respiratory status: spontaneous ventilation and room air  Hydration status: euvolemic  Follow-up not needed.              Vitals Value Taken Time   /69 06/18/25 05:21   Temp 36.8 °C (98.2 °F) 06/18/25 05:21   Pulse 77 06/18/25 05:21   Resp 20 06/18/25 05:21   SpO2 94 % 06/18/25 05:21         Event Time   Out of Recovery 06/16/2025 15:31:00         Pain/Bob Score: Pain Rating Prior to Med Admin: 6 (6/18/2025  5:46 AM)  Pain Rating Post Med Admin: 7 (6/18/2025  5:08 AM)

## 2025-06-18 NOTE — PROGRESS NOTES
[facility] Neuro  Neurosurgery  Progress Note    Subjective:     Interval History: NAEs overnight. Patient reports feeling better compared to yesterday. NO complaints. JOHNATHAN drains 10,0mL output.       Post-Op Info:  Procedure(s) (LRB):  EVACUATION, EMPYEMA (Left)  CRANIECTOMY (Left)   2 Days Post-Op      Medications:  Continuous Infusions:  Scheduled Meds:   atorvastatin  80 mg Oral Daily    busPIRone  15 mg Oral BID    ceFEPime IV (PEDS and ADULTS)  1 g Intravenous Q6H    ezetimibe  10 mg Oral Daily    famotidine (PF)  20 mg Intravenous Q12H    levetiracetam  500 mg Oral BID    levothyroxine  88 mcg Oral Before breakfast    metoprolol succinate  12.5 mg Oral Daily    metroNIDAZOLE IV (PEDS and ADULTS)  500 mg Intravenous Q8H    nortriptyline  25 mg Oral QHS    polyethylene glycol  17 g Oral BID    traZODone  25 mg Oral QHS    vancomycin 1,250 mg in D5W 250 mL IVPB (admixture device)  1,250 mg Intravenous Q24H     PRN Meds:  Current Facility-Administered Medications:     acetaminophen, 650 mg, Rectal, Q4H PRN    acetaminophen, 650 mg, Oral, Q8H PRN    acetaminophen, 650 mg, Oral, Q4H PRN    butalbital-acetaminophen-caffeine -40 mg, 1 tablet, Oral, Q4H PRN    dextrose 50%, 12.5 g, Intravenous, PRN    dextrose 50%, 25 g, Intravenous, PRN    diphenhydrAMINE, 25 mg, Oral, Q6H PRN    glucagon (human recombinant), 1 mg, Intramuscular, PRN    glucagon (human recombinant), 1 mg, Intramuscular, PRN    glucose, 16 g, Oral, PRN    glucose, 24 g, Oral, PRN    insulin aspart U-100, 0-10 Units, Subcutaneous, QID (AC + HS) PRN    melatonin, 6 mg, Oral, Nightly PRN    morphine, 2 mg, Intravenous, Q4H PRN    naloxone, 0.02 mg, Intravenous, PRN    ondansetron, 4 mg, Intravenous, Q8H PRN    oxyCODONE-acetaminophen, 1 tablet, Oral, Q4H PRN    prochlorperazine, 5 mg, Intravenous, Q6H PRN    sodium chloride 0.9%, 10 mL, Intravenous, PRN    sodium chloride 0.9%, 3 mL, Intravenous, Q12H PRN    Pharmacy to dose  Vancomycin consult, , , Once **AND** vancomycin - pharmacy to dose, , Intravenous, pharmacy to manage frequency     Review of Systems  Objective:     Weight: 49.9 kg (110 lb)  Body mass index is 17.75 kg/m².  Vital Signs (Most Recent):  Temp: 98.2 °F (36.8 °C) (06/18/25 0521)  Pulse: 77 (06/18/25 0521)  Resp: 20 (06/18/25 0521)  BP: 112/69 (06/18/25 0521)  SpO2: (!) 94 % (06/18/25 0521) Vital Signs (24h Range):  Temp:  [97.6 °F (36.4 °C)-98.8 °F (37.1 °C)] 98.2 °F (36.8 °C)  Pulse:  [73-88] 77  Resp:  [18-20] 20  SpO2:  [94 %-100 %] 94 %  BP: (111-135)/(69-85) 112/69                              Closed/Suction Drain 06/16/25 1423 Tube - 1 Left Scalp Bulb 10 Fr. (Active)   Site Description Unable to view 06/17/25 2055   Dressing Type Gauze 06/17/25 2055   Dressing Status Clean;Dry;Intact 06/17/25 2055   Dressing Intervention Integrity maintained 06/17/25 2055   Drainage Sanguineous 06/17/25 2055   Status To bulb suction 06/17/25 2055   Output (mL) 10 mL 06/18/25 0615            Closed/Suction Drain 06/16/25 1423 Tube - 2 Left Scalp Bulb 10 Fr. (Active)   Site Description Unable to view 06/17/25 2055   Dressing Type Gauze 06/17/25 2055   Dressing Status Clean;Dry;Intact 06/17/25 2055   Dressing Intervention Integrity maintained 06/17/25 2055   Drainage Sanguineous 06/17/25 2055   Status To bulb suction 06/17/25 2055   Output (mL) 0 mL 06/18/25 0615       Neurosurgery Physical Exam  Awake, alert, oriented  NAD. Resting in bed.   Speech clear. Answers questions appropriately  EOMI. PERRL  Moves all extremities, right arm weakness  JOHNATHAN drains with bloody output.     Significant Labs:  Recent Labs   Lab 06/17/25  0817 06/18/25  0516   * 186*    139   K 4.2 4.0    104   CO2 28 28   BUN 6.9* 3.9*   CREATININE 0.69 0.68   CALCIUM 8.8 8.7     Recent Labs   Lab 06/17/25  0817 06/18/25  0516   WBC 6.90 7.03   HGB 10.4* 10.5*   HCT 33.4* 32.9*   * 405*     Recent Labs   Lab 06/16/25  1040   INR 1.0      Microbiology Results (last 7 days)       Procedure Component Value Units Date/Time    Tissue Culture - Aerobic [[id number]] Collected: 06/16/25 1338    Order Status: Completed Specimen: Tissue from Head Updated: 06/17/25 0639     Tissue - Aerobic Culture No Growth At 24 Hours    Tissue Culture - Aerobic [[id number]] Collected: 06/16/25 1357    Order Status: Completed Specimen: Bone from Head Updated: 06/17/25 0635     Tissue - Aerobic Culture No Growth At 24 Hours    Gram Stain [[id number]] Collected: 06/16/25 1357    Order Status: Completed Specimen: Bone from Head Updated: 06/16/25 1557     GRAM STAIN Moderate WBC observed      Rare Gram Positive Rods    Gram Stain [[id number]] Collected: 06/16/25 1338    Order Status: Completed Specimen: Tissue from Head Updated: 06/16/25 1556     GRAM STAIN Moderate WBC observed      Rare Gram positive cocci      Rare Gram Positive Rods    Fungal Culture [[id number]] Collected: 06/16/25 1357    Order Status: Sent Specimen: Bone from Head Updated: 06/16/25 1438    Anaerobic Culture [[id number]] Collected: 06/16/25 1357    Order Status: Sent Specimen: Bone from Head Updated: 06/16/25 1438    Fungal Culture [[id number]] Collected: 06/16/25 1338    Order Status: Sent Specimen: Tissue from Head Updated: 06/16/25 1438    Anaerobic Culture [[id number]] Collected: 06/16/25 1338    Order Status: Sent Specimen: Tissue from Head Updated: 06/16/25 1438    AFB Smear [[id number]]     Order Status: Sent Specimen: Bone from Head     Mycobacteria and Nocardia Culture [[id number]]     Order Status: Sent Specimen: Bone from Head     AFB Smear [[id number]]     Order Status: Sent Specimen: SWAB from Skull     Mycobacteria and Nocardia Culture [[id number]]     Order Status: Sent Specimen: Tissue from Skull               Assessment/Plan:     -Continue JOHNATHAN Drains  -On broad antibiotics  -Appreciate infectious disease recommendations  -Intraoperative cultures pending  -PTOT  -Pain control  -Dressing  changes as needed  -will follow     CECILIA Crarillo  Neurosurgery  [facility] - Ortho Neuro

## 2025-06-18 NOTE — PT/OT/SLP PROGRESS
Physical Therapy Treatment    Patient Name:  Chelle Chandra   MRN:  [id number]    Recommendations:     Discharge therapy intensity: High Intensity Therapy   Discharge Equipment Recommendations: other (see comments) (TBD by next level care facility.)  Barriers to discharge: Decreased caregiver support, Impaired mobility, and Ongoing medical needs    Assessment:     Chelle Chandra is a 59 y.o. female admitted with a medical diagnosis of R sided weakness and facial droop due to L SDH s/p craniotomy and MMA embolization. On 5/30 patient with subdural hemorrhage s/p L diony hole. The pt presents with new R sided weakness, and underwent a redo of diony hole for fluid evacuation/drain placement and a left cranioplasty for subdural fluid evacuation on 6/16.  She presents with the following impairments/functional limitations: weakness, impaired self care skills, impaired balance, decreased safety awareness, impaired endurance, impaired functional mobility, pain, gait instability.     Rehab Prognosis: Good; patient would benefit from acute skilled PT services to address these deficits and reach maximum level of function.    Recent Surgery: Procedure(s) (LRB):  EVACUATION, EMPYEMA (Left)  CRANIECTOMY (Left) 2 Days Post-Op    Plan:     During this hospitalization, patient would benefit from acute PT services 6 x/week to address the identified rehab impairments via gait training, therapeutic activities, therapeutic exercises and progress toward the following goals:    Plan of Care Expires:  07/17/25    Subjective     Chief Complaint: incision pain    Objective:     Communicated with  nurse prior to session.  Patient found sitting edge of bed with JOHNATHAN drain, peripheral IV, telemetry upon PT entry to room.     General Precautions: Standard, fall  Orthopedic Precautions: N/A  Braces: N/A  Respiratory Status: Room air  Blood Pressure: 113/69  Skin Integrity: coverlet dressing over incision      Functional Mobility:  Min assist  transfers and gait 120 ft x 2 min HHA.    treatment due to CNA to assist with shower.     Education Provided:  Role and goals of PT, transfer training, bed mobility, gait training, balance training, safety awareness, assistive device, strengthening exercises, and importance of participating in PT to return to PLOF.     Patient left sitting edge of bed with CNA    GOALS:   Multidisciplinary Problems       Physical Therapy Goals          Problem: Physical Therapy    Goal Priority Disciplines Outcome Interventions   Physical Therapy Goal     PT, PT/OT Progressing    Description: Goals to be met by: 25     Patient will increase functional independence with mobility by performin. Supine to sit with Modified [place]  2. Sit to supine with Modified [place]  3. Sit to stand transfer with Modified [place]  4. Bed to chair transfer with Modified [place] using Rolling Walker  5. Gait  x 200 feet with Modified [place] using Rolling Walker.                          Time Tracking:     PT Received On: 25  PT Start Time: 1415     PT Stop Time: 1430  PT Total Time (min): 15 min     Billable Minutes: Gait Training 15    Treatment Type: Treatment  PT/PTA: PTA     Number of PTA visits since last PT visit: 2025

## 2025-06-18 NOTE — PROGRESS NOTES
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  Patient recently had subdural hematoma in May requiring intracranial embolization, craniotomy with hematoma evacuation.  After this patient was discharged home.  Late may she presented back to the ED with right-sided weakness, facial droop and imaging was concerning for left MCA diminished flow and subacute hematoma.  MRI brain was negative for CVA.  Neurosurgery performed diony hole for drainage of subdural hematoma due to increased intracranial pressure with postop CT showing definitive improvement patient was on Plavix post embolization which was held briefly and resumed per Neurosurgery on 06/05 and she was transferred to rehab on 06/06.  While in rehab patient was noted to have increase right upper extremity weakness and also significant headaches.  CT head revealed reaccumulation of subdural fluid collection, increased from before patient was sent to main [place] for further evaluation/neurosurgery services.  Neurosurgery evaluated, planning for  shunt this hospitalization, home meds resumed as appropriate except for Plavix, symptomatic management for headache.   shunt scheduled for 6/16.  Patient underwent redo diony hole, fluid evacuation and drain placement, concern for surgical site infection and therefore underwent cranioplasty.  Infectious Disease consulted and patient was placed on cefepime, vancomycin and Flagyl pending intraoperative cultures    Interval Hx:     Patient seen at bedside, complaining of itching in chest wall, otherwise no new complaints, hemodynamics stable, no acute events overnight, no other new complaints, no change in neurological status     Objective/physical  exam:  General: In no acute distress, afebrile  Chest: Clear to auscultation bilaterally  Heart: S1, S2, no appreciable murmur  Abdomen: Soft, nontender, BS +  MSK: Warm, no lower extremity edema, no clubbing or cyanosis  Neurologic: Alert and oriented x4,  VITAL SIGNS: 24 HRS MIN & MAX LAST   Temp  Min: 97.6 °F (36.4 °C)  Max: 98.8 °F (37.1 °C) 98.2 °F (36.8 °C)   BP  Min: 111/70  Max: 135/76 112/69   Pulse  Min: 73  Max: 88  77   Resp  Min: 18  Max: 20 20   SpO2  Min: 94 %  Max: 100 % (!) 94 %       Recent Labs   Lab 06/18/25  0516   WBC 7.03   RBC 3.54*   HGB 10.5*   HCT 32.9*   MCV 92.9   MCH 29.7   MCHC 31.9*   RDW 13.6   *   MPV 9.8         Recent Labs   Lab 06/18/25  0516      K 4.0      CO2 28   BUN 3.9*   CREATININE 0.68   *   CALCIUM 8.7   ALBUMIN 2.5*   PROT 6.3   ALKPHOS 168*   ALT 15   AST 11   BILITOT 0.2          Microbiology Results (last 7 days)       Procedure Component Value Units Date/Time    Tissue Culture - Aerobic [[id number]] Collected: 06/16/25 1338    Order Status: Completed Specimen: Tissue from Head Updated: 06/17/25 0639     Tissue - Aerobic Culture No Growth At 24 Hours    Tissue Culture - Aerobic [[id number]] Collected: 06/16/25 1357    Order Status: Completed Specimen: Bone from Head Updated: 06/17/25 0635     Tissue - Aerobic Culture No Growth At 24 Hours    Gram Stain [[id number]] Collected: 06/16/25 1357    Order Status: Completed Specimen: Bone from Head Updated: 06/16/25 1557     GRAM STAIN Moderate WBC observed      Rare Gram Positive Rods    Gram Stain [[id number]] Collected: 06/16/25 1338    Order Status: Completed Specimen: Tissue from Head Updated: 06/16/25 1556     GRAM STAIN Moderate WBC observed      Rare Gram positive cocci      Rare Gram Positive Rods    Fungal Culture [[id number]] Collected: 06/16/25 1357    Order Status: Sent Specimen: Bone from Head Updated: 06/16/25 1438    Anaerobic Culture [[id number]] Collected: 06/16/25 1357    Order  Status: Sent Specimen: Bone from Head Updated: 06/16/25 1438    Fungal Culture [[id number]] Collected: 06/16/25 1338    Order Status: Sent Specimen: Tissue from Head Updated: 06/16/25 1438    Anaerobic Culture [[id number]] Collected: 06/16/25 1338    Order Status: Sent Specimen: Tissue from Head Updated: 06/16/25 1438    AFB Smear [[id number]]     Order Status: Sent Specimen: Bone from Head     Mycobacteria and Nocardia Culture [[id number]]     Order Status: Sent Specimen: Bone from Head     AFB Smear [[id number]]     Order Status: Sent Specimen: SWAB from Skull     Mycobacteria and Nocardia Culture [[id number]]     Order Status: Sent Specimen: Tissue from Skull              Scheduled Med:   atorvastatin  80 mg Oral Daily    busPIRone  15 mg Oral BID    ceFEPime IV (PEDS and ADULTS)  1 g Intravenous Q6H    ezetimibe  10 mg Oral Daily    famotidine (PF)  20 mg Intravenous Q12H    levetiracetam  500 mg Oral BID    levothyroxine  88 mcg Oral Before breakfast    metoprolol succinate  12.5 mg Oral Daily    metroNIDAZOLE IV (PEDS and ADULTS)  500 mg Intravenous Q8H    nortriptyline  25 mg Oral QHS    polyethylene glycol  17 g Oral BID    traZODone  25 mg Oral QHS    vancomycin 1,250 mg in D5W 250 mL IVPB (admixture device)  1,250 mg Intravenous Q24H          Assessment/Plan:    Increased subdural fluid collection Status post left redo diony hole with fluid evacuation, drain placement 6/16  Suspected surgical site infection status post cranioplasty 6/16  Subdural hematoma requiring craniotomy with evacuation and MMA embolization early May with recurrence late May requiring diony hole  Right-sided weakness/intractable headache secondary to above  History of 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, 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  Depression/anxiety   HLD  Hypothyroidism  Essential HTN  Type 2 diabetes mellitus  Prophylaxis       Postop recommendations per Neurosurgery  JOHNATHAN drain in place  Symptomatic management for headache , continue p.r.n.  Fioricet  Postop CT non impressive  Intraoperative findings were concerning for infection  Infectious disease consulted and the patient is now on cefepime, Flagyl and vancomycin   Follow intraoperative cultures, so far negative  Keppra for seizure prevention   Plavix been on hold (cardiology okay to stop Plavix and once cleared by Neurosurgery Plavix can be restarted if indicated per Neurology for CVA)  Continue home meds-statin, BuSpar, Zetia, levothyroxine, Toprol-XL, nortriptyline   Labs stable  INR within normal limits  Trazodone 25 mg q.h.s. for insomnia  P.r.n. Benadryl and hydrocortisone cream for itching  DVT prophylaxis-bilateral SCDs, chemical prophylaxis when cleared by Neurosurgery    Cat Casper MD   06/18/2025

## 2025-06-19 LAB
BACTERIA SPEC ANAEROBE CULT: NORMAL
BACTERIA SPEC ANAEROBE CULT: NORMAL
BACTERIA TISS AEROBE CULT: ABNORMAL
BACTERIA TISS AEROBE CULT: ABNORMAL
CREAT SERPL-MCNC: 0.63 MG/DL (ref 0.55–1.02)
GFR SERPLBLD CREATININE-BSD FMLA CKD-EPI: >60 ML/MIN/1.73/M2
POCT GLUCOSE: 144 MG/DL (ref 70–110)
POCT GLUCOSE: 161 MG/DL (ref 70–110)
POCT GLUCOSE: 189 MG/DL (ref 70–110)

## 2025-06-19 PROCEDURE — 63600175 PHARM REV CODE 636 W HCPCS: Performed by: INTERNAL MEDICINE

## 2025-06-19 PROCEDURE — 25000003 PHARM REV CODE 250: Performed by: INTERNAL MEDICINE

## 2025-06-19 PROCEDURE — 82565 ASSAY OF CREATININE: CPT | Performed by: INTERNAL MEDICINE

## 2025-06-19 PROCEDURE — 97116 GAIT TRAINING THERAPY: CPT | Mod: CQ

## 2025-06-19 PROCEDURE — 25000003 PHARM REV CODE 250

## 2025-06-19 PROCEDURE — 11000001 HC ACUTE MED/SURG PRIVATE ROOM

## 2025-06-19 PROCEDURE — 97535 SELF CARE MNGMENT TRAINING: CPT | Mod: CO

## 2025-06-19 PROCEDURE — 63600175 PHARM REV CODE 636 W HCPCS

## 2025-06-19 PROCEDURE — 36415 COLL VENOUS BLD VENIPUNCTURE: CPT | Performed by: INTERNAL MEDICINE

## 2025-06-19 PROCEDURE — 97110 THERAPEUTIC EXERCISES: CPT | Mod: CQ

## 2025-06-19 RX ADMIN — ATORVASTATIN CALCIUM 80 MG: 40 TABLET, FILM COATED ORAL at 08:06

## 2025-06-19 RX ADMIN — HYDROCORTISONE: 1 CREAM TOPICAL at 09:06

## 2025-06-19 RX ADMIN — INSULIN ASPART 2 UNITS: 100 INJECTION, SOLUTION INTRAVENOUS; SUBCUTANEOUS at 01:06

## 2025-06-19 RX ADMIN — LEVETIRACETAM 500 MG: 500 SOLUTION ORAL at 09:06

## 2025-06-19 RX ADMIN — FAMOTIDINE 20 MG: 10 INJECTION, SOLUTION INTRAVENOUS at 08:06

## 2025-06-19 RX ADMIN — BUTALBITAL, ACETAMINOPHEN, AND CAFFEINE 1 TABLET: 325; 50; 40 TABLET ORAL at 06:06

## 2025-06-19 RX ADMIN — OXYCODONE HYDROCHLORIDE AND ACETAMINOPHEN 1 TABLET: 10; 325 TABLET ORAL at 03:06

## 2025-06-19 RX ADMIN — BUTALBITAL, ACETAMINOPHEN, AND CAFFEINE 1 TABLET: 325; 50; 40 TABLET ORAL at 08:06

## 2025-06-19 RX ADMIN — LEVOTHYROXINE SODIUM 88 MCG: 0.09 TABLET ORAL at 05:06

## 2025-06-19 RX ADMIN — HYDROCORTISONE: 1 CREAM TOPICAL at 08:06

## 2025-06-19 RX ADMIN — POLYETHYLENE GLYCOL 3350 17 G: 17 POWDER, FOR SOLUTION ORAL at 08:06

## 2025-06-19 RX ADMIN — TRAZODONE HYDROCHLORIDE 25 MG: 50 TABLET ORAL at 09:06

## 2025-06-19 RX ADMIN — BUTALBITAL, ACETAMINOPHEN, AND CAFFEINE 1 TABLET: 325; 50; 40 TABLET ORAL at 03:06

## 2025-06-19 RX ADMIN — BUSPIRONE HYDROCHLORIDE 15 MG: 5 TABLET ORAL at 08:06

## 2025-06-19 RX ADMIN — OXYCODONE HYDROCHLORIDE AND ACETAMINOPHEN 1 TABLET: 10; 325 TABLET ORAL at 07:06

## 2025-06-19 RX ADMIN — POLYETHYLENE GLYCOL 3350 17 G: 17 POWDER, FOR SOLUTION ORAL at 11:06

## 2025-06-19 RX ADMIN — BUTALBITAL, ACETAMINOPHEN, AND CAFFEINE 1 TABLET: 325; 50; 40 TABLET ORAL at 12:06

## 2025-06-19 RX ADMIN — CEFEPIME 1 G: 1 INJECTION, POWDER, FOR SOLUTION INTRAMUSCULAR; INTRAVENOUS at 07:06

## 2025-06-19 RX ADMIN — VANCOMYCIN HYDROCHLORIDE 1250 MG: 1.25 INJECTION, POWDER, LYOPHILIZED, FOR SOLUTION INTRAVENOUS at 08:06

## 2025-06-19 RX ADMIN — METRONIDAZOLE 500 MG: 5 INJECTION, SOLUTION INTRAVENOUS at 03:06

## 2025-06-19 RX ADMIN — CEFEPIME 1 G: 1 INJECTION, POWDER, FOR SOLUTION INTRAMUSCULAR; INTRAVENOUS at 02:06

## 2025-06-19 RX ADMIN — NORTRIPTYLINE HYDROCHLORIDE 25 MG: 25 CAPSULE ORAL at 09:06

## 2025-06-19 RX ADMIN — OXYCODONE HYDROCHLORIDE AND ACETAMINOPHEN 1 TABLET: 10; 325 TABLET ORAL at 10:06

## 2025-06-19 RX ADMIN — METRONIDAZOLE 500 MG: 5 INJECTION, SOLUTION INTRAVENOUS at 08:06

## 2025-06-19 RX ADMIN — BUSPIRONE HYDROCHLORIDE 15 MG: 5 TABLET ORAL at 09:06

## 2025-06-19 RX ADMIN — FAMOTIDINE 20 MG: 10 INJECTION, SOLUTION INTRAVENOUS at 09:06

## 2025-06-19 RX ADMIN — METRONIDAZOLE 500 MG: 5 INJECTION, SOLUTION INTRAVENOUS at 11:06

## 2025-06-19 RX ADMIN — METOPROLOL SUCCINATE 12.5 MG: 25 TABLET, EXTENDED RELEASE ORAL at 08:06

## 2025-06-19 RX ADMIN — CEFEPIME 1 G: 1 INJECTION, POWDER, FOR SOLUTION INTRAMUSCULAR; INTRAVENOUS at 03:06

## 2025-06-19 RX ADMIN — OXYCODONE HYDROCHLORIDE AND ACETAMINOPHEN 1 TABLET: 10; 325 TABLET ORAL at 05:06

## 2025-06-19 RX ADMIN — EZETIMIBE 10 MG: 10 TABLET ORAL at 08:06

## 2025-06-19 RX ADMIN — CEFEPIME 1 G: 1 INJECTION, POWDER, FOR SOLUTION INTRAMUSCULAR; INTRAVENOUS at 08:06

## 2025-06-19 RX ADMIN — LEVETIRACETAM 500 MG: 500 SOLUTION ORAL at 08:06

## 2025-06-19 RX ADMIN — BUTALBITAL, ACETAMINOPHEN, AND CAFFEINE 1 TABLET: 325; 50; 40 TABLET ORAL at 09:06

## 2025-06-19 NOTE — PROGRESS NOTES
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  Patient recently had subdural hematoma in May requiring intracranial embolization, craniotomy with hematoma evacuation.  After this patient was discharged home.  Late may she presented back to the ED with right-sided weakness, facial droop and imaging was concerning for left MCA diminished flow and subacute hematoma.  MRI brain was negative for CVA.  Neurosurgery performed diony hole for drainage of subdural hematoma due to increased intracranial pressure with postop CT showing definitive improvement patient was on Plavix post embolization which was held briefly and resumed per Neurosurgery on 06/05 and she was transferred to rehab on 06/06.  While in rehab patient was noted to have increase right upper extremity weakness and also significant headaches.  CT head revealed reaccumulation of subdural fluid collection, increased from before patient was sent to main [place] for further evaluation/neurosurgery services.  Neurosurgery evaluated, planning for  shunt this hospitalization, home meds resumed as appropriate except for Plavix, symptomatic management for headache.   shunt scheduled for 6/16.  Patient underwent redo diony hole, fluid evacuation and drain placement, concern for surgical site infection and therefore underwent cranioplasty.  Infectious Disease consulted and patient was placed on cefepime, vancomycin and Flagyl pending intraoperative cultures    Interval Hx:     Patient seen at bedside, family member at bedside  no new complaints, hemodynamics stable,   no acute events overnight, no other new complaints, no change in neurological status     Objective/physical exam:  General: In no  acute distress, afebrile  Chest: Clear to auscultation bilaterally  Heart: S1, S2, no appreciable murmur  Abdomen: Soft, nontender, BS +  MSK: Warm, no lower extremity edema, no clubbing or cyanosis  Neurologic: Alert and oriented x4,  VITAL SIGNS: 24 HRS MIN & MAX LAST   Temp  Min: 97.7 °F (36.5 °C)  Max: 98.8 °F (37.1 °C) 98.5 °F (36.9 °C)   BP  Min: 100/70  Max: 122/82 100/70   Pulse  Min: 71  Max: 86  82   Resp  Min: 16  Max: 18 18   SpO2  Min: 93 %  Max: 97 % 95 %       Recent Labs   Lab 06/18/25  0516   WBC 7.03   RBC 3.54*   HGB 10.5*   HCT 32.9*   MCV 92.9   MCH 29.7   MCHC 31.9*   RDW 13.6   *   MPV 9.8         Recent Labs   Lab 06/18/25  0516      K 4.0      CO2 28   BUN 3.9*   CREATININE 0.68   *   CALCIUM 8.7   ALBUMIN 2.5*   PROT 6.3   ALKPHOS 168*   ALT 15   AST 11   BILITOT 0.2          Microbiology Results (last 7 days)       Procedure Component Value Units Date/Time    Tissue Culture - Aerobic [[id number]]  (Abnormal) Collected: 06/16/25 1357    Order Status: Completed Specimen: Bone from Head Updated: 06/18/25 1115     Tissue - Aerobic Culture Moderate Staphylococcus epidermidis    Tissue Culture - Aerobic [[id number]]  (Abnormal) Collected: 06/16/25 1338    Order Status: Completed Specimen: Tissue from Head Updated: 06/18/25 1115     Tissue - Aerobic Culture Moderate Staphylococcus epidermidis    Anaerobic Culture [[id number]] Collected: 06/16/25 1357    Order Status: Completed Specimen: Bone from Head Updated: 06/18/25 0815     Anaerobe Culture No Anaerobes Isolated    Anaerobic Culture [[id number]] Collected: 06/16/25 1338    Order Status: Completed Specimen: Tissue from Head Updated: 06/18/25 0810     Anaerobe Culture No Anaerobes Isolated    Gram Stain [[id number]] Collected: 06/16/25 1357    Order Status: Completed Specimen: Bone from Head Updated: 06/16/25 1557     GRAM STAIN Moderate WBC observed      Rare Gram Positive Rods    Gram Stain [[id number]] Collected:  06/16/25 1338    Order Status: Completed Specimen: Tissue from Head Updated: 06/16/25 1556     GRAM STAIN Moderate WBC observed      Rare Gram positive cocci      Rare Gram Positive Rods    Fungal Culture [[id number]] Collected: 06/16/25 1357    Order Status: Sent Specimen: Bone from Head Updated: 06/16/25 1438    Fungal Culture [[id number]] Collected: 06/16/25 1338    Order Status: Sent Specimen: Tissue from Head Updated: 06/16/25 1438    AFB Smear [[id number]]     Order Status: Sent Specimen: Bone from Head     Mycobacteria and Nocardia Culture [[id number]]     Order Status: Sent Specimen: Bone from Head     AFB Smear [[id number]]     Order Status: Sent Specimen: SWAB from Skull     Mycobacteria and Nocardia Culture [[id number]]     Order Status: Sent Specimen: Tissue from Skull              Scheduled Med:   atorvastatin  80 mg Oral Daily    busPIRone  15 mg Oral BID    ceFEPime IV (PEDS and ADULTS)  1 g Intravenous Q6H    ezetimibe  10 mg Oral Daily    famotidine (PF)  20 mg Intravenous Q12H    hydrocortisone   Topical (Top) BID    levetiracetam  500 mg Oral BID    levothyroxine  88 mcg Oral Before breakfast    metoprolol succinate  12.5 mg Oral Daily    metroNIDAZOLE IV (PEDS and ADULTS)  500 mg Intravenous Q8H    nortriptyline  25 mg Oral QHS    polyethylene glycol  17 g Oral BID    traZODone  25 mg Oral QHS    vancomycin 1,250 mg in D5W 250 mL IVPB (admixture device)  1,250 mg Intravenous Q24H          Assessment/Plan:    Increased subdural fluid collection Status post left redo diony hole with fluid evacuation, drain placement 6/16  Suspected surgical site infection status post cranioplasty 6/16  Subdural hematoma requiring craniotomy with evacuation and MMA embolization early May with recurrence late May requiring diony hole  Right-sided weakness/intractable headache secondary to above  History of 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, 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  Depression/anxiety   HLD  Hypothyroidism  Essential  HTN  Type 2 diabetes mellitus  Prophylaxis     plan  Postop recommendations per Neurosurgery  JOHNATHAN drain in place  Symptomatic management for headache , continue p.r.n. Fioricet  Postop CT non impressive  Intraoperative findings were concerning for infection  Infectious disease consulted and the patient is now on cefepime, Flagyl and vancomycin   Follow intraoperative cultures, so far negative  Keppra for seizure prevention   Plavix been on hold (cardiology okay to stop Plavix and once cleared by Neurosurgery Plavix can be restarted if indicated per Neurology for CVA)  Continue home meds-statin, BuSpar, Zetia, levothyroxine, Toprol-XL, nortriptyline   Labs stable  INR within normal limits  Trazodone 25 mg q.h.s. for insomnia  P.r.n. Benadryl and hydrocortisone cream for itching  DVT prophylaxis-bilateral SCDs, chemical prophylaxis when cleared by Neurosurgery      Dispo patient awaiting placement     Lorena Agudelo MD   06/19/2025

## 2025-06-19 NOTE — PT/OT/SLP PROGRESS
Occupational Therapy   Treatment    Name: Chelle Chandra  MRN: [id number]    Recommendations:     Recommended therapy intensity at discharge: High Intensity Therapy   Discharge Equipment Recommendations:  to be determined by next level of care  Barriers to discharge:       Assessment:     Chelle Chandra is a 59 y.o. female with a medical diagnosis of R sided weakness and facial droop due to L SDH s/p craniotomy and MMA embolization.      She presents with improved balance and increased endurance, R UE weakness noted, pt. Cooperative and motivated throughout session. Pt. Is fall risk at this time, recommending High intensity therapy pending progress. Performance deficits affecting function are weakness, impaired self care skills, impaired functional mobility, decreased safety awareness, pain.     Rehab Prognosis:  Good; patient would benefit from acute skilled OT services to address these deficits and reach maximum level of function.       Plan:     Patient to be seen 5 x/week to address the above listed problems via self-care/home management, therapeutic activities, therapeutic exercises, neuromuscular re-education, sensory integration, cognitive retraining  Plan of Care Expires: 07/17/25  Plan of Care Reviewed with: patient    Subjective     Pain/Comfort:       Objective:     Communicated with: RN prior to session.  Patient found HOB elevated with   upon OT entry to room.    General Precautions: Standard, fall (HOB 30 degrees)    Orthopedic Precautions:   Braces: N/A  Respiratory Status: Room air       Occupational Performance:   (Bed Mobility- Min A)  (Sitting balance- SBA)  Pt. Performing LB dressing task with  Min A while donning underwear, seated EOB.   (Sit to stand- Min A) using RW with taco. (Clothing management- Min A) in supported stance.   Pt. Performing stand step t/f to BS chair with CGA, pt. Wanting to eat breakfast at this time. Pt. Left [facility] with all needs within reach.   Therapeutic  Positioning    OT interventions performed during the course of today's session in an effort to prevent and/or reduce acquired pressure injuries:   Therapeutic positioning was provided at the conclusion of session to offload all bony prominences for the prevention and/or reduction of pressure injuries      Patient Education:  Patient provided with verbal education education regarding fall prevention, safety awareness, and pressure ulcer prevention.  Additional teaching is warranted.      Patient left up in chair with all lines intact and call button in reach.    GOALS:   Multidisciplinary Problems       Occupational Therapy Goals          Problem: Occupational Therapy    Goal Priority Disciplines Outcome Interventions   Occupational Therapy Goal     OT, PT/OT Progressing    Description: Goals: to be met by 07/14/25    Pt will complete grooming standing at sink with LRAD with mod I.  Pt will complete UB dressing with SBA.  Pt will complete LB dressing with SBA using LRAD.  Pt will complete toileting with SBA using LRAD.  Pt will complete functional mobility to/from toilet and toilet transfer with mod I using LRAD.   Pt will demo visual attention to R side >80% of time with min verbal cues.                         Time Tracking:     OT Date of Treatment: 06/19/25  OT Start Time: 0824  OT Stop Time: 0840  OT Total Time (min): 16 min    Billable Minutes:Self Care/Home Management 1    Supervising Occupational Therapist: ILSA La  OT/EDILSON: EDILSON     Number of EDILSON visits since last OT visit: 1 6/19/2025

## 2025-06-19 NOTE — CARE UPDATE
Patient with decreasing output in left JOHNATHAN drain #1; asked to remove by Dr. Rankin  Drain site was prepped with chloraprep and drain was removed without resistance.  A 3-0 prolene was applied to the drain site. Site dry following.  She tolerated this without complications.    Continue drain #2  OK to leave incision open to air

## 2025-06-19 NOTE — PLAN OF CARE
Problem: Adult Inpatient Plan of Care  Goal: Absence of Hospital-Acquired Illness or Injury  Outcome: Progressing  Intervention: Identify and Manage Fall Risk  Flowsheets (Taken 6/19/2025 1709)  Safety Promotion/Fall Prevention:   assistive device/personal item within reach   medications reviewed   toileting scheduled  Goal: Optimal Comfort and Wellbeing  Outcome: Progressing  Intervention: Monitor Pain and Promote Comfort  Flowsheets (Taken 6/19/2025 1709)  Pain Management Interventions:   around-the-clock dosing utilized   care clustered   medication offered   position adjusted

## 2025-06-19 NOTE — PLAN OF CARE
Pt choice obtained for acute rehab. Referral sent to  rehab along with IV abx(s) order through 7/3/25.  Pt's dgt Wendy and pt's cousin Georgia present at bedside. Dgt tells me when pt returns home pt will have someone with her at all times. When pt's  is at work Wendy states she will stay with pt and states that Georgia is only 7 min away and available to help as needed.

## 2025-06-19 NOTE — PT/OT/SLP PROGRESS
Physical Therapy Treatment    Patient Name:  Chelle Chandra   MRN:  [id number]    Recommendations:     Discharge therapy intensity: High Intensity Therapy   Discharge Equipment Recommendations: other (see comments) (TBD by next level care facility.)  Barriers to discharge: Decreased caregiver support, Impaired mobility, and Ongoing medical needs    Assessment:     Chelle Chandra is a 59 y.o. female admitted with a medical diagnosis of R sided weakness and facial droop due to L SDH s/p craniotomy and MMA embolization. On 5/30 patient with subdural hemorrhage s/p L diony hole. The pt presents with new R sided weakness, and underwent a redo of diony hole for fluid evacuation/drain placement and a left cranioplasty for subdural fluid evacuation on 6/16. She presents with the following impairments/functional limitations: weakness, impaired self care skills, impaired balance, decreased safety awareness, impaired endurance, impaired functional mobility, pain, gait instability.     Rehab Prognosis: Good; patient would benefit from acute skilled PT services to address these deficits and reach maximum level of function.    Recent Surgery: Procedure(s) (LRB):  EVACUATION, EMPYEMA (Left)  CRANIECTOMY (Left) 3 Days Post-Op    Plan:     During this hospitalization, patient would benefit from acute PT services 6 x/week to address the identified rehab impairments via gait training, therapeutic activities, therapeutic exercises and progress toward the following goals:    Plan of Care Expires:  07/17/25    Subjective     Chief Complaint: pain in her head, she stated that nurse was aware and meds were given prior to treatment.     Objective:     Communicated with nurse prior to session.  Patient found sitting on commode with JOHNATHAN drain, peripheral IV, telemetry upon PT entry to room.     General Precautions: Standard, fall  Orthopedic Precautions: N/A  Braces: N/A  Respiratory Status: Room air  Blood Pressure:   Skin Integrity: coverlet  dressing intact. JOHNATHAN drains secure.      Functional Mobility:  Min to transfer off toilet and same to adjust clothing.   Gait 150 ft x 3 with min HHA and focus on wider ALEJANDRO, increase step right, and forward head posture.   Pt performed LE PRE's to increase strenth, ROM, and endurance to improve overall independence.  Pt performed dynamic balance activities to improve righting reactions  to prevent LOB/falls.     Education Provided:  Role and goals of PT, transfer training, bed mobility, gait training, balance training, safety awareness, assistive device, strengthening exercises, and importance of participating in PT to return to PLOF.     Patient left supine with all lines intact, call button in reach, and daughter  present.    GOALS:   Multidisciplinary Problems       Physical Therapy Goals          Problem: Physical Therapy    Goal Priority Disciplines Outcome Interventions   Physical Therapy Goal     PT, PT/OT Progressing    Description: Goals to be met by: 25     Patient will increase functional independence with mobility by performin. Supine to sit with Modified [place]  2. Sit to supine with Modified [place]  3. Sit to stand transfer with Modified [place]  4. Bed to chair transfer with Modified [place] using Rolling Walker  5. Gait  x 200 feet with Modified [place] using Rolling Walker.                          Time Tracking:     PT Received On: 25  PT Start Time: 0900     PT Stop Time: 0940  PT Total Time (min): 40 min     Billable Minutes: Gait Training 25 and Therapeutic Exercise 15    Treatment Type: Treatment  PT/PTA: PTA     Number of PTA visits since last PT visit: 2     2025

## 2025-06-19 NOTE — PROGRESS NOTES
Awake, alert, Beulah.  JOHNATHAN-0 and 10.  On antibiotics.Will remove one drain today.Will follow.  Thank you.

## 2025-06-19 NOTE — PT/OT/SLP PROGRESS
[facility]  Speech Language Pathology Department  Diet Tolerance Follow-up    Patient Name:  Chelle Chandra   MRN:  [id number]    Recommendations     General recommendations:  dysphagia therapy  Solid texture recommendation:  Regular Diet - IDDSI Level 7  Liquid consistency recommendation: Moderately thick liquids - IDDSI Level 3   Medications: in puree  Swallow strategies/precautions: small bites/sips, slow rate, alternate solids/liquids, and upright for PO intake  Precautions: aspiration    Diet Tolerance     Nursing reports no difficulty regarding diet tolerance.    Outcome Measures     Functional Oral Intake Scale: 5 - Total oral diet with multiple consistencies, by requiring special preparation or compensations    Plan     SLP to follow up regarding dysphagia therapy.    06/19/2025

## 2025-06-19 NOTE — PROCEDURES
"Chelle Chandra is a 59 y.o. female patient.    Temp: 98.8 °F (37.1 °C) (06/18/25 1948)  Pulse: 86 (06/18/25 1948)  Resp: 18 (06/18/25 1543)  BP: 104/77 (06/18/25 1948)  SpO2: 95 % (06/18/25 1948)  Weight: 49.9 kg (110 lb) (06/12/25 0223)  Height: 5' 6" (167.6 cm) (06/12/25 0223)    PICC  Date/Time: 6/18/2025 7:52 PM  Performed by: Sylwia Topete RN  Consent Done: Yes  Time out: Immediately prior to procedure a time out was called to verify the correct patient, procedure, equipment, support staff and site/side marked as required  Indications: med administration and vascular access  Anesthesia: local infiltration  Local anesthetic: lidocaine 1% without epinephrine    Preparation: skin prepped with ChloraPrep  Skin prep agent dried: skin prep agent completely dried prior to procedure  Sterile barriers: all five maximum sterile barriers used - cap, mask, sterile gown, sterile gloves, and large sterile sheet  Hand hygiene: hand hygiene performed prior to central venous catheter insertion  Location details: left basilic  Catheter type: single lumen  Catheter size: 4 Fr  Catheter Length: 17cm    Ultrasound guidance: yes  Vessel Caliber: patent, compressibility normal  Needle advanced into vessel with real time Ultrasound guidance.  Guidewire confirmed in vessel.  Sterile sheath used.  Number of attempts: 1  Post-procedure: blood return through all ports, chlorhexidine patch and sterile dressing applied            Name MISSY Topete RN   6/18/2025    "

## 2025-06-20 LAB
POCT GLUCOSE: 168 MG/DL (ref 70–110)
POCT GLUCOSE: 178 MG/DL (ref 70–110)
POCT GLUCOSE: 201 MG/DL (ref 70–110)
POCT GLUCOSE: 247 MG/DL (ref 70–110)
VANCOMYCIN TROUGH SERPL-MCNC: 6.5 UG/ML (ref 15–20)

## 2025-06-20 PROCEDURE — 25000003 PHARM REV CODE 250: Performed by: STUDENT IN AN ORGANIZED HEALTH CARE EDUCATION/TRAINING PROGRAM

## 2025-06-20 PROCEDURE — 99900031 HC PATIENT EDUCATION (STAT)

## 2025-06-20 PROCEDURE — 63600175 PHARM REV CODE 636 W HCPCS: Performed by: INTERNAL MEDICINE

## 2025-06-20 PROCEDURE — 25000003 PHARM REV CODE 250: Performed by: INTERNAL MEDICINE

## 2025-06-20 PROCEDURE — 36415 COLL VENOUS BLD VENIPUNCTURE: CPT | Performed by: INTERNAL MEDICINE

## 2025-06-20 PROCEDURE — 11000001 HC ACUTE MED/SURG PRIVATE ROOM

## 2025-06-20 PROCEDURE — 97116 GAIT TRAINING THERAPY: CPT | Mod: CQ

## 2025-06-20 PROCEDURE — 80202 ASSAY OF VANCOMYCIN: CPT | Performed by: INTERNAL MEDICINE

## 2025-06-20 PROCEDURE — 63600175 PHARM REV CODE 636 W HCPCS

## 2025-06-20 PROCEDURE — 94799 UNLISTED PULMONARY SVC/PX: CPT

## 2025-06-20 PROCEDURE — 92526 ORAL FUNCTION THERAPY: CPT

## 2025-06-20 PROCEDURE — 25000003 PHARM REV CODE 250

## 2025-06-20 RX ORDER — NORTRIPTYLINE HYDROCHLORIDE 25 MG/1
25 CAPSULE ORAL NIGHTLY
Qty: 30 CAPSULE | Refills: 11 | Status: ON HOLD | OUTPATIENT
Start: 2025-06-20 | End: 2026-06-20

## 2025-06-20 RX ORDER — FAMOTIDINE 20 MG/1
20 TABLET, FILM COATED ORAL 2 TIMES DAILY
Qty: 60 TABLET | Refills: 11 | Status: ON HOLD | OUTPATIENT
Start: 2025-06-20 | End: 2026-06-20

## 2025-06-20 RX ORDER — BISACODYL 10 MG/1
10 SUPPOSITORY RECTAL DAILY PRN
Status: DISCONTINUED | OUTPATIENT
Start: 2025-06-20 | End: 2025-07-15 | Stop reason: HOSPADM

## 2025-06-20 RX ORDER — FAMOTIDINE 20 MG/1
20 TABLET, FILM COATED ORAL 2 TIMES DAILY
Status: DISCONTINUED | OUTPATIENT
Start: 2025-06-20 | End: 2025-07-15 | Stop reason: HOSPADM

## 2025-06-20 RX ORDER — TRAZODONE HYDROCHLORIDE 50 MG/1
25 TABLET ORAL NIGHTLY
Qty: 15 TABLET | Refills: 11 | Status: ON HOLD | OUTPATIENT
Start: 2025-06-20 | End: 2026-06-20

## 2025-06-20 RX ADMIN — CEFEPIME 1 G: 1 INJECTION, POWDER, FOR SOLUTION INTRAMUSCULAR; INTRAVENOUS at 09:06

## 2025-06-20 RX ADMIN — METOPROLOL SUCCINATE 12.5 MG: 25 TABLET, EXTENDED RELEASE ORAL at 08:06

## 2025-06-20 RX ADMIN — METRONIDAZOLE 500 MG: 5 INJECTION, SOLUTION INTRAVENOUS at 03:06

## 2025-06-20 RX ADMIN — INSULIN ASPART 2 UNITS: 100 INJECTION, SOLUTION INTRAVENOUS; SUBCUTANEOUS at 05:06

## 2025-06-20 RX ADMIN — BUSPIRONE HYDROCHLORIDE 15 MG: 5 TABLET ORAL at 09:06

## 2025-06-20 RX ADMIN — INSULIN ASPART 2 UNITS: 100 INJECTION, SOLUTION INTRAVENOUS; SUBCUTANEOUS at 09:06

## 2025-06-20 RX ADMIN — OXYCODONE HYDROCHLORIDE AND ACETAMINOPHEN 1 TABLET: 10; 325 TABLET ORAL at 09:06

## 2025-06-20 RX ADMIN — NORTRIPTYLINE HYDROCHLORIDE 25 MG: 25 CAPSULE ORAL at 09:06

## 2025-06-20 RX ADMIN — CEFEPIME 1 G: 1 INJECTION, POWDER, FOR SOLUTION INTRAMUSCULAR; INTRAVENOUS at 02:06

## 2025-06-20 RX ADMIN — BUTALBITAL, ACETAMINOPHEN, AND CAFFEINE 1 TABLET: 325; 50; 40 TABLET ORAL at 07:06

## 2025-06-20 RX ADMIN — ATORVASTATIN CALCIUM 80 MG: 40 TABLET, FILM COATED ORAL at 08:06

## 2025-06-20 RX ADMIN — OXYCODONE HYDROCHLORIDE AND ACETAMINOPHEN 1 TABLET: 10; 325 TABLET ORAL at 07:06

## 2025-06-20 RX ADMIN — TRAZODONE HYDROCHLORIDE 25 MG: 50 TABLET ORAL at 09:06

## 2025-06-20 RX ADMIN — LEVETIRACETAM 500 MG: 500 SOLUTION ORAL at 09:06

## 2025-06-20 RX ADMIN — INSULIN ASPART 4 UNITS: 100 INJECTION, SOLUTION INTRAVENOUS; SUBCUTANEOUS at 11:06

## 2025-06-20 RX ADMIN — BISACODYL 10 MG: 10 SUPPOSITORY RECTAL at 11:06

## 2025-06-20 RX ADMIN — VANCOMYCIN HYDROCHLORIDE 1250 MG: 1.25 INJECTION, POWDER, LYOPHILIZED, FOR SOLUTION INTRAVENOUS at 10:06

## 2025-06-20 RX ADMIN — FAMOTIDINE 20 MG: 20 TABLET, FILM COATED ORAL at 09:06

## 2025-06-20 RX ADMIN — LEVOTHYROXINE SODIUM 88 MCG: 0.09 TABLET ORAL at 05:06

## 2025-06-20 RX ADMIN — METRONIDAZOLE 500 MG: 5 INJECTION, SOLUTION INTRAVENOUS at 07:06

## 2025-06-20 RX ADMIN — HYDROCORTISONE: 1 CREAM TOPICAL at 08:06

## 2025-06-20 RX ADMIN — EZETIMIBE 10 MG: 10 TABLET ORAL at 08:06

## 2025-06-20 RX ADMIN — POLYETHYLENE GLYCOL 3350 17 G: 17 POWDER, FOR SOLUTION ORAL at 09:06

## 2025-06-20 RX ADMIN — BUSPIRONE HYDROCHLORIDE 15 MG: 5 TABLET ORAL at 08:06

## 2025-06-20 RX ADMIN — BUTALBITAL, ACETAMINOPHEN, AND CAFFEINE 1 TABLET: 325; 50; 40 TABLET ORAL at 03:06

## 2025-06-20 RX ADMIN — OXYCODONE HYDROCHLORIDE AND ACETAMINOPHEN 1 TABLET: 10; 325 TABLET ORAL at 11:06

## 2025-06-20 RX ADMIN — OXYCODONE HYDROCHLORIDE AND ACETAMINOPHEN 1 TABLET: 10; 325 TABLET ORAL at 02:06

## 2025-06-20 RX ADMIN — FAMOTIDINE 20 MG: 10 INJECTION, SOLUTION INTRAVENOUS at 08:06

## 2025-06-20 RX ADMIN — BUTALBITAL, ACETAMINOPHEN, AND CAFFEINE 1 TABLET: 325; 50; 40 TABLET ORAL at 05:06

## 2025-06-20 RX ADMIN — OXYCODONE HYDROCHLORIDE AND ACETAMINOPHEN 1 TABLET: 10; 325 TABLET ORAL at 05:06

## 2025-06-20 RX ADMIN — BUTALBITAL, ACETAMINOPHEN, AND CAFFEINE 1 TABLET: 325; 50; 40 TABLET ORAL at 10:06

## 2025-06-20 RX ADMIN — CEFEPIME 1 G: 1 INJECTION, POWDER, FOR SOLUTION INTRAMUSCULAR; INTRAVENOUS at 03:06

## 2025-06-20 RX ADMIN — HYDROCORTISONE: 1 CREAM TOPICAL at 09:06

## 2025-06-20 RX ADMIN — POLYETHYLENE GLYCOL 3350 17 G: 17 POWDER, FOR SOLUTION ORAL at 08:06

## 2025-06-20 RX ADMIN — LEVETIRACETAM 500 MG: 500 SOLUTION ORAL at 08:06

## 2025-06-20 RX ADMIN — CEFEPIME 1 G: 1 INJECTION, POWDER, FOR SOLUTION INTRAMUSCULAR; INTRAVENOUS at 08:06

## 2025-06-20 NOTE — PROGRESS NOTES
Inpatient Nutrition Assessment    Admit Date: 6/11/2025   Total duration of encounter: 9 days   Patient Age: 59 y.o.    Nutrition Recommendation/Prescription     Continue current diet (Diet Consistent Carbohydrate 2000 Calories (up to 75 gm per meal); Moderately Thick Liquids (IDDSI Level 3); Standard Tray) as tolerated. Texture per SLP.   Monitor PO intakes, labs, and wt changes    Communication of Recommendations: reviewed with patient    Nutrition Assessment     Malnutrition Assessment/Nutrition-Focused Physical Exam    Malnutrition Context: acute illness or injury (06/18/25 1545)  Malnutrition Level: severe (06/18/25 1545)  Energy Intake (Malnutrition): other (see comments) (Does not meet criteria) (06/18/25 1545)  Weight Loss (Malnutrition): greater than 5% in 1 month (06/18/25 1545)  Subcutaneous Fat (Malnutrition): moderate depletion (06/18/25 1545)  Orbital Region (Subcutaneous Fat Loss): moderate depletion        Muscle Mass (Malnutrition): moderate depletion (06/18/25 1545)  [place] Region (Muscle Loss): moderate depletion  Clavicle Bone Region (Muscle Loss): moderate depletion                    Fluid Accumulation (Malnutrition): other (see comments) (Not present) (06/18/25 1545)     Hand  Strength, Right (Malnutrition): Unable to assess (06/18/25 1545)  A minimum of two characteristics is recommended for diagnosis of either severe or non-severe malnutrition.    Chart Review    Reason Seen: length of stay and follow-up    Malnutrition Screening Tool Results   Have you recently lost weight without trying?: Yes: 2-13 lbs  Have you been eating poorly because of a decreased appetite?: No   MST Score: 1   Diagnosis:  Increased subdural fluid collection Status post left redo diony hole with fluid evacuation, drain placement 6/16  Suspected surgical site infection status post cranioplasty 6/16  Subdural hematoma requiring craniotomy with evacuation and MMA embolization early May with recurrence late May  requiring diony hole  Right-sided weakness/intractable headache secondary to above    Relevant Medical History:   hypothyroidism, anxiety, bronchial asthma, COPD, type 2 diabetes mellitus, 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, 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, GERD     Scheduled Medications:  atorvastatin, 80 mg, Daily  busPIRone, 15 mg, BID  ceFEPime IV (PEDS and ADULTS), 1 g, Q6H  ezetimibe, 10 mg, Daily  famotidine, 20 mg, BID  hydrocortisone, , BID  levetiracetam, 500 mg, BID  levothyroxine, 88 mcg, Before breakfast  metoprolol succinate, 12.5 mg, Daily  metroNIDAZOLE IV (PEDS and ADULTS), 500 mg, Q8H  nortriptyline, 25 mg, QHS  polyethylene glycol, 17 g, BID  traZODone, 25 mg, QHS  vancomycin 1,250 mg in D5W 250 mL IVPB (admixture device), 1,250 mg, Q24H    Continuous Infusions:   PRN Medications:  acetaminophen, 650 mg, Q4H PRN  acetaminophen, 650 mg, Q8H PRN  acetaminophen, 650 mg, Q4H PRN  bisacodyL, 10 mg, Daily PRN  butalbital-acetaminophen-caffeine -40 mg, 1 tablet, Q4H PRN  dextrose 50%, 12.5 g, PRN  dextrose 50%, 25 g, PRN  diphenhydrAMINE, 25 mg, Q6H PRN  glucagon (human recombinant), 1 mg, PRN  glucagon (human recombinant), 1 mg, PRN  glucose, 16 g, PRN  glucose, 24 g, PRN  insulin aspart U-100, 0-10 Units, QID (AC + HS) PRN  melatonin, 6 mg, Nightly PRN  morphine, 2 mg, Q4H PRN  naloxone, 0.02 mg, PRN  ondansetron, 4 mg, Q8H PRN  oxyCODONE-acetaminophen, 1 tablet, Q4H PRN  prochlorperazine, 5 mg, Q6H PRN  sodium chloride 0.9%, 10 mL, PRN  sodium chloride 0.9%, 10 mL, Q12H PRN  sodium chloride 0.9%, 3 mL, Q12H PRN  vancomycin - pharmacy to dose, , pharmacy to manage frequency    Calorie Containing IV Medications: no significant kcals from medications at this time    Recent Labs   Lab 06/15/25  1016 06/16/25  1618 06/17/25  0817 06/18/25  0516 06/19/25  0737     --  140 139  --    K 4.4  --  4.2 4.0  --    CALCIUM 9.4  --  8.8 8.7  --      --  103 104  --    CO2 29  --  28 28  --    BUN 5.8*  --  6.9*  "3.9*  --    CREATININE 0.70  --  0.69 0.68 0.63   EGFRNORACEVR >60  --  >60 >60 >60   *  --  133* 186*  --    BILITOT 0.2  --  0.3 0.2  --    ALKPHOS 201*  --  172* 168*  --    ALT 27  --  19 15  --    AST 24  --  12 11  --    ALBUMIN 2.8*  --  2.5* 2.5*  --    CRP  --  32.80*  --   --   --    WBC 6.37  --  6.90 7.03  --    HGB 11.9*  --  10.4* 10.5*  --    HCT 37.6  --  33.4* 32.9*  --      Nutrition Orders:  Diet Consistent Carbohydrate 2000 Calories (up to 75 gm per meal); Moderately Thick Liquids (IDDSI Level 3); Standard Tray      Appetite/Oral Intake: good/% of meals  Factors Affecting Nutritional Intake: pain  Social Needs Impacting Access to Food: none identified  Food/Jew/Cultural Preferences: none reported  Food Allergies: no known food allergies  Last Bowel Movement: 06/15/25  Wound(s):  scalp and temporal incisions noted.     Comments    6/18/25: Spoke to family members at bedside who report pt's appetite has been fluctuating since admit depending on her pain levels. PO intake varies between %. Pt ate 75% of her breakfast this morning but did not eat much of her lunch. Family reports UBW of 112 lbs, last weighing that about 1 month ago. Family reports wt at admit was about 100 lbs which would indicate a 10% wt loss x1 month, nutritionally significant. Most recent [facility] wt from 6/12 is 110 lbs, will monitor for accuracy/trends.     6/20/25: Patient reports good oral intake of meals. Denies any nausea, vomiting, or diarrhea. Constipation noted.     Anthropometrics    Height: 5' 6" (167.6 cm), Height Method: Measured  Last Weight: 49.9 kg (110 lb) (06/12/25 0223), Weight Method: Bed Scale  BMI (Calculated): 17.8  BMI Classification: underweight (BMI less than 18.5)     Ideal Body Weight (IBW), Female: 130 lb     % Ideal Body Weight, Female (lb): 84.62 %                             Usual Weight Provided By: patient    Wt Readings from Last 5 Encounters:   06/12/25 49.9 kg (110 lb) "   06/06/25 46.9 kg (103 lb 6.3 oz)   06/03/25 46.3 kg (102 lb)   05/15/25 49 kg (108 lb 0.4 oz)   05/06/25 50.8 kg (112 lb)     Weight Change(s) Since Admission:   Wt Readings from Last 1 Encounters:   06/12/25 0223 49.9 kg (110 lb)   06/11/25 1314 46.7 kg (103 lb)   Admit Weight: 46.7 kg (103 lb) (06/11/25 1314), Weight Method: Stated    Estimated Needs    Weight Used For Calorie Calculations: 49.9 kg (110 lb 0.2 oz)  Energy Calorie Requirements (kcal): 0465-1842 kcals (30-35 kcal/kg)  Energy Need Method: Kcal/kg  Weight Used For Protein Calculations: 49.9 kg (110 lb 0.2 oz)  Protein Requirements: 60-75 g (1.2-1.5 g/kg)  Fluid Requirements (mL): 1497 mL (30 mL/kg)  CHO Requirement: N/A     Enteral Nutrition     Patient not receiving enteral nutrition at this time.    Parenteral Nutrition     Patient not receiving parenteral nutrition support at this time.    Evaluation of Received Nutrient Intake    Calories: meeting estimated needs  Protein: meeting estimated needs    Patient Education     Not applicable.    Nutrition Diagnosis     PES: Inadequate energy intake related to acute illness as evidenced by meeting <75% EEN. (resolved)     PES: Severe acute disease or injury related malnutrition Related to  As Evidenced by:  - weight loss: > 5% in 1 month - muscle mass depletion: 2 areas of moderate muscle loss (Clavicle, Temporalis) - loss of subcutaneous fat: 2 areas of moderate fat loss (Infraorbital, Buccal) new    Nutrition Interventions     Intervention(s): modified composition of meals/snacks and collaboration with other providers  Intervention(s): Oral diet/nutrient modifications    Goal: Meet greater than 80% of nutritional needs by follow-up. (goal met)  Goal: Maintain weight throughout hospitalization. (goal progressing)    Nutrition Goals & Monitoring     Dietitian will monitor: energy intake, weight, and gastrointestinal profile  Discharge planning: Consistent Carbohydrate diet with texture modifications  per SLP  Nutrition Risk/Follow-Up: patient at increased nutrition risk; dietitian will follow-up twice weekly   Please consult if re-assessment needed sooner.

## 2025-06-20 NOTE — PT/OT/SLP PROGRESS
[facility]  Spe[facility]ology Department  Dysphagia Therapy Progress Note    Patient Name:  Chelle Chandra   MRN:  [id number]    Recommendations     General recommendations:  dysphagia therapy  Solid texture recommendation:  Regular Diet - IDDSI Level 7  Liquid consistency recommendation: Moderately thick liquids - IDDSI Level 3   Medications: in puree  Aspiration precautions: small bites/sips, slow rate, alternate solids/liquids, and upright for PO intake    Discharge therapy intensity: High Intensity Therapy   Barriers to safe discharge:  none    Subjective     Patient awake, alert, and cooperative.  Spiritual/Cultural/Spiritism Beliefs/Practices that affect care: no    Pain/Comfort:  0/10    Respiratory Status:  room air    Objective     Therapeutic Activities:  Pt completed laryngeal exercises x40 with minimal cues.  Pt attempted shaker exercises however unable to complete 2/2 tension and discomfort at surgical site    Assessment     Pt continues to present with oropharyngeal dysphagia requiring diet modification to reduce the risk of aspiration.    Outcome Measures     Functional Oral Intake Scale: 5 - Total oral diet with multiple consistencies, by requiring special preparation or compensations    Goals     Multidisciplinary Problems       SLP Goals          Problem: SLP    Goal Priority Disciplines Outcome   SLP Goal     SLP    Description: LTG: The pt will tolerate least restrictive diet with no overt s/sx of aspiration.    STGs:  1. Patient will participate CTAR/pharyngeal exercises  2. Patient will participate in laryngeal elevation exercises  3. Patient will participate in repeat MBS when clinically appropriate                     Patient Education     Patient provided with verbal education regarding SLP POC.  Understanding was verbalized, however additional teaching warranted.    Plan     Will continue to follow and tx as appropriate.    SLP Follow-Up:  Yes   Patient  to be seen:  5 x/week   Plan of Care expires:  07/01/25  Plan of Care reviewed with:  patient       Time Tracking     SLP Treatment Date:   06/20/25  Speech Start Time:  1317  Speech Stop Time:  1327     Speech Total Time (min):  10 min    Billable minutes:  Treatment of Swallow Dysfunction, 10 minutes       06/20/2025

## 2025-06-20 NOTE — PT/OT/SLP PROGRESS
Physical Therapy Treatment    Patient Name:  Chelle Chandra   MRN:  [id number]    Recommendations:     Discharge therapy intensity: High Intensity Therapy   Discharge Equipment Recommendations: other (see comments) (TBD by next level care facility.)  Barriers to discharge: Decreased caregiver support, Impaired mobility, and Ongoing medical needs    Assessment:     Chelle Chandra is a 59 y.o. female admitted with a medical diagnosis of R sided weakness and facial droop due to L SDH s/p craniotomy and MMA embolization. On 5/30 patient with subdural hemorrhage s/p L diony hole. The pt presents with new R sided weakness, and underwent a redo of diony hole for fluid evacuation/drain placement and a left cranioplasty for subdural fluid evacuation on 6/16. She presents with the following impairments/functional limitations: weakness, impaired self care skills, impaired balance, decreased safety awareness, impaired endurance, impaired functional mobility, pain, gait instability.     Rehab Prognosis: Good; patient would benefit from acute skilled PT services to address these deficits and reach maximum level of function.    Recent Surgery: Procedure(s) (LRB):  EVACUATION, EMPYEMA (Left)  CRANIECTOMY (Left) 4 Days Post-Op    Plan:     During this hospitalization, patient would benefit from acute PT services 6 x/week to address the identified rehab impairments via gait training, therapeutic activities, therapeutic exercises and progress toward the following goals:    Plan of Care Expires:  07/17/25    Subjective     Chief Complaint: none, no headache today    Objective:     Communicated with nurse prior to session.  Patient found up in chair with JOHNATHAN drain, peripheral IV, telemetry upon PT entry to room.     General Precautions: Standard, fall  Orthopedic Precautions: N/A  Braces: N/A  Respiratory Status: Room air  Blood Pressure: 101/66  Skin Integrity: staples intact      Functional Mobility:  SBA STS and min assist transfers.    Gait 100 ft x 3 RW min assist. Improve sequence and gait quality using RW with taco adaptive device.     Education Provided:  Role and goals of PT, transfer training, bed mobility, gait training, balance training, safety awareness, assistive device, strengthening exercises, and importance of participating in PT to return to PLOF.     Patient left supine with call button in reach    GOALS:   Multidisciplinary Problems       Physical Therapy Goals          Problem: Physical Therapy    Goal Priority Disciplines Outcome Interventions   Physical Therapy Goal     PT, PT/OT Progressing    Description: Goals to be met by: 25     Patient will increase functional independence with mobility by performin. Supine to sit with Modified [place]  2. Sit to supine with Modified [place]  3. Sit to stand transfer with Modified [place]  4. Bed to chair transfer with Modified [place] using Rolling Walker  5. Gait  x 200 feet with Modified [place] using Rolling Walker.                          Time Tracking:     PT Received On: 25  PT Start Time: 1015     PT Stop Time: 1042  PT Total Time (min): 27 min     Billable Minutes: Gait Training 27    Treatment Type: Treatment  PT/PTA: PTA     Number of PTA visits since last PT visit: 3     2025

## 2025-06-21 LAB
POCT GLUCOSE: 134 MG/DL (ref 70–110)
POCT GLUCOSE: 178 MG/DL (ref 70–110)
POCT GLUCOSE: 259 MG/DL (ref 70–110)

## 2025-06-21 PROCEDURE — 63600175 PHARM REV CODE 636 W HCPCS

## 2025-06-21 PROCEDURE — 25000003 PHARM REV CODE 250: Performed by: INTERNAL MEDICINE

## 2025-06-21 PROCEDURE — 63600175 PHARM REV CODE 636 W HCPCS: Performed by: INTERNAL MEDICINE

## 2025-06-21 PROCEDURE — 94799 UNLISTED PULMONARY SVC/PX: CPT

## 2025-06-21 PROCEDURE — 11000001 HC ACUTE MED/SURG PRIVATE ROOM

## 2025-06-21 PROCEDURE — 97110 THERAPEUTIC EXERCISES: CPT | Mod: CQ

## 2025-06-21 PROCEDURE — 63600175 PHARM REV CODE 636 W HCPCS: Performed by: STUDENT IN AN ORGANIZED HEALTH CARE EDUCATION/TRAINING PROGRAM

## 2025-06-21 PROCEDURE — 99024 POSTOP FOLLOW-UP VISIT: CPT | Mod: ,,, | Performed by: NEUROLOGICAL SURGERY

## 2025-06-21 PROCEDURE — 97116 GAIT TRAINING THERAPY: CPT | Mod: CQ

## 2025-06-21 RX ORDER — ENOXAPARIN SODIUM 100 MG/ML
40 INJECTION SUBCUTANEOUS EVERY 24 HOURS
Status: DISCONTINUED | OUTPATIENT
Start: 2025-06-21 | End: 2025-06-23

## 2025-06-21 RX ADMIN — BUTALBITAL, ACETAMINOPHEN, AND CAFFEINE 1 TABLET: 325; 50; 40 TABLET ORAL at 05:06

## 2025-06-21 RX ADMIN — HYDROCORTISONE: 1 CREAM TOPICAL at 08:06

## 2025-06-21 RX ADMIN — EZETIMIBE 10 MG: 10 TABLET ORAL at 09:06

## 2025-06-21 RX ADMIN — ENOXAPARIN SODIUM 40 MG: 40 INJECTION SUBCUTANEOUS at 07:06

## 2025-06-21 RX ADMIN — BUSPIRONE HYDROCHLORIDE 15 MG: 5 TABLET ORAL at 08:06

## 2025-06-21 RX ADMIN — METRONIDAZOLE 500 MG: 5 INJECTION, SOLUTION INTRAVENOUS at 09:06

## 2025-06-21 RX ADMIN — METRONIDAZOLE 500 MG: 5 INJECTION, SOLUTION INTRAVENOUS at 02:06

## 2025-06-21 RX ADMIN — ATORVASTATIN CALCIUM 80 MG: 40 TABLET, FILM COATED ORAL at 09:06

## 2025-06-21 RX ADMIN — POLYETHYLENE GLYCOL 3350 17 G: 17 POWDER, FOR SOLUTION ORAL at 09:06

## 2025-06-21 RX ADMIN — BUSPIRONE HYDROCHLORIDE 15 MG: 5 TABLET ORAL at 09:06

## 2025-06-21 RX ADMIN — LEVETIRACETAM 500 MG: 500 SOLUTION ORAL at 08:06

## 2025-06-21 RX ADMIN — HYDROCORTISONE: 1 CREAM TOPICAL at 09:06

## 2025-06-21 RX ADMIN — DEXTROSE MONOHYDRATE 750 MG: 5 INJECTION, SOLUTION INTRAVENOUS at 10:06

## 2025-06-21 RX ADMIN — FAMOTIDINE 20 MG: 20 TABLET, FILM COATED ORAL at 09:06

## 2025-06-21 RX ADMIN — CEFEPIME 1 G: 1 INJECTION, POWDER, FOR SOLUTION INTRAMUSCULAR; INTRAVENOUS at 08:06

## 2025-06-21 RX ADMIN — OXYCODONE HYDROCHLORIDE AND ACETAMINOPHEN 1 TABLET: 10; 325 TABLET ORAL at 02:06

## 2025-06-21 RX ADMIN — CEFEPIME 1 G: 1 INJECTION, POWDER, FOR SOLUTION INTRAMUSCULAR; INTRAVENOUS at 09:06

## 2025-06-21 RX ADMIN — OXYCODONE HYDROCHLORIDE AND ACETAMINOPHEN 1 TABLET: 10; 325 TABLET ORAL at 10:06

## 2025-06-21 RX ADMIN — METRONIDAZOLE 500 MG: 5 INJECTION, SOLUTION INTRAVENOUS at 11:06

## 2025-06-21 RX ADMIN — LEVOTHYROXINE SODIUM 88 MCG: 0.09 TABLET ORAL at 05:06

## 2025-06-21 RX ADMIN — TRAZODONE HYDROCHLORIDE 25 MG: 50 TABLET ORAL at 08:06

## 2025-06-21 RX ADMIN — FAMOTIDINE 20 MG: 20 TABLET, FILM COATED ORAL at 08:06

## 2025-06-21 RX ADMIN — LEVETIRACETAM 500 MG: 500 SOLUTION ORAL at 09:06

## 2025-06-21 RX ADMIN — CEFEPIME 1 G: 1 INJECTION, POWDER, FOR SOLUTION INTRAMUSCULAR; INTRAVENOUS at 02:06

## 2025-06-21 RX ADMIN — METOPROLOL SUCCINATE 12.5 MG: 25 TABLET, EXTENDED RELEASE ORAL at 09:06

## 2025-06-21 RX ADMIN — NORTRIPTYLINE HYDROCHLORIDE 25 MG: 25 CAPSULE ORAL at 08:06

## 2025-06-21 RX ADMIN — OXYCODONE HYDROCHLORIDE AND ACETAMINOPHEN 1 TABLET: 10; 325 TABLET ORAL at 08:06

## 2025-06-21 RX ADMIN — POLYETHYLENE GLYCOL 3350 17 G: 17 POWDER, FOR SOLUTION ORAL at 08:06

## 2025-06-21 NOTE — PT/OT/SLP PROGRESS
Physical Therapy Treatment    Patient Name:  Chelle Chandra   MRN:  [id number]    Recommendations:     Discharge therapy intensity: High Intensity Therapy   Discharge Equipment Recommendations: other (see comments) (TBD by next level care facility.)  Barriers to discharge: Impaired mobility and Ongoing medical needs    Assessment:     Chelle Chandra is a 59 y.o. female admitted with a medical diagnosis of R sided weakness and facial droop due to L SDH s/p craniotomy and MMA embolization. On 5/30 patient with subdural hemorrhage s/p L diony hole. The pt presents with new R sided weakness, and underwent a redo of diony hole for fluid evacuation/drain placement and a left cranioplasty for subdural fluid evacuation on 6/16 .  She presents with the following impairments/functional limitations: weakness, impaired self care skills, impaired balance, decreased safety awareness, impaired endurance, impaired functional mobility, pain, gait instability .    Rehab Prognosis: Good; patient would benefit from acute skilled PT services to address these deficits and reach maximum level of function.    Recent Surgery: Procedure(s) (LRB):  EVACUATION, EMPYEMA (Left)  CRANIECTOMY (Left) 5 Days Post-Op    Plan:     During this hospitalization, patient would benefit from acute PT services 6 x/week to address the identified rehab impairments via gait training, therapeutic activities, therapeutic exercises and progress toward the following goals:    Plan of Care Expires:  07/17/25    Subjective     Chief Complaint: none  Patient/Family Comments/goals: feeling better  Pain/Comfort:  Pain Rating 1: 0/10      Objective:     Communicated with pts nurse prior to session.  Patient found HOB elevated with telemetry, peripheral IV upon PT entry to room.     General Precautions: Standard, fall (HOB 30 degrees)  Orthopedic Precautions: N/A  Braces: N/A  Respiratory Status: Room air  Skin Integrity: Visible skin intact      Functional Mobility:  Bed  Mobility:     Rolling Left:  supervision  Scooting: supervision  Supine to Sit: contact guard assistance  Transfers:     Sit to Stand:  contact guard assistance with standard walker  Bed to Chair: contact guard assistance with  rolling walker  using  Step Transfer  Gait: The  pt ambulated 200 ft w/ RW and short distance in room to improve obstacle negotiation and safety.  Pt required CGA/Min A and verbal cues for step width and R foot clearance.   Balance: no LOB during gait and transfers    Therapeutic Activities/Exercises:  Pt directed in seated and supine B LE focusing on R hip abduction and R LE strengthening to impact gait.  Encouraged pt to work on hip strengthening in supine.  Pt and family able to recall exercises and family agreed to assist pt.  The pt is highly motivated.    Co-Treatment: No    Education:  Patient and family were provided with verbal education and demonstrations education regarding fall prevention, safety awareness, home exercise program, and gait mechanics.  Understanding was verbalized, however additional teaching warranted.     Patient left HOB elevated with all lines intact, call button in reach, nurse notified, and family present    GOALS:   Multidisciplinary Problems       Physical Therapy Goals          Problem: Physical Therapy    Goal Priority Disciplines Outcome Interventions   Physical Therapy Goal     PT, PT/OT Progressing    Description: Goals to be met by: 25     Patient will increase functional independence with mobility by performin. Supine to sit with Modified [place]  2. Sit to supine with Modified [place]  3. Sit to stand transfer with Modified [place]  4. Bed to chair transfer with Modified [place] using Rolling Walker  5. Gait  x 200 feet with Modified [place] using Rolling Walker.                          Time Tracking:     PT Received On: 25  PT Start Time: 1221     PT Stop Time: 1249  PT Total Time (min): 28 min      Billable Minutes: Gait Training 9 min and Therapeutic Exercise 19 min    Treatment Type: Treatment  PT/PTA: PTA     Number of PTA visits since last PT visit: 4     06/21/2025

## 2025-06-21 NOTE — PLAN OF CARE
Problem: Adult Inpatient Plan of Care  Goal: Plan of Care Review  Outcome: Progressing  Goal: Patient-Specific Goal (Individualized)  Outcome: Progressing  Goal: Absence of Hospital-Acquired Illness or Injury  Outcome: Progressing  Goal: Optimal Comfort and Wellbeing  Outcome: Progressing  Goal: Readiness for Transition of Care  Outcome: Progressing     Problem: Diabetes Comorbidity  Goal: Blood Glucose Level Within Targeted Range  Outcome: Progressing     Problem: Wound  Goal: Optimal Coping  Outcome: Progressing  Goal: Optimal Functional Ability  Outcome: Progressing  Goal: Absence of Infection Signs and Symptoms  Outcome: Progressing  Goal: Improved Oral Intake  Outcome: Progressing  Goal: Optimal Pain Control and Function  Outcome: Progressing  Goal: Skin Health and Integrity  Outcome: Progressing  Goal: Optimal Wound Healing  Outcome: Progressing     Problem: Infection  Goal: Absence of Infection Signs and Symptoms  Outcome: Progressing     Problem: Comorbidity Management  Goal: Maintenance of Behavioral Health Symptom Control  Outcome: Progressing     Problem: Fall Injury Risk  Goal: Absence of Fall and Fall-Related Injury  Outcome: Progressing

## 2025-06-21 NOTE — PROGRESS NOTES
[facility]  Infectious Disease  Progress Note    Patient Name: Chelle Chandra  MRN: [id number]  Admission Date: 6/11/2025  Length of Stay: 10 days  Attending Physician: Lorena Agudelo MD  Primary Care Provider: Jorge Silver MD    Isolation Status: No active isolations  Assessment/Plan:      Active Diagnoses:    Diagnosis Date Noted POA    PRINCIPAL PROBLEM:  Severe malnutrition [E43] 05/31/2025 Yes      Problems Resolved During this Admission:           Michael Machuca DO  Infectious Disease  [facility]    Subjective:       No fevers overnight      Review of Systems:   General: Patient denies any unexplained weight loss, night sweats, fatigue malaise or lethargy.   HEENT: (Head, Eyes, Ears, Nose and Throat): Patient denies any visual changes, headaches, eye pain, double vision, sore throat, recent, trauma, ear pain, epistaxis, tinnitus, or sinusitis.   Neck: No signs of recent trauma, or swelling.   Heart: No signs of chest pain, palpitations, orthopnea of loss of consciousness.   Lungs: No cough, sputum, wheeze, hemoptysis, shortness of breath, exercise intolerance.   GI: No abdominal pain, unintentional weight loss, nausea/vomiting, diarrhea/constipation, hematemesis, bright red blood per rectum, or melena.   : No incontinence, dysuria, hematuria, nocturia, polyuria, discharge, increased frequency, or urgency.   Vascular: No signs of claudication, leg edema, varicose veins, or thrombosis.   Neurologic: No loss of sensation, No numbness, No tingling, No Paralysis, No history of tremors or seizures.   Endocrine: No heat/cold intolerance, No excessive sweating, polyuria, polydipsia, or polyphagia.   Hematology: No anemia, easy bruising, petechiae or purpura Skin: No rashes, itching skin, open lesions, skin redness, hives or sensitivity to sun exposure, no changes in nail, hair or skin color.   Musculoskeletal: Patient denies joint swelling, decreased  "range of motion, crepitus, functional deficit, or arthritis.   Psychiatric: no signs of depression, anxiety or recent change in mood and sleep patterns.    Objective:     Vital Signs (Most Recent):  Temp: 99 °F (37.2 °C) (06/21/25 0914)  Pulse: 79 (06/21/25 0914)  Resp: 18 (06/21/25 0914)  BP: 122/63 (06/21/25 0914)  SpO2: 96 % (06/21/25 0914) Vital Signs (24h Range):  Temp:  [97.9 °F (36.6 °C)-99 °F (37.2 °C)] 99 °F (37.2 °C)  Pulse:  [73-83] 79  Resp:  [18] 18  SpO2:  [96 %-98 %] 96 %  BP: (109-124)/(63-79) 122/63     Weight: 49.9 kg (110 lb)  Body mass index is 17.75 kg/m².    Estimated Creatinine Clearance: 74.8 mL/min (based on SCr of 0.63 mg/dL).    Antibiotics (From admission, onward)      Start     Stop Route Frequency Ordered    06/21/25 1100  vancomycin 750 mg in D5W 250 mL IVPB (admixture device)         -- IV Every 12 hours (non-standard times) 06/20/25 2308    06/17/25 1530  metronidazole IVPB 500 mg         06/24/25 1529 IV Every 8 hours (non-standard times) 06/17/25 1421    06/16/25 1515  ceFEPIme injection 1 g         -- IV Every 6 hours (non-standard times) 06/16/25 1412    06/16/25 1511  vancomycin - pharmacy to dose  (vancomycin IVPB (PEDS and ADULTS))        Placed in "And" Linked Group    -- IV pharmacy to manage frequency 06/16/25 1412           Microbiology Results (last 7 days)       Procedure Component Value Units Date/Time    Tissue Culture - Aerobic [[id number]]  (Abnormal)  (Susceptibility) Collected: 06/16/25 1357    Order Status: Completed Specimen: Bone from Head Updated: 06/19/25 1001     Tissue - Aerobic Culture Moderate Staphylococcus epidermidis    Tissue Culture - Aerobic [[id number]]  (Abnormal)  (Susceptibility) Collected: 06/16/25 1338    Order Status: Completed Specimen: Tissue from Head Updated: 06/19/25 0953     Tissue - Aerobic Culture Moderate Staphylococcus epidermidis    Anaerobic Culture [[id number]] Collected: 06/16/25 1357    Order Status: Completed Specimen: Bone " from Head Updated: 06/19/25 0901     Anaerobe Culture No Anaerobes Isolated    Anaerobic Culture [[id number]] Collected: 06/16/25 1338    Order Status: Completed Specimen: Tissue from Head Updated: 06/19/25 0901     Anaerobe Culture No Anaerobes Isolated    Gram Stain [[id number]] Collected: 06/16/25 1357    Order Status: Completed Specimen: Bone from Head Updated: 06/16/25 1557     GRAM STAIN Moderate WBC observed      Rare Gram Positive Rods    Gram Stain [[id number]] Collected: 06/16/25 1338    Order Status: Completed Specimen: Tissue from Head Updated: 06/16/25 1556     GRAM STAIN Moderate WBC observed      Rare Gram positive cocci      Rare Gram Positive Rods    Fungal Culture [[id number]] Collected: 06/16/25 1357    Order Status: Sent Specimen: Bone from Head Updated: 06/16/25 1438    Fungal Culture [[id number]] Collected: 06/16/25 1338    Order Status: Sent Specimen: Tissue from Head Updated: 06/16/25 1438    AFB Smear [[id number]]     Order Status: Sent Specimen: Bone from Head     Mycobacteria and Nocardia Culture [[id number]]     Order Status: Sent Specimen: Bone from Head     AFB Smear [[id number]]     Order Status: Sent Specimen: SWAB from Skull     Mycobacteria and Nocardia Culture [[id number]]     Order Status: Sent Specimen: Tissue from Skull            Imaging Results    None        Vitals:    06/21/25 0914   BP: 122/63   Pulse: 79   Resp: 18   Temp: 99 °F (37.2 °C)          Assessments        S/P Subdural hematoma requiring craniotomy with evacuation and MMA embolization early May with recurrence late May 2025 requiring diony hole  Increased subdural fluid collection 6.11.25  --currently on vancomycin cefepime and Flagyl  --intraoperative culture data Positive for Staphylococcus epidermidis /oxacillin sensitive              Past Medical of  hypothyroidism, anxiety, bronchial asthma, COPD, type 2 diabetes mellitus, 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                 Recommendations      Within the past 24 hours, negative changes to the patient's culture and/or microbiology data the patient's positive culture data for Staphylococcus epidermidis is oxacillin sensitive, no worsening leukocytosis, and the patient is afebrile, no changes from our standpoint today     Final outpatient IV antibiotic recommendation  IV vancomycin 1000mg every 24 hours plus IV Rocephin 2 g every 24 hours until 7.3.2025            Thank you, If the consulting physician, specialty physician staff, pharmacy staff, nursing staff or respiratory / occupational therapy staff have any additional questions regarding ID consultation recommendations and treatment plans; please feel free to contact us at anytime.               Michael Machuca D.O., F.I.D.S.A.  Infectious Disease Physician   [facility]  cell [phone]

## 2025-06-21 NOTE — PROGRESS NOTES
Pharmacokinetic Assessment Follow Up: IV Vancomycin    Vancomycin serum concentration assessment(s):    The trough level was drawn correctly and can be used to guide therapy at this time. The measurement is below the desired definitive target range of 10 to 15 mcg/mL.    Vancomycin Regimen Plan:    Change regimen to Vancomycin 750 mg IV every 12 hours with next serum trough concentration measured at 1000 prior to 4th dose on 06/22    Scheduled Administration Times    1100  2300    Drug levels (last 3 results):  Recent Labs   Lab Result Units 06/20/25  2129   Vancomycin Trough ug/ml 6.5*       Vancomycin Administrations:  vancomycin given in the last 96 hours                     vancomycin 1,250 mg in D5W 250 mL IVPB (admixture device) (mg) 1,250 mg New Bag 06/20/25 2247     1,250 mg New Bag 06/19/25 2004     1,250 mg New Bag 06/18/25 2111     1,250 mg New Bag 06/17/25 2149                    Pharmacy will continue to follow and monitor vancomycin.    Please contact pharmacy at extension [phone] for questions regarding this assessment.    Thank you for the consult,   Linda Farris       Patient brief summary:  Chelle Chandra is a 59 y.o. female initiated on antimicrobial therapy with IV Vancomycin for treatment of subdural empyema    The patient's current regimen is vancomycin 750mg IV Q12h    Drug Allergies:   Review of patient's allergies indicates:   Allergen Reactions    Aspirin     Ketorolac     Penicillins     Sulfa (sulfonamide antibiotics)     Ozempic [semaglutide] Other (See Comments)     Abdominal pain       Actual Body Weight:  Wt Readings from Last 1 Encounters:   06/12/25 49.9 kg (110 lb)       Renal Function:   Estimated Creatinine Clearance: 74.8 mL/min (based on SCr of 0.63 mg/dL).,     Dialysis Method (if applicable):  N/A    CBC (last 72 hours):  Recent Labs   Lab Result Units 06/18/25  0516   WBC x10(3)/mcL 7.03   Hgb g/dL 10.5*   Hct % 32.9*   Platelet x10(3)/mcL 405*   Mono % % 8.8   Eos % % 3.1    Basophil % % 0.6       Metabolic Panel (last 72 hours):  Recent Labs   Lab Result Units 06/18/25  0516 06/19/25  0737   Sodium mmol/L 139  --    Potassium mmol/L 4.0  --    Chloride mmol/L 104  --    CO2 mmol/L 28  --    Glucose mg/dL 186*  --    Blood Urea Nitrogen mg/dL 3.9*  --    Creatinine mg/dL 0.68 0.63   Albumin g/dL 2.5*  --    Bilirubin Total mg/dL 0.2  --    ALP unit/L 168*  --    AST unit/L 11  --    ALT unit/L 15  --        Microbiologic Results:  Microbiology Results (last 7 days)       Procedure Component Value Units Date/Time    Tissue Culture - Aerobic [[id number]]  (Abnormal)  (Susceptibility) Collected: 06/16/25 1357    Order Status: Completed Specimen: Bone from Head Updated: 06/19/25 1001     Tissue - Aerobic Culture Moderate Staphylococcus epidermidis    Tissue Culture - Aerobic [[id number]]  (Abnormal)  (Susceptibility) Collected: 06/16/25 1338    Order Status: Completed Specimen: Tissue from Head Updated: 06/19/25 0953     Tissue - Aerobic Culture Moderate Staphylococcus epidermidis    Anaerobic Culture [[id number]] Collected: 06/16/25 1357    Order Status: Completed Specimen: Bone from Head Updated: 06/19/25 0901     Anaerobe Culture No Anaerobes Isolated    Anaerobic Culture [[id number]] Collected: 06/16/25 1338    Order Status: Completed Specimen: Tissue from Head Updated: 06/19/25 0901     Anaerobe Culture No Anaerobes Isolated    Gram Stain [[id number]] Collected: 06/16/25 1357    Order Status: Completed Specimen: Bone from Head Updated: 06/16/25 1557     GRAM STAIN Moderate WBC observed      Rare Gram Positive Rods    Gram Stain [[id number]] Collected: 06/16/25 1338    Order Status: Completed Specimen: Tissue from Head Updated: 06/16/25 1556     GRAM STAIN Moderate WBC observed      Rare Gram positive cocci      Rare Gram Positive Rods    Fungal Culture [[id number]] Collected: 06/16/25 1357    Order Status: Sent Specimen: Bone from Head Updated: 06/16/25 1438    Fungal Culture  [[id number]] Collected: 06/16/25 1338    Order Status: Sent Specimen: Tissue from Head Updated: 06/16/25 1438    AFB Smear [[id number]]     Order Status: Sent Specimen: Bone from Head     Mycobacteria and Nocardia Culture [[id number]]     Order Status: Sent Specimen: Bone from Head     AFB Smear [[id number]]     Order Status: Sent Specimen: SWAB from Skull     Mycobacteria and Nocardia Culture [[id number]]     Order Status: Sent Specimen: Tissue from Skull

## 2025-06-21 NOTE — PROGRESS NOTES
[facility] Hospital Medicine Progress Note        Chief Complaint: Inpatient Follow-up for     HPI:   59-year-old  female with significant history of hypothyroidism, anxiety, bronchial asthma, COPD, type 2 diabetes mellitus, 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 Patient recently had subdural hematoma in May requiring intracranial embolization, craniotomy with hematoma evacuation. After this patient was discharged home. Late may she presented back to the ED with right-sided weakness, facial droop and imaging was concerning for left MCA diminished flow and subacute hematoma. MRI brain was negative for CVA. Neurosurgery performed diony hole for drainage of subdural hematoma due to increased intracranial pressure with postop CT showing definitive improvement patient was on Plavix post embolization which was held briefly and resumed per Neurosurgery on 06/05 and she was transferred to rehab on 06/06. While in rehab patient was noted to have increase right upper extremity weakness and also significant headaches. CT head revealed reaccumulation of subdural fluid collection, increased from before patient was sent to main [place] for further evaluation/neurosurgery services. Neurosurgery evaluated, planning for  shunt this hospitalization, home meds resumed as appropriate except for Plavix, symptomatic management for headache.  shunt scheduled for 6/16. Patient underwent redo diony hole, fluid evacuation and drain placement, concern for surgical site infection and therefore underwent cranioplasty. Infectious Disease consulted and patient was placed on cefepime, vancomycin and Flagyl pending intraoperative cultures.  Intraoperative cultures growing staph epi.  ID recommending IV vancomycin, IV Rocephin until 7/3.  Neurosurgery wished to monitor patient for another day until 6/21.  PT/OT on board; recommending high-intensity therapy.  Speech therapy recommending  regular diet with moderately thick liquids.  CM on board for placement.    Interval Hx:   No new complaints.  Will plan for DC to Rehab facility on 6/23.    Objective/physical exam:  General: alert lady lying comfortably in bed, in no acute distress.  HENT: oral and oropharyngeal mucosa moist, pink, with no erythema or exudates, no ear pain or discharge  Neck: normal neck movement, no lymph nodes or swellings, no JVD or Carotid bruit  Respiratory: clear breathing sounds bilaterally, no crackles, rales, ronchi or wheezes  Cardiovascular: clear S1 and S2, no murmurs, rubs or gallops  Peripheral Vascular: no lesions, ulcers or erosions, normal peripheral pulses and no pedal edema  Gastrointestinal: soft, non-tender, non-distended abdomen, no guarding, rigidity or rebound tenderness, normal bowel sounds  Integumentary: normal skin color, no rashes or lesions  Neuro: incision wound with staples healing well; AAO x 3; motor strength 5/5 in B/L UEs & LEs; sensation intact to gross and fine touch B/L; CN II-XII grossly intact    VITAL SIGNS: 24 HRS MIN & MAX LAST   Temp  Min: 98.1 °F (36.7 °C)  Max: 99 °F (37.2 °C) 98.3 °F (36.8 °C)   BP  Min: 109/70  Max: 124/74 124/74   Pulse  Min: 68  Max: 83  68   Resp  Min: 18  Max: 18 18   SpO2  Min: 96 %  Max: 98 % 98 %     I have reviewed the following labs:  Recent Labs   Lab 06/15/25  1016 06/17/25  0817 06/18/25  0516   WBC 6.37 6.90 7.03   RBC 4.04* 3.51* 3.54*   HGB 11.9* 10.4* 10.5*   HCT 37.6 33.4* 32.9*   MCV 93.1 95.2* 92.9   MCH 29.5 29.6 29.7   MCHC 31.6* 31.1* 31.9*   RDW 13.8 13.7 13.6   * 410* 405*   MPV 9.7 9.7 9.8     Recent Labs   Lab 06/15/25  1016 06/17/25  0817 06/18/25  0516 06/19/25  0737    140 139  --    K 4.4 4.2 4.0  --     103 104  --    CO2 29 28 28  --    BUN 5.8* 6.9* 3.9*  --    CREATININE 0.70 0.69 0.68 0.63   * 133* 186*  --    CALCIUM 9.4 8.8 8.7  --    ALBUMIN 2.8* 2.5* 2.5*  --    PROT 7.4 6.4 6.3  --    ALKPHOS 201* 172*  168*  --    ALT 27 19 15  --    AST 24 12 11  --    BILITOT 0.2 0.3 0.2  --      Assessment/Plan:  Increased subdural fluid collection Status post left redo diony hole with fluid evacuation, drain placement 6/16  Suspected surgical site infection status post cranioplasty 6/16  Subdural hematoma requiring craniotomy with evacuation and MMA embolization early May with recurrence late May requiring diony hole  Right-sided weakness/intractable headache secondary to above  History of 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, 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  Depression/anxiety   HLD  Hypothyroidism  Essential HTN  Type 2 diabetes mellitus    Continues to be admitted   Reporting no new complaints   Drains removed by Neurosurgery  Neurosurgery remains on board; follow recommendations   On IV Vancomycin/Cefepime/Flagyl  ID remains on board; recommendations noted   Planned for IV Vancomycin/Rocephin till 7/3 upon DC  PT/OT on board; recommending high-intensity therapy   Cm on board for placement   Speech therapy cleared patient for regular solids and moderately thick liquids   Continued on atorvastatin, buspirone b.i.d., Zetia, Pepcid b.i.d., Keppra b.i.d., levothyroxine, metoprolol succinate, nortriptyline, MiraLax b.i.d., trazodone    VTE prophylaxis:  Lovenox    Patient condition:  Stable    Anticipated discharge and Disposition:         All diagnosis and differential diagnosis have been reviewed; assessment and plan has been documented; I have personally reviewed the labs and test results that are presently available; I have reviewed the patients medication list; I have reviewed the consulting providers response and recommendations. I have reviewed or attempted to review medical records based upon their availability    All of the patient's questions have been  addressed and answered. Patient's is agreeable to the above stated plan. I will continue to monitor closely and make adjustments to medical management as needed.    Portions of this note  dictated using EMR integrated voice recognition software, and may be subject to voice recognition errors not corrected at proofreading. Please contact writer for clarification if needed.     Radiology:  I have personally reviewed the following imaging and agree with the radiologist.     Fl Modified Barium Swallow Speech  See procedure notes from Speech Pathologist.    This procedure was auto-finalized.  CT Head Without Contrast  Narrative: EXAMINATION:  CT HEAD WITHOUT CONTRAST    CLINICAL HISTORY:  Subdural hemorrhage;interval subgaleal to peritoneal shunt;    TECHNIQUE:  Axial scans were obtained from skull base to the vertex.    Coronal and sagittal reconstructions obtained from the axial data.    Automatic exposure control was utilized to limit radiation dose.    Contrast: None    Radiation Dose:    Total DLP: 921 mGy*cm    COMPARISON:  CT head dated 06/14/2025    FINDINGS:  There are postoperative changes following left-sided craniotomy with interval drain placement.  Left cerebral convexity subdural hemorrhage has decreased in size now measuring up to 8 mm in thickness.  There is unchanged encephalomalacia in the left frontal and temporal lobes.  Mass effect has significantly improved.  There is no midline shift or herniation.  The basal cisterns are patent.  Skull base is intact.  Impression: Postoperative changes following left-sided craniotomy and drain placement, with decreased subdural hemorrhage and mass effect.    Electronically signed by: Beverley Ames  Date:    06/17/2025  Time:    08:10      Chucho Hernanedz MD  Department of Hospital Medicine   [facility]   06/21/2025

## 2025-06-21 NOTE — PROGRESS NOTES
POD#5 Left redo diony hole for fluid evacuation and drain placement.   Left cranioplasty for subdural fluid evacuation  She is sitting up in bed, NAD  She states her HA is much improved  She has no complaints at time of rounds    AFVSS  PERRL, EOMI  Alert, oriented to all spheres  Speech clear  Beulah well, no deficits appreciated  Incision c/d/I  Drain sites dry    Plan: No new recs from our standpoint  OK to leave incision open to air  OK to shower  Continue PT/OT  Antibx per ID  SCDs and lovenox for DVT prophylaxis

## 2025-06-21 NOTE — PROGRESS NOTES
[facility] Hospital Medicine Progress Note        Chief Complaint: Inpatient Follow-up for     HPI:   59-year-old  female with significant history of hypothyroidism, anxiety, bronchial asthma, COPD, type 2 diabetes mellitus, 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 Patient recently had subdural hematoma in May requiring intracranial embolization, craniotomy with hematoma evacuation. After this patient was discharged home. Late may she presented back to the ED with right-sided weakness, facial droop and imaging was concerning for left MCA diminished flow and subacute hematoma. MRI brain was negative for CVA. Neurosurgery performed diony hole for drainage of subdural hematoma due to increased intracranial pressure with postop CT showing definitive improvement patient was on Plavix post embolization which was held briefly and resumed per Neurosurgery on 06/05 and she was transferred to rehab on 06/06. While in rehab patient was noted to have increase right upper extremity weakness and also significant headaches. CT head revealed reaccumulation of subdural fluid collection, increased from before patient was sent to main [place] for further evaluation/neurosurgery services. Neurosurgery evaluated, planning for  shunt this hospitalization, home meds resumed as appropriate except for Plavix, symptomatic management for headache.  shunt scheduled for 6/16. Patient underwent redo diony hole, fluid evacuation and drain placement, concern for surgical site infection and therefore underwent cranioplasty. Infectious Disease consulted and patient was placed on cefepime, vancomycin and Flagyl pending intraoperative cultures.  Intraoperative cultures growing staph epi.  ID recommending IV vancomycin, IV Rocephin until 7/3.  Neurosurgery wished to monitor patient for another day until 6/21.  PT/OT on board; recommending high-intensity therapy.  Speech therapy recommending  regular diet with moderately thick liquids.  CM on board for placement.    Interval Hx:   No new complaints.  Was planned for DC today but Neurosurgery requested to keep patient for 1 more day.  Will plan for DC on 6/23.    Objective/physical exam:  General: alert lady lying comfortably in bed, in no acute distress.  HENT: oral and oropharyngeal mucosa moist, pink, with no erythema or exudates, no ear pain or discharge  Neck: normal neck movement, no lymph nodes or swellings, no JVD or Carotid bruit  Respiratory: clear breathing sounds bilaterally, no crackles, rales, ronchi or wheezes  Cardiovascular: clear S1 and S2, no murmurs, rubs or gallops  Peripheral Vascular: no lesions, ulcers or erosions, normal peripheral pulses and no pedal edema  Gastrointestinal: soft, non-tender, non-distended abdomen, no guarding, rigidity or rebound tenderness, normal bowel sounds  Integumentary: normal skin color, no rashes or lesions  Neuro: incision wound with staples healing well; AAO x 3; motor strength 5/5 in B/L UEs & LEs; sensation intact to gross and fine touch B/L; CN II-XII grossly intact    VITAL SIGNS: 24 HRS MIN & MAX LAST   Temp  Min: 98.1 °F (36.7 °C)  Max: 99 °F (37.2 °C) 98.3 °F (36.8 °C)   BP  Min: 109/70  Max: 124/74 124/74   Pulse  Min: 68  Max: 83  68   Resp  Min: 18  Max: 18 18   SpO2  Min: 96 %  Max: 98 % 98 %     I have reviewed the following labs:  Recent Labs   Lab 06/15/25  1016 06/17/25  0817 06/18/25  0516   WBC 6.37 6.90 7.03   RBC 4.04* 3.51* 3.54*   HGB 11.9* 10.4* 10.5*   HCT 37.6 33.4* 32.9*   MCV 93.1 95.2* 92.9   MCH 29.5 29.6 29.7   MCHC 31.6* 31.1* 31.9*   RDW 13.8 13.7 13.6   * 410* 405*   MPV 9.7 9.7 9.8     Recent Labs   Lab 06/15/25  1016 06/17/25  0817 06/18/25  0516 06/19/25  0737    140 139  --    K 4.4 4.2 4.0  --     103 104  --    CO2 29 28 28  --    BUN 5.8* 6.9* 3.9*  --    CREATININE 0.70 0.69 0.68 0.63   * 133* 186*  --    CALCIUM 9.4 8.8 8.7  --     ALBUMIN 2.8* 2.5* 2.5*  --    PROT 7.4 6.4 6.3  --    ALKPHOS 201* 172* 168*  --    ALT 27 19 15  --    AST 24 12 11  --    BILITOT 0.2 0.3 0.2  --      Assessment/Plan:  Increased subdural fluid collection Status post left redo diony hole with fluid evacuation, drain placement 6/16  Suspected surgical site infection status post cranioplasty 6/16  Subdural hematoma requiring craniotomy with evacuation and MMA embolization early May with recurrence late May requiring diony hole  Right-sided weakness/intractable headache secondary to above  History of 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, 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  Depression/anxiety   HLD  Hypothyroidism  Essential HTN  Type 2 diabetes mellitus    Continues to be admitted   Reporting no new complaints   Tolerating p.o. intake and having bowel movements  Drains removed by Neurosurgery  Neurosurgery remains on board; follow recommendations   On IV Vancomycin/Cefepime/Flagyl  ID remains on board; recommendations noted   Planned for IV Vancomycin/Rocephin till 7/3 upon DC  PT/OT on board; recommending high-intensity therapy   Cm on board for placement   Speech therapy cleared patient for regular solids and moderately thick liquids   Continued on atorvastatin, buspirone b.i.d., Zetia, Pepcid b.i.d., Keppra b.i.d., levothyroxine, metoprolol succinate, nortriptyline, MiraLax b.i.d., trazodone    VTE prophylaxis:  Lovenox    Patient condition:  Stable    Anticipated discharge and Disposition:         All diagnosis and differential diagnosis have been reviewed; assessment and plan has been documented; I have personally reviewed the labs and test results that are presently available; I have reviewed the patients medication list; I have reviewed the consulting providers response and recommendations. I have reviewed or attempted to review medical records based upon their availability    All of the patient's questions have been  addressed and answered. Patient's is agreeable to the above  stated plan. I will continue to monitor closely and make adjustments to medical management as needed.    Portions of this note dictated using EMR integrated voice recognition software, and may be subject to voice recognition errors not corrected at proofreading. Please contact writer for clarification if needed.     Radiology:  I have personally reviewed the following imaging and agree with the radiologist.     Fl Modified Barium Swallow Speech  See procedure notes from Speech Pathologist.    This procedure was auto-finalized.  CT Head Without Contrast  Narrative: EXAMINATION:  CT HEAD WITHOUT CONTRAST    CLINICAL HISTORY:  Subdural hemorrhage;interval subgaleal to peritoneal shunt;    TECHNIQUE:  Axial scans were obtained from skull base to the vertex.    Coronal and sagittal reconstructions obtained from the axial data.    Automatic exposure control was utilized to limit radiation dose.    Contrast: None    Radiation Dose:    Total DLP: 921 mGy*cm    COMPARISON:  CT head dated 06/14/2025    FINDINGS:  There are postoperative changes following left-sided craniotomy with interval drain placement.  Left cerebral convexity subdural hemorrhage has decreased in size now measuring up to 8 mm in thickness.  There is unchanged encephalomalacia in the left frontal and temporal lobes.  Mass effect has significantly improved.  There is no midline shift or herniation.  The basal cisterns are patent.  Skull base is intact.  Impression: Postoperative changes following left-sided craniotomy and drain placement, with decreased subdural hemorrhage and mass effect.    Electronically signed by: Beverley Ames  Date:    06/17/2025  Time:    08:10      Chucho Hernandez MD  Department of Hospital Medicine   [facility]   06/21/2025

## 2025-06-22 ENCOUNTER — TELEPHONE (OUTPATIENT)
Dept: PHARMACY | Facility: CLINIC | Age: 60
End: 2025-06-22
Payer: COMMERCIAL

## 2025-06-22 LAB
CREAT SERPL-MCNC: 0.72 MG/DL (ref 0.55–1.02)
GFR SERPLBLD CREATININE-BSD FMLA CKD-EPI: >60 ML/MIN/1.73/M2
POCT GLUCOSE: 161 MG/DL (ref 70–110)
POCT GLUCOSE: 175 MG/DL (ref 70–110)
POCT GLUCOSE: 180 MG/DL (ref 70–110)
POCT GLUCOSE: 189 MG/DL (ref 70–110)
VANCOMYCIN TROUGH SERPL-MCNC: 13.5 UG/ML (ref 15–20)

## 2025-06-22 PROCEDURE — 63600175 PHARM REV CODE 636 W HCPCS: Performed by: INTERNAL MEDICINE

## 2025-06-22 PROCEDURE — 94799 UNLISTED PULMONARY SVC/PX: CPT

## 2025-06-22 PROCEDURE — 82565 ASSAY OF CREATININE: CPT | Performed by: INTERNAL MEDICINE

## 2025-06-22 PROCEDURE — 25000003 PHARM REV CODE 250: Performed by: INTERNAL MEDICINE

## 2025-06-22 PROCEDURE — 99024 POSTOP FOLLOW-UP VISIT: CPT | Mod: ,,, | Performed by: NEUROLOGICAL SURGERY

## 2025-06-22 PROCEDURE — 11000001 HC ACUTE MED/SURG PRIVATE ROOM

## 2025-06-22 PROCEDURE — 80202 ASSAY OF VANCOMYCIN: CPT | Performed by: INTERNAL MEDICINE

## 2025-06-22 PROCEDURE — 36415 COLL VENOUS BLD VENIPUNCTURE: CPT | Performed by: INTERNAL MEDICINE

## 2025-06-22 PROCEDURE — 63600175 PHARM REV CODE 636 W HCPCS: Performed by: STUDENT IN AN ORGANIZED HEALTH CARE EDUCATION/TRAINING PROGRAM

## 2025-06-22 PROCEDURE — 63600175 PHARM REV CODE 636 W HCPCS

## 2025-06-22 RX ADMIN — LEVOTHYROXINE SODIUM 88 MCG: 0.09 TABLET ORAL at 05:06

## 2025-06-22 RX ADMIN — OXYCODONE HYDROCHLORIDE AND ACETAMINOPHEN 1 TABLET: 10; 325 TABLET ORAL at 11:06

## 2025-06-22 RX ADMIN — CEFEPIME 1 G: 1 INJECTION, POWDER, FOR SOLUTION INTRAMUSCULAR; INTRAVENOUS at 03:06

## 2025-06-22 RX ADMIN — METRONIDAZOLE 500 MG: 5 INJECTION, SOLUTION INTRAVENOUS at 08:06

## 2025-06-22 RX ADMIN — CEFEPIME 1 G: 1 INJECTION, POWDER, FOR SOLUTION INTRAMUSCULAR; INTRAVENOUS at 01:06

## 2025-06-22 RX ADMIN — HYDROCORTISONE: 1 CREAM TOPICAL at 08:06

## 2025-06-22 RX ADMIN — CEFEPIME 1 G: 1 INJECTION, POWDER, FOR SOLUTION INTRAMUSCULAR; INTRAVENOUS at 08:06

## 2025-06-22 RX ADMIN — BUTALBITAL, ACETAMINOPHEN, AND CAFFEINE 1 TABLET: 325; 50; 40 TABLET ORAL at 03:06

## 2025-06-22 RX ADMIN — FAMOTIDINE 20 MG: 20 TABLET, FILM COATED ORAL at 08:06

## 2025-06-22 RX ADMIN — DEXTROSE MONOHYDRATE 750 MG: 5 INJECTION, SOLUTION INTRAVENOUS at 01:06

## 2025-06-22 RX ADMIN — NORTRIPTYLINE HYDROCHLORIDE 25 MG: 25 CAPSULE ORAL at 08:06

## 2025-06-22 RX ADMIN — POLYETHYLENE GLYCOL 3350 17 G: 17 POWDER, FOR SOLUTION ORAL at 08:06

## 2025-06-22 RX ADMIN — DEXTROSE MONOHYDRATE 750 MG: 5 INJECTION, SOLUTION INTRAVENOUS at 12:06

## 2025-06-22 RX ADMIN — INSULIN ASPART 2 UNITS: 100 INJECTION, SOLUTION INTRAVENOUS; SUBCUTANEOUS at 05:06

## 2025-06-22 RX ADMIN — LEVETIRACETAM 500 MG: 500 SOLUTION ORAL at 08:06

## 2025-06-22 RX ADMIN — ATORVASTATIN CALCIUM 80 MG: 40 TABLET, FILM COATED ORAL at 08:06

## 2025-06-22 RX ADMIN — METOPROLOL SUCCINATE 12.5 MG: 25 TABLET, EXTENDED RELEASE ORAL at 08:06

## 2025-06-22 RX ADMIN — BUSPIRONE HYDROCHLORIDE 15 MG: 5 TABLET ORAL at 08:06

## 2025-06-22 RX ADMIN — EZETIMIBE 10 MG: 10 TABLET ORAL at 08:06

## 2025-06-22 RX ADMIN — OXYCODONE HYDROCHLORIDE AND ACETAMINOPHEN 1 TABLET: 10; 325 TABLET ORAL at 05:06

## 2025-06-22 RX ADMIN — BUTALBITAL, ACETAMINOPHEN, AND CAFFEINE 1 TABLET: 325; 50; 40 TABLET ORAL at 08:06

## 2025-06-22 RX ADMIN — INSULIN ASPART 1 UNITS: 100 INJECTION, SOLUTION INTRAVENOUS; SUBCUTANEOUS at 08:06

## 2025-06-22 RX ADMIN — METRONIDAZOLE 500 MG: 5 INJECTION, SOLUTION INTRAVENOUS at 03:06

## 2025-06-22 RX ADMIN — ENOXAPARIN SODIUM 40 MG: 40 INJECTION SUBCUTANEOUS at 04:06

## 2025-06-22 RX ADMIN — INSULIN ASPART 2 UNITS: 100 INJECTION, SOLUTION INTRAVENOUS; SUBCUTANEOUS at 11:06

## 2025-06-22 RX ADMIN — TRAZODONE HYDROCHLORIDE 25 MG: 50 TABLET ORAL at 08:06

## 2025-06-22 NOTE — TELEPHONE ENCOUNTER
[facility]Population [facility] Chart Review & Patient Outreach Details For Medication Adherence Project    Reason for Outreach Encounter: 3rd Party payor non-compliance report ([employer], [employer], C, etc)  2.  Patient Outreach Method: Reviewed Patient Chart  3.   Medication in question: atorvastatin   LAST FILLED: 5/29/25 for 90 day supply  Hyperlipidemia Medications              atorvastatin (LIPITOR) 80 MG tablet Take 1 tablet (80 mg total) by mouth once daily.    ezetimibe (ZETIA) 10 mg tablet Take 1 tablet by mouth once daily               4.  Reviewed and or Updates Made To: Patient Chart  5. Outreach Outcomes and/or actions taken: Patient filled medication and is on track to be adherent

## 2025-06-22 NOTE — PROGRESS NOTES
Pharmacokinetic Assessment Follow Up: IV Vancomycin    Vancomycin serum concentration assessment(s):    Level of 13.5 mcg/ml after 2 doses.  Goal 10-20 mcg/ml    Vancomycin Regimen Plan:    Continue 750 mg q12h.  Check trough on 6/24 at 1200.    Drug levels (last 3 results):  Recent Labs   Lab Result Units 06/20/25 2129 06/22/25  1049   Vancomycin Trough ug/ml 6.5* 13.5*        Patient brief summary:  Chelle Chandra is a 59 y.o. female initiated on antimicrobial therapy with IV Vancomycin for treatment of subdural empyema    Actual Body Weight:   49.9 kg    Renal Function:   Estimated Creatinine Clearance: 65.5 mL/min (based on SCr of 0.72 mg/dL).,     Dialysis Method (if applicable):  N/A    Metabolic Panel (last 72 hours):  Recent Labs   Lab Result Units 06/22/25  1049   Creatinine mg/dL 0.72       Vancomycin Administrations:  vancomycin given in the last 96 hours                     vancomycin 750 mg in D5W 250 mL IVPB (admixture device) (mg) 750 mg New Bag 06/22/25 0051     750 mg New Bag 06/21/25 1040    vancomycin 1,250 mg in D5W 250 mL IVPB (admixture device) (mg) 1,250 mg New Bag 06/20/25 2247     1,250 mg New Bag 06/19/25 2004     1,250 mg New Bag 06/18/25 2111                    Microbiologic Results:  Microbiology Results (last 7 days)       Procedure Component Value Units Date/Time    Tissue Culture - Aerobic [[id number]]  (Abnormal)  (Susceptibility) Collected: 06/16/25 1357    Order Status: Completed Specimen: Bone from Head Updated: 06/19/25 1001     Tissue - Aerobic Culture Moderate Staphylococcus epidermidis    Tissue Culture - Aerobic [[id number]]  (Abnormal)  (Susceptibility) Collected: 06/16/25 1338    Order Status: Completed Specimen: Tissue from Head Updated: 06/19/25 0953     Tissue - Aerobic Culture Moderate Staphylococcus epidermidis    Anaerobic Culture [[id number]] Collected: 06/16/25 1357    Order Status: Completed Specimen: Bone from Head Updated: 06/19/25 0901     Anaerobe Culture No  Anaerobes Isolated    Anaerobic Culture [[id number]] Collected: 06/16/25 1338    Order Status: Completed Specimen: Tissue from Head Updated: 06/19/25 0901     Anaerobe Culture No Anaerobes Isolated    Gram Stain [[id number]] Collected: 06/16/25 1357    Order Status: Completed Specimen: Bone from Head Updated: 06/16/25 1557     GRAM STAIN Moderate WBC observed      Rare Gram Positive Rods    Gram Stain [[id number]] Collected: 06/16/25 1338    Order Status: Completed Specimen: Tissue from Head Updated: 06/16/25 1556     GRAM STAIN Moderate WBC observed      Rare Gram positive cocci      Rare Gram Positive Rods    Fungal Culture [[id number]] Collected: 06/16/25 1357    Order Status: Sent Specimen: Bone from Head Updated: 06/16/25 1438    Fungal Culture [[id number]] Collected: 06/16/25 1338    Order Status: Sent Specimen: Tissue from Head Updated: 06/16/25 1438    AFB Smear [[id number]]     Order Status: Sent Specimen: Bone from Head     Mycobacteria and Nocardia Culture [[id number]]     Order Status: Sent Specimen: Bone from Head     AFB Smear [[id number]]     Order Status: Sent Specimen: SWAB from Skull     Mycobacteria and Nocardia Culture [[id number]]     Order Status: Sent Specimen: Tissue from Skull

## 2025-06-22 NOTE — PROGRESS NOTES
POD#6 Left redo diony hole for fluid evacuation and drain placement.   Left cranioplasty for subdural fluid evacuation  She is sitting up in bed, NAD  She c/o mild HA, controlled with meds. Much improved from pre-op  She has no complaints at time of rounds    AFVSS  PERRL, EOMI  Alert, oriented to all spheres  Speech clear  Beulah well, no deficits appreciated  Incision c/d/I  Drain sites dry    Plan: No new recs from our standpoint  OK to leave incision open to air  OK to shower  Continue PT/OT  Antibx per ID  SCDs and lovenox for DVT prophylaxis

## 2025-06-22 NOTE — PROGRESS NOTES
[facility] Hospital Medicine Progress Note        Chief Complaint: Inpatient Follow-up for     HPI:   59-year-old  female with significant history of hypothyroidism, anxiety, bronchial asthma, COPD, type 2 diabetes mellitus, 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 Patient recently had subdural hematoma in May requiring intracranial embolization, craniotomy with hematoma evacuation. After this patient was discharged home. Late may she presented back to the ED with right-sided weakness, facial droop and imaging was concerning for left MCA diminished flow and subacute hematoma. MRI brain was negative for CVA. Neurosurgery performed diony hole for drainage of subdural hematoma due to increased intracranial pressure with postop CT showing definitive improvement patient was on Plavix post embolization which was held briefly and resumed per Neurosurgery on 06/05 and she was transferred to rehab on 06/06. While in rehab patient was noted to have increase right upper extremity weakness and also significant headaches. CT head revealed reaccumulation of subdural fluid collection, increased from before patient was sent to main [place] for further evaluation/neurosurgery services. Neurosurgery evaluated, planning for  shunt this hospitalization, home meds resumed as appropriate except for Plavix, symptomatic management for headache.  shunt scheduled for 6/16. Patient underwent redo diony hole, fluid evacuation and drain placement, concern for surgical site infection and therefore underwent cranioplasty. Infectious Disease consulted and patient was placed on cefepime, vancomycin and Flagyl pending intraoperative cultures.  Intraoperative cultures growing staph epi.  ID recommending IV vancomycin, IV Rocephin until 7/3.  Neurosurgery wished to monitor patient for another day until 6/21.  PT/OT on board; recommending high-intensity therapy.  Speech therapy recommending  regular diet with moderately thick liquids.  CM on board for placement.    Interval Hx:   No new complaints.  Will plan for DC to Rehab facility on 6/23.    Objective/physical exam:  General: alert lady lying comfortably in bed, in no acute distress.  HENT: oral and oropharyngeal mucosa moist, pink, with no erythema or exudates, no ear pain or discharge  Neck: normal neck movement, no lymph nodes or swellings, no JVD or Carotid bruit  Respiratory: clear breathing sounds bilaterally, no crackles, rales, ronchi or wheezes  Cardiovascular: clear S1 and S2, no murmurs, rubs or gallops  Peripheral Vascular: no lesions, ulcers or erosions, normal peripheral pulses and no pedal edema  Gastrointestinal: soft, non-tender, non-distended abdomen, no guarding, rigidity or rebound tenderness, normal bowel sounds  Integumentary: normal skin color, no rashes or lesions  Neuro: incision wound with staples healing well; AAO x 3; motor strength 5/5 in B/L UEs & LEs; sensation intact to gross and fine touch B/L; CN II-XII grossly intact    VITAL SIGNS: 24 HRS MIN & MAX LAST   Temp  Min: 97.4 °F (36.3 °C)  Max: 98.6 °F (37 °C) 97.4 °F (36.3 °C)   BP  Min: 112/74  Max: 125/74 122/78   Pulse  Min: 76  Max: 86  83   Resp  Min: 17  Max: 20 20   SpO2  Min: 95 %  Max: 99 % 97 %     I have reviewed the following labs:  Recent Labs   Lab 06/17/25 0817 06/18/25  0516   WBC 6.90 7.03   RBC 3.51* 3.54*   HGB 10.4* 10.5*   HCT 33.4* 32.9*   MCV 95.2* 92.9   MCH 29.6 29.7   MCHC 31.1* 31.9*   RDW 13.7 13.6   * 405*   MPV 9.7 9.8     Recent Labs   Lab 06/17/25  0817 06/18/25  0516 06/19/25  0737 06/22/25  1049    139  --   --    K 4.2 4.0  --   --     104  --   --    CO2 28 28  --   --    BUN 6.9* 3.9*  --   --    CREATININE 0.69 0.68 0.63 0.72   * 186*  --   --    CALCIUM 8.8 8.7  --   --    ALBUMIN 2.5* 2.5*  --   --    PROT 6.4 6.3  --   --    ALKPHOS 172* 168*  --   --    ALT 19 15  --   --    AST 12 11  --   --     BILITOT 0.3 0.2  --   --      Assessment/Plan:  Increased subdural fluid collection Status post left redo diony hole with fluid evacuation, drain placement 6/16  Suspected surgical site infection status post cranioplasty 6/16  Subdural hematoma requiring craniotomy with evacuation and MMA embolization early May with recurrence late May requiring diony hole  Right-sided weakness/intractable headache secondary to above  History of 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, 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  Depression/anxiety   HLD  Hypothyroidism  Essential HTN  Type 2 diabetes mellitus    Continues to be admitted   Reporting no new complaints   Drains removed by Neurosurgery  Neurosurgery on board; follow recommendations   On IV Vancomycin/Cefepime/Flagyl  ID remains on board; recommendations noted   Planned for IV Vancomycin/Rocephin till 7/3 upon DC  PT/OT on board; recommending high-intensity therapy   Speech therapy cleared patient for regular solids and moderately thick liquids   Continued on atorvastatin, buspirone b.i.d., Zetia, Pepcid b.i.d., Keppra b.i.d., levothyroxine, metoprolol succinate, nortriptyline, MiraLax b.i.d., trazodone  Plan for DC tomorrow to rehab    VTE prophylaxis:  Lovenox    Patient condition:  Stable    Anticipated discharge and Disposition:         All diagnosis and differential diagnosis have been reviewed; assessment and plan has been documented; I have personally reviewed the labs and test results that are presently available; I have reviewed the patients medication list; I have reviewed the consulting providers response and recommendations. I have reviewed or attempted to review medical records based upon their availability    All of the patient's questions have been  addressed and answered. Patient's is agreeable to the above stated plan. I will continue to monitor closely and make adjustments to medical management as needed.    Portions of this note dictated using EMR integrated voice recognition  software, and may be subject to voice recognition errors not corrected at proofreading. Please contact writer for clarification if needed.     Radiology:  I have personally reviewed the following imaging and agree with the radiologist.     Fl Modified Barium Swallow Speech  See procedure notes from Speech Pathologist.    This procedure was auto-finalized.  CT Head Without Contrast  Narrative: EXAMINATION:  CT HEAD WITHOUT CONTRAST    CLINICAL HISTORY:  Subdural hemorrhage;interval subgaleal to peritoneal shunt;    TECHNIQUE:  Axial scans were obtained from skull base to the vertex.    Coronal and sagittal reconstructions obtained from the axial data.    Automatic exposure control was utilized to limit radiation dose.    Contrast: None    Radiation Dose:    Total DLP: 921 mGy*cm    COMPARISON:  CT head dated 06/14/2025    FINDINGS:  There are postoperative changes following left-sided craniotomy with interval drain placement.  Left cerebral convexity subdural hemorrhage has decreased in size now measuring up to 8 mm in thickness.  There is unchanged encephalomalacia in the left frontal and temporal lobes.  Mass effect has significantly improved.  There is no midline shift or herniation.  The basal cisterns are patent.  Skull base is intact.  Impression: Postoperative changes following left-sided craniotomy and drain placement, with decreased subdural hemorrhage and mass effect.    Electronically signed by: Beverley Ames  Date:    06/17/2025  Time:    08:10      Chucho Hernandez MD  Department of Hospital Medicine   [facility]   06/22/2025

## 2025-06-23 ENCOUNTER — ANESTHESIA EVENT (OUTPATIENT)
Dept: SURGERY | Facility: HOSPITAL | Age: 60
End: 2025-06-23
Payer: COMMERCIAL

## 2025-06-23 ENCOUNTER — ANESTHESIA (OUTPATIENT)
Dept: SURGERY | Facility: HOSPITAL | Age: 60
End: 2025-06-23
Payer: COMMERCIAL

## 2025-06-23 LAB
ANION GAP SERPL CALC-SCNC: 7 MEQ/L
APTT PPP: 28.4 SECONDS (ref 23.2–33.7)
BASOPHILS # BLD AUTO: 0.06 X10(3)/MCL
BASOPHILS NFR BLD AUTO: 1 %
BUN SERPL-MCNC: 6.2 MG/DL (ref 9.8–20.1)
CALCIUM SERPL-MCNC: 9.1 MG/DL (ref 8.4–10.2)
CHLORIDE SERPL-SCNC: 106 MMOL/L (ref 98–107)
CO2 SERPL-SCNC: 25 MMOL/L (ref 23–31)
CREAT SERPL-MCNC: 0.55 MG/DL (ref 0.55–1.02)
CREAT/UREA NIT SERPL: 11
EOSINOPHIL # BLD AUTO: 0.22 X10(3)/MCL (ref 0–0.9)
EOSINOPHIL NFR BLD AUTO: 3.6 %
ERYTHROCYTE [DISTWIDTH] IN BLOOD BY AUTOMATED COUNT: 14.4 % (ref 11.5–17)
GFR SERPLBLD CREATININE-BSD FMLA CKD-EPI: >60 ML/MIN/1.73/M2
GLUCOSE SERPL-MCNC: 219 MG/DL (ref 82–115)
HCT VFR BLD AUTO: 32.7 % (ref 37–47)
HGB BLD-MCNC: 10.6 G/DL (ref 12–16)
IMM GRANULOCYTES # BLD AUTO: 0.04 X10(3)/MCL (ref 0–0.04)
IMM GRANULOCYTES NFR BLD AUTO: 0.7 %
INR PPP: 1.1
LYMPHOCYTES # BLD AUTO: 1.5 X10(3)/MCL (ref 0.6–4.6)
LYMPHOCYTES NFR BLD AUTO: 24.4 %
MCH RBC QN AUTO: 29.8 PG (ref 27–31)
MCHC RBC AUTO-ENTMCNC: 32.4 G/DL (ref 33–36)
MCV RBC AUTO: 91.9 FL (ref 80–94)
MONOCYTES # BLD AUTO: 0.66 X10(3)/MCL (ref 0.1–1.3)
MONOCYTES NFR BLD AUTO: 10.7 %
NEUTROPHILS # BLD AUTO: 3.66 X10(3)/MCL (ref 2.1–9.2)
NEUTROPHILS NFR BLD AUTO: 59.6 %
NRBC BLD AUTO-RTO: 0 %
PLATELET # BLD AUTO: 359 X10(3)/MCL (ref 130–400)
PMV BLD AUTO: 9.9 FL (ref 7.4–10.4)
POCT GLUCOSE: 155 MG/DL (ref 70–110)
POCT GLUCOSE: 157 MG/DL (ref 70–110)
POCT GLUCOSE: 230 MG/DL (ref 70–110)
POTASSIUM SERPL-SCNC: 3.6 MMOL/L (ref 3.5–5.1)
PROTHROMBIN TIME: 13.8 SECONDS (ref 12.5–14.5)
RBC # BLD AUTO: 3.56 X10(6)/MCL (ref 4.2–5.4)
SODIUM SERPL-SCNC: 138 MMOL/L (ref 136–145)
WBC # BLD AUTO: 6.14 X10(3)/MCL (ref 4.5–11.5)

## 2025-06-23 PROCEDURE — 87205 SMEAR GRAM STAIN: CPT | Performed by: NEUROLOGICAL SURGERY

## 2025-06-23 PROCEDURE — 63600175 PHARM REV CODE 636 W HCPCS: Performed by: INTERNAL MEDICINE

## 2025-06-23 PROCEDURE — 63600175 PHARM REV CODE 636 W HCPCS

## 2025-06-23 PROCEDURE — 87070 CULTURE OTHR SPECIMN AEROBIC: CPT | Performed by: NEUROLOGICAL SURGERY

## 2025-06-23 PROCEDURE — 37000008 HC ANESTHESIA 1ST 15 MINUTES: Performed by: NEUROLOGICAL SURGERY

## 2025-06-23 PROCEDURE — 87186 SC STD MICRODIL/AGAR DIL: CPT | Performed by: NEUROLOGICAL SURGERY

## 2025-06-23 PROCEDURE — 20000000 HC ICU ROOM

## 2025-06-23 PROCEDURE — 87077 CULTURE AEROBIC IDENTIFY: CPT

## 2025-06-23 PROCEDURE — 85025 COMPLETE CBC W/AUTO DIFF WBC: CPT | Performed by: PHYSICIAN ASSISTANT

## 2025-06-23 PROCEDURE — 0NB00ZZ EXCISION OF SKULL, OPEN APPROACH: ICD-10-PCS | Performed by: NEUROLOGICAL SURGERY

## 2025-06-23 PROCEDURE — 82962 GLUCOSE BLOOD TEST: CPT | Performed by: NEUROLOGICAL SURGERY

## 2025-06-23 PROCEDURE — 27800903 OPTIME MED/SURG SUP & DEVICES OTHER IMPLANTS: Performed by: NEUROLOGICAL SURGERY

## 2025-06-23 PROCEDURE — 71000033 HC RECOVERY, INTIAL HOUR: Performed by: NEUROLOGICAL SURGERY

## 2025-06-23 PROCEDURE — 25000003 PHARM REV CODE 250: Performed by: INTERNAL MEDICINE

## 2025-06-23 PROCEDURE — 87153 DNA/RNA SEQUENCING: CPT

## 2025-06-23 PROCEDURE — 25000003 PHARM REV CODE 250

## 2025-06-23 PROCEDURE — 36620 INSERTION CATHETER ARTERY: CPT | Mod: XU,,, | Performed by: ANESTHESIOLOGY

## 2025-06-23 PROCEDURE — 85730 THROMBOPLASTIN TIME PARTIAL: CPT | Performed by: PHYSICIAN ASSISTANT

## 2025-06-23 PROCEDURE — 63600175 PHARM REV CODE 636 W HCPCS: Performed by: ANESTHESIOLOGY

## 2025-06-23 PROCEDURE — 63600175 PHARM REV CODE 636 W HCPCS: Performed by: NEUROLOGICAL SURGERY

## 2025-06-23 PROCEDURE — 99900031 HC PATIENT EDUCATION (STAT)

## 2025-06-23 PROCEDURE — 76937 US GUIDE VASCULAR ACCESS: CPT | Mod: 26,,, | Performed by: ANESTHESIOLOGY

## 2025-06-23 PROCEDURE — 25000003 PHARM REV CODE 250: Performed by: NEUROLOGICAL SURGERY

## 2025-06-23 PROCEDURE — 36415 COLL VENOUS BLD VENIPUNCTURE: CPT | Performed by: PHYSICIAN ASSISTANT

## 2025-06-23 PROCEDURE — 36000711: Performed by: NEUROLOGICAL SURGERY

## 2025-06-23 PROCEDURE — 27201423 OPTIME MED/SURG SUP & DEVICES STERILE SUPPLY: Performed by: NEUROLOGICAL SURGERY

## 2025-06-23 PROCEDURE — 87075 CULTR BACTERIA EXCEPT BLOOD: CPT | Performed by: NEUROLOGICAL SURGERY

## 2025-06-23 PROCEDURE — 63600175 PHARM REV CODE 636 W HCPCS: Performed by: STUDENT IN AN ORGANIZED HEALTH CARE EDUCATION/TRAINING PROGRAM

## 2025-06-23 PROCEDURE — 71000039 HC RECOVERY, EACH ADD'L HOUR: Performed by: NEUROLOGICAL SURGERY

## 2025-06-23 PROCEDURE — 00P000Z REMOVAL OF DRAINAGE DEVICE FROM BRAIN, OPEN APPROACH: ICD-10-PCS | Performed by: NEUROLOGICAL SURGERY

## 2025-06-23 PROCEDURE — 009400Z DRAINAGE OF INTRACRANIAL SUBDURAL SPACE WITH DRAINAGE DEVICE, OPEN APPROACH: ICD-10-PCS | Performed by: NEUROLOGICAL SURGERY

## 2025-06-23 PROCEDURE — 87102 FUNGUS ISOLATION CULTURE: CPT | Performed by: NEUROLOGICAL SURGERY

## 2025-06-23 PROCEDURE — 94799 UNLISTED PULMONARY SVC/PX: CPT

## 2025-06-23 PROCEDURE — 99900035 HC TECH TIME PER 15 MIN (STAT)

## 2025-06-23 PROCEDURE — 37000009 HC ANESTHESIA EA ADD 15 MINS: Performed by: NEUROLOGICAL SURGERY

## 2025-06-23 PROCEDURE — 85610 PROTHROMBIN TIME: CPT | Performed by: PHYSICIAN ASSISTANT

## 2025-06-23 PROCEDURE — 00U20KZ SUPPLEMENT DURA MATER WITH NONAUTOLOGOUS TISSUE SUBSTITUTE, OPEN APPROACH: ICD-10-PCS | Performed by: NEUROLOGICAL SURGERY

## 2025-06-23 PROCEDURE — 80048 BASIC METABOLIC PNL TOTAL CA: CPT | Performed by: PHYSICIAN ASSISTANT

## 2025-06-23 PROCEDURE — 36000710: Performed by: NEUROLOGICAL SURGERY

## 2025-06-23 PROCEDURE — P9047 ALBUMIN (HUMAN), 25%, 50ML: HCPCS

## 2025-06-23 PROCEDURE — 87077 CULTURE AEROBIC IDENTIFY: CPT | Performed by: NEUROLOGICAL SURGERY

## 2025-06-23 DEVICE — COLLAGEN DURAL REGENERATION MEMBRANE 4IN X 5IN (10.0CM X 12.5CM)
Type: IMPLANTABLE DEVICE | Site: CRANIAL | Status: FUNCTIONAL
Brand: DURAMATRIX-ONLAY PLUS

## 2025-06-23 RX ORDER — GLYCOPYRROLATE 0.2 MG/ML
INJECTION INTRAMUSCULAR; INTRAVENOUS
Status: DISCONTINUED | OUTPATIENT
Start: 2025-06-23 | End: 2025-06-23

## 2025-06-23 RX ORDER — HYDROMORPHONE HYDROCHLORIDE 2 MG/ML
INJECTION, SOLUTION INTRAMUSCULAR; INTRAVENOUS; SUBCUTANEOUS
Status: DISCONTINUED | OUTPATIENT
Start: 2025-06-23 | End: 2025-06-23

## 2025-06-23 RX ORDER — LABETALOL HYDROCHLORIDE 5 MG/ML
10 INJECTION, SOLUTION INTRAVENOUS ONCE
Status: COMPLETED | OUTPATIENT
Start: 2025-06-23 | End: 2025-06-23

## 2025-06-23 RX ORDER — BACITRACIN 500 [USP'U]/G
OINTMENT TOPICAL
Status: DISCONTINUED | OUTPATIENT
Start: 2025-06-23 | End: 2025-06-23 | Stop reason: HOSPADM

## 2025-06-23 RX ORDER — DEXAMETHASONE SODIUM PHOSPHATE 4 MG/ML
INJECTION, SOLUTION INTRA-ARTICULAR; INTRALESIONAL; INTRAMUSCULAR; INTRAVENOUS; SOFT TISSUE
Status: DISCONTINUED | OUTPATIENT
Start: 2025-06-23 | End: 2025-06-23

## 2025-06-23 RX ORDER — LIDOCAINE HYDROCHLORIDE AND EPINEPHRINE 5; 5 MG/ML; UG/ML
INJECTION, SOLUTION INFILTRATION; PERINEURAL
Status: DISCONTINUED | OUTPATIENT
Start: 2025-06-23 | End: 2025-06-23 | Stop reason: HOSPADM

## 2025-06-23 RX ORDER — ROCURONIUM BROMIDE 10 MG/ML
INJECTION, SOLUTION INTRAVENOUS
Status: DISCONTINUED | OUTPATIENT
Start: 2025-06-23 | End: 2025-06-23

## 2025-06-23 RX ORDER — FENTANYL CITRATE 50 UG/ML
INJECTION, SOLUTION INTRAMUSCULAR; INTRAVENOUS
Status: DISCONTINUED | OUTPATIENT
Start: 2025-06-23 | End: 2025-06-23

## 2025-06-23 RX ORDER — MEPERIDINE HYDROCHLORIDE 25 MG/ML
12.5 INJECTION INTRAMUSCULAR; INTRAVENOUS; SUBCUTANEOUS ONCE AS NEEDED
Status: DISCONTINUED | OUTPATIENT
Start: 2025-06-23 | End: 2025-06-23 | Stop reason: HOSPADM

## 2025-06-23 RX ORDER — ONDANSETRON HYDROCHLORIDE 2 MG/ML
INJECTION, SOLUTION INTRAVENOUS
Status: DISCONTINUED | OUTPATIENT
Start: 2025-06-23 | End: 2025-06-23

## 2025-06-23 RX ORDER — SODIUM CITRATE AND CITRIC ACID MONOHYDRATE 334; 500 MG/5ML; MG/5ML
30 SOLUTION ORAL
Status: CANCELLED | OUTPATIENT
Start: 2025-06-23

## 2025-06-23 RX ORDER — HYDRALAZINE HYDROCHLORIDE 20 MG/ML
INJECTION INTRAMUSCULAR; INTRAVENOUS
Status: DISPENSED
Start: 2025-06-23 | End: 2025-06-24

## 2025-06-23 RX ORDER — SODIUM CHLORIDE, SODIUM GLUCONATE, SODIUM ACETATE, POTASSIUM CHLORIDE AND MAGNESIUM CHLORIDE 30; 37; 368; 526; 502 MG/100ML; MG/100ML; MG/100ML; MG/100ML; MG/100ML
INJECTION, SOLUTION INTRAVENOUS CONTINUOUS
Status: CANCELLED | OUTPATIENT
Start: 2025-06-23 | End: 2025-07-23

## 2025-06-23 RX ORDER — INSULIN ASPART 100 [IU]/ML
0-5 INJECTION, SOLUTION INTRAVENOUS; SUBCUTANEOUS
Status: DISCONTINUED | OUTPATIENT
Start: 2025-06-23 | End: 2025-07-15 | Stop reason: HOSPADM

## 2025-06-23 RX ORDER — MUPIROCIN 20 MG/G
OINTMENT TOPICAL 2 TIMES DAILY
Status: COMPLETED | OUTPATIENT
Start: 2025-06-23 | End: 2025-06-28

## 2025-06-23 RX ORDER — VANCOMYCIN HYDROCHLORIDE 1 G/20ML
INJECTION, POWDER, LYOPHILIZED, FOR SOLUTION INTRAVENOUS
Status: DISCONTINUED | OUTPATIENT
Start: 2025-06-23 | End: 2025-06-23 | Stop reason: HOSPADM

## 2025-06-23 RX ORDER — MIDAZOLAM HYDROCHLORIDE 1 MG/ML
INJECTION INTRAMUSCULAR; INTRAVENOUS
Status: DISCONTINUED | OUTPATIENT
Start: 2025-06-23 | End: 2025-06-23

## 2025-06-23 RX ORDER — GLUCAGON 1 MG
1 KIT INJECTION
Status: DISCONTINUED | OUTPATIENT
Start: 2025-06-23 | End: 2025-07-15 | Stop reason: HOSPADM

## 2025-06-23 RX ORDER — LABETALOL HYDROCHLORIDE 5 MG/ML
INJECTION, SOLUTION INTRAVENOUS
Status: DISPENSED
Start: 2025-06-23 | End: 2025-06-24

## 2025-06-23 RX ORDER — ACETAMINOPHEN 10 MG/ML
15 INJECTION, SOLUTION INTRAVENOUS ONCE
Status: COMPLETED | OUTPATIENT
Start: 2025-06-23 | End: 2025-06-23

## 2025-06-23 RX ORDER — SODIUM CHLORIDE 9 MG/ML
INJECTION, SOLUTION INTRAVENOUS CONTINUOUS
Status: DISCONTINUED | OUTPATIENT
Start: 2025-06-23 | End: 2025-06-26

## 2025-06-23 RX ORDER — HYDROMORPHONE HYDROCHLORIDE 2 MG/ML
0.4 INJECTION, SOLUTION INTRAMUSCULAR; INTRAVENOUS; SUBCUTANEOUS EVERY 5 MIN PRN
Status: DISCONTINUED | OUTPATIENT
Start: 2025-06-23 | End: 2025-06-23 | Stop reason: HOSPADM

## 2025-06-23 RX ORDER — LIDOCAINE HYDROCHLORIDE 10 MG/ML
1 INJECTION, SOLUTION EPIDURAL; INFILTRATION; INTRACAUDAL; PERINEURAL ONCE
Status: CANCELLED | OUTPATIENT
Start: 2025-06-23 | End: 2025-06-23

## 2025-06-23 RX ORDER — LIDOCAINE HYDROCHLORIDE 20 MG/ML
INJECTION, SOLUTION EPIDURAL; INFILTRATION; INTRACAUDAL; PERINEURAL
Status: DISCONTINUED | OUTPATIENT
Start: 2025-06-23 | End: 2025-06-23

## 2025-06-23 RX ORDER — IBUPROFEN 200 MG
16 TABLET ORAL
Status: DISCONTINUED | OUTPATIENT
Start: 2025-06-23 | End: 2025-07-15 | Stop reason: HOSPADM

## 2025-06-23 RX ORDER — IBUPROFEN 200 MG
24 TABLET ORAL
Status: DISCONTINUED | OUTPATIENT
Start: 2025-06-23 | End: 2025-07-15 | Stop reason: HOSPADM

## 2025-06-23 RX ORDER — ALBUMIN HUMAN 250 G/1000ML
SOLUTION INTRAVENOUS
Status: DISCONTINUED | OUTPATIENT
Start: 2025-06-23 | End: 2025-06-23

## 2025-06-23 RX ORDER — SUCCINYLCHOLINE CHLORIDE 20 MG/ML
INJECTION INTRAMUSCULAR; INTRAVENOUS
Status: DISCONTINUED | OUTPATIENT
Start: 2025-06-23 | End: 2025-06-23

## 2025-06-23 RX ORDER — PHENYLEPHRINE HYDROCHLORIDE 10 MG/ML
INJECTION INTRAVENOUS
Status: DISCONTINUED | OUTPATIENT
Start: 2025-06-23 | End: 2025-06-23

## 2025-06-23 RX ORDER — HYDRALAZINE HYDROCHLORIDE 20 MG/ML
5 INJECTION INTRAMUSCULAR; INTRAVENOUS ONCE
Status: COMPLETED | OUTPATIENT
Start: 2025-06-23 | End: 2025-06-23

## 2025-06-23 RX ORDER — PROPOFOL 10 MG/ML
VIAL (ML) INTRAVENOUS
Status: DISCONTINUED | OUTPATIENT
Start: 2025-06-23 | End: 2025-06-23

## 2025-06-23 RX ADMIN — ONDANSETRON 4 MG: 2 INJECTION INTRAMUSCULAR; INTRAVENOUS at 01:06

## 2025-06-23 RX ADMIN — OXYCODONE HYDROCHLORIDE AND ACETAMINOPHEN 1 TABLET: 10; 325 TABLET ORAL at 02:06

## 2025-06-23 RX ADMIN — FENTANYL CITRATE 100 MCG: 50 INJECTION, SOLUTION INTRAMUSCULAR; INTRAVENOUS at 12:06

## 2025-06-23 RX ADMIN — ROCURONIUM BROMIDE 10 MG: 10 SOLUTION INTRAVENOUS at 12:06

## 2025-06-23 RX ADMIN — PROPOFOL 100 MG: 10 INJECTION, EMULSION INTRAVENOUS at 12:06

## 2025-06-23 RX ADMIN — ALBUMIN (HUMAN) 100 ML: 12.5 SOLUTION INTRAVENOUS at 12:06

## 2025-06-23 RX ADMIN — GLYCOPYRROLATE 0.2 MG: 0.2 INJECTION INTRAMUSCULAR; INTRAVENOUS at 12:06

## 2025-06-23 RX ADMIN — DEXAMETHASONE SODIUM PHOSPHATE 8 MG: 4 INJECTION, SOLUTION INTRA-ARTICULAR; INTRALESIONAL; INTRAMUSCULAR; INTRAVENOUS; SOFT TISSUE at 12:06

## 2025-06-23 RX ADMIN — ATORVASTATIN CALCIUM 80 MG: 40 TABLET, FILM COATED ORAL at 08:06

## 2025-06-23 RX ADMIN — ACETAMINOPHEN 750 MG: 10 INJECTION, SOLUTION INTRAVENOUS at 02:06

## 2025-06-23 RX ADMIN — TRAZODONE HYDROCHLORIDE 25 MG: 50 TABLET ORAL at 08:06

## 2025-06-23 RX ADMIN — HYDROMORPHONE HYDROCHLORIDE 0.5 MG: 2 INJECTION, SOLUTION INTRAMUSCULAR; INTRAVENOUS; SUBCUTANEOUS at 02:06

## 2025-06-23 RX ADMIN — SODIUM CHLORIDE: 9 INJECTION, SOLUTION INTRAVENOUS at 05:06

## 2025-06-23 RX ADMIN — LIDOCAINE HYDROCHLORIDE 40 MG: 20 INJECTION, SOLUTION EPIDURAL; INFILTRATION; INTRACAUDAL; PERINEURAL at 12:06

## 2025-06-23 RX ADMIN — OXYCODONE HYDROCHLORIDE AND ACETAMINOPHEN 1 TABLET: 10; 325 TABLET ORAL at 08:06

## 2025-06-23 RX ADMIN — METRONIDAZOLE 500 MG: 5 INJECTION, SOLUTION INTRAVENOUS at 04:06

## 2025-06-23 RX ADMIN — LEVETIRACETAM 500 MG: 500 SOLUTION ORAL at 08:06

## 2025-06-23 RX ADMIN — LEVOTHYROXINE SODIUM 88 MCG: 0.09 TABLET ORAL at 05:06

## 2025-06-23 RX ADMIN — HYDRALAZINE HYDROCHLORIDE 5 MG: 20 INJECTION INTRAMUSCULAR; INTRAVENOUS at 03:06

## 2025-06-23 RX ADMIN — SUGAMMADEX 200 MG: 100 INJECTION, SOLUTION INTRAVENOUS at 01:06

## 2025-06-23 RX ADMIN — METOPROLOL SUCCINATE 12.5 MG: 25 TABLET, EXTENDED RELEASE ORAL at 08:06

## 2025-06-23 RX ADMIN — CEFEPIME 1 G: 1 INJECTION, POWDER, FOR SOLUTION INTRAMUSCULAR; INTRAVENOUS at 05:06

## 2025-06-23 RX ADMIN — SUCCINYLCHOLINE CHLORIDE 120 MG: 20 INJECTION, SOLUTION INTRAMUSCULAR; INTRAVENOUS at 12:06

## 2025-06-23 RX ADMIN — METRONIDAZOLE 500 MG: 5 INJECTION, SOLUTION INTRAVENOUS at 08:06

## 2025-06-23 RX ADMIN — SODIUM CHLORIDE, SODIUM GLUCONATE, SODIUM ACETATE, POTASSIUM CHLORIDE AND MAGNESIUM CHLORIDE: 526; 502; 368; 37; 30 INJECTION, SOLUTION INTRAVENOUS at 12:06

## 2025-06-23 RX ADMIN — LABETALOL HYDROCHLORIDE 10 MG: 5 INJECTION, SOLUTION INTRAVENOUS at 02:06

## 2025-06-23 RX ADMIN — METRONIDAZOLE 500 MG: 5 INJECTION, SOLUTION INTRAVENOUS at 12:06

## 2025-06-23 RX ADMIN — OXYCODONE HYDROCHLORIDE AND ACETAMINOPHEN 1 TABLET: 10; 325 TABLET ORAL at 09:06

## 2025-06-23 RX ADMIN — BUSPIRONE HYDROCHLORIDE 15 MG: 5 TABLET ORAL at 08:06

## 2025-06-23 RX ADMIN — DEXTROSE MONOHYDRATE 750 MG: 5 INJECTION, SOLUTION INTRAVENOUS at 03:06

## 2025-06-23 RX ADMIN — POLYETHYLENE GLYCOL 3350 17 G: 17 POWDER, FOR SOLUTION ORAL at 08:06

## 2025-06-23 RX ADMIN — PHENYLEPHRINE HYDROCHLORIDE 300 MCG: 10 INJECTION INTRAVENOUS at 12:06

## 2025-06-23 RX ADMIN — FAMOTIDINE 20 MG: 20 TABLET, FILM COATED ORAL at 08:06

## 2025-06-23 RX ADMIN — ENOXAPARIN SODIUM 40 MG: 40 INJECTION SUBCUTANEOUS at 05:06

## 2025-06-23 RX ADMIN — HYDROMORPHONE HYDROCHLORIDE 0.4 MG: 2 INJECTION INTRAMUSCULAR; INTRAVENOUS; SUBCUTANEOUS at 02:06

## 2025-06-23 RX ADMIN — INSULIN ASPART 2 UNITS: 100 INJECTION, SOLUTION INTRAVENOUS; SUBCUTANEOUS at 09:06

## 2025-06-23 RX ADMIN — ALBUMIN (HUMAN) 100 ML: 12.5 SOLUTION INTRAVENOUS at 01:06

## 2025-06-23 RX ADMIN — OXYCODONE HYDROCHLORIDE AND ACETAMINOPHEN 1 TABLET: 10; 325 TABLET ORAL at 05:06

## 2025-06-23 RX ADMIN — CEFEPIME 1 G: 1 INJECTION, POWDER, FOR SOLUTION INTRAMUSCULAR; INTRAVENOUS at 04:06

## 2025-06-23 RX ADMIN — ROCURONIUM BROMIDE 60 MG: 10 SOLUTION INTRAVENOUS at 12:06

## 2025-06-23 RX ADMIN — CEFEPIME 1 G: 1 INJECTION, POWDER, FOR SOLUTION INTRAMUSCULAR; INTRAVENOUS at 08:06

## 2025-06-23 RX ADMIN — NORTRIPTYLINE HYDROCHLORIDE 25 MG: 25 CAPSULE ORAL at 08:06

## 2025-06-23 RX ADMIN — ACETAMINOPHEN 650 MG: 325 TABLET ORAL at 08:06

## 2025-06-23 RX ADMIN — HYDROMORPHONE HYDROCHLORIDE 1.5 MG: 2 INJECTION, SOLUTION INTRAMUSCULAR; INTRAVENOUS; SUBCUTANEOUS at 12:06

## 2025-06-23 RX ADMIN — MIDAZOLAM HYDROCHLORIDE 2 MG: 1 INJECTION, SOLUTION INTRAMUSCULAR; INTRAVENOUS at 12:06

## 2025-06-23 RX ADMIN — DEXTROSE MONOHYDRATE 750 MG: 5 INJECTION, SOLUTION INTRAVENOUS at 01:06

## 2025-06-23 RX ADMIN — MUPIROCIN: 20 OINTMENT TOPICAL at 08:06

## 2025-06-23 RX ADMIN — EZETIMIBE 10 MG: 10 TABLET ORAL at 08:06

## 2025-06-23 RX ADMIN — CEFEPIME 1 G: 1 INJECTION, POWDER, FOR SOLUTION INTRAMUSCULAR; INTRAVENOUS at 10:06

## 2025-06-23 NOTE — ANESTHESIA PROCEDURE NOTES
Intubation    Date/Time: 6/23/2025 12:27 PM    Performed by: Guanakito rKaus CRNA  Authorized by: Regis Elena MD    Intubation:     Induction:  Rapid sequence induction    Intubated:  Postinduction    Mask Ventilation:  Not attempted    Attempts:  1    Attempted By:  CRNA    Method of Intubation:  Direct    Blade:  Lang 2    Laryngeal View Grade: Grade I - full view of cords      Difficult Airway Encountered?: No      Complications:  None    Airway Device:  Oral endotracheal tube    Airway Device Size:  7.0    Style/Cuff Inflation:  Cuffed (inflated to minimal occlusive pressure)    Tube secured:  22    Secured at:  The lips    Placement Verified By:  Capnometry    Complicating Factors:  None    Findings Post-Intubation:  BS equal bilateral and atraumatic/condition of teeth unchanged

## 2025-06-23 NOTE — PLAN OF CARE
Problem: Adult Inpatient Plan of Care  Goal: Optimal Comfort and Wellbeing  Outcome: Progressing  Intervention: Monitor Pain and Promote Comfort  Flowsheets (Taken 6/23/2025 1824)  Pain Management Interventions:   care clustered   pain management plan reviewed with patient/caregiver   quiet environment facilitated   relaxation techniques promoted     Problem: Diabetes Comorbidity  Goal: Blood Glucose Level Within Targeted Range  Outcome: Progressing  Intervention: Monitor and Manage Glycemia  Flowsheets (Taken 6/23/2025 1824)  Glycemic Management: blood glucose monitored     Problem: Wound  Goal: Optimal Coping  Outcome: Progressing  Intervention: Support Patient and Family Response  Flowsheets (Taken 6/23/2025 1824)  Supportive Measures: active listening utilized  Family/Support System Care:   involvement promoted   presence promoted   support provided

## 2025-06-23 NOTE — TRANSFER OF CARE
"Anesthesia Transfer of Care Note    Patient: Chelle Chandra    Procedure(s) Performed: Procedure(s) (LRB):  CRANIECTOMY (Left)    Patient location: PACU    Anesthesia Type: general    Transport from OR: Transported from OR on room air with adequate spontaneous ventilation    Post pain: adequate analgesia    Post assessment: no apparent anesthetic complications and tolerated procedure well    Post vital signs: stable    Level of consciousness: sedated and responds to stimulation    Nausea/Vomiting: no nausea/vomiting    Complications: none    Transfer of care protocol was followedComments: Pt moving left arm, rubbing nose. Not opening eyes at this time      Last vitals: Visit Vitals  /74 (BP Location: Right arm, Patient Position: Lying)   Pulse 74   Temp 36.8 °C (98.2 °F) (Oral)   Resp 18   Ht 5' 6" (1.676 m)   Wt 49.9 kg (110 lb)   SpO2 96%   BMI 17.75 kg/m²     "

## 2025-06-23 NOTE — PROGRESS NOTES
Notified by nursing that patient's right arm became flaccid sometime after lunch yesterday although we were just made aware  STAT CT head was ordered    AFVSS  PERRL, EOMI  Alert, oriented to all spheres. Speech clear.   Follows commands in left UE and bilateral LE  Flaccid in right proximal UE. Very little movement in fingers.  Incision c/d/I    CT head:  Impression:       1. Increasing size of extra-axial fluid collections along the cerebral convexity with increasing mass effect.  2. Increased parenchymal edema in the left posterior frontal lobe.     Plan: Dr. Rankin did review images  Plan will be to take her back to the OR this afternoon for revision crani and placement of drains  NPO now  Labs ordered

## 2025-06-23 NOTE — PT/OT/SLP PROGRESS
Physical Therapy      Patient Name:  Chelle Chandra   MRN:  [id number]    Patient not seen today secondary to off floor for surgery, PT to follow up as appropriate.

## 2025-06-23 NOTE — H&P
[facility]  Pulmonary Critical Care Note    Patient Name: Chelle Chandra  MRN: [id number]  Admission Date: 6/11/2025  Hospital Length of Stay: 12 days  Code Status: Full Code  Attending Provider: Lorena Agudelo MD  Primary Care Provider: Jorge Silver MD     Subjective:     HPI:   1-year-old  female with significant history of hypothyroidism, anxiety, bronchial asthma, COPD, type 2 diabetes mellitus, 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 Patient recently had subdural hematoma in May requiring intracranial embolization, craniotomy with hematoma evacuation. After this patient was discharged home. Late may she presented back to the ED with right-sided weakness, facial droop and imaging was concerning for left MCA diminished flow and subacute hematoma. MRI brain was negative for CVA. Neurosurgery performed diony hole for drainage of subdural hematoma due to increased intracranial pressure with postop CT showing definitive improvement patient was on Plavix post embolization which was held briefly and resumed per Neurosurgery on 06/05 and she was transferred to rehab on 06/06. While in rehab patient was noted to have increase right upper extremity weakness and also significant headaches. CT head revealed reaccumulation of subdural fluid collection, increased from before patient was sent to main [place] for further evaluation/neurosurgery services. Neurosurgery evaluated, planning for  shunt this hospitalization, home meds resumed as appropriate except for Plavix, symptomatic management for headache.  shunt scheduled for 6/16. Patient underwent redo diony hole, fluid evacuation and drain placement, concern for surgical site infection and therefore underwent cranioplasty. Infectious Disease consulted and patient was placed on cefepime, vancomycin and Flagyl pending intraoperative cultures. Intraoperative cultures growing staph epi. ID recommending IV  vancomycin, IV Rocephin until 7/3. Neurosurgery wished to monitor patient for another day until 6/21. Ptient was planned for DC to Rehab 6/23/25, but complained of inability to move RUE since yesterday afternoon which she admits to not reporting to the nursing team . CT Head ordered which showed increasing size of extra-axial fluid collections along cerebral convexity with increasing mass effect & increase parenchymal edema in L posterior frontal lobe. NSX informed and they took the patient to the OR    Hospital Course/Significant events:  6/23/25 - Admitted to ICU     24 Hour Interval History:  Pending    Past Medical History:   Diagnosis Date    Accelerated junctional rhythm     Agatston CAC score, <100     Anxiety disorder, unspecified     Asthma     COPD type A     Diabetes mellitus without complication     GERD (gastroesophageal reflux disease)     History of COVID-19     Insomnia     Lung nodule        Past Surgical History:   Procedure Laterality Date    ANGIOGRAM, CORONARY, WITH LEFT HEART CATHETERIZATION      BACK SURGERY      BREAST LUMPECTOMY Right     CARDIOVERSION  08/01/2013    CARPAL TUNNEL RELEASE  10/23/2013    CRANIECTOMY Left 6/16/2025    Procedure: CRANIECTOMY;  Surgeon: James Rankin MD;  Location: [facility] OR;  Service: Neurosurgery;  Laterality: Left;  left redo-craniectomy for subdural empyema    CRANIOTOMY FOR EVACUATION OF SUBDURAL HEMATOMA Left 05/19/2025    Procedure: CRANIOTOMY, FOR SUBDURAL HEMATOMA EVACUATION;  Surgeon: Ricardo Shelley MD;  Location: [facility] OR;  Service: Neurosurgery;  Laterality: Left;    CREATION OF TERRI HOLE WITH EVACUATION OF HEMATOMA Left 6/2/2025    Procedure: CREATION, CRANIAL TERRI HOLE, WITH HEMATOMA EVACUATION;  Surgeon: James Rankin MD;  Location: [facility] OR;  Service: Neurosurgery;  Laterality: Left;  LEFT BURRHOLE FOR SUBDURAL HEMATOMA EVACUATION // STEALTH // [employer] SKYTRON // DRILL    EVACUATION OF EMPYEMA Left 6/16/2025    Procedure: EVACUATION,  "EMPYEMA;  Surgeon: James Rankin MD;  Location: [facility] OR;  Service: Neurosurgery;  Laterality: Left;  left craniotomy for subdural empyema    FUSION OF CERVICAL SPINE BY ANTERIOR APPROACH USING COMPUTER-ASSISTED NAVIGATION  06/10/2019    KIDNEY SURGERY      TONSILLECTOMY AND ADENOIDECTOMY      TOTAL ABDOMINAL HYSTERECTOMY      WRIST SURGERY         Social History[1]    Current Outpatient Medications   Medication Instructions    0.9% NaCl SolP 500 mL with vancomycin 1,000 mg SolR 1,000 mg 1,000 mg, Irrigation, Daily    atorvastatin (LIPITOR) 80 mg, Oral, Daily    busPIRone (BUSPAR) 15 mg, Oral, 2 times daily    butalbital-acetaminophen-caffeine -40 mg (FIORICET, ESGIC) -40 mg per tablet 1 tablet, Oral, Every 4 hours PRN    [Paused] clopidogreL (PLAVIX) 75 mg, Oral, Daily    D5W PgBk 100 mL with cefTRIAXone 1 gram SolR 2 g 2 g, Intravenous, Daily    docusate sodium (COLACE) 100 mg, Oral, 2 times daily    ezetimibe (ZETIA) 10 mg, Oral    famotidine (PEPCID) 20 mg, Oral, 2 times daily    FARXIGA 5 mg, Oral    levETIRAcetam (KEPPRA) 500 mg, Oral, 2 times daily    levothyroxine (SYNTHROID) 88 mcg, Oral, Before breakfast    metFORMIN (GLUCOPHAGE) 500 mg, Oral, 2 times daily with meals    metoprolol succinate (TOPROL-XL) 12.5 mg, Daily    nortriptyline (PAMELOR) 25 mg, Oral, Nightly    ondansetron (ZOFRAN) 8 mg, Oral, Every 6 hours PRN    pen needle, diabetic 32 gauge x 5/32" Ndle 1 each, Misc.(Non-Drug; Combo Route), Weekly    traZODone (DESYREL) 25 mg, Oral, Nightly     Current Inpatient Medications   atorvastatin  80 mg Oral Daily    busPIRone  15 mg Oral BID    ceFEPime IV (PEDS and ADULTS)  1 g Intravenous Q6H    enoxparin  40 mg Subcutaneous Q24H (prophylaxis, 1700)    ezetimibe  10 mg Oral Daily    famotidine  20 mg Oral BID    hydrALAZINE        hydrocortisone   Topical (Top) BID    labetaloL        levetiracetam  500 mg Oral BID    levothyroxine  88 mcg Oral Before breakfast    metoprolol succinate  " 12.5 mg Oral Daily    metroNIDAZOLE IV (PEDS and ADULTS)  500 mg Intravenous Q8H    mupirocin   Nasal BID    nortriptyline  25 mg Oral QHS    polyethylene glycol  17 g Oral BID    traZODone  25 mg Oral QHS    vancomycin 750 mg in D5W 250 mL IVPB (admixture device)  750 mg Intravenous Q12H     Current Intravenous Infusions   0.9% NaCl   Intravenous Continuous 100 mL/hr at 06/23/25 1707 New Bag at 06/23/25 1707    clevidipine  0-16 mg/hr Intravenous Continuous         Review of Systems   Constitutional:  Negative for chills and fever.   Respiratory:  Negative for cough and shortness of breath.    Cardiovascular:  Negative for chest pain and palpitations.   Gastrointestinal:  Negative for blood in stool, constipation, diarrhea, nausea and vomiting.   Genitourinary:  Negative for dysuria and urgency.   Skin:  Negative for rash.   Neurological:  Positive for sensory change, focal weakness and weakness.        Objective:     Intake/Output Summary (Last 24 hours) at 6/23/2025 1746  Last data filed at 6/23/2025 1744  Gross per 24 hour   Intake 1420 ml   Output 550 ml   Net 870 ml     Vital Signs (Most Recent):  Temp: 98.1 °F (36.7 °C) (06/23/25 1630)  Pulse: 75 (06/23/25 1630)  Resp: 20 (06/23/25 1708)  BP: 138/77 (06/23/25 1630)  SpO2: 97 % (06/23/25 1630)  Body mass index is 17.75 kg/m².  Weight: 49.9 kg (110 lb) Vital Signs (24h Range):  Temp:  [97.6 °F (36.4 °C)-98.9 °F (37.2 °C)] 98.1 °F (36.7 °C)  Pulse:  [63-93] 75  Resp:  [7-20] 20  SpO2:  [92 %-99 %] 97 %  BP: (112-150)/(67-93) 138/77  Arterial Line BP: (129-139)/() 139/70     Physical Exam  General: Well nourished w/o distress  HEENT: NC/AT; PERRL; nasal and oral mucosa moist and clear; left craniotomy changes  Pulm: CTA bilaterally, normal work of breathing  CV: S1, S2 w/o murmurs or gallops; no edema noted  GI: Bowel sound present in all quadrants, abdomen soft to palpation  MSK: Unable to move RUE. Full ROM of other extremities   Derm: No rashes, abnormal  "bruising, or skin lesions  Neuro: AAOx4;  See MSK  Psych: Cooperative; appropriate mood and affect    Lines/Drains/Airways       Drain  Duration                  Closed/Suction Drain 06/23/25 1353 Tube - 1 Left Scalp Bulb 10 Fr. <1 day         Urethral Catheter 06/23/25 1220 Silicone 16 Fr. <1 day              Arterial Line  Duration             Arterial Line 06/23/25 1016 Radial <1 day              Peripheral Intravenous Line  Duration                  Midline Catheter Single Lumen 06/18/25 1952 Left basilic vein (medial side of arm) 4 days    Peripheral IV Single Lumen 06/23/25 1233 18 G 1 1/4 in Left Hand <1 day                  Significant Labs:  Lab Results   Component Value Date    WBC 6.14 06/23/2025    HGB 10.6 (L) 06/23/2025    HCT 32.7 (L) 06/23/2025    MCV 91.9 06/23/2025     06/23/2025       BMP  Lab Results   Component Value Date     06/23/2025    K 3.6 06/23/2025    CO2 25 06/23/2025    BUN 6.2 (L) 06/23/2025    CREATININE 0.55 06/23/2025    CALCIUM 9.1 06/23/2025    AGAP 7.0 06/23/2025    EGFRNONAA >60 07/28/2021     ABG  No results for input(s): "PH", "PO2", "PCO2", "HCO3", "BE" in the last 168 hours.  Mechanical Ventilation Support:       Significant Imaging:  I have reviewed the pertinent imaging within the past 24 hours.    Assessment/Plan:     Assessment  S/p ICH ; s/p craniectomy    CVA w/ RUE Weakness   Subdural hematoma requiring crani with evacuation and MMA embolization 5/2025  Increased subdural fluid collection Status post left redo diony hole with fluid evacuation, drain placement 6/16  Suspected surgical site infection status post cranioplasty 6/16  Right-sided weakness/intractable headache secondary to aboveistory of 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, 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  Depression/anxiety   HLD  Hypothyroidism  Essential HTN  Type 2 diabetes mellitus    Plan  -Admitted to ICU/Continue ICU level of care for ongoing monitoring and medical management  - Subdural empyema removed " x3  - q1hr neuro checks  - neuro surgery following, appreciate recs  - On Vanc, Cefepime, and Flagyl  - Continue antibiotics  - atorvastatin 80, buspar 15, zetia, 10  - can continue trazodone nightly qh  - PT/OT    DVT Prophylaxis: scd  GI Prophylaxis: none     32 minutes of critical care was time spent personally by me on the following activities: development of treatment plan with patient or surrogate and bedside caregivers, discussions with consultants, evaluation of patient's response to treatment, examination of patient, ordering and performing treatments and interventions, ordering and review of laboratory studies, ordering and review of radiographic studies, pulse oximetry, re-evaluation of patient's condition.  This critical care time did not overlap with that of any other provider or involve time for any procedures.     Aneesh Campbell MD  [facility], PGY-2  Pulmonary & Critical Care Medicine  [facility] - [address] [place] [address]         [1]   Social History  Socioeconomic History    Marital status:    Tobacco Use    Smoking status: Every Day     Current packs/day: 0.50     Types: Cigarettes    Smokeless tobacco: Never   Vaping Use    Vaping status: Former    Quit date: 6/1/2024   Substance and Sexual Activity    Alcohol use: Not Currently    Drug use: Never     Social Drivers of Health     Financial Resource Strain: Patient Declined (6/18/2025)    Overall Financial Resource Strain (CARDIA)     Difficulty of Paying Living Expenses: Patient declined   Food Insecurity: Patient Declined (6/18/2025)    Hunger Vital Sign     Worried About Running Out of Food in the Last Year: Patient declined     Ran Out of Food in the Last Year: Patient declined   Transportation Needs: Patient Declined (6/18/2025)    PRAPARE - Transportation     Lack of Transportation (Medical): Patient declined     Lack of Transportation (Non-Medical): Patient declined   Physical Activity: Inactive (5/29/2025)    Exercise  Vital Sign     Days of Exercise per Week: 0 days     Minutes of Exercise per Session: 0 min   Stress: Patient Declined (6/18/2025)    [employer] - Occupational Stress Questionnaire     Feeling of Stress : Patient declined   Recent Concern: Stress - Stress Concern Present (5/29/2025)    [employer] - Occupational Stress Questionnaire     Feeling of Stress : Rather much   Housing Stability: Patient Declined (6/18/2025)    Housing Stability Vital Sign     Unable to Pay for Housing in the Last Year: Patient declined     Homeless in the Last Year: Patient declined

## 2025-06-23 NOTE — BRIEF OP NOTE
[facility] - Periop Services  Brief Operative Note    SUMMARY     Surgery Date: 6/23/2025     Surgeons and Role:     * James Rankin MD - Primary    Assisting Surgeon: Henrik Sandoval PA-C    Pre-op Diagnosis:  Abscess of brain [G06.0]    Post-op Diagnosis:  Post-Op Diagnosis Codes:     * Abscess of brain [G06.0]    Procedure(s) (LRB):  CRANIECTOMY (Left)    Left craniectomy for evacuation of empyema.     Anesthesia: General    Implants:  Implant Name Type Inv. Item Serial No.  Lot No. LRB No. Used Action   DURAMATRIX ONLAY PLUS 4X5 - [id number]  DURAMATRIX ONLAY PLUS 4X5  [facility] [employer] H[facility] SERV [id number] Left 1 Implanted       Operative Findings: Dictated    Estimated Blood Loss: * No values recorded between 6/23/2025 12:17 PM and 6/23/2025  1:45 PM *    Estimated Blood Loss has been documented.         Specimens:   Specimen (24h ago, onward)      None          ID Type Source Tests Collected by Time Destination   1 : subdural empyema Tissue Brain ANAEROBIC CULTURE [facility], FUNGAL CULTURE [facility], GRAM STAIN [facility], TISSUE CULTURE - AEROBIC [facility] James Rankin MD 6/23/2025 1311    2 : subdural empyema Tissue Brain ANAEROBIC CULTURE [facility], FUNGAL CULTURE [facility], GRAM STAIN [facility], TISSUE CULTURE - AEROBIC [facility] James Rankin MD 6/23/2025 1313    3 : subdural empyema Tissue Brain ANAEROBIC CULTURE [facility], FUNGAL CULTURE [facility], GRAM STAIN [facility], TISSUE CULTURE - AEROBIC [facility] James Rankin MD 6/23/2025 1314        [id number]

## 2025-06-23 NOTE — PROGRESS NOTES
[facility] Hospital Medicine Progress Note        Chief Complaint: Inpatient Follow-up for     HPI:   59-year-old  female with significant history of hypothyroidism, anxiety, bronchial asthma, COPD, type 2 diabetes mellitus, 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 Patient recently had subdural hematoma in May requiring intracranial embolization, craniotomy with hematoma evacuation. After this patient was discharged home. Late may she presented back to the ED with right-sided weakness, facial droop and imaging was concerning for left MCA diminished flow and subacute hematoma. MRI brain was negative for CVA. Neurosurgery performed diony hole for drainage of subdural hematoma due to increased intracranial pressure with postop CT showing definitive improvement patient was on Plavix post embolization which was held briefly and resumed per Neurosurgery on 06/05 and she was transferred to rehab on 06/06. While in rehab patient was noted to have increase right upper extremity weakness and also significant headaches. CT head revealed reaccumulation of subdural fluid collection, increased from before patient was sent to main [facility] for further evaluation/neurosurgery services. Neurosurgery evaluated, planning for  shunt this hospitalization, home meds resumed as appropriate except for Plavix, symptomatic management for headache.  shunt scheduled for 6/16. Patient underwent redo diony hole, fluid evacuation and drain placement, concern for surgical site infection and therefore underwent cranioplasty. Infectious Disease consulted and patient was placed on cefepime, vancomycin and Flagyl pending intraoperative cultures.  Intraoperative cultures growing staph epi.  ID recommending IV vancomycin, IV Rocephin until 7/3.  Neurosurgery wished to monitor patient for another day until 6/21.  PT/OT on board; recommending high-intensity therapy.  Speech therapy recommending  regular diet with moderately thick liquids.  CM on board for placement.    Interval Hx:   Patient was planned for DC to Rehab today. This morning complained of inability to move RUE since yesterday afternoon which she admits to not reporting to the nursing team yesterday. CT Head ordered which showed increasing size of extra-axial fluid collections along cerebral convexity with increasing mass effect & increase parenchymal edema in L posterior frontal lobe. NSX informed; plan to take patient to the OR.    Objective/physical exam:  General: alert lady lying comfortably in bed, in no acute distress.  HENT: oral and oropharyngeal mucosa moist, pink, with no erythema or exudates, no ear pain or discharge  Neck: normal neck movement, no lymph nodes or swellings, no JVD or Carotid bruit  Respiratory: clear breathing sounds bilaterally, no crackles, rales, ronchi or wheezes  Cardiovascular: clear S1 and S2, no murmurs, rubs or gallops  Peripheral Vascular: no lesions, ulcers or erosions, normal peripheral pulses and no pedal edema  Gastrointestinal: soft, non-tender, non-distended abdomen, no guarding, rigidity or rebound tenderness, normal bowel sounds  Integumentary: normal skin color, no rashes or lesions  Neuro: incision wound with staples healing well; AAO x 3; motor strength 0/5 in RUE, 3/5 in RLE & 4/5 LUE/LLE; sensation intact to gross and fine touch B/L; CN II-XII grossly intact    VITAL SIGNS: 24 HRS MIN & MAX LAST   Temp  Min: 98.2 °F (36.8 °C)  Max: 98.9 °F (37.2 °C) 98.2 °F (36.8 °C)   BP  Min: 112/74  Max: 143/85 112/74   Pulse  Min: 74  Max: 93  74   Resp  Min: 17  Max: 20 18   SpO2  Min: 95 %  Max: 98 % 96 % (RA)     I have reviewed the following labs:  Recent Labs   Lab 06/17/25  0817 06/18/25  0516 06/23/25  1023   WBC 6.90 7.03 6.14   RBC 3.51* 3.54* 3.56*   HGB 10.4* 10.5* 10.6*   HCT 33.4* 32.9* 32.7*   MCV 95.2* 92.9 91.9   MCH 29.6 29.7 29.8   MCHC 31.1* 31.9* 32.4*   RDW 13.7 13.6 14.4   *  405* 359   MPV 9.7 9.8 9.9     Recent Labs   Lab 06/17/25  0817 06/18/25  0516 06/19/25  0737 06/22/25  1049 06/23/25  1023    139  --   --  138   K 4.2 4.0  --   --  3.6    104  --   --  106   CO2 28 28  --   --  25   BUN 6.9* 3.9*  --   --  6.2*   CREATININE 0.69 0.68 0.63 0.72 0.55   * 186*  --   --  219*   CALCIUM 8.8 8.7  --   --  9.1   ALBUMIN 2.5* 2.5*  --   --   --    PROT 6.4 6.3  --   --   --    ALKPHOS 172* 168*  --   --   --    ALT 19 15  --   --   --    AST 12 11  --   --   --    BILITOT 0.3 0.2  --   --   --      Assessment/Plan:  Worsened RUE Weakness 2/2 Worsened Extra-Axial Fluid Collection  Increased subdural fluid collection Status post left redo diony hole with fluid evacuation, drain placement 6/16  Suspected surgical site infection status post cranioplasty 6/16  Subdural hematoma requiring craniotomy with evacuation and MMA embolization early May with recurrence late May requiring diony hole  Right-sided weakness/intractable headache secondary to above  History of 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, 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  Depression/anxiety   HLD  Hypothyroidism  Essential HTN  Type 2 diabetes mellitus    Continues to be admitted   Neurosurgery planning for operative intervention today  CT Head showed worsened extra-axial fluid collections with mass effect and L posterior frontal lobe edema  Drains removed by Neurosurgery  On IV Vancomycin/Cefepime/Flagyl  ID remains on board; recommendations noted   Planned for IV Vancomycin/Rocephin till 7/3 upon DC  PT/OT on board; recommending high-intensity therapy   Speech therapy cleared patient for regular solids and moderately thick liquids   Continued on atorvastatin, buspirone b.i.d., Zetia, Pepcid b.i.d., Keppra b.i.d., levothyroxine, metoprolol succinate, nortriptyline, MiraLax b.i.d., trazodone    VTE prophylaxis:  Lovenox    Patient condition:  Stable    Anticipated discharge and Disposition:         All diagnosis and differential  diagnosis have been reviewed; assessment and plan has been documented; I have personally reviewed the labs and test results that are presently available; I have reviewed the patients medication list; I have reviewed the consulting providers response and recommendations. I have reviewed or attempted to review medical records based upon their availability    All of the patient's questions have been  addressed and answered. Patient's is agreeable to the above stated plan. I will continue to monitor closely and make adjustments to medical management as needed.    Portions of this note dictated using EMR integrated voice recognition software, and may be subject to voice recognition errors not corrected at proofreading. Please contact writer for clarification if needed.     Radiology:  I have personally reviewed the following imaging and agree with the radiologist.     CT Head Without Contrast  Narrative: EXAMINATION:  CT HEAD WITHOUT CONTRAST    CLINICAL HISTORY:  NEW ONSET RUE WEAKNESS;    TECHNIQUE:  Axial scans were obtained from skull base to the vertex.    Coronal and sagittal reconstructions obtained from the axial data.    Automatic exposure control was utilized to limit radiation dose.    Contrast: None    Radiation Dose:    Total DLP: 875 mGy*cm    COMPARISON:  CT head dated 06/17/2025    FINDINGS:  There is increasing size of extra-axial fluid collections along the left cerebral convexity measuring up to 2.2 cm in thickness.  There is unchanged encephalomalacia in the left cerebral hemisphere.  There is increased parenchymal edema in the left posterior frontal lobe.  Mass effect has increased with partial effacement of the left lateral ventricle.  There is no significant midline shift.  The basal cisterns are patent.  The skull base is intact.  Impression: 1. Increasing size of extra-axial fluid collections along the cerebral convexity with increasing mass effect.  2. Increased parenchymal edema in the left  posterior frontal lobe.    Electronically signed by: Beverley Ames  Date:    06/23/2025  Time:    09:48      Chucho Hernandez MD  Department of Hospital Medicine   [facility]   06/23/2025

## 2025-06-23 NOTE — PT/OT/SLP PROGRESS
Patient off floor for emergent craniectomy. Patient with history of dysphagia and was on a modified diet prior to surgery. Please keep patient NPO until re-evaluated by SLP following craniectomy.

## 2025-06-23 NOTE — PLAN OF CARE
06/23/25 0808   Final Note   Assessment Type Final Discharge Note   Anticipated Discharge Disposition Rehab  ([facility])   Post-Acute Status   Post-Acute Authorization Placement   Post-Acute Placement Status Set-up Complete/Auth obtained   Discharge Delays (!) Ambulance Transport/Facility Transport

## 2025-06-23 NOTE — ANESTHESIA PREPROCEDURE EVALUATION
06/23/2025      Chelle Chandra is a 59 y.o., female.with significant history of hypothyroidism, anxiety, bronchial asthma, COPD, type 2 diabetes mellitus, 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 Patient recently had subdural hematoma in May requiring intracranial embolization, craniotomy with hematoma evacuation. Subsequently continued hospital stay with surgical interventions and now returning to OR for craniectomy.     Pre-operative evaluation for Procedure(s) (LRB):  CRANIECTOMY (Left)    Pre-op Assessment    I have reviewed the Patient Summary Reports.     I have reviewed the Nursing Notes. I have reviewed the NPO Status.   I have reviewed the Medications.     Review of Systems  Anesthesia Hx:  No problems with previous Anesthesia                    Past Medical History:   Diagnosis Date    Accelerated junctional rhythm     Agatston CAC score, <100     Anxiety disorder, unspecified     Asthma     COPD type A     Diabetes mellitus without complication     GERD (gastroesophageal reflux disease)     History of COVID-19     Insomnia     Lung nodule        Problem List[1]    Review of patient's allergies indicates:   Allergen Reactions    Aspirin     Ketorolac     Penicillins     Sulfa (sulfonamide antibiotics)     Ozempic [semaglutide] Other (See Comments)     Abdominal pain       Current Outpatient Medications   Medication Instructions    0.9% NaCl SolP 500 mL with vancomycin 1,000 mg SolR 1,000 mg 1,000 mg, Irrigation, Daily    atorvastatin (LIPITOR) 80 mg, Oral, Daily    busPIRone (BUSPAR) 15 mg, Oral, 2 times daily    butalbital-acetaminophen-caffeine -40 mg (FIORICET, ESGIC) -40 mg per tablet 1 tablet, Oral, Every 4 hours PRN    [Paused] clopidogreL (PLAVIX) 75 mg, Oral, Daily    D5W PgBk 100 mL with cefTRIAXone 1 gram SolR 2 g 2 g, Intravenous, Daily    docusate sodium (COLACE) 100 mg, Oral, 2 times daily    ezetimibe (ZETIA) 10 mg, Oral    famotidine (PEPCID) 20 mg, Oral, 2  "times daily    FARXIGA 5 mg, Oral    levETIRAcetam (KEPPRA) 500 mg, Oral, 2 times daily    levothyroxine (SYNTHROID) 88 mcg, Oral, Before breakfast    metFORMIN (GLUCOPHAGE) 500 mg, Oral, 2 times daily with meals    metoprolol succinate (TOPROL-XL) 12.5 mg, Daily    nortriptyline (PAMELOR) 25 mg, Oral, Nightly    ondansetron (ZOFRAN) 8 mg, Oral, Every 6 hours PRN    pen needle, diabetic 32 gauge x 5/32" Ndle 1 each, Misc.(Non-Drug; Combo Route), Weekly    traZODone (DESYREL) 25 mg, Oral, Nightly       Past Surgical History:   Procedure Laterality Date    ANGIOGRAM, CORONARY, WITH LEFT HEART CATHETERIZATION      BACK SURGERY      BREAST LUMPECTOMY Right     CARDIOVERSION  08/01/2013    CARPAL TUNNEL RELEASE  10/23/2013    CRANIECTOMY Left 6/16/2025    Procedure: CRANIECTOMY;  Surgeon: James Rankin MD;  Location: [facility] OR;  Service: Neurosurgery;  Laterality: Left;  left redo-craniectomy for subdural empyema    CRANIOTOMY FOR EVACUATION OF SUBDURAL HEMATOMA Left 05/19/2025    Procedure: CRANIOTOMY, FOR SUBDURAL HEMATOMA EVACUATION;  Surgeon: Ricardo Shelley MD;  Location: [facility] OR;  Service: Neurosurgery;  Laterality: Left;    CREATION OF TERRI HOLE WITH EVACUATION OF HEMATOMA Left 6/2/2025    Procedure: CREATION, CRANIAL TERRI HOLE, WITH HEMATOMA EVACUATION;  Surgeon: James Rankin MD;  Location: [facility] OR;  Service: Neurosurgery;  Laterality: Left;  LEFT BURRHOLE FOR SUBDURAL HEMATOMA EVACUATION // STEALTH // SHERRI SKYTRON // DRILL    EVACUATION OF EMPYEMA Left 6/16/2025    Procedure: EVACUATION, EMPYEMA;  Surgeon: James Rankin MD;  Location: [facility] OR;  Service: Neurosurgery;  Laterality: Left;  left craniotomy for subdural empyema    FUSION OF CERVICAL SPINE BY ANTERIOR APPROACH USING COMPUTER-ASSISTED NAVIGATION  06/10/2019    KIDNEY SURGERY      TONSILLECTOMY AND ADENOIDECTOMY      TOTAL ABDOMINAL HYSTERECTOMY      WRIST SURGERY         Social History[2]    /78   Pulse 93   Temp 36.8 °C (98.2 °F) " "(Oral)   Resp 18   Ht 5' 6" (1.676 m)   Wt 49.9 kg (110 lb)   SpO2 96%   BMI 17.75 kg/m²       Physical Exam  General: Well nourished and Cooperative    Airway:  Mallampati: II   Mouth Opening: Normal  TM Distance: Normal  Tongue: Normal  Neck ROM: Normal ROM    Dental:  No upper teeth, lowers in fair condition  Chest/Lungs:  Clear to auscultation    Heart:  Rhythm: Regular Rhythm        Lab Results   Component Value Date    WBC 7.03 06/18/2025    HGB 10.5 (L) 06/18/2025    HCT 32.9 (L) 06/18/2025    MCV 92.9 06/18/2025     (H) 06/18/2025          BMP  Lab Results   Component Value Date    HCT 32.9 (L) 06/18/2025     06/18/2025    K 4.0 06/18/2025    BUN 3.9 (L) 06/18/2025    CREATININE 0.72 06/22/2025    CALCIUM 8.7 06/18/2025          Diagnostic Studies:    5/29/25      Left Ventricle: The left ventricle is normal in size. Normal wall thickness. There is normal systolic function with a visually estimated ejection fraction of 55 - 60%.    Right Ventricle: The right ventricle is normal in size Systolic function is normal. TAPSE is 1.8 cm.    IVC/SVC: Normal venous pressure at 3 mmHg.  .  EKG  Results for orders placed or performed during the hospital encounter of 05/29/25   ECG 12 lead    Collection Time: 05/29/25 10:24 AM   Result Value Ref Range    QRS Duration 80 ms    [facility] QTC Calculation 460 ms    Narrative    Test Reason : R29.818,    Vent. Rate :  82 BPM     Atrial Rate :  82 BPM     P-R Int : 140 ms          QRS Dur :  80 ms      QT Int : 394 ms       P-R-T Axes :  70  82  70 degrees    QTcB Int : 460 ms    Normal sinus rhythm  Nonspecific T wave abnormality  Prolonged QT  Abnormal ECG  When compared with ECG of 19-May-2025 12:33,  Nonspecific T wave abnormality no longer evident in Inferior leads  Confirmed by Julien Valle ([id number]) on 5/31/2025 2:17:43 AM    Referred By: AAAREFERRAL SELF           Confirmed By: Julien Valle         Anesthesia Plan  Type of Anesthesia, risks & benefits " discussed:    Anesthesia Type: Gen ETT  Intra-op Monitoring Plan: Standard ASA Monitors and Art Line  Post Op Pain Control Plan: multimodal analgesia  Induction:  IV  Informed Consent: Informed consent signed with the Patient and all parties understand the risks and agree with anesthesia plan.  All questions answered.   ASA Score: 3  Day of Surgery Review of History & Physical: H&P Update referred to the surgeon/provider.  Anesthesia Plan Notes: Surgeon deems case emergent, will proceed with GETA/RSI     Ready For Surgery From Anesthesia Perspective.     .  Anesthesia consent includes material facts, risks, complications & alternatives, and possibility of altering the anesthesia plan due to intraoperative conditions.    I reviewed problem list, prior to admission medication list, appropriate labs, any workup, Xray, EKG etc   See anesthesia chart for details of the anesthesia plan carried out.       Regis Elena MD    ¤                [1]   Patient Active Problem List  Diagnosis    Anxiety disorder, unspecified    Asthma    Depressive disorder    Inadequately controlled diabetes mellitus    GERD (gastroesophageal reflux disease)    HLD.    HYPOTHYROIDISM.    INSOMNIA.    COPD type A    Agatston CAC score, <100    History of COVID-19    Tobacco use    History of stroke    Dizziness    Tobacco dependency    Subdural hematoma    Acute focal neurological deficit    Acute right-sided weakness    Severe malnutrition    Accelerated junctional rhythm    Lung nodule    Bipolar depression    S/P craniotomy    Dysphagia    Aphasia   [2]   Social History  Socioeconomic History    Marital status:    Tobacco Use    Smoking status: Every Day     Current packs/day: 0.50     Types: Cigarettes    Smokeless tobacco: Never   Vaping Use    Vaping status: Former    Quit date: 6/1/2024   Substance and Sexual Activity    Alcohol use: Not Currently    Drug use: Never     Social Drivers of Health     Financial Resource Strain:  Patient Declined (6/18/2025)    Overall Financial Resource Strain (CARDIA)     Difficulty of Paying Living Expenses: Patient declined   Food Insecurity: Patient Declined (6/18/2025)    Hunger Vital Sign     Worried About Running Out of Food in the Last Year: Patient declined     Ran Out of Food in the Last Year: Patient declined   Transportation Needs: Patient Declined (6/18/2025)    PRAPARE - Transportation     Lack of Transportation (Medical): Patient declined     Lack of Transportation (Non-Medical): Patient declined   Physical Activity: Inactive (5/29/2025)    Exercise Vital Sign     Days of Exercise per Week: 0 days     Minutes of Exercise per Session: 0 min   Stress: Patient Declined (6/18/2025)    [employer] - Occupational Stress Questionnaire     Feeling of Stress : Patient declined   Recent Concern: Stress - Stress Concern Present (5/29/2025)    [employer] - Occupational Stress Questionnaire     Feeling of Stress : Rather much   Housing Stability: Patient Declined (6/18/2025)    Housing Stability Vital Sign     Unable to Pay for Housing in the Last Year: Patient declined     Homeless in the Last Year: Patient declined

## 2025-06-23 NOTE — ANESTHESIA PROCEDURE NOTES
Arterial    Diagnosis: Invasive monitoring    Patient location during procedure: done in OR  Timeout: 6/23/2025 1:30 PM  Procedure end time: 6/23/2025 1:33 PM    Staffing  Authorizing Provider: Regis Elena MD  Performing Provider: Regis Elena MD    Staffing  Performed by: Regis Elena MD  Authorized by: Regis Elena MD    Anesthesiologist was present at the time of the procedure.    Preanesthetic Checklist  Completed: patient identified, IV checked, site marked, risks and benefits discussed, surgical consent, monitors and equipment checked, pre-op evaluation, timeout performed and anesthesia consent givenArterial  Skin Prep: chlorhexidine gluconate and isopropyl alcohol  Local Infiltration: lidocaine (5 cc 1% Lidocaine for skin wheal)  Orientation: right  Location: brachial    Catheter size: see details below.  Catheter placement by Ultrasound guidance (US was used to visualize needle entry into the vessel.). Heme positive aspiration all ports.   Vessel Caliber: medium, patent, compressibility normal  Needle advanced into vessel with real time Ultrasound guidance.  Sterile sheath used.  Image recorded and saved.Insertion Attempts: 1  Assessment  Dressing: secured with tape and tegaderm  Patient: Tolerated well  Additional Notes  Arrow radial artery cath set used...20GaX1&3/4inch (4.45cm) radio-opaque polyurethane over 22Ga TW introducer needle with integral 0.018inch (0.46mm) holger spring-wire guide used for arterial access.  Location confirmed with flashback, catheter advanced into vessel, intro needle/wire removed and luer lock connector attached to catheter.  Catheter flushed with saline, armboard secured.

## 2025-06-24 LAB
CRP SERPL-MCNC: 16.3 MG/L
ERYTHROCYTE [SEDIMENTATION RATE] IN BLOOD: 15 MM/HR (ref 0–30)
GRAM STN SPEC: NORMAL
POCT GLUCOSE: 147 MG/DL (ref 70–110)
POCT GLUCOSE: 148 MG/DL (ref 70–110)
POCT GLUCOSE: 240 MG/DL (ref 70–110)
POCT GLUCOSE: 254 MG/DL (ref 70–110)
VANCOMYCIN TROUGH SERPL-MCNC: 15.1 UG/ML (ref 15–20)

## 2025-06-24 PROCEDURE — 80202 ASSAY OF VANCOMYCIN: CPT | Performed by: INTERNAL MEDICINE

## 2025-06-24 PROCEDURE — 25000003 PHARM REV CODE 250: Performed by: INTERNAL MEDICINE

## 2025-06-24 PROCEDURE — 63600175 PHARM REV CODE 636 W HCPCS

## 2025-06-24 PROCEDURE — 97164 PT RE-EVAL EST PLAN CARE: CPT

## 2025-06-24 PROCEDURE — 97116 GAIT TRAINING THERAPY: CPT

## 2025-06-24 PROCEDURE — 97168 OT RE-EVAL EST PLAN CARE: CPT

## 2025-06-24 PROCEDURE — 97535 SELF CARE MNGMENT TRAINING: CPT

## 2025-06-24 PROCEDURE — 20000000 HC ICU ROOM

## 2025-06-24 PROCEDURE — 25000003 PHARM REV CODE 250: Performed by: STUDENT IN AN ORGANIZED HEALTH CARE EDUCATION/TRAINING PROGRAM

## 2025-06-24 PROCEDURE — 63600175 PHARM REV CODE 636 W HCPCS: Performed by: INTERNAL MEDICINE

## 2025-06-24 PROCEDURE — 92610 EVALUATE SWALLOWING FUNCTION: CPT

## 2025-06-24 PROCEDURE — 36415 COLL VENOUS BLD VENIPUNCTURE: CPT

## 2025-06-24 PROCEDURE — 25000003 PHARM REV CODE 250

## 2025-06-24 PROCEDURE — 36415 COLL VENOUS BLD VENIPUNCTURE: CPT | Performed by: INTERNAL MEDICINE

## 2025-06-24 PROCEDURE — 86140 C-REACTIVE PROTEIN: CPT

## 2025-06-24 PROCEDURE — 85652 RBC SED RATE AUTOMATED: CPT

## 2025-06-24 RX ORDER — HYDRALAZINE HYDROCHLORIDE 20 MG/ML
10 INJECTION INTRAMUSCULAR; INTRAVENOUS EVERY 4 HOURS PRN
Status: DISCONTINUED | OUTPATIENT
Start: 2025-06-24 | End: 2025-07-15 | Stop reason: HOSPADM

## 2025-06-24 RX ORDER — HYDRALAZINE HYDROCHLORIDE 20 MG/ML
INJECTION INTRAMUSCULAR; INTRAVENOUS
Status: COMPLETED
Start: 2025-06-24 | End: 2025-06-24

## 2025-06-24 RX ORDER — LABETALOL HYDROCHLORIDE 5 MG/ML
10 INJECTION, SOLUTION INTRAVENOUS EVERY 4 HOURS PRN
Status: DISCONTINUED | OUTPATIENT
Start: 2025-06-24 | End: 2025-07-15 | Stop reason: HOSPADM

## 2025-06-24 RX ADMIN — MUPIROCIN: 20 OINTMENT TOPICAL at 09:06

## 2025-06-24 RX ADMIN — BUSPIRONE HYDROCHLORIDE 15 MG: 5 TABLET ORAL at 08:06

## 2025-06-24 RX ADMIN — CEFEPIME 1 G: 1 INJECTION, POWDER, FOR SOLUTION INTRAMUSCULAR; INTRAVENOUS at 04:06

## 2025-06-24 RX ADMIN — MUPIROCIN: 20 OINTMENT TOPICAL at 08:06

## 2025-06-24 RX ADMIN — LEVETIRACETAM 500 MG: 500 SOLUTION ORAL at 08:06

## 2025-06-24 RX ADMIN — FAMOTIDINE 20 MG: 20 TABLET, FILM COATED ORAL at 08:06

## 2025-06-24 RX ADMIN — TRAZODONE HYDROCHLORIDE 25 MG: 50 TABLET ORAL at 08:06

## 2025-06-24 RX ADMIN — CEFEPIME 1 G: 1 INJECTION, POWDER, FOR SOLUTION INTRAMUSCULAR; INTRAVENOUS at 08:06

## 2025-06-24 RX ADMIN — BUTALBITAL, ACETAMINOPHEN, AND CAFFEINE 1 TABLET: 325; 50; 40 TABLET ORAL at 07:06

## 2025-06-24 RX ADMIN — SODIUM CHLORIDE: 9 INJECTION, SOLUTION INTRAVENOUS at 01:06

## 2025-06-24 RX ADMIN — OXYCODONE HYDROCHLORIDE AND ACETAMINOPHEN 1 TABLET: 10; 325 TABLET ORAL at 12:06

## 2025-06-24 RX ADMIN — NORTRIPTYLINE HYDROCHLORIDE 25 MG: 25 CAPSULE ORAL at 08:06

## 2025-06-24 RX ADMIN — OXYCODONE HYDROCHLORIDE AND ACETAMINOPHEN 1 TABLET: 10; 325 TABLET ORAL at 08:06

## 2025-06-24 RX ADMIN — DEXTROSE MONOHYDRATE 750 MG: 5 INJECTION, SOLUTION INTRAVENOUS at 11:06

## 2025-06-24 RX ADMIN — METOPROLOL SUCCINATE 12.5 MG: 25 TABLET, EXTENDED RELEASE ORAL at 08:06

## 2025-06-24 RX ADMIN — POLYETHYLENE GLYCOL 3350 17 G: 17 POWDER, FOR SOLUTION ORAL at 08:06

## 2025-06-24 RX ADMIN — INSULIN ASPART 2 UNITS: 100 INJECTION, SOLUTION INTRAVENOUS; SUBCUTANEOUS at 03:06

## 2025-06-24 RX ADMIN — BUTALBITAL, ACETAMINOPHEN, AND CAFFEINE 1 TABLET: 325; 50; 40 TABLET ORAL at 11:06

## 2025-06-24 RX ADMIN — DEXTROSE MONOHYDRATE 750 MG: 5 INJECTION, SOLUTION INTRAVENOUS at 01:06

## 2025-06-24 RX ADMIN — INSULIN ASPART 3 UNITS: 100 INJECTION, SOLUTION INTRAVENOUS; SUBCUTANEOUS at 05:06

## 2025-06-24 RX ADMIN — HYDROCORTISONE: 1 CREAM TOPICAL at 08:06

## 2025-06-24 RX ADMIN — DEXTROSE MONOHYDRATE 750 MG: 5 INJECTION, SOLUTION INTRAVENOUS at 02:06

## 2025-06-24 RX ADMIN — CEFEPIME 1 G: 1 INJECTION, POWDER, FOR SOLUTION INTRAMUSCULAR; INTRAVENOUS at 05:06

## 2025-06-24 RX ADMIN — EZETIMIBE 10 MG: 10 TABLET ORAL at 08:06

## 2025-06-24 RX ADMIN — OXYCODONE HYDROCHLORIDE AND ACETAMINOPHEN 1 TABLET: 10; 325 TABLET ORAL at 05:06

## 2025-06-24 RX ADMIN — OXYCODONE HYDROCHLORIDE AND ACETAMINOPHEN 1 TABLET: 10; 325 TABLET ORAL at 03:06

## 2025-06-24 RX ADMIN — ATORVASTATIN CALCIUM 80 MG: 40 TABLET, FILM COATED ORAL at 08:06

## 2025-06-24 RX ADMIN — HYDRALAZINE HYDROCHLORIDE 10 MG: 20 INJECTION INTRAMUSCULAR; INTRAVENOUS at 11:06

## 2025-06-24 RX ADMIN — CEFEPIME 1 G: 1 INJECTION, POWDER, FOR SOLUTION INTRAMUSCULAR; INTRAVENOUS at 11:06

## 2025-06-24 RX ADMIN — BISACODYL 10 MG: 10 SUPPOSITORY RECTAL at 02:06

## 2025-06-24 RX ADMIN — LEVOTHYROXINE SODIUM 88 MCG: 0.09 TABLET ORAL at 04:06

## 2025-06-24 RX ADMIN — METRONIDAZOLE 500 MG: 5 INJECTION, SOLUTION INTRAVENOUS at 05:06

## 2025-06-24 RX ADMIN — HYDROCORTISONE: 1 CREAM TOPICAL at 02:06

## 2025-06-24 NOTE — PLAN OF CARE
Problem: Physical Therapy  Goal: Physical Therapy Goal  Description: Goals to be met by: 25     Patient will increase functional independence with mobility by performin. Supine to sit with Modified Dierks  2. Sit to supine with Modified Dierks  3. Sit to stand transfer with Modified Dierks  4. Bed to chair transfer with Modified Dierks using Rolling Walker vs QC  5. Gait  x 200 feet with Modified Dierks using Rolling Walker vs QC.     Outcome: Progressing

## 2025-06-24 NOTE — PLAN OF CARE
Problem: Occupational Therapy  Goal: Occupational Therapy Goal  Description: Goals: to be met by 07/14/25    Pt will complete grooming standing at sink with LRAD with CGA.  Pt will complete UB dressing with SBA.  Pt will complete LB dressing with SBA using LRAD.  Pt will complete toileting with CGA using LRAD.  Pt will complete functional mobility to/from toilet and toilet transfer with mod I using LRAD.   Pt will demo visual attention to R side >80% of time with min verbal cues.  Pt will demo RUE strength of 3+/5 for participation in ADLs.   Outcome: Progressing  Updated 6/24/25

## 2025-06-24 NOTE — PT/OT/SLP EVAL
[facility]  Speech Language Pathology Department  Clinical Swallow Evaluation    Patient Name:  Chelle Chandra   MRN:  [id number]    Recommendations     General recommendations:  SLP follow up x1 regarding diet tolerance and dysphagia therapy  Solid texture recommendation:  Regular Diet - IDDSI Level 7  Liquid consistency recommendation: Moderately thick liquids - IDDSI Level 3   Medications: crushed in puree  Swallow strategies/precautions: small bites/sips, slow rate, upright for PO intake, and assist with feeding as needed  Precautions: aspiration    History     Chelle Chandra is a/n 59 y.o. female admitted with a medical diagnosis of R sided weakness and facial droop due to L SDH s/p craniotomy and MMA embolization. On 5/30 patient with subdural hemorrhage s/p L doiny hole. Pt then with new R sided weakness and underwent a redo of diony hole for fluid evacuation/drain placement and a left cranioplasty for subdural fluid evacuation on 6/16. Now with ICH s/p craniectomy on 6/23     Modified diet at baseline.    Past Medical History:   Diagnosis Date    Accelerated junctional rhythm     Agatston CAC score, <100     Anxiety disorder, unspecified     Asthma     COPD type A     Diabetes mellitus without complication     GERD (gastroesophageal reflux disease)     History of COVID-19     Insomnia     Lung nodule      Past Surgical History:   Procedure Laterality Date    ANGIOGRAM, CORONARY, WITH LEFT HEART CATHETERIZATION      BACK SURGERY      BREAST LUMPECTOMY Right     CARDIOVERSION  08/01/2013    CARPAL TUNNEL RELEASE  10/23/2013    CRANIECTOMY Left 6/16/2025    Procedure: CRANIECTOMY;  Surgeon: James Rankin MD;  Location: [facility] OR;  Service: Neurosurgery;  Laterality: Left;  left redo-craniectomy for subdural empyema    CRANIECTOMY Left 6/23/2025    Procedure: CRANIECTOMY;  Surgeon: James Rankin MD;  Location: [facility] OR;  Service: Neurosurgery;  Laterality: Left;  LEFT //  PINS //  SCOPE    CRANIOTOMY FOR EVACUATION OF SUBDURAL HEMATOMA Left 05/19/2025    Procedure: CRANIOTOMY, FOR SUBDURAL HEMATOMA EVACUATION;  Surgeon: Ricardo Shelley MD;  Location: [facility] OR;  Service: Neurosurgery;  Laterality: Left;    CREATION OF TERRI HOLE WITH EVACUATION OF HEMATOMA Left 6/2/2025    Procedure: CREATION, CRANIAL TERRI HOLE, WITH HEMATOMA EVACUATION;  Surgeon: James Rankin MD;  Location: [facility] OR;  Service: Neurosurgery;  Laterality: Left;  LEFT BURRHOLE FOR SUBDURAL HEMATOMA EVACUATION // STEALTH // HERCULES SKYTRON // DRILL    EVACUATION OF EMPYEMA Left 6/16/2025    Procedure: EVACUATION, EMPYEMA;  Surgeon: James Rankin MD;  Location: [facility] OR;  Service: Neurosurgery;  Laterality: Left;  left craniotomy for subdural empyema    FUSION OF CERVICAL SPINE BY ANTERIOR APPROACH USING COMPUTER-ASSISTED NAVIGATION  06/10/2019    KIDNEY SURGERY      TONSILLECTOMY AND ADENOIDECTOMY      TOTAL ABDOMINAL HYSTERECTOMY      WRIST SURGERY       Home diet texture/consistency: Regular and moderately thick liquids      Subjective     Patient awake and alert.    Spiritual/Cultural/Rastafarian Beliefs/Practices that affect care: no    Pain/Comfort: Pain Rating 1: 0/10    Respiratory Status:  room air    Restraints/positioning devices: none    Objective     ORAL MUSCULATURE  Dentition: own teeth  Secretion Management: adequate  Mucosal Quality: good  Facial Movement: reduced right  Buccal Strength & Mobility: WFL  Vocal Quality: adequate  Volitional Cough: Able to clear secretions    PO TRIALS  Consistency Fed By Oral Symptoms Pharyngeal Symptoms   Moderately thick liquid by cup Self None None   Moderately thick liquid by straw Self None None   Puree Self None None   Regular solid Self Prolonged bolus formation/mastication  Oral residue None     Patient required assistance to cut her food due to R arm weakness. Patient with mild oral residue onb solids, discussed finger or lingual sweep. Patient was efficient with  clearance on her next few trials of solids.     Assessment     Patient is tolerating her baseline diet with no signs/sx of aspiration. May require assistance for tray set up. SLP to follow up.    Outcome Measures     Functional Oral Intake Scale: 5 - Total oral diet with multiple consistencies, by requiring special preparation or compensations    Goals     Multidisciplinary Problems       SLP Goals          Problem: SLP    Goal Priority Disciplines Outcome   SLP Goal     SLP    Description: LTG: The pt will tolerate least restrictive diet with no overt s/sx of aspiration.    STGs:  1. Patient will participate CTAR/pharyngeal exercises  2. Patient will participate in laryngeal elevation exercises  3. Patient will participate in repeat MBS when clinically appropriate                     Education     Patient provided with verbal education regarding SLP POC.  Understanding was verbalized.    Plan     SLP Follow-Up:  Yes   Patient to be seen:  5 x/week   Plan of Care expires:  07/08/25  Plan of Care reviewed with:  patient     Time Tracking     SLP Treatment Date:   06/24/25  Speech Start Time:  0815  Speech Stop Time:  0840     Speech Total Time (min):  25 min    Billable minutes:  Swallow and Oral Function Evaluation, 25 minutes     06/24/2025

## 2025-06-24 NOTE — PLAN OF CARE
Problem: Diabetes Comorbidity  Goal: Blood Glucose Level Within Targeted Range  Outcome: Progressing  Intervention: Monitor and Manage Glycemia  Flowsheets (Taken 6/24/2025 1827)  Glycemic Management:   blood glucose monitored   supplemental insulin given     Problem: Wound  Goal: Optimal Coping  Outcome: Progressing  Intervention: Support Patient and Family Response  Flowsheets (Taken 6/24/2025 1827)  Family/Support System Care:   caregiver stress acknowledged   involvement promoted   presence promoted     Problem: Fall Injury Risk  Goal: Absence of Fall and Fall-Related Injury  Outcome: Progressing  Intervention: Identify and Manage Contributors  Flowsheets (Taken 6/24/2025 1827)  Self-Care Promotion: BADL personal objects within reach  Medication Review/Management: medications reviewed

## 2025-06-24 NOTE — PROGRESS NOTES
Inpatient Nutrition Assessment    Admit Date: 6/11/2025   Total duration of encounter: 13 days   Patient Age: 59 y.o.    Nutrition Recommendation/Prescription     Continue current diet (Diet Adult Regular Moderately Thick Liquids (IDDSI Level 3)) as tolerated. Texture per SLP.   Add Magic Cup (provides 190 kcal, 9 g protein per serving)   Monitor PO intakes, labs, and wt changes    Communication of Recommendations: reviewed with nurse and reviewed with patient    Nutrition Assessment     Malnutrition Assessment/Nutrition-Focused Physical Exam    Malnutrition Context: acute illness or injury (06/18/25 1545)  Malnutrition Level: severe (06/18/25 1545)  Energy Intake (Malnutrition): other (see comments) (Does not meet criteria) (06/18/25 1545)  Weight Loss (Malnutrition): greater than 5% in 1 month (06/18/25 1545)  Subcutaneous Fat (Malnutrition): moderate depletion (06/18/25 1545)  Orbital Region (Subcutaneous Fat Loss): moderate depletion        Muscle Mass (Malnutrition): moderate depletion (06/18/25 1545)  [place] Region (Muscle Loss): moderate depletion  Clavicle Bone Region (Muscle Loss): moderate depletion                    Fluid Accumulation (Malnutrition): other (see comments) (Not present) (06/18/25 1545)     Hand  Strength, Right (Malnutrition): Unable to assess (06/18/25 1545)  A minimum of two characteristics is recommended for diagnosis of either severe or non-severe malnutrition.    Chart Review    Reason Seen: length of stay and follow-up    Malnutrition Screening Tool Results   Have you recently lost weight without trying?: Yes: 2-13 lbs  Have you been eating poorly because of a decreased appetite?: No   MST Score: 1   Diagnosis:  Increased subdural fluid collection Status post left redo diony hole with fluid evacuation, drain placement 6/16  Suspected surgical site infection status post cranioplasty 6/16  Subdural hematoma requiring craniotomy with evacuation and MMA embolization early May with  recurrence late May requiring diony hole  Right-sided weakness/intractable headache secondary to above    Relevant Medical History:   hypothyroidism, anxiety, bronchial asthma, COPD, type 2 diabetes mellitus, 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, 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, GERD     Scheduled Medications:  atorvastatin, 80 mg, Daily  busPIRone, 15 mg, BID  ceFEPime IV (PEDS and ADULTS), 1 g, Q6H  ezetimibe, 10 mg, Daily  famotidine, 20 mg, BID  hydrALAZINE, ,   hydrocortisone, , BID  levetiracetam, 500 mg, BID  levothyroxine, 88 mcg, Before breakfast  metoprolol succinate, 12.5 mg, Daily  mupirocin, , BID  nortriptyline, 25 mg, QHS  polyethylene glycol, 17 g, BID  traZODone, 25 mg, QHS  vancomycin 750 mg in D5W 250 mL IVPB (admixture device), 750 mg, Q12H    Continuous Infusions:  0.9% NaCl, Last Rate: Stopped (06/24/25 0124)  clevidipine, Last Rate: Stopped (06/24/25 0000)    PRN Medications:  acetaminophen, 650 mg, Q4H PRN  acetaminophen, 650 mg, Q8H PRN  acetaminophen, 650 mg, Q4H PRN  bisacodyL, 10 mg, Daily PRN  butalbital-acetaminophen-caffeine -40 mg, 1 tablet, Q4H PRN  dextrose 50%, 12.5 g, PRN  dextrose 50%, 25 g, PRN  diphenhydrAMINE, 25 mg, Q6H PRN  glucagon (human recombinant), 1 mg, PRN  glucose, 16 g, PRN  glucose, 24 g, PRN  hydrALAZINE, ,   hydrALAZINE, 10 mg, Q4H PRN  insulin aspart U-100, 0-5 Units, QID (AC + HS) PRN  labetalol, 10 mg, Q4H PRN  melatonin, 6 mg, Nightly PRN  morphine, 2 mg, Q4H PRN  naloxone, 0.02 mg, PRN  ondansetron, 4 mg, Q8H PRN  oxyCODONE-acetaminophen, 1 tablet, Q4H PRN  prochlorperazine, 5 mg, Q6H PRN  sodium chloride 0.9%, 10 mL, PRN  sodium chloride 0.9%, 10 mL, Q12H PRN  sodium chloride 0.9%, 3 mL, Q12H PRN  vancomycin - pharmacy to dose, , pharmacy to manage frequency    Calorie Containing IV Medications: no significant kcals from medications at this time    Recent Labs   Lab 06/18/25  0516 06/19/25  0737 06/22/25  1049 06/23/25  1023 06/24/25  0654     --   --  138  --   "  K 4.0  --   --  3.6  --    CALCIUM 8.7  --   --  9.1  --      --   --  106  --    CO2 28  --   --  25  --    BUN 3.9*  --   --  6.2*  --    CREATININE 0.68 0.63 0.72 0.55  --    EGFRNORACEVR >60 >60 >60 >60  --    *  --   --  219*  --    BILITOT 0.2  --   --   --   --    ALKPHOS 168*  --   --   --   --    ALT 15  --   --   --   --    AST 11  --   --   --   --    ALBUMIN 2.5*  --   --   --   --    CRP  --   --   --   --  16.30*   WBC 7.03  --   --  6.14  --    HGB 10.5*  --   --  10.6*  --    HCT 32.9*  --   --  32.7*  --      Nutrition Orders:  Diet Adult Regular Moderately Thick Liquids (IDDSI Level 3)      Appetite/Oral Intake: fair/50-75% of meals  Factors Affecting Nutritional Intake: none identified  Social Needs Impacting Access to Food: none identified  Food/Yarsanism/Cultural Preferences: none reported  Food Allergies: no known food allergies  Last Bowel Movement: 06/22/25  Wound(s):  scalp and temporal incisions noted.     Comments    6/18/25: Spoke to family members at bedside who report pt's appetite has been fluctuating since admit depending on her pain levels. PO intake varies between %. Pt ate 75% of her breakfast this morning but did not eat much of her lunch. Family reports UBW of 112 lbs, last weighing that about 1 month ago. Family reports wt at admit was about 100 lbs which would indicate a 10% wt loss x1 month, nutritionally significant. Most recent [facility] wt from 6/12 is 110 lbs, will monitor for accuracy/trends.     6/20/25: Patient reports good oral intake of meals. Denies any nausea, vomiting, or diarrhea. Constipation noted.     6/24/25: Pt feeling "off" this AM and didn't eat as much. Still on mod thick liquids. Will send Magic Cup for added kcal and protein.    Anthropometrics    Height: 5' 6" (167.6 cm), Height Method: Measured  Last Weight: 49.9 kg (110 lb) (06/12/25 0223), Weight Method: Bed Scale  BMI (Calculated): 17.8  BMI Classification: underweight (BMI less " than 18.5)     Ideal Body Weight (IBW), Female: 130 lb     % Ideal Body Weight, Female (lb): 84.62 %                             Usual Weight Provided By: patient    Wt Readings from Last 5 Encounters:   06/12/25 49.9 kg (110 lb)   06/06/25 46.9 kg (103 lb 6.3 oz)   06/03/25 46.3 kg (102 lb)   05/15/25 49 kg (108 lb 0.4 oz)   05/06/25 50.8 kg (112 lb)     Weight Change(s) Since Admission:   Wt Readings from Last 1 Encounters:   06/12/25 0223 49.9 kg (110 lb)   06/11/25 1314 46.7 kg (103 lb)   Admit Weight: 46.7 kg (103 lb) (06/11/25 1314), Weight Method: Stated    Estimated Needs    Weight Used For Calorie Calculations: 49.9 kg (110 lb 0.2 oz)  Energy Calorie Requirements (kcal): 5825-4989 kcals (30-35 kcal/kg)  Energy Need Method: Kcal/kg  Weight Used For Protein Calculations: 49.9 kg (110 lb 0.2 oz)  Protein Requirements: 60-75 g (1.2-1.5 g/kg)  Fluid Requirements (mL): 1497 mL (30 mL/kg)  CHO Requirement: N/A     Enteral Nutrition     Patient not receiving enteral nutrition at this time.    Parenteral Nutrition     Patient not receiving parenteral nutrition support at this time.    Evaluation of Received Nutrient Intake    Calories: meeting estimated needs  Protein: meeting estimated needs    Patient Education     Not applicable.    Nutrition Diagnosis     PES: Inadequate energy intake related to acute illness as evidenced by meeting <75% EEN. (resolved)     PES: Severe acute disease or injury related malnutrition Related to  As Evidenced by:  - weight loss: > 5% in 1 month - muscle mass depletion: 2 areas of moderate muscle loss (Clavicle, Temporalis) - loss of subcutaneous fat: 2 areas of moderate fat loss (Infraorbital, Buccal) new    Nutrition Interventions     Intervention(s): modified composition of meals/snacks and collaboration with other providers  Intervention(s): Oral diet/nutrient modifications    Goal: Meet greater than 80% of nutritional needs by follow-up. (goal met)  Goal: Maintain weight  throughout hospitalization. (goal progressing)    Nutrition Goals & Monitoring     Dietitian will monitor: energy intake, weight, and gastrointestinal profile  Discharge planning: Consistent Carbohydrate diet with texture modifications per SLP  Nutrition Risk/Follow-Up: patient at increased nutrition risk; dietitian will follow-up twice weekly   Please consult if re-assessment needed sooner.

## 2025-06-24 NOTE — PT/OT/SLP RE-EVAL
Physical Therapy Re-Evaluation    Patient Name:  Chelle Chandra   MRN:  [id number]    Recommendations:     Discharge therapy intensity: High Intensity Therapy   Discharge Equipment Recommendations: to be determined by next level of care   Barriers to discharge: Impaired mobility and Ongoing medical needs    Assessment:     Chelle Chandra is a 59 y.o. female admitted with a medical diagnosis of R sided weakness and facial droop due to L SDH s/p craniotomy and MMA embolization. On 5/30 patient with subdural hemorrhage s/p L diony hole. Pt then with new R sided weakness and underwent a redo of diony hole for fluid evacuation/drain placement and a left cranioplasty for subdural fluid evacuation on 6/16. Now with ICH s/p craniectomy on 6/23.  She presents with the following impairments/functional limitations: weakness, impaired endurance, impaired self care skills, impaired functional mobility, gait instability, impaired balance, pain, decreased lower extremity function, decreased upper extremity function, decreased safety awareness.    Pt tolerated PT re-eval well despite c/o 8/10 headache. Pt able to perform supine>sit with CGA, sit<>stand with Rafael, and ambulate x 4' + 16' with Rafael and B HHA. Pt's BLE function grossly unchanged since previous re-eval but pt does present with worsened RUE weakness; refer to OT note for details. Pt does demo unsteadiness in standing and will now likely require a unilateral AD given RUE weakness. Will continue to progress as appropriate, recommending high intensity therapy at d/c.    Rehab Prognosis: Good; patient would benefit from acute skilled PT services to address these deficits and reach maximum level of function.    Recent Surgery: Procedure(s) (LRB):  CRANIECTOMY (Left) 1 Day Post-Op    Plan:     During this hospitalization, patient would benefit from acute PT services 6 x/week to address the identified rehab impairments via gait training, therapeutic activities, therapeutic  exercises, neuromuscular re-education and progress toward the following goals:    Plan of Care Expires:  07/24/25    PT/PTA conference to discuss PT POC and patient's progression towards goals held with Lazarus Funez PTA.     Subjective     Chief Complaint: headache, needing to have a BM  Patient/Family Comments/goals: to return to PLOF  Pain/Comfort:  Pain Rating 1: 8/10  Location 1: head  Pain Addressed 1: Reposition, Distraction    Patients cultural, spiritual, Gnosticism conflicts given the current situation: no    Objective:     Communicated with RN prior to session.  Patient found HOB elevated with telemetry, pulse ox (continuous), blood pressure cuff, arterial line, peripheral IV, JOHNATHAN drain, alvarez catheter  upon PT entry to room.    General Precautions: Standard, fall, seizure (<130/90)  Orthopedic Precautions:N/A   Braces:  (helmet)  Respiratory Status: Room air  Blood Pressure: 132/72, ; after session /68      Exams:  Sensation:    -       Intact  RLE ROM: WFL  RLE Strength: hip flex 4/5, knee ext 4+/5, ankle DF/PF 4/5  LLE ROM: WFL  LLE Strength: WFL  Skin integrity: Visible skin intact      Functional Mobility:  Bed Mobility:     Supine to Sit: contact guard assistance  Transfers:     Sit to Stand:  minimum assistance with hand-held assist and x1 trial from EOB and x1 trial from toilet  Gait: Pt amb x 4' to toilet then 16' to bedside chair, both with Rafael and B HHA, LUE support primarily for balance as pt is unsteady with gait and RUE support for flaccid UE. VC for larger steps with RLE   Balance: sitting SBA-CGA, standing Rafael      AM-PAC 6 CLICK MOBILITY  Total Score:18       Treatment & Education:  Patient provided with verbal education education regarding PT role/goals/POC, post-op precautions, fall prevention, safety awareness, and discharge/DME recommendations.  Understanding was verbalized.     Patient left up in chair with all lines intact, call button in reach, RN notified, and  daughter present.    GOALS:   Multidisciplinary Problems       Physical Therapy Goals          Problem: Physical Therapy    Goal Priority Disciplines Outcome Interventions   Physical Therapy Goal     PT, PT/OT Progressing    Description: Goals to be met by: 25     Patient will increase functional independence with mobility by performin. Supine to sit with Modified [place]  2. Sit to supine with Modified [place]  3. Sit to stand transfer with Modified [place]  4. Bed to chair transfer with Modified [place] using Rolling Walker vs QC  5. Gait  x 200 feet with Modified [place] using Rolling Walker vs QC.                          History:     Past Medical History:   Diagnosis Date    Accelerated junctional rhythm     Agatston CAC score, <100     Anxiety disorder, unspecified     Asthma     COPD type A     Diabetes mellitus without complication     GERD (gastroesophageal reflux disease)     History of COVID-19     Insomnia     Lung nodule        Past Surgical History:   Procedure Laterality Date    ANGIOGRAM, CORONARY, WITH LEFT HEART CATHETERIZATION      BACK SURGERY      BREAST LUMPECTOMY Right     CARDIOVERSION  2013    CARPAL TUNNEL RELEASE  10/23/2013    CRANIECTOMY Left 2025    Procedure: CRANIECTOMY;  Surgeon: James Rankin MD;  Location: [facility] OR;  Service: Neurosurgery;  Laterality: Left;  left redo-craniectomy for subdural empyema    CRANIECTOMY Left 2025    Procedure: CRANIECTOMY;  Surgeon: James Rankin MD;  Location: [facility] OR;  Service: Neurosurgery;  Laterality: Left;  LEFT //  PINS // SCOPE    CRANIOTOMY FOR EVACUATION OF SUBDURAL HEMATOMA Left 2025    Procedure: CRANIOTOMY, FOR SUBDURAL HEMATOMA EVACUATION;  Surgeon: Ricardo Shelley MD;  Location: [facility] OR;  Service: Neurosurgery;  Laterality: Left;    CREATION OF TERRI HOLE WITH EVACUATION OF HEMATOMA Left 2025    Procedure: CREATION, CRANIAL TERRI HOLE, WITH HEMATOMA EVACUATION;  Surgeon:  James Rankin MD;  Location: [facility] OR;  Service: Neurosurgery;  Laterality: Left;  LEFT BURRHOLE FOR SUBDURAL HEMATOMA EVACUATION // STEALTH // HERCULES SKYTRON // DRILL    EVACUATION OF EMPYEMA Left 6/16/2025    Procedure: EVACUATION, EMPYEMA;  Surgeon: James Rankin MD;  Location: [facility] OR;  Service: Neurosurgery;  Laterality: Left;  left craniotomy for subdural empyema    FUSION OF CERVICAL SPINE BY ANTERIOR APPROACH USING COMPUTER-ASSISTED NAVIGATION  06/10/2019    KIDNEY SURGERY      TONSILLECTOMY AND ADENOIDECTOMY      TOTAL ABDOMINAL HYSTERECTOMY      WRIST SURGERY         Time Tracking:     PT Received On: 06/24/25  PT Start Time: 1430     PT Stop Time: 1502  PT Total Time (min): 32 min     Billable Minutes: Re-eval 24 and Gait Training 8      06/24/2025

## 2025-06-24 NOTE — OP NOTE
[facility]                       [address] [address]    PATIENT NAME:      KAREY MILTON  YOB: 1965  CSN:               [id number]  MRN:               [id number]  ADMIT DATE:        06/11/2025 13:17:00  PHYSICIAN:         James Rankni MD                          OPERATIVE REPORT      DATE OF SURGERY:    06/23/2025 03:03:51    SURGEON:  James Rankin MD    ASSISTANT:  Henrik Sandoval.    PREOPERATIVE DIAGNOSES:  Left recurrent large subdural empyema fluid,   inflammation of the brain with significant weakness, now in the right arm that   developed over the last 12 to 18 hours.    POSTOPERATIVE DIAGNOSES:  Left recurrent large subdural empyema fluid,   inflammation of the brain with significant weakness, now in the right arm that   developed over the last 12 to 18 hours.    PROCEDURE:  Left redo connecting the previous incision and removal of   significant more bone, irrigation and debridement, sending cultures, obtaining   hemostasis, placement of drain and closure again with no bone replacement.  A   drain was left in place.    INDICATIONS FOR SURGERY:  This is a 59-year-old with 2 surgeries x2 for subdural   fluid and empyema.  She was improved considerably with postop she is looking   good.  The drains were removed.  Subsequently yesterday  Sunday afternoon she   became weak.  We were not notified until this morning.  Immediately CT was done   and brought her back to the operating room for removal of more subdural empyema,   fluid collection, placement of drain significantly not able to move her right   arm at all.  As a result after a long discussion of the risk of possible stroke   under fluid with the daughter and the patient, we are going to proceed with   surgical drainage and placement of drain with more bone removal.  Consent was   obtained.    OPERATIVE PROCEDURE:  The patient was brought to  the operating room, intubated.    Bump was put.  Head was shaved, sterilely prepped and draped.  Incision was   made to connect the 2 previous incisions and then made into reverse horseshoe   incision.  This allowed us to reflect everything forward, cut, immediately   subdural fluid came out.  Cultures were sent.  More and more fluid came out.  We   irrigated in different directions.  We took more bone off, cut the bone off,   opened the dura and more and more cultures were sent in different direction   inferiorly, anteriorly, posteriorly, superiorly, more and more cultures were   sent out.  We took more bone off and the dura was opened up and cut.  Once   everything was irrigated, hemostasis obtained with FloSeal and Avitene in   different directions.  Bipolar cautery was obtained.  Hemostasis obtained.    Wound was irrigated multiple times.  FloSeal and Avitene put and stent to keep   irrigated multiple directions.  DuraGen was placed.  Drain was replaced,   secured, posteriorly connected.  Wound was closed with 3-0 Vicryl and staples.    The patient was then taken to the ICU.    I discussed with the daughter the care and hopefully she will improve with time.    Because of the severe weakness and possible strokes, some of the weakness may   be permanent.  We will continue to treat with antibiotics and see how she does.        ______________________________  MD RODDY Agosto/FRANCESCO  DD:  06/23/2025  Time:  01:52PM  DT:  06/23/2025  Time:  04:48PM  Job #:  [id number]      OPERATIVE REPORT

## 2025-06-24 NOTE — PROGRESS NOTES
Pharmacokinetic Assessment Follow Up: IV Vancomycin    Vancomycin serum concentration assessment(s):  The trough level was drawn correctly and can be used to guide therapy at this time. The measurement is within the desired definitive target range of 15 to 20 mcg/mL.    Vancomycin Regimen Plan:  Continue regimen to Vancomycin 750 mg IV every 12 hours with next serum trough concentration measured at 1200 prior to 1300 dose on 06/25    Scheduled Administration Times  0100  1300    Drug levels (last 3 results):  Recent Labs   Lab Result Units 06/22/25  1049 06/24/25  1211   Vancomycin Trough ug/ml 13.5* 15.1       Vancomycin Administrations:  vancomycin given in the last 96 hours                     vancomycin 750 mg in D5W 250 mL IVPB (admixture device) (mg) 750 mg New Bag 06/24/25 0124     750 mg New Bag 06/23/25 1512     750 mg New Bag  0129     750 mg New Bag 06/22/25 1307     750 mg New Bag  0051     750 mg New Bag 06/21/25 1040    vancomycin injection (g) 1 g Given 06/23/25 1306    vancomycin 1,250 mg in D5W 250 mL IVPB (admixture device) (mg) 1,250 mg New Bag 06/20/25 2247                    Pharmacy will continue to follow and monitor vancomycin.    Please contact pharmacy at extension [phone] for questions regarding this assessment.    Thank you for the consult,   Bess Bernabe       Patient brief summary:  Chelle Chandra is a 59 y.o. female initiated on antimicrobial therapy with IV Vancomycin for treatment of subdural empyema    The patient's current regimen is 750 mg IVPB Q12H    Drug Allergies:   Review of patient's allergies indicates:   Allergen Reactions    Aspirin     Ketorolac     Penicillins     Sulfa (sulfonamide antibiotics)     Ozempic [semaglutide] Other (See Comments)     Abdominal pain       Actual Body Weight:  Wt Readings from Last 1 Encounters:   06/12/25 49.9 kg (110 lb)       Renal Function:   Estimated Creatinine Clearance: 85.7 mL/min (based on SCr of 0.55 mg/dL).,     Dialysis Method (if  applicable):  N/A    CBC (last 72 hours):  Recent Labs   Lab Result Units 06/23/25  1023   WBC x10(3)/mcL 6.14   Hgb g/dL 10.6*   Hct % 32.7*   Platelet x10(3)/mcL 359   Mono % % 10.7   Eos % % 3.6   Basophil % % 1.0       Metabolic Panel (last 72 hours):  Recent Labs   Lab Result Units 06/22/25  1049 06/23/25  1023   Sodium mmol/L  --  138   Potassium mmol/L  --  3.6   Chloride mmol/L  --  106   CO2 mmol/L  --  25   Glucose mg/dL  --  219*   Blood Urea Nitrogen mg/dL  --  6.2*   Creatinine mg/dL 0.72 0.55       Microbiologic Results:  Microbiology Results (last 7 days)       Procedure Component Value Units Date/Time    Gram Stain [[id number]] Collected: 06/23/25 1311    Order Status: Completed Specimen: Tissue from Brain Updated: 06/24/25 0707     GRAM STAIN Many WBC observed      No bacteria seen    Gram Stain [[id number]] Collected: 06/23/25 1313    Order Status: Completed Specimen: Tissue from Brain Updated: 06/24/25 0706     GRAM STAIN Few WBC observed      Rare Gram positive cocci    Gram Stain [[id number]] Collected: 06/23/25 1314    Order Status: Completed Specimen: Tissue from Brain Updated: 06/24/25 0705     GRAM STAIN Few WBC observed      Few Gram positive cocci    Tissue Culture - Aerobic [[id number]] Collected: 06/23/25 1311    Order Status: Completed Specimen: Tissue from Brain Updated: 06/24/25 0648     Tissue - Aerobic Culture No Growth At 24 Hours    Tissue Culture - Aerobic [[id number]] Collected: 06/23/25 1313    Order Status: Completed Specimen: Tissue from Brain Updated: 06/24/25 0648     Tissue - Aerobic Culture No Growth At 24 Hours    Tissue Culture - Aerobic [[id number]] Collected: 06/23/25 1314    Order Status: Completed Specimen: Tissue from Brain Updated: 06/24/25 0648     Tissue - Aerobic Culture No Growth At 24 Hours    Anaerobic Culture [[id number]] Collected: 06/23/25 1311    Order Status: Sent Specimen: Tissue from Brain Updated: 06/23/25 1549    Fungal Culture [[id number]]  Collected: 06/23/25 1314    Order Status: Sent Specimen: Tissue from Brain Updated: 06/23/25 1326    Anaerobic Culture [[id number]] Collected: 06/23/25 1314    Order Status: Sent Specimen: Tissue from Brain Updated: 06/23/25 1326    Anaerobic Culture [[id number]] Collected: 06/23/25 1313    Order Status: Sent Specimen: Tissue from Brain Updated: 06/23/25 1326    Fungal Culture [[id number]] Collected: 06/23/25 1313    Order Status: Sent Specimen: Tissue from Brain Updated: 06/23/25 1326    Fungal Culture [[id number]] Collected: 06/23/25 1311    Order Status: Sent Specimen: Tissue from Brain Updated: 06/23/25 1326    Tissue Culture - Aerobic [[id number]]  (Abnormal)  (Susceptibility) Collected: 06/16/25 1357    Order Status: Completed Specimen: Bone from Head Updated: 06/19/25 1001     Tissue - Aerobic Culture Moderate Staphylococcus epidermidis    Tissue Culture - Aerobic [[id number]]  (Abnormal)  (Susceptibility) Collected: 06/16/25 1338    Order Status: Completed Specimen: Tissue from Head Updated: 06/19/25 0953     Tissue - Aerobic Culture Moderate Staphylococcus epidermidis    Anaerobic Culture [[id number]] Collected: 06/16/25 1357    Order Status: Completed Specimen: Bone from Head Updated: 06/19/25 0901     Anaerobe Culture No Anaerobes Isolated    Anaerobic Culture [[id number]] Collected: 06/16/25 1338    Order Status: Completed Specimen: Tissue from Head Updated: 06/19/25 0901     Anaerobe Culture No Anaerobes Isolated

## 2025-06-24 NOTE — PROGRESS NOTES
JOHNATHAN-10  Awake, alert, no facial droop.  Moves left side and left leg well.  Right Arm-triceps 3 to 4-, biceps-2. No .  Cat scan shows good removal with less fluid, underlying brain changes.  Will need ID to see her today again, continue antibiotics.   Needs therapy.  Will obtain MRI of the brain with contrast end of the week.  Thank you.

## 2025-06-24 NOTE — PT/OT/SLP RE-EVAL
Occupational Therapy   Re-evaluation    Name: Chelle Chandra  MRN: [id number]  Recent Surgery: Procedure(s) (LRB):  CRANIECTOMY (Left) 1 Day Post-Op    Recommendations:     Discharge therapy intensity: High Intensity Therapy   Discharge Equipment Recommendations:  to be determined by next level of care  Barriers to discharge:  None    Assessment:     Chelle Chandra is a 59 y.o. female with a medical diagnosis of R sided weakness and facial droop due to L SDH s/p craniotomy and MMA embolization. On 5/30 patient with subdural hemorrhage s/p L diony hole. Pt then with new R sided weakness and underwent a redo of diony hole for fluid evacuation/drain placement and a left cranioplasty for subdural fluid evacuation on 6/16. Now with ICH s/p craniectomy on 6/23.  She presents with the following performance deficits affecting function: weakness, impaired endurance, impaired sensation, impaired self care skills, impaired functional mobility, gait instability, decreased upper extremity function, decreased lower extremity function, decreased safety awareness, pain.      Pt tolerated re-eval well. Pt CGA-min A for bed mobility and transfers. Requires HHA for ambulation and transfers. Pt's RUE ROM severely impaired s/p craniectomy. Noted scap elevation and retraction WFL. Pt demonstrates mild disturbance in ability to localize to sound on R side, however is able to self correct. Goals and POC updated 2/2 patient performance s/p sx. Remain appropriate for high intensity therapy upon d/c to increase safety and independence with ADLs and functional mobility.     Rehab Prognosis: Good; patient would benefit from acute skilled OT services to address these deficits and reach maximum level of function.       Plan:     Patient to be seen 6 x/week to address the above listed problems via self-care/home management, therapeutic activities, therapeutic exercises, neuromuscular re-education, sensory integration  Plan of Care Expires:  07/17/25  Plan of Care Reviewed with: patient    OT/LEE conference to discuss OT POC and patient's progression towards goals held with ROSA Conn    Subjective     Chief Complaint: pain at head s/p sx  Patient/Family Comments/goals: return to PLOF    Pain/Comfort:  Pain Rating 1: 8/10 (pain at sx site)    Patients cultural, spiritual, [employer]al conflicts given the current situation: no    Objective:     OT communicated with nurse prior to session.      Patient was found HOB elevated with peripheral IV, arterial line, pulse ox (continuous), telemetry, blood pressure cuff, JOHNATHAN drain, alvarez catheter upon OT entry to room.    General Precautions: Standard, fall, seizure (BP <130/90)  Orthopedic Precautions:    Braces:  (helmet)    Vital Signs: Blood Pressure: 132/72  HR: 104  Sp02: 97% on RA  With Activity: BP: 133/68, HR: 107, O2: 97%    Functional Mobility/Transfers:  Bed mobility:    Scooting: contact guard assistance  Supine to Sit: minimum assistance  Transfers: Sit to Stand: contact guard assistance with hand-held assist  Toilet Transfer: contact guard assistance with hand-held assist using Step Transfer    Activities of Daily Living:  Toileting: total assistance for hygiene in standing    AMPA 6 Click ADL:  AMPA Total Score: 16    Functional Cognition:  Orientation: oriented to Person, Place, Time, Situation.  Pt able to recognize OT and SOT upon entry to room.     Visual Perceptual Skills:  Localization: WFL except demonstrates needing occasional cues for localizing to therapist voice when standing on her R.   Pt unable to don glasses at this time 2/2 sx, reports no double vision.   Pt with previous history of R sided visual neglect from previous crani.     Upper Extremity Function:  Right Upper Extremity:   Range of Motion: Impaired. Scap elevation and retraction intact. Unable to perform AROM for scap depression and protraction. PROM intact.     Strength: Impaired. Scap elevators 3+/5, 0/5 digit  flex/ext, wrist flex/ext, elbow flex/ext, and shoulder flex/abd/IR/ER/depress/protract  Sensation: WFL except reports light touch feels dull compared to LUE    Left Upper Extremity:  Range of Motion: WFL  Strength: WFL  Sensation: WFL    Balance:   Static sitting balance: WFL; uses BUE to support self seated EOB.   Dynamic sitting balance:WFL    Therapeutic Positioning  Risk for acquired pressure injuries is decreased due to ability to get to BSC/toilet with assist, intact sensation, and continence.    OT interventions performed during the course of today's session in an effort to prevent and/or reduce acquired pressure injuries:   Education was provided on benefits of and recommendations for therapeutic positioning    Skin assessment: known incisional wounds to L scalp, L latera temporal region, and L head. No broken skin noted to sacral area during toileting hygiene.    OT recommendations for therapeutic positioning throughout hospitalization:   Follow [facility] Pressure Injury Prevention Protocol    Patient Education:  Patient and daughter/s were provided with verbal education education regarding OT role/goals/POC, fall prevention, and safety awareness.  Understanding was verbalized, however additional teaching warranted.     Patient left up in chair with all lines intact, call button in reach, nurse notified, and daughter present    GOALS:   Multidisciplinary Problems       Occupational Therapy Goals          Problem: Occupational Therapy    Goal Priority Disciplines Outcome Interventions   Occupational Therapy Goal     OT, PT/OT Progressing    Description: Goals: to be met by 07/14/25    Pt will complete grooming standing at sink with LRAD with CGA.  Pt will complete UB dressing with SBA.  Pt will complete LB dressing with SBA using LRAD.  Pt will complete toileting with CGA using LRAD.  Pt will complete functional mobility to/from toilet and toilet transfer with mod I using LRAD.   Pt will demo visual attention to  R side >80% of time with min verbal cues.  Pt will demo RUE strength of 3-/5 for participation in ADLs.                       History:     Past Medical History:   Diagnosis Date    Accelerated junctional rhythm     Agatston CAC score, <100     Anxiety disorder, unspecified     Asthma     COPD type A     Diabetes mellitus without complication     GERD (gastroesophageal reflux disease)     History of COVID-19     Insomnia     Lung nodule          Past Surgical History:   Procedure Laterality Date    ANGIOGRAM, CORONARY, WITH LEFT HEART CATHETERIZATION      BACK SURGERY      BREAST LUMPECTOMY Right     CARDIOVERSION  08/01/2013    CARPAL TUNNEL RELEASE  10/23/2013    CRANIECTOMY Left 6/16/2025    Procedure: CRANIECTOMY;  Surgeon: James Rankin MD;  Location: [facility] OR;  Service: Neurosurgery;  Laterality: Left;  left redo-craniectomy for subdural empyema    CRANIECTOMY Left 6/23/2025    Procedure: CRANIECTOMY;  Surgeon: James Rankin MD;  Location: [facility] OR;  Service: Neurosurgery;  Laterality: Left;  LEFT //  PINS // SCOPE    CRANIOTOMY FOR EVACUATION OF SUBDURAL HEMATOMA Left 05/19/2025    Procedure: CRANIOTOMY, FOR SUBDURAL HEMATOMA EVACUATION;  Surgeon: Ricardo Shelley MD;  Location: [facility] OR;  Service: Neurosurgery;  Laterality: Left;    CREATION OF TERRI HOLE WITH EVACUATION OF HEMATOMA Left 6/2/2025    Procedure: CREATION, CRANIAL TERRI HOLE, WITH HEMATOMA EVACUATION;  Surgeon: James Rankin MD;  Location: [facility] OR;  Service: Neurosurgery;  Laterality: Left;  LEFT BURRHOLE FOR SUBDURAL HEMATOMA EVACUATION // STEALTH // SHERRI SKYTRON // DRILL    EVACUATION OF EMPYEMA Left 6/16/2025    Procedure: EVACUATION, EMPYEMA;  Surgeon: James Rankin MD;  Location: [facility];  Service: Neurosurgery;  Laterality: Left;  left craniotomy for subdural empyema    FUSION OF CERVICAL SPINE BY ANTERIOR APPROACH USING COMPUTER-ASSISTED NAVIGATION  06/10/2019    KIDNEY SURGERY      TONSILLECTOMY AND ADENOIDECTOMY      TOTAL  ABDOMINAL HYSTERECTOMY      WRIST SURGERY         Time Tracking:     OT Date of Treatment: 06/24/25  OT Start Time: 1430  OT Stop Time: 1502  OT Total Time (min): 32 min    Billable Minutes:Re-eval 1 unit  Self Care/Home Management 1 unit    6/24/2025

## 2025-06-25 LAB
POCT GLUCOSE: 161 MG/DL (ref 70–110)
POCT GLUCOSE: 217 MG/DL (ref 70–110)

## 2025-06-25 PROCEDURE — 94799 UNLISTED PULMONARY SVC/PX: CPT

## 2025-06-25 PROCEDURE — 25000003 PHARM REV CODE 250

## 2025-06-25 PROCEDURE — 97535 SELF CARE MNGMENT TRAINING: CPT

## 2025-06-25 PROCEDURE — 97112 NEUROMUSCULAR REEDUCATION: CPT | Mod: CQ

## 2025-06-25 PROCEDURE — 25000003 PHARM REV CODE 250: Performed by: INTERNAL MEDICINE

## 2025-06-25 PROCEDURE — 63600175 PHARM REV CODE 636 W HCPCS

## 2025-06-25 PROCEDURE — 97116 GAIT TRAINING THERAPY: CPT | Mod: CQ

## 2025-06-25 PROCEDURE — 63600175 PHARM REV CODE 636 W HCPCS: Performed by: INTERNAL MEDICINE

## 2025-06-25 PROCEDURE — 99900031 HC PATIENT EDUCATION (STAT)

## 2025-06-25 PROCEDURE — 97112 NEUROMUSCULAR REEDUCATION: CPT

## 2025-06-25 PROCEDURE — 94761 N-INVAS EAR/PLS OXIMETRY MLT: CPT

## 2025-06-25 PROCEDURE — 20000000 HC ICU ROOM

## 2025-06-25 PROCEDURE — 99900035 HC TECH TIME PER 15 MIN (STAT)

## 2025-06-25 RX ORDER — ACETAMINOPHEN 325 MG/1
650 TABLET ORAL EVERY 4 HOURS PRN
Status: DISCONTINUED | OUTPATIENT
Start: 2025-06-25 | End: 2025-07-15 | Stop reason: HOSPADM

## 2025-06-25 RX ORDER — MORPHINE SULFATE 4 MG/ML
2 INJECTION, SOLUTION INTRAMUSCULAR; INTRAVENOUS EVERY 4 HOURS PRN
Status: DISCONTINUED | OUTPATIENT
Start: 2025-06-25 | End: 2025-07-15 | Stop reason: HOSPADM

## 2025-06-25 RX ORDER — OXYCODONE AND ACETAMINOPHEN 10; 325 MG/1; MG/1
1 TABLET ORAL EVERY 4 HOURS PRN
Refills: 0 | Status: DISCONTINUED | OUTPATIENT
Start: 2025-06-25 | End: 2025-07-15 | Stop reason: HOSPADM

## 2025-06-25 RX ORDER — ACETAMINOPHEN 650 MG/1
650 SUPPOSITORY RECTAL EVERY 4 HOURS PRN
Status: DISCONTINUED | OUTPATIENT
Start: 2025-06-25 | End: 2025-07-15 | Stop reason: HOSPADM

## 2025-06-25 RX ADMIN — MUPIROCIN: 20 OINTMENT TOPICAL at 08:06

## 2025-06-25 RX ADMIN — METOPROLOL SUCCINATE 12.5 MG: 25 TABLET, EXTENDED RELEASE ORAL at 08:06

## 2025-06-25 RX ADMIN — NORTRIPTYLINE HYDROCHLORIDE 25 MG: 25 CAPSULE ORAL at 08:06

## 2025-06-25 RX ADMIN — POLYETHYLENE GLYCOL 3350 17 G: 17 POWDER, FOR SOLUTION ORAL at 08:06

## 2025-06-25 RX ADMIN — CEFEPIME 1 G: 1 INJECTION, POWDER, FOR SOLUTION INTRAMUSCULAR; INTRAVENOUS at 04:06

## 2025-06-25 RX ADMIN — HYDROCORTISONE: 1 CREAM TOPICAL at 08:06

## 2025-06-25 RX ADMIN — INSULIN ASPART 1 UNITS: 100 INJECTION, SOLUTION INTRAVENOUS; SUBCUTANEOUS at 08:06

## 2025-06-25 RX ADMIN — DEXTROSE MONOHYDRATE 750 MG: 5 INJECTION, SOLUTION INTRAVENOUS at 12:06

## 2025-06-25 RX ADMIN — MUPIROCIN: 20 OINTMENT TOPICAL at 09:06

## 2025-06-25 RX ADMIN — CEFEPIME 1 G: 1 INJECTION, POWDER, FOR SOLUTION INTRAMUSCULAR; INTRAVENOUS at 11:06

## 2025-06-25 RX ADMIN — EZETIMIBE 10 MG: 10 TABLET ORAL at 08:06

## 2025-06-25 RX ADMIN — LEVETIRACETAM 500 MG: 500 SOLUTION ORAL at 08:06

## 2025-06-25 RX ADMIN — SODIUM CHLORIDE: 9 INJECTION, SOLUTION INTRAVENOUS at 05:06

## 2025-06-25 RX ADMIN — ATORVASTATIN CALCIUM 80 MG: 40 TABLET, FILM COATED ORAL at 08:06

## 2025-06-25 RX ADMIN — FAMOTIDINE 20 MG: 20 TABLET, FILM COATED ORAL at 08:06

## 2025-06-25 RX ADMIN — LEVOTHYROXINE SODIUM 88 MCG: 0.09 TABLET ORAL at 04:06

## 2025-06-25 RX ADMIN — HYDRALAZINE HYDROCHLORIDE 10 MG: 20 INJECTION INTRAMUSCULAR; INTRAVENOUS at 04:06

## 2025-06-25 RX ADMIN — OXYCODONE HYDROCHLORIDE AND ACETAMINOPHEN 1 TABLET: 10; 325 TABLET ORAL at 08:06

## 2025-06-25 RX ADMIN — OXYCODONE AND ACETAMINOPHEN 1 TABLET: 325; 10 TABLET ORAL at 09:06

## 2025-06-25 RX ADMIN — BUSPIRONE HYDROCHLORIDE 15 MG: 5 TABLET ORAL at 08:06

## 2025-06-25 RX ADMIN — OXYCODONE AND ACETAMINOPHEN 1 TABLET: 325; 10 TABLET ORAL at 05:06

## 2025-06-25 RX ADMIN — TRAZODONE HYDROCHLORIDE 25 MG: 50 TABLET ORAL at 08:06

## 2025-06-25 RX ADMIN — OXYCODONE HYDROCHLORIDE AND ACETAMINOPHEN 1 TABLET: 10; 325 TABLET ORAL at 02:06

## 2025-06-25 RX ADMIN — CEFEPIME 1 G: 1 INJECTION, POWDER, FOR SOLUTION INTRAMUSCULAR; INTRAVENOUS at 05:06

## 2025-06-25 RX ADMIN — OXYCODONE HYDROCHLORIDE AND ACETAMINOPHEN 1 TABLET: 10; 325 TABLET ORAL at 12:06

## 2025-06-25 RX ADMIN — BUTALBITAL, ACETAMINOPHEN, AND CAFFEINE 1 TABLET: 325; 50; 40 TABLET ORAL at 08:06

## 2025-06-25 NOTE — PLAN OF CARE
Problem: Adult Inpatient Plan of Care  Goal: Plan of Care Review  Outcome: Progressing  Flowsheets (Taken 6/25/2025 1006)  Plan of Care Reviewed With: patient  Goal: Patient-Specific Goal (Individualized)  Outcome: Progressing  Goal: Absence of Hospital-Acquired Illness or Injury  Outcome: Progressing  Goal: Optimal Comfort and Wellbeing  Outcome: Progressing  Goal: Readiness for Transition of Care  Outcome: Progressing     Problem: Diabetes Comorbidity  Goal: Blood Glucose Level Within Targeted Range  Outcome: Progressing     Problem: Wound  Goal: Optimal Coping  Outcome: Progressing  Goal: Optimal Functional Ability  Outcome: Progressing  Goal: Absence of Infection Signs and Symptoms  Outcome: Progressing  Goal: Improved Oral Intake  Outcome: Progressing  Goal: Optimal Pain Control and Function  Outcome: Progressing  Goal: Skin Health and Integrity  Outcome: Progressing  Goal: Optimal Wound Healing  Outcome: Progressing     Problem: Infection  Goal: Absence of Infection Signs and Symptoms  Outcome: Progressing     Problem: Comorbidity Management  Goal: Maintenance of Behavioral Health Symptom Control  Outcome: Progressing

## 2025-06-25 NOTE — PLAN OF CARE
Problem: Adult Inpatient Plan of Care  Goal: Plan of Care Review  Outcome: Progressing  Goal: Patient-Specific Goal (Individualized)  Outcome: Progressing  Goal: Absence of Hospital-Acquired Illness or Injury  Outcome: Progressing  Goal: Optimal Comfort and Wellbeing  Outcome: Progressing  Goal: Readiness for Transition of Care  Outcome: Progressing     Problem: Diabetes Comorbidity  Goal: Blood Glucose Level Within Targeted Range  Outcome: Progressing     Problem: Wound  Goal: Optimal Coping  Outcome: Progressing  Goal: Optimal Functional Ability  Outcome: Progressing  Goal: Absence of Infection Signs and Symptoms  Outcome: Progressing  Goal: Improved Oral Intake  Outcome: Progressing  Goal: Optimal Pain Control and Function  Outcome: Progressing  Goal: Skin Health and Integrity  Outcome: Progressing  Goal: Optimal Wound Healing  Outcome: Progressing     Problem: Comorbidity Management  Goal: Maintenance of Behavioral Health Symptom Control  Outcome: Progressing     Problem: Fall Injury Risk  Goal: Absence of Fall and Fall-Related Injury  Outcome: Progressing     Problem: Infection  Goal: Absence of Infection Signs and Symptoms  Outcome: Progressing

## 2025-06-25 NOTE — PT/OT/SLP PROGRESS
[facility]  Speech Language Pathology Department  Diet Tolerance Follow-up    Patient Name:  Chelle Chandra   MRN:  [id number]    Recommendations     General recommendations:  dysphagia therapy  Solid texture recommendation:  Regular Diet - IDDSI Level 7  Liquid consistency recommendation: Moderately thick liquids - IDDSI Level 3   Medications: crushed in puree  Precautions: aspiration    Diet Tolerance     Nursing reports no difficulty regarding diet tolerance.    Outcome Measures     Functional Oral Intake Scale: 5 - Total oral diet with multiple consistencies, by requiring special preparation or compensations      Plan     SLP to follow up for dysphagia therapy.

## 2025-06-25 NOTE — DISCHARGE SUMMARY
[facility] ([facility])  Rehab Discharge Summary    Patient Name: Chelle Chandra  MRN: [id number]  Age: 59 y.o. Sex: female  : 1965  Hospital Length of Stay: 5 days   Date of Service: 2025     Discharge Information   Date of Admission: 2025  Date of Discharge: 2025  Admit Diagnosis:   Subdural hematoma           Major depressive disorder           Generalized anxiety disorder           HLD           History of CVA           Hypothyroidism           HTN           Iron-deficiency anemia           Thrombocytosis           Constipation           DM type 2           Moderate protein calorie malnutrition  Discharge Diagnosis:  Subdural hematoma (current)            Major depressive disorder  (stable)            Generalized anxiety disorder  (stable)            HLD  (stable)            History of CVA  (stable)            Hypothyroidism  (stable)            HTN  (stable)            Iron-deficiency anemia  (stable)            Thrombocytosis   (stable)            Tachycardia  (stable)            Constipation   (stable)            DM type 2  (stable)            Moderate protein calorie malnutrition  (stable)    COVID-19 testing:  Unknown  COVID-19 vaccination status:  Vaccinated (Pfizer):  2021, 2021     Internal Medicine (attending): Aldair Armenta MD  Physiatry (consulting):  Lazarus Mills MD     OUTPATIENT PROVIDERS  PCP: Jorge Silver MD   Neurosurgery: James Rankin MD  Neurology: Jayne Verdin [facility]    Hospital Course   59-year-old white female presented to [facility] ED on 05/15/2025 after multiple falls.  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  JOHNATHAN drain removed on .  Discharge home with home health on .  Presented to [facility] ED on  with headache,  slurred speech, facial droop, and right-sided weakness.  CTA head and neck significant for suspected diminished flow inferior division of left MCA close to bifurcation.  CT head significant cord left cerebral convexity hypodense subacute hematoma and improved postsurgical pneumocephalus.  Transferred to [facility] for neurology/neurosurgery consultation.  Not a candidate for TNK due to recent intracranial hemorrhage.  Neurology recommended holding Plavix in initiating Keppra 500 mg b.i.d..  EKG noted for evidence of any epileptiform discharges.  MRI brain with no evidence of new infarct.  MBS completed.  Speech 30 recommended moderately thickened liquids due to high risk of aspiration.  Neurosurgery evaluated him and recommended for drainage.  Tolerated left diony hole for drainage of SDH due to increased intracranial pressure with mass effect on 6/2 without perioperative complications.  Repeat CT head significant for removal of SDH.  Reported increase strength to right arm.  Headache improved.  Recommended high-intensity therapy.  Speech therapy recommended minced and moist diet with moderately thickened liquids.  Plavix resumed per Neurosurgery recommendations on 06/05.  Tolerated transfer to [facility] inpatient rehab unit on 06/06 without incident.    During inpatient rehab course thrombocytosis remained stable.  Mild tachycardia.  Metoprolol succinate increased  25 mg daily on 06/09.  Remained with intermittent headaches.  Staff reported new lean to right with increase weakness to right side.  Remained with good glycemic control.  Hemodynamically stable.  Repeat CT head without contrast significant for  s/p recent craniotomy for evacuation of the left-sided subdural hematoma.  There is some residual pneumo cephaly seen.  There has been reaccumulation of the subdural fluid collection which appears predominately hypo attenuated on today's examination.  Maximum thickness of the subdural fluid collection on today's  "examination is 17.5 mm which is greater than the prior examinations. Neurosurgery evaluated and recommended transfer to [facility] for removal of subdural fluid.  No beds available at [facility].  Emergently sent through ED. [facility] ED, Neurosurgery, and family notified of transfer.     Chief Complaint: Subdural hematoma s/p left craniotomy with hematoma evacuation on 05/19, s/p left diony hole drainage of SDH on 06/02/2025     /68   Pulse 92   Temp 98.2 °F (36.8 °C) (Oral)   Resp 18   Ht 5' 6" (1.676 m)   Wt 46.9 kg (103 lb 6.3 oz)   SpO2 95%   BMI 16.69 kg/m²      Physical Exam  Constitutional:       General: She is not in acute distress.     Appearance: She is underweight.   HENT:      Head:      Comments: Left cranial incision sites clean intact  Eyes:      Pupils: Pupils are equal, round, and reactive to light.      Comments: Wearing corrective lenses   Cardiovascular:      Rate and Rhythm: Regular rhythm. Tachycardia present.   Pulmonary:      Effort: No respiratory distress.      Breath sounds: No wheezing or rhonchi.   Abdominal:      General: Bowel sounds are normal.      Palpations: Abdomen is soft.      Tenderness: There is no abdominal tenderness. There is no guarding.   Musculoskeletal:         General: No deformity.      Cervical back: Neck supple.      Right lower leg: No edema.      Left lower leg: No edema.      Comments: Diffuse muscle atrophy with right sided weakness   Skin:     General: Skin is warm.   Neurological:      Motor: Weakness present.   Psychiatric:         Mood and Affect: Mood normal.         Behavior: Behavior normal.         Thought Content: Thought content normal.         Judgment: Judgment normal.    *MD performed and documented physical examination        Labs  No results displayed because visit has over 200 results.      Admission on 06/06/2025, Discharged on 06/11/2025   Component Date Value Ref Range Status    POCT Glucose 06/06/2025 196 (H)  70 - 110 mg/dL Final "    POCT Glucose 06/06/2025 235 (H)  70 - 110 mg/dL Final    Sodium 06/07/2025 142  136 - 145 mmol/L Final    Potassium 06/07/2025 3.8  3.5 - 5.1 mmol/L Final    Chloride 06/07/2025 107  98 - 107 mmol/L Final    CO2 06/07/2025 26  23 - 31 mmol/L Final    Glucose 06/07/2025 174 (H)  82 - 115 mg/dL Final    Blood Urea Nitrogen 06/07/2025 16.2  9.8 - 20.1 mg/dL Final    Creatinine 06/07/2025 0.64  0.55 - 1.02 mg/dL Final    Calcium 06/07/2025 9.1  8.4 - 10.2 mg/dL Final    Protein Total 06/07/2025 6.6  5.8 - 7.6 gm/dL Final    Albumin 06/07/2025 2.5 (L)  3.4 - 4.8 g/dL Final    Globulin 06/07/2025 4.1 (H)  2.4 - 3.5 gm/dL Final    Albumin/Globulin Ratio 06/07/2025 0.6 (L)  1.1 - 2.0 ratio Final    Bilirubin Total 06/07/2025 0.2  <=1.5 mg/dL Final    ALP 06/07/2025 141  40 - 150 unit/L Final    ALT 06/07/2025 32  0 - 55 unit/L Final    AST 06/07/2025 21  11 - 45 unit/L Final    eGFR 06/07/2025 >60  mL/min/1.73/m2 Final    Estimated GFR calculated using the CKD-EPI creatinine (2021) equation.    Anion Gap 06/07/2025 9.0  mEq/L Final    BUN/Creatinine Ratio 06/07/2025 25   Final    Prealbumin 06/07/2025 20.6  14.0 - 37.0 mg/dL Final    Ferritin Level 06/07/2025 578.36 (H)  4.63 - 204.00 ng/mL Final    Iron Binding Capacity Unsaturated 06/07/2025 155  70 - 310 ug/dL Final    Iron Level 06/07/2025 36 (L)  50 - 170 ug/dL Final    Transferrin 06/07/2025 166 (L)  180 - 382 mg/dL Final    Iron Binding Capacity Total 06/07/2025 191 (L)  250 - 450 ug/dL Final    Iron Saturation 06/07/2025 19 (L)  20 - 50 % Final    WBC 06/07/2025 8.64  4.50 - 11.50 x10(3)/mcL Final    RBC 06/07/2025 3.71 (L)  4.20 - 5.40 x10(6)/mcL Final    Hgb 06/07/2025 11.0 (L)  12.0 - 16.0 g/dL Final    Hct 06/07/2025 33.9 (L)  37.0 - 47.0 % Final    MCV 06/07/2025 91.4  80.0 - 94.0 fL Final    MCH 06/07/2025 29.6  27.0 - 31.0 pg Final    MCHC 06/07/2025 32.4 (L)  33.0 - 36.0 g/dL Final    RDW 06/07/2025 14.5  11.5 - 17.0 % Final    Platelet 06/07/2025 794 (H)   130 - 400 x10(3)/mcL Final    MPV 06/07/2025 9.4  7.4 - 10.4 fL Final    IPF 06/07/2025 1.0  0.9 - 11.2 % Final    Neut % 06/07/2025 66.7  % Final    Lymph % 06/07/2025 20.6  % Final    Mono % 06/07/2025 10.1  % Final    Eos % 06/07/2025 1.4  % Final    Basophil % 06/07/2025 0.5  % Final    Imm Grans % 06/07/2025 0.7  % Final    Neut # 06/07/2025 5.77  2.1 - 9.2 x10(3)/mcL Final    Lymph # 06/07/2025 1.78  0.6 - 4.6 x10(3)/mcL Final    Mono # 06/07/2025 0.87  0.1 - 1.3 x10(3)/mcL Final    Eos # 06/07/2025 0.12  0 - 0.9 x10(3)/mcL Final    Baso # 06/07/2025 0.04  <=0.2 x10(3)/mcL Final    Imm Gran # 06/07/2025 0.06 (H)  0.00 - 0.04 x10(3)/mcL Final    NRBC% 06/07/2025 0.0  % Final    POCT Glucose 06/07/2025 156 (H)  70 - 110 mg/dL Final    POCT Glucose 06/07/2025 203 (H)  70 - 110 mg/dL Final    POCT Glucose 06/07/2025 190 (H)  70 - 110 mg/dL Final    POCT Glucose 06/07/2025 218 (H)  70 - 110 mg/dL Final    POCT Glucose 06/08/2025 131 (H)  70 - 110 mg/dL Final    POCT Glucose 06/08/2025 140 (H)  70 - 110 mg/dL Final    POCT Glucose 06/08/2025 118 (H)  70 - 110 mg/dL Final    POCT Glucose 06/08/2025 180 (H)  70 - 110 mg/dL Final    Sodium 06/09/2025 142  136 - 145 mmol/L Final    Every Monday    Potassium 06/09/2025 3.7  3.5 - 5.1 mmol/L Final    Every Monday    Chloride 06/09/2025 104  98 - 107 mmol/L Final    Every Monday    CO2 06/09/2025 28  23 - 31 mmol/L Final    Every Monday    Glucose 06/09/2025 170 (H)  82 - 115 mg/dL Final    Every Monday    Blood Urea Nitrogen 06/09/2025 12.0  9.8 - 20.1 mg/dL Final    Every Monday    Creatinine 06/09/2025 0.65  0.55 - 1.02 mg/dL Final    Every Monday    Calcium 06/09/2025 9.3  8.4 - 10.2 mg/dL Final    Every Monday    Protein Total 06/09/2025 7.0  5.8 - 7.6 gm/dL Final    Every Monday    Albumin 06/09/2025 2.7 (L)  3.4 - 4.8 g/dL Final    Every Monday    Globulin 06/09/2025 4.3 (H)  2.4 - 3.5 gm/dL Final    Albumin/Globulin Ratio 06/09/2025 0.6 (L)  1.1 - 2.0 ratio Final     Bilirubin Total 06/09/2025 0.4  <=1.5 mg/dL Final    Every Monday    ALP 06/09/2025 139  40 - 150 unit/L Final    Every Monday    ALT 06/09/2025 26  0 - 55 unit/L Final    Every Monday    AST 06/09/2025 14  11 - 45 unit/L Final    Every Monday    eGFR 06/09/2025 >60  mL/min/1.73/m2 Final    Estimated GFR calculated using the CKD-EPI creatinine (2021) equation.    Anion Gap 06/09/2025 10.0  mEq/L Final    BUN/Creatinine Ratio 06/09/2025 18   Final    Prealbumin 06/09/2025 20.9  14.0 - 37.0 mg/dL Final    Magnesium Level 06/09/2025 1.70  1.60 - 2.60 mg/dL Final    Every Monday    Phosphorus Level 06/09/2025 3.7  2.3 - 4.7 mg/dL Final    Every Monday    WBC 06/09/2025 6.23  4.50 - 11.50 x10(3)/mcL Final    RBC 06/09/2025 3.78 (L)  4.20 - 5.40 x10(6)/mcL Final    Hgb 06/09/2025 11.1 (L)  12.0 - 16.0 g/dL Final    Hct 06/09/2025 35.3 (L)  37.0 - 47.0 % Final    MCV 06/09/2025 93.4  80.0 - 94.0 fL Final    MCH 06/09/2025 29.4  27.0 - 31.0 pg Final    MCHC 06/09/2025 31.4 (L)  33.0 - 36.0 g/dL Final    RDW 06/09/2025 14.3  11.5 - 17.0 % Final    Platelet 06/09/2025 667 (H)  130 - 400 x10(3)/mcL Final    MPV 06/09/2025 9.3  7.4 - 10.4 fL Final    Neut % 06/09/2025 48.9  % Final    Lymph % 06/09/2025 37.4  % Final    Mono % 06/09/2025 9.8  % Final    Eos % 06/09/2025 2.9  % Final    Basophil % 06/09/2025 0.5  % Final    Imm Grans % 06/09/2025 0.5  % Final    Neut # 06/09/2025 3.05  2.1 - 9.2 x10(3)/mcL Final    Lymph # 06/09/2025 2.33  0.6 - 4.6 x10(3)/mcL Final    Mono # 06/09/2025 0.61  0.1 - 1.3 x10(3)/mcL Final    Eos # 06/09/2025 0.18  0 - 0.9 x10(3)/mcL Final    Baso # 06/09/2025 0.03  <=0.2 x10(3)/mcL Final    Imm Gran # 06/09/2025 0.03  0.00 - 0.04 x10(3)/mcL Final    NRBC% 06/09/2025 0.0  % Final    POCT Glucose 06/09/2025 166 (H)  70 - 110 mg/dL Final    POCT Glucose 06/09/2025 160 (H)  70 - 110 mg/dL Final    POCT Glucose 06/10/2025 137 (H)  70 - 110 mg/dL Final    POCT Glucose 06/10/2025 104  70 - 110 mg/dL  Final    POCT Glucose 06/10/2025 148 (H)  70 - 110 mg/dL Final    POCT Glucose 06/10/2025 122 (H)  70 - 110 mg/dL Final    POCT Glucose 06/11/2025 124 (H)  70 - 110 mg/dL Final    POCT Glucose 06/11/2025 148 (H)  70 - 110 mg/dL Final     Radiology  CT head without contrast on 06/11/2025, IMPRESSION:  Status post recent craniotomy for evacuation of the left-sided subdural hematoma.  There is some residual pneumo cephaly seen.  There has been reaccumulation of the subdural fluid collection which appears predominately hypo attenuated on today's examination.  Maximum thickness of the subdural fluid collection on today's examination is 17.5 mm which is greater than the prior examinations   Radiology   Transthoracic echo on 05/30/2025, IMPRESSION:  LVEF 55-60%.  Right ventricle is normal in size, systolic function is normal.  Normal venous pressure at 3 mm Hg.  Radiology   MRI brain without contrast on 05/29/2025, IMPRESSION:  There is no diffusion weighted restriction or signal dropout on ADC map to indicate an acute infarct.  There is left middle cerebral artery territory encephalomalacia combined with gliotic changes related to old infarct.  Chronic microvascular ischemic changes or mild with periventricular and deep white matter T2 FLAIR hyperintense signals.  There is no acute intracranial hemorrhage, hydrocephalus, midline shift or mass effect.  Left cerebral convexity chronic subdural hematoma or hygroma is with similar appearance.  There are several left pleural base calcifications which showed gradient echo sequence dephasing artifacts.  Radiology  Carotid ultrasound on 05/29/2025:  IMPRESSION:  Right ICA is patent with less than 50% stenosis.  Left ICA is patent with less than 50% stenosis.  Bilateral vertebral arteries are patent with antegrade flow.    Discharge Summary Plan   Discharge Status:  Decline    Location: Discharge to [facility] for neurosurgery evaluation    Medications: See discharge medicine  reconciliation    Activity:  Bed rest    Diet: NPO        Follow-up:  To follow up with admitting physician upon arrival to [facility]    Discussed plan of care, and patient communicated understanding. Agreed to comply with recommendations.    Shaheen Nava NP conducted independent examination and assisted with medical documentation.     Discharge Time: 46 minutes

## 2025-06-25 NOTE — PROGRESS NOTES
POD#2 left craniectomy for evacuation of empyema   She is sitting up in bed, NAD  She endorses mild LEWIS  She denies blurred vision and N/V  She continues with right UE weakness    AFVSS  PERRL, EOMI  Alert, oriented to all spheres  3/5 deltoids and triceps on the right; 0/5 biceps and   5/5 left UE and bilateral LE  Incision c/d/I  Drain output 20/5    Plan: Continue drain  Continue current dressing  OK to downgrade with Q2 hour neuro exams  BP parameters below 140/90  HOB> 30  Keppra BID  Antibx per ID  PT/OT  SCDs for DVT prophylaxis

## 2025-06-25 NOTE — PT/OT/SLP PROGRESS
Physical Therapy Treatment    Patient Name:  Chelle Chandra   MRN:  [id number]    Recommendations:     Discharge therapy intensity: High Intensity Therapy   Discharge Equipment Recommendations: to be determined by next level of care  Barriers to discharge: Impaired mobility, Ongoing medical needs, and placement    Assessment:     Chelle Chandra is a 59 y.o. female admitted with a medical diagnosis of R sided weakness and facial droop due to L SDH s/p craniotomy and MMA embolization. On 5/30 patient with subdural hemorrhage s/p L diony hole. Pt then with new R sided weakness and underwent a redo of diony hole for fluid evacuation/drain placement and a left cranioplasty for subdural fluid evacuation on 6/16. Now with ICH s/p craniectomy on 6/23.  She presents with the following impairments/functional limitations: weakness, impaired endurance, impaired self care skills, impaired functional mobility, gait instability, impaired balance, pain, decreased lower extremity function, decreased upper extremity function, decreased safety awareness.    Pt tolerated tx session well today; c/o fatigue, but remained willing to participate in tx session. Overall required Rafael for all mobility; ambulated ~46' Rafael w/L HHA. Pt also performed seated neuro janessa exercises to improved muscle facilitation in BLE; increased emphasis on RLE eccentric motion. Remains appropriate for HIGH intensity therapy upon D/C to improve functional independence.    Rehab Prognosis: Good; patient would benefit from acute skilled PT services to address these deficits and reach maximum level of function.    Recent Surgery: Procedure(s) (LRB):  CRANIECTOMY (Left) 2 Days Post-Op    Plan:     During this hospitalization, patient would benefit from acute PT services 6 x/week to address the identified rehab impairments via gait training, therapeutic activities, therapeutic exercises, neuromuscular re-education and progress toward the following goals:    Plan of  "Care Expires:  25    Subjective     Chief Complaint: "I just woke up from a nap."  Patient/Family Comments/goals: Get better  Pain/Comfort:         Objective:     Communicated with RN prior to session.  Patient found up in chair with telemetry, pulse ox (continuous), blood pressure cuff, arterial line, peripheral IV, JOHNATHAN drain, alvarez catheter upon PT entry to room.     General Precautions: Standard, fall, seizure (<130/90)  Orthopedic Precautions: N/A  Braces:  (helmet)  Respiratory Status: Room air  Blood Pressure: 132/74 prior to mobility; 122/89 post tx session  Skin Integrity: Visible skin intact    Functional Mobility:  Transfers:     Sit to Stand:  minimum assistance with hand-held assist  Gait: ~48' Rafael w/RW; Rafael required 2/2 unsteadiness and occasional NBOS; R veering w/vc to correct; vc for improved step length and posture. Chair in tow.    Balance: fair/poor dynamic balance    Neuro janessa:  BLE LAQ and march x 15 reps  RLE AAROM w/emphasis on static hold and eccentric movement     Co-Treatment: No    Education:  Patient and daughter/s were provided with verbal education education regarding PT role/goals/POC, fall prevention, safety awareness, and home exercise program.  Understanding was verbalized.     Patient left up in chair with all lines intact, call button in reach, RN notified, and daughter present    DME Justification:  No DME recommended requiring DME justifications    GOALS:   Multidisciplinary Problems       Physical Therapy Goals          Problem: Physical Therapy    Goal Priority Disciplines Outcome Interventions   Physical Therapy Goal     PT, PT/OT Progressing    Description: Goals to be met by: 25     Patient will increase functional independence with mobility by performin. Supine to sit with Modified Laverne  2. Sit to supine with Modified Laverne  3. Sit to stand transfer with Modified Laverne  4. Bed to chair transfer with Modified Laverne using " Rolling Walker vs QC  5. Gait  x 200 feet with Modified Warden using Rolling Walker vs QC.                          Time Tracking:     PT Received On: 06/25/25  PT Start Time: 1440     PT Stop Time: 1506  PT Total Time (min): 26 min     Billable Minutes: Gait Training 1 and Neuromuscular Re-education 1    Treatment Type: Treatment  PT/PTA: PTA     Number of PTA visits since last PT visit: 1 06/25/2025

## 2025-06-25 NOTE — PT/OT/SLP PROGRESS
Occupational Therapy   Treatment    Name: Chelle Chandra  MRN: [id number]    Recommendations:     Recommended therapy intensity at discharge: High Intensity Therapy   Discharge Equipment Recommendations:  to be determined by next level of care  Barriers to discharge:  None    Assessment:     Chelle Chandra is a 59 y.o. female with a medical diagnosis of R sided weakness and facial droop due to L SDH s/p craniotomy and MMA embolization. On 5/30 patient with subdural hemorrhage s/p L diony hole. Pt then with new R sided weakness and underwent a redo of diony hole for fluid evacuation/drain placement and a left cranioplasty for subdural fluid evacuation on 6/16. Now with ICH s/p craniectomy on 6/23.  She presents with motivation to participate in therapy. Performance deficits affecting function are weakness, impaired endurance, impaired sensation, impaired self care skills, impaired functional mobility, gait instability, decreased upper extremity function, decreased lower extremity function, decreased safety awareness, pain.     Pt tolerated treatment well. Pt performed oral hygiene in standing with min A. Pt engaged in neuromuscular re-education exercises to facilitate movement of RUE for functional use. PROM and stretching performed to manage tightness and maintain joint integrity. GivMohr sling provided at end of session to be donned during PT session. Pt demonstrates progress towards OT goals. Resting hand splint ordered, to be delivered tomorrow. Remains appropriate for high intensity therapy.    Rehab Prognosis:  Good; patient would benefit from acute skilled OT services to address these deficits and reach maximum level of function.       Plan:     Patient to be seen 6 x/week to address the above listed problems via self-care/home management, therapeutic activities, therapeutic exercises, neuromuscular re-education, sensory integration  Plan of Care Expires: 07/17/25  Plan of Care Reviewed with:  patient    Subjective     Pain/Comfort:  Pain Rating 1:  (pain at sx site when donning helmet; reports nurse recenty administered pain meds.)    Objective:     Communicated with: nurse prior to session.  Patient found HOB elevated with peripheral IV, blood pressure cuff, alvarez catheter, telemetry, pulse ox (continuous) upon OT entry to room.    General Precautions: Standard, fall, seizure (BP: <140/90, HOB >30 degrees)    Orthopedic Precautions:   Braces:  (helmet when OOB)  Respiratory Status: Room air  Vital Signs: Blood Pressure: 133/79  HR: 106 bpm  Sp02: 97%  With Activity: BP: 121/85; HR: 120 bpm; SpO2: 97%     Occupational Performance:     Functional Mobility/Transfers:  Bed mobility:    Scooting: minimum assistance  Supine to Sit: minimum assistance  Transfers: Sit to Stand: contact guard assistance with hand-held assist  Functional mobility: Pt ambulated from EOB to sink with CGA using HHA. Requires cues for RLE step length. Pt then ambulated from sink to bedside chair with CGA using HHA. Pt demonstrates balance instability during ambulation.     Activities of Daily Living:  Grooming: minimum assistance for oral hygiene in standing 2/2 impaired functional use of RUE. Pt placed toothpaste on brush, while student therapist held brush. RUE placed on counter to increase functional use and use as support.     Balance:   Static Sitting Balance: One UE support: Good: Patient able to maintain balance without handhold support, limited postural sway.  Dynamic Sitting Balance: No UE support: requires CGA for ROM/MMT assessments seated EOB.     Manual Therapy:   PROM and stretching performed to pt's RUE in shoulder flexion/abduction and elbow flexion/extension to decrease tightness and maintain joint integrity.   ROM assessed at today's session: scap elevation/retraction WFL, however no noted change in scap depression/protraction from re-eval.   Pt attempted AROM for FA pronation, high rate of compensation at shoulder  noted.     Neuromuscular re-education:  Pt performed table glides x5 using towel to facilitate movement in RUE. Pt demonstrates ability to perform scapular retraction and requires AAROM to perform scapular protraction. Pt encouraged to continue performing neuromuscular re-ed exercises at table in bed.   Pt also provided education and demonstration on performing PROM to RUE using her LUE to facilitate movement at shoulder joint/maintain muscle integrity.     Therapeutic Positioning    OT interventions performed during the course of today's session in an effort to prevent and/or reduce acquired pressure injuries:   Education was provided on benefits of and recommendations for therapeutic positioning    [facility] 6 Click ADL: 17    Co-Treatment: No    Patient Education:  Patient and daughter/s were provided with verbal education and demonstrations education regarding OT role/goals/POC, fall prevention, and safety awareness.  Understanding was verbalized, however additional teaching warranted.      Patient left up in chair with all lines intact, call button in reach, and daughter present.    GOALS:   Multidisciplinary Problems       Occupational Therapy Goals          Problem: Occupational Therapy    Goal Priority Disciplines Outcome Interventions   Occupational Therapy Goal     OT, PT/OT Progressing    Description: Goals: to be met by 07/14/25    Pt will complete grooming standing at sink with LRAD with CGA.  Pt will complete UB dressing with SBA.  Pt will complete LB dressing with SBA using LRAD.  Pt will complete toileting with CGA using LRAD.  Pt will complete functional mobility to/from toilet and toilet transfer with mod I using LRAD.   Pt will demo visual attention to R side >80% of time with min verbal cues.  Pt will demo RUE strength of 3-/5 for participation in ADLs.                       Time Tracking:     OT Date of Treatment: 06/25/25  OT Start Time: 1318  OT Stop Time: 1407  OT Total Time (min): 49  min    Billable Minutes:Self Care/Home Management 2 units  Neuromuscular Re-education 1 unit    Supervising Occupational Therapist: LISA Mallory  OT/EDILSON: OT     Number of EDILSON visits since last OT visit: 1    6/25/2025

## 2025-06-25 NOTE — PROGRESS NOTES
[facility] - [facility] ICU  Pulmonary Critical Care Note    Patient Name: Chelle Chandra  MRN: [id number]  Admission Date: 6/11/2025  Hospital Length of Stay: 14 days  Code Status: Full Code  Attending Provider: James Nicholson MD  Primary Care Provider: Jorge Silver MD     Subjective:     HPI:   1-year-old  female with significant history of hypothyroidism, anxiety, bronchial asthma, COPD, type 2 diabetes mellitus, 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 Patient recently had subdural hematoma in May requiring intracranial embolization, craniotomy with hematoma evacuation. After this patient was discharged home. Late may she presented back to the ED with right-sided weakness, facial droop and imaging was concerning for left MCA diminished flow and subacute hematoma. MRI brain was negative for CVA. Neurosurgery performed diony hole for drainage of subdural hematoma due to increased intracranial pressure with postop CT showing definitive improvement patient was on Plavix post embolization which was held briefly and resumed per Neurosurgery on 06/05 and she was transferred to rehab on 06/06. While in rehab patient was noted to have increase right upper extremity weakness and also significant headaches. CT head revealed reaccumulation of subdural fluid collection, increased from before patient was sent to main [facility] for further evaluation/neurosurgery services. Neurosurgery evaluated, planning for  shunt this hospitalization, home meds resumed as appropriate except for Plavix, symptomatic management for headache.  shunt scheduled for 6/16. Patient underwent redo diony hole, fluid evacuation and drain placement, concern for surgical site infection and therefore underwent cranioplasty. Infectious Disease consulted and patient was placed on cefepime, vancomycin and Flagyl pending intraoperative cultures. Intraoperative cultures growing staph epi. ID recommending IV  vancomycin, IV Rocephin until 7/3. Neurosurgery wished to monitor patient for another day until 6/21. Ptient was planned for DC to Rehab 6/23/25, but complained of inability to move RUE since yesterday afternoon which she admits to not reporting to the nursing team . CT Head ordered which showed increasing size of extra-axial fluid collections along cerebral convexity with increasing mass effect & increase parenchymal edema in L posterior frontal lobe. NSX informed and they took the patient to the OR    Hospital Course/Significant events:  6/23/25 - Admitted to ICU     24 Hour Interval History:  No events overnight.  Not requiring any vasoactive agents.  Neuro exam actually looks a bit better to me with less facial asymmetry today.  Pain is also improved.  No word back from ID yet.  All cultures from the 23rd are still in progress with no growth yet    Past Medical History:   Diagnosis Date    Accelerated junctional rhythm     Agatston CAC score, <100     Anxiety disorder, unspecified     Asthma     COPD type A     Diabetes mellitus without complication     GERD (gastroesophageal reflux disease)     History of COVID-19     Insomnia     Lung nodule        Past Surgical History:   Procedure Laterality Date    ANGIOGRAM, CORONARY, WITH LEFT HEART CATHETERIZATION      BACK SURGERY      BREAST LUMPECTOMY Right     CARDIOVERSION  08/01/2013    CARPAL TUNNEL RELEASE  10/23/2013    CRANIECTOMY Left 6/16/2025    Procedure: CRANIECTOMY;  Surgeon: James Rankin MD;  Location: [facility] OR;  Service: Neurosurgery;  Laterality: Left;  left redo-craniectomy for subdural empyema    CRANIECTOMY Left 6/23/2025    Procedure: CRANIECTOMY;  Surgeon: James Rankin MD;  Location: [facility] OR;  Service: Neurosurgery;  Laterality: Left;  LEFT //  PINS // SCOPE    CRANIOTOMY FOR EVACUATION OF SUBDURAL HEMATOMA Left 05/19/2025    Procedure: CRANIOTOMY, FOR SUBDURAL HEMATOMA EVACUATION;  Surgeon: Ricardo Shelley MD;  Location: [facility] OR;   "Service: Neurosurgery;  Laterality: Left;    CREATION OF TERRI HOLE WITH EVACUATION OF HEMATOMA Left 6/2/2025    Procedure: CREATION, CRANIAL TERRI HOLE, WITH HEMATOMA EVACUATION;  Surgeon: James Rankin MD;  Location: [facility] OR;  Service: Neurosurgery;  Laterality: Left;  LEFT BURRHOLE FOR SUBDURAL HEMATOMA EVACUATION // STEALTH // HERCULES SKYTRON // DRILL    EVACUATION OF EMPYEMA Left 6/16/2025    Procedure: EVACUATION, EMPYEMA;  Surgeon: James Rankin MD;  Location: [facility] OR;  Service: Neurosurgery;  Laterality: Left;  left craniotomy for subdural empyema    FUSION OF CERVICAL SPINE BY ANTERIOR APPROACH USING COMPUTER-ASSISTED NAVIGATION  06/10/2019    KIDNEY SURGERY      TONSILLECTOMY AND ADENOIDECTOMY      TOTAL ABDOMINAL HYSTERECTOMY      WRIST SURGERY         Social History[1]    Current Outpatient Medications   Medication Instructions    0.9% NaCl SolP 500 mL with vancomycin 1,000 mg SolR 1,000 mg 1,000 mg, Irrigation, Daily    atorvastatin (LIPITOR) 80 mg, Oral, Daily    busPIRone (BUSPAR) 15 mg, Oral, 2 times daily    butalbital-acetaminophen-caffeine -40 mg (FIORICET, ESGIC) -40 mg per tablet 1 tablet, Oral, Every 4 hours PRN    [Paused] clopidogreL (PLAVIX) 75 mg, Oral, Daily    D5W PgBk 100 mL with cefTRIAXone 1 gram SolR 2 g 2 g, Intravenous, Daily    docusate sodium (COLACE) 100 mg, Oral, 2 times daily    ezetimibe (ZETIA) 10 mg, Oral    famotidine (PEPCID) 20 mg, Oral, 2 times daily    FARXIGA 5 mg, Oral    levETIRAcetam (KEPPRA) 500 mg, Oral, 2 times daily    levothyroxine (SYNTHROID) 88 mcg, Oral, Before breakfast    metFORMIN (GLUCOPHAGE) 500 mg, Oral, 2 times daily with meals    metoprolol succinate (TOPROL-XL) 12.5 mg, Daily    nortriptyline (PAMELOR) 25 mg, Oral, Nightly    ondansetron (ZOFRAN) 8 mg, Oral, Every 6 hours PRN    pen needle, diabetic 32 gauge x 5/32" Ndle 1 each, Misc.(Non-Drug; Combo Route), Weekly    traZODone (DESYREL) 25 mg, Oral, Nightly     Current Inpatient " Medications   atorvastatin  80 mg Oral Daily    busPIRone  15 mg Oral BID    ceFEPime IV (PEDS and ADULTS)  1 g Intravenous Q6H    ezetimibe  10 mg Oral Daily    famotidine  20 mg Oral BID    hydrocortisone   Topical (Top) BID    levetiracetam  500 mg Oral BID    levothyroxine  88 mcg Oral Before breakfast    metoprolol succinate  12.5 mg Oral Daily    mupirocin   Nasal BID    nortriptyline  25 mg Oral QHS    polyethylene glycol  17 g Oral BID    traZODone  25 mg Oral QHS    vancomycin 750 mg in D5W 250 mL IVPB (admixture device)  750 mg Intravenous Q12H     Current Intravenous Infusions   0.9% NaCl   Intravenous Continuous 100 mL/hr at 06/25/25 0538 New Bag at 06/25/25 0538    clevidipine  0-16 mg/hr Intravenous Continuous   Held at 06/24/25 0000          Objective:     Intake/Output Summary (Last 24 hours) at 6/25/2025 0837  Last data filed at 6/25/2025 0530  Gross per 24 hour   Intake 2067.1 ml   Output 3825 ml   Net -1757.9 ml     Vital Signs (Most Recent):  Temp: 98.3 °F (36.8 °C) (06/25/25 0400)  Pulse: 103 (06/25/25 0530)  Resp: 20 (06/25/25 0806)  BP: 131/68 (06/25/25 0530)  SpO2: 96 % (06/25/25 0530)  Body mass index is 17.75 kg/m².  Weight: 49.9 kg (110 lb) Vital Signs (24h Range):  Temp:  [98.3 °F (36.8 °C)-98.6 °F (37 °C)] 98.3 °F (36.8 °C)  Pulse:  [] 103  Resp:  [10-23] 20  SpO2:  [95 %-99 %] 96 %  BP: (116-149)/() 131/68  Arterial Line BP: ()/() 118/55     Physical Exam  General: Well nourished w/o distress  HEENT:  Dressings in place with drain; PERRL; nasal and oral mucosa moist and clear; left craniotomy changes  Pulm: CTA bilaterally, normal work of breathing  CV: S1, S2 w/o murmurs or gallops; no edema noted  GI: Bowel sound present in all quadrants, abdomen soft to palpation  MSK: Unable to move RUE. Full ROM of other extremities minus some proximal weakness in the right lower extremity  Derm: No rashes, abnormal bruising, or skin lesions  Neuro:  Alert and oriented and  "conversant.  Neuro exam intact except for what is noted above  Psych: Cooperative; appropriate mood and affect    Lines/Drains/Airways       Drain  Duration                  Closed/Suction Drain 06/23/25 1353 Tube - 1 Left Scalp Bulb 10 Fr. 1 day         Urethral Catheter 06/23/25 1220 Silicone 16 Fr. 1 day              Arterial Line  Duration             Arterial Line 06/23/25 1016 Radial 1 day              Peripheral Intravenous Line  Duration                  Midline Catheter Single Lumen 06/18/25 1952 Left basilic vein (medial side of arm) 6 days    Peripheral IV Single Lumen 06/23/25 1233 18 G 1 1/4 in Left Hand 1 day                  Significant Labs:  Lab Results   Component Value Date    WBC 6.14 06/23/2025    HGB 10.6 (L) 06/23/2025    HCT 32.7 (L) 06/23/2025    MCV 91.9 06/23/2025     06/23/2025       BMP  Lab Results   Component Value Date     06/23/2025    K 3.6 06/23/2025    CO2 25 06/23/2025    BUN 6.2 (L) 06/23/2025    CREATININE 0.55 06/23/2025    CALCIUM 9.1 06/23/2025    AGAP 7.0 06/23/2025    EGFRNONAA >60 07/28/2021     ABG  No results for input(s): "PH", "PO2", "PCO2", "HCO3", "BE" in the last 168 hours.  Mechanical Ventilation Support:       Significant Imaging:  I have reviewed the pertinent imaging within the past 24 hours.    Assessment/Plan:     Assessment  S/p ICH ; s/p craniectomy    CVA w/ RUE Weakness   Subdural hematoma requiring crani with evacuation and MMA embolization 5/2025  Increased subdural fluid collection Status post left redo diony hole with fluid evacuation, drain placement 6/16  Suspected surgical site infection status post cranioplasty 6/16  Right-sided weakness/intractable headache secondary to aboveistory of 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, 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  Depression/anxiety   HLD  Hypothyroidism  Essential HTN  Type 2 diabetes mellitus    Plan  -we will continue to reach out to ID  -Continue current antimicrobials which consists of cefepime and " vanc  -patient is tolerating diet and not requiring any intensive therapies, we will reach out to Neurosurgery about possible downgrade to the floor    DVT Prophylaxis: scd  GI Prophylaxis: none     32 minutes of critical care was time spent personally by me on the following activities: development of treatment plan with patient or surrogate and bedside caregivers, discussions with consultants, evaluation of patient's response to treatment, examination of patient, ordering and performing treatments and interventions, ordering and review of laboratory studies, ordering and review of radiographic studies, pulse oximetry, re-evaluation of patient's condition.  This critical care time did not overlap with that of any other provider or involve time for any procedures.     James Nicholson MD  Pulmonary & Critical Care Medicine  [facility] - [facility] ICU           [1]   Social History  Socioeconomic History    Marital status:    Tobacco Use    Smoking status: Every Day     Current packs/day: 0.50     Types: Cigarettes    Smokeless tobacco: Never   Vaping Use    Vaping status: Former    Quit date: 6/1/2024   Substance and Sexual Activity    Alcohol use: Not Currently    Drug use: Never     Social Drivers of Health     Financial Resource Strain: Patient Declined (6/18/2025)    Overall Financial Resource Strain (CARDIA)     Difficulty of Paying Living Expenses: Patient declined   Food Insecurity: Patient Declined (6/18/2025)    Hunger Vital Sign     Worried About Running Out of Food in the Last Year: Patient declined     Ran Out of Food in the Last Year: Patient declined   Transportation Needs: Patient Declined (6/18/2025)    PRAPARE - Transportation     Lack of Transportation (Medical): Patient declined     Lack of Transportation (Non-Medical): Patient declined   Physical Activity: Inactive (5/29/2025)    Exercise Vital Sign     Days of Exercise per Week: 0 days     Minutes of Exercise per Session: 0 min    Stress: Patient Declined (6/18/2025)    [employer] - Occupational Stress Questionnaire     Feeling of Stress : Patient declined   Recent Concern: Stress - Stress Concern Present (5/29/2025)    [employer] - Occupational Stress Questionnaire     Feeling of Stress : Rather much   Housing Stability: Patient Declined (6/18/2025)    Housing Stability Vital Sign     Unable to Pay for Housing in the Last Year: Patient declined     Homeless in the Last Year: Patient declined

## 2025-06-26 PROBLEM — G06.2 SUBDURAL EMPYEMA: Status: ACTIVE | Noted: 2025-06-26

## 2025-06-26 LAB
BACTERIA SPEC ANAEROBE CULT: NORMAL
CREAT SERPL-MCNC: 0.53 MG/DL (ref 0.55–1.02)
GFR SERPLBLD CREATININE-BSD FMLA CKD-EPI: >60 ML/MIN/1.73/M2
POCT GLUCOSE: 158 MG/DL (ref 70–110)
VANCOMYCIN TROUGH SERPL-MCNC: 12.2 UG/ML (ref 15–20)

## 2025-06-26 PROCEDURE — 36415 COLL VENOUS BLD VENIPUNCTURE: CPT | Performed by: INTERNAL MEDICINE

## 2025-06-26 PROCEDURE — 63600175 PHARM REV CODE 636 W HCPCS: Performed by: INTERNAL MEDICINE

## 2025-06-26 PROCEDURE — 25000003 PHARM REV CODE 250: Performed by: INTERNAL MEDICINE

## 2025-06-26 PROCEDURE — 80202 ASSAY OF VANCOMYCIN: CPT | Performed by: INTERNAL MEDICINE

## 2025-06-26 PROCEDURE — 21400001 HC TELEMETRY ROOM

## 2025-06-26 PROCEDURE — 97535 SELF CARE MNGMENT TRAINING: CPT

## 2025-06-26 PROCEDURE — 63600175 PHARM REV CODE 636 W HCPCS

## 2025-06-26 PROCEDURE — 92526 ORAL FUNCTION THERAPY: CPT

## 2025-06-26 PROCEDURE — 97530 THERAPEUTIC ACTIVITIES: CPT | Mod: CQ

## 2025-06-26 PROCEDURE — 97116 GAIT TRAINING THERAPY: CPT | Mod: CQ

## 2025-06-26 PROCEDURE — 99900031 HC PATIENT EDUCATION (STAT)

## 2025-06-26 PROCEDURE — 99900035 HC TECH TIME PER 15 MIN (STAT)

## 2025-06-26 PROCEDURE — 11000001 HC ACUTE MED/SURG PRIVATE ROOM

## 2025-06-26 PROCEDURE — 94799 UNLISTED PULMONARY SVC/PX: CPT

## 2025-06-26 PROCEDURE — 97112 NEUROMUSCULAR REEDUCATION: CPT | Mod: CQ

## 2025-06-26 PROCEDURE — 25000003 PHARM REV CODE 250: Performed by: STUDENT IN AN ORGANIZED HEALTH CARE EDUCATION/TRAINING PROGRAM

## 2025-06-26 PROCEDURE — 36415 COLL VENOUS BLD VENIPUNCTURE: CPT

## 2025-06-26 PROCEDURE — 82565 ASSAY OF CREATININE: CPT

## 2025-06-26 PROCEDURE — 97112 NEUROMUSCULAR REEDUCATION: CPT

## 2025-06-26 RX ADMIN — OXYCODONE AND ACETAMINOPHEN 1 TABLET: 325; 10 TABLET ORAL at 12:06

## 2025-06-26 RX ADMIN — FAMOTIDINE 20 MG: 20 TABLET, FILM COATED ORAL at 09:06

## 2025-06-26 RX ADMIN — CEFEPIME 1 G: 1 INJECTION, POWDER, FOR SOLUTION INTRAMUSCULAR; INTRAVENOUS at 04:06

## 2025-06-26 RX ADMIN — POLYETHYLENE GLYCOL 3350 17 G: 17 POWDER, FOR SOLUTION ORAL at 09:06

## 2025-06-26 RX ADMIN — BUSPIRONE HYDROCHLORIDE 15 MG: 5 TABLET ORAL at 09:06

## 2025-06-26 RX ADMIN — BISACODYL 10 MG: 10 SUPPOSITORY RECTAL at 09:06

## 2025-06-26 RX ADMIN — BUTALBITAL, ACETAMINOPHEN, AND CAFFEINE 1 TABLET: 325; 50; 40 TABLET ORAL at 09:06

## 2025-06-26 RX ADMIN — HYDROCORTISONE: 1 CREAM TOPICAL at 09:06

## 2025-06-26 RX ADMIN — OXYCODONE AND ACETAMINOPHEN 1 TABLET: 325; 10 TABLET ORAL at 02:06

## 2025-06-26 RX ADMIN — CEFEPIME 1 G: 1 INJECTION, POWDER, FOR SOLUTION INTRAMUSCULAR; INTRAVENOUS at 11:06

## 2025-06-26 RX ADMIN — NORTRIPTYLINE HYDROCHLORIDE 25 MG: 25 CAPSULE ORAL at 09:06

## 2025-06-26 RX ADMIN — MUPIROCIN: 20 OINTMENT TOPICAL at 09:06

## 2025-06-26 RX ADMIN — TRAZODONE HYDROCHLORIDE 25 MG: 50 TABLET ORAL at 09:06

## 2025-06-26 RX ADMIN — METOPROLOL SUCCINATE 12.5 MG: 25 TABLET, EXTENDED RELEASE ORAL at 09:06

## 2025-06-26 RX ADMIN — LEVETIRACETAM 500 MG: 500 SOLUTION ORAL at 09:06

## 2025-06-26 RX ADMIN — LEVOTHYROXINE SODIUM 88 MCG: 0.09 TABLET ORAL at 05:06

## 2025-06-26 RX ADMIN — DEXTROSE MONOHYDRATE 750 MG: 5 INJECTION, SOLUTION INTRAVENOUS at 12:06

## 2025-06-26 RX ADMIN — ATORVASTATIN CALCIUM 80 MG: 40 TABLET, FILM COATED ORAL at 09:06

## 2025-06-26 RX ADMIN — OXYCODONE AND ACETAMINOPHEN 1 TABLET: 325; 10 TABLET ORAL at 05:06

## 2025-06-26 RX ADMIN — CEFEPIME 1 G: 1 INJECTION, POWDER, FOR SOLUTION INTRAMUSCULAR; INTRAVENOUS at 05:06

## 2025-06-26 RX ADMIN — EZETIMIBE 10 MG: 10 TABLET ORAL at 09:06

## 2025-06-26 NOTE — PT/OT/SLP PROGRESS
Occupational Therapy   Treatment    Name: Chelle Chandra  MRN: [id number]    Recommendations:     Recommended therapy intensity at discharge: High Intensity Therapy   Discharge Equipment Recommendations:  to be determined by next level of care  Barriers to discharge:  None    Assessment:     Chelle Chandra is a 59 y.o. female with a medical diagnosis of  R sided weakness and facial droop due to L SDH s/p craniotomy and MMA embolization. On 5/30 patient with subdural hemorrhage s/p L diony hole. Pt then with new R sided weakness and underwent a redo of diony hole for fluid evacuation/drain placement and a left cranioplasty for subdural fluid evacuation on 6/16. Now with ICH s/p craniectomy on 6/23. She presents with motivation to participate in therapy. Performance deficits affecting function are weakness, impaired endurance, impaired sensation, impaired self care skills, impaired functional mobility, gait instability, decreased upper extremity function, decreased lower extremity function, decreased safety awareness, pain.     Pt tolerated treatment well. Pt reports she does feel that she is doing as well today. Daughter at bedside reports medication recently administered by nurse. Pt participated in neuromuscular re-ed to facilitate movement and muscle activation of RUE. Pt provided with resting hand splint, handout, and verbal education for wear schedule and indications for contacting nurse/OT or to doff splint. OT returned ~1hour later to assess skin and noted no altered skin integrity from splint. Remains appropriate for high intensity therpy at d/c.     DME Justification: No DME recommended requiring DME justifications    Rehab Prognosis:  Good; patient would benefit from acute skilled OT services to address these deficits and reach maximum level of function.       Plan:     Patient to be seen 6 x/week to address the above listed problems via self-care/home management, therapeutic activities, therapeutic  exercises, neuromuscular re-education, sensory integration  Plan of Care Expires: 07/17/25  Plan of Care Reviewed with: patient    Subjective     Pain/Comfort:  Pain Rating 1:  (pain at sx site (L head) and RUE at bicep with mvmt)    Objective:     Communicated with: nurse prior to session.  Patient found HOB elevated with PureWick, telemetry, peripheral IV upon OT entry to room.    General Precautions: Standard, fall, seizure    Orthopedic Precautions:   Braces:  (helmet OOB)  Respiratory Status: Room air  Vital Signs: Blood Pressure: 129/79     Occupational Performance:     Functional Mobility/Transfers:  Bed mobility:    Scooting: minimum assistance  Supine to Sit: minimum assistance  Transfers: Sit to Stand: minimum assistance with hand-held assist  Bed to Chair: minimum assistance with hand-held assist using Step Transfer  Functional mobility: pt required min A for transfers 2/2 impaired balance and R lateral leaning.     Activities of Daily Living:  Upper Body Dressing: minimum assistance to don gown, pt placed RUE through arm hole using LUE.   Lower Body Dressing: maximal assistance to don socks.     Balance:   Static Sitting Balance: One UE support: Fair: Patient able to maintain balance with handhold support; may require occasional minimal assistance.  Dynamic Sitting Balance: One UE support: Fair: Patient accepts minimal challenge; able to maintain balance while turning head/trunk.    Neuromuscular re-education:   Pt performed WBing on RUE elbow and hand 5x and using LUE and abdominal muscle to return to midline. Activity used to facilitate movement and sensation and activate muscles in RUE for functional use in daily activities.     Fit/Train orthotic:   Pt assisted with donning resting hand splint at end of session. Handout provided for wear schedule and indications of when to contact OT or nurse/doff splint. Pt and daughter verbalized understanding. OT returned ~1hour later to assess skin and noted no  altered skin integrity from splint.   Therapeutic Positioning    OT interventions performed during the course of today's session in an effort to prevent and/or reduce acquired pressure injuries:   Education was provided on benefits of and recommendations for therapeutic positioning  Therapeutic positioning was provided at the conclusion of session for contracture prevention    [facility] 6 Click ADL: 17    Co-Treatment: No    Patient Education:  Patient and daughter/s were provided with verbal education and handouts education regarding OT role/goals/POC, fall prevention, and safety awareness.  Understanding was verbalized.      Patient left up in chair with all lines intact, call button in reach, and daughter present.    GOALS:   Multidisciplinary Problems       Occupational Therapy Goals          Problem: Occupational Therapy    Goal Priority Disciplines Outcome Interventions   Occupational Therapy Goal     OT, PT/OT Progressing    Description: Goals: to be met by 07/14/25    Pt will complete grooming standing at sink with LRAD with CGA.  Pt will complete UB dressing with SBA.  Pt will complete LB dressing with SBA using LRAD.  Pt will complete toileting with CGA using LRAD.  Pt will complete functional mobility to/from toilet and toilet transfer with mod I using LRAD.   Pt will demo visual attention to R side >80% of time with min verbal cues.  Pt will demo RUE strength of 3-/5 for participation in ADLs.                       Time Tracking:     OT Date of Treatment: 06/26/25  OT Start Time: 1407  OT Stop Time: 1448  OT Total Time (min): 41 min    Billable Minutes:Self Care/Home Management 2 unit  Neuromuscular Re-education 1 unit    OTR/L readily available for conference at the time of the provision of services: LISA Mallory  OT/EDILSON: OT     Number of EDILSON visits since last OT visit: 2    6/26/2025

## 2025-06-26 NOTE — PT/OT/SLP PROGRESS
Physical Therapy Treatment    Patient Name:  Chelle Chandra   MRN:  [id number]    Recommendations:     Discharge therapy intensity: High Intensity Therapy   Discharge Equipment Recommendations: to be determined by next level of care  Barriers to discharge: Impaired mobility, Ongoing medical needs, and placement    Assessment:     Chelle Chandra is a 59 y.o. female admitted with a medical diagnosis of R sided weakness and facial droop due to L SDH s/p craniotomy and MMA embolization. On 5/30 patient with subdural hemorrhage s/p L diony hole. Pt then with new R sided weakness and underwent a redo of diony hole for fluid evacuation/drain placement and a left cranioplasty for subdural fluid evacuation on 6/16. Now with ICH s/p craniectomy on 6/23.  She presents with the following impairments/functional limitations: weakness, gait instability, decreased upper extremity function, decreased lower extremity function, impaired balance, impaired endurance, impaired self care skills, impaired functional mobility, decreased safety awareness.    Pt recently transferred to [address] [place] [address] from ICU. Staff present in room upon arrival. Tolerated session well with reported mild dizziness during gait trial.     Rehab Prognosis: Good; patient would benefit from acute skilled PT services to address these deficits and reach maximum level of function.    Recent Surgery: Procedure(s) (LRB):  CRANIECTOMY (Left) 3 Days Post-Op    Plan:     During this hospitalization, patient would benefit from acute PT services 6 x/week to address the identified rehab impairments via gait training, therapeutic activities, therapeutic exercises, neuromuscular re-education and progress toward the following goals:    Plan of Care Expires:  07/24/25    Subjective     Chief Complaint: R arm was painful last night  Patient/Family Comments/goals: Get better  Pain/Comfort:  Pain Rating 1: other (see comments) (Reports R arm sensitivity and pain mostly at area of former IV  site)      Objective:     Communicated with RN prior to session.  Patient found up in chair with peripheral IV, telemetry, Other (comments) (Helmet) upon PT entry to room.     General Precautions: Standard, fall, seizure, other (see comments) (BP<140/90)  Orthopedic Precautions: N/A  Braces: N/A (helmet when OOB)  Respiratory Status: Room air  Blood Pressure: 123/80 Pre tx, post tx 132/77, HR 80  Skin Integrity: Visible skin intact    Functional Mobility:  Transfers:     Sit to Stand:  Min assist   Bed to chair: Stand pivot to L side with Min assist  Gait: 45' with HHA on L, NBOS, decreased L lateral weight shift, cues to correct posture and head positioning, min to mod assist. Minor LOB's during trial. Reported mild dizziness.    Bed mobility:   Sit to supine: Min assist x 2    Neuro janessa:  Alt box taps with HHA L, TC's to improve R hip and knee flexion, VC's to improve R foot placement    Co-Treatment: No    Education:  Patient and daughter/s were provided with verbal education education regarding PT role/goals/POC, fall prevention, safety awareness, and home exercise program.  Understanding was verbalized.     Patient left HOB elevated with all lines intact, call button in reach, RN notified, and daughter present    DME Justification:  No DME recommended requiring DME justifications    GOALS:   Multidisciplinary Problems       Physical Therapy Goals          Problem: Physical Therapy    Goal Priority Disciplines Outcome Interventions   Physical Therapy Goal     PT, PT/OT Progressing    Description: Goals to be met by: 25     Patient will increase functional independence with mobility by performin. Supine to sit with Modified [place]  2. Sit to supine with Modified [place]  3. Sit to stand transfer with Modified [place]  4. Bed to chair transfer with Modified [place] using Rolling Walker vs QC  5. Gait  x 200 feet with Modified [place] using Rolling Walker vs QC.                           Time Tracking:     PT Received On: 06/26/25  PT Start Time: 1010     PT Stop Time: 1048  PT Total Time (min): 38 min     Billable Minutes: Gait Training 1 unit, Therapeutic Activity 1 unit, and Neuromuscular Re-education 1 unit    Treatment Type: Treatment  PT/PTA: PTA     Number of PTA visits since last PT visit: 2     06/26/2025

## 2025-06-26 NOTE — PLAN OF CARE
Problem: Adult Inpatient Plan of Care  Goal: Plan of Care Review  Outcome: Progressing  Goal: Patient-Specific Goal (Individualized)  Outcome: Progressing  Goal: Absence of Hospital-Acquired Illness or Injury  Outcome: Progressing  Goal: Optimal Comfort and Wellbeing  Outcome: Progressing  Goal: Readiness for Transition of Care  Outcome: Progressing     Problem: Diabetes Comorbidity  Goal: Blood Glucose Level Within Targeted Range  Outcome: Progressing     Problem: Wound  Goal: Optimal Coping  Outcome: Progressing  Goal: Optimal Functional Ability  Outcome: Progressing  Goal: Absence of Infection Signs and Symptoms  Outcome: Progressing  Goal: Improved Oral Intake  Outcome: Progressing  Goal: Optimal Pain Control and Function  Outcome: Progressing  Goal: Skin Health and Integrity  Outcome: Progressing  Goal: Optimal Wound Healing  Outcome: Progressing     Problem: Infection  Goal: Absence of Infection Signs and Symptoms  Outcome: Progressing     Problem: Comorbidity Management  Goal: Maintenance of Behavioral Health Symptom Control  Outcome: Progressing     Problem: Fall Injury Risk  Goal: Absence of Fall and Fall-Related Injury  Outcome: Progressing     Problem: Skin Injury Risk Increased  Goal: Skin Health and Integrity  Outcome: Progressing      Chief complaint:   Chief Complaint   Patient presents with   • Breast Pain     Past 6 months, pain in both breast       Vitals:  Visit Vitals  /80 (BP Location: LUE - Left upper extremity, Patient Position: Sitting, Cuff Size: Large Adult)   Pulse 82   Ht 5' 4\" (1.626 m)   Wt 98.5 kg (217 lb 2.5 oz)   LMP  (LMP Unknown)   SpO2 98%   BMI 37.27 kg/m²       HISTORY OF PRESENT ILLNESS     Lalo Hernandez is a 36 year old female who presents to the office with concerns of breast pain that started about 6 months ago.  She describes the pain as a pressure. No nippel retraction or discharge.  She also notices axillary pain as well.  No breast cancer in family.     she does have sharp pain intermittently in both breasts.  She had unremarkable mammogram and breast US in 2018 for palpable lump which was unremarkable.    Other significant problems:  There are no problems to display for this patient.      PAST MEDICAL, FAMILY AND SOCIAL HISTORY     Medications:  Current Outpatient Medications   Medication Sig Dispense Refill   • amitriptyline (ELAVIL) 50 MG tablet Take 1 tablet by mouth nightly. 90 tablet 3   • meloxicam (MOBIC) 15 MG tablet Take 1 tablet by mouth daily as needed for Pain. Take with food, stop if causes stomach upset 30 tablet 1   • ferrous sulfate 325 (65 FE) MG tablet Take 1 tablet by mouth daily (with breakfast). 90 tablet 1   • albuterol 108 (90 Base) MCG/ACT inhaler Inhale 2 puffs into the lungs every 4 hours as needed for Shortness of Breath or Wheezing. 1 each 12   • tiZANidine (ZANAFLEX) 2 MG tablet Take 1 tablet by mouth every 8 hours as needed for Muscle spasms. 30 tablet 1     No current facility-administered medications for this visit.       Allergies:  ALLERGIES:  No Known Allergies    Past Medical  History/Surgeries:  Past Medical History:   Diagnosis Date   • Migraine        Past Surgical History:   Procedure Laterality Date   • Tubal ligation  01/30/2020       Family History:  Family  History   Problem Relation Age of Onset   • Patient is unaware of any medical problems Mother    • High blood pressure Father    • Diabetes Maternal Grandfather        Social History:  Social History     Tobacco Use   • Smoking status: Never   • Smokeless tobacco: Never   Substance Use Topics   • Alcohol use: No     Alcohol/week: 0.0 standard drinks of alcohol       REVIEW OF SYSTEMS     Review of Systems   Constitutional: Negative for activity change, appetite change, fatigue and fever.   Respiratory: Negative for cough and shortness of breath.    Cardiovascular: Negative for chest pain and palpitations.   Skin: Negative for rash.       PHYSICAL EXAM     Physical Exam  Constitutional:       Appearance: Normal appearance.   Cardiovascular:      Rate and Rhythm: Normal rate and regular rhythm.      Heart sounds: Normal heart sounds.   Pulmonary:      Effort: Pulmonary effort is normal.      Breath sounds: Normal breath sounds.   Chest:   Breasts:     Right: Mass and tenderness present. No swelling, bleeding, inverted nipple or nipple discharge.      Left: Mass and tenderness present. No swelling, bleeding, inverted nipple or nipple discharge.      Comments: Right breast: 7:00 small soft tender mass present.  Left breast multiple;e areas from 7:00-4:00 position of small soft palpable tender mass and 2:00 mass with tenderness.  Lymphadenopathy:      Upper Body:      Right upper body: No axillary adenopathy.      Left upper body: No axillary adenopathy.   Neurological:      Mental Status: She is alert.         ASSESSMENT/PLAN     Lalo was seen today for breast pain.    Diagnoses and all orders for this visit:    Mass of breast, unspecified laterality  -     US Breast Complete all 4 Quadrants Bilat; Future  -     Mammo Diagnostic Bilateral; Future    Breast pain in female  -     US Breast Complete all 4 Quadrants Bilat; Future  -     Mammo Diagnostic Bilateral; Future      Patient verbalizes understanding and agrees  with plan.

## 2025-06-26 NOTE — PT/OT/SLP PROGRESS
[facility]  Speech Language Pathology Department  Dysphagia Therapy Progress Note    Patient Name:  Chelle Chandra   MRN:  [id number]    Recommendations     General recommendations:  dysphagia therapy  Solid texture recommendation:  Regular Diet - IDDSI Level 7  Liquid consistency recommendation: Moderately thick liquids - IDDSI Level 3   Medications: crushed in puree  Aspiration precautions: small bites/sips, slow rate, and upright for PO intake    Discharge therapy intensity: High Intensity Therapy   Barriers to safe discharge:  complicated medical history    Subjective     Patient awake and alert.  Spiritual/Cultural/Gnosticism Beliefs/Practices that affect care: no    Objective     Therapeutic PO Trials:  Patient eating lunch with daughter present. Patient's daughter cut up there food and hand the patient the fork. The patient is able to feed herself. Patient with prolonged but functional mastication. Mild oral residue initially, however patient independently clears using a lingual/finger sweep followed by a sip of liquids.     Patient is tolerating her diet with functional compensatory strategies.     Assessment     Pt continues to present with oropharyngeal dysphagia requiring diet modification to improve swallow safety and efficiency.    Outcome Measures     Functional Oral Intake Scale: 5 - Total oral diet with multiple consistencies, by requiring special preparation or compensations    Goals     Multidisciplinary Problems       SLP Goals          Problem: SLP    Goal Priority Disciplines Outcome   SLP Goal     SLP    Description: LTG: The pt will tolerate least restrictive diet with no overt s/sx of aspiration.    STGs:  1. Patient will participate CTAR/pharyngeal exercises  2. Patient will participate in laryngeal elevation exercises  3. Patient will participate in repeat MBS when clinically appropriate                     Patient Education     Patient and daughter/s were  provided with verbal education regarding SLP POC.  Understanding was verbalized.    Plan     Will continue to follow and tx as appropriate.    SLP Follow-Up:  Yes   Patient to be seen:  5 x/week   Plan of Care expires:  07/08/25  Plan of Care reviewed with:  patient       Time Tracking     SLP Treatment Date:   06/24/25  Speech Start Time:  0815  Speech Stop Time:  0840     Speech Total Time (min):  25 min    Billable minutes:  Treatment of Swallow Dysfunction, 25 minutes       06/26/2025

## 2025-06-26 NOTE — PROGRESS NOTES
[facility]  Infectious Disease  Progress Note    Patient Name: Chelle Chandra  MRN: [id number]  Admission Date: 6/11/2025  Length of Stay: 15 days  Attending Physician: Jake Paulino MD  Primary Care Provider: Jorge Silver MD    Isolation Status: No active isolations  Assessment/Plan:      Active Diagnoses:    Diagnosis Date Noted POA    PRINCIPAL PROBLEM:  Severe malnutrition [E43] 05/31/2025 Yes      Problems Resolved During this Admission:         Assessments        # subdural hematoma- s/p craniotomy/her whole S/P late May 2025     # Increased subdural fluid collection with surgical site infection 6.11.25  - s/p I&D/cranioplasty 6/16- culture positive for MSSE    - worsening right upper extremity movement, CT head showed increase size of the extra-axial fluid collection, with mass effect 6/23-return to OR, s/p left craniectomy with evacuation of empyema, drain placement- intraop culture positive for MSSE and Pseudomonas stutzeri (sensitive pending    - currently afebrile, WBC , creatinine normal- on last CBC 6/23, CRP 16.3  - patient has been on vancomycin, and cefepime 6/16 (7 days of metronidazole)    # Past Medical of  hypothyroidism, anxiety, bronchial asthma, COPD, type 2 diabetes mellitus, 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           Recommendations :    Continue with cefepime, can stop vancomycin as no MRSA isolated.  The Staph epi is oxacillin sensitive.  Cefepime should hopefully cover both Pseudomonas species and MSSE-   Must follow-up sensitivities for Pseudomonas prior to final antibiotic plan or discharge- discussed with microbiology, they are working on sensory today, results will be available tomorrow    Anticipate at least 6-8 weeks of IV antibiotics, regular labs, repeat imaging to assure resolution of the infection  Close follow-up with Neurosurgery  We will arrange for ID follow-up as outpatient upon discharge    Right shoulder pain?   X-ray-defer to primary team  Discussed with the patient, daughter at bedside     ID will follow and see when culture sensitivities are back.    Kenrick Oliveros MD  Infectious Disease  [facility]    Subjective:   Patient reports no movement in the right upper extremity, slight sensation.  Reports right shoulder pain that is new.  Able to move right lower extremity slightly.  Denies any headaches.  No fevers, chills.  No respiratory issues.  No GI complaints       Objective:     Vital Signs (Most Recent):  Temp: 97.6 °F (36.4 °C) (06/26/25 1059)  Pulse: 72 (06/26/25 1059)  Resp: 16 (06/26/25 1235)  BP: 116/74 (06/26/25 1059)  SpO2: 100 % (06/26/25 1059) Vital Signs (24h Range):  Temp:  [97.6 °F (36.4 °C)-98.6 °F (37 °C)] 97.6 °F (36.4 °C)  Pulse:  [] 72  Resp:  [10-20] 16  SpO2:  [96 %-100 %] 100 %  BP: (116-141)/(64-89) 116/74     Weight: 49.9 kg (110 lb)  Body mass index is 17.75 kg/m².    Estimated Creatinine Clearance: 88.9 mL/min (A) (based on SCr of 0.53 mg/dL (L)).    Examination:    General:  Chronically ill-appearing female.  NAD.  Alert, oriented x3.  Daughter at bedside  HEENT:  Status post left craniotomy, sutures in place intact.  Presence of JOHNATHAN drain with small amount of blood-tinged fluid.  CV:  RRR:  Lungs:  On room air, no distress  Extremities:  Slight pain in the shoulder on passive extension of the right upper extremity.  0/5 strength    Antibiotics (From admission, onward)      Start     Stop Route Frequency Ordered    06/27/25 0100  vancomycin (VANCOCIN) 1,000 mg in D5W 250 mL IVPB (admixture device)         -- IV Every 12 hours (non-standard times) 06/26/25 1244    06/23/25 2100  mupirocin 2 % ointment         06/28/25 2059 Nasl 2 times daily 06/23/25 1536    06/16/25 1515  ceFEPIme injection 1 g         -- IV Every 6 hours (non-standard times) 06/16/25 1412    06/16/25 1511  vancomycin - pharmacy to dose  (vancomycin IVPB (PEDS and ADULTS))        Placed in  ""And" Linked Group    -- IV pharmacy to manage frequency 06/16/25 1412           Microbiology Results (last 7 days)       Procedure Component Value Units Date/Time    Tissue Culture - Aerobic [[id number]]  (Abnormal) Collected: 06/23/25 1313    Order Status: Completed Specimen: Tissue from Brain Updated: 06/26/25 1252     Tissue - Aerobic Culture Staphylococcus epidermidis     Comment: Isolated from Thio only       Tissue Culture - Aerobic [[id number]]  (Abnormal) Collected: 06/23/25 1311    Order Status: Completed Specimen: Tissue from Brain Updated: 06/26/25 1243     Tissue - Aerobic Culture Rare Pseudomonas stutzeri    Tissue Culture - Aerobic [[id number]] Collected: 06/23/25 1314    Order Status: Completed Specimen: Tissue from Brain Updated: 06/26/25 1010     Tissue - Aerobic Culture No Growth At 72 Hours    Anaerobic Culture [[id number]] Collected: 06/23/25 1314    Order Status: Completed Specimen: Tissue from Brain Updated: 06/26/25 0907     Anaerobe Culture No Anaerobes Isolated    Anaerobic Culture [[id number]] Collected: 06/23/25 1313    Order Status: Completed Specimen: Tissue from Brain Updated: 06/26/25 0907     Anaerobe Culture No Anaerobes Isolated    Anaerobic Culture [[id number]] Collected: 06/23/25 1311    Order Status: Completed Specimen: Tissue from Brain Updated: 06/26/25 0904     Anaerobe Culture No Anaerobes Isolated    Gram Stain [[id number]] Collected: 06/23/25 1311    Order Status: Completed Specimen: Tissue from Brain Updated: 06/24/25 0707     GRAM STAIN Many WBC observed      No bacteria seen    Gram Stain [[id number]] Collected: 06/23/25 1313    Order Status: Completed Specimen: Tissue from Brain Updated: 06/24/25 0706     GRAM STAIN Few WBC observed      Rare Gram positive cocci    Gram Stain [[id number]] Collected: 06/23/25 1314    Order Status: Completed Specimen: Tissue from Brain Updated: 06/24/25 0705     GRAM STAIN Few WBC observed      Few Gram positive cocci    Fungal Culture " [[id number]] Collected: 06/23/25 1314    Order Status: Sent Specimen: Tissue from Brain Updated: 06/23/25 1326    Fungal Culture [[id number]] Collected: 06/23/25 1313    Order Status: Sent Specimen: Tissue from Brain Updated: 06/23/25 1326    Fungal Culture [[id number]] Collected: 06/23/25 1311    Order Status: Sent Specimen: Tissue from Brain Updated: 06/23/25 1326           Imaging Results    None        Vitals:    06/26/25 1235   BP:    Pulse:    Resp: 16   Temp:

## 2025-06-26 NOTE — PROGRESS NOTES
Awake, alert, appropriate.  Drain working.  RUE weak.  LUE and Legs good strength.  Changed dressing today.  MRI soon.Will follow.  ID needs to see patient.  Thank you.

## 2025-06-26 NOTE — PROGRESS NOTES
Pharmacokinetic Assessment Follow Up: IV Vancomycin    Vancomycin serum concentration assessment(s):    The trough level was drawn correctly and can be used to guide therapy at this time. The measurement is below the desired definitive target range of 15 to 20 mcg/mL.    Vancomycin Regimen Plan:    Change regimen to Vancomycin 1000 mg IV every 12 hours with next serum trough concentration measured at 1200 prior to 1300 dose on 6/28    Scheduled Administration Times        Drug levels (last 3 results):  Recent Labs   Lab Result Units 06/24/25  1211 06/26/25  1207   Vancomycin Trough ug/ml 15.1 12.2*       Vancomycin Administrations:  vancomycin given in the last 96 hours                     vancomycin 750 mg in D5W 250 mL IVPB (admixture device) (mg) 750 mg New Bag 06/26/25 1237     750 mg New Bag  0058     750 mg New Bag 06/25/25 1249     750 mg New Bag 06/24/25 2318     750 mg New Bag  1407     750 mg New Bag  0124     750 mg New Bag 06/23/25 1512     750 mg New Bag  0129     750 mg New Bag 06/22/25 1307    vancomycin injection (g) 1 g Given 06/23/25 1306                    Pharmacy will continue to follow and monitor vancomycin.    Please contact pharmacy at extension [phone] for questions regarding this assessment.    Thank you for the consult,   Cari Nicholson       Patient brief summary:  Chelle Chandra is a 59 y.o. female initiated on antimicrobial therapy with IV Vancomycin for treatment of subdural empyema    The patient's current regimen is 1000 mg q12h    Drug Allergies:   Review of patient's allergies indicates:   Allergen Reactions    Aspirin     Ketorolac     Penicillins     Sulfa (sulfonamide antibiotics)     Ozempic [semaglutide] Other (See Comments)     Abdominal pain       Actual Body Weight:  Wt Readings from Last 1 Encounters:   06/12/25 49.9 kg (110 lb)       Renal Function:   Estimated Creatinine Clearance: 88.9 mL/min (A) (based on SCr of 0.53 mg/dL (L)).,     Dialysis Method (if  "applicable):  N/A    CBC (last 72 hours):  No results for input(s): "WHITE BLOOD CELL COUNT", "HEMOGLOBIN", "HEMATOCRIT", "PLATELETS", "GRAN%", "LYMPH%", "MONO%", "EOSINOPHIL%", "BASOPHIL%", "DIFFERENTIAL METHOD" in the last 72 hours.    Metabolic Panel (last 72 hours):  Recent Labs   Lab Result Units 06/26/25  0722   Creatinine mg/dL 0.53*       Microbiologic Results:  Microbiology Results (last 7 days)       Procedure Component Value Units Date/Time    Tissue Culture - Aerobic [[id number]]  (Abnormal) Collected: 06/23/25 1311    Order Status: Completed Specimen: Tissue from Brain Updated: 06/26/25 1243     Tissue - Aerobic Culture Rare Pseudomonas stutzeri    Tissue Culture - Aerobic [[id number]] Collected: 06/23/25 1314    Order Status: Completed Specimen: Tissue from Brain Updated: 06/26/25 1010     Tissue - Aerobic Culture No Growth At 72 Hours    Anaerobic Culture [[id number]] Collected: 06/23/25 1314    Order Status: Completed Specimen: Tissue from Brain Updated: 06/26/25 0907     Anaerobe Culture No Anaerobes Isolated    Anaerobic Culture [[id number]] Collected: 06/23/25 1313    Order Status: Completed Specimen: Tissue from Brain Updated: 06/26/25 0907     Anaerobe Culture No Anaerobes Isolated    Anaerobic Culture [[id number]] Collected: 06/23/25 1311    Order Status: Completed Specimen: Tissue from Brain Updated: 06/26/25 0904     Anaerobe Culture No Anaerobes Isolated    Tissue Culture - Aerobic [[id number]] Collected: 06/23/25 1313    Order Status: Completed Specimen: Tissue from Brain Updated: 06/25/25 1005     Tissue - Aerobic Culture No Growth At 48 Hours    Gram Stain [[id number]] Collected: 06/23/25 1311    Order Status: Completed Specimen: Tissue from Brain Updated: 06/24/25 0707     GRAM STAIN Many WBC observed      No bacteria seen    Gram Stain [[id number]] Collected: 06/23/25 1313    Order Status: Completed Specimen: Tissue from Brain Updated: 06/24/25 0706     GRAM STAIN Few WBC observed      " Rare Gram positive cocci    Gram Stain [[id number]] Collected: 06/23/25 1314    Order Status: Completed Specimen: Tissue from Brain Updated: 06/24/25 0705     GRAM STAIN Few WBC observed      Few Gram positive cocci    Fungal Culture [[id number]] Collected: 06/23/25 1314    Order Status: Sent Specimen: Tissue from Brain Updated: 06/23/25 1326    Fungal Culture [[id number]] Collected: 06/23/25 1313    Order Status: Sent Specimen: Tissue from Brain Updated: 06/23/25 1326    Fungal Culture [[id number]] Collected: 06/23/25 1311    Order Status: Sent Specimen: Tissue from Brain Updated: 06/23/25 1326

## 2025-06-27 LAB
ANION GAP SERPL CALC-SCNC: 7 MEQ/L
BACTERIA TISS AEROBE CULT: ABNORMAL
BASOPHILS # BLD AUTO: 0.06 X10(3)/MCL
BASOPHILS NFR BLD AUTO: 0.9 %
BUN SERPL-MCNC: 3.5 MG/DL (ref 9.8–20.1)
CALCIUM SERPL-MCNC: 8.5 MG/DL (ref 8.4–10.2)
CHLORIDE SERPL-SCNC: 109 MMOL/L (ref 98–107)
CO2 SERPL-SCNC: 26 MMOL/L (ref 23–31)
CREAT SERPL-MCNC: 0.56 MG/DL (ref 0.55–1.02)
CREAT/UREA NIT SERPL: 6
EOSINOPHIL # BLD AUTO: 0.35 X10(3)/MCL (ref 0–0.9)
EOSINOPHIL NFR BLD AUTO: 5.1 %
ERYTHROCYTE [DISTWIDTH] IN BLOOD BY AUTOMATED COUNT: 14.6 % (ref 11.5–17)
GFR SERPLBLD CREATININE-BSD FMLA CKD-EPI: >60 ML/MIN/1.73/M2
GLUCOSE SERPL-MCNC: 144 MG/DL (ref 82–115)
HCT VFR BLD AUTO: 29.4 % (ref 37–47)
HGB BLD-MCNC: 9.6 G/DL (ref 12–16)
IMM GRANULOCYTES # BLD AUTO: 0.04 X10(3)/MCL (ref 0–0.04)
IMM GRANULOCYTES NFR BLD AUTO: 0.6 %
LYMPHOCYTES # BLD AUTO: 1.99 X10(3)/MCL (ref 0.6–4.6)
LYMPHOCYTES NFR BLD AUTO: 28.8 %
MAGNESIUM SERPL-MCNC: 1.6 MG/DL (ref 1.6–2.6)
MCH RBC QN AUTO: 29.8 PG (ref 27–31)
MCHC RBC AUTO-ENTMCNC: 32.7 G/DL (ref 33–36)
MCV RBC AUTO: 91.3 FL (ref 80–94)
MONOCYTES # BLD AUTO: 0.7 X10(3)/MCL (ref 0.1–1.3)
MONOCYTES NFR BLD AUTO: 10.1 %
NEUTROPHILS # BLD AUTO: 3.77 X10(3)/MCL (ref 2.1–9.2)
NEUTROPHILS NFR BLD AUTO: 54.5 %
NRBC BLD AUTO-RTO: 0 %
PHOSPHATE SERPL-MCNC: 3.4 MG/DL (ref 2.3–4.7)
PLATELET # BLD AUTO: 405 X10(3)/MCL (ref 130–400)
PMV BLD AUTO: 9.4 FL (ref 7.4–10.4)
POCT GLUCOSE: 187 MG/DL (ref 70–110)
POTASSIUM SERPL-SCNC: 3.3 MMOL/L (ref 3.5–5.1)
RBC # BLD AUTO: 3.22 X10(6)/MCL (ref 4.2–5.4)
SODIUM SERPL-SCNC: 142 MMOL/L (ref 136–145)
WBC # BLD AUTO: 6.91 X10(3)/MCL (ref 4.5–11.5)

## 2025-06-27 PROCEDURE — A9577 INJ MULTIHANCE: HCPCS | Performed by: INTERNAL MEDICINE

## 2025-06-27 PROCEDURE — 25000003 PHARM REV CODE 250: Performed by: INTERNAL MEDICINE

## 2025-06-27 PROCEDURE — 84100 ASSAY OF PHOSPHORUS: CPT | Performed by: INTERNAL MEDICINE

## 2025-06-27 PROCEDURE — 85025 COMPLETE CBC W/AUTO DIFF WBC: CPT | Performed by: INTERNAL MEDICINE

## 2025-06-27 PROCEDURE — 36415 COLL VENOUS BLD VENIPUNCTURE: CPT | Performed by: INTERNAL MEDICINE

## 2025-06-27 PROCEDURE — 80048 BASIC METABOLIC PNL TOTAL CA: CPT | Performed by: INTERNAL MEDICINE

## 2025-06-27 PROCEDURE — 97112 NEUROMUSCULAR REEDUCATION: CPT

## 2025-06-27 PROCEDURE — 21400001 HC TELEMETRY ROOM

## 2025-06-27 PROCEDURE — 97110 THERAPEUTIC EXERCISES: CPT

## 2025-06-27 PROCEDURE — 63600175 PHARM REV CODE 636 W HCPCS: Performed by: INTERNAL MEDICINE

## 2025-06-27 PROCEDURE — 63600175 PHARM REV CODE 636 W HCPCS

## 2025-06-27 PROCEDURE — 11000001 HC ACUTE MED/SURG PRIVATE ROOM

## 2025-06-27 PROCEDURE — 83735 ASSAY OF MAGNESIUM: CPT | Performed by: INTERNAL MEDICINE

## 2025-06-27 PROCEDURE — 97116 GAIT TRAINING THERAPY: CPT | Mod: CQ

## 2025-06-27 PROCEDURE — 25500020 PHARM REV CODE 255: Performed by: INTERNAL MEDICINE

## 2025-06-27 PROCEDURE — 25000003 PHARM REV CODE 250: Performed by: NURSE PRACTITIONER

## 2025-06-27 RX ORDER — ENOXAPARIN SODIUM 100 MG/ML
40 INJECTION SUBCUTANEOUS EVERY 24 HOURS
Status: DISCONTINUED | OUTPATIENT
Start: 2025-06-27 | End: 2025-07-15 | Stop reason: HOSPADM

## 2025-06-27 RX ORDER — LEVETIRACETAM 500 MG/1
500 TABLET ORAL 2 TIMES DAILY
Status: DISCONTINUED | OUTPATIENT
Start: 2025-06-27 | End: 2025-07-02

## 2025-06-27 RX ORDER — POTASSIUM CHLORIDE 20 MEQ/1
40 TABLET, EXTENDED RELEASE ORAL ONCE
Status: COMPLETED | OUTPATIENT
Start: 2025-06-27 | End: 2025-06-27

## 2025-06-27 RX ORDER — DIAZEPAM 5 MG/1
5 TABLET ORAL ONCE
Status: COMPLETED | OUTPATIENT
Start: 2025-06-27 | End: 2025-06-27

## 2025-06-27 RX ADMIN — POLYETHYLENE GLYCOL 3350 17 G: 17 POWDER, FOR SOLUTION ORAL at 08:06

## 2025-06-27 RX ADMIN — ENOXAPARIN SODIUM 40 MG: 40 INJECTION SUBCUTANEOUS at 05:06

## 2025-06-27 RX ADMIN — METOPROLOL SUCCINATE 12.5 MG: 25 TABLET, EXTENDED RELEASE ORAL at 09:06

## 2025-06-27 RX ADMIN — TRAZODONE HYDROCHLORIDE 25 MG: 50 TABLET ORAL at 08:06

## 2025-06-27 RX ADMIN — FAMOTIDINE 20 MG: 20 TABLET, FILM COATED ORAL at 08:06

## 2025-06-27 RX ADMIN — HYDROCORTISONE: 1 CREAM TOPICAL at 09:06

## 2025-06-27 RX ADMIN — BUSPIRONE HYDROCHLORIDE 15 MG: 5 TABLET ORAL at 08:06

## 2025-06-27 RX ADMIN — POTASSIUM CHLORIDE 40 MEQ: 1500 TABLET, EXTENDED RELEASE ORAL at 08:06

## 2025-06-27 RX ADMIN — LEVOTHYROXINE SODIUM 88 MCG: 0.09 TABLET ORAL at 06:06

## 2025-06-27 RX ADMIN — EZETIMIBE 10 MG: 10 TABLET ORAL at 08:06

## 2025-06-27 RX ADMIN — BUTALBITAL, ACETAMINOPHEN, AND CAFFEINE 1 TABLET: 325; 50; 40 TABLET ORAL at 01:06

## 2025-06-27 RX ADMIN — CEFEPIME 1 G: 1 INJECTION, POWDER, FOR SOLUTION INTRAMUSCULAR; INTRAVENOUS at 11:06

## 2025-06-27 RX ADMIN — DIAZEPAM 5 MG: 5 TABLET ORAL at 08:06

## 2025-06-27 RX ADMIN — BUTALBITAL, ACETAMINOPHEN, AND CAFFEINE 1 TABLET: 325; 50; 40 TABLET ORAL at 07:06

## 2025-06-27 RX ADMIN — VANCOMYCIN HYDROCHLORIDE 1000 MG: 1 INJECTION, POWDER, LYOPHILIZED, FOR SOLUTION INTRAVENOUS at 06:06

## 2025-06-27 RX ADMIN — NORTRIPTYLINE HYDROCHLORIDE 25 MG: 25 CAPSULE ORAL at 08:06

## 2025-06-27 RX ADMIN — HYDROCORTISONE: 1 CREAM TOPICAL at 08:06

## 2025-06-27 RX ADMIN — MUPIROCIN: 20 OINTMENT TOPICAL at 09:06

## 2025-06-27 RX ADMIN — ATORVASTATIN CALCIUM 80 MG: 40 TABLET, FILM COATED ORAL at 08:06

## 2025-06-27 RX ADMIN — CEFEPIME 1 G: 1 INJECTION, POWDER, FOR SOLUTION INTRAMUSCULAR; INTRAVENOUS at 05:06

## 2025-06-27 RX ADMIN — GADOBENATE DIMEGLUMINE 10 ML: 529 INJECTION, SOLUTION INTRAVENOUS at 10:06

## 2025-06-27 RX ADMIN — CEFEPIME 1 G: 1 INJECTION, POWDER, FOR SOLUTION INTRAMUSCULAR; INTRAVENOUS at 10:06

## 2025-06-27 RX ADMIN — BUTALBITAL, ACETAMINOPHEN, AND CAFFEINE 1 TABLET: 325; 50; 40 TABLET ORAL at 03:06

## 2025-06-27 RX ADMIN — BUTALBITAL, ACETAMINOPHEN, AND CAFFEINE 1 TABLET: 325; 50; 40 TABLET ORAL at 11:06

## 2025-06-27 RX ADMIN — OXYCODONE AND ACETAMINOPHEN 1 TABLET: 325; 10 TABLET ORAL at 08:06

## 2025-06-27 RX ADMIN — BUTALBITAL, ACETAMINOPHEN, AND CAFFEINE 1 TABLET: 325; 50; 40 TABLET ORAL at 08:06

## 2025-06-27 RX ADMIN — MUPIROCIN: 20 OINTMENT TOPICAL at 08:06

## 2025-06-27 RX ADMIN — LEVETIRACETAM 500 MG: 500 TABLET, FILM COATED ORAL at 08:06

## 2025-06-27 RX ADMIN — OXYCODONE AND ACETAMINOPHEN 1 TABLET: 325; 10 TABLET ORAL at 03:06

## 2025-06-27 NOTE — ACP (ADVANCE CARE PLANNING)
Advance Directives:   Living Will: No        Oral Declaration: No    [place]: No    Do Not Resuscitate Status: No    Medical Power of : No        Oral Declaration: No      Decision Making:  Patient answered questions  Goals of Care: The patient endorses that what is most important right now is to focus on spending time at home, avoiding the hospital, remaining as independent as possible, symptom/pain control, and curative/life-prolongation (regardless of treatment burdens)    Accordingly, we have decided that the best plan to meet the patient's goals includes continuing with treatment    Total time: 17 minutes

## 2025-06-27 NOTE — PROGRESS NOTES
POD#4 left craniectomy for evacuation of empyema   She is sitting up in bed, NAD  She endorses mild LEWIS  She denies blurred vision and N/V  She continues with right UE weakness    AFVSS  PERRL, EOMI  Alert, oriented to all spheres  Motor exam is unchanged  Incision c/d/I  Drain output 16/24 hrs    Plan: Continue drain  OK to leave incision open to air  OK for Q4 hour neuro exams  BP parameters below 140/90  HOB> 30  Keppra BID  Antibx per ID  PT/OT  SCDs for DVT prophylaxis; ok for lovenox  MRI brain pending

## 2025-06-27 NOTE — PLAN OF CARE
Problem: Adult Inpatient Plan of Care  Goal: Plan of Care Review  Outcome: Progressing  Goal: Patient-Specific Goal (Individualized)  Outcome: Progressing  Goal: Absence of Hospital-Acquired Illness or Injury  Outcome: Progressing  Goal: Optimal Comfort and Wellbeing  Outcome: Progressing  Goal: Readiness for Transition of Care  Outcome: Progressing     Problem: Diabetes Comorbidity  Goal: Blood Glucose Level Within Targeted Range  Outcome: Progressing     Problem: Wound  Goal: Optimal Coping  Outcome: Progressing  Goal: Optimal Functional Ability  Outcome: Progressing  Goal: Absence of Infection Signs and Symptoms  Outcome: Progressing  Goal: Improved Oral Intake  Outcome: Progressing  Goal: Optimal Pain Control and Function  Outcome: Progressing  Goal: Skin Health and Integrity  Outcome: Progressing  Goal: Optimal Wound Healing  Outcome: Progressing     Problem: Infection  Goal: Absence of Infection Signs and Symptoms  Outcome: Progressing     Problem: Comorbidity Management  Goal: Maintenance of Behavioral Health Symptom Control  Outcome: Progressing     Problem: Fall Injury Risk  Goal: Absence of Fall and Fall-Related Injury  Outcome: Progressing     Problem: Skin Injury Risk Increased  Goal: Skin Health and Integrity  Outcome: Progressing

## 2025-06-27 NOTE — PT/OT/SLP PROGRESS
Physical Therapy Treatment    Patient Name:  Chelle Chandra   MRN:  [id number]    Recommendations:     Discharge therapy intensity: High Intensity Therapy   Discharge Equipment Recommendations: to be determined by next level of care  Barriers to discharge: Impaired mobility, Ongoing medical needs, and placement    Assessment:     Chelle Chandra is a 59 y.o. female admitted with a medical diagnosis of R sided weakness and facial droop due to L SDH s/p craniotomy and MMA embolization. On 5/30 patient with subdural hemorrhage s/p L diony hole. Pt then with new R sided weakness and underwent a redo of diony hole for fluid evacuation/drain placement and a left cranioplasty for subdural fluid evacuation on 6/16. Now with ICH s/p craniectomy on 6/23.  She presents with the following impairments/functional limitations: weakness, gait instability, decreased upper extremity function, decreased lower extremity function, impaired balance, impaired endurance, impaired self care skills, impaired functional mobility, decreased safety awareness.    Pt sitting up in recliner with family visiting. Pt pleasant and agreeable to tx session. Noted improved ALEJANDRO and RLE foot clearance this date. Continues with R inattention and R lateral lean. Remains appropriate for high intensity therapy at d/c.      Rehab Prognosis: Good; patient would benefit from acute skilled PT services to address these deficits and reach maximum level of function.    Recent Surgery: Procedure(s) (LRB):  CRANIECTOMY (Left) 4 Days Post-Op    Plan:     During this hospitalization, patient would benefit from acute PT services 6 x/week to address the identified rehab impairments via gait training, therapeutic activities, therapeutic exercises, neuromuscular re-education and progress toward the following goals:    Plan of Care Expires:  07/24/25    Subjective     Chief Complaint: frequent BM's this morning   Patient/Family Comments/goals: Get better  Pain/Comfort:  Pain  Rating 1: 0/10      Objective:     Communicated with RN prior to session.  Patient found up in chair with peripheral IV, telemetry, Other (comments) (Helmet) upon PT entry to room.     General Precautions: Standard, fall, seizure, other (see comments) (BP<140/90)  Orthopedic Precautions: N/A  Braces: N/A (helmet when OOB), GiveMohr RUE  Respiratory Status: Room air  Blood Pressure: 131/79, HR 76  Skin Integrity: Visible skin intact    Functional Mobility:  Transfers:     Sit to Stand:  Min assist   Gait: 100' with HHA on L, frequent cues to lift head and decrease R lean, cues to scan into R visual field,  Mod to max assist to improve L lateral weight shifting, overall mod assist for balance. Initially pt demonstrated scissoring and NOB however Improved coordination and ALEJANDRO after ~25'.     Donned resting hand splint at conclusion.   Co-Treatment: No    Education:  Patient and daughter/s were provided with verbal education education regarding PT role/goals/POC, fall prevention, safety awareness, and home exercise program.  Understanding was verbalized.     Patient left up in chair with all lines intact, call button in reach, RN notified, and daughter present    DME Justification:  No DME recommended requiring DME justifications    GOALS:   Multidisciplinary Problems       Physical Therapy Goals          Problem: Physical Therapy    Goal Priority Disciplines Outcome Interventions   Physical Therapy Goal     PT, PT/OT Progressing    Description: Goals to be met by: 25     Patient will increase functional independence with mobility by performin. Supine to sit with Modified [place]  2. Sit to supine with Modified [place]  3. Sit to stand transfer with Modified [place]  4. Bed to chair transfer with Modified [place] using Rolling Walker vs QC  5. Gait  x 200 feet with Modified [place] using Rolling Walker vs QC.                          Time Tracking:     PT Received On: 25  PT  Start Time: 1049     PT Stop Time: 1120  PT Total Time (min): 31 min     Billable Minutes: Gait Training 2 units    Treatment Type: Treatment  PT/PTA: PTA     Number of PTA visits since last PT visit: 3     06/27/2025

## 2025-06-27 NOTE — PROGRESS NOTES
Inpatient Nutrition Assessment    Admit Date: 6/11/2025   Total duration of encounter: 16 days   Patient Age: 59 y.o.    Nutrition Recommendation/Prescription     Continue current diet (Diet Adult Regular Moderately Thick Liquids (IDDSI Level 3)) as tolerated. Texture per SLP.   Continue Magic Cup (provides 190 kcal, 9 g protein per serving)   Monitor PO intakes, labs, and wt changes    Communication of Recommendations: reviewed with patient    Nutrition Assessment     Malnutrition Assessment/Nutrition-Focused Physical Exam    Malnutrition Context: acute illness or injury (06/18/25 1545)  Malnutrition Level: severe (06/18/25 1545)  Energy Intake (Malnutrition): other (see comments) (Does not meet criteria) (06/18/25 1545)  Weight Loss (Malnutrition): greater than 5% in 1 month (06/18/25 1545)  Subcutaneous Fat (Malnutrition): moderate depletion (06/18/25 1545)  Orbital Region (Subcutaneous Fat Loss): moderate depletion        Muscle Mass (Malnutrition): moderate depletion (06/18/25 1545)  [place] Region (Muscle Loss): moderate depletion  Clavicle Bone Region (Muscle Loss): moderate depletion                    Fluid Accumulation (Malnutrition): other (see comments) (Not present) (06/18/25 1545)     Hand  Strength, Right (Malnutrition): Unable to assess (06/18/25 1545)  A minimum of two characteristics is recommended for diagnosis of either severe or non-severe malnutrition.    Chart Review    Reason Seen: length of stay and follow-up    Malnutrition Screening Tool Results   Have you recently lost weight without trying?: Yes: 2-13 lbs  Have you been eating poorly because of a decreased appetite?: No   MST Score: 1   Diagnosis:  Increased subdural fluid collection Status post left redo diony hole with fluid evacuation, drain placement 6/16  Suspected surgical site infection status post cranioplasty 6/16  Subdural hematoma requiring craniotomy with evacuation and MMA embolization early May with recurrence late May  requiring diony hole  Right-sided weakness/intractable headache secondary to above    Relevant Medical History:   hypothyroidism, anxiety, bronchial asthma, COPD, type 2 diabetes mellitus, 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, 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, GERD     Scheduled Medications:  atorvastatin, 80 mg, Daily  busPIRone, 15 mg, BID  ceFEPime IV (PEDS and ADULTS), 1 g, Q6H  ezetimibe, 10 mg, Daily  famotidine, 20 mg, BID  hydrocortisone, , BID  levETIRAcetam, 500 mg, BID  levothyroxine, 88 mcg, Before breakfast  metoprolol succinate, 12.5 mg, Daily  mupirocin, , BID  nortriptyline, 25 mg, QHS  polyethylene glycol, 17 g, BID  traZODone, 25 mg, QHS    Continuous Infusions:     PRN Medications:  acetaminophen, 650 mg, Q4H PRN  acetaminophen, 650 mg, Q4H PRN  bisacodyL, 10 mg, Daily PRN  butalbital-acetaminophen-caffeine -40 mg, 1 tablet, Q4H PRN  dextrose 50%, 12.5 g, PRN  dextrose 50%, 25 g, PRN  diphenhydrAMINE, 25 mg, Q6H PRN  glucagon (human recombinant), 1 mg, PRN  glucose, 16 g, PRN  glucose, 24 g, PRN  hydrALAZINE, 10 mg, Q4H PRN  insulin aspart U-100, 0-5 Units, QID (AC + HS) PRN  labetalol, 10 mg, Q4H PRN  melatonin, 6 mg, Nightly PRN  morphine, 2 mg, Q4H PRN  naloxone, 0.02 mg, PRN  ondansetron, 4 mg, Q8H PRN  oxyCODONE-acetaminophen, 1 tablet, Q4H PRN  prochlorperazine, 5 mg, Q6H PRN  sodium chloride 0.9%, 10 mL, PRN  sodium chloride 0.9%, 10 mL, Q12H PRN  sodium chloride 0.9%, 3 mL, Q12H PRN    Calorie Containing IV Medications: no significant kcals from medications at this time    Recent Labs   Lab 06/22/25  1049 06/23/25  1023 06/24/25  0654 06/26/25  0722 06/27/25  0420   NA  --  138  --   --  142   K  --  3.6  --   --  3.3*   CALCIUM  --  9.1  --   --  8.5   PHOS  --   --   --   --  3.4   MG  --   --   --   --  1.60   CL  --  106  --   --  109*   CO2  --  25  --   --  26   BUN  --  6.2*  --   --  3.5*   CREATININE 0.72 0.55  --  0.53* 0.56   EGFRNORACEVR >60 >60  --  >60 >60   GLU  --  219*  --   --  144*  "  CRP  --   --  16.30*  --   --    WBC  --  6.14  --   --  6.91   HGB  --  10.6*  --   --  9.6*   HCT  --  32.7*  --   --  29.4*     Nutrition Orders:  Diet Adult Regular Moderately Thick Liquids (IDDSI Level 3)  Dietary nutrition supplements TID; Magic Cup - Any flavor    Appetite/Oral Intake: fair/25-50% of meals  Factors Affecting Nutritional Intake: none identified  Social Needs Impacting Access to Food: none identified  Food/Gnosticist/Cultural Preferences: none reported  Food Allergies: no known food allergies  Last Bowel Movement: 06/24/25  Wound(s):  scalp and temporal incisions noted.     Comments    6/18/25: Spoke to family members at bedside who report pt's appetite has been fluctuating since admit depending on her pain levels. PO intake varies between %. Pt ate 75% of her breakfast this morning but did not eat much of her lunch. Family reports UBW of 112 lbs, last weighing that about 1 month ago. Family reports wt at admit was about 100 lbs which would indicate a 10% wt loss x1 month, nutritionally significant. Most recent bedscale wt from 6/12 is 110 lbs, will monitor for accuracy/trends.     6/20/25: Patient reports good oral intake of meals. Denies any nausea, vomiting, or diarrhea. Constipation noted.     6/24/25: Pt feeling "off" this AM and didn't eat as much. Still on mod thick liquids. Will send Magic Cup for added kcal and protein.    6/27/25: Pt reports fair appetite, does not eat much for breakfast but eats close to 50% of lunch and dinner. Pt states her family has been bringing in outside meals for dinner, she is looking forward to getting some ribs/steak. Pt consuming 1-2 Magic Cup supplements per day.      Anthropometrics    Height: 5' 6" (167.6 cm), Height Method: Measured  Last Weight: 49.9 kg (110 lb) (06/12/25 0223), Weight Method: Bed Scale  BMI (Calculated): 17.8  BMI Classification: underweight (BMI less than 18.5)     Ideal Body Weight (IBW), Female: 130 lb     % Ideal Body " Weight, Female (lb): 84.62 %                             Usual Weight Provided By: patient    Wt Readings from Last 5 Encounters:   06/12/25 49.9 kg (110 lb)   06/06/25 46.9 kg (103 lb 6.3 oz)   06/03/25 46.3 kg (102 lb)   05/15/25 49 kg (108 lb 0.4 oz)   05/06/25 50.8 kg (112 lb)     Weight Change(s) Since Admission:   Wt Readings from Last 1 Encounters:   06/12/25 0223 49.9 kg (110 lb)   06/11/25 1314 46.7 kg (103 lb)   Admit Weight: 46.7 kg (103 lb) (06/11/25 1314), Weight Method: Stated    Estimated Needs    Weight Used For Calorie Calculations: 49.9 kg (110 lb 0.2 oz)  Energy Calorie Requirements (kcal): 8742-8853 kcals (30-35 kcal/kg)  Energy Need Method: Kcal/kg  Weight Used For Protein Calculations: 49.9 kg (110 lb 0.2 oz)  Protein Requirements: 60-75 g (1.2-1.5 g/kg)  Fluid Requirements (mL): 1497 mL (30 mL/kg)  CHO Requirement: N/A     Enteral Nutrition     Patient not receiving enteral nutrition at this time.    Parenteral Nutrition     Patient not receiving parenteral nutrition support at this time.    Evaluation of Received Nutrient Intake    Calories: meeting estimated needs  Protein: meeting estimated needs    Patient Education     Not applicable.    Nutrition Diagnosis     PES: Inadequate energy intake related to acute illness as evidenced by meeting <75% EEN. (resolved)     PES: Severe acute disease or injury related malnutrition Related to  As Evidenced by:  - weight loss: > 5% in 1 month - muscle mass depletion: 2 areas of moderate muscle loss (Clavicle, Temporalis) - loss of subcutaneous fat: 2 areas of moderate fat loss (Infraorbital, Buccal) active    Nutrition Interventions     Intervention(s): modified composition of meals/snacks and collaboration with other providers  Intervention(s): Oral diet/nutrient modifications    Goal: Meet greater than 80% of nutritional needs by follow-up. (goal progressing)  Goal: Maintain weight throughout hospitalization. (goal progressing)    Nutrition Goals &  Monitoring     Dietitian will monitor: energy intake, weight, and gastrointestinal profile  Discharge planning: Consistent Carbohydrate diet with texture modifications per SLP  Nutrition Risk/Follow-Up: patient at increased nutrition risk; dietitian will follow-up twice weekly   Please consult if re-assessment needed sooner.

## 2025-06-27 NOTE — PT/OT/SLP PROGRESS
Occupational Therapy   Treatment    Name: Chelle Chandra  MRN: [id number]    Recommendations:     Recommended therapy intensity at discharge: High Intensity Therapy   Discharge Equipment Recommendations:  to be determined by next level of care  Barriers to discharge:  None    Assessment:     Chelle Chandra is a 59 y.o. female with a medical diagnosis of  R sided weakness and facial droop due to L SDH s/p craniotomy and MMA embolization. On 5/30 patient with subdural hemorrhage s/p L diony hole. Pt then with new R sided weakness and underwent a redo of diony hole for fluid evacuation/drain placement and a left cranioplasty for subdural fluid evacuation on 6/16. Now with ICH s/p craniectomy on 6/23. She presents with good motivation for therapy. Performance deficits affecting function are weakness, impaired endurance, impaired sensation, impaired self care skills, impaired functional mobility, gait instability, decreased upper extremity function, decreased lower extremity function, decreased safety awareness, pain.     Pt tolerated treatment well. Pt engaged in RUE AAROM, AROM, and PROM exercises and PROM stretch to facilitate movement and strength in RUE. Mirror therapy and WBing used to facilitate movement of RUE for functional tasks. Pt responded to interventions well, requires cues and assistance for maintaining neutral sitting posture. Pt presents with motivation and progress towards OT goals. Remains appropriate for high intensity therapy at d/c.     DME Justification: No DME recommended requiring DME justifications    Rehab Prognosis:  Good; patient would benefit from acute skilled OT services to address these deficits and reach maximum level of function.       Plan:     Patient to be seen 6 x/week to address the above listed problems via self-care/home management, therapeutic activities, therapeutic exercises, neuromuscular re-education, sensory integration  Plan of Care Expires: 07/17/25  Plan of Care  Reviewed with: patient    Subjective     Pain/Comfort:  Pain Rating 1:  (pain when donning/doffing helmet)  Location - Side 1: Left  Location 1: head    Objective:     Communicated with: nurse prior to session.  Patient found HOB elevated with peripheral IV, JOHNATHAN drain, telemetry upon OT entry to room.    General Precautions: Standard, fall, seizure    Orthopedic Precautions:   Braces:  (helmet)  Respiratory Status: Room air     Occupational Performance:     Functional Mobility/Transfers:  Bed mobility:    Scooting: minimum assistance  Supine to Sit: minimum assistance  Sit to Supine: minimum assistance  Assistance with RLE to get in/out of bed.     Balance:   Static Sitting Balance: One UE support: Fair: Patient able to maintain balance with handhold support; may require occasional minimal assistance.  Dynamic Sitting Balance: One UE support: Fair: Patient accepts minimal challenge; able to maintain balance while turning head/trunk.    Neuromuscular re-education:   Pt seated EOB with full body mirror to assist pt in recognizing R lateral lean. Pt required cues to recognize self in mirror, however returns to midline on own.   Pt WB through RUE while seated EOB to facilitate muscle stimulation/strength  Pt used table top mirror therapy box to facilitate movement of RUE. Pt performed AROM exercises of LUE in mirror, while watching movements.   Explanation of mirror therapy provided to pt, requires cues to continuously direct attention to mirror for most effective engagement.   Therapeutic Exercise:  Pt performed:  AAROM for shoulder shrugs x5  PROM stretch of shoulder flexion and abduction with hold x2  AAROM shoulder protraction table glides x5  AROM shoulder retraction x5  PROM using LUE to bring RUE into shoulder flexion x5  Pt presents with fatigue at end of exercises.     Therapeutic Positioning    OT interventions performed during the course of today's session in an effort to prevent and/or reduce acquired  pressure injuries:   Education was provided on benefits of and recommendations for therapeutic positioning  Therapeutic positioning was provided at the conclusion of session for contracture prevention    [facility] 6 Click ADL: 17    Co-Treatment: No    Patient Education:  Patient and daughter/s were provided with verbal education and demonstrations education regarding OT role/goals/POC, fall prevention, and safety awareness.  Understanding was verbalized, however additional teaching warranted.      Patient left HOB elevated with all lines intact, call button in reach, and family present.    GOALS:   Multidisciplinary Problems       Occupational Therapy Goals          Problem: Occupational Therapy    Goal Priority Disciplines Outcome Interventions   Occupational Therapy Goal     OT, PT/OT Progressing    Description: Goals: to be met by 07/14/25    Pt will complete grooming standing at sink with LRAD with CGA.  Pt will complete UB dressing with SBA.  Pt will complete LB dressing with SBA using LRAD.  Pt will complete toileting with CGA using LRAD.  Pt will complete functional mobility to/from toilet and toilet transfer with mod I using LRAD.   Pt will demo visual attention to R side >80% of time with min verbal cues.  Pt will demo RUE strength of 3-/5 for participation in ADLs.                       Time Tracking:     OT Date of Treatment: 06/27/25  OT Start Time: 1514  OT Stop Time: 1540  OT Total Time (min): 26 min    Billable Minutes:Therapeutic Exercise 1 unit  Neuromuscular Re-education 1 unit    OTR/L readily available for conference at the time of the provision of services: LISA Mallory  OT/EDILSON: OT     Number of EDILSON visits since last OT visit: 3    6/27/2025

## 2025-06-27 NOTE — PROGRESS NOTES
[facility] Hospital Medicine Progress Note        Chief Complaint: Inpatient Follow-up     HPI:   59-year-old  female with significant history of hypothyroidism, anxiety, bronchial asthma, COPD, type 2 diabetes mellitus, 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, 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, and GERD. Patient recently had subdural hematoma following a fall in May requiring intracranial embolization, craniotomy with hematoma evacuation. After this patient was discharged home. Late May she presented back to the ED with headache, right-sided weakness, facial droop and imaging was concerning for left MCA diminished flow and subacute hematoma. MRI brain was negative for CVA. Neurosurgery performed diony hole for drainage of subdural hematoma due to increased intracranial pressure with postop CT showing definitive improvement patient was on Plavix post embolization which was held briefly and resumed per Neurosurgery on 06/05 and she was transferred to rehab on 06/06. While in rehab patient was noted to have increase right upper extremity weakness and also significant headaches. CT head revealed reaccumulation of subdural fluid collection, increased from before patient was sent back to main [place] for further evaluation/neurosurgery services. Neurosurgery evaluated, planning for  shunt this hospitalization, home meds resumed as appropriate except for Plavix, symptomatic management for headache.  shunt scheduled for 6/16. Patient underwent redo diony hole, fluid evacuation and drain placement, concern for surgical site infection and therefore underwent cranioplasty. Infectious Disease consulted and patient was placed on cefepime, vancomycin and Flagyl pending intraoperative cultures.  Intraoperative cultures growing staph epi.  ID recommending IV vancomycin, IV Rocephin until 7/3.  Neurosurgery wished to monitor patient for another day until 6/21.  PT/OT on board; recommending high-intensity therapy.   Speech therapy recommending regular diet with moderately thick liquids.  CM on board for placement.     On 6/23 discharge to rehab was planned but Pt developed RUE weakness and Ct head showed increasing size of extra-axial fluid collections along cerebral convexity with increasing mass effect & increase parenchymal edema in L posterior frontal lobe. Pt was taken back to OR and underwent left craniotomy and evacuation of empyema. Post operatively admitted to ICU. Intraoperative culture from 6/23 grew  staph epi and rare Pseudomonas stutzeri.  ID suggested to stop Vancomycin and continue Cefepime as Staph epi is oxacillin sensitive and Cefepime will cover both Pseudomonas and MSSE with total course at least 6 to 8 weeks. Pt's care downgraded back to  service on 6/25/25.         Interval Hx:   No acute events reported overnight   Downgraded back to medical floor yesterday   Afebrile. Hemodynamics are stable, on RA  No am labs today.  Awake, alert, remains with RUE weakness   Neurosurgery ordered MRI brain       Case was discussed with patient's nurse and  on the floor.    Objective/physical exam:  General: In no acute distress, afebrile  Chest: Clear to auscultation bilaterally  Heart: RRR, +S1, S2, no appreciable murmur  Abdomen: Soft, nontender, BS +  MSK: Warm, no lower extremity edema, no clubbing or cyanosis  Neurologic: Alert and oriented x4, RUE weakness    VITAL SIGNS: 24 HRS MIN & MAX LAST   Temp  Min: 97.6 °F (36.4 °C)  Max: 98.6 °F (37 °C) 98.2 °F (36.8 °C)   BP  Min: 112/73  Max: 138/75 112/73   Pulse  Min: 70  Max: 84  84   Resp  Min: 10  Max: 18 18   SpO2  Min: 95 %  Max: 100 % 95 %     I have reviewed the following labs:  Recent Labs   Lab 06/23/25  1023   WBC 6.14   RBC 3.56*   HGB 10.6*   HCT 32.7*   MCV 91.9   MCH 29.8   MCHC 32.4*   RDW 14.4      MPV 9.9     Recent Labs   Lab 06/22/25  1049 06/23/25  1023 06/26/25  0722   NA  --  138  --    K  --  3.6  --    CL  --  106  --     CO2  --  25  --    BUN  --  6.2*  --    CREATININE 0.72 0.55 0.53*   GLU  --  219*  --    CALCIUM  --  9.1  --      Microbiology Results (last 7 days)       Procedure Component Value Units Date/Time    Tissue Culture - Aerobic [[id number]]  (Abnormal) Collected: 06/23/25 1313    Order Status: Completed Specimen: Tissue from Brain Updated: 06/26/25 1252     Tissue - Aerobic Culture Staphylococcus epidermidis     Comment: Isolated from Thio only       Tissue Culture - Aerobic [[id number]]  (Abnormal) Collected: 06/23/25 1311    Order Status: Completed Specimen: Tissue from Brain Updated: 06/26/25 1243     Tissue - Aerobic Culture Rare Pseudomonas stutzeri    Tissue Culture - Aerobic [[id number]] Collected: 06/23/25 1314    Order Status: Completed Specimen: Tissue from Brain Updated: 06/26/25 1010     Tissue - Aerobic Culture No Growth At 72 Hours    Anaerobic Culture [[id number]] Collected: 06/23/25 1314    Order Status: Completed Specimen: Tissue from Brain Updated: 06/26/25 0907     Anaerobe Culture No Anaerobes Isolated    Anaerobic Culture [[id number]] Collected: 06/23/25 1313    Order Status: Completed Specimen: Tissue from Brain Updated: 06/26/25 0907     Anaerobe Culture No Anaerobes Isolated    Anaerobic Culture [[id number]] Collected: 06/23/25 1311    Order Status: Completed Specimen: Tissue from Brain Updated: 06/26/25 0904     Anaerobe Culture No Anaerobes Isolated    Gram Stain [[id number]] Collected: 06/23/25 1311    Order Status: Completed Specimen: Tissue from Brain Updated: 06/24/25 0707     GRAM STAIN Many WBC observed      No bacteria seen    Gram Stain [[id number]] Collected: 06/23/25 1313    Order Status: Completed Specimen: Tissue from Brain Updated: 06/24/25 0706     GRAM STAIN Few WBC observed      Rare Gram positive cocci    Gram Stain [[id number]] Collected: 06/23/25 1314    Order Status: Completed Specimen: Tissue from Brain Updated: 06/24/25 0705     GRAM STAIN Few WBC observed      Few  Gram positive cocci    Fungal Culture [[id number]] Collected: 06/23/25 1314    Order Status: Sent Specimen: Tissue from Brain Updated: 06/23/25 1326    Fungal Culture [[id number]] Collected: 06/23/25 1313    Order Status: Sent Specimen: Tissue from Brain Updated: 06/23/25 1326    Fungal Culture [[id number]] Collected: 06/23/25 1311    Order Status: Sent Specimen: Tissue from Brain Updated: 06/23/25 1326             See below for Radiology    Assessment/Plan:  Left Subdural empyema - 6/23/25 s/p left craniotomy and evacuation of empyema with tissue culture positive for staph epidermidis and Pseudomonas stutzeri  Traumatic Subdural hematoma , s/p left frontoparietal craniotomy and evacuation on 5/19/25   Chronic left SDH with new onset headache, Rt sided weakness and facial droop, s/p left diony hole on 6/2/25  Post op Increased subdural fluid collection, s/p left redo diony hole with fluid evacuation, drain placement on 6/16  Surgical site infection status post cranioplasty on 6/16 with tissue culture positive for Staph epidermidis     Hx- 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, 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,Depression/anxiety ,HLD, Hypothyroidism, Essential HTN, Type 2 diabetes mellitus    Plan-   ID evaluated the case and suggested to stop Vancomycin and continue Cefepime 1 gram q6h until final sensitivity is known with anticipated antibiotic treatment for at least 6 to 8 weeks .   MRI brain ordered per Neurosurgery   Continue [facility] for VTE prophylaxis for now   Neuro check q2h  PT/OT consult     VTE prophylaxis: SCDs    Patient condition:  Fair    Anticipated discharge and Disposition:     TBD    All diagnosis and differential diagnosis have been reviewed; assessment and plan has been documented; I have personally reviewed the labs and test results that are presently available; I have reviewed the patients medication list; I have reviewed the consulting providers response and recommendations. I have reviewed or attempted to  review medical records based upon their availability    All of the patient's questions have been  addressed and answered. Patient's is agreeable to the above stated plan. I will continue to monitor closely and make adjustments to medical management as needed.    Portions of this note dictated using EMR integrated voice recognition software, and may be subject to voice recognition errors not corrected at proofreading. Please contact writer for clarification if needed.   _____________________________________________________________________    Malnutrition Status:  Nutrition consulted. Most recent weight and BMI monitored-     Measurements:  Wt Readings from Last 1 Encounters:   06/12/25 49.9 kg (110 lb)   Body mass index is 17.75 kg/m².    Patient has been screened and assessed by RD.    Malnutrition Type:  Context: acute illness or injury  Level: severe    Malnutrition Characteristic Summary:  Weight Loss (Malnutrition): greater than 5% in 1 month  Energy Intake (Malnutrition): other (see comments) (Does not meet criteria)  Subcutaneous Fat (Malnutrition): moderate depletion  Muscle Mass (Malnutrition): moderate depletion  Fluid Accumulation (Malnutrition): other (see comments) (Not present)  Hand  Strength, Right (Malnutrition): Unable to assess    Interventions/Recommendations (treatment strategy):  Oral diet/nutrient modifications     Scheduled Med:   atorvastatin  80 mg Oral Daily    busPIRone  15 mg Oral BID    ceFEPime IV (PEDS and ADULTS)  1 g Intravenous Q6H    ezetimibe  10 mg Oral Daily    famotidine  20 mg Oral BID    hydrocortisone   Topical (Top) BID    levetiracetam  500 mg Oral BID    levothyroxine  88 mcg Oral Before breakfast    metoprolol succinate  12.5 mg Oral Daily    mupirocin   Nasal BID    nortriptyline  25 mg Oral QHS    polyethylene glycol  17 g Oral BID    traZODone  25 mg Oral QHS    [START ON 6/27/2025] vancomycin (VANCOCIN) 1,000 mg in D5W 250 mL IVPB (admixture device)  1,000 mg  Intravenous Q12H      Continuous Infusions:   0.9% NaCl   Intravenous Continuous   Stopped at 06/25/25 1249      PRN Meds:    Current Facility-Administered Medications:     acetaminophen, 650 mg, Rectal, Q4H PRN    acetaminophen, 650 mg, Oral, Q4H PRN    bisacodyL, 10 mg, Rectal, Daily PRN    butalbital-acetaminophen-caffeine -40 mg, 1 tablet, Oral, Q4H PRN    dextrose 50%, 12.5 g, Intravenous, PRN    dextrose 50%, 25 g, Intravenous, PRN    diphenhydrAMINE, 25 mg, Oral, Q6H PRN    glucagon (human recombinant), 1 mg, Intramuscular, PRN    glucose, 16 g, Oral, PRN    glucose, 24 g, Oral, PRN    hydrALAZINE, 10 mg, Intravenous, Q4H PRN    insulin aspart U-100, 0-5 Units, Subcutaneous, QID (AC + HS) PRN    labetalol, 10 mg, Intravenous, Q4H PRN    melatonin, 6 mg, Oral, Nightly PRN    morphine, 2 mg, Intravenous, Q4H PRN    naloxone, 0.02 mg, Intravenous, PRN    ondansetron, 4 mg, Intravenous, Q8H PRN    oxyCODONE-acetaminophen, 1 tablet, Oral, Q4H PRN    prochlorperazine, 5 mg, Intravenous, Q6H PRN    sodium chloride 0.9%, 10 mL, Intravenous, PRN    Flushing PICC/Midline Protocol, , , Until Discontinued **AND** sodium chloride 0.9%, 10 mL, Intravenous, Q12H PRN    sodium chloride 0.9%, 3 mL, Intravenous, Q12H PRN    Pharmacy to dose Vancomycin consult, , , Once **AND** vancomycin - pharmacy to dose, , Intravenous, pharmacy to manage frequency         Argentina Jerome MD  Department of Hospital Medicine   [facility]   06/26/2025

## 2025-06-28 LAB
BACTERIA TISS AEROBE CULT: NO GROWTH
POCT GLUCOSE: 139 MG/DL (ref 70–110)
POCT GLUCOSE: 168 MG/DL (ref 70–110)
POCT GLUCOSE: 175 MG/DL (ref 70–110)
POCT GLUCOSE: 231 MG/DL (ref 70–110)

## 2025-06-28 PROCEDURE — 25000003 PHARM REV CODE 250: Performed by: INTERNAL MEDICINE

## 2025-06-28 PROCEDURE — 97116 GAIT TRAINING THERAPY: CPT | Mod: CQ

## 2025-06-28 PROCEDURE — 63600175 PHARM REV CODE 636 W HCPCS

## 2025-06-28 PROCEDURE — 97530 THERAPEUTIC ACTIVITIES: CPT | Mod: CQ

## 2025-06-28 PROCEDURE — 21400001 HC TELEMETRY ROOM

## 2025-06-28 PROCEDURE — 63600175 PHARM REV CODE 636 W HCPCS: Performed by: INTERNAL MEDICINE

## 2025-06-28 PROCEDURE — 11000001 HC ACUTE MED/SURG PRIVATE ROOM

## 2025-06-28 RX ADMIN — OXYCODONE AND ACETAMINOPHEN 1 TABLET: 325; 10 TABLET ORAL at 04:06

## 2025-06-28 RX ADMIN — LEVOTHYROXINE SODIUM 88 MCG: 0.09 TABLET ORAL at 04:06

## 2025-06-28 RX ADMIN — HYDROCORTISONE: 1 CREAM TOPICAL at 09:06

## 2025-06-28 RX ADMIN — ACETAMINOPHEN 650 MG: 325 TABLET ORAL at 05:06

## 2025-06-28 RX ADMIN — EZETIMIBE 10 MG: 10 TABLET ORAL at 09:06

## 2025-06-28 RX ADMIN — CEFEPIME 1 G: 1 INJECTION, POWDER, FOR SOLUTION INTRAMUSCULAR; INTRAVENOUS at 04:06

## 2025-06-28 RX ADMIN — CEFEPIME 1 G: 1 INJECTION, POWDER, FOR SOLUTION INTRAMUSCULAR; INTRAVENOUS at 05:06

## 2025-06-28 RX ADMIN — CEFEPIME 1 G: 1 INJECTION, POWDER, FOR SOLUTION INTRAMUSCULAR; INTRAVENOUS at 10:06

## 2025-06-28 RX ADMIN — METOPROLOL SUCCINATE 12.5 MG: 25 TABLET, EXTENDED RELEASE ORAL at 09:06

## 2025-06-28 RX ADMIN — ACETAMINOPHEN 650 MG: 325 TABLET ORAL at 08:06

## 2025-06-28 RX ADMIN — CEFEPIME 1 G: 1 INJECTION, POWDER, FOR SOLUTION INTRAMUSCULAR; INTRAVENOUS at 01:06

## 2025-06-28 RX ADMIN — MUPIROCIN: 20 OINTMENT TOPICAL at 09:06

## 2025-06-28 RX ADMIN — TRAZODONE HYDROCHLORIDE 25 MG: 50 TABLET ORAL at 08:06

## 2025-06-28 RX ADMIN — HYDROCORTISONE: 1 CREAM TOPICAL at 08:06

## 2025-06-28 RX ADMIN — NORTRIPTYLINE HYDROCHLORIDE 25 MG: 25 CAPSULE ORAL at 08:06

## 2025-06-28 RX ADMIN — FAMOTIDINE 20 MG: 20 TABLET, FILM COATED ORAL at 09:06

## 2025-06-28 RX ADMIN — OXYCODONE AND ACETAMINOPHEN 1 TABLET: 325; 10 TABLET ORAL at 09:06

## 2025-06-28 RX ADMIN — ACETAMINOPHEN 650 MG: 325 TABLET ORAL at 01:06

## 2025-06-28 RX ADMIN — BUSPIRONE HYDROCHLORIDE 15 MG: 5 TABLET ORAL at 08:06

## 2025-06-28 RX ADMIN — FAMOTIDINE 20 MG: 20 TABLET, FILM COATED ORAL at 08:06

## 2025-06-28 RX ADMIN — ENOXAPARIN SODIUM 40 MG: 40 INJECTION SUBCUTANEOUS at 05:06

## 2025-06-28 RX ADMIN — LEVETIRACETAM 500 MG: 500 TABLET, FILM COATED ORAL at 09:06

## 2025-06-28 RX ADMIN — ATORVASTATIN CALCIUM 80 MG: 40 TABLET, FILM COATED ORAL at 09:06

## 2025-06-28 RX ADMIN — LEVETIRACETAM 500 MG: 500 TABLET, FILM COATED ORAL at 08:06

## 2025-06-28 RX ADMIN — BUSPIRONE HYDROCHLORIDE 15 MG: 5 TABLET ORAL at 09:06

## 2025-06-28 RX ADMIN — POLYETHYLENE GLYCOL 3350 17 G: 17 POWDER, FOR SOLUTION ORAL at 08:06

## 2025-06-28 NOTE — PROGRESS NOTES
[facility] Hospital Medicine Progress Note        Chief Complaint: Inpatient Follow-up    HPI:   59-year-old  female with significant history of hypothyroidism, anxiety, bronchial asthma, COPD, type 2 diabetes mellitus, 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, 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, and GERD. Patient recently had subdural hematoma following a fall in May requiring intracranial embolization, craniotomy with hematoma evacuation. After this patient was discharged home. Late May she presented back to the ED with headache, right-sided weakness, facial droop and imaging was concerning for left MCA diminished flow and subacute hematoma. MRI brain was negative for CVA. Neurosurgery performed diony hole for drainage of subdural hematoma due to increased intracranial pressure with postop CT showing definitive improvement patient was on Plavix post embolization which was held briefly and resumed per Neurosurgery on 06/05 and she was transferred to rehab on 06/06. While in rehab patient was noted to have increase right upper extremity weakness and also significant headaches. CT head revealed reaccumulation of subdural fluid collection, increased from before patient was sent back to main [place] for further evaluation/neurosurgery services. Neurosurgery evaluated, planning for  shunt this hospitalization, home meds resumed as appropriate except for Plavix, symptomatic management for headache.  shunt scheduled for 6/16. Patient underwent redo diony hole, fluid evacuation and drain placement, concern for surgical site infection and therefore underwent cranioplasty. Infectious Disease consulted and patient was placed on cefepime, vancomycin and Flagyl pending intraoperative cultures.  Intraoperative cultures growing staph epi.  ID recommending IV vancomycin, IV Rocephin until 7/3.  Neurosurgery wished to monitor patient for another day until 6/21.  PT/OT on board; recommending high-intensity therapy.   Speech therapy recommending regular diet with moderately thick liquids.  CM on board for placement.      On 6/23 discharge to rehab was planned but Pt developed RUE weakness and Ct head showed increasing size of extra-axial fluid collections along cerebral convexity with increasing mass effect & increase parenchymal edema in L posterior frontal lobe. Pt was taken back to OR and underwent left craniotomy and evacuation of empyema. Post operatively admitted to ICU. Intraoperative culture from 6/23 grew  staph epi and rare Pseudomonas stutzeri.  ID suggested to stop Vancomycin and continue Cefepime as Staph epi is oxacillin sensitive and Cefepime will cover both Pseudomonas and MSSE with total course at least 6 to 8 weeks. Pt's care downgraded back to  service on 6/25/25.    ID requested sensitivity testing for Pseudomonas stutzeri, however per hospital microbiology their instrument was not able to run sensitivities, and they are sending the sample to [place] for further testing. ID recommended to continue Cefepime at this time.     Pt remains with dense weakness to RUE. MRI brain on 6/27/25 showed no acute stroke but noted post surgical left dural thickening and enhancement, left cerebral encephalomalacia with extensive gliotic changes.     Interval Hx:   No acute events reported.  Remains with Right sided weakness more weakness to RUE.   Vitals are stable.  Case was discussed with patient's nurse and  on the floor.    Objective/physical exam:  General: In no acute distress, afebrile  Chest: Clear to auscultation bilaterally  Heart: RRR, +S1, S2, no appreciable murmur  Abdomen: Soft, nontender, BS +  MSK: Warm, no lower extremity edema, no clubbing or cyanosis  Neurologic: Alert and oriented x3, Rt sided weakness      VITAL SIGNS: 24 HRS MIN & MAX LAST   Temp  Min: 97.8 °F (36.6 °C)  Max: 99 °F (37.2 °C) 98 °F (36.7 °C)   BP  Min: 114/73  Max: 136/86 120/66   Pulse  Min: 66  Max: 86  75   Resp  Min: 16   Max: 18 16   SpO2  Min: 94 %  Max: 97 % (!) 94 %     I have reviewed the following labs:  Recent Labs   Lab 06/23/25  1023 06/27/25  0420   WBC 6.14 6.91   RBC 3.56* 3.22*   HGB 10.6* 9.6*   HCT 32.7* 29.4*   MCV 91.9 91.3   MCH 29.8 29.8   MCHC 32.4* 32.7*   RDW 14.4 14.6    405*   MPV 9.9 9.4     Recent Labs   Lab 06/23/25  1023 06/26/25  0722 06/27/25  0420     --  142   K 3.6  --  3.3*     --  109*   CO2 25  --  26   BUN 6.2*  --  3.5*   CREATININE 0.55 0.53* 0.56   *  --  144*   CALCIUM 9.1  --  8.5   MG  --   --  1.60     Microbiology Results (last 7 days)       Procedure Component Value Units Date/Time    Tissue Culture - Aerobic [[id number]] Collected: 06/23/25 1314    Order Status: Completed Specimen: Tissue from Brain Updated: 06/28/25 0920     Tissue - Aerobic Culture No Growth    Tissue Culture - Aerobic [[id number]]  (Abnormal) Collected: 06/23/25 1311    Order Status: Completed Specimen: Tissue from Brain Updated: 06/28/25 0918     Tissue - Aerobic Culture Rare Pseudomonas stutzeri    Tissue Culture - Aerobic [[id number]]  (Abnormal)  (Susceptibility) Collected: 06/23/25 1313    Order Status: Completed Specimen: Tissue from Brain Updated: 06/27/25 0928     Tissue - Aerobic Culture Staphylococcus epidermidis     Comment: Isolated from Thio only       Anaerobic Culture [[id number]] Collected: 06/23/25 1314    Order Status: Completed Specimen: Tissue from Brain Updated: 06/26/25 0907     Anaerobe Culture No Anaerobes Isolated    Anaerobic Culture [[id number]] Collected: 06/23/25 1313    Order Status: Completed Specimen: Tissue from Brain Updated: 06/26/25 0907     Anaerobe Culture No Anaerobes Isolated    Anaerobic Culture [[id number]] Collected: 06/23/25 1311    Order Status: Completed Specimen: Tissue from Brain Updated: 06/26/25 0904     Anaerobe Culture No Anaerobes Isolated    Gram Stain [[id number]] Collected: 06/23/25 1311    Order Status: Completed Specimen: Tissue from  Brain Updated: 06/24/25 0707     GRAM STAIN Many WBC observed      No bacteria seen    Gram Stain [[id number]] Collected: 06/23/25 1313    Order Status: Completed Specimen: Tissue from Brain Updated: 06/24/25 0706     GRAM STAIN Few WBC observed      Rare Gram positive cocci    Gram Stain [[id number]] Collected: 06/23/25 1314    Order Status: Completed Specimen: Tissue from Brain Updated: 06/24/25 0705     GRAM STAIN Few WBC observed      Few Gram positive cocci    Fungal Culture [[id number]] Collected: 06/23/25 1314    Order Status: Sent Specimen: Tissue from Brain Updated: 06/23/25 1326    Fungal Culture [[id number]] Collected: 06/23/25 1313    Order Status: Sent Specimen: Tissue from Brain Updated: 06/23/25 1326    Fungal Culture [[id number]] Collected: 06/23/25 1311    Order Status: Sent Specimen: Tissue from Brain Updated: 06/23/25 1326             See below for Radiology    Assessment/Plan:  Left Subdural empyema - 6/23/25 s/p left craniotomy and evacuation of empyema with tissue culture positive for staph epidermidis and Pseudomonas stutzeri  Traumatic Subdural hematoma , s/p left frontoparietal craniotomy and evacuation on 5/19/25   Chronic left SDH with new onset headache, Rt sided weakness and facial droop, s/p left diony hole on 6/2/25  Post op Increased subdural fluid collection, s/p left redo diony hole with fluid evacuation, drain placement on 6/16  Surgical site infection status post cranioplasty on 6/16 with tissue culture positive for Staph epidermidis      Hx- 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, 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,Depression/anxiety ,HLD, Hypothyroidism, Essential HTN, Type 2 diabetes mellitus     Plan-   ID evaluated the case and suggested to stop Vancomycin and continue Cefepime 1 gram q6h until final sensitivity is known with anticipated antibiotic treatment for at least 6 to 8 weeks .   Await final sensitivity data for Pseudomonas stutzeri.     Remains with Rt sided weakness   MRI brain on 6/27/25  with no evidence of acute stroke. .   Continue [facility]   Neurosurgery cleared Pt for Lovenox for VTE prophylaxis as of 6/27/25  Neuro check q2h  PT/OT consulted. High intensity therapy suggested.     VTE prophylaxis: Will start Lovenox today      Patient condition:  Fair     Anticipated discharge and Disposition:     TBD.       All diagnosis and differential diagnosis have been reviewed; assessment and plan has been documented; I have personally reviewed the labs and test results that are presently available; I have reviewed the patients medication list; I have reviewed the consulting providers response and recommendations. I have reviewed or attempted to review medical records based upon their availability    All of the patient's questions have been  addressed and answered. Patient's is agreeable to the above stated plan. I will continue to monitor closely and make adjustments to medical management as needed.    Portions of this note dictated using EMR integrated voice recognition software, and may be subject to voice recognition errors not corrected at proofreading. Please contact writer for clarification if needed.   _____________________________________________________________________    Malnutrition Status:  Nutrition consulted. Most recent weight and BMI monitored-     Measurements:  Wt Readings from Last 1 Encounters:   06/12/25 49.9 kg (110 lb)   Body mass index is 17.75 kg/m².    Patient has been screened and assessed by RD.    Malnutrition Type:  Context: acute illness or injury  Level: severe    Malnutrition Characteristic Summary:  Weight Loss (Malnutrition): greater than 5% in 1 month  Energy Intake (Malnutrition): other (see comments) (Does not meet criteria)  Subcutaneous Fat (Malnutrition): moderate depletion  Muscle Mass (Malnutrition): moderate depletion  Fluid Accumulation (Malnutrition): other (see comments) (Not present)  Hand  Strength, Right (Malnutrition): Unable to  assess    Interventions/Recommendations (treatment strategy):  Oral diet/nutrient modifications     Scheduled Med:   atorvastatin  80 mg Oral Daily    busPIRone  15 mg Oral BID    ceFEPime IV (PEDS and ADULTS)  1 g Intravenous Q6H    enoxparin  40 mg Subcutaneous Q24H (prophylaxis, 1700)    ezetimibe  10 mg Oral Daily    famotidine  20 mg Oral BID    hydrocortisone   Topical (Top) BID    levETIRAcetam  500 mg Oral BID    levothyroxine  88 mcg Oral Before breakfast    metoprolol succinate  12.5 mg Oral Daily    nortriptyline  25 mg Oral QHS    polyethylene glycol  17 g Oral BID    traZODone  25 mg Oral QHS      Continuous Infusions:     PRN Meds:    Current Facility-Administered Medications:     acetaminophen, 650 mg, Rectal, Q4H PRN    acetaminophen, 650 mg, Oral, Q4H PRN    bisacodyL, 10 mg, Rectal, Daily PRN    butalbital-acetaminophen-caffeine -40 mg, 1 tablet, Oral, Q4H PRN    dextrose 50%, 12.5 g, Intravenous, PRN    dextrose 50%, 25 g, Intravenous, PRN    diphenhydrAMINE, 25 mg, Oral, Q6H PRN    glucagon (human recombinant), 1 mg, Intramuscular, PRN    glucose, 16 g, Oral, PRN    glucose, 24 g, Oral, PRN    hydrALAZINE, 10 mg, Intravenous, Q4H PRN    insulin aspart U-100, 0-5 Units, Subcutaneous, QID (AC + HS) PRN    labetalol, 10 mg, Intravenous, Q4H PRN    melatonin, 6 mg, Oral, Nightly PRN    morphine, 2 mg, Intravenous, Q4H PRN    naloxone, 0.02 mg, Intravenous, PRN    ondansetron, 4 mg, Intravenous, Q8H PRN    oxyCODONE-acetaminophen, 1 tablet, Oral, Q4H PRN    prochlorperazine, 5 mg, Intravenous, Q6H PRN    sodium chloride 0.9%, 10 mL, Intravenous, PRN    Flushing PICC/Midline Protocol, , , Until Discontinued **AND** sodium chloride 0.9%, 10 mL, Intravenous, Q12H PRN    sodium chloride 0.9%, 3 mL, Intravenous, Q12H PRN         Argentina Jerome MD  Department of Hospital Medicine   [facility]   06/28/2025

## 2025-06-28 NOTE — PROGRESS NOTES
[facility] ID update:    Pseudomonas stutzeri from 6/23 is still pending. Per microbiology today, their instrument was not able to run sensitivities, and they are sending the sample to [place] for further testing.   Continue with Cefepime at this time to cover both MSSE and Pseudomonas at this time    ID will follow along

## 2025-06-28 NOTE — PROGRESS NOTES
[facility] - Neurology  Neurosurgery  Progress Note    Subjective:     Interval History:   POD#5 left craniectomy for evacuation of empyema   Examined patient with family at bedside. She is sitting up in bed, NAD. Complaints of mild headache. Denies blurred vision and N/V. She continues with right UE weakness.    MRI brain w without completed 06/27 revealed postsurgical changes of left craniotomy with evacuation of extra-axial fluid collection.  Overlying scalp thickening and inflammation.  No abnormal brain parenchymal enhancement.  Left cerebral encephalomalacia with extensive gliotic changes.  No acute infarct.    Post-Op Info:  Procedure(s) (LRB):  CRANIECTOMY (Left)   5 Days Post-Op      Medications:  Continuous Infusions:  Scheduled Meds:   atorvastatin  80 mg Oral Daily    busPIRone  15 mg Oral BID    ceFEPime IV (PEDS and ADULTS)  1 g Intravenous Q6H    enoxparin  40 mg Subcutaneous Q24H (prophylaxis, 1700)    ezetimibe  10 mg Oral Daily    famotidine  20 mg Oral BID    hydrocortisone   Topical (Top) BID    levETIRAcetam  500 mg Oral BID    levothyroxine  88 mcg Oral Before breakfast    metoprolol succinate  12.5 mg Oral Daily    nortriptyline  25 mg Oral QHS    polyethylene glycol  17 g Oral BID    traZODone  25 mg Oral QHS     PRN Meds:  Current Facility-Administered Medications:     acetaminophen, 650 mg, Rectal, Q4H PRN    acetaminophen, 650 mg, Oral, Q4H PRN    bisacodyL, 10 mg, Rectal, Daily PRN    butalbital-acetaminophen-caffeine -40 mg, 1 tablet, Oral, Q4H PRN    dextrose 50%, 12.5 g, Intravenous, PRN    dextrose 50%, 25 g, Intravenous, PRN    diphenhydrAMINE, 25 mg, Oral, Q6H PRN    glucagon (human recombinant), 1 mg, Intramuscular, PRN    glucose, 16 g, Oral, PRN    glucose, 24 g, Oral, PRN    hydrALAZINE, 10 mg, Intravenous, Q4H PRN    insulin aspart U-100, 0-5 Units, Subcutaneous, QID (AC + HS) PRN    labetalol, 10 mg, Intravenous, Q4H PRN    melatonin, 6 mg, Oral, Nightly  "PRN    morphine, 2 mg, Intravenous, Q4H PRN    naloxone, 0.02 mg, Intravenous, PRN    ondansetron, 4 mg, Intravenous, Q8H PRN    oxyCODONE-acetaminophen, 1 tablet, Oral, Q4H PRN    prochlorperazine, 5 mg, Intravenous, Q6H PRN    sodium chloride 0.9%, 10 mL, Intravenous, PRN    Flushing PICC/Midline Protocol, , , Until Discontinued **AND** sodium chloride 0.9%, 10 mL, Intravenous, Q12H PRN    sodium chloride 0.9%, 3 mL, Intravenous, Q12H PRN     Review of Systems  Objective:     Weight: 49.9 kg (110 lb)  Body mass index is 17.75 kg/m².  Vital Signs (Most Recent):  Temp: 97.8 °F (36.6 °C) (06/28/25 0406)  Pulse: 86 (06/28/25 0922)  Resp: 16 (06/28/25 0921)  BP: 136/86 (06/28/25 0922)  SpO2: 97 % (06/28/25 0406) Vital Signs (24h Range):  Temp:  [97.8 °F (36.6 °C)-99 °F (37.2 °C)] 97.8 °F (36.6 °C)  Pulse:  [68-87] 86  Resp:  [16-18] 16  SpO2:  [97 %-99 %] 97 %  BP: (116-136)/(71-86) 136/86              Closed/Suction Drain 06/23/25 1353 Tube - 1 Left Scalp Bulb 10 Fr. (Active)   Site Description Unable to view 06/26/25 0930   Dressing Type Gauze 06/27/25 2000   Dressing Status Clean;Dry;Intact 06/27/25 2000   Dressing Intervention Integrity maintained 06/27/25 2000   Drainage Serosanguineous 06/27/25 2000   Status To bulb suction 06/27/25 2000   Output (mL) 10 mL 06/28/25 0623       Neurosurgery Physical Exam    AFVSS  PERRL, EOMI  Alert, oriented to all spheres  Motor exam is unchanged  Incision C/D/I  Drain output 10 cc/24 hrs, serosanguinous      Significant Labs:  Recent Labs   Lab 06/27/25  0420   *      K 3.3*   *   CO2 26   BUN 3.5*   CREATININE 0.56   CALCIUM 8.5   MG 1.60     Recent Labs   Lab 06/27/25  0420   WBC 6.91   HGB 9.6*   HCT 29.4*   *     No results for input(s): "LABPT", "INR", "APTT" in the last 48 hours.  Microbiology Results (last 7 days)       Procedure Component Value Units Date/Time    Tissue Culture - Aerobic [[id number]] Collected: 06/23/25 1314    Order Status: " Completed Specimen: Tissue from Brain Updated: 06/28/25 0920     Tissue - Aerobic Culture No Growth    Tissue Culture - Aerobic [[id number]]  (Abnormal) Collected: 06/23/25 1311    Order Status: Completed Specimen: Tissue from Brain Updated: 06/28/25 0918     Tissue - Aerobic Culture Rare Pseudomonas stutzeri    Tissue Culture - Aerobic [[id number]]  (Abnormal)  (Susceptibility) Collected: 06/23/25 1313    Order Status: Completed Specimen: Tissue from Brain Updated: 06/27/25 0928     Tissue - Aerobic Culture Staphylococcus epidermidis     Comment: Isolated from Thio only       Anaerobic Culture [[id number]] Collected: 06/23/25 1314    Order Status: Completed Specimen: Tissue from Brain Updated: 06/26/25 0907     Anaerobe Culture No Anaerobes Isolated    Anaerobic Culture [[id number]] Collected: 06/23/25 1313    Order Status: Completed Specimen: Tissue from Brain Updated: 06/26/25 0907     Anaerobe Culture No Anaerobes Isolated    Anaerobic Culture [[id number]] Collected: 06/23/25 1311    Order Status: Completed Specimen: Tissue from Brain Updated: 06/26/25 0904     Anaerobe Culture No Anaerobes Isolated    Gram Stain [[id number]] Collected: 06/23/25 1311    Order Status: Completed Specimen: Tissue from Brain Updated: 06/24/25 0707     GRAM STAIN Many WBC observed      No bacteria seen    Gram Stain [[id number]] Collected: 06/23/25 1313    Order Status: Completed Specimen: Tissue from Brain Updated: 06/24/25 0706     GRAM STAIN Few WBC observed      Rare Gram positive cocci    Gram Stain [[id number]] Collected: 06/23/25 1314    Order Status: Completed Specimen: Tissue from Brain Updated: 06/24/25 0705     GRAM STAIN Few WBC observed      Few Gram positive cocci    Fungal Culture [[id number]] Collected: 06/23/25 1314    Order Status: Sent Specimen: Tissue from Brain Updated: 06/23/25 1326    Fungal Culture [[id number]] Collected: 06/23/25 1313    Order Status: Sent Specimen: Tissue from Brain Updated: 06/23/25 1326     Fungal Culture [[id number]] Collected: 06/23/25 1311    Order Status: Sent Specimen: Tissue from Brain Updated: 06/23/25 1326          All pertinent labs from the last 24 hours have been reviewed.  Significant Diagnostics:  I have reviewed all pertinent imaging results/findings within the past 24 hours.    Assessment/Plan:    POD#5 left craniectomy for evacuation of empyema     MRI brain with stable postoperative changes  Floor status  Continue drain  OK to leave incision open to air  Neuro checks Q4  BP parameters below 140/90  HOB> 30  Keppra BID  Antibx per ID  PT/OT  SCDs for DVT prophylaxis; ok for lovenox           Active Diagnoses:    Diagnosis Date Noted POA    PRINCIPAL PROBLEM:  Subdural empyema [G06.2] 06/26/2025 Yes    Severe malnutrition [E43] 05/31/2025 Yes      Problems Resolved During this Admission:       LUIS Quiroga  Neurosurgery  [facility] - Neurology

## 2025-06-28 NOTE — PLAN OF CARE
Problem: Adult Inpatient Plan of Care  Goal: Plan of Care Review  6/28/2025 0515 by Candi Frye RN  Outcome: Progressing  6/28/2025 0515 by Candi Frye RN  Outcome: Progressing  Goal: Patient-Specific Goal (Individualized)  6/28/2025 0515 by Candi Frye RN  Outcome: Progressing  6/28/2025 0515 by Candi Frye RN  Outcome: Progressing  Goal: Absence of Hospital-Acquired Illness or Injury  6/28/2025 0515 by Candi Frye RN  Outcome: Progressing  6/28/2025 0515 by Candi Frye RN  Outcome: Progressing  Goal: Optimal Comfort and Wellbeing  6/28/2025 0515 by Candi Frye RN  Outcome: Progressing  6/28/2025 0515 by Candi Frye RN  Outcome: Progressing  Goal: Readiness for Transition of Care  6/28/2025 0515 by Candi Frye RN  Outcome: Progressing  6/28/2025 0515 by Candi Frye RN  Outcome: Progressing     Problem: Diabetes Comorbidity  Goal: Blood Glucose Level Within Targeted Range  6/28/2025 0515 by Candi Frye RN  Outcome: Progressing  6/28/2025 0515 by Candi Frye RN  Outcome: Progressing     Problem: Wound  Goal: Optimal Coping  6/28/2025 0515 by Candi Frye RN  Outcome: Progressing  6/28/2025 0515 by Candi Frye RN  Outcome: Progressing  Goal: Optimal Functional Ability  6/28/2025 0515 by Candi Frye RN  Outcome: Progressing  6/28/2025 0515 by Candi Frye RN  Outcome: Progressing  Goal: Absence of Infection Signs and Symptoms  6/28/2025 0515 by Candi Frye RN  Outcome: Progressing  6/28/2025 0515 by Candi Frye RN  Outcome: Progressing  Goal: Improved Oral Intake  6/28/2025 0515 by Candi Frye RN  Outcome: Progressing  6/28/2025 0515 by Candi Frye RN  Outcome: Progressing  Goal: Optimal Pain Control and Function  6/28/2025 0515 by Candi Frye, RN  Outcome: Progressing  6/28/2025 0515 by Candi Frye RN  Outcome: Progressing  Goal: Skin Health and Integrity  6/28/2025 0515 by Uday  ASHLEY Christopher  Outcome: Progressing  6/28/2025 0515 by Candi Frye RN  Outcome: Progressing  Goal: Optimal Wound Healing  6/28/2025 0515 by Candi Frye RN  Outcome: Progressing  6/28/2025 0515 by Candi Frye RN  Outcome: Progressing     Problem: Infection  Goal: Absence of Infection Signs and Symptoms  6/28/2025 0515 by Candi Frye RN  Outcome: Progressing  6/28/2025 0515 by Candi Frye RN  Outcome: Progressing     Problem: Comorbidity Management  Goal: Maintenance of Behavioral Health Symptom Control  6/28/2025 0515 by Candi Frye RN  Outcome: Progressing  6/28/2025 0515 by Candi Frye RN  Outcome: Progressing     Problem: Fall Injury Risk  Goal: Absence of Fall and Fall-Related Injury  6/28/2025 0515 by Candi Frye RN  Outcome: Progressing  6/28/2025 0515 by Candi Frye RN  Outcome: Progressing     Problem: Skin Injury Risk Increased  Goal: Skin Health and Integrity  6/28/2025 0515 by Candi Frye RN  Outcome: Progressing  6/28/2025 0515 by Candi Frye RN  Outcome: Progressing

## 2025-06-28 NOTE — PT/OT/SLP PROGRESS
Physical Therapy Treatment    Patient Name:  Chelle Chandra   MRN:  [id number]    Recommendations:     Discharge therapy intensity: High Intensity Therapy   Discharge Equipment Recommendations: to be determined by next level of care  Barriers to discharge: Impaired mobility and Ongoing medical needs    Assessment:     Chelle Chandra is a 59 y.o. female.  She presents with the following impairments/functional limitations: weakness, gait instability, decreased upper extremity function, decreased lower extremity function, impaired balance, impaired endurance, impaired self care skills, impaired functional mobility, decreased safety awareness.  Communicated with NSG prior to session.      Rehab Prognosis: Good; patient would benefit from acute skilled PT services to address these deficits and reach maximum level of function.    Recent Surgery: Procedure(s) (LRB):  CRANIECTOMY (Left) 5 Days Post-Op  General Precautions: Standard, fall, seizure, other (see comments) (BP<140/90)  Orthopedic Precautions: N/A  Braces: N/A (helmet when OOB)  Respiratory Status: Room air  Skin Integrity: Visible skin intact    Plan:     During this hospitalization, patient would benefit from acute PT services 6 x/week to address the identified rehab impairments via gait training, therapeutic activities, therapeutic exercises, neuromuscular re-education and progress toward the following goals:    Plan of Care Expires:  07/24/25    Subjective     Patient found supine upon PT entry to room. Greeted pt and explained the treatment planned for today's session. Pt agreed to session.    Objective:     Functional Mobility:    Bed Mobility: Supine to EOB Min A.    Transfers: Sit to Stands Min A.     Gait Training: Pt ambulated 10ft in room with HHA on Left Side. No LOB noted.    Balance: CGA with VC to lean to the left to come into midline in sitting. Pt c/o dizziness once EOB,  sat EOB x 6 mins to let dizziness subside.        Education:  Role and  goals of PT, transfer training, bed mobility, gait training, balance training, safety awareness, assistive device, strengthening exercises, and importance of participating in PT to return to PLOF     Patient left up in chair with all lines intact, call button in reach, and family present    GOALS:   Multidisciplinary Problems       Physical Therapy Goals          Problem: Physical Therapy    Goal Priority Disciplines Outcome Interventions   Physical Therapy Goal     PT, PT/OT Progressing    Description: Goals to be met by: 25     Patient will increase functional independence with mobility by performin. Supine to sit with Modified [place]  2. Sit to supine with Modified [place]  3. Sit to stand transfer with Modified [place]  4. Bed to chair transfer with Modified [place] using Rolling Walker vs QC  5. Gait  x 200 feet with Modified [place] using Rolling Walker vs QC.                          Time Tracking:     Billable Minutes: Gait Training 8 and Therapeutic Activity 15    Treatment Type: Treatment  PT/PTA: PTA     Number of PTA visits since last PT visit: 4     2025

## 2025-06-29 LAB
POCT GLUCOSE: 178 MG/DL (ref 70–110)
POCT GLUCOSE: 185 MG/DL (ref 70–110)
POCT GLUCOSE: 186 MG/DL (ref 70–110)

## 2025-06-29 PROCEDURE — 25000003 PHARM REV CODE 250: Performed by: INTERNAL MEDICINE

## 2025-06-29 PROCEDURE — 25000242 PHARM REV CODE 250 ALT 637 W/ HCPCS: Performed by: INTERNAL MEDICINE

## 2025-06-29 PROCEDURE — 63600175 PHARM REV CODE 636 W HCPCS: Performed by: INTERNAL MEDICINE

## 2025-06-29 PROCEDURE — 97535 SELF CARE MNGMENT TRAINING: CPT | Mod: CO

## 2025-06-29 PROCEDURE — 21400001 HC TELEMETRY ROOM

## 2025-06-29 PROCEDURE — 63600175 PHARM REV CODE 636 W HCPCS

## 2025-06-29 PROCEDURE — 25000003 PHARM REV CODE 250: Performed by: NURSE PRACTITIONER

## 2025-06-29 RX ADMIN — CEFEPIME 1 G: 1 INJECTION, POWDER, FOR SOLUTION INTRAMUSCULAR; INTRAVENOUS at 10:06

## 2025-06-29 RX ADMIN — ACETAMINOPHEN 650 MG: 325 TABLET ORAL at 01:06

## 2025-06-29 RX ADMIN — Medication 6 MG: at 08:06

## 2025-06-29 RX ADMIN — LEVETIRACETAM 500 MG: 500 TABLET, FILM COATED ORAL at 08:06

## 2025-06-29 RX ADMIN — Medication 1 EACH: at 12:06

## 2025-06-29 RX ADMIN — FAMOTIDINE 20 MG: 20 TABLET, FILM COATED ORAL at 08:06

## 2025-06-29 RX ADMIN — HYDROCORTISONE: 1 CREAM TOPICAL at 09:06

## 2025-06-29 RX ADMIN — CEFEPIME 1 G: 1 INJECTION, POWDER, FOR SOLUTION INTRAMUSCULAR; INTRAVENOUS at 11:06

## 2025-06-29 RX ADMIN — NORTRIPTYLINE HYDROCHLORIDE 25 MG: 25 CAPSULE ORAL at 08:06

## 2025-06-29 RX ADMIN — BUSPIRONE HYDROCHLORIDE 15 MG: 5 TABLET ORAL at 08:06

## 2025-06-29 RX ADMIN — LEVOTHYROXINE SODIUM 88 MCG: 0.09 TABLET ORAL at 06:06

## 2025-06-29 RX ADMIN — ATORVASTATIN CALCIUM 80 MG: 40 TABLET, FILM COATED ORAL at 09:06

## 2025-06-29 RX ADMIN — ACETAMINOPHEN 650 MG: 325 TABLET ORAL at 11:06

## 2025-06-29 RX ADMIN — LEVETIRACETAM 500 MG: 500 TABLET, FILM COATED ORAL at 09:06

## 2025-06-29 RX ADMIN — TRAZODONE HYDROCHLORIDE 25 MG: 50 TABLET ORAL at 08:06

## 2025-06-29 RX ADMIN — ENOXAPARIN SODIUM 40 MG: 40 INJECTION SUBCUTANEOUS at 04:06

## 2025-06-29 RX ADMIN — ACETAMINOPHEN 650 MG: 325 TABLET ORAL at 08:06

## 2025-06-29 RX ADMIN — BUSPIRONE HYDROCHLORIDE 15 MG: 5 TABLET ORAL at 09:06

## 2025-06-29 RX ADMIN — CEFEPIME 1 G: 1 INJECTION, POWDER, FOR SOLUTION INTRAMUSCULAR; INTRAVENOUS at 06:06

## 2025-06-29 RX ADMIN — PSYLLIUM HUSK 1 PACKET: 3.4 POWDER ORAL at 12:06

## 2025-06-29 RX ADMIN — CEFEPIME 1 G: 1 INJECTION, POWDER, FOR SOLUTION INTRAMUSCULAR; INTRAVENOUS at 04:06

## 2025-06-29 RX ADMIN — EZETIMIBE 10 MG: 10 TABLET ORAL at 09:06

## 2025-06-29 RX ADMIN — FAMOTIDINE 20 MG: 20 TABLET, FILM COATED ORAL at 09:06

## 2025-06-29 RX ADMIN — ACETAMINOPHEN 650 MG: 325 TABLET ORAL at 04:06

## 2025-06-29 RX ADMIN — ACETAMINOPHEN 650 MG: 325 TABLET ORAL at 09:06

## 2025-06-29 RX ADMIN — METOPROLOL SUCCINATE 12.5 MG: 25 TABLET, EXTENDED RELEASE ORAL at 09:06

## 2025-06-29 RX ADMIN — HYDROCORTISONE: 1 CREAM TOPICAL at 08:06

## 2025-06-29 NOTE — PROGRESS NOTES
[facility] - Neurology  Neurosurgery  Progress Note    Subjective:     Interval History:   POD#6 left craniectomy for evacuation of empyema   Examined patient at bedside. She is curled up in bed sleeping, easily arousable, NAD. Complaints of mild headache. Denies blurred vision and N/V. She continues with right UE weakness.      Post-Op Info:  Procedure(s) (LRB):  CRANIECTOMY (Left)   6 Days Post-Op      Medications:  Continuous Infusions:  Scheduled Meds:   atorvastatin  80 mg Oral Daily    busPIRone  15 mg Oral BID    ceFEPime IV (PEDS and ADULTS)  1 g Intravenous Q6H    enoxparin  40 mg Subcutaneous Q24H (prophylaxis, 1700)    ezetimibe  10 mg Oral Daily    famotidine  20 mg Oral BID    hydrocortisone   Topical (Top) BID    levETIRAcetam  500 mg Oral BID    levothyroxine  88 mcg Oral Before breakfast    metoprolol succinate  12.5 mg Oral Daily    nortriptyline  25 mg Oral QHS    polyethylene glycol  17 g Oral BID    traZODone  25 mg Oral QHS     PRN Meds:  Current Facility-Administered Medications:     acetaminophen, 650 mg, Rectal, Q4H PRN    acetaminophen, 650 mg, Oral, Q4H PRN    bisacodyL, 10 mg, Rectal, Daily PRN    butalbital-acetaminophen-caffeine -40 mg, 1 tablet, Oral, Q4H PRN    dextrose 50%, 12.5 g, Intravenous, PRN    dextrose 50%, 25 g, Intravenous, PRN    diphenhydrAMINE, 25 mg, Oral, Q6H PRN    glucagon (human recombinant), 1 mg, Intramuscular, PRN    glucose, 16 g, Oral, PRN    glucose, 24 g, Oral, PRN    hydrALAZINE, 10 mg, Intravenous, Q4H PRN    insulin aspart U-100, 0-5 Units, Subcutaneous, QID (AC + HS) PRN    labetalol, 10 mg, Intravenous, Q4H PRN    melatonin, 6 mg, Oral, Nightly PRN    morphine, 2 mg, Intravenous, Q4H PRN    naloxone, 0.02 mg, Intravenous, PRN    ondansetron, 4 mg, Intravenous, Q8H PRN    oxyCODONE-acetaminophen, 1 tablet, Oral, Q4H PRN    prochlorperazine, 5 mg, Intravenous, Q6H PRN    sodium chloride 0.9%, 10 mL, Intravenous, PRN    Flushing  "PICC/Midline Protocol, , , Until Discontinued **AND** sodium chloride 0.9%, 10 mL, Intravenous, Q12H PRN    sodium chloride 0.9%, 3 mL, Intravenous, Q12H PRN     Review of Systems  Objective:     Weight: 49.9 kg (110 lb)  Body mass index is 17.75 kg/m².  Vital Signs (Most Recent):  Temp: 98.9 °F (37.2 °C) (06/29/25 0426)  Pulse: 75 (06/29/25 0755)  Resp: 16 (06/28/25 0921)  BP: 117/73 (06/29/25 0426)  SpO2: 96 % (06/29/25 0755) Vital Signs (24h Range):  Temp:  [98 °F (36.7 °C)-98.9 °F (37.2 °C)] 98.9 °F (37.2 °C)  Pulse:  [66-86] 75  Resp:  [16] 16  SpO2:  [93 %-98 %] 96 %  BP: (114-136)/(66-86) 117/73              Closed/Suction Drain 06/23/25 1353 Tube - 1 Left Scalp Bulb 10 Fr. (Active)   Site Description Unable to view 06/26/25 0930   Dressing Type Gauze 06/27/25 2000   Dressing Status Clean;Dry;Intact 06/27/25 2000   Dressing Intervention Integrity maintained 06/27/25 2000   Drainage Serosanguineous 06/27/25 2000   Status To bulb suction 06/27/25 2000   Output (mL) 10 mL 06/28/25 0623       Neurosurgery Physical Exam    AFVSS  PERRL, EOMI  Alert, oriented to all spheres  Motor exam is unchanged  Incision C/D/I  Drain output 15 cc/24 hrs, serosanguinous      Significant Labs:  No results for input(s): "GLU", "NA", "K", "CL", "CO2", "BUN", "CREATININE", "CALCIUM", "MG" in the last 48 hours.    No results for input(s): "WBC", "HGB", "HCT", "PLT" in the last 48 hours.    No results for input(s): "LABPT", "INR", "APTT" in the last 48 hours.  Microbiology Results (last 7 days)       Procedure Component Value Units Date/Time    Tissue Culture - Aerobic [[id number]] Collected: 06/23/25 1314    Order Status: Completed Specimen: Tissue from Brain Updated: 06/28/25 0920     Tissue - Aerobic Culture No Growth    Tissue Culture - Aerobic [[id number]]  (Abnormal) Collected: 06/23/25 1311    Order Status: Completed Specimen: Tissue from Brain Updated: 06/28/25 0918     Tissue - Aerobic Culture Rare Pseudomonas stutzeri    " Tissue Culture - Aerobic [[id number]]  (Abnormal)  (Susceptibility) Collected: 06/23/25 1313    Order Status: Completed Specimen: Tissue from Brain Updated: 06/27/25 0928     Tissue - Aerobic Culture Staphylococcus epidermidis     Comment: Isolated from Thio only       Anaerobic Culture [[id number]] Collected: 06/23/25 1314    Order Status: Completed Specimen: Tissue from Brain Updated: 06/26/25 0907     Anaerobe Culture No Anaerobes Isolated    Anaerobic Culture [[id number]] Collected: 06/23/25 1313    Order Status: Completed Specimen: Tissue from Brain Updated: 06/26/25 0907     Anaerobe Culture No Anaerobes Isolated    Anaerobic Culture [[id number]] Collected: 06/23/25 1311    Order Status: Completed Specimen: Tissue from Brain Updated: 06/26/25 0904     Anaerobe Culture No Anaerobes Isolated    Gram Stain [[id number]] Collected: 06/23/25 1311    Order Status: Completed Specimen: Tissue from Brain Updated: 06/24/25 0707     GRAM STAIN Many WBC observed      No bacteria seen    Gram Stain [[id number]] Collected: 06/23/25 1313    Order Status: Completed Specimen: Tissue from Brain Updated: 06/24/25 0706     GRAM STAIN Few WBC observed      Rare Gram positive cocci    Gram Stain [[id number]] Collected: 06/23/25 1314    Order Status: Completed Specimen: Tissue from Brain Updated: 06/24/25 0705     GRAM STAIN Few WBC observed      Few Gram positive cocci    Fungal Culture [[id number]] Collected: 06/23/25 1314    Order Status: Sent Specimen: Tissue from Brain Updated: 06/23/25 1326    Fungal Culture [[id number]] Collected: 06/23/25 1313    Order Status: Sent Specimen: Tissue from Brain Updated: 06/23/25 1326    Fungal Culture [[id number]] Collected: 06/23/25 1311    Order Status: Sent Specimen: Tissue from Brain Updated: 06/23/25 1326          All pertinent labs from the last 24 hours have been reviewed.  Significant Diagnostics:  I have reviewed all pertinent imaging results/findings within the past 24  hours.    Assessment/Plan:    POD#6 left craniectomy for evacuation of empyema     Floor status  Continue drain  OK to leave incision open to air  Neuro checks Q4  BP parameters below 140/90  HOB> 30  Keppra BID  Antibx per ID  PT/OT  SCDs for DVT prophylaxis; ok for lovenox  Fall precautions           Active Diagnoses:    Diagnosis Date Noted POA    PRINCIPAL PROBLEM:  Subdural empyema [G06.2] 06/26/2025 Yes    Severe malnutrition [E43] 05/31/2025 Yes      Problems Resolved During this Admission:       LUIS Quiroga  Neurosurgery  [facility] - Neurology

## 2025-06-29 NOTE — PT/OT/SLP PROGRESS
Occupational Therapy   Treatment    Name: Chelle Chandra  MRN: [id number]    Recommendations:     Recommended therapy intensity at discharge: High Intensity Therapy   Discharge Equipment Recommendations:  to be determined by next level of care  Barriers to discharge:       Assessment:     Chelle Chandra is a 59 y.o. female with a medical diagnosis of R sided weakness and facial droop due to L SDH s/p craniotomy and MMA embolization      She presents with profound R UE weakness, no active movement noted. Increased activity tolerance and improved balance during mobility. Intermittent R inattention noted with R lateral lean. Pt. Is a fall risk at this time due to R sided deficits. Recommending High intensity therapy pending progress.Performance deficits affecting function are weakness, impaired endurance, impaired sensation, impaired self care skills, impaired functional mobility, gait instability, decreased upper extremity function, decreased lower extremity function, decreased safety awareness, pain.     Rehab Prognosis:  Good; patient would benefit from acute skilled OT services to address these deficits and reach maximum level of function.       Plan:     Patient to be seen 6 x/week to address the above listed problems via self-care/home management, therapeutic activities, therapeutic exercises, neuromuscular re-education, sensory integration  Plan of Care Expires: 07/17/25  Plan of Care Reviewed with: patient    Subjective     Pain/Comfort:  Pain Rating 1: 4/10  Location 1:  (HA)  Pain Addressed 1: Nurse notified    Objective:     Communicated with: RN prior to session.  Patient found HOB elevated with   upon OT entry to room.    General Precautions: Standard, fall, seizure    Orthopedic Precautions:   Braces:  (helmet)  Respiratory Status: Room air  Vital Signs: Blood Pressure: Unable to obtain BP, several attempts and BP cuff changed however BP machine not reading, no complaints of dizziness noted per pt.       Occupational Performance:   (Bed Mobility-Mod A) Supine to sit  (Sitting balance- CGA) Slight R posterior lean, able to self correct with verbal cueing.  (Sit to stand- Min A) R LE blocked  Stand pivot t/f performed from EOB to BSC Min A.   Total A for pericare and brief management.   Pt. Then t/f to BS chair.   PROM/Stretching performed to R UE, Splint donned. RN notified of session and pt. Progress.  Therapeutic Positioning    OT interventions performed during the course of today's session in an effort to prevent and/or reduce acquired pressure injuries:   Education was provided on benefits of and recommendations for therapeutic positioning      Patient Education:  Patient provided with verbal education education regarding fall prevention, safety awareness, and pressure ulcer prevention.  Understanding was verbalized.      Patient left up in chair with all lines intact and call button in reach.    GOALS:   Multidisciplinary Problems       Occupational Therapy Goals          Problem: Occupational Therapy    Goal Priority Disciplines Outcome Interventions   Occupational Therapy Goal     OT, PT/OT Progressing    Description: Goals: to be met by 07/14/25    Pt will complete grooming standing at sink with LRAD with CGA.  Pt will complete UB dressing with SBA.  Pt will complete LB dressing with SBA using LRAD.  Pt will complete toileting with CGA using LRAD.  Pt will complete functional mobility to/from toilet and toilet transfer with mod I using LRAD.   Pt will demo visual attention to R side >80% of time with min verbal cues.  Pt will demo RUE strength of 3-/5 for participation in ADLs.                       Time Tracking:     OT Date of Treatment: 06/29/25  OT Start Time: 1110  OT Stop Time: 1136  OT Total Time (min): 26 min    Billable Minutes:Self Care/Home Management 2    OTR/L readily available for conference at the time of the provision of services: LISA Tenorio  OT/EDILSON: EDILSON     Number of EDILSON visits  since last OT visit: 4 6/29/2025

## 2025-06-29 NOTE — PROGRESS NOTES
[facility] Hospital Medicine Progress Note        Chief Complaint: Inpatient Follow-up     HPI:   59-year-old female with PMHx of left MCA stroke s/p thrombectomy in 6/2024 on Plavix, hypothyroidism, anxiety, bronchial asthma, COPD, type 2 diabetes mellitus,  and GERD. Patient recently had subdural hematoma following a fall in May requiring intracranial embolization, craniotomy with hematoma evacuation. After this patient was discharged home. Late May she presented back to the ED with headache, right-sided weakness, facial droop and imaging was concerning for left MCA diminished flow and subacute hematoma. MRI brain was negative for CVA. Neurosurgery performed diony hole for drainage of subdural hematoma due to increased intracranial pressure with postop CT showing definitive improvement patient was on Plavix post embolization which was held briefly and resumed per Neurosurgery on 06/05 and she was transferred to rehab on 06/06. While in rehab patient was noted to have increase right upper extremity weakness and also significant headaches. CT head revealed reaccumulation of subdural fluid collection, increased from before patient was sent back to main [place] for further evaluation/neurosurgery services. Neurosurgery evaluated, planning for  shunt this hospitalization, home meds resumed as appropriate except for Plavix, symptomatic management for headache.  shunt scheduled for 6/16. Patient underwent redo diony hole, fluid evacuation and drain placement, concern for surgical site infection and therefore underwent cranioplasty. Infectious Disease consulted and patient was placed on cefepime, vancomycin and Flagyl pending intraoperative cultures.  Intraoperative cultures growing staph epi.  ID recommending IV vancomycin, IV Rocephin until 7/3.  Neurosurgery wished to monitor patient for another day until 6/21.  PT/OT on board; recommending high-intensity therapy.  Speech therapy  recommending regular diet with moderately thick liquids.  CM on board for placement.      On 6/23 discharge to rehab was planned but Pt developed RUE weakness and Ct head showed increasing size of extra-axial fluid collections along cerebral convexity with increasing mass effect & increase parenchymal edema in L posterior frontal lobe. Pt was taken back to OR and underwent left craniotomy and evacuation of empyema. Post operatively admitted to ICU. Intraoperative culture from 6/23 grew  staph epi and rare Pseudomonas stutzeri.  ID suggested to stop Vancomycin and continue Cefepime as Staph epi is oxacillin sensitive and Cefepime will cover both Pseudomonas and MSSE with total course at least 6 to 8 weeks. Pt's care downgraded back to  service on 6/25/25.     ID requested sensitivity testing for Pseudomonas stutzeri, however per hospital microbiology their instrument was not able to run sensitivities, and they are sending the sample to [place] for further testing. ID recommended to continue Cefepime at this time.      Pt remains with daily headache and dense weakness to RUE. MRI brain on 6/27/25 showed no acute stroke but noted post surgical left dural thickening and enhancement, left cerebral encephalomalacia with extensive gliotic changes.       Interval Hx:   C/O loose bowel movements x 2 this morning. This is new from today. Will stop Miralax. Add Probiotic.  C/O some headache not relived by Fioricet yet.   RUE weakness persisted.     Case was discussed with patient's nurse and  on the floor.    Objective/physical exam:  General: In no acute distress, afebrile  Chest: Clear to auscultation bilaterally  Heart: RRR, +S1, S2, no appreciable murmur  Abdomen: Soft, nontender, BS +  MSK: Warm, no lower extremity edema, no clubbing or cyanosis  Neurologic: Alert and oriented x3, RUE- 0/5, RLE 2/5, Left U/L ext- 5/5        VITAL SIGNS: 24 HRS MIN & MAX LAST   Temp  Min: 98 °F (36.7 °C)  Max: 98.9 °F (37.2 °C)  98.9 °F (37.2 °C)   BP  Min: 117/73  Max: 120/66 117/73   Pulse  Min: 75  Max: 78  75   No data recorded 16   SpO2  Min: 93 %  Max: 98 % 96 %     I have reviewed the following labs:  Recent Labs   Lab 06/23/25  1023 06/27/25  0420   WBC 6.14 6.91   RBC 3.56* 3.22*   HGB 10.6* 9.6*   HCT 32.7* 29.4*   MCV 91.9 91.3   MCH 29.8 29.8   MCHC 32.4* 32.7*   RDW 14.4 14.6    405*   MPV 9.9 9.4     Recent Labs   Lab 06/23/25  1023 06/26/25  0722 06/27/25  0420     --  142   K 3.6  --  3.3*     --  109*   CO2 25  --  26   BUN 6.2*  --  3.5*   CREATININE 0.55 0.53* 0.56   *  --  144*   CALCIUM 9.1  --  8.5   MG  --   --  1.60     Microbiology Results (last 7 days)       Procedure Component Value Units Date/Time    Tissue Culture - Aerobic [[id number]] Collected: 06/23/25 1314    Order Status: Completed Specimen: Tissue from Brain Updated: 06/28/25 0920     Tissue - Aerobic Culture No Growth    Tissue Culture - Aerobic [[id number]]  (Abnormal) Collected: 06/23/25 1311    Order Status: Completed Specimen: Tissue from Brain Updated: 06/28/25 0918     Tissue - Aerobic Culture Rare Pseudomonas stutzeri    Tissue Culture - Aerobic [[id number]]  (Abnormal)  (Susceptibility) Collected: 06/23/25 1313    Order Status: Completed Specimen: Tissue from Brain Updated: 06/27/25 0928     Tissue - Aerobic Culture Staphylococcus epidermidis     Comment: Isolated from Thio only       Anaerobic Culture [[id number]] Collected: 06/23/25 1314    Order Status: Completed Specimen: Tissue from Brain Updated: 06/26/25 0907     Anaerobe Culture No Anaerobes Isolated    Anaerobic Culture [[id number]] Collected: 06/23/25 1313    Order Status: Completed Specimen: Tissue from Brain Updated: 06/26/25 0907     Anaerobe Culture No Anaerobes Isolated    Anaerobic Culture [[id number]] Collected: 06/23/25 1311    Order Status: Completed Specimen: Tissue from Brain Updated: 06/26/25 0904     Anaerobe Culture No Anaerobes Isolated    Gram  Stain [[id number]] Collected: 06/23/25 1311    Order Status: Completed Specimen: Tissue from Brain Updated: 06/24/25 0707     GRAM STAIN Many WBC observed      No bacteria seen    Gram Stain [[id number]] Collected: 06/23/25 1313    Order Status: Completed Specimen: Tissue from Brain Updated: 06/24/25 0706     GRAM STAIN Few WBC observed      Rare Gram positive cocci    Gram Stain [[id number]] Collected: 06/23/25 1314    Order Status: Completed Specimen: Tissue from Brain Updated: 06/24/25 0705     GRAM STAIN Few WBC observed      Few Gram positive cocci    Fungal Culture [[id number]] Collected: 06/23/25 1314    Order Status: Sent Specimen: Tissue from Brain Updated: 06/23/25 1326    Fungal Culture [[id number]] Collected: 06/23/25 1313    Order Status: Sent Specimen: Tissue from Brain Updated: 06/23/25 1326    Fungal Culture [[id number]] Collected: 06/23/25 1311    Order Status: Sent Specimen: Tissue from Brain Updated: 06/23/25 1326             See below for Radiology    Assessment/Plan:  Left Subdural empyema - 6/23/25 s/p left craniotomy and evacuation of empyema with tissue culture positive for staph epidermidis and Pseudomonas stutzeri  Traumatic Subdural hematoma , s/p left frontoparietal craniotomy and evacuation on 5/19/25   Chronic left SDH with new onset headache, Rt sided weakness and facial droop, s/p left diony hole on 6/2/25  Post op Increased subdural fluid collection, s/p left redo diony hole with fluid evacuation, drain placement on 6/16  Surgical site infection status post cranioplasty on 6/16 with tissue culture positive for Staph epidermidis      Hx-  CVA in June, 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,Depression/anxiety ,HLD, Hypothyroidism, Essential HTN, Type 2 diabetes mellitus.      Plan-   ID evaluated the case and suggested to stop Vancomycin and continue Cefepime 1 gram q6h until final sensitivity is known with anticipated antibiotic treatment for at least 6 to 8 weeks .   Await final sensitivity data  for Pseudomonas stutzeri.      Remains with Rt sided weakness   MRI brain on 6/27/25 with no evidence of acute stroke. .   Continue [facility]   Neurosurgery cleared Pt for Lovenox for VTE prophylaxis as of 6/27/25  Plavix remains on hold pending Neurosurgery clearance  Neuro check q4h  PT/OT consulted. High intensity therapy suggested.     VTE prophylaxis: Lovenox     Patient condition:  Fair     Anticipated discharge and Disposition:     TBD.        All diagnosis and differential diagnosis have been reviewed; assessment and plan has been documented; I have personally reviewed the labs and test results that are presently available; I have reviewed the patients medication list; I have reviewed the consulting providers response and recommendations. I have reviewed or attempted to review medical records based upon their availability    All of the patient's questions have been  addressed and answered. Patient's is agreeable to the above stated plan. I will continue to monitor closely and make adjustments to medical management as needed.    Portions of this note dictated using EMR integrated voice recognition software, and may be subject to voice recognition errors not corrected at proofreading. Please contact writer for clarification if needed.   _____________________________________________________________________    Malnutrition Status:  Nutrition consulted. Most recent weight and BMI monitored-     Measurements:  Wt Readings from Last 1 Encounters:   06/12/25 49.9 kg (110 lb)   Body mass index is 17.75 kg/m².    Patient has been screened and assessed by RD.    Malnutrition Type:  Context: acute illness or injury  Level: severe    Malnutrition Characteristic Summary:  Weight Loss (Malnutrition): greater than 5% in 1 month  Energy Intake (Malnutrition): other (see comments) (Does not meet criteria)  Subcutaneous Fat (Malnutrition): moderate depletion  Muscle Mass (Malnutrition): moderate depletion  Fluid Accumulation  (Malnutrition): other (see comments) (Not present)  Hand  Strength, Right (Malnutrition): Unable to assess    Interventions/Recommendations (treatment strategy):  Oral diet/nutrient modifications     Scheduled Med:   atorvastatin  80 mg Oral Daily    busPIRone  15 mg Oral BID    ceFEPime IV (PEDS and ADULTS)  1 g Intravenous Q6H    enoxparin  40 mg Subcutaneous Q24H (prophylaxis, 1700)    ezetimibe  10 mg Oral Daily    famotidine  20 mg Oral BID    hydrocortisone   Topical (Top) BID    Lactobacillus rhamnosus GG  1 packet Oral Daily    levETIRAcetam  500 mg Oral BID    levothyroxine  88 mcg Oral Before breakfast    metoprolol succinate  12.5 mg Oral Daily    nortriptyline  25 mg Oral QHS    psyllium husk  1 packet Oral Daily    traZODone  25 mg Oral QHS      Continuous Infusions:     PRN Meds:    Current Facility-Administered Medications:     acetaminophen, 650 mg, Rectal, Q4H PRN    acetaminophen, 650 mg, Oral, Q4H PRN    bisacodyL, 10 mg, Rectal, Daily PRN    butalbital-acetaminophen-caffeine -40 mg, 1 tablet, Oral, Q4H PRN    dextrose 50%, 12.5 g, Intravenous, PRN    dextrose 50%, 25 g, Intravenous, PRN    diphenhydrAMINE, 25 mg, Oral, Q6H PRN    glucagon (human recombinant), 1 mg, Intramuscular, PRN    glucose, 16 g, Oral, PRN    glucose, 24 g, Oral, PRN    hydrALAZINE, 10 mg, Intravenous, Q4H PRN    insulin aspart U-100, 0-5 Units, Subcutaneous, QID (AC + HS) PRN    labetalol, 10 mg, Intravenous, Q4H PRN    melatonin, 6 mg, Oral, Nightly PRN    morphine, 2 mg, Intravenous, Q4H PRN    naloxone, 0.02 mg, Intravenous, PRN    ondansetron, 4 mg, Intravenous, Q8H PRN    oxyCODONE-acetaminophen, 1 tablet, Oral, Q4H PRN    prochlorperazine, 5 mg, Intravenous, Q6H PRN    sodium chloride 0.9%, 10 mL, Intravenous, PRN    Flushing PICC/Midline Protocol, , , Until Discontinued **AND** sodium chloride 0.9%, 10 mL, Intravenous, Q12H PRN    sodium chloride 0.9%, 3 mL, Intravenous, Q12H PRN         Argentina Jerome,  MD  Department of Hospital Medicine   [facility]   06/29/2025

## 2025-06-29 NOTE — PLAN OF CARE
Problem: Adult Inpatient Plan of Care  Goal: Absence of Hospital-Acquired Illness or Injury  Outcome: Progressing     Problem: Adult Inpatient Plan of Care  Goal: Optimal Comfort and Wellbeing  Outcome: Progressing     Problem: Wound  Goal: Optimal Coping  Outcome: Progressing     Problem: Wound  Goal: Optimal Functional Ability  Outcome: Progressing     Problem: Wound  Goal: Skin Health and Integrity  Outcome: Progressing     Problem: Wound  Goal: Optimal Wound Healing  Outcome: Progressing     Problem: Infection  Goal: Absence of Infection Signs and Symptoms  Outcome: Progressing     Problem: Fall Injury Risk  Goal: Absence of Fall and Fall-Related Injury  Outcome: Progressing

## 2025-06-30 LAB
ANION GAP SERPL CALC-SCNC: 9 MEQ/L
BASOPHILS # BLD AUTO: 0.07 X10(3)/MCL
BASOPHILS NFR BLD AUTO: 1.3 %
BUN SERPL-MCNC: 11.2 MG/DL (ref 9.8–20.1)
CALCIUM SERPL-MCNC: 9.1 MG/DL (ref 8.4–10.2)
CHLORIDE SERPL-SCNC: 105 MMOL/L (ref 98–107)
CO2 SERPL-SCNC: 27 MMOL/L (ref 23–31)
CREAT SERPL-MCNC: 0.57 MG/DL (ref 0.55–1.02)
CREAT/UREA NIT SERPL: 20
EOSINOPHIL # BLD AUTO: 0.31 X10(3)/MCL (ref 0–0.9)
EOSINOPHIL NFR BLD AUTO: 5.7 %
ERYTHROCYTE [DISTWIDTH] IN BLOOD BY AUTOMATED COUNT: 14.9 % (ref 11.5–17)
GFR SERPLBLD CREATININE-BSD FMLA CKD-EPI: >60 ML/MIN/1.73/M2
GLUCOSE SERPL-MCNC: 150 MG/DL (ref 82–115)
HCT VFR BLD AUTO: 31 % (ref 37–47)
HGB BLD-MCNC: 9.9 G/DL (ref 12–16)
IMM GRANULOCYTES # BLD AUTO: 0.03 X10(3)/MCL (ref 0–0.04)
IMM GRANULOCYTES NFR BLD AUTO: 0.5 %
LYMPHOCYTES # BLD AUTO: 1.54 X10(3)/MCL (ref 0.6–4.6)
LYMPHOCYTES NFR BLD AUTO: 28.2 %
MAGNESIUM SERPL-MCNC: 1.9 MG/DL (ref 1.6–2.6)
MCH RBC QN AUTO: 29.6 PG (ref 27–31)
MCHC RBC AUTO-ENTMCNC: 31.9 G/DL (ref 33–36)
MCV RBC AUTO: 92.5 FL (ref 80–94)
MONOCYTES # BLD AUTO: 0.59 X10(3)/MCL (ref 0.1–1.3)
MONOCYTES NFR BLD AUTO: 10.8 %
NEUTROPHILS # BLD AUTO: 2.93 X10(3)/MCL (ref 2.1–9.2)
NEUTROPHILS NFR BLD AUTO: 53.5 %
NRBC BLD AUTO-RTO: 0 %
PHOSPHATE SERPL-MCNC: 3.8 MG/DL (ref 2.3–4.7)
PLATELET # BLD AUTO: 509 X10(3)/MCL (ref 130–400)
PMV BLD AUTO: 9.2 FL (ref 7.4–10.4)
POCT GLUCOSE: 238 MG/DL (ref 70–110)
POTASSIUM SERPL-SCNC: 3.6 MMOL/L (ref 3.5–5.1)
RBC # BLD AUTO: 3.35 X10(6)/MCL (ref 4.2–5.4)
SODIUM SERPL-SCNC: 141 MMOL/L (ref 136–145)
WBC # BLD AUTO: 5.47 X10(3)/MCL (ref 4.5–11.5)

## 2025-06-30 PROCEDURE — 99232 SBSQ HOSP IP/OBS MODERATE 35: CPT | Mod: ,,,

## 2025-06-30 PROCEDURE — 84100 ASSAY OF PHOSPHORUS: CPT | Performed by: INTERNAL MEDICINE

## 2025-06-30 PROCEDURE — 25000242 PHARM REV CODE 250 ALT 637 W/ HCPCS: Performed by: INTERNAL MEDICINE

## 2025-06-30 PROCEDURE — 63600175 PHARM REV CODE 636 W HCPCS

## 2025-06-30 PROCEDURE — 97116 GAIT TRAINING THERAPY: CPT | Mod: CQ

## 2025-06-30 PROCEDURE — 97530 THERAPEUTIC ACTIVITIES: CPT | Mod: CQ

## 2025-06-30 PROCEDURE — 25000003 PHARM REV CODE 250: Performed by: NURSE PRACTITIONER

## 2025-06-30 PROCEDURE — 36415 COLL VENOUS BLD VENIPUNCTURE: CPT | Performed by: INTERNAL MEDICINE

## 2025-06-30 PROCEDURE — 25000003 PHARM REV CODE 250: Performed by: INTERNAL MEDICINE

## 2025-06-30 PROCEDURE — 83735 ASSAY OF MAGNESIUM: CPT | Performed by: INTERNAL MEDICINE

## 2025-06-30 PROCEDURE — 63600175 PHARM REV CODE 636 W HCPCS: Performed by: INTERNAL MEDICINE

## 2025-06-30 PROCEDURE — 80048 BASIC METABOLIC PNL TOTAL CA: CPT | Performed by: INTERNAL MEDICINE

## 2025-06-30 PROCEDURE — 85025 COMPLETE CBC W/AUTO DIFF WBC: CPT | Performed by: INTERNAL MEDICINE

## 2025-06-30 PROCEDURE — 21400001 HC TELEMETRY ROOM

## 2025-06-30 PROCEDURE — 92526 ORAL FUNCTION THERAPY: CPT

## 2025-06-30 RX ADMIN — ACETAMINOPHEN 650 MG: 325 TABLET ORAL at 07:06

## 2025-06-30 RX ADMIN — CEFEPIME 1 G: 1 INJECTION, POWDER, FOR SOLUTION INTRAMUSCULAR; INTRAVENOUS at 08:06

## 2025-06-30 RX ADMIN — CEFEPIME 1 G: 1 INJECTION, POWDER, FOR SOLUTION INTRAMUSCULAR; INTRAVENOUS at 06:06

## 2025-06-30 RX ADMIN — NORTRIPTYLINE HYDROCHLORIDE 25 MG: 25 CAPSULE ORAL at 08:06

## 2025-06-30 RX ADMIN — METOPROLOL SUCCINATE 12.5 MG: 25 TABLET, EXTENDED RELEASE ORAL at 09:06

## 2025-06-30 RX ADMIN — FAMOTIDINE 20 MG: 20 TABLET, FILM COATED ORAL at 09:06

## 2025-06-30 RX ADMIN — ENOXAPARIN SODIUM 40 MG: 40 INJECTION SUBCUTANEOUS at 06:06

## 2025-06-30 RX ADMIN — CEFEPIME 1 G: 1 INJECTION, POWDER, FOR SOLUTION INTRAMUSCULAR; INTRAVENOUS at 02:06

## 2025-06-30 RX ADMIN — FAMOTIDINE 20 MG: 20 TABLET, FILM COATED ORAL at 08:06

## 2025-06-30 RX ADMIN — EZETIMIBE 10 MG: 10 TABLET ORAL at 09:06

## 2025-06-30 RX ADMIN — ATORVASTATIN CALCIUM 80 MG: 40 TABLET, FILM COATED ORAL at 09:06

## 2025-06-30 RX ADMIN — BUSPIRONE HYDROCHLORIDE 15 MG: 5 TABLET ORAL at 08:06

## 2025-06-30 RX ADMIN — TRAZODONE HYDROCHLORIDE 25 MG: 50 TABLET ORAL at 08:06

## 2025-06-30 RX ADMIN — LEVETIRACETAM 500 MG: 500 TABLET, FILM COATED ORAL at 09:06

## 2025-06-30 RX ADMIN — Medication 6 MG: at 08:06

## 2025-06-30 RX ADMIN — LEVETIRACETAM 500 MG: 500 TABLET, FILM COATED ORAL at 08:06

## 2025-06-30 RX ADMIN — PSYLLIUM HUSK 1 PACKET: 3.4 POWDER ORAL at 09:06

## 2025-06-30 RX ADMIN — ACETAMINOPHEN 650 MG: 325 TABLET ORAL at 02:06

## 2025-06-30 RX ADMIN — HYDROCORTISONE: 1 CREAM TOPICAL at 09:06

## 2025-06-30 RX ADMIN — LEVOTHYROXINE SODIUM 88 MCG: 0.09 TABLET ORAL at 06:06

## 2025-06-30 RX ADMIN — INSULIN ASPART 1 UNITS: 100 INJECTION, SOLUTION INTRAVENOUS; SUBCUTANEOUS at 08:06

## 2025-06-30 RX ADMIN — Medication 1 EACH: at 09:06

## 2025-06-30 RX ADMIN — BUSPIRONE HYDROCHLORIDE 15 MG: 5 TABLET ORAL at 09:06

## 2025-06-30 RX ADMIN — ACETAMINOPHEN 650 MG: 325 TABLET ORAL at 09:06

## 2025-06-30 NOTE — PT/OT/SLP PROGRESS
[facility]  Speech Language Pathology Department  Dysphagia Therapy Progress Note    Patient Name:  Chelle Chandra   MRN:  [id number]    Recommendations     General recommendations:  dysphagia therapy  Solid texture recommendation:  Regular Diet - IDDSI Level 7  Liquid consistency recommendation: Moderately thick liquids - IDDSI Level 3   Medications: crushed in puree  Aspiration precautions: small bites/sips, slow rate, alternate solids/liquids, and upright for PO intake    Discharge therapy intensity: High Intensity Therapy   Barriers to safe discharge:  none    Subjective     Patient awake, alert, and cooperative.  Spiritual/Cultural/Latter day Beliefs/Practices that affect care: no    Pain/Comfort:  0/10    Respiratory Status:  room air    Objective     Therapeutic Activities:  Pt completed laryngeal exercises x60 with minimal cues.    Assessment     Pt continues to present with oropharyngeal dysphagia requiring diet modification to reduce the risk of aspiration.    Outcome Measures     Functional Oral Intake Scale: 5 - Total oral diet with multiple consistencies, by requiring special preparation or compensations    Goals     Multidisciplinary Problems       SLP Goals          Problem: SLP    Goal Priority Disciplines Outcome   SLP Goal     SLP    Description: LTG: The pt will tolerate least restrictive diet with no overt s/sx of aspiration.    STGs:  1. Patient will participate CTAR/pharyngeal exercises  2. Patient will participate in laryngeal elevation exercises  3. Patient will participate in repeat MBS when clinically appropriate                     Patient Education     Patient provided with verbal education regarding SLP POC.  Understanding was verbalized, however additional teaching warranted.    Plan     Will continue to follow and tx as appropriate.    SLP Follow-Up:  Yes   Patient to be seen:  5 x/week   Plan of Care expires:  07/08/25  Plan of Care reviewed with:  patient        Time Tracking     SLP Treatment Date:   06/30/25  Speech Start Time:  1429  Speech Stop Time:  1443     Speech Total Time (min):  14 min    Billable minutes:  Treatment of Swallow Dysfunction, 14 minutes       06/30/2025

## 2025-06-30 NOTE — PROGRESS NOTES
[facility] - Neurology  Neurosurgery  Progress Note    Subjective:     Interval History:   POD#7 left craniectomy for evacuation of empyema   Examined patient at bedside. She is curled up in bed, awake, alert, NAD. Complaints of mild headache. Denies blurred vision and N/V. She continues with right UE weakness.       Post-Op Info:  Procedure(s) (LRB):  CRANIECTOMY (Left)   7 Days Post-Op      Medications:  Continuous Infusions:  Scheduled Meds:   atorvastatin  80 mg Oral Daily    busPIRone  15 mg Oral BID    ceFEPime IV (PEDS and ADULTS)  1 g Intravenous Q6H    enoxparin  40 mg Subcutaneous Q24H (prophylaxis, 1700)    ezetimibe  10 mg Oral Daily    famotidine  20 mg Oral BID    hydrocortisone   Topical (Top) BID    Lactobacillus rhamnosus GG  1 packet Oral Daily    levETIRAcetam  500 mg Oral BID    levothyroxine  88 mcg Oral Before breakfast    metoprolol succinate  12.5 mg Oral Daily    nortriptyline  25 mg Oral QHS    psyllium husk  1 packet Oral Daily    traZODone  25 mg Oral QHS     PRN Meds:  Current Facility-Administered Medications:     acetaminophen, 650 mg, Rectal, Q4H PRN    acetaminophen, 650 mg, Oral, Q4H PRN    bisacodyL, 10 mg, Rectal, Daily PRN    butalbital-acetaminophen-caffeine -40 mg, 1 tablet, Oral, Q4H PRN    dextrose 50%, 12.5 g, Intravenous, PRN    dextrose 50%, 25 g, Intravenous, PRN    diphenhydrAMINE, 25 mg, Oral, Q6H PRN    glucagon (human recombinant), 1 mg, Intramuscular, PRN    glucose, 16 g, Oral, PRN    glucose, 24 g, Oral, PRN    hydrALAZINE, 10 mg, Intravenous, Q4H PRN    insulin aspart U-100, 0-5 Units, Subcutaneous, QID (AC + HS) PRN    labetalol, 10 mg, Intravenous, Q4H PRN    melatonin, 6 mg, Oral, Nightly PRN    morphine, 2 mg, Intravenous, Q4H PRN    naloxone, 0.02 mg, Intravenous, PRN    ondansetron, 4 mg, Intravenous, Q8H PRN    oxyCODONE-acetaminophen, 1 tablet, Oral, Q4H PRN    prochlorperazine, 5 mg, Intravenous, Q6H PRN    sodium chloride 0.9%, 10  "mL, Intravenous, PRN    Flushing PICC/Midline Protocol, , , Until Discontinued **AND** sodium chloride 0.9%, 10 mL, Intravenous, Q12H PRN    sodium chloride 0.9%, 3 mL, Intravenous, Q12H PRN     Review of Systems  Objective:     Weight: 49.9 kg (110 lb)  Body mass index is 17.75 kg/m².  Vital Signs (Most Recent):  Temp: 98.3 °F (36.8 °C) (06/30/25 0950)  Pulse: 87 (06/30/25 0949)  Resp: 16 (06/28/25 0921)  BP: 128/85 (06/30/25 0949)  SpO2: 98 % (06/30/25 0735) Vital Signs (24h Range):  Temp:  [98.3 °F (36.8 °C)-98.6 °F (37 °C)] 98.3 °F (36.8 °C)  Pulse:  [82-87] 87  SpO2:  [97 %-98 %] 98 %  BP: (128-130)/(80-85) 128/85              Closed/Suction Drain 06/23/25 1353 Tube - 1 Left Scalp Bulb 10 Fr. (Active)   Site Description Unable to view 06/26/25 0930   Dressing Type Gauze 06/27/25 2000   Dressing Status Clean;Dry;Intact 06/27/25 2000   Dressing Intervention Integrity maintained 06/27/25 2000   Drainage Serosanguineous 06/27/25 2000   Status To bulb suction 06/27/25 2000   Output (mL) 10 mL 06/28/25 0623       Neurosurgery Physical Exam    AFVSS  PERRL, EOMI  Alert, oriented to all spheres  Motor exam is unchanged  Incision C/D/I  Drain output 0 cc/24 hrs, serosanguinous      Significant Labs:  Recent Labs   Lab 06/30/25  0401   *      K 3.6      CO2 27   BUN 11.2   CREATININE 0.57   CALCIUM 9.1   MG 1.90       Recent Labs   Lab 06/30/25  0401   WBC 5.47   HGB 9.9*   HCT 31.0*   *       No results for input(s): "LABPT", "INR", "APTT" in the last 48 hours.  Microbiology Results (last 7 days)       Procedure Component Value Units Date/Time    Tissue Culture - Aerobic [[id number]] Collected: 06/23/25 1314    Order Status: Completed Specimen: Tissue from Brain Updated: 06/28/25 0920     Tissue - Aerobic Culture No Growth    Tissue Culture - Aerobic [[id number]]  (Abnormal) Collected: 06/23/25 1311    Order Status: Completed Specimen: Tissue from Brain Updated: 06/28/25 0918     Tissue - " Aerobic Culture Rare Pseudomonas stutzeri    Tissue Culture - Aerobic [[id number]]  (Abnormal)  (Susceptibility) Collected: 06/23/25 1313    Order Status: Completed Specimen: Tissue from Brain Updated: 06/27/25 0928     Tissue - Aerobic Culture Staphylococcus epidermidis     Comment: Isolated from Thio only       Anaerobic Culture [[id number]] Collected: 06/23/25 1314    Order Status: Completed Specimen: Tissue from Brain Updated: 06/26/25 0907     Anaerobe Culture No Anaerobes Isolated    Anaerobic Culture [[id number]] Collected: 06/23/25 1313    Order Status: Completed Specimen: Tissue from Brain Updated: 06/26/25 0907     Anaerobe Culture No Anaerobes Isolated    Anaerobic Culture [[id number]] Collected: 06/23/25 1311    Order Status: Completed Specimen: Tissue from Brain Updated: 06/26/25 0904     Anaerobe Culture No Anaerobes Isolated    Gram Stain [[id number]] Collected: 06/23/25 1311    Order Status: Completed Specimen: Tissue from Brain Updated: 06/24/25 0707     GRAM STAIN Many WBC observed      No bacteria seen    Gram Stain [[id number]] Collected: 06/23/25 1313    Order Status: Completed Specimen: Tissue from Brain Updated: 06/24/25 0706     GRAM STAIN Few WBC observed      Rare Gram positive cocci    Gram Stain [[id number]] Collected: 06/23/25 1314    Order Status: Completed Specimen: Tissue from Brain Updated: 06/24/25 0705     GRAM STAIN Few WBC observed      Few Gram positive cocci    Fungal Culture [[id number]] Collected: 06/23/25 1314    Order Status: Sent Specimen: Tissue from Brain Updated: 06/23/25 1326    Fungal Culture [[id number]] Collected: 06/23/25 1313    Order Status: Sent Specimen: Tissue from Brain Updated: 06/23/25 1326    Fungal Culture [[id number]] Collected: 06/23/25 1311    Order Status: Sent Specimen: Tissue from Brain Updated: 06/23/25 1326          All pertinent labs from the last 24 hours have been reviewed.  Significant Diagnostics:  I have reviewed all pertinent imaging  results/findings within the past 24 hours.    Assessment/Plan:    POD#7 left craniectomy for evacuation of empyema     Floor status  Continue drain for now, removal soon  OK to leave incision open to air  Neuro checks Q4  BP parameters below 140/90  HOB> 30  Keppra BID  Antibx per ID  Encouraged PT/OT  SCDs for DVT prophylaxis; ok for lovenox  Fall precautions         Active Diagnoses:    Diagnosis Date Noted POA    PRINCIPAL PROBLEM:  Subdural empyema [G06.2] 06/26/2025 Yes    Severe malnutrition [E43] 05/31/2025 Yes      Problems Resolved During this Admission:       LUIS Quiroga  Neurosurgery  [facility] - Neurology

## 2025-06-30 NOTE — PLAN OF CARE
Problem: Adult Inpatient Plan of Care  Goal: Plan of Care Review  Outcome: Progressing     Problem: Adult Inpatient Plan of Care  Goal: Absence of Hospital-Acquired Illness or Injury  Outcome: Progressing     Problem: Adult Inpatient Plan of Care  Goal: Optimal Comfort and Wellbeing  Outcome: Progressing     Problem: Wound  Goal: Optimal Functional Ability  Outcome: Progressing     Problem: Wound  Goal: Absence of Infection Signs and Symptoms  Outcome: Progressing     Problem: Wound  Goal: Optimal Pain Control and Function  Outcome: Progressing     Problem: Wound  Goal: Skin Health and Integrity  Outcome: Progressing     Problem: Wound  Goal: Optimal Wound Healing  Outcome: Progressing

## 2025-06-30 NOTE — PROGRESS NOTES
[facility] Hospital Medicine Progress Note        Chief Complaint: Inpatient Follow-up     HPI:   59-year-old female with PMHx of left MCA stroke s/p thrombectomy in 6/2024 on Plavix, hypothyroidism, anxiety, bronchial asthma, COPD, type 2 diabetes mellitus,  and GERD. Patient recently had subdural hematoma following a fall in May requiring intracranial embolization, craniotomy with hematoma evacuation. After this patient was discharged home. Late May she presented back to the ED with headache, right-sided weakness, facial droop and imaging was concerning for left MCA diminished flow and subacute hematoma. MRI brain was negative for CVA. Neurosurgery performed diony hole for drainage of subdural hematoma due to increased intracranial pressure with postop CT showing definitive improvement patient was on Plavix post embolization which was held briefly and resumed per Neurosurgery on 06/05 and she was transferred to rehab on 06/06. While in rehab patient was noted to have increase right upper extremity weakness and also significant headaches. CT head revealed reaccumulation of subdural fluid collection, increased from before patient was sent back to main [place] for further evaluation/neurosurgery services. Neurosurgery evaluated, planning for  shunt this hospitalization, home meds resumed as appropriate except for Plavix, symptomatic management for headache.  shunt scheduled for 6/16. Patient underwent redo diony hole, fluid evacuation and drain placement, concern for surgical site infection and therefore underwent cranioplasty. Infectious Disease consulted and patient was placed on cefepime, vancomycin and Flagyl pending intraoperative cultures.  Intraoperative cultures growing staph epi.  ID recommending IV vancomycin, IV Rocephin until 7/3.  Neurosurgery wished to monitor patient for another day until 6/21.  PT/OT on board; recommending high-intensity therapy.  Speech therapy  recommending regular diet with moderately thick liquids.  CM on board for placement.      On 6/23 discharge to rehab was planned but Pt developed RUE weakness and Ct head showed increasing size of extra-axial fluid collections along cerebral convexity with increasing mass effect & increase parenchymal edema in L posterior frontal lobe. Pt was taken back to OR and underwent left craniotomy and evacuation of empyema. Post operatively admitted to ICU. Intraoperative culture from 6/23 grew  staph epi and rare Pseudomonas stutzeri.  ID suggested to stop Vancomycin and continue Cefepime as Staph epi is oxacillin sensitive and Cefepime will cover both Pseudomonas and MSSE with total course at least 6 to 8 weeks. Pt's care downgraded back to  service on 6/25/25.     ID requested sensitivity testing for Pseudomonas stutzeri, however per hospital microbiology their instrument was not able to run sensitivities, and they are sending the sample to [facility] for further testing. ID recommended to continue Cefepime at this time with tentative end date 8/18/2025.     Pt remains with daily headache and dense weakness to RUE. MRI brain on 6/27/25 showed no acute stroke but noted post surgical left dural thickening and enhancement, left cerebral encephalomalacia with extensive gliotic changes.    As of 6/30 Pt is able to move her Rt upper ext some. Swelling noted inner aspect of Rt arm with U/S suggestive of hematoma 5.6x3.6x1.7 cm.        Interval Hx:   Today for the first time Pt is able to move her Rt arm some. Also noted sensation returning and felt a pain on the inner aspect of arm with swelling.   U/S ordered and suggested  a hematoma   Vitals are stable, on RA  Labs are stable except reactive thrombocytosis with Plt count 509      Case was discussed with patient's nurse and  on the floor.    Objective/physical exam:  General: In no acute distress, afebrile  Chest: Clear to auscultation bilaterally  Heart: RRR, +S1, S2,  no appreciable murmur  Abdomen: Soft, nontender, BS +  MSK: Warm, no lower extremity edema, no clubbing or cyanosis  Neurologic: Alert and oriented x3, RUE- 1/5, RLE 2/5, Left U/L ext- 5/5        VITAL SIGNS: 24 HRS MIN & MAX LAST   Temp  Min: 97.5 °F (36.4 °C)  Max: 98.6 °F (37 °C) 98.2 °F (36.8 °C)   BP  Min: 128/85  Max: 136/70 136/70   Pulse  Min: 78  Max: 87  78   No data recorded 16   SpO2  Min: 96 %  Max: 98 % 96 %     I have reviewed the following labs:  Recent Labs   Lab 06/27/25  0420 06/30/25  0401   WBC 6.91 5.47   RBC 3.22* 3.35*   HGB 9.6* 9.9*   HCT 29.4* 31.0*   MCV 91.3 92.5   MCH 29.8 29.6   MCHC 32.7* 31.9*   RDW 14.6 14.9   * 509*   MPV 9.4 9.2     Recent Labs   Lab 06/26/25  0722 06/27/25  0420 06/30/25  0401   NA  --  142 141   K  --  3.3* 3.6   CL  --  109* 105   CO2  --  26 27   BUN  --  3.5* 11.2   CREATININE 0.53* 0.56 0.57   GLU  --  144* 150*   CALCIUM  --  8.5 9.1   MG  --  1.60 1.90     Microbiology Results (last 7 days)       Procedure Component Value Units Date/Time    Fungal Culture [[id number]]  (Normal) Collected: 06/16/25 1338    Order Status: Completed Specimen: Tissue from Head Updated: 06/30/25 1501     Fungal Culture No Fungus Isolated at 2 Weeks    Fungal Culture [[id number]]  (Normal) Collected: 06/16/25 1357    Order Status: Completed Specimen: Bone from Head Updated: 06/30/25 1501     Fungal Culture No Fungus Isolated at 2 Weeks    Tissue Culture - Aerobic [[id number]]  (Abnormal) Collected: 06/23/25 1311    Order Status: Completed Specimen: Tissue from Brain Updated: 06/30/25 1406     Tissue - Aerobic Culture Rare Pseudomonas stutzeri     Comment: Susceptibility sent to reference lab. Results to follow on separate report.       Tissue Culture - Aerobic [[id number]] Collected: 06/23/25 1314    Order Status: Completed Specimen: Tissue from Brain Updated: 06/28/25 0920     Tissue - Aerobic Culture No Growth    Tissue Culture - Aerobic [[id number]]  (Abnormal)   (Susceptibility) Collected: 06/23/25 1313    Order Status: Completed Specimen: Tissue from Brain Updated: 06/27/25 0928     Tissue - Aerobic Culture Staphylococcus epidermidis     Comment: Isolated from Thio only       Anaerobic Culture [[id number]] Collected: 06/23/25 1314    Order Status: Completed Specimen: Tissue from Brain Updated: 06/26/25 0907     Anaerobe Culture No Anaerobes Isolated    Anaerobic Culture [[id number]] Collected: 06/23/25 1313    Order Status: Completed Specimen: Tissue from Brain Updated: 06/26/25 0907     Anaerobe Culture No Anaerobes Isolated    Anaerobic Culture [[id number]] Collected: 06/23/25 1311    Order Status: Completed Specimen: Tissue from Brain Updated: 06/26/25 0904     Anaerobe Culture No Anaerobes Isolated    Gram Stain [[id number]] Collected: 06/23/25 1311    Order Status: Completed Specimen: Tissue from Brain Updated: 06/24/25 0707     GRAM STAIN Many WBC observed      No bacteria seen    Gram Stain [[id number]] Collected: 06/23/25 1313    Order Status: Completed Specimen: Tissue from Brain Updated: 06/24/25 0706     GRAM STAIN Few WBC observed      Rare Gram positive cocci    Gram Stain [[id number]] Collected: 06/23/25 1314    Order Status: Completed Specimen: Tissue from Brain Updated: 06/24/25 0705     GRAM STAIN Few WBC observed      Few Gram positive cocci             See below for Radiology    Assessment/Plan:  Left Subdural empyema - 6/23/25 s/p left craniotomy and evacuation of empyema with tissue culture positive for staph epidermidis and Pseudomonas stutzeri  Traumatic Subdural hematoma , s/p left frontoparietal craniotomy and evacuation on 5/19/25   Chronic left SDH with new onset headache, Rt sided weakness and facial droop, s/p left diony hole on 6/2/25  Post op Increased subdural fluid collection, s/p left redo diony hole with fluid evacuation, drain placement on 6/16  Surgical site infection status post cranioplasty on 6/16 with tissue culture positive for Staph  epidermidis   Hematoma , RUE- 6/30/25- conservative management       Hx-  CVA in June, 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,Depression/anxiety ,HLD, Hypothyroidism, Essential HTN, Type 2 diabetes mellitus.      Plan-   ID evaluated the case and suggested to stop Vancomycin and continue Cefepime 1 gram q6h until final sensitivity is known with anticipated end date 8/18/25. Await final sensitivity data for Pseudomonas stutzeri.      Continue [facility]   Neurosurgery cleared Pt for Lovenox for VTE prophylaxis as of 6/27/25  Plavix remains on hold pending Neurosurgery clearance  Neuro check q4h  PT/OT consulted. High intensity therapy suggested.     VTE prophylaxis: Lovenox     Patient condition:  Fair     Anticipated discharge and Disposition:     TBD.            All diagnosis and differential diagnosis have been reviewed; assessment and plan has been documented; I have personally reviewed the labs and test results that are presently available; I have reviewed the patients medication list; I have reviewed the consulting providers response and recommendations. I have reviewed or attempted to review medical records based upon their availability    All of the patient's questions have been  addressed and answered. Patient's is agreeable to the above stated plan. I will continue to monitor closely and make adjustments to medical management as needed.    Portions of this note dictated using EMR integrated voice recognition software, and may be subject to voice recognition errors not corrected at proofreading. Please contact writer for clarification if needed.   _____________________________________________________________________    Malnutrition Status:  Nutrition consulted. Most recent weight and BMI monitored-     Measurements:  Wt Readings from Last 1 Encounters:   06/12/25 49.9 kg (110 lb)   Body mass index is 17.75 kg/m².    Patient has been screened and assessed by RD.    Malnutrition Type:  Context: acute illness or  injury  Level: severe    Malnutrition Characteristic Summary:  Weight Loss (Malnutrition): greater than 5% in 1 month  Energy Intake (Malnutrition): other (see comments) (Does not meet criteria)  Subcutaneous Fat (Malnutrition): moderate depletion  Muscle Mass (Malnutrition): moderate depletion  Fluid Accumulation (Malnutrition): other (see comments) (Not present)  Hand  Strength, Right (Malnutrition): Unable to assess    Interventions/Recommendations (treatment strategy):  Oral diet/nutrient modifications     Scheduled Med:   atorvastatin  80 mg Oral Daily    busPIRone  15 mg Oral BID    ceFEPime IV (PEDS and ADULTS)  1 g Intravenous Q6H    enoxparin  40 mg Subcutaneous Q24H (prophylaxis, 1700)    ezetimibe  10 mg Oral Daily    famotidine  20 mg Oral BID    hydrocortisone   Topical (Top) BID    Lactobacillus rhamnosus GG  1 packet Oral Daily    levETIRAcetam  500 mg Oral BID    levothyroxine  88 mcg Oral Before breakfast    metoprolol succinate  12.5 mg Oral Daily    nortriptyline  25 mg Oral QHS    psyllium husk  1 packet Oral Daily    traZODone  25 mg Oral QHS      Continuous Infusions:     PRN Meds:    Current Facility-Administered Medications:     acetaminophen, 650 mg, Rectal, Q4H PRN    acetaminophen, 650 mg, Oral, Q4H PRN    bisacodyL, 10 mg, Rectal, Daily PRN    butalbital-acetaminophen-caffeine -40 mg, 1 tablet, Oral, Q4H PRN    dextrose 50%, 12.5 g, Intravenous, PRN    dextrose 50%, 25 g, Intravenous, PRN    diphenhydrAMINE, 25 mg, Oral, Q6H PRN    glucagon (human recombinant), 1 mg, Intramuscular, PRN    glucose, 16 g, Oral, PRN    glucose, 24 g, Oral, PRN    hydrALAZINE, 10 mg, Intravenous, Q4H PRN    insulin aspart U-100, 0-5 Units, Subcutaneous, QID (AC + HS) PRN    labetalol, 10 mg, Intravenous, Q4H PRN    melatonin, 6 mg, Oral, Nightly PRN    morphine, 2 mg, Intravenous, Q4H PRN    naloxone, 0.02 mg, Intravenous, PRN    ondansetron, 4 mg, Intravenous, Q8H PRN    oxyCODONE-acetaminophen, 1  tablet, Oral, Q4H PRN    prochlorperazine, 5 mg, Intravenous, Q6H PRN    sodium chloride 0.9%, 10 mL, Intravenous, PRN    Flushing PICC/Midline Protocol, , , Until Discontinued **AND** sodium chloride 0.9%, 10 mL, Intravenous, Q12H PRN    sodium chloride 0.9%, 3 mL, Intravenous, Q12H PRN         Argentina Jerome MD  Department of Hospital Medicine   [facility]   06/30/2025

## 2025-06-30 NOTE — PT/OT/SLP PROGRESS
Physical Therapy Treatment    Patient Name:  Chelle Chandra   MRN:  [id number]    Recommendations:     Discharge therapy intensity: High Intensity Therapy   Discharge Equipment Recommendations: to be determined by next level of care  Barriers to discharge: Impaired mobility and Ongoing medical needs    Assessment:     Chelle Chandra is a 59 y.o. female admitted with a medical diagnosis of R sided weakness and facial droop due to L SDH s/p craniotomy and MMA embolization. On 5/30 patient with subdural hemorrhage s/p L diony hole. Pt then with new R sided weakness and underwent a redo of diony hole for fluid evacuation/drain placement and a left cranioplasty for subdural fluid evacuation on 6/16. Now with ICH s/p craniectomy on 6/23 .  She presents with the following impairments/functional limitations: weakness, impaired sensation, impaired self care skills, impaired functional mobility, gait instability, impaired balance, decreased upper extremity function, decreased lower extremity function, decreased coordination, decreased safety awareness   .  Rehab Prognosis: Good; patient would benefit from acute skilled PT services to address these deficits and reach maximum level of function.    Recent Surgery: Procedure(s) (LRB):  CRANIECTOMY (Left) 7 Days Post-Op    Plan:     During this hospitalization, patient would benefit from acute PT services 6 x/week to address the identified rehab impairments via gait training, therapeutic activities, therapeutic exercises, neuromuscular re-education and progress toward the following goals:    Plan of Care Expires:  07/24/25    Subjective     Chief Complaint: helmet does not fit well.   Patient/Family Comments/goals: to get better  Pain/Comfort:  Pain Rating 1:  (incision to L head, very TTT, no pain at rest)      Objective:     Communicated with pts nurse prior to session.  Patient found HOB elevated with JOHNATHAN drain, peripheral IV, telemetry upon PT entry to room.     General  Precautions: Standard, fall, seizure, HOB>30 degrees /90  Orthopedic Precautions: N/A  Braces:  (helmet OOB)  Respiratory Status: Room air  Blood Pressure: 129/85   Skin Integrity: incision L head intact, bruising/ mild swelling R UE      Functional Mobility:  Bed Mobility:     Supine to Sit: contact guard assistance and minimum assistance  Transfers:     Sit to Stand:  contact guard assistance and verbal cues for loading legs and posture/head, coming to stand  with hand-held assist and performed from EOB, chair, shower seat.  Bed to Chair: contact guard assistance and minimum assistance with  hand-held assist  using  Step Transfer and cues for attention to R LE/ foot clearance and placement  Gait: The pt ambulated bed to bathroom and bathroom to chair ~ 15 ft and 20 ft, w/ HHA, cues for R foot clearance, posture, to avoid hanging head forward and visual awareness of obstacles.   Balance: good sitting balance EOB and in shower, assisting w/ cleaning, drying and donning pajamas and socks.  Added padding in helmet to improve fit and reduce it from falling forward into eyes    Co-Treatment: No    Education:  Patient and family were provided with verbal education and demonstrations education regarding PT role/goals/POC, fall prevention, and safety awareness.  Understanding was verbalized, however additional teaching warranted.     Patient left up in chair with all lines intact, call button in reach, nurse notified, and family present      GOALS:   Multidisciplinary Problems       Physical Therapy Goals          Problem: Physical Therapy    Goal Priority Disciplines Outcome Interventions   Physical Therapy Goal     PT, PT/OT Progressing    Description: Goals to be met by: 25     Patient will increase functional independence with mobility by performin. Supine to sit with Modified [place]  2. Sit to supine with Modified [place]  3. Sit to stand transfer with Modified [place]  4. Bed to  chair transfer with Modified [place] using Rolling Walker vs QC  5. Gait  x 200 feet with Modified [place] using Rolling Walker vs QC.                          Time Tracking:     PT Received On: 06/30/25  PT Start Time: 1132     PT Stop Time: 1201  PT Total Time (min): 29 min     Billable Minutes: Gait Training 12 min and Therapeutic Activity 17 min    Treatment Type: Treatment  PT/PTA: PTA     Number of PTA visits since last PT visit: 5 06/30/2025

## 2025-06-30 NOTE — PROGRESS NOTES
Infectious Disease  Progress Note    PATIENT IDENTIFICATION:  Patient Name: Chelle Chandra : 1965 Age: 59 y.o. Sex: female  Admit Date: 2025 Hospital Day: 19  Room: [facility]        CHIEF COMPLAINT:  Follow up for surgical site infection    IMPRESSION:  MSSE + Pseudomonas stutzeri SSI s/p craniotomy   Increased subdural fluid collection s/p I&D  and craniectomy   History of COPD  Type II DM      RECOMMENDATIONS:  Continue Cefepime for MSSE and pseudomonal SSI  Follow up results of Pseudomonas stutzeri sensitivities sent to [facility], in process  Will need ~8 weeks of IV ABX. Will finalize plan once sensitivities are recovered from pseudomonas isolate.  Tentatively planning cefepime 2g Q8H through  if isolate returns with S- to Cefepime.      ______________________________________________________________________    INTERVAL HISTORY:  No acute changes overnight  Remains afebrile  Pseudomonas isolate sent to [facility] for further testing and sensitivities             Past Medical History:   Diagnosis Date    Accelerated junctional rhythm     Agatston CAC score, <100     Anxiety disorder, unspecified     Asthma     COPD type A     Diabetes mellitus without complication     GERD (gastroesophageal reflux disease)     History of COVID-19     Insomnia     Lung nodule          Past Surgical History:   Procedure Laterality Date    ANGIOGRAM, CORONARY, WITH LEFT HEART CATHETERIZATION      BACK SURGERY      BREAST LUMPECTOMY Right     CARDIOVERSION  2013    CARPAL TUNNEL RELEASE  10/23/2013    CRANIECTOMY Left 2025    Procedure: CRANIECTOMY;  Surgeon: James Rankin MD;  Location: [facility] OR;  Service: Neurosurgery;  Laterality: Left;  left redo-craniectomy for subdural empyema    CRANIECTOMY Left 2025    Procedure: CRANIECTOMY;  Surgeon: James Rankin MD;  Location: [facility] OR;  Service: Neurosurgery;  Laterality: Left;  LEFT //  PINS // SCOPE    CRANIOTOMY FOR EVACUATION OF SUBDURAL HEMATOMA Left  05/19/2025    Procedure: CRANIOTOMY, FOR SUBDURAL HEMATOMA EVACUATION;  Surgeon: Ricardo Shelley MD;  Location: [facility] OR;  Service: Neurosurgery;  Laterality: Left;    CREATION OF TERRI HOLE WITH EVACUATION OF HEMATOMA Left 6/2/2025    Procedure: CREATION, CRANIAL TERRI HOLE, WITH HEMATOMA EVACUATION;  Surgeon: James Rankin MD;  Location: [facility] OR;  Service: Neurosurgery;  Laterality: Left;  LEFT BURRHOLE FOR SUBDURAL HEMATOMA EVACUATION // STEALTH // SHERRI SKYTRON // DRILL    EVACUATION OF EMPYEMA Left 6/16/2025    Procedure: EVACUATION, EMPYEMA;  Surgeon: James Rankin MD;  Location: [facility] OR;  Service: Neurosurgery;  Laterality: Left;  left craniotomy for subdural empyema    FUSION OF CERVICAL SPINE BY ANTERIOR APPROACH USING COMPUTER-ASSISTED NAVIGATION  06/10/2019    KIDNEY SURGERY      TONSILLECTOMY AND ADENOIDECTOMY      TOTAL ABDOMINAL HYSTERECTOMY      WRIST SURGERY           Review of patient's allergies indicates:   Allergen Reactions    Aspirin     Ketorolac     Penicillins     Sulfa (sulfonamide antibiotics)     Ozempic [semaglutide] Other (See Comments)     Abdominal pain       ROS:  10 point ROS negative except as noted       Family History   Problem Relation Name Age of Onset    Heart attack Mother      Diabetes Father         Social History[1]    CURRENT MEDS:     atorvastatin  80 mg Oral Daily    busPIRone  15 mg Oral BID    ceFEPime IV (PEDS and ADULTS)  1 g Intravenous Q6H    enoxparin  40 mg Subcutaneous Q24H (prophylaxis, 1700)    ezetimibe  10 mg Oral Daily    famotidine  20 mg Oral BID    hydrocortisone   Topical (Top) BID    Lactobacillus rhamnosus GG  1 packet Oral Daily    levETIRAcetam  500 mg Oral BID    levothyroxine  88 mcg Oral Before breakfast    metoprolol succinate  12.5 mg Oral Daily    nortriptyline  25 mg Oral QHS    psyllium husk  1 packet Oral Daily    traZODone  25 mg Oral QHS         OBJECTIVE:  VITALS: Temp:  [97.5 °F (36.4 °C)-98.6 °F (37 °C)] 97.5 °F (36.4  °C)  Pulse:  [82-87] 86  SpO2:  [96 %-98 %] 96 %  BP: (128-130)/(80-85) 129/85  General appearance: Chronically ill but non-toxic appearing middle aged  female seen sitting up in chair, she is awake and alert, in no acute distress.  Family at bedside.  HEENT: Large defect to L side of head from previous craniectomy, incision site healing well with no redness, swelling, or drainage from site.  Sclera white, conjunctiva clear, no signs of thrush   Neck:  supple  Lungs:  breath sounds clear and equal bilaterally with no increased WOB.  Stable on room air  Cardiac:  RRR, no murmur  Abdomen:  soft, non-distended, non-tender.  Bowel sounds normal  Extremities:  No increased edema  :  No alvarez, voids   Skin:  No new rash or lesion  Psych/Neuro:  Patient is A&O x3 with pleasant mood and affect        LABS:    Recent Labs   Lab 06/27/25  0420 06/30/25  0401   WBC 6.91 5.47   HGB 9.6* 9.9*   HCT 29.4* 31.0*   * 509*       Recent Labs   Lab 06/30/25  0401      K 3.6      CO2 27   BUN 11.2   *         RADIOLOGY  Reviewed soft tissue US from earlier today, performed for some swelling to the middle of her R arm.  No DVT noted, hematoma noted at the AC.         SUBMITTED BY:  Jack De Los Santos, [facility]  Infectious Disease  [facility]   6/30/2025       [1]   Social History  Tobacco Use    Smoking status: Every Day     Current packs/day: 0.50     Types: Cigarettes    Smokeless tobacco: Never   Vaping Use    Vaping status: Former    Quit date: 6/1/2024   Substance Use Topics    Alcohol use: Not Currently    Drug use: Never

## 2025-07-01 LAB
POCT GLUCOSE: 148 MG/DL (ref 70–110)
POCT GLUCOSE: 150 MG/DL (ref 70–110)
POCT GLUCOSE: 188 MG/DL (ref 70–110)

## 2025-07-01 PROCEDURE — 25000003 PHARM REV CODE 250: Performed by: INTERNAL MEDICINE

## 2025-07-01 PROCEDURE — 97116 GAIT TRAINING THERAPY: CPT

## 2025-07-01 PROCEDURE — 25000242 PHARM REV CODE 250 ALT 637 W/ HCPCS: Performed by: INTERNAL MEDICINE

## 2025-07-01 PROCEDURE — 97164 PT RE-EVAL EST PLAN CARE: CPT

## 2025-07-01 PROCEDURE — 97535 SELF CARE MNGMENT TRAINING: CPT

## 2025-07-01 PROCEDURE — 97110 THERAPEUTIC EXERCISES: CPT

## 2025-07-01 PROCEDURE — 63600175 PHARM REV CODE 636 W HCPCS: Performed by: INTERNAL MEDICINE

## 2025-07-01 PROCEDURE — 63600175 PHARM REV CODE 636 W HCPCS

## 2025-07-01 PROCEDURE — 97112 NEUROMUSCULAR REEDUCATION: CPT

## 2025-07-01 PROCEDURE — 21400001 HC TELEMETRY ROOM

## 2025-07-01 RX ADMIN — ACETAMINOPHEN 650 MG: 325 TABLET ORAL at 08:07

## 2025-07-01 RX ADMIN — HYDROCORTISONE: 1 CREAM TOPICAL at 09:07

## 2025-07-01 RX ADMIN — CEFEPIME 1 G: 1 INJECTION, POWDER, FOR SOLUTION INTRAMUSCULAR; INTRAVENOUS at 08:07

## 2025-07-01 RX ADMIN — TRAZODONE HYDROCHLORIDE 25 MG: 50 TABLET ORAL at 08:07

## 2025-07-01 RX ADMIN — BUTALBITAL, ACETAMINOPHEN, AND CAFFEINE 1 TABLET: 325; 50; 40 TABLET ORAL at 08:07

## 2025-07-01 RX ADMIN — ACETAMINOPHEN 650 MG: 325 TABLET ORAL at 04:07

## 2025-07-01 RX ADMIN — OXYCODONE AND ACETAMINOPHEN 1 TABLET: 325; 10 TABLET ORAL at 11:07

## 2025-07-01 RX ADMIN — Medication 1 EACH: at 08:07

## 2025-07-01 RX ADMIN — ENOXAPARIN SODIUM 40 MG: 40 INJECTION SUBCUTANEOUS at 04:07

## 2025-07-01 RX ADMIN — EZETIMIBE 10 MG: 10 TABLET ORAL at 08:07

## 2025-07-01 RX ADMIN — LEVETIRACETAM 500 MG: 500 TABLET, FILM COATED ORAL at 08:07

## 2025-07-01 RX ADMIN — METOPROLOL SUCCINATE 12.5 MG: 25 TABLET, EXTENDED RELEASE ORAL at 08:07

## 2025-07-01 RX ADMIN — BUSPIRONE HYDROCHLORIDE 15 MG: 5 TABLET ORAL at 08:07

## 2025-07-01 RX ADMIN — FAMOTIDINE 20 MG: 20 TABLET, FILM COATED ORAL at 08:07

## 2025-07-01 RX ADMIN — LEVOTHYROXINE SODIUM 88 MCG: 0.09 TABLET ORAL at 06:07

## 2025-07-01 RX ADMIN — OXYCODONE AND ACETAMINOPHEN 1 TABLET: 325; 10 TABLET ORAL at 10:07

## 2025-07-01 RX ADMIN — PSYLLIUM HUSK 1 PACKET: 3.4 POWDER ORAL at 08:07

## 2025-07-01 RX ADMIN — CEFEPIME 1 G: 1 INJECTION, POWDER, FOR SOLUTION INTRAMUSCULAR; INTRAVENOUS at 02:07

## 2025-07-01 RX ADMIN — ATORVASTATIN CALCIUM 80 MG: 40 TABLET, FILM COATED ORAL at 08:07

## 2025-07-01 RX ADMIN — NORTRIPTYLINE HYDROCHLORIDE 25 MG: 25 CAPSULE ORAL at 08:07

## 2025-07-01 RX ADMIN — OXYCODONE AND ACETAMINOPHEN 1 TABLET: 325; 10 TABLET ORAL at 02:07

## 2025-07-01 RX ADMIN — HYDROCORTISONE: 1 CREAM TOPICAL at 08:07

## 2025-07-01 RX ADMIN — CEFEPIME 1 G: 1 INJECTION, POWDER, FOR SOLUTION INTRAMUSCULAR; INTRAVENOUS at 04:07

## 2025-07-01 NOTE — PROGRESS NOTES
Inpatient Nutrition Assessment    Admit Date: 6/11/2025   Total duration of encounter: 20 days   Patient Age: 59 y.o.    Nutrition Recommendation/Prescription     Continue current diet (Diet Adult Regular Moderately Thick Liquids (IDDSI Level 3)) as tolerated. Texture per SLP.   Continue Magic Cup (provides 190 kcal, 9 g protein per serving)   Monitor PO intakes, labs, and wt changes    Communication of Recommendations: reviewed with patient    Nutrition Assessment     Malnutrition Assessment/Nutrition-Focused Physical Exam    Malnutrition Context: acute illness or injury (06/18/25 1545)  Malnutrition Level: severe (06/18/25 1545)  Energy Intake (Malnutrition): other (see comments) (Does not meet criteria) (06/18/25 1545)  Weight Loss (Malnutrition): greater than 5% in 1 month (06/18/25 1545)  Subcutaneous Fat (Malnutrition): moderate depletion (06/18/25 1545)  Orbital Region (Subcutaneous Fat Loss): moderate depletion        Muscle Mass (Malnutrition): moderate depletion (06/18/25 1545)  [place] Region (Muscle Loss): moderate depletion  Clavicle Bone Region (Muscle Loss): moderate depletion                    Fluid Accumulation (Malnutrition): other (see comments) (Not present) (06/18/25 1545)     Hand  Strength, Right (Malnutrition): Unable to assess (06/18/25 1545)  A minimum of two characteristics is recommended for diagnosis of either severe or non-severe malnutrition.    Chart Review    Reason Seen: length of stay and follow-up    Malnutrition Screening Tool Results   Have you recently lost weight without trying?: Yes: 2-13 lbs  Have you been eating poorly because of a decreased appetite?: No   MST Score: 1   Diagnosis:  Increased subdural fluid collection Status post left redo diony hole with fluid evacuation, drain placement 6/16  Suspected surgical site infection status post cranioplasty 6/16  Subdural hematoma requiring craniotomy with evacuation and MMA embolization early May with recurrence late May  requiring diony hole  Right-sided weakness/intractable headache secondary to above    Relevant Medical History:   hypothyroidism, anxiety, bronchial asthma, COPD, type 2 diabetes mellitus, 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, 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, GERD     Scheduled Medications:  atorvastatin, 80 mg, Daily  busPIRone, 15 mg, BID  ceFEPime IV (PEDS and ADULTS), 1 g, Q6H  enoxparin, 40 mg, Q24H (prophylaxis, 1700)  ezetimibe, 10 mg, Daily  famotidine, 20 mg, BID  hydrocortisone, , BID  Lactobacillus rhamnosus GG, 1 packet, Daily  levETIRAcetam, 500 mg, BID  levothyroxine, 88 mcg, Before breakfast  metoprolol succinate, 12.5 mg, Daily  nortriptyline, 25 mg, QHS  psyllium husk, 1 packet, Daily  traZODone, 25 mg, QHS    Continuous Infusions:     PRN Medications:  acetaminophen, 650 mg, Q4H PRN  acetaminophen, 650 mg, Q4H PRN  bisacodyL, 10 mg, Daily PRN  butalbital-acetaminophen-caffeine -40 mg, 1 tablet, Q4H PRN  dextrose 50%, 12.5 g, PRN  dextrose 50%, 25 g, PRN  diphenhydrAMINE, 25 mg, Q6H PRN  glucagon (human recombinant), 1 mg, PRN  glucose, 16 g, PRN  glucose, 24 g, PRN  hydrALAZINE, 10 mg, Q4H PRN  insulin aspart U-100, 0-5 Units, QID (AC + HS) PRN  labetalol, 10 mg, Q4H PRN  melatonin, 6 mg, Nightly PRN  morphine, 2 mg, Q4H PRN  naloxone, 0.02 mg, PRN  ondansetron, 4 mg, Q8H PRN  oxyCODONE-acetaminophen, 1 tablet, Q4H PRN  prochlorperazine, 5 mg, Q6H PRN  sodium chloride 0.9%, 10 mL, PRN  sodium chloride 0.9%, 10 mL, Q12H PRN  sodium chloride 0.9%, 3 mL, Q12H PRN    Calorie Containing IV Medications: no significant kcals from medications at this time    Recent Labs   Lab 06/26/25  0722 06/27/25  0420 06/30/25  0401   NA  --  142 141   K  --  3.3* 3.6   CALCIUM  --  8.5 9.1   PHOS  --  3.4 3.8   MG  --  1.60 1.90   CL  --  109* 105   CO2  --  26 27   BUN  --  3.5* 11.2   CREATININE 0.53* 0.56 0.57   EGFRNORACEVR >60 >60 >60   GLU  --  144* 150*   WBC  --  6.91 5.47   HGB  --  9.6* 9.9*   HCT  --  29.4* 31.0*  "    Nutrition Orders:  Diet Adult Regular Moderately Thick Liquids (IDDSI Level 3)  Dietary nutrition supplements TID; Magic Cup - Any flavor    Appetite/Oral Intake: not applicable/not applicable  Factors Affecting Nutritional Intake: none identified  Social Needs Impacting Access to Food: none identified  Food/Moravian/Cultural Preferences: none reported  Food Allergies: no known food allergies  Last Bowel Movement: 06/30/25  Wound(s):  scalp and temporal incisions noted.     Comments    6/18/25: Spoke to family members at bedside who report pt's appetite has been fluctuating since admit depending on her pain levels. PO intake varies between %. Pt ate 75% of her breakfast this morning but did not eat much of her lunch. Family reports UBW of 112 lbs, last weighing that about 1 month ago. Family reports wt at admit was about 100 lbs which would indicate a 10% wt loss x1 month, nutritionally significant. Most recent bedscale wt from 6/12 is 110 lbs, will monitor for accuracy/trends.     6/20/25: Patient reports good oral intake of meals. Denies any nausea, vomiting, or diarrhea. Constipation noted.     6/24/25: Pt feeling "off" this AM and didn't eat as much. Still on mod thick liquids. Will send Magic Cup for added kcal and protein.    6/27/25: Pt reports fair appetite, does not eat much for breakfast but eats close to 50% of lunch and dinner. Pt states her family has been bringing in outside meals for dinner, she is looking forward to getting some ribs/steak. Pt consuming 1-2 Magic Cup supplements per day.      7/1/25: Pt working with therapy. No recent documentation of oral intake. Will follow-up early.     Anthropometrics    Height: 5' 6" (167.6 cm), Height Method: Measured  Last Weight: 49.9 kg (110 lb) (06/12/25 0223), Weight Method: Bed Scale  BMI (Calculated): 17.8  BMI Classification: underweight (BMI less than 18.5)     Ideal Body Weight (IBW), Female: 130 lb     % Ideal Body Weight, Female (lb): " 84.62 %                             Usual Weight Provided By: patient    Wt Readings from Last 5 Encounters:   06/12/25 49.9 kg (110 lb)   06/06/25 46.9 kg (103 lb 6.3 oz)   06/03/25 46.3 kg (102 lb)   05/15/25 49 kg (108 lb 0.4 oz)   05/06/25 50.8 kg (112 lb)     Weight Change(s) Since Admission:   7/1/25 no updated weights   Wt Readings from Last 1 Encounters:   06/12/25 0223 49.9 kg (110 lb)   06/11/25 1314 46.7 kg (103 lb)   Admit Weight: 46.7 kg (103 lb) (06/11/25 1314), Weight Method: Stated    Estimated Needs    Weight Used For Calorie Calculations: 49.9 kg (110 lb 0.2 oz)  Energy Calorie Requirements (kcal): 1221-8416 kcals (30-35 kcal/kg)  Energy Need Method: Kcal/kg  Weight Used For Protein Calculations: 49.9 kg (110 lb 0.2 oz)  Protein Requirements: 60-75 g (1.2-1.5 g/kg)  Fluid Requirements (mL): 1497 mL (30 mL/kg)  CHO Requirement: N/A     Enteral Nutrition     Patient not receiving enteral nutrition at this time.    Parenteral Nutrition     Patient not receiving parenteral nutrition support at this time.    Evaluation of Received Nutrient Intake    Calories: not meeting estimated needs  Protein: not meeting estimated needs    Patient Education     Not applicable.    Nutrition Diagnosis     PES: Inadequate energy intake related to acute illness as evidenced by meeting <75% EEN. (resolved, unknown will reassess on early follow-up)    PES: Severe acute disease or injury related malnutrition Related to  As Evidenced by:  - weight loss: > 5% in 1 month - muscle mass depletion: 2 areas of moderate muscle loss (Clavicle, Temporalis) - loss of subcutaneous fat: 2 areas of moderate fat loss (Infraorbital, Buccal) active    Nutrition Interventions     Intervention(s): modified composition of meals/snacks and collaboration with other providers  Intervention(s): Oral diet/nutrient modifications    Goal: Meet greater than 80% of nutritional needs by follow-up. (goal progressing)  Goal: Maintain weight throughout  hospitalization. (goal progressing)    Nutrition Goals & Monitoring     Dietitian will monitor: energy intake, weight, and gastrointestinal profile  Discharge planning: Consistent Carbohydrate diet with texture modifications per SLP  Nutrition Risk/Follow-Up: patient at increased nutrition risk; dietitian will follow-up twice weekly   Please consult if re-assessment needed sooner.

## 2025-07-01 NOTE — PLAN OF CARE
Problem: Physical Therapy  Goal: Physical Therapy Goal  Description: Goals to be met by: 25     Patient will increase functional independence with mobility by performin. Supine to sit with Modified [place]  2. Sit to supine with Modified [place]  3. Sit to stand transfer with Modified [place]  4. Bed to chair transfer with Modified [place] using Rolling Walker vs QC  5. Gait  x 200 feet with Modified [place] using Rolling Walker vs QC.     Outcome: Progressing

## 2025-07-01 NOTE — CARE UPDATE
Output for remains JOHNATHAN low  Suture holding drain in place removed.  Her drain site was sterilized with chloroprep stick and drain removed  3-0 prolene applied to the drain site  She tolerated this well with no change in exam    Every other staple to be removed on POD#14, 7/07/2025  Complete staple removal on POD#21, 7/14/2025 or prior to discharge from rehab  Okay to resume Plavix 2 weeks postop, 07/07/2025    All questions answered.

## 2025-07-01 NOTE — PLAN OF CARE
Received update from [facility]. They will need final ID recommendations and still need her to qualify prior to bringing her back to rehab.

## 2025-07-01 NOTE — PROGRESS NOTES
[facility] Hospital Medicine Progress Note        Chief Complaint: Inpatient Follow-up for follow up    HPI:   59-year-old female with PMHx of left MCA stroke s/p thrombectomy in 6/2024 on Plavix, hypothyroidism, anxiety, bronchial asthma, COPD, type 2 diabetes mellitus,  and GERD. Patient recently had subdural hematoma following a fall in May requiring intracranial embolization, craniotomy with hematoma evacuation. After this patient was discharged home. Late May she presented back to the ED with headache, right-sided weakness, facial droop and imaging was concerning for left MCA diminished flow and subacute hematoma. MRI brain was negative for CVA. Neurosurgery performed diony hole for drainage of subdural hematoma due to increased intracranial pressure with postop CT showing definitive improvement patient was on Plavix post embolization which was held briefly and resumed per Neurosurgery on 06/05 and she was transferred to rehab on 06/06. While in rehab patient was noted to have increase right upper extremity weakness and also significant headaches. CT head revealed reaccumulation of subdural fluid collection, increased from before patient was sent back to main [place] for further evaluation/neurosurgery services. Neurosurgery evaluated, planning for  shunt this hospitalization, home meds resumed as appropriate except for Plavix, symptomatic management for headache.  shunt scheduled for 6/16. Patient underwent redo diony hole, fluid evacuation and drain placement, concern for surgical site infection and therefore underwent cranioplasty. Infectious Disease consulted and patient was placed on cefepime, vancomycin and Flagyl pending intraoperative cultures.  Intraoperative cultures growing staph epi.  ID recommending IV vancomycin, IV Rocephin until 7/3.  Neurosurgery wished to monitor patient for another day until 6/21.  PT/OT on board; recommending high-intensity therapy.  Speech  therapy recommending regular diet with moderately thick liquids.  CM on board for placement.      On 6/23 discharge to rehab was planned but Pt developed RUE weakness and Ct head showed increasing size of extra-axial fluid collections along cerebral convexity with increasing mass effect & increase parenchymal edema in L posterior frontal lobe. Pt was taken back to OR and underwent left craniotomy and evacuation of empyema. Post operatively admitted to ICU. Intraoperative culture from 6/23 grew  staph epi and rare Pseudomonas stutzeri.  ID suggested to stop Vancomycin and continue Cefepime as Staph epi is oxacillin sensitive and Cefepime will cover both Pseudomonas and MSSE with total course at least 6 to 8 weeks. Pt's care downgraded back to  service on 6/25/25.     ID requested sensitivity testing for Pseudomonas stutzeri, however per hospital microbiology their instrument was not able to run sensitivities, and they are sending the sample to [place] for further testing. ID recommended to continue Cefepime at this time with tentative end date 8/18/2025.     Pt remains with daily headache and dense weakness to RUE. MRI brain on 6/27/25 showed no acute stroke but noted post surgical left dural thickening and enhancement, left cerebral encephalomalacia with extensive gliotic changes.     As of 6/30 Pt is able to move her Rt upper ext some. Swelling noted inner aspect of Rt arm with U/S suggestive of hematoma 5.6x3.6x1.7 cm. Will continue conservative management for hematoma. Subdural JOHNATHAN drain and every other staple removed on 7/1/25 with complete staple removal on POD #21 7/14/25. NS cleared Pt to resume Plavix 2 weeks post op 7/7/25.          Interval Hx:   Some movement to Rt arm.   No new c/o today   Subdural JOHNATHAN drain removed today.   Vitals are stable, on RA    Case was discussed with patient's nurse and  on the floor.    Objective/physical exam:    General: In no acute distress, afebrile  Chest: Clear  to auscultation bilaterally  Heart: RRR, +S1, S2, no appreciable murmur  Abdomen: Soft, nontender, BS +  MSK: Warm, no lower extremity edema, no clubbing or cyanosis  Neurologic: Alert and oriented x3, RUE- 1/5, RLE 2/5, Left U/L ext- 5/5      VITAL SIGNS: 24 HRS MIN & MAX LAST   Temp  Min: 97.3 °F (36.3 °C)  Max: 98.2 °F (36.8 °C) 97.8 °F (36.6 °C)   BP  Min: 111/72  Max: 135/94 125/80   Pulse  Min: 74  Max: 85  81   Resp  Min: 18  Max: 18 18   SpO2  Min: 96 %  Max: 98 % 96 %     I have reviewed the following labs:  Recent Labs   Lab 06/27/25  0420 06/30/25  0401   WBC 6.91 5.47   RBC 3.22* 3.35*   HGB 9.6* 9.9*   HCT 29.4* 31.0*   MCV 91.3 92.5   MCH 29.8 29.6   MCHC 32.7* 31.9*   RDW 14.6 14.9   * 509*   MPV 9.4 9.2     Recent Labs   Lab 06/26/25  0722 06/27/25  0420 06/30/25  0401   NA  --  142 141   K  --  3.3* 3.6   CL  --  109* 105   CO2  --  26 27   BUN  --  3.5* 11.2   CREATININE 0.53* 0.56 0.57   GLU  --  144* 150*   CALCIUM  --  8.5 9.1   MG  --  1.60 1.90     Microbiology Results (last 7 days)       Procedure Component Value Units Date/Time    Fungal Culture [[id number]]  (Normal) Collected: 06/16/25 1338    Order Status: Completed Specimen: Tissue from Head Updated: 06/30/25 1501     Fungal Culture No Fungus Isolated at 2 Weeks    Fungal Culture [[id number]]  (Normal) Collected: 06/16/25 1357    Order Status: Completed Specimen: Bone from Head Updated: 06/30/25 1501     Fungal Culture No Fungus Isolated at 2 Weeks    Tissue Culture - Aerobic [[id number]]  (Abnormal) Collected: 06/23/25 1311    Order Status: Completed Specimen: Tissue from Brain Updated: 06/30/25 1406     Tissue - Aerobic Culture Rare Pseudomonas stutzeri     Comment: Susceptibility sent to reference lab. Results to follow on separate report.       Tissue Culture - Aerobic [[id number]] Collected: 06/23/25 1314    Order Status: Completed Specimen: Tissue from Brain Updated: 06/28/25 0920     Tissue - Aerobic Culture No Growth     Tissue Culture - Aerobic [[id number]]  (Abnormal)  (Susceptibility) Collected: 06/23/25 1313    Order Status: Completed Specimen: Tissue from Brain Updated: 06/27/25 0928     Tissue - Aerobic Culture Staphylococcus epidermidis     Comment: Isolated from Thio only       Anaerobic Culture [[id number]] Collected: 06/23/25 1314    Order Status: Completed Specimen: Tissue from Brain Updated: 06/26/25 0907     Anaerobe Culture No Anaerobes Isolated    Anaerobic Culture [[id number]] Collected: 06/23/25 1313    Order Status: Completed Specimen: Tissue from Brain Updated: 06/26/25 0907     Anaerobe Culture No Anaerobes Isolated    Anaerobic Culture [[id number]] Collected: 06/23/25 1311    Order Status: Completed Specimen: Tissue from Brain Updated: 06/26/25 0904     Anaerobe Culture No Anaerobes Isolated             See below for Radiology    Assessment/Plan:  Left Subdural empyema - 6/23/25 s/p left craniotomy and evacuation of empyema with tissue culture positive for staph epidermidis and Pseudomonas stutzeri  Traumatic Subdural hematoma , s/p left frontoparietal craniotomy and evacuation on 5/19/25   Chronic left SDH with new onset headache, Rt sided weakness and facial droop, s/p left diony hole on 6/2/25  Post op Increased subdural fluid collection, s/p left redo diony hole with fluid evacuation, drain placement on 6/16  Surgical site infection status post cranioplasty on 6/16 with tissue culture positive for Staph epidermidis   Hematoma , RUE- 6/30/25- conservative management         Hx-  CVA in June, 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,Depression/anxiety ,HLD, Hypothyroidism, Essential HTN, Type 2 diabetes mellitus.      Plan-   Continue Cefepime 1 gram q6h until final sensitivity is known with anticipated end date 8/18/25. Await final sensitivity data for Pseudomonas stutzeri. Rehab will accept Pt once final sensitivity is known with final ID rec.      Continue [facility]   Neurosurgery cleared Pt for Lovenox for VTE prophylaxis  as of 6/27/25  Plavix can be resumed 2 weeks post op 7/7/25 per Neurosurgery   Neuro check q4h  PT/OT consulted. High intensity therapy suggested.  CM consulted to assist with placement      VTE prophylaxis: Lovenox     Patient condition:  Fair     Anticipated discharge and Disposition:     TBD.            All diagnosis and differential diagnosis have been reviewed; assessment and plan has been documented; I have personally reviewed the labs and test results that are presently available; I have reviewed the patients medication list; I have reviewed the consulting providers response and recommendations. I have reviewed or attempted to review medical records based upon their availability    All of the patient's questions have been  addressed and answered. Patient's is agreeable to the above stated plan. I will continue to monitor closely and make adjustments to medical management as needed.    Portions of this note dictated using EMR integrated voice recognition software, and may be subject to voice recognition errors not corrected at proofreading. Please contact writer for clarification if needed.   _____________________________________________________________________    Malnutrition Status:  Nutrition consulted. Most recent weight and BMI monitored-     Measurements:  Wt Readings from Last 1 Encounters:   06/12/25 49.9 kg (110 lb)   Body mass index is 17.75 kg/m².    Patient has been screened and assessed by RD.    Malnutrition Type:  Context: acute illness or injury  Level: severe    Malnutrition Characteristic Summary:  Weight Loss (Malnutrition): greater than 5% in 1 month  Energy Intake (Malnutrition): other (see comments) (Does not meet criteria)  Subcutaneous Fat (Malnutrition): moderate depletion  Muscle Mass (Malnutrition): moderate depletion  Fluid Accumulation (Malnutrition): other (see comments) (Not present)  Hand  Strength, Right (Malnutrition): Unable to assess    Interventions/Recommendations  (treatment strategy):  Oral diet/nutrient modifications     Scheduled Med:   atorvastatin  80 mg Oral Daily    busPIRone  15 mg Oral BID    ceFEPime IV (PEDS and ADULTS)  1 g Intravenous Q6H    enoxparin  40 mg Subcutaneous Q24H (prophylaxis, 1700)    ezetimibe  10 mg Oral Daily    famotidine  20 mg Oral BID    hydrocortisone   Topical (Top) BID    Lactobacillus rhamnosus GG  1 packet Oral Daily    levETIRAcetam  500 mg Oral BID    levothyroxine  88 mcg Oral Before breakfast    metoprolol succinate  12.5 mg Oral Daily    nortriptyline  25 mg Oral QHS    psyllium husk  1 packet Oral Daily    traZODone  25 mg Oral QHS      Continuous Infusions:     PRN Meds:    Current Facility-Administered Medications:     acetaminophen, 650 mg, Rectal, Q4H PRN    acetaminophen, 650 mg, Oral, Q4H PRN    bisacodyL, 10 mg, Rectal, Daily PRN    butalbital-acetaminophen-caffeine -40 mg, 1 tablet, Oral, Q4H PRN    dextrose 50%, 12.5 g, Intravenous, PRN    dextrose 50%, 25 g, Intravenous, PRN    diphenhydrAMINE, 25 mg, Oral, Q6H PRN    glucagon (human recombinant), 1 mg, Intramuscular, PRN    glucose, 16 g, Oral, PRN    glucose, 24 g, Oral, PRN    hydrALAZINE, 10 mg, Intravenous, Q4H PRN    insulin aspart U-100, 0-5 Units, Subcutaneous, QID (AC + HS) PRN    labetalol, 10 mg, Intravenous, Q4H PRN    melatonin, 6 mg, Oral, Nightly PRN    morphine, 2 mg, Intravenous, Q4H PRN    naloxone, 0.02 mg, Intravenous, PRN    ondansetron, 4 mg, Intravenous, Q8H PRN    oxyCODONE-acetaminophen, 1 tablet, Oral, Q4H PRN    prochlorperazine, 5 mg, Intravenous, Q6H PRN    sodium chloride 0.9%, 10 mL, Intravenous, PRN    Flushing PICC/Midline Protocol, , , Until Discontinued **AND** sodium chloride 0.9%, 10 mL, Intravenous, Q12H PRN    sodium chloride 0.9%, 3 mL, Intravenous, Q12H PRN         Argentina Jerome MD  Department of Hospital Medicine   [facility]   07/01/2025

## 2025-07-01 NOTE — PT/OT/SLP RE-EVAL
Physical Therapy Re-Evaluation    Patient Name:  Chelle Chandra   MRN:  [id number]    Recommendations:     Discharge therapy intensity: High Intensity Therapy   Discharge Equipment Recommendations: to be determined by next level of care   Barriers to discharge: Impaired mobility and Ongoing medical needs    Assessment:     Chelle Chandra is a 59 y.o. female admitted with a medical diagnosis of R sided weakness and facial droop due to L SDH s/p craniotomy and MMA embolization. On 5/30 patient with subdural hemorrhage s/p L diony hole.  She presents with the following impairments/functional limitations: weakness, gait instability, decreased upper extremity function, impaired endurance, impaired balance, decreased lower extremity function, impaired self care skills, pain, impaired functional mobility.    Pt with great effort to PT re-eval. Pt sitting up in recliner with helmet donned. Pt continues to have RLE weakness, impaired truck control, and gait instability. Pt is at a high risk of falls and remains a great candidate for high intensity therapy in order to improve independence. Will continue to work on balance and gait while in stay.     DME Justification:  TBD    Rehab Prognosis: Good; patient would benefit from acute skilled PT services to address these deficits and reach maximum level of function.    Recent Surgery: Procedure(s) (LRB):  CRANIECTOMY (Left) 8 Days Post-Op    Plan:     During this hospitalization, patient would benefit from acute PT services 6 x/week to address the identified rehab impairments via gait training, therapeutic activities, therapeutic exercises, neuromuscular re-education and progress toward the following goals:    Plan of Care Expires:  08/01/25    PT/PTA conference to discuss PT POC and patient's progression towards goals held with Carmen Wisdom PTA.     Subjective     Chief Complaint: headache   Patient/Family Comments/goals: get better   Pain/Comfort:  Pain Rating 1:  6/10  Location 1: head    Patients cultural, spiritual, Anglican conflicts given the current situation: no    Objective:     Communicated with nsg prior to session.  Patient found up in chair with peripheral IV (helmet)  upon PT entry to room.    General Precautions: Standard, fall, seizure, BP <140/90  Orthopedic Precautions:N/A   Braces:  (helmet)  Respiratory Status: Room air  Blood Pressure: 125/80      Functional Mobility:  Bed Mobility:     Sit to Supine: minimum assistance  Transfers:     Sit to Stand:  minimum assistance with hand-held assist  Gait: Pt amb 20ft+10ft+10ft Min-Mod A using various Assistive devices.   Zachariah walker for first 20ft: Pt req Max cues and demo for walker placement and sequencing. Pt with difficulty keeping walker on the side of her secondary to weakness and impaired motor planning. Pt disliking the use of RW so switched to quad cane.   Quad cane for the remainder of gait training: Pt with improved smoothness and stability using quad cane vs zachariah walker.   Pt needing verbal cues to stand tall due to R lateral lean. Pt able to correct momentarily, but difficulty maintaining.  Balance: Fair  R lateral trunk lean noted with standing and sitting      AM-PAC 6 CLICK MOBILITY  Total Score:15     Co-Treatment: No    Treatment & Education:      Patient provided with verbal education education regarding PT role/goals/POC, fall prevention, and safety awareness.  Additional teaching is warranted.     Patient left HOB elevated with all lines intact, call button in reach, and helmet doffed.      GOALS:   Multidisciplinary Problems       Physical Therapy Goals          Problem: Physical Therapy    Goal Priority Disciplines Outcome Interventions   Physical Therapy Goal     PT, PT/OT Progressing    Description: Goals to be met by: 25     Patient will increase functional independence with mobility by performin. Supine to sit with Modified [place]  2. Sit to supine with Modified  [place]  3. Sit to stand transfer with Modified [place]  4. Bed to chair transfer with Modified [place] using Rolling Walker vs QC  5. Gait  x 200 feet with Modified [place] using Rolling Walker vs QC.                          History:     Past Medical History:   Diagnosis Date    Accelerated junctional rhythm     Agatston CAC score, <100     Anxiety disorder, unspecified     Asthma     COPD type A     Diabetes mellitus without complication     GERD (gastroesophageal reflux disease)     History of COVID-19     Insomnia     Lung nodule        Past Surgical History:   Procedure Laterality Date    ANGIOGRAM, CORONARY, WITH LEFT HEART CATHETERIZATION      BACK SURGERY      BREAST LUMPECTOMY Right     CARDIOVERSION  08/01/2013    CARPAL TUNNEL RELEASE  10/23/2013    CRANIECTOMY Left 6/16/2025    Procedure: CRANIECTOMY;  Surgeon: James Rankin MD;  Location: [facility] OR;  Service: Neurosurgery;  Laterality: Left;  left redo-craniectomy for subdural empyema    CRANIECTOMY Left 6/23/2025    Procedure: CRANIECTOMY;  Surgeon: James Rankin MD;  Location: [facility] OR;  Service: Neurosurgery;  Laterality: Left;  LEFT //  PINS // SCOPE    CRANIOTOMY FOR EVACUATION OF SUBDURAL HEMATOMA Left 05/19/2025    Procedure: CRANIOTOMY, FOR SUBDURAL HEMATOMA EVACUATION;  Surgeon: Ricardo Shelley MD;  Location: [facility] OR;  Service: Neurosurgery;  Laterality: Left;    CREATION OF TERRI HOLE WITH EVACUATION OF HEMATOMA Left 6/2/2025    Procedure: CREATION, CRANIAL TERRI HOLE, WITH HEMATOMA EVACUATION;  Surgeon: James Rankin MD;  Location: [facility] OR;  Service: Neurosurgery;  Laterality: Left;  LEFT BURRHOLE FOR SUBDURAL HEMATOMA EVACUATION // STEALTH // SHERRI SKYTRON // DRILL    EVACUATION OF EMPYEMA Left 6/16/2025    Procedure: EVACUATION, EMPYEMA;  Surgeon: James Rankin MD;  Location: [facility] OR;  Service: Neurosurgery;  Laterality: Left;  left craniotomy for subdural empyema    FUSION OF CERVICAL SPINE BY ANTERIOR APPROACH  USING COMPUTER-ASSISTED NAVIGATION  06/10/2019    KIDNEY SURGERY      TONSILLECTOMY AND ADENOIDECTOMY      TOTAL ABDOMINAL HYSTERECTOMY      WRIST SURGERY         Time Tracking:     PT Received On: 07/01/25  PT Start Time: 1519     PT Stop Time: 1549  PT Total Time (min): 30 min     Billable Minutes: Re-eval 10 and Gait Training 20 07/01/2025

## 2025-07-01 NOTE — PLAN OF CARE
Problem: Adult Inpatient Plan of Care  Goal: Plan of Care Review  7/1/2025 1755 by Andreia Jiménez RN  Outcome: Progressing  7/1/2025 1746 by Andreia Jiménez RN  Outcome: Progressing  Goal: Patient-Specific Goal (Individualized)  7/1/2025 1755 by Andreia Jiménez RN  Outcome: Progressing  7/1/2025 1746 by Andreia Jiménez RN  Outcome: Progressing  Goal: Absence of Hospital-Acquired Illness or Injury  7/1/2025 1755 by Andreia Jiménez RN  Outcome: Progressing  7/1/2025 1746 by Andreia Jiménez RN  Outcome: Progressing  Goal: Optimal Comfort and Wellbeing  7/1/2025 1755 by Andreia Jiménez RN  Outcome: Progressing  7/1/2025 1746 by Andreia Jiménez RN  Outcome: Progressing  Goal: Readiness for Transition of Care  7/1/2025 1755 by Andreia Jiménez RN  Outcome: Progressing  7/1/2025 1746 by Andreia Jiménez RN  Outcome: Progressing     Problem: Diabetes Comorbidity  Goal: Blood Glucose Level Within Targeted Range  7/1/2025 1755 by Andreia Jiménez RN  Outcome: Progressing  7/1/2025 1746 by Andreia Jiménez RN  Outcome: Progressing     Problem: Wound  Goal: Optimal Coping  7/1/2025 1755 by Andreia Jiménez RN  Outcome: Progressing  7/1/2025 1746 by Andreia Jiménez RN  Outcome: Progressing  Goal: Optimal Functional Ability  7/1/2025 1755 by Andreia Jiménez RN  Outcome: Progressing  7/1/2025 1746 by Andreia Jiménez RN  Outcome: Progressing  Goal: Absence of Infection Signs and Symptoms  7/1/2025 1755 by Andreia Jiménez RN  Outcome: Progressing  7/1/2025 1746 by Andreia Jiménez RN  Outcome: Progressing  Goal: Improved Oral Intake  7/1/2025 1755 by Andreia Jiménez RN  Outcome: Progressing  7/1/2025 1746 by Andreia Jiménez RN  Outcome: Progressing  Goal: Optimal Pain Control and Function  7/1/2025 1755 by Andreia Jiménez, RN  Outcome: Progressing  7/1/2025 1746 by Andreia Jiménez RN  Outcome: Progressing  Goal: Skin Health and Integrity  7/1/2025 1755 by Andreia Jiménez, RN  Outcome:  Progressing  7/1/2025 1746 by Andreia Jiménez RN  Outcome: Progressing  Goal: Optimal Wound Healing  7/1/2025 1755 by Andreia Jiménez RN  Outcome: Progressing  7/1/2025 1746 by Andreia Jiménez RN  Outcome: Progressing     Problem: Infection  Goal: Absence of Infection Signs and Symptoms  Outcome: Progressing     Problem: Comorbidity Management  Goal: Maintenance of Behavioral Health Symptom Control  Outcome: Progressing     Problem: Fall Injury Risk  Goal: Absence of Fall and Fall-Related Injury  Outcome: Progressing     Problem: Skin Injury Risk Increased  Goal: Skin Health and Integrity  Outcome: Progressing

## 2025-07-01 NOTE — PROGRESS NOTES
07/01/25 1432   Missed Time Reason   SLP Attempted Eval Date 07/01/25   SLP Attempted Eval Time 1432   Missed Treatment Reason Other (Comment)     SLP attempting to see pt for therapy session however pt with OT at this time. SLP to reattempt as schedule allows.

## 2025-07-01 NOTE — PT/OT/SLP PROGRESS
Occupational Therapy   Treatment    Name: Chelle Chandra  MRN: [id number]    Recommendations:     Recommended therapy intensity at discharge: High Intensity Therapy   Discharge Equipment Recommendations:  to be determined by next level of care  Barriers to discharge:  None    Assessment:     Chelle Chandra is a 59 y.o. female with a medical diagnosis of R sided weakness and facial droop due to L SDH s/p craniotomy and MMA embolization. On 5/30 patient with subdural hemorrhage s/p L diony hole. Pt then with new R sided weakness and underwent a redo of diony hole for fluid evacuation/drain placement and a left cranioplasty for subdural fluid evacuation on 6/16. Now with ICH s/p craniectomy on 6/23, swelling to RUE 6/30 ultrasound suggestive of hematoma. She presents with good tolerance for therapy session. Performance deficits affecting function are weakness, impaired endurance, impaired sensation, impaired self care skills, impaired functional mobility, gait instability, decreased upper extremity function, decreased lower extremity function, decreased safety awareness, pain.     Pt tolerated treatment well. Pt performed oral care standing at sink,using [place] to incorporate RUE in functional tasks. Pt performed table glides and engaged in TE to facilitate movement and strength to RUE. Pt presents with R lateral lean, however able to self correct with cues. Pt demonstrates progress towards OT goals and remains appropriate for high intensity at d/c.     DME Justification: No DME recommended requiring DME justifications    Rehab Prognosis:  Good; patient would benefit from acute skilled OT services to address these deficits and reach maximum level of function.       Plan:     Patient to be seen 6 x/week to address the above listed problems via self-care/home management, therapeutic activities, therapeutic exercises, neuromuscular re-education, sensory integration  Plan of Care Expires: 07/17/25  Plan of Care Reviewed with:  patient    Subjective     Pain/Comfort:  Pain Rating 1: 0/10    Objective:     Communicated with: nurse prior to session.  Patient found up in chair with peripheral IV, telemetry, helmet donned upon OT entry to room.    General Precautions: Standard, fall, seizure    Orthopedic Precautions:   Braces:  (helmet)  Respiratory Status: Room air  Vital Signs: Blood Pressure: 139/69     Occupational Performance:     Functional Mobility/Transfers:  Transfers: Sit to Stand: minimum assistance with hand-held assist  Functional mobility: pt ambulated from bedside chair to restroom with min A using no AD and cues to maintain posture. [place] donned during ambulation to provide support to RUE.     Activities of Daily Living:  Grooming: minimum assistance for oral care standing at Counts include 234 beds at the [facility]. Pt performed oral care with [place] assistance to incorporate RUE into functional daily tasks. Pt then prompted to use L hand over R hand to finish task.     Balance:   Static Sitting Balance: One UE support: Good: Patient able to maintain balance without handhold support, limited postural sway. Demonstrates R lateral lean, however able to self correct with cues.   Dynamic Standing balance: pt requires mod A for dynamic standing balance during grooming tasks at Counts include 234 beds at the [facility] and during neuromuscular re-ed activities. Pt presents with R knee buckling during standing activities, requires cues to extend knee.     Neuromuscular re-education:  Pt performed table glides (pushing and pulling) in standing x10 requiring mod A for standing balance. Sponge block placed in pillowcase to maintain hand placement on pillowcase.   Pt provided with PROM stretch to RUE for shoulder flexion with 10 second hold x5 and RUE shoulder abd with 10 second hold x5    Therapeutic Exercise:  Pt performed:  X5 AAROM scap elevation (shoulder shrugs)  X5 AAROM scap retraction exercises    Therapeutic Positioning    OT interventions performed during the course of today's session in an effort  to prevent and/or reduce acquired pressure injuries:   Education was provided on benefits of and recommendations for therapeutic positioning  Therapeutic positioning was provided at the conclusion of session for contracture prevention and to achieve upright sitting posture.     [facility] 6 Click ADL: 17    Co-Treatment: No    Patient Education:  Patient provided with verbal education education regarding OT role/goals/POC, fall prevention, and safety awareness.  Understanding was verbalized, however additional teaching warranted.      Patient left up in chair with call button in reach and RUE resting hand splint donned.    GOALS:   Multidisciplinary Problems       Occupational Therapy Goals          Problem: Occupational Therapy    Goal Priority Disciplines Outcome Interventions   Occupational Therapy Goal     OT, PT/OT Progressing    Description: Goals: to be met by 07/14/25    Pt will complete grooming standing at sink with LRAD with CGA.  Pt will complete UB dressing with SBA.  Pt will complete LB dressing with SBA using LRAD.  Pt will complete toileting with CGA using LRAD.  Pt will complete functional mobility to/from toilet and toilet transfer with mod I using LRAD.   Pt will demo visual attention to R side >80% of time with min verbal cues.  Pt will demo RUE strength of 3-/5 for participation in ADLs.                       Time Tracking:     OT Date of Treatment: 07/01/25  OT Start Time: 1406  OT Stop Time: 1446  OT Total Time (min): 40 min    Billable Minutes:Self Care/Home Management 1 unit  Therapeutic Exercise 1 unit  Neuromuscular Re-education 1 unit    OTR/L readily available for conference at the time of the provision of services: LISA Mallory  OT/EDILSON: OT     Number of EDILSON visits since last OT visit: 5    7/1/2025

## 2025-07-02 LAB
POCT GLUCOSE: 142 MG/DL (ref 70–110)
POCT GLUCOSE: 184 MG/DL (ref 70–110)
POCT GLUCOSE: 221 MG/DL (ref 70–110)

## 2025-07-02 PROCEDURE — 97535 SELF CARE MNGMENT TRAINING: CPT

## 2025-07-02 PROCEDURE — 99232 SBSQ HOSP IP/OBS MODERATE 35: CPT | Mod: ,,,

## 2025-07-02 PROCEDURE — 92611 MOTION FLUOROSCOPY/SWALLOW: CPT

## 2025-07-02 PROCEDURE — 25000003 PHARM REV CODE 250: Performed by: INTERNAL MEDICINE

## 2025-07-02 PROCEDURE — 97116 GAIT TRAINING THERAPY: CPT

## 2025-07-02 PROCEDURE — 25000242 PHARM REV CODE 250 ALT 637 W/ HCPCS: Performed by: INTERNAL MEDICINE

## 2025-07-02 PROCEDURE — 92526 ORAL FUNCTION THERAPY: CPT

## 2025-07-02 PROCEDURE — 97112 NEUROMUSCULAR REEDUCATION: CPT

## 2025-07-02 PROCEDURE — 63600175 PHARM REV CODE 636 W HCPCS

## 2025-07-02 PROCEDURE — 21400001 HC TELEMETRY ROOM

## 2025-07-02 PROCEDURE — 63600175 PHARM REV CODE 636 W HCPCS: Performed by: INTERNAL MEDICINE

## 2025-07-02 PROCEDURE — 97168 OT RE-EVAL EST PLAN CARE: CPT

## 2025-07-02 RX ORDER — LEVETIRACETAM 100 MG/ML
500 SOLUTION ORAL 2 TIMES DAILY
Status: DISCONTINUED | OUTPATIENT
Start: 2025-07-02 | End: 2025-07-15 | Stop reason: HOSPADM

## 2025-07-02 RX ADMIN — NORTRIPTYLINE HYDROCHLORIDE 25 MG: 25 CAPSULE ORAL at 08:07

## 2025-07-02 RX ADMIN — LEVETIRACETAM 500 MG: 100 SOLUTION ORAL at 08:07

## 2025-07-02 RX ADMIN — CEFEPIME 1 G: 1 INJECTION, POWDER, FOR SOLUTION INTRAMUSCULAR; INTRAVENOUS at 02:07

## 2025-07-02 RX ADMIN — FAMOTIDINE 20 MG: 20 TABLET, FILM COATED ORAL at 09:07

## 2025-07-02 RX ADMIN — CEFEPIME 1 G: 1 INJECTION, POWDER, FOR SOLUTION INTRAMUSCULAR; INTRAVENOUS at 08:07

## 2025-07-02 RX ADMIN — PSYLLIUM HUSK 1 PACKET: 3.4 POWDER ORAL at 09:07

## 2025-07-02 RX ADMIN — HYDROCORTISONE: 1 CREAM TOPICAL at 08:07

## 2025-07-02 RX ADMIN — METOPROLOL SUCCINATE 12.5 MG: 25 TABLET, EXTENDED RELEASE ORAL at 09:07

## 2025-07-02 RX ADMIN — Medication 1 EACH: at 09:07

## 2025-07-02 RX ADMIN — LEVETIRACETAM 500 MG: 100 SOLUTION ORAL at 10:07

## 2025-07-02 RX ADMIN — ACETAMINOPHEN 650 MG: 325 TABLET ORAL at 04:07

## 2025-07-02 RX ADMIN — TRAZODONE HYDROCHLORIDE 25 MG: 50 TABLET ORAL at 08:07

## 2025-07-02 RX ADMIN — BUSPIRONE HYDROCHLORIDE 15 MG: 5 TABLET ORAL at 09:07

## 2025-07-02 RX ADMIN — OXYCODONE AND ACETAMINOPHEN 1 TABLET: 325; 10 TABLET ORAL at 09:07

## 2025-07-02 RX ADMIN — ATORVASTATIN CALCIUM 80 MG: 40 TABLET, FILM COATED ORAL at 09:07

## 2025-07-02 RX ADMIN — FAMOTIDINE 20 MG: 20 TABLET, FILM COATED ORAL at 08:07

## 2025-07-02 RX ADMIN — BUSPIRONE HYDROCHLORIDE 15 MG: 5 TABLET ORAL at 08:07

## 2025-07-02 RX ADMIN — CEFEPIME 1 G: 1 INJECTION, POWDER, FOR SOLUTION INTRAMUSCULAR; INTRAVENOUS at 09:07

## 2025-07-02 RX ADMIN — ENOXAPARIN SODIUM 40 MG: 40 INJECTION SUBCUTANEOUS at 04:07

## 2025-07-02 RX ADMIN — LEVOTHYROXINE SODIUM 88 MCG: 0.09 TABLET ORAL at 06:07

## 2025-07-02 RX ADMIN — EZETIMIBE 10 MG: 10 TABLET ORAL at 09:07

## 2025-07-02 NOTE — PROGRESS NOTES
Infectious Diseases Progress Note     PATIENT IDENTIFICATION:  Patient Name: Chelle Chandra : 1965 Age: 59 y.o. Sex: female  Admit Date: 2025 Hospital Day: 19  Room: [address] [place] [address] A  2025        CHIEF COMPLAINT:  Follow up for surgical site infection    IMPRESSION:  MSSE + Pseudomonas stutzeri SSI s/p craniotomy   Increased subdural fluid collection s/p I&D  and craniectomy   History of COPD  Type II DM        RECOMMENDATIONS:  Continue Cefepime for MSSE and pseudomonal SSI  Follow up results of Pseudomonas stutzeri sensitivities sent to [facility], in process  Will need ~8 weeks of IV ABX. Will finalize plan once sensitivities are recovered from pseudomonas isolate.  Tentatively planning cefepime 2g Q8H through  if isolate returns with S- to Cefepime.         INTERVAL HISTORY:  No acute changes overnight  Remains afebrile  Pseudomonas isolate sent to [facility] for further testing and sensitivities       VS Reviewed.  General appearance: Chronically ill but non-toxic appearing middle aged  female seen sitting up in chair, she is awake and alert, in no acute distress.  Family at bedside.  HEENT: Large defect to L side of head from previous craniectomy, incision site healing well with no redness, swelling, or drainage from site.  Sclera white, conjunctiva clear, no signs of thrush   Neck:  supple  Lungs:  breath sounds clear and equal bilaterally with no increased WOB.  Stable on room air  Cardiac:  RRR, no murmur  Abdomen:  soft, non-distended, non-tender.  Bowel sounds normal  Extremities:  No increased edema  :  No alvarez, voids       VITAL SIGNS: 24 HRS MIN & MAX LAST   Temp  Min: 97.3 °F (36.3 °C)  Max: 98.2 °F (36.8 °C) 97.8 °F (36.6 °C)   BP  Min: 111/72  Max: 135/94 125/80   Pulse  Min: 74  Max: 85  81   Resp  Min: 18  Max: 18 18   SpO2  Min: 96 %  Max: 98 % 96 %      I have reviewed the following labs:       Recent Labs   Lab 25  0420 25  0401   WBC 6.91 5.47   RBC 3.22*  3.35*   HGB 9.6* 9.9*   HCT 29.4* 31.0*   MCV 91.3 92.5   MCH 29.8 29.6   MCHC 32.7* 31.9*   RDW 14.6 14.9   * 509*   MPV 9.4 9.2            Recent Labs   Lab 06/26/25  0722 06/27/25  0420 06/30/25  0401   NA  --  142 141   K  --  3.3* 3.6   CL  --  109* 105   CO2  --  26 27   BUN  --  3.5* 11.2   CREATININE 0.53* 0.56 0.57   GLU  --  144* 150*   CALCIUM  --  8.5 9.1   MG  --  1.60 1.90      Microbiology Results (last 7 days)         Procedure Component Value Units Date/Time     Fungal Culture [[id number]]  (Normal) Collected: 06/16/25 1338     Order Status: Completed Specimen: Tissue from Head Updated: 06/30/25 1501       Fungal Culture No Fungus Isolated at 2 Weeks     Fungal Culture [[id number]]  (Normal) Collected: 06/16/25 1357     Order Status: Completed Specimen: Bone from Head Updated: 06/30/25 1501       Fungal Culture No Fungus Isolated at 2 Weeks     Tissue Culture - Aerobic [[id number]]  (Abnormal) Collected: 06/23/25 1311     Order Status: Completed Specimen: Tissue from Brain Updated: 06/30/25 1406       Tissue - Aerobic Culture Rare Pseudomonas stutzeri       Comment: Susceptibility sent to reference lab. Results to follow on separate report.        Tissue Culture - Aerobic [[id number]] Collected: 06/23/25 1314     Order Status: Completed Specimen: Tissue from Brain Updated: 06/28/25 0920       Tissue - Aerobic Culture No Growth     Tissue Culture - Aerobic [[id number]]  (Abnormal)  (Susceptibility) Collected: 06/23/25 1313     Order Status: Completed Specimen: Tissue from Brain Updated: 06/27/25 0928       Tissue - Aerobic Culture Staphylococcus epidermidis       Comment: Isolated from Thio only        Anaerobic Culture [[id number]] Collected: 06/23/25 1314     Order Status: Completed Specimen: Tissue from Brain Updated: 06/26/25 0907       Anaerobe Culture No Anaerobes Isolated     Anaerobic Culture [[id number]] Collected: 06/23/25 1313     Order Status: Completed Specimen: Tissue from Brain  Updated: 06/26/25 0907       Anaerobe Culture No Anaerobes Isolated     Anaerobic Culture [[id number]] Collected: 06/23/25 1311     Order Status: Completed Specimen: Tissue from Brain Updated: 06/26/25 0904       Anaerobe Culture No Anaerobes Isolated

## 2025-07-02 NOTE — PROGRESS NOTES
[facility] Hospital Medicine Progress Note        Chief Complaint: Inpatient Follow-up for follow up    HPI:   59-year-old female with PMHx of left MCA stroke s/p thrombectomy in 6/2024 on Plavix, hypothyroidism, anxiety, bronchial asthma, COPD, type 2 diabetes mellitus,  and GERD. Patient recently had subdural hematoma following a fall in May requiring intracranial embolization, craniotomy with hematoma evacuation. After this patient was discharged home. Late May she presented back to the ED with headache, right-sided weakness, facial droop and imaging was concerning for left MCA diminished flow and subacute hematoma. MRI brain was negative for CVA. Neurosurgery performed diony hole for drainage of subdural hematoma due to increased intracranial pressure with postop CT showing definitive improvement patient was on Plavix post embolization which was held briefly and resumed per Neurosurgery on 06/05 and she was transferred to rehab on 06/06. While in rehab patient was noted to have increase right upper extremity weakness and also significant headaches. CT head revealed reaccumulation of subdural fluid collection, increased from before patient was sent back to main [facility] for further evaluation/neurosurgery services. Neurosurgery evaluated, planning for  shunt this hospitalization, home meds resumed as appropriate except for Plavix, symptomatic management for headache.  shunt scheduled for 6/16. Patient underwent redo diony hole, fluid evacuation and drain placement, concern for surgical site infection and therefore underwent cranioplasty. Infectious Disease consulted and patient was placed on cefepime, vancomycin and Flagyl pending intraoperative cultures.  Intraoperative cultures growing staph epi.  ID recommending IV vancomycin, IV Rocephin until 7/3.  Neurosurgery wished to monitor patient for another day until 6/21.  PT/OT on board; recommending high-intensity therapy.  Speech  therapy recommending regular diet with moderately thick liquids.  CM on board for placement.      On 6/23 discharge to rehab was planned but Pt developed RUE weakness and Ct head showed increasing size of extra-axial fluid collections along cerebral convexity with increasing mass effect & increase parenchymal edema in L posterior frontal lobe. Pt was taken back to OR and underwent left craniotomy and evacuation of empyema. Post operatively admitted to ICU. Intraoperative culture from 6/23 grew  staph epi and rare Pseudomonas stutzeri.  ID suggested to stop Vancomycin and continue Cefepime as Staph epi is oxacillin sensitive and Cefepime will cover both Pseudomonas and MSSE with total course at least 6 to 8 weeks. Pt's care downgraded back to  service on 6/25/25.     ID requested sensitivity testing for Pseudomonas stutzeri, however per hospital microbiology their instrument was not able to run sensitivities, and they are sending the sample to [facility] for further testing. ID recommended to continue Cefepime at this time with tentative end date 8/18/2025.     Pt remains with daily headache and dense weakness to RUE. MRI brain on 6/27/25 showed no acute stroke but noted post surgical left dural thickening and enhancement, left cerebral encephalomalacia with extensive gliotic changes.     As of 6/30 Pt is able to move her Rt upper ext some. Swelling noted inner aspect of Rt arm with U/S suggestive of hematoma 5.6x3.6x1.7 cm. Will continue conservative management for hematoma. Subdural JOHNATHAN drain and every other staple removed on 7/1/25 with complete staple removal on POD #21 7/14/25. NS cleared Pt to resume Plavix 2 weeks post op 7/7/25.          Interval Hx:   Pt has no new c/o today. SLP was in the room following up on her swallow. They want to repeat MBS today.  Otherwise no acute events reported.  Vitals are stable, on RA    Case was discussed with patient's nurse and  on the floor.    Objective/physical  exam:    General: In no acute distress, afebrile  Chest: Clear to auscultation bilaterally  Heart: RRR, +S1, S2, no appreciable murmur  Abdomen: Soft, nontender, BS +  MSK: Warm, no lower extremity edema, no clubbing or cyanosis  Neurologic: Alert and oriented x3, RUE- 1/5, RLE 2/5, Left U/L ext- 5/5      VITAL SIGNS: 24 HRS MIN & MAX LAST   Temp  Min: 97.5 °F (36.4 °C)  Max: 98 °F (36.7 °C) 97.5 °F (36.4 °C)   BP  Min: 112/75  Max: 134/71 112/75   Pulse  Min: 69  Max: 84  69   Resp  Min: 16  Max: 18 16   SpO2  Min: 96 %  Max: 98 % 98 %     I have reviewed the following labs:  Recent Labs   Lab 06/27/25  0420 06/30/25  0401   WBC 6.91 5.47   RBC 3.22* 3.35*   HGB 9.6* 9.9*   HCT 29.4* 31.0*   MCV 91.3 92.5   MCH 29.8 29.6   MCHC 32.7* 31.9*   RDW 14.6 14.9   * 509*   MPV 9.4 9.2     Recent Labs   Lab 06/26/25  0722 06/27/25  0420 06/30/25  0401   NA  --  142 141   K  --  3.3* 3.6   CL  --  109* 105   CO2  --  26 27   BUN  --  3.5* 11.2   CREATININE 0.53* 0.56 0.57   GLU  --  144* 150*   CALCIUM  --  8.5 9.1   MG  --  1.60 1.90     Microbiology Results (last 7 days)       Procedure Component Value Units Date/Time    Fungal Culture [[id number]]  (Normal) Collected: 06/16/25 1338    Order Status: Completed Specimen: Tissue from Head Updated: 06/30/25 1501     Fungal Culture No Fungus Isolated at 2 Weeks    Fungal Culture [[id number]]  (Normal) Collected: 06/16/25 1357    Order Status: Completed Specimen: Bone from Head Updated: 06/30/25 1501     Fungal Culture No Fungus Isolated at 2 Weeks    Tissue Culture - Aerobic [[id number]]  (Abnormal) Collected: 06/23/25 1311    Order Status: Completed Specimen: Tissue from Brain Updated: 06/30/25 1406     Tissue - Aerobic Culture Rare Pseudomonas stutzeri     Comment: Susceptibility sent to reference lab. Results to follow on separate report.       Tissue Culture - Aerobic [[id number]] Collected: 06/23/25 1314    Order Status: Completed Specimen: Tissue from Brain  Updated: 06/28/25 0920     Tissue - Aerobic Culture No Growth    Tissue Culture - Aerobic [[id number]]  (Abnormal)  (Susceptibility) Collected: 06/23/25 1313    Order Status: Completed Specimen: Tissue from Brain Updated: 06/27/25 0928     Tissue - Aerobic Culture Staphylococcus epidermidis     Comment: Isolated from Thio only       Anaerobic Culture [[id number]] Collected: 06/23/25 1314    Order Status: Completed Specimen: Tissue from Brain Updated: 06/26/25 0907     Anaerobe Culture No Anaerobes Isolated    Anaerobic Culture [[id number]] Collected: 06/23/25 1313    Order Status: Completed Specimen: Tissue from Brain Updated: 06/26/25 0907     Anaerobe Culture No Anaerobes Isolated    Anaerobic Culture [[id number]] Collected: 06/23/25 1311    Order Status: Completed Specimen: Tissue from Brain Updated: 06/26/25 0904     Anaerobe Culture No Anaerobes Isolated             See below for Radiology    Assessment/Plan:  Left Subdural empyema - 6/23/25 s/p left craniotomy and evacuation of empyema with tissue culture positive for staph epidermidis and Pseudomonas stutzeri  Traumatic Subdural hematoma , s/p left frontoparietal craniotomy and evacuation on 5/19/25   Chronic left SDH with new onset headache, Rt sided weakness and facial droop, s/p left diony hole on 6/2/25  Post op Increased subdural fluid collection, s/p left redo diony hole with fluid evacuation, drain placement on 6/16  Surgical site infection status post cranioplasty on 6/16 with tissue culture positive for Staph epidermidis   Hematoma , RUE- 6/30/25- conservative management         Hx-  CVA in June, 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,Depression/anxiety ,HLD, Hypothyroidism, Essential HTN, Type 2 diabetes mellitus.      Plan-   Continue Cefepime 1 gram q6h until final sensitivity is known with anticipated end date 8/18/25. Await final sensitivity data for Pseudomonas stutzeri. Rehab will accept Pt once final sensitivity is known with final ID rec.      Continue  [facility]   Neurosurgery cleared Pt for Lovenox for VTE prophylaxis as of 6/27/25  Plavix can be resumed 2 weeks post op 7/7/25 per Neurosurgery   Neuro check q4h  PT/OT consulted. High intensity therapy suggested.  CM consulted to assist with placement      VTE prophylaxis: Lovenox     Patient condition:  Fair     Anticipated discharge and Disposition:     TBD.        All diagnosis and differential diagnosis have been reviewed; assessment and plan has been documented; I have personally reviewed the labs and test results that are presently available; I have reviewed the patients medication list; I have reviewed the consulting providers response and recommendations. I have reviewed or attempted to review medical records based upon their availability    All of the patient's questions have been  addressed and answered. Patient's is agreeable to the above stated plan. I will continue to monitor closely and make adjustments to medical management as needed.    Portions of this note dictated using EMR integrated voice recognition software, and may be subject to voice recognition errors not corrected at proofreading. Please contact writer for clarification if needed.   _____________________________________________________________________    Malnutrition Status:  Nutrition consulted. Most recent weight and BMI monitored-     Measurements:  Wt Readings from Last 1 Encounters:   06/12/25 49.9 kg (110 lb)   Body mass index is 17.75 kg/m².    Patient has been screened and assessed by RD.    Malnutrition Type:  Context: acute illness or injury  Level: severe    Malnutrition Characteristic Summary:  Weight Loss (Malnutrition): greater than 5% in 1 month  Energy Intake (Malnutrition): other (see comments) (Does not meet criteria)  Subcutaneous Fat (Malnutrition): moderate depletion  Muscle Mass (Malnutrition): moderate depletion  Fluid Accumulation (Malnutrition): other (see comments) (Not present)  Hand  Strength, Right  (Malnutrition): Unable to assess    Interventions/Recommendations (treatment strategy):  Oral diet/nutrient modifications     Scheduled Med:   atorvastatin  80 mg Oral Daily    busPIRone  15 mg Oral BID    ceFEPime IV (PEDS and ADULTS)  1 g Intravenous Q6H    enoxparin  40 mg Subcutaneous Q24H (prophylaxis, 1700)    ezetimibe  10 mg Oral Daily    famotidine  20 mg Oral BID    hydrocortisone   Topical (Top) BID    Lactobacillus rhamnosus GG  1 packet Oral Daily    levetiracetam  500 mg Oral BID    levothyroxine  88 mcg Oral Before breakfast    metoprolol succinate  12.5 mg Oral Daily    nortriptyline  25 mg Oral QHS    psyllium husk  1 packet Oral Daily    traZODone  25 mg Oral QHS      Continuous Infusions:     PRN Meds:    Current Facility-Administered Medications:     acetaminophen, 650 mg, Rectal, Q4H PRN    acetaminophen, 650 mg, Oral, Q4H PRN    bisacodyL, 10 mg, Rectal, Daily PRN    butalbital-acetaminophen-caffeine -40 mg, 1 tablet, Oral, Q4H PRN    dextrose 50%, 12.5 g, Intravenous, PRN    dextrose 50%, 25 g, Intravenous, PRN    diphenhydrAMINE, 25 mg, Oral, Q6H PRN    glucagon (human recombinant), 1 mg, Intramuscular, PRN    glucose, 16 g, Oral, PRN    glucose, 24 g, Oral, PRN    hydrALAZINE, 10 mg, Intravenous, Q4H PRN    insulin aspart U-100, 0-5 Units, Subcutaneous, QID (AC + HS) PRN    labetalol, 10 mg, Intravenous, Q4H PRN    melatonin, 6 mg, Oral, Nightly PRN    morphine, 2 mg, Intravenous, Q4H PRN    naloxone, 0.02 mg, Intravenous, PRN    ondansetron, 4 mg, Intravenous, Q8H PRN    oxyCODONE-acetaminophen, 1 tablet, Oral, Q4H PRN    prochlorperazine, 5 mg, Intravenous, Q6H PRN    sodium chloride 0.9%, 10 mL, Intravenous, PRN    Flushing PICC/Midline Protocol, , , Until Discontinued **AND** sodium chloride 0.9%, 10 mL, Intravenous, Q12H PRN    sodium chloride 0.9%, 3 mL, Intravenous, Q12H PRN         Argentina Jerome MD  Department of Hospital Medicine   [facility]    07/02/2025

## 2025-07-02 NOTE — PROCEDURES
[facility]  Speech Language Pathology Department  Modified Barium Swallow (MBS) Study    Patient Name:  Chelle Chandra   MRN:  [id number]    Recommendations     General recommendations:  dysphagia therapy  Repeat MBS study: as appropriate  Solid texture recommendation:  Regular Diet - IDDSI Level 7  Liquid consistency recommendation: Moderately thick liquids - IDDSI Level 3   Medications: crushed in puree  General precautions: aspiration    History     Chelle Chandra is a/n 59 y.o. female with significant hx.On 5/30 patient with subdural hemorrhage s/p L diony hole. Pt then with new R sided weakness and underwent a redo of diony hole for fluid evacuation/drain placement and a left cranioplasty for subdural fluid evacuation on 6/16. Now with ICH s/p craniectomy on 6/23, swelling to RUE 6/30 ultrasound suggestive of hematoma.     Most recent MBS: 6/17- regular solids, moderately thick liquids    Past Medical History:   Diagnosis Date    Accelerated junctional rhythm     Agatston CAC score, <100     Anxiety disorder, unspecified     Asthma     COPD type A     Diabetes mellitus without complication     GERD (gastroesophageal reflux disease)     History of COVID-19     Insomnia     Lung nodule      Past Surgical History:   Procedure Laterality Date    ANGIOGRAM, CORONARY, WITH LEFT HEART CATHETERIZATION      BACK SURGERY      BREAST LUMPECTOMY Right     CARDIOVERSION  08/01/2013    CARPAL TUNNEL RELEASE  10/23/2013    CRANIECTOMY Left 6/16/2025    Procedure: CRANIECTOMY;  Surgeon: James Rankin MD;  Location: [facility] OR;  Service: Neurosurgery;  Laterality: Left;  left redo-craniectomy for subdural empyema    CRANIECTOMY Left 6/23/2025    Procedure: CRANIECTOMY;  Surgeon: James Rankin MD;  Location: [facility] OR;  Service: Neurosurgery;  Laterality: Left;  LEFT //  PINS // SCOPE    CRANIOTOMY FOR EVACUATION OF SUBDURAL HEMATOMA Left 05/19/2025    Procedure: CRANIOTOMY, FOR SUBDURAL HEMATOMA  EVACUATION;  Surgeon: Ricardo Shelley MD;  Location: [facility] OR;  Service: Neurosurgery;  Laterality: Left;    CREATION OF TERRI HOLE WITH EVACUATION OF HEMATOMA Left 6/2/2025    Procedure: CREATION, CRANIAL TERRI HOLE, WITH HEMATOMA EVACUATION;  Surgeon: James Rankin MD;  Location: [facility] OR;  Service: Neurosurgery;  Laterality: Left;  LEFT BURRHOLE FOR SUBDURAL HEMATOMA EVACUATION // STEALTH // SHERRI SKYTRON // DRILL    EVACUATION OF EMPYEMA Left 6/16/2025    Procedure: EVACUATION, EMPYEMA;  Surgeon: James Rankin MD;  Location: [facility] OR;  Service: Neurosurgery;  Laterality: Left;  left craniotomy for subdural empyema    FUSION OF CERVICAL SPINE BY ANTERIOR APPROACH USING COMPUTER-ASSISTED NAVIGATION  06/10/2019    KIDNEY SURGERY      TONSILLECTOMY AND ADENOIDECTOMY      TOTAL ABDOMINAL HYSTERECTOMY      WRIST SURGERY       A MBS Study was completed to assess the efficiency of her swallow function, rule out aspiration and make recommendations regarding safe dietary consistencies, effective compensatory strategies, and safe eating environment.     Patient complaint: to get off of thickened liquids    Imaging   Results for orders placed during the hospital encounter of 05/29/25    X-Ray Chest 1 View    Narrative  EXAMINATION:  XR CHEST 1 VIEW    CLINICAL HISTORY:  aspiration suspected;    TECHNIQUE:  Single frontal view of the chest was performed.    COMPARISON:  05/30/2025    FINDINGS:  LINES AND TUBES: None    MEDIASTINUM AND RUTH: The cardiac silhouette is normal.    LUNGS: No lobar consolidation. No edema.    PLEURA:No pleural effusion. No pneumothorax.    OTHER: Postop ACDF.    Impression  No acute cardiopulmonary abnormality.      Electronically signed by: Kristyn Guzman  Date:    05/30/2025  Time:    14:33    No results found for this or any previous visit.    Results for orders placed during the hospital encounter of 07/01/24    MRI Brain Without Contrast    Narrative  EXAMINATION:  MRI BRAIN WITHOUT  CONTRAST    CLINICAL HISTORY:  dizziness, off-balance, r/o cva, recent history of cva 1 month ago;    TECHNIQUE:  Multiplanar MRI sequences were performed of the brain without contrast.    COMPARISON:  MRI brain June 2, 2024    FINDINGS:  There are extensive left middle cerebral artery territory frontoparietal lobes and temporooccipital lobes as well as basal ganglia subacute nonhemorrhagic infarcts.  Infarct shows less dense brightness on diffusion-weighted sequence and now is not associated with signal dropout on the ADC map.  Brain edematous changes and sulci effacement is slightly less evident.  There is local mass effect without midline shift.  No hydrocephalus.  There is no new infratentorial or supratentorial brain infarct.  Gradient echo sequence are without altered signal to reflect a previous hemorrhagic byproduct.  Sella and the suprasellar areas are unremarkable.    The cerebellar tonsils are normally positioned.  No acute extra axial fluid collections identified. The mastoid air cells are clear.    Impression  1.  Left cerebral hemisphere extensive subacute infarct without hemorrhagic transformation.    2.  No new findings.      Electronically signed by: Adam Sears  Date:    07/02/2024  Time:    11:36    Subjective     Patient awake and alert.    Spiritual/Cultural/[employer] Beliefs/Practices that affect care: no  Pain/Comfort: Pain Rating 1: 0/10    Respiratory Status:  room air    Restraints/positioning devices: none    Fluoroscopic Findings     Oral Musculature  Dentition: own teeth  Secretion Management: adequate  Mucosal Quality: good  Facial Movement: reduced right  Buccal Strength & Mobility: WFL  Vocal Quality: adequate  Volitional Cough: Able to clear secretions      Setup  Upright in bed  Able to self feed  Adequate head control    Visualization  Lateral view  Cervical hardware noted    Oral Phase:   Reduced bolus control with liquids    Pharyngeal Phase:   Reduced base of tongue  retraction  Reduced epiglottic deflection  Reduced hyolaryngeal excursion    Consistency Fed by Laryngeal Penetration Aspiration Residue   Mildly thick liquid by cup Self To the vocal folds before on 1/2 trials TRACE aspiration on non-cleared laryngeal vestibule residue from penetration Mild  Pyriform sinus   Mildly thick liquid by cup- chin tuck Self After the swallow of pyriform sinus residue None Mild  Pyriform sinus   Mildly thick liquid by straw- chin tuck, L turn Self None None Mild-moderate  Valleculae and Pyriform sinus   Mildly thick liquid by straw- chin tuck, R turn Self None None Moderate  Valleculae and Pyriform sinus   Mildly thick liquid by straw- R turn only Self During the swallow None Mild  Pyriform sinus   Thin liquid by straw Self To the vocal folds  Cleared with cued cough None Mild-moderate  Pyriform sinus     Cervical Esophageal Phase:   UES appeared to accommodate all bolus types without stasis or retrograde movement visualized    Compensatory Strategies:  Compensatory strategies were intermittently effective, however patient requires MAX cues to efficiently complete the strategy      Assessment     Patient continues to exhibit moderate oropharyngeal dysphagia characterized by the findings noted above.  Laryngeal penetration, not consistently cleared, with mildly thick liquids.   Both swallow safety and efficiency are impaired. Patient requires MAX cues to provide efficient use of chin tuck with head turn to reduced laryngeal penetration of mildly thick liquids.     SLP rec: continue current modified diet (regular solids, moderately thick liquids) with use of chin tuck/head turn during therapy sessions. Once patient is independently efficient with compensatory strategies, consider initiating mildly thick liquids.     Outcome Measures     Functional Oral Intake Scale: 5 - Total oral diet with multiple consistencies, by requiring special preparation or compensations    Goals      Multidisciplinary Problems       SLP Goals          Problem: SLP    Goal Priority Disciplines Outcome   SLP Goal     SLP    Description: LTG: The pt will tolerate least restrictive diet with no overt s/sx of aspiration.    STGs:  1. Patient will participate CTAR/pharyngeal exercises  2. Patient will participate in laryngeal elevation exercises  3. Patient will participate in repeat MBS when clinically appropriate  4. Patient will utilize chin tuck with head turn consistently with liquid trials.                     Education     Patient provided with verbal education regarding results/recommendations and SLP POC.  Understanding was verbalized.    Plan     SLP Follow-Up:  Yes    Patient to be seen:  5 x/week   Plan of Care expires:  07/16/25  Plan of Care reviewed with:  patient     Time Tracking     SLP Treatment Date:   07/02/25  Speech Start Time:  1400  Speech Stop Time:  1420     Speech Total Time (min):  20 min    Billable minutes:   Motion Fluoroscopic Evaluation, Video Recording, 20 minutes     07/02/2025

## 2025-07-02 NOTE — PROGRESS NOTES
Infectious Disease  Progress Note    PATIENT IDENTIFICATION:  Patient Name: Chelle Chandra : 1965 Age: 59 y.o. Sex: female  Admit Date: 2025 Hospital Day: 21  Room: 4/4 A        CHIEF COMPLAINT:  Follow up for surgical site infection    IMPRESSION:  MSSE + Pseudomonas stutzeri SSI s/p craniotomy   Increased subdural fluid collection s/p I&D  and craniectomy   History of COPD  Type II DM      RECOMMENDATIONS:  Continue Cefepime for MSSE and pseudomonal SSI  Follow up results of Pseudomonas stutzeri sensitivities sent to [place], in process  Will need ~8 weeks of IV ABX. Will finalize plan once sensitivities are recovered from pseudomonas isolate.  Tentatively planning cefepime 2g Q8H through  if isolate returns with S- to Cefepime.      ______________________________________________________________________    INTERVAL HISTORY:  No acute changes overnight  Remains afebrile  Pseudomonas isolate sent to [place] for further testing and sensitivities, those results are in process            Past Medical History:   Diagnosis Date    Accelerated junctional rhythm     Agatston CAC score, <100     Anxiety disorder, unspecified     Asthma     COPD type A     Diabetes mellitus without complication     GERD (gastroesophageal reflux disease)     History of COVID-19     Insomnia     Lung nodule          Past Surgical History:   Procedure Laterality Date    ANGIOGRAM, CORONARY, WITH LEFT HEART CATHETERIZATION      BACK SURGERY      BREAST LUMPECTOMY Right     CARDIOVERSION  2013    CARPAL TUNNEL RELEASE  10/23/2013    CRANIECTOMY Left 2025    Procedure: CRANIECTOMY;  Surgeon: James Rankin MD;  Location: [facility] OR;  Service: Neurosurgery;  Laterality: Left;  left redo-craniectomy for subdural empyema    CRANIECTOMY Left 2025    Procedure: CRANIECTOMY;  Surgeon: James Rankin MD;  Location: [facility] OR;  Service: Neurosurgery;  Laterality: Left;  LEFT //  PINS // SCOPE    CRANIOTOMY FOR  EVACUATION OF SUBDURAL HEMATOMA Left 05/19/2025    Procedure: CRANIOTOMY, FOR SUBDURAL HEMATOMA EVACUATION;  Surgeon: Ricardo Shelley MD;  Location: [facility] OR;  Service: Neurosurgery;  Laterality: Left;    CREATION OF TERRI HOLE WITH EVACUATION OF HEMATOMA Left 6/2/2025    Procedure: CREATION, CRANIAL TERRI HOLE, WITH HEMATOMA EVACUATION;  Surgeon: James Rankin MD;  Location: [facility] OR;  Service: Neurosurgery;  Laterality: Left;  LEFT BURRHOLE FOR SUBDURAL HEMATOMA EVACUATION // STEALTH // HERCULES SKYTRON // DRILL    EVACUATION OF EMPYEMA Left 6/16/2025    Procedure: EVACUATION, EMPYEMA;  Surgeon: James Rankin MD;  Location: [facility] OR;  Service: Neurosurgery;  Laterality: Left;  left craniotomy for subdural empyema    FUSION OF CERVICAL SPINE BY ANTERIOR APPROACH USING COMPUTER-ASSISTED NAVIGATION  06/10/2019    KIDNEY SURGERY      TONSILLECTOMY AND ADENOIDECTOMY      TOTAL ABDOMINAL HYSTERECTOMY      WRIST SURGERY           Review of patient's allergies indicates:   Allergen Reactions    Aspirin     Ketorolac     Penicillins     Sulfa (sulfonamide antibiotics)     Ozempic [semaglutide] Other (See Comments)     Abdominal pain       ROS:  10 point ROS negative except as noted       Family History   Problem Relation Name Age of Onset    Heart attack Mother      Diabetes Father         Social History[1]    CURRENT MEDS:     atorvastatin  80 mg Oral Daily    busPIRone  15 mg Oral BID    ceFEPime IV (PEDS and ADULTS)  1 g Intravenous Q6H    enoxparin  40 mg Subcutaneous Q24H (prophylaxis, 1700)    ezetimibe  10 mg Oral Daily    famotidine  20 mg Oral BID    hydrocortisone   Topical (Top) BID    Lactobacillus rhamnosus GG  1 packet Oral Daily    levetiracetam  500 mg Oral BID    levothyroxine  88 mcg Oral Before breakfast    metoprolol succinate  12.5 mg Oral Daily    nortriptyline  25 mg Oral QHS    psyllium husk  1 packet Oral Daily    traZODone  25 mg Oral QHS         OBJECTIVE:  VITALS: Temp:  [97.5 °F (36.4 °C)-98  °F (36.7 °C)] 97.5 °F (36.4 °C)  Pulse:  [69-84] 69  Resp:  [16-18] 16  SpO2:  [96 %-98 %] 98 %  BP: (112-134)/(68-80) 112/75  General appearance: Chronically ill but non-toxic appearing middle aged  female seen sitting up in chair, she is awake and alert, in no acute distress.  SLP therapist at [facility] for therapy.    HEENT: Large defect to L side of head from previous craniectomy, incision site healing well with no redness, swelling, or drainage from site.  Sclera white, conjunctiva clear, no signs of thrush   Neck:  supple  Lungs:  breath sounds clear and equal bilaterally with no increased WOB.  Stable on room air  Cardiac:  RRR, no murmur  Abdomen:  soft, non-distended, non-tender.  Bowel sounds normal  Extremities:  No increased edema  :  No alvarez, voids   Skin:  No new rash or lesion  Psych/Neuro:  Patient is A&O x3 with pleasant mood and affect        LABS:    Recent Labs   Lab 06/27/25  0420 06/30/25  0401   WBC 6.91 5.47   HGB 9.6* 9.9*   HCT 29.4* 31.0*   * 509*       Recent Labs   Lab 06/30/25  0401      K 3.6      CO2 27   BUN 11.2   *         RADIOLOGY  No new imaging available for review        SUBMITTED BY:  Jack De Los Santos, [facility]  Infectious Disease  [facility]   7/2/2025         [1]   Social History  Tobacco Use    Smoking status: Every Day     Current packs/day: 0.50     Types: Cigarettes    Smokeless tobacco: Never   Vaping Use    Vaping status: Former    Quit date: 6/1/2024   Substance Use Topics    Alcohol use: Not Currently    Drug use: Never

## 2025-07-02 NOTE — PROGRESS NOTES
[facility]  Neurology  Neurosurgery  Progress Note    Subjective:     Interval History: NAEs overnight. Patient resting in bed. No complaints. Right arm with some improvement of motors, able to lift off bed. Sensation intact.     Post-Op Info:  Procedure(s) (LRB):  CRANIECTOMY (Left)   9 Days Post-Op      Medications:  Continuous Infusions:  Scheduled Meds:   atorvastatin  80 mg Oral Daily    busPIRone  15 mg Oral BID    ceFEPime IV (PEDS and ADULTS)  1 g Intravenous Q6H    enoxparin  40 mg Subcutaneous Q24H (prophylaxis, 1700)    ezetimibe  10 mg Oral Daily    famotidine  20 mg Oral BID    hydrocortisone   Topical (Top) BID    Lactobacillus rhamnosus GG  1 packet Oral Daily    levetiracetam  500 mg Oral BID    levothyroxine  88 mcg Oral Before breakfast    metoprolol succinate  12.5 mg Oral Daily    nortriptyline  25 mg Oral QHS    psyllium husk  1 packet Oral Daily    traZODone  25 mg Oral QHS     PRN Meds:  Current Facility-Administered Medications:     acetaminophen, 650 mg, Rectal, Q4H PRN    acetaminophen, 650 mg, Oral, Q4H PRN    bisacodyL, 10 mg, Rectal, Daily PRN    butalbital-acetaminophen-caffeine -40 mg, 1 tablet, Oral, Q4H PRN    dextrose 50%, 12.5 g, Intravenous, PRN    dextrose 50%, 25 g, Intravenous, PRN    diphenhydrAMINE, 25 mg, Oral, Q6H PRN    glucagon (human recombinant), 1 mg, Intramuscular, PRN    glucose, 16 g, Oral, PRN    glucose, 24 g, Oral, PRN    hydrALAZINE, 10 mg, Intravenous, Q4H PRN    insulin aspart U-100, 0-5 Units, Subcutaneous, QID (AC + HS) PRN    labetalol, 10 mg, Intravenous, Q4H PRN    melatonin, 6 mg, Oral, Nightly PRN    morphine, 2 mg, Intravenous, Q4H PRN    naloxone, 0.02 mg, Intravenous, PRN    ondansetron, 4 mg, Intravenous, Q8H PRN    oxyCODONE-acetaminophen, 1 tablet, Oral, Q4H PRN    prochlorperazine, 5 mg, Intravenous, Q6H PRN    sodium chloride 0.9%, 10 mL, Intravenous, PRN    Flushing PICC/Midline Protocol, , , Until Discontinued **AND**  "sodium chloride 0.9%, 10 mL, Intravenous, Q12H PRN    sodium chloride 0.9%, 3 mL, Intravenous, Q12H PRN     Review of Systems  Objective:     Weight: 49.9 kg (110 lb)  Body mass index is 17.75 kg/m².  Vital Signs (Most Recent):  Temp: 98 °F (36.7 °C) (07/01/25 1940)  Pulse: 83 (07/02/25 1019)  Resp: 16 (07/02/25 0915)  BP: 134/71 (07/02/25 1019)  SpO2: 96 % (07/02/25 1019) Vital Signs (24h Range):  Temp:  [97.3 °F (36.3 °C)-98 °F (36.7 °C)] 98 °F (36.7 °C)  Pulse:  [79-84] 83  Resp:  [16-18] 16  SpO2:  [96 %] 96 %  BP: (125-135)/(68-94) 134/71                              Neurosurgery Physical Exam  Awake, alert, oriented.   NAD. Resting in bed.   Able to lift right arm off bed,  improving. Improvement overall. Sensation intact.   Moves other extremities well  Incision CDI with staples overlying.   Bone flap site depressed, soft.     Significant Labs:  No results for input(s): "GLU", "NA", "K", "CL", "CO2", "BUN", "CREATININE", "CALCIUM", "MG" in the last 48 hours.  No results for input(s): "WBC", "HGB", "HCT", "PLT" in the last 48 hours.  No results for input(s): "LABPT", "INR", "APTT" in the last 48 hours.  Microbiology Results (last 7 days)       Procedure Component Value Units Date/Time    Fungal Culture [[id number]]  (Normal) Collected: 06/16/25 1338    Order Status: Completed Specimen: Tissue from Head Updated: 06/30/25 1501     Fungal Culture No Fungus Isolated at 2 Weeks    Fungal Culture [[id number]]  (Normal) Collected: 06/16/25 1357    Order Status: Completed Specimen: Bone from Head Updated: 06/30/25 1501     Fungal Culture No Fungus Isolated at 2 Weeks    Tissue Culture - Aerobic [[id number]]  (Abnormal) Collected: 06/23/25 1311    Order Status: Completed Specimen: Tissue from Brain Updated: 06/30/25 1405     Tissue - Aerobic Culture Rare Pseudomonas stutzeri     Comment: Susceptibility sent to reference lab. Results to follow on separate report.       Tissue Culture - Aerobic [[id number]] " Collected: 06/23/25 1314    Order Status: Completed Specimen: Tissue from Brain Updated: 06/28/25 0920     Tissue - Aerobic Culture No Growth    Tissue Culture - Aerobic [[id number]]  (Abnormal)  (Susceptibility) Collected: 06/23/25 1313    Order Status: Completed Specimen: Tissue from Brain Updated: 06/27/25 0928     Tissue - Aerobic Culture Staphylococcus epidermidis     Comment: Isolated from Thio only       Anaerobic Culture [[id number]] Collected: 06/23/25 1314    Order Status: Completed Specimen: Tissue from Brain Updated: 06/26/25 0907     Anaerobe Culture No Anaerobes Isolated    Anaerobic Culture [[id number]] Collected: 06/23/25 1313    Order Status: Completed Specimen: Tissue from Brain Updated: 06/26/25 0907     Anaerobe Culture No Anaerobes Isolated    Anaerobic Culture [[id number]] Collected: 06/23/25 1311    Order Status: Completed Specimen: Tissue from Brain Updated: 06/26/25 0904     Anaerobe Culture No Anaerobes Isolated              Assessment/Plan:     Active Diagnoses:    Diagnosis Date Noted POA    PRINCIPAL PROBLEM:  Subdural empyema [G06.2] 06/26/2025 Yes    Severe malnutrition [E43] 05/31/2025 Yes      Problems Resolved During this Admission:     -JOHNATHAN removed yesterday. Incision Healing well. Right arm improving  -Ok to shower, Ok to leave incision open to air  -PTOTST  -Antibiotics per infectious disease  -Pending placement.     CECILIA Carrillo  Neurosurgery  [facility] - Neurology

## 2025-07-02 NOTE — PT/OT/SLP PROGRESS
"Physical Therapy Treatment    Patient Name:  Chelle Chandra   MRN:  [id number]    Recommendations:     Discharge therapy intensity: High Intensity Therapy   Discharge Equipment Recommendations: to be determined by next level of care  Barriers to discharge: Impaired mobility and Ongoing medical needs    Assessment:     Chelle Chandra is a 59 y.o. female admitted with a medical diagnosis of R sided weakness and facial droop due to L SDH s/p craniotomy and MMA embolization. On 5/30 patient with subdural hemorrhage s/p L diony hole.  She presents with the following impairments/functional limitations: weakness, gait instability, decreased upper extremity function, impaired endurance, impaired balance, decreased lower extremity function, impaired self care skills, pain, impaired functional mobility.    Visual/mirror feedback utilized during gait training today to help correct R lateral trunk lean. Pt with good response and was able to correct posture using mirror (only) and without verbal or tactile cues. Pt would revert back to R lateral lean when she would look away from mirror.     DME Justification:  TBD.     Rehab Prognosis: Good; patient would benefit from acute skilled PT services to address these deficits and reach maximum level of function.    Recent Surgery: Procedure(s) (LRB):  CRANIECTOMY (Left) 9 Days Post-Op    Plan:     During this hospitalization, patient would benefit from acute PT services 6 x/week to address the identified rehab impairments via gait training, therapeutic activities, therapeutic exercises, neuromuscular re-education and progress toward the following goals:    Plan of Care Expires:  08/01/25    Subjective     Chief Complaint: headache   Patient/Family Comments/goals: "to walk by myself"  Pain/Comfort:   4-5/10 headache      Objective:     Communicated with nsg prior to session.  Patient found HOB elevated with peripheral IV upon PT entry to room.     General Precautions: Standard, fall, " seizure, aspiration  Orthopedic Precautions: N/A  Braces: helmet   Respiratory Status: Room air  Blood Pressure: 117/80  Skin Integrity: Visible skin intact      Functional Mobility:  Bed Mobility:     Supine to Sit: minimum assistance  Transfers:     Sit to Stand:  minimum assistance   X1 rep using HHA  X2 reps using hallway rail   Bed to Chair: minimum assistance with  no AD  using  Stand Pivot  Gait: Pt amb 12ft (hallway rail)+15ft (hallway rail)+5ft (quad cane) with Min A.   Min A for RLE advancement and foot clearance. Therapist assisting with knee control during stance phase secondary to impaired R terminal knee extension.    Mild ataxia throughout req verbal cues and mirror feedback for foot placement   Pt able to self correct posture using mirror feedback.   Pt unable to ambulate further secondary to muscle fatigue    Therapeutic Activities/Exercises:      Co-Treatment: No    Education:  Patient provided with verbal education education regarding PT role/goals/POC, fall prevention, safety awareness, and discharge/DME recommendations.  Understanding was verbalized, however additional teaching warranted.     Patient left up in chair with all lines intact, call button in reach, and helmet on      GOALS:   Multidisciplinary Problems       Physical Therapy Goals          Problem: Physical Therapy    Goal Priority Disciplines Outcome Interventions   Physical Therapy Goal     PT, PT/OT Progressing    Description: Goals to be met by: 25     Patient will increase functional independence with mobility by performin. Supine to sit with Modified [place]  2. Sit to supine with Modified [place]  3. Sit to stand transfer with Modified [place]  4. Bed to chair transfer with Modified [place] using Rolling Walker vs QC  5. Gait  x 200 feet with Modified [place] using Rolling Walker vs QC.                          Time Tracking:     PT Received On: 25  PT Start Time: 1518     PT Stop  Time: 1553  PT Total Time (min): 35 min     Billable Minutes: Gait Training 25 and Neuromuscular Re-education 10    Treatment Type: Treatment  PT/PTA: PT     Number of PTA visits since last PT visit: 1 07/02/2025

## 2025-07-02 NOTE — PLAN OF CARE
Problem: Adult Inpatient Plan of Care  Goal: Plan of Care Review  Outcome: Progressing     Problem: Adult Inpatient Plan of Care  Goal: Patient-Specific Goal (Individualized)  Outcome: Progressing     Problem: Adult Inpatient Plan of Care  Goal: Absence of Hospital-Acquired Illness or Injury  Outcome: Progressing     Problem: Adult Inpatient Plan of Care  Goal: Optimal Comfort and Wellbeing  Outcome: Progressing     Problem: Adult Inpatient Plan of Care  Goal: Readiness for Transition of Care  Outcome: Progressing     Problem: Wound  Goal: Optimal Pain Control and Function  Outcome: Progressing     Problem: Wound  Goal: Skin Health and Integrity  Outcome: Progressing     Problem: Wound  Goal: Optimal Wound Healing  Outcome: Progressing     Problem: Infection  Goal: Absence of Infection Signs and Symptoms  Outcome: Progressing     Problem: Fall Injury Risk  Goal: Absence of Fall and Fall-Related Injury  Outcome: Progressing     Problem: Skin Injury Risk Increased  Goal: Skin Health and Integrity  Outcome: Progressing

## 2025-07-02 NOTE — PLAN OF CARE
Problem: Adult Inpatient Plan of Care  Goal: Plan of Care Review  Flowsheets (Taken 7/2/2025 0429)  Plan of Care Reviewed With: patient     Problem: Wound  Goal: Optimal Wound Healing  Intervention: Promote Wound Healing  Flowsheets (Taken 7/2/2025 0429)  Sleep/Rest Enhancement:   room darkened   relaxation techniques promoted   regular sleep/rest pattern promoted   noise level reduced     Problem: Fall Injury Risk  Goal: Absence of Fall and Fall-Related Injury  Intervention: Promote Injury-Free Environment  Flowsheets (Taken 7/2/2025 0429)  Safety Promotion/Fall Prevention:   assistive device/personal item within reach   bed alarm set   Fall Risk signage in place   Fall Risk reviewed with patient/family   lighting adjusted   side rails raised x 3

## 2025-07-02 NOTE — PT/OT/SLP RE-EVAL
Occupational Therapy   Re-evaluation    Name: Chelle Chandra  MRN: [id number]  Recent Surgery: Procedure(s) (LRB):  CRANIECTOMY (Left) 9 Days Post-Op    Recommendations:     Discharge therapy intensity: High Intensity Therapy   Discharge Equipment Recommendations:  to be determined by next level of care  Barriers to discharge:  None    Assessment:     Chelle Chandra is a 59 y.o. female with a medical diagnosis of R sided weakness and facial droop due to L SDH s/p craniotomy and MMA embolization. On 5/30 patient with subdural hemorrhage s/p L diony hole. Pt then with new R sided weakness and underwent a redo of diony hole for fluid evacuation/drain placement and a left cranioplasty for subdural fluid evacuation on 6/16. Now with ICH s/p craniectomy on 6/23, swelling to RUE 6/30 ultrasound suggestive of hematoma.  She presents with the following performance deficits affecting function: weakness, impaired endurance, impaired self care skills, impaired functional mobility, impaired balance, decreased upper extremity function, decreased lower extremity function, decreased safety awareness, pain, impaired fine motor.      Pt tolerated re-eval well. Pt demonstrates increased ROM in RUE and demonstrates 2+/5 for shoulder and elbow flexion. Pt demonstrates severe R lateral lean that requires max verbal and tactile cues to correct. Pt able to self correct, however continuous cues throughout session are required. Pt presents with progress towards OT goals. POC and goals remain appropriate for pt. Remains appropriate for high intensity at d/c.     Rehab Prognosis: Good; patient would benefit from acute skilled OT services to address these deficits and reach maximum level of function.       DME Justification: No DME recommended requiring DME justifications    Plan:     Patient to be seen 6 x/week to address the above listed problems via self-care/home management, therapeutic activities, therapeutic exercises, neuromuscular  "re-education, sensory integration, splinting  Plan of Care Expires: 08/04/25  Plan of Care Reviewed with: patient    OT/LEE conference to discuss OT POC and patient's progression towards goals held with ROSA Conn    Subjective     Chief Complaint: "I want to wash my hair"  Patient/Family Comments/goals: return home    Pain/Comfort:  Pain Rating 1:  (wincing while donning helmet)    Patients cultural, spiritual, Jehovah's witness conflicts given the current situation: no    Objective:     OT communicated with nurse prior to session.      Patient was found HOB elevated with peripheral IV, telemetry upon OT entry to room.    General Precautions: Standard, fall, seizure (helmet OOB or when bed >30 degrees)  Orthopedic Precautions:    Braces:  (resting hand splint and GivMohr sling)    Vital Signs: Blood Pressure: 134/71    Functional Mobility/Transfers:  Bed mobility:    Scooting: contact guard assistance  Supine to Sit: minimum assistance  Transfers: Sit to Stand: minimum assistance with quad cane  Pt requires modAx2 for ambulation to<>from bed to room sink using a quad cane. Requires moderate cues for step length with R foot and for leaning to L 2/2 severe R sided lean.      Activities of Daily Living:  Grooming: moderate assistance and of 2 persons to perform face washing standing at sink. AAROM provided to RUE to incorporate use of R hand in functional daily tasks.   MaxAx2 for hair washing while in long sit position in bed. Pt performed R lateral head flexion to allow OT and student OT to perform hair washing task over basin.   Lower Body Dressing: contact guard assistance to doff/don socks seated EOB    AMPAC 6 Click ADL:  AMPAC Total Score: 16    Functional Cognition:  Orientation: oriented to Person, Place, and Situation    Visual Perceptual Skills:  Pt performed therapy activities with glasses donned and no report of blurriness or inability to complete tasks 2/2 visual impairment.     Upper Extremity " Function:  Right Upper Extremity:   Range of Motion:   AROM for shoulder flexion ~60 degrees before demonstrating compensation.   Gravity minimized; Sup-WFL, pro-~70 degrees, minimal wrist flexion, and no movement at digits.   PROM: shoulder flexion-~170 degrees and shoulder abd ~90 degrees before report of pain.   Shoulder subluxation present.    Strength: Shoulder and elbow flexion 2+/5, digits 0/5  Sensation: able to feel light touch, however reports feeling is slightly duller on R side compared the L.     Left Upper Extremity:  Range of Motion: WFL  Strength: WFL  Sensation: WFL    Balance:   Static sitting balance: Impaired. R lateral lean requiring max cues/reminders to correct.   Dynamic sitting balance:Impaired. R lateral lean requiring max cues/reminders to correct, able to perform seated EOB tasks, however requires CGA.   Static standing balance:Impaired. Requires modAx2 2/2 R knee buckling and R lateral lean.   Dynamic standing balance: impaired. Requires modAx2 2/2 knee buckling and R lateral lean.     Therapeutic Positioning  Risk for acquired pressure injuries is decreased due to ability to get to BSC/toilet with assist.    OT interventions performed during the course of today's session in an effort to prevent and/or reduce acquired pressure injuries:   Education was provided on benefits of and recommendations for therapeutic positioning  Therapeutic positioning was provided at the conclusion of session for contracture prevention    Skin assessment: Known incisional wound to L head.     OT recommendations for therapeutic positioning throughout hospitalization:   Follow [facility] Pressure Injury Prevention Protocol    Co-Treatment: No    Patient Education:  Patient provided with verbal education education regarding OT role/goals/POC, fall prevention, and safety awareness.  Understanding was verbalized, however additional teaching warranted.     Patient left HOB elevated with all lines intact and call button  in reach    GOALS:   Multidisciplinary Problems       Occupational Therapy Goals          Problem: Occupational Therapy    Goal Priority Disciplines Outcome Interventions   Occupational Therapy Goal     OT, PT/OT Progressing    Description: Goals: to be met by 07/14/25    Pt will complete grooming standing at sink with LRAD with CGA.  Pt will complete UB dressing with SBA.  Pt will complete LB dressing with SBA using LRAD.  Pt will complete toileting with CGA using LRAD.  Pt will complete functional mobility to/from toilet and toilet transfer with mod I using LRAD.   Pt will demo visual attention to R side >80% of time with min verbal cues.  Pt will demo RUE strength of 3-/5 for participation in ADLs.                       History:     Past Medical History:   Diagnosis Date    Accelerated junctional rhythm     Agatston CAC score, <100     Anxiety disorder, unspecified     Asthma     COPD type A     Diabetes mellitus without complication     GERD (gastroesophageal reflux disease)     History of COVID-19     Insomnia     Lung nodule          Past Surgical History:   Procedure Laterality Date    ANGIOGRAM, CORONARY, WITH LEFT HEART CATHETERIZATION      BACK SURGERY      BREAST LUMPECTOMY Right     CARDIOVERSION  08/01/2013    CARPAL TUNNEL RELEASE  10/23/2013    CRANIECTOMY Left 6/16/2025    Procedure: CRANIECTOMY;  Surgeon: James Rankin MD;  Location: [facility];  Service: Neurosurgery;  Laterality: Left;  left redo-craniectomy for subdural empyema    CRANIECTOMY Left 6/23/2025    Procedure: CRANIECTOMY;  Surgeon: James Rankin MD;  Location: [facility] OR;  Service: Neurosurgery;  Laterality: Left;  LEFT //  PINS // SCOPE    CRANIOTOMY FOR EVACUATION OF SUBDURAL HEMATOMA Left 05/19/2025    Procedure: CRANIOTOMY, FOR SUBDURAL HEMATOMA EVACUATION;  Surgeon: Ricardo Shelley MD;  Location: [facility] OR;  Service: Neurosurgery;  Laterality: Left;    CREATION OF TERRI HOLE WITH EVACUATION OF HEMATOMA Left 6/2/2025    Procedure:  CREATION, CRANIAL TERRI HOLE, WITH HEMATOMA EVACUATION;  Surgeon: James Rankin MD;  Location: [facility] OR;  Service: Neurosurgery;  Laterality: Left;  LEFT BURRHOLE FOR SUBDURAL HEMATOMA EVACUATION // STEALTH // SHERRI SKYTRON // DRILL    EVACUATION OF EMPYEMA Left 6/16/2025    Procedure: EVACUATION, EMPYEMA;  Surgeon: James Rankin MD;  Location: [facility] OR;  Service: Neurosurgery;  Laterality: Left;  left craniotomy for subdural empyema    FUSION OF CERVICAL SPINE BY ANTERIOR APPROACH USING COMPUTER-ASSISTED NAVIGATION  06/10/2019    KIDNEY SURGERY      TONSILLECTOMY AND ADENOIDECTOMY      TOTAL ABDOMINAL HYSTERECTOMY      WRIST SURGERY         Time Tracking:     OT Date of Treatment: 07/02/25  OT Start Time: 0959  OT Stop Time: 1100  OT Total Time (min): 61 min    Billable Minutes:Re-eval 1 unit  Self Care/Home Management 3 units    7/2/2025

## 2025-07-02 NOTE — PT/OT/SLP PROGRESS
[facility]  Spe[facility] Pathology Department  Dysphagia Therapy Progress Note    Patient Name:  Chelle Chandra   MRN:  [id number]    Recommendations     General recommendations:  repeat Modified [place] Swallow Study today  Solid texture recommendation:  Regular Diet - IDDSI Level 7  Liquid consistency recommendation: Moderately thick liquids - IDDSI Level 3   Medications: crushed in puree    Discharge therapy intensity: High Intensity Therapy   Barriers to safe discharge:  complicated medical history    Subjective     Patient awake and alert.  Spiritual/Cultural/Sikh Beliefs/Practices that affect care: no    Pain/Comfort: Pain Rating 1: 0/10    Respiratory Status:  room air    Objective     Therapeutic PO Trials:  Consistency Amount Fed By Oral Symptoms Pharyngeal Symptoms   Moderately thick liquid by cup 1oz Self None None   Mildly thick liquid by cup 2oz Self None None     Assessment     Patient is appropriate for repeat MBS. SLP to complete.    Outcome Measures     Functional Oral Intake Scale: 5 - Total oral diet with multiple consistencies, by requiring special preparation or compensations    Goals     Multidisciplinary Problems       SLP Goals          Problem: SLP    Goal Priority Disciplines Outcome   SLP Goal     SLP    Description: LTG: The pt will tolerate least restrictive diet with no overt s/sx of aspiration.    STGs:  1. Patient will participate CTAR/pharyngeal exercises  2. Patient will participate in laryngeal elevation exercises  3. Patient will participate in repeat MBS when clinically appropriate  4. Patient will utilize chin tuck with head turn consistently with liquid trials.                     Patient Education     Patient provided with verbal education regarding swallow function.  Understanding was verbalized.    Plan     Will continue to follow and tx as appropriate.    SLP Follow-Up:  Yes   Patient to be seen:  5 x/week   Plan of Care expires:   07/16/25  Plan of Care reviewed with:  patient       Time Tracking     SLP Treatment Date:   07/02/25  Speech Start Time:  1400  Speech Stop Time:  1420     Speech Total Time (min):  20 min    Billable minutes:  Treatment of Swallow Dysfunction, 20 minutes       07/02/2025

## 2025-07-03 LAB
POCT GLUCOSE: 165 MG/DL (ref 70–110)
POCT GLUCOSE: 177 MG/DL (ref 70–110)
POCT GLUCOSE: 178 MG/DL (ref 70–110)
POCT GLUCOSE: 192 MG/DL (ref 70–110)

## 2025-07-03 PROCEDURE — 25000003 PHARM REV CODE 250: Performed by: INTERNAL MEDICINE

## 2025-07-03 PROCEDURE — 92526 ORAL FUNCTION THERAPY: CPT

## 2025-07-03 PROCEDURE — 21400001 HC TELEMETRY ROOM

## 2025-07-03 PROCEDURE — 25000242 PHARM REV CODE 250 ALT 637 W/ HCPCS: Performed by: INTERNAL MEDICINE

## 2025-07-03 PROCEDURE — 97112 NEUROMUSCULAR REEDUCATION: CPT

## 2025-07-03 PROCEDURE — 97110 THERAPEUTIC EXERCISES: CPT

## 2025-07-03 PROCEDURE — 63600175 PHARM REV CODE 636 W HCPCS: Performed by: INTERNAL MEDICINE

## 2025-07-03 PROCEDURE — 63600175 PHARM REV CODE 636 W HCPCS

## 2025-07-03 PROCEDURE — 97116 GAIT TRAINING THERAPY: CPT | Mod: CQ

## 2025-07-03 PROCEDURE — 97530 THERAPEUTIC ACTIVITIES: CPT | Mod: CQ

## 2025-07-03 RX ORDER — CEFEPIME HYDROCHLORIDE 2 G/1
2 INJECTION, POWDER, FOR SOLUTION INTRAVENOUS
Status: DISCONTINUED | OUTPATIENT
Start: 2025-07-03 | End: 2025-07-03

## 2025-07-03 RX ORDER — CEFEPIME HYDROCHLORIDE 2 G/1
2 INJECTION, POWDER, FOR SOLUTION INTRAVENOUS
Status: DISCONTINUED | OUTPATIENT
Start: 2025-07-04 | End: 2025-07-15 | Stop reason: HOSPADM

## 2025-07-03 RX ADMIN — BUSPIRONE HYDROCHLORIDE 15 MG: 5 TABLET ORAL at 09:07

## 2025-07-03 RX ADMIN — CEFEPIME 1 G: 1 INJECTION, POWDER, FOR SOLUTION INTRAMUSCULAR; INTRAVENOUS at 09:07

## 2025-07-03 RX ADMIN — LEVOTHYROXINE SODIUM 88 MCG: 0.09 TABLET ORAL at 05:07

## 2025-07-03 RX ADMIN — ATORVASTATIN CALCIUM 80 MG: 40 TABLET, FILM COATED ORAL at 09:07

## 2025-07-03 RX ADMIN — OXYCODONE AND ACETAMINOPHEN 1 TABLET: 325; 10 TABLET ORAL at 06:07

## 2025-07-03 RX ADMIN — CEFEPIME 1 G: 1 INJECTION, POWDER, FOR SOLUTION INTRAMUSCULAR; INTRAVENOUS at 02:07

## 2025-07-03 RX ADMIN — METOPROLOL SUCCINATE 12.5 MG: 25 TABLET, EXTENDED RELEASE ORAL at 09:07

## 2025-07-03 RX ADMIN — LEVETIRACETAM 500 MG: 100 SOLUTION ORAL at 09:07

## 2025-07-03 RX ADMIN — HYDROCORTISONE: 1 CREAM TOPICAL at 09:07

## 2025-07-03 RX ADMIN — CEFEPIME 2 G: 2 INJECTION, POWDER, FOR SOLUTION INTRAVENOUS at 10:07

## 2025-07-03 RX ADMIN — NORTRIPTYLINE HYDROCHLORIDE 25 MG: 25 CAPSULE ORAL at 10:07

## 2025-07-03 RX ADMIN — FAMOTIDINE 20 MG: 20 TABLET, FILM COATED ORAL at 09:07

## 2025-07-03 RX ADMIN — OXYCODONE AND ACETAMINOPHEN 1 TABLET: 325; 10 TABLET ORAL at 03:07

## 2025-07-03 RX ADMIN — PSYLLIUM HUSK 1 PACKET: 3.4 POWDER ORAL at 09:07

## 2025-07-03 RX ADMIN — EZETIMIBE 10 MG: 10 TABLET ORAL at 09:07

## 2025-07-03 RX ADMIN — ENOXAPARIN SODIUM 40 MG: 40 INJECTION SUBCUTANEOUS at 05:07

## 2025-07-03 RX ADMIN — Medication 1 EACH: at 09:07

## 2025-07-03 RX ADMIN — OXYCODONE AND ACETAMINOPHEN 1 TABLET: 325; 10 TABLET ORAL at 09:07

## 2025-07-03 RX ADMIN — TRAZODONE HYDROCHLORIDE 25 MG: 50 TABLET ORAL at 09:07

## 2025-07-03 NOTE — PT/OT/SLP PROGRESS
Physical Therapy Treatment    Patient Name:  Chelle Chandra   MRN:  [id number]    Recommendations:     Discharge therapy intensity: High Intensity Therapy   Discharge Equipment Recommendations: to be determined by next level of care  Barriers to discharge: Impaired mobility    Assessment:     Chelle Chandra is a 59 y.o. female admitted with a medical diagnosis of R sided weakness and facial droop due to L SDH s/p craniotomy and MMA embolization. On 5/30 patient with subdural hemorrhage s/p L diony hole.  She presents with the following impairments/functional limitations: weakness, gait instability, decreased upper extremity function, impaired endurance, impaired balance, decreased lower extremity function, impaired self care skills, pain, impaired functional mobility     Pt with HOB elevated upon arrival, agreeable to PT tx today. Pt able to moira helmet but unable to clasp 2/2 R sided weakness. Pt then able to moira B socks long sitting in bed at CGA, CGA to sit on EOB. Pt then able to t/f to bedside chair min A with HHA, noted improvement in advancing RLE with no assistance needed. Pt then performed gait training with mirror feedback for pt to self correct R lateral lean, pt able to correct lean when looking in mirror but reverts back when looking away. Pt able to perform x20ft with SBQC and min A to advance RLE, noted NBOS and intermittent scissoring when no assist was provided. Progressed to hallway railing x20ft with better mechanics and less scissoring noted with less factors to attend to. Pt then wheeled back to room and positioned for comfort with all needs in reach and in no distress.    DME Justification:  No DME recommended requiring DME justifications    Rehab Prognosis: Good; patient would benefit from acute skilled PT services to address these deficits and reach maximum level of function.    Recent Surgery: Procedure(s) (LRB):  CRANIECTOMY (Left) 10 Days Post-Op    Plan:     During this hospitalization,  "patient would benefit from acute PT services 6 x/week to address the identified rehab impairments via gait training, therapeutic activities, therapeutic exercises, neuromuscular re-education and progress toward the following goals:    Plan of Care Expires:  25    Subjective     Chief Complaint: "I do a lot better with the smaller cane than the walker or hemiwalker"   Patient/Family Comments/goals: to get stronger  Pain/Comfort:  Pain Rating 1: 0/10      Objective:     Communicated with nsg prior to session.  Patient found HOB elevated with peripheral IV upon PT entry to room.     General Precautions: Standard, fall, seizure, aspiration  Orthopedic Precautions: N/A  Braces: N/A  Respiratory Status: Room air  Blood Pressure:   Skin Integrity: Visible skin intact      Functional Mobility:  Bed Mobility:     Supine to Sit: contact guard assistance  Transfers:     Sit to Stand:  minimum assistance with hand-held assist, grab bars, and HHA from EOB, grab bar in hallway  Bed to Chair: minimum assistance with  hand-held assist  using  Step Transfer  Gait: 20ft with SBQC, 20ft with hallway railing  Use of mirror for all gait, some scissoring noted with RLE but able to correct when using railing vs SBQC    Co-Treatment: No    Education:  Patient provided with verbal education education regarding PT role/goals/POC, fall prevention, safety awareness, and discharge/DME recommendations.  Understanding was verbalized.     Patient left up in chair with all lines intact, call button in reach, and nsg notified      GOALS:   Multidisciplinary Problems       Physical Therapy Goals          Problem: Physical Therapy    Goal Priority Disciplines Outcome Interventions   Physical Therapy Goal     PT, PT/OT Progressing    Description: Goals to be met by: 25     Patient will increase functional independence with mobility by performin. Supine to sit with Modified [place]  2. Sit to supine with Modified [place]  3. " Sit to stand transfer with Modified [place]  4. Bed to chair transfer with Modified [place] using Rolling Walker vs QC  5. Gait  x 200 feet with Modified [place] using Rolling Walker vs QC.                          Time Tracking:     PT Received On: 07/03/25  PT Start Time: 1035     PT Stop Time: 1113  PT Total Time (min): 38 min     Billable Minutes: Gait Training 30 and Therapeutic Activity 8    Treatment Type: Treatment  PT/PTA: PTA     Number of PTA visits since last PT visit: 2     07/03/2025

## 2025-07-03 NOTE — PT/OT/SLP PROGRESS
[facility]  Speech Language Pathology Department  Dysphagia Therapy Progress Note    Patient Name:  Chelle Chandra   MRN:  [id number]    Recommendations     General recommendations:  dysphagia therapy  Solid texture recommendation:  Regular Diet - IDDSI Level 7  Liquid consistency recommendation: Moderately thick liquids - IDDSI Level 3   Medications: crushed in puree  Aspiration precautions: small bites/sips, slow rate, alternate solids/liquids, and upright for PO intake    Discharge therapy intensity: High Intensity Therapy   Barriers to safe discharge:  none    Subjective     Patient awake, alert, and cooperative.  Spiritual/Cultural/Rastafari Beliefs/Practices that affect care: no    Pain/Comfort:  0/10    Respiratory Status:  room air    Objective     Therapeutic Activities:  Pt practiced compensatory strategies/postural changes with mod verbal and visual cues.  Pt completed chin tuck x25 with mod cues and extra time as needed  Increased accuracy with reminder to picture target on sternum as a goal of where chin should end up versus moving head posteriorly and minimal movement of chin downward  Pt completed chin tuck with right turn x25 with min-mod cues  Increased accuracy with SLP model and reminder to hit target then slide over    Assessment     Pt continues to present with oropharyngeal dysphagia requiring diet modification to reduce the risk of aspiration.    Outcome Measures     Functional Oral Intake Scale: 5 - Total oral diet with multiple consistencies, by requiring special preparation or compensations    Goals     Multidisciplinary Problems       SLP Goals          Problem: SLP    Goal Priority Disciplines Outcome   SLP Goal     SLP    Description: LTG: The pt will tolerate least restrictive diet with no overt s/sx of aspiration.    STGs:  1. Patient will participate CTAR/pharyngeal exercises  2. Patient will participate in laryngeal elevation exercises  3. Patient will  participate in repeat MBS when clinically appropriate  4. Patient will utilize chin tuck with head turn consistently with liquid trials.                     Patient Education     Patient provided with verbal education regarding SLP POC.  Understanding was verbalized, however additional teaching warranted.    Plan     Will continue to follow and tx as appropriate.    SLP Follow-Up:  Yes   Patient to be seen:  5 x/week   Plan of Care expires:  07/16/25  Plan of Care reviewed with:  patient       Time Tracking     SLP Treatment Date:   07/03/25  Speech Start Time:  1328  Speech Stop Time:  1342     Speech Total Time (min):  14 min    Billable minutes:  Treatment of Swallow Dysfunction, 14 minutes       07/03/2025

## 2025-07-03 NOTE — PROGRESS NOTES
NAEs overnight. Resting in bed.   No neurologic complaints. Has been ambulating with assistance  Right arm continues to improve    A/ox4. NAD resting in bed  Able to lift right arm off bed, 3+/5 right  strength.   Moves other extremities well  EOMI. Speech clear  Incision CDI. Staples overlying. Soft.     -Ok to shower, Ok to leave incision open to air  -PTOTST  -Antibiotics per infectious disease  -Pending placement.

## 2025-07-03 NOTE — PROGRESS NOTES
"[facility] Hospital Medicine Progress Note        Chief Complaint: Inpatient Follow-up subdural empyema    HPI per admitting team: "59-year-old female with PMHx of left MCA stroke s/p thrombectomy in 6/2024 on Plavix, hypothyroidism, anxiety, bronchial asthma, COPD, type 2 diabetes mellitus,  and GERD. Patient recently had subdural hematoma following a fall in May requiring intracranial embolization, craniotomy with hematoma evacuation. After this patient was discharged home. Late May she presented back to the ED with headache, right-sided weakness, facial droop and imaging was concerning for left MCA diminished flow and subacute hematoma. MRI brain was negative for CVA. Neurosurgery performed diony hole for drainage of subdural hematoma due to increased intracranial pressure with postop CT showing definitive improvement patient was on Plavix post embolization which was held briefly and resumed per Neurosurgery on 06/05 and she was transferred to rehab on 06/06. While in rehab patient was noted to have increase right upper extremity weakness and also significant headaches. CT head revealed reaccumulation of subdural fluid collection, increased from before patient was sent back to [facility] for further evaluation/neurosurgery services. Neurosurgery evaluated, planning for  shunt this hospitalization, home meds resumed as appropriate except for Plavix, symptomatic management for headache.  shunt scheduled for 6/16. Patient underwent redo diony hole, fluid evacuation and drain placement, concern for surgical site infection and therefore underwent cranioplasty. Infectious Disease consulted and patient was placed on cefepime, vancomycin and Flagyl pending intraoperative cultures.  Intraoperative cultures growing staph epi.  ID recommending IV vancomycin, IV Rocephin until 7/3.  Neurosurgery wished to monitor patient for another day until 6/21.  PT/OT on board; recommending " "high-intensity therapy.  Speech therapy recommending regular diet with moderately thick liquids.  CM on board for placement.   On 6/23 discharge to rehab was planned but Pt developed RUE weakness and Ct head showed increasing size of extra-axial fluid collections along cerebral convexity with increasing mass effect & increase parenchymal edema in L posterior frontal lobe. Pt was taken back to OR and underwent left craniotomy and evacuation of empyema. Post operatively admitted to ICU. Intraoperative culture from 6/23 grew  staph epi and rare Pseudomonas stutzeri.  ID suggested to stop Vancomycin and continue Cefepime as Staph epi is oxacillin sensitive and Cefepime will cover both Pseudomonas and MSSE with total course at least 6 to 8 weeks. Pt's care downgraded back to  service on 6/25/25.  ID requested sensitivity testing for Pseudomonas stutzeri, however per hospital microbiology their instrument was not able to run sensitivities, and they are sending the sample to [facility] for further testing. ID recommended to continue Cefepime at this time with tentative end date 8/18/2025.  Pt remains with daily headache and dense weakness to RUE. MRI brain on 6/27/25 showed no acute stroke but noted post surgical left dural thickening and enhancement, left cerebral encephalomalacia with extensive gliotic changes.  As of 6/30 Pt is able to move her Rt upper ext some. Swelling noted inner aspect of Rt arm with U/S suggestive of hematoma 5.6x3.6x1.7 cm. Will continue conservative management for hematoma. Subdural JOHNATHAN drain and every other staple removed on 7/1/25 with complete staple removal on POD #21 7/14/25. NS cleared Pt to resume Plavix 2 weeks post op 7/7/25"       Interval Hx:   Patient is laying in bed, reports she wore her helmet for a bit longer to work with therapy and now her head is sensitive at the craniotomy site, requesting pain medications   Discussed that we are still waiting on her cultures to come back from " outlying lab for discharge planning  No family at bedside   Case was discussed with patient's nurse and  on the floor.    Objective/physical exam:  General: In no acute distress, afebrile, concave craniectomy site  Chest: Clear to auscultation bilaterally anteriorly  Heart: RRR, +S1, S2, no appreciable murmur  Abdomen: Soft, nontender, BS +  MSK: Warm, no lower extremity edema, no clubbing or cyanosis  Neurologic: Alert and oriented x3, right upper extremity improving, reach out of 5 strength      VITAL SIGNS: 24 HRS MIN & MAX LAST   Temp  Min: 98 °F (36.7 °C)  Max: 98.5 °F (36.9 °C) 98 °F (36.7 °C)   BP  Min: 114/70  Max: 137/79 131/71   Pulse  Min: 77  Max: 88  79   Resp  Min: 16  Max: 18 18   SpO2  Min: 96 %  Max: 97 % 97 %     I have reviewed the following labs:  Recent Labs   Lab 06/27/25  0420 06/30/25  0401   WBC 6.91 5.47   RBC 3.22* 3.35*   HGB 9.6* 9.9*   HCT 29.4* 31.0*   MCV 91.3 92.5   MCH 29.8 29.6   MCHC 32.7* 31.9*   RDW 14.6 14.9   * 509*   MPV 9.4 9.2     Recent Labs   Lab 06/27/25  0420 06/30/25  0401    141   K 3.3* 3.6   * 105   CO2 26 27   BUN 3.5* 11.2   CREATININE 0.56 0.57   * 150*   CALCIUM 8.5 9.1   MG 1.60 1.90     Microbiology Results (last 7 days)       Procedure Component Value Units Date/Time    Fungal Culture [[id number]]  (Normal) Collected: 06/16/25 1338    Order Status: Completed Specimen: Tissue from Head Updated: 06/30/25 1501     Fungal Culture No Fungus Isolated at 2 Weeks    Fungal Culture [[id number]]  (Normal) Collected: 06/16/25 1357    Order Status: Completed Specimen: Bone from Head Updated: 06/30/25 1501     Fungal Culture No Fungus Isolated at 2 Weeks    Tissue Culture - Aerobic [[id number]]  (Abnormal) Collected: 06/23/25 1311    Order Status: Completed Specimen: Tissue from Brain Updated: 06/30/25 1402     Tissue - Aerobic Culture Rare Pseudomonas stutzeri     Comment: Susceptibility sent to reference lab. Results to follow on  separate report.       Tissue Culture - Aerobic [[id number]] Collected: 06/23/25 1314    Order Status: Completed Specimen: Tissue from Brain Updated: 06/28/25 0920     Tissue - Aerobic Culture No Growth    Tissue Culture - Aerobic [[id number]]  (Abnormal)  (Susceptibility) Collected: 06/23/25 1313    Order Status: Completed Specimen: Tissue from Brain Updated: 06/27/25 0928     Tissue - Aerobic Culture Staphylococcus epidermidis     Comment: Isolated from Thio only                See below for Radiology    Assessment/Plan:  Left Subdural empyema - 6/23/25 s/p left craniectomy and evacuation of empyema with tissue culture positive for staph epidermidis and Pseudomonas stutzeri  Traumatic Subdural hematoma--> s/p left frontoparietal craniotomy and evacuation on 5/19/25   Chronic left SDH with new onset headache, Rt sided weakness and facial droop, s/p left diony hole on 6/2/25  Post op Increased subdural fluid collection, s/p left redo diony hole with fluid evacuation, drain placement on 6/16  Surgical site infection status post cranioplasty on 6/16 with tissue culture positive for Staph epidermidis   Hematoma, RUE- 6/30/25- conservative management   Hx-  CVA in June, 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,Depression/anxiety ,HLD, Hypothyroidism, Essential HTN, Type 2 diabetes mellitus.   Severe malnutrition      Continue Cefepime 2 gram q8h until final sensitivity is known for Pseudomonas Stutzeri (sent out to ref lab for susceptibility) - Tentatively planning cefepime 2g Q8H through 8/18 if isolate returns with S- to Cefepime.    6/23- Sent out cx still pending results  Continue [facility]   Neurosurgery cleared pt for Lovenox for VTE prophylaxis as of 6/27/25  Plavix can be resumed 2 weeks post op- 7/7/25 per Neurosurgery   Neuro check q4h  Insulin sliding scale, Accu-Cheks AC and HS,  Given hyperglycemia, Diet changed to diabetic with moderately thickened liquids as recommended per speech  PT/OT - High intensity therapy  suggested.  CM on board, patient is accepted but discharge held given waiting for final cultures that has been sent out  Morning CBC, CMP ordered       VTE prophylaxis: Lovenox     Patient condition:  Fair     Anticipated discharge and Disposition:   TBD, [facility] rehab will submit for Auth once final cultures received       All diagnosis and differential diagnosis have been reviewed; assessment and plan has been documented; I have personally reviewed the labs and test results that are presently available; I have reviewed the patients medication list; I have reviewed the consulting providers response and recommendations. I have reviewed or attempted to review medical records based upon their availability    All of the patient's questions have been  addressed and answered. Patient's is agreeable to the above stated plan. I will continue to monitor closely and make adjustments to medical management as needed.    Portions of this note dictated using EMR integrated voice recognition software, and may be subject to voice recognition errors not corrected at proofreading. Please contact writer for clarification if needed.   _____________________________________________________________________    Malnutrition Status:  Nutrition consulted. Most recent weight and BMI monitored-     Measurements:  Wt Readings from Last 1 Encounters:   06/12/25 49.9 kg (110 lb)   Body mass index is 17.75 kg/m².    Patient has been screened and assessed by RD.    Malnutrition Type:  Context: acute illness or injury  Level: severe    Malnutrition Characteristic Summary:  Weight Loss (Malnutrition): greater than 5% in 1 month  Energy Intake (Malnutrition): other (see comments) (Does not meet criteria)  Subcutaneous Fat (Malnutrition): moderate depletion  Muscle Mass (Malnutrition): moderate depletion  Fluid Accumulation (Malnutrition): other (see comments) (Not present)  Hand  Strength, Right (Malnutrition): Unable to  assess    Interventions/Recommendations (treatment strategy):  Oral diet/nutrient modifications     Scheduled Med:   atorvastatin  80 mg Oral Daily    busPIRone  15 mg Oral BID    ceFEPIme  2 g Intramuscular Q8H    enoxparin  40 mg Subcutaneous Q24H (prophylaxis, 1700)    ezetimibe  10 mg Oral Daily    famotidine  20 mg Oral BID    hydrocortisone   Topical (Top) BID    Lactobacillus rhamnosus GG  1 packet Oral Daily    levetiracetam  500 mg Oral BID    levothyroxine  88 mcg Oral Before breakfast    metoprolol succinate  12.5 mg Oral Daily    nortriptyline  25 mg Oral QHS    psyllium husk  1 packet Oral Daily    traZODone  25 mg Oral QHS      Continuous Infusions:     PRN Meds:  Current Facility-Administered Medications:     acetaminophen, 650 mg, Rectal, Q4H PRN    acetaminophen, 650 mg, Oral, Q4H PRN    bisacodyL, 10 mg, Rectal, Daily PRN    butalbital-acetaminophen-caffeine -40 mg, 1 tablet, Oral, Q4H PRN    dextrose 50%, 12.5 g, Intravenous, PRN    dextrose 50%, 25 g, Intravenous, PRN    diphenhydrAMINE, 25 mg, Oral, Q6H PRN    glucagon (human recombinant), 1 mg, Intramuscular, PRN    glucose, 16 g, Oral, PRN    glucose, 24 g, Oral, PRN    hydrALAZINE, 10 mg, Intravenous, Q4H PRN    insulin aspart U-100, 0-5 Units, Subcutaneous, QID (AC + HS) PRN    labetalol, 10 mg, Intravenous, Q4H PRN    melatonin, 6 mg, Oral, Nightly PRN    morphine, 2 mg, Intravenous, Q4H PRN    naloxone, 0.02 mg, Intravenous, PRN    ondansetron, 4 mg, Intravenous, Q8H PRN    oxyCODONE-acetaminophen, 1 tablet, Oral, Q4H PRN    prochlorperazine, 5 mg, Intravenous, Q6H PRN    sodium chloride 0.9%, 10 mL, Intravenous, PRN    Flushing PICC/Midline Protocol, , , Until Discontinued **AND** sodium chloride 0.9%, 10 mL, Intravenous, Q12H PRN    sodium chloride 0.9%, 3 mL, Intravenous, Q12H PRN         Jake Paulino MD  Department of Hospital Medicine   [facility]   07/03/2025

## 2025-07-03 NOTE — PROGRESS NOTES
Inpatient Nutrition Assessment    Admit Date: 6/11/2025   Total duration of encounter: 22 days   Patient Age: 59 y.o.    Nutrition Recommendation/Prescription     Continue current diet regular diet and moderately thickened liquids per SLP recommendations.   Chocolate Magic Cup (provides 190 kcal, 9 g protein per serving) BID  Monitor PO intakes, labs, and wt changes    Communication of Recommendations: reviewed with patient    Nutrition Assessment     Malnutrition Assessment/Nutrition-Focused Physical Exam    Malnutrition Context: acute illness or injury (06/18/25 1545)  Malnutrition Level: severe (06/18/25 1545)  Energy Intake (Malnutrition): other (see comments) (Does not meet criteria) (06/18/25 1545)  Weight Loss (Malnutrition): greater than 5% in 1 month (06/18/25 1545)  Subcutaneous Fat (Malnutrition): moderate depletion (06/18/25 1545)  Orbital Region (Subcutaneous Fat Loss): moderate depletion        Muscle Mass (Malnutrition): moderate depletion (06/18/25 1545)  [place] Region (Muscle Loss): moderate depletion  Clavicle Bone Region (Muscle Loss): moderate depletion                    Fluid Accumulation (Malnutrition): other (see comments) (Not present) (06/18/25 1545)     Hand  Strength, Right (Malnutrition): Unable to assess (06/18/25 1545)  A minimum of two characteristics is recommended for diagnosis of either severe or non-severe malnutrition.    Chart Review    Reason Seen: length of stay and follow-up    Malnutrition Screening Tool Results   Have you recently lost weight without trying?: Yes: 2-13 lbs  Have you been eating poorly because of a decreased appetite?: No   MST Score: 1   Diagnosis:  Increased subdural fluid collection Status post left redo diony hole with fluid evacuation, drain placement 6/16  Suspected surgical site infection status post cranioplasty 6/16  Subdural hematoma requiring craniotomy with evacuation and MMA embolization early May with recurrence late May requiring diony  hole  Right-sided weakness/intractable headache secondary to above    Relevant Medical History:   hypothyroidism, anxiety, bronchial asthma, COPD, type 2 diabetes mellitus, 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, 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, GERD     Scheduled Medications:  atorvastatin, 80 mg, Daily  busPIRone, 15 mg, BID  ceFEPIme, 2 g, Q8H  enoxparin, 40 mg, Q24H (prophylaxis, 1700)  ezetimibe, 10 mg, Daily  famotidine, 20 mg, BID  hydrocortisone, , BID  Lactobacillus rhamnosus GG, 1 packet, Daily  levetiracetam, 500 mg, BID  levothyroxine, 88 mcg, Before breakfast  metoprolol succinate, 12.5 mg, Daily  nortriptyline, 25 mg, QHS  psyllium husk, 1 packet, Daily  traZODone, 25 mg, QHS    Continuous Infusions:     PRN Medications:  acetaminophen, 650 mg, Q4H PRN  acetaminophen, 650 mg, Q4H PRN  bisacodyL, 10 mg, Daily PRN  butalbital-acetaminophen-caffeine -40 mg, 1 tablet, Q4H PRN  dextrose 50%, 12.5 g, PRN  dextrose 50%, 25 g, PRN  diphenhydrAMINE, 25 mg, Q6H PRN  glucagon (human recombinant), 1 mg, PRN  glucose, 16 g, PRN  glucose, 24 g, PRN  hydrALAZINE, 10 mg, Q4H PRN  insulin aspart U-100, 0-5 Units, QID (AC + HS) PRN  labetalol, 10 mg, Q4H PRN  melatonin, 6 mg, Nightly PRN  morphine, 2 mg, Q4H PRN  naloxone, 0.02 mg, PRN  ondansetron, 4 mg, Q8H PRN  oxyCODONE-acetaminophen, 1 tablet, Q4H PRN  prochlorperazine, 5 mg, Q6H PRN  sodium chloride 0.9%, 10 mL, PRN  sodium chloride 0.9%, 10 mL, Q12H PRN  sodium chloride 0.9%, 3 mL, Q12H PRN    Calorie Containing IV Medications: no significant kcals from medications at this time    Recent Labs   Lab 06/27/25  0420 06/30/25  0401    141   K 3.3* 3.6   CALCIUM 8.5 9.1   PHOS 3.4 3.8   MG 1.60 1.90   * 105   CO2 26 27   BUN 3.5* 11.2   CREATININE 0.56 0.57   EGFRNORACEVR >60 >60   * 150*   WBC 6.91 5.47   HGB 9.6* 9.9*   HCT 29.4* 31.0*     Nutrition Orders:  Diet Adult Regular Moderately Thick Liquids (IDDSI Level 3)  Dietary nutrition supplements TID; Magic  "Cup - Any flavor    Appetite/Oral Intake: fair/50-75% of meals  Factors Affecting Nutritional Intake: preferences   Social Needs Impacting Access to Food: none identified  Food/Temple/Cultural Preferences: none reported  Food Allergies: no known food allergies  Last Bowel Movement: 06/30/25  Wound(s):  scalp and temporal incisions noted.     Comments    6/18/25: Spoke to family members at bedside who report pt's appetite has been fluctuating since admit depending on her pain levels. PO intake varies between %. Pt ate 75% of her breakfast this morning but did not eat much of her lunch. Family reports UBW of 112 lbs, last weighing that about 1 month ago. Family reports wt at admit was about 100 lbs which would indicate a 10% wt loss x1 month, nutritionally significant. Most recent bedscale wt from 6/12 is 110 lbs, will monitor for accuracy/trends.     6/20/25: Patient reports good oral intake of meals. Denies any nausea, vomiting, or diarrhea. Constipation noted.     6/24/25: Pt feeling "off" this AM and didn't eat as much. Still on mod thick liquids. Will send Magic Cup for added kcal and protein.    6/27/25: Pt reports fair appetite, does not eat much for breakfast but eats close to 50% of lunch and dinner. Pt states her family has been bringing in outside meals for dinner, she is looking forward to getting some ribs/steak. Pt consuming 1-2 Magic Cup supplements per day.      7/1/25: Pt working with therapy. No recent documentation of oral intake. Will follow-up early.     7/3/25: Fair intake of meals. Likes scrambled eggs for breakfast. Eating magic cup with dinner. Updated order to BID and only chocolate. Wants cereal and milk with every meal, updated in [facility]. Pt is receiving some outside food from . Denies any current GI complaints.     Anthropometrics    Height: 5' 6" (167.6 cm), Height Method: Measured  Last Weight: 49.9 kg (110 lb) (06/12/25 0223), Weight Method: Bed Scale  BMI (Calculated): " 17.8  BMI Classification: underweight (BMI less than 18.5)     Ideal Body Weight (IBW), Female: 130 lb     % Ideal Body Weight, Female (lb): 84.62 %                             Usual Weight Provided By: patient    Wt Readings from Last 5 Encounters:   06/12/25 49.9 kg (110 lb)   06/06/25 46.9 kg (103 lb 6.3 oz)   06/03/25 46.3 kg (102 lb)   05/15/25 49 kg (108 lb 0.4 oz)   05/06/25 50.8 kg (112 lb)     Weight Change(s) Since Admission:   7/1/25 no updated weights   Wt Readings from Last 1 Encounters:   06/12/25 0223 49.9 kg (110 lb)   06/11/25 1314 46.7 kg (103 lb)   Admit Weight: 46.7 kg (103 lb) (06/11/25 1314), Weight Method: Stated    Estimated Needs    Weight Used For Calorie Calculations: 49.9 kg (110 lb 0.2 oz)  Energy Calorie Requirements (kcal): 1342-6736 kcals (30-35 kcal/kg)  Energy Need Method: Kcal/kg  Weight Used For Protein Calculations: 49.9 kg (110 lb 0.2 oz)  Protein Requirements: 60-75 g (1.2-1.5 g/kg)  Fluid Requirements (mL): 1497 mL (30 mL/kg)  CHO Requirement: N/A     Enteral Nutrition     Patient not receiving enteral nutrition at this time.    Parenteral Nutrition     Patient not receiving parenteral nutrition support at this time.    Evaluation of Received Nutrient Intake    Calories: not meeting estimated needs, improving   Protein: not meeting estimated needs, improving     Patient Education     Not applicable.    Nutrition Diagnosis     PES: Inadequate energy intake related to acute illness as evidenced by meeting <75% EEN. (active)    PES: Severe acute disease or injury related malnutrition Related to  As Evidenced by:  - weight loss: > 5% in 1 month - muscle mass depletion: 2 areas of moderate muscle loss (Clavicle, Temporalis) - loss of subcutaneous fat: 2 areas of moderate fat loss (Infraorbital, Buccal) active    Nutrition Interventions     Intervention(s): commercial food and collaboration with other providers  Intervention(s): Oral diet/nutrient modifications    Goal: Meet greater  than 80% of nutritional needs by follow-up. (goal progressing)  Goal: Maintain weight throughout hospitalization. (goal progressing)    Nutrition Goals & Monitoring     Dietitian will monitor: energy intake, weight, and gastrointestinal profile  Discharge planning: regular diet with texture modifications per SLP  Nutrition Risk/Follow-Up: patient at increased nutrition risk; dietitian will follow-up twice weekly   Please consult if re-assessment needed sooner.

## 2025-07-03 NOTE — PLAN OF CARE
Problem: Adult Inpatient Plan of Care  Goal: Plan of Care Review  Outcome: Progressing  Flowsheets (Taken 7/3/2025 0223)  Plan of Care Reviewed With: patient     Problem: Diabetes Comorbidity  Goal: Blood Glucose Level Within Targeted Range  Intervention: Monitor and Manage Glycemia  Flowsheets (Taken 7/3/2025 0223)  Glycemic Management: blood glucose monitored     Problem: Wound  Goal: Optimal Wound Healing  Intervention: Promote Wound Healing  Flowsheets (Taken 7/3/2025 0223)  Sleep/Rest Enhancement:   consistent schedule promoted   room darkened   regular sleep/rest pattern promoted   noise level reduced     Problem: Fall Injury Risk  Goal: Absence of Fall and Fall-Related Injury  Intervention: Promote Injury-Free Environment  Flowsheets (Taken 7/3/2025 0223)  Safety Promotion/Fall Prevention:   assistive device/personal item within reach   bed alarm set   Fall Risk reviewed with patient/family   Fall Risk signage in place   nonskid shoes/socks when out of bed   side rails raised x 3

## 2025-07-03 NOTE — PROGRESS NOTES
Infectious Diseases Progress Note       Patient Name: Chelle Chandra   : 1965 Age: 59 y.o. Sex: female  Admit Date: 2025   Hospital Day: 22  Room: 784/4 A  7/3/2025        CHIEF COMPLAINT:  Follow up for surgical site infection    IMPRESSION:  MSSE + Pseudomonas stutzeri SSI s/p craniotomy   Increased subdural fluid collection s/p I&D  and craniectomy   History of COPD  Type II DM        RECOMMENDATIONS:  Continue Cefepime for MSSE and pseudomonal SSI  Follow up results of Pseudomonas stutzeri sensitivities sent to [facility], in process  Will need ~8 weeks of IV ABX.  Tentatively planning cefepime 2g Q8H through  if isolate returns with S- to Cefepime.    Can DC from ID stand point and follow up clinic.      INTERVAL HISTORY:  No acute changes overnight  Remains afebrile going to rehab.  Pseudomonas isolate sent to [facility] for further testing and sensitivities, those results are in process    OBJECTIVE:  VITALS: Temp:    Reviewed.  General appearance: Chronically ill but non-toxic appearing middle aged  female seen sitting up in chair, she is awake and alert, in no acute distress.  SLP therapist at [facility] for therapy.    HEENT: Large defect to L side of head from previous craniectomy, incision site healing well with no redness, swelling, or drainage from site.  Sclera white, conjunctiva clear, no signs of thrush   Neck:  supple  Lungs:  breath sounds clear and equal bilaterally with no increased WOB.  Stable on room air  Cardiac:  RRR, no murmur  Abdomen:  soft, non-distended, non-tender.  Bowel sounds normal  Extremities:  No increased edema  :  No alvarez, voids     Labs reviewed.    Microbiology Results (Last 365 Days)    Procedure Component Value Units Date/Time   Anaerobic Culture [[id number]] Collected: 25 1314   Order Status: Completed Specimen: Tissue from Brain Updated: 25 0907    Anaerobe Culture No Anaerobes Isolated   Fungal Culture [[id number]] Collected:  06/23/25 1314   Order Status: Sent Specimen: Tissue from Brain Updated: 06/23/25 1326   Gram Stain [[id number]] Collected: 06/23/25 1314   Order Status: Completed Specimen: Tissue from Brain Updated: 06/24/25 0705    GRAM STAIN Few WBC observed     Few Gram positive cocci   Tissue Culture - Aerobic [[id number]] Collected: 06/23/25 1314   Order Status: Completed Specimen: Tissue from Brain Updated: 06/28/25 0920    Tissue - Aerobic Culture No Growth   Anaerobic Culture [[id number]] Collected: 06/23/25 1313   Order Status: Completed Specimen: Tissue from Brain Updated: 06/26/25 0907    Anaerobe Culture No Anaerobes Isolated   Fungal Culture [[id number]] Collected: 06/23/25 1313   Order Status: Sent Specimen: Tissue from Brain Updated: 06/23/25 1326   Gram Stain [[id number]] Collected: 06/23/25 1313   Order Status: Completed Specimen: Tissue from Brain Updated: 06/24/25 0706    GRAM STAIN Few WBC observed     Rare Gram positive cocci   Tissue Culture - Aerobic [[id number]] (Abnormal)  Collected: 06/23/25 1313   Order Status: Completed Specimen: Tissue from Brain Updated: 06/27/25 0928    Tissue - Aerobic Culture Staphylococcus epidermidis Abnormal     Comment: Isolated from Thio only      Susceptibility     Staphylococcus epidermidis     CECY     Clindamycin <=0.25 Sensitive     Gentamicin <=0.5 Sensitive     Oxacillin <=0.25 Sensitive     Tetracycline <=1 Sensitive     Vancomycin 1 Sensitive               Linear View         Anaerobic Culture [[id number]] Collected: 06/23/25 1311   Order Status: Completed Specimen: Tissue from Brain Updated: 06/26/25 0904    Anaerobe Culture No Anaerobes Isolated   Fungal Culture [[id number]] Collected: 06/23/25 1311   Order Status: Sent Specimen: Tissue from Brain Updated: 06/23/25 1326   Gram Stain [[id number]] Collected: 06/23/25 1311   Order Status: Completed Specimen: Tissue from Brain Updated: 06/24/25 0707    GRAM STAIN Many WBC observed     No bacteria seen   Tissue Culture -  Aerobic [[id number]] (Abnormal) Collected: 06/23/25 1311   Order Status: Completed Specimen: Tissue from Brain Updated: 06/30/25 1406    Tissue - Aerobic Culture Rare Pseudomonas stutzeri Abnormal     Comment: Susceptibility sent to reference lab. Results to follow on separate report.

## 2025-07-03 NOTE — PLAN OF CARE
Problem: Adult Inpatient Plan of Care  Goal: Plan of Care Review  7/3/2025 0529 by Jalyn Adamson, RN  Outcome: Progressing  7/3/2025 0223 by Jalyn Adamson, RN  Outcome: Progressing  Flowsheets (Taken 7/3/2025 0223)  Plan of Care Reviewed With: patient

## 2025-07-03 NOTE — PT/OT/SLP PROGRESS
Occupational Therapy   Treatment    Name: Chelle Chandra  MRN: [id number]    Recommendations:     Recommended therapy intensity at discharge: High Intensity Therapy   Discharge Equipment Recommendations:  to be determined by next level of care  Barriers to discharge:  None    Assessment:     Chelle Chandra is a 59 y.o. female with a medical diagnosis of R sided weakness and facial droop due to L SDH s/p craniotomy and MMA embolization. On 5/30 patient with subdural hemorrhage s/p L diony hole. Pt then with new R sided weakness and underwent a redo of diony hole for fluid evacuation/drain placement and a left cranioplasty for subdural fluid evacuation on 6/16. Now with ICH s/p craniectomy on 6/23, swelling to RUE 6/30 ultrasound suggestive of hematoma. She presents with good tolerance for OT session. Performance deficits affecting function are weakness, impaired endurance, impaired self care skills, impaired functional mobility, impaired balance, decreased upper extremity function, decreased lower extremity function, decreased safety awareness, pain, impaired fine motor.     Pt tolerated treatment well. Noted progress in pt posture in sitting, pt sits more at midline at today's treatment without cues. Pt performed neuromuscular re-education tasks in standing, in front of mirror to provide visual feedback of pt R lateral lean. Pt requires cues to continuously monitor posture in mirror however able to self correct once she recognizes impaired posture. Requires more assistance with standing balance with fatigue. Demonstrates progress towards goals and remains appropriate from high intensity therapy at d/c.     DME Justification: No DME recommended requiring DME justifications    Rehab Prognosis:  Good; patient would benefit from acute skilled OT services to address these deficits and reach maximum level of function.       Plan:     Patient to be seen 6 x/week to address the above listed problems via self-care/home  management, therapeutic activities, therapeutic exercises, neuromuscular re-education, sensory integration, splinting  Plan of Care Expires: 08/04/25  Plan of Care Reviewed with: patient    Subjective     Pain/Comfort:  Pain Rating 1: 0/10    Objective:     Communicated with: nurse prior to session.  Patient found up in chair with peripheral IV (helmet) upon OT entry to room.    General Precautions: Standard, fall, seizure    Orthopedic Precautions:   Braces:  (resting hand splint, Giv Joya sling)  Respiratory Status: Room air  Vital Signs: Blood Pressure: 116/69     Occupational Performance:     Functional Mobility/Transfers:  Bed mobility:    Scooting: stand by assistance  Bridging: stand by assistance  Sit to Supine: minimum assistance; assistance with RLE into bed  Transfers: Sit to Stand: minimum assistance with quad cane  Bed to Chair: minimum assistance with quad cane using Step Transfer    Balance:   Static Sitting Balance: One UE support: Fair: Patient able to maintain balance with handhold support; may require occasional minimal assistance.  Dynamic standing balance: requires min A and cues for extending R knee and standing shoulder width apart for greater ALEJANDRO. Requires cues to look at self in mirror, able to self correct posture once she acknowledges refection.     Neuromuscular re-education:   Pt performed dynamic, functional, and cognitive standing task of reaching to touch shapes on mirror once called aloud by student therapist. Pt able to lift RUE to ~40 degrees of shoulder flexion, then performed AAROM using LUE to gain enough flexion to reach shapes. Pt performed task twice with 7/7 correct shapes and reaching, however became fatigued on 2nd trial with 5/7 shapes correct.   Higher shapes required extra assistance by student therapist for reach.   Neuromuscular re-ed task used to facilitate ROM, strength, and encouragement of crossing midline to increase use of RUE in ADLs.   Pt performed x5 with RUE  table glides in standing using quad cane and min A and mirror placed behind table. Pt required cues to monitor posture in reflection and to widen ALEJANDRO. Pt began to require more assistance in standing with fatigue.   Task used to provide a gravity minimized plane to gain more ROM at shoulder joint and promote weightbearing through RUE.     Therapeutic Exercise:  Pt performed:   1x10 PROM shoulder flexion exercises  1x10 AAROM shoulder shrugs  1x10 AAROM scap retraction exercises  1x15 PROM wrist flex/ext exercises    Therapeutic Positioning    OT interventions performed during the course of today's session in an effort to prevent and/or reduce acquired pressure injuries:   Education was provided on benefits of and recommendations for therapeutic positioning  Therapeutic positioning was provided at the conclusion of session for contracture prevention and resting hand splint donned.    Skin assessment: bruising noted at R bicep from hematoma.    [facility] 6 Click ADL: 17    Co-Treatment: No    Patient Education:  Patient provided with verbal education education regarding OT role/goals/POC, fall prevention, and safety awareness.  Understanding was verbalized, however additional teaching warranted.      Patient left HOB elevated with call button in reach and helmet doffed.    GOALS:   Multidisciplinary Problems       Occupational Therapy Goals          Problem: Occupational Therapy    Goal Priority Disciplines Outcome Interventions   Occupational Therapy Goal     OT, PT/OT Progressing    Description: Goals: to be met by 07/14/25    Pt will complete grooming standing at sink with LRAD with CGA.  Pt will complete UB dressing with SBA.  Pt will complete LB dressing with SBA using LRAD.  Pt will complete toileting with CGA using LRAD.  Pt will complete functional mobility to/from toilet and toilet transfer with mod I using LRAD.   Pt will demo visual attention to R side >80% of time with min verbal cues.  Pt will demo RUE  strength of 3-/5 for participation in ADLs.                       Time Tracking:     OT Date of Treatment: 07/03/25  OT Start Time: 1349  OT Stop Time: 1425  OT Total Time (min): 36 min    Billable Minutes:Therapeutic Exercise 1 unit   Neuromuscular Re-education 1 unit    OTR/L readily available for conference at the time of the provision of services: LISA Mallory  OT/EDILSON: OT     Number of EDILSON visits since last OT visit: 1    7/3/2025

## 2025-07-04 LAB
ALBUMIN SERPL-MCNC: 3.2 G/DL (ref 3.4–4.8)
ALBUMIN/GLOB SERPL: 0.9 RATIO (ref 1.1–2)
ALP SERPL-CCNC: 121 UNIT/L (ref 40–150)
ALT SERPL-CCNC: 29 UNIT/L (ref 0–55)
ANION GAP SERPL CALC-SCNC: 10 MEQ/L
AST SERPL-CCNC: 23 UNIT/L (ref 11–45)
BILIRUB SERPL-MCNC: 0.3 MG/DL
BUN SERPL-MCNC: 13.2 MG/DL (ref 9.8–20.1)
CALCIUM SERPL-MCNC: 9 MG/DL (ref 8.4–10.2)
CHLORIDE SERPL-SCNC: 106 MMOL/L (ref 98–107)
CO2 SERPL-SCNC: 26 MMOL/L (ref 23–31)
CREAT SERPL-MCNC: 0.65 MG/DL (ref 0.55–1.02)
CREAT/UREA NIT SERPL: 20
ERYTHROCYTE [DISTWIDTH] IN BLOOD BY AUTOMATED COUNT: 15 % (ref 11.5–17)
GFR SERPLBLD CREATININE-BSD FMLA CKD-EPI: >60 ML/MIN/1.73/M2
GLOBULIN SER-MCNC: 3.5 GM/DL (ref 2.4–3.5)
GLUCOSE SERPL-MCNC: 155 MG/DL (ref 82–115)
HCT VFR BLD AUTO: 33.3 % (ref 37–47)
HGB BLD-MCNC: 10.5 G/DL (ref 12–16)
MCH RBC QN AUTO: 29.6 PG (ref 27–31)
MCHC RBC AUTO-ENTMCNC: 31.5 G/DL (ref 33–36)
MCV RBC AUTO: 93.8 FL (ref 80–94)
NRBC BLD AUTO-RTO: 0 %
PLATELET # BLD AUTO: 448 X10(3)/MCL (ref 130–400)
PMV BLD AUTO: 9.3 FL (ref 7.4–10.4)
POCT GLUCOSE: 189 MG/DL (ref 70–110)
POCT GLUCOSE: 234 MG/DL (ref 70–110)
POCT GLUCOSE: 235 MG/DL (ref 70–110)
POTASSIUM SERPL-SCNC: 3.6 MMOL/L (ref 3.5–5.1)
PROT SERPL-MCNC: 6.7 GM/DL (ref 5.8–7.6)
RBC # BLD AUTO: 3.55 X10(6)/MCL (ref 4.2–5.4)
SODIUM SERPL-SCNC: 142 MMOL/L (ref 136–145)
WBC # BLD AUTO: 5.34 X10(3)/MCL (ref 4.5–11.5)

## 2025-07-04 PROCEDURE — 97112 NEUROMUSCULAR REEDUCATION: CPT

## 2025-07-04 PROCEDURE — 85027 COMPLETE CBC AUTOMATED: CPT | Performed by: INTERNAL MEDICINE

## 2025-07-04 PROCEDURE — 25000003 PHARM REV CODE 250: Performed by: INTERNAL MEDICINE

## 2025-07-04 PROCEDURE — 99900031 HC PATIENT EDUCATION (STAT)

## 2025-07-04 PROCEDURE — 21400001 HC TELEMETRY ROOM

## 2025-07-04 PROCEDURE — 63600175 PHARM REV CODE 636 W HCPCS

## 2025-07-04 PROCEDURE — 94799 UNLISTED PULMONARY SVC/PX: CPT

## 2025-07-04 PROCEDURE — 25000242 PHARM REV CODE 250 ALT 637 W/ HCPCS: Performed by: INTERNAL MEDICINE

## 2025-07-04 PROCEDURE — 80053 COMPREHEN METABOLIC PANEL: CPT | Performed by: INTERNAL MEDICINE

## 2025-07-04 PROCEDURE — 36415 COLL VENOUS BLD VENIPUNCTURE: CPT | Performed by: INTERNAL MEDICINE

## 2025-07-04 PROCEDURE — 63600175 PHARM REV CODE 636 W HCPCS: Performed by: INTERNAL MEDICINE

## 2025-07-04 PROCEDURE — 97116 GAIT TRAINING THERAPY: CPT | Mod: CQ

## 2025-07-04 PROCEDURE — 99900035 HC TECH TIME PER 15 MIN (STAT)

## 2025-07-04 RX ADMIN — CEFEPIME 2 G: 2 INJECTION, POWDER, FOR SOLUTION INTRAVENOUS at 06:07

## 2025-07-04 RX ADMIN — OXYCODONE AND ACETAMINOPHEN 1 TABLET: 325; 10 TABLET ORAL at 04:07

## 2025-07-04 RX ADMIN — HYDROCORTISONE: 1 CREAM TOPICAL at 09:07

## 2025-07-04 RX ADMIN — FAMOTIDINE 20 MG: 20 TABLET, FILM COATED ORAL at 09:07

## 2025-07-04 RX ADMIN — CEFEPIME 2 G: 2 INJECTION, POWDER, FOR SOLUTION INTRAVENOUS at 02:07

## 2025-07-04 RX ADMIN — OXYCODONE AND ACETAMINOPHEN 1 TABLET: 325; 10 TABLET ORAL at 11:07

## 2025-07-04 RX ADMIN — CEFEPIME 2 G: 2 INJECTION, POWDER, FOR SOLUTION INTRAVENOUS at 09:07

## 2025-07-04 RX ADMIN — PSYLLIUM HUSK 1 PACKET: 3.4 POWDER ORAL at 09:07

## 2025-07-04 RX ADMIN — ENOXAPARIN SODIUM 40 MG: 40 INJECTION SUBCUTANEOUS at 04:07

## 2025-07-04 RX ADMIN — BUSPIRONE HYDROCHLORIDE 15 MG: 5 TABLET ORAL at 09:07

## 2025-07-04 RX ADMIN — LEVOTHYROXINE SODIUM 88 MCG: 0.09 TABLET ORAL at 06:07

## 2025-07-04 RX ADMIN — Medication 1 EACH: at 09:07

## 2025-07-04 RX ADMIN — LEVETIRACETAM 500 MG: 100 SOLUTION ORAL at 09:07

## 2025-07-04 RX ADMIN — NORTRIPTYLINE HYDROCHLORIDE 25 MG: 25 CAPSULE ORAL at 09:07

## 2025-07-04 RX ADMIN — EZETIMIBE 10 MG: 10 TABLET ORAL at 09:07

## 2025-07-04 RX ADMIN — INSULIN ASPART 2 UNITS: 100 INJECTION, SOLUTION INTRAVENOUS; SUBCUTANEOUS at 04:07

## 2025-07-04 RX ADMIN — INSULIN ASPART 2 UNITS: 100 INJECTION, SOLUTION INTRAVENOUS; SUBCUTANEOUS at 12:07

## 2025-07-04 RX ADMIN — TRAZODONE HYDROCHLORIDE 25 MG: 50 TABLET ORAL at 09:07

## 2025-07-04 RX ADMIN — OXYCODONE AND ACETAMINOPHEN 1 TABLET: 325; 10 TABLET ORAL at 12:07

## 2025-07-04 RX ADMIN — ATORVASTATIN CALCIUM 80 MG: 40 TABLET, FILM COATED ORAL at 09:07

## 2025-07-04 RX ADMIN — BUTALBITAL, ACETAMINOPHEN, AND CAFFEINE 1 TABLET: 325; 50; 40 TABLET ORAL at 06:07

## 2025-07-04 RX ADMIN — METOPROLOL SUCCINATE 12.5 MG: 25 TABLET, EXTENDED RELEASE ORAL at 09:07

## 2025-07-04 NOTE — PROGRESS NOTES
"[facility] Hospital Medicine Progress Note        Chief Complaint: Inpatient Follow-up subdural empyema    HPI per admitting team: "59-year-old female with PMHx of left MCA stroke s/p thrombectomy in 6/2024 on Plavix, hypothyroidism, anxiety, bronchial asthma, COPD, type 2 diabetes mellitus,  and GERD. Patient recently had subdural hematoma following a fall in May requiring intracranial embolization, craniotomy with hematoma evacuation. After this patient was discharged home. Late May she presented back to the ED with headache, right-sided weakness, facial droop and imaging was concerning for left MCA diminished flow and subacute hematoma. MRI brain was negative for CVA. Neurosurgery performed diony hole for drainage of subdural hematoma due to increased intracranial pressure with postop CT showing definitive improvement patient was on Plavix post embolization which was held briefly and resumed per Neurosurgery on 06/05 and she was transferred to rehab on 06/06. While in rehab patient was noted to have increase right upper extremity weakness and also significant headaches. CT head revealed reaccumulation of subdural fluid collection, increased from before patient was sent back to [facility] for further evaluation/neurosurgery services. Neurosurgery evaluated, planning for  shunt this hospitalization, home meds resumed as appropriate except for Plavix, symptomatic management for headache.  shunt scheduled for 6/16. Patient underwent redo diony hole, fluid evacuation and drain placement, concern for surgical site infection and therefore underwent cranioplasty. Infectious Disease consulted and patient was placed on cefepime, vancomycin and Flagyl pending intraoperative cultures.  Intraoperative cultures growing staph epi.  ID recommending IV vancomycin, IV Rocephin until 7/3.  Neurosurgery wished to monitor patient for another day until 6/21.  PT/OT on board; recommending " "high-intensity therapy.  Speech therapy recommending regular diet with moderately thick liquids.  CM on board for placement.   On 6/23 discharge to rehab was planned but Pt developed RUE weakness and Ct head showed increasing size of extra-axial fluid collections along cerebral convexity with increasing mass effect & increase parenchymal edema in L posterior frontal lobe. Pt was taken back to OR and underwent left craniotomy and evacuation of empyema. Post operatively admitted to ICU. Intraoperative culture from 6/23 grew  staph epi and rare Pseudomonas stutzeri.  ID suggested to stop Vancomycin and continue Cefepime as Staph epi is oxacillin sensitive and Cefepime will cover both Pseudomonas and MSSE with total course at least 6 to 8 weeks. Pt's care downgraded back to  service on 6/25/25.  ID requested sensitivity testing for Pseudomonas stutzeri, however per hospital microbiology their instrument was not able to run sensitivities, and they are sending the sample to [facility] for further testing. ID recommended to continue Cefepime at this time with tentative end date 8/18/2025.  Pt remains with daily headache and dense weakness to RUE. MRI brain on 6/27/25 showed no acute stroke but noted post surgical left dural thickening and enhancement, left cerebral encephalomalacia with extensive gliotic changes.  As of 6/30 Pt is able to move her Rt upper ext some. Swelling noted inner aspect of Rt arm with U/S suggestive of hematoma 5.6x3.6x1.7 cm. Will continue conservative management for hematoma. Subdural JOHNATHAN drain and every other staple removed on 7/1/25 with complete staple removal on POD #21 7/14/25. NS cleared Pt to resume Plavix 2 weeks post op 7/7/25"       Interval Hx:   Patient is laying in bed, no major complaints.  Reports eating well.  No family at bedside   Case was discussed with patient's nurse on the floor    Objective/physical exam:  General: In no acute distress, afebrile, concave craniectomy site, " incisions are clean   Chest: Clear to auscultation bilaterally anteriorly  Heart: RRR, +S1, S2, no appreciable murmur  Abdomen: Soft, nontender, BS +  MSK: Warm, no lower extremity edema, no clubbing or cyanosis  Neurologic: Alert and oriented x3, right upper extremity improving      VITAL SIGNS: 24 HRS MIN & MAX LAST   Temp  Min: 97.9 °F (36.6 °C)  Max: 98.3 °F (36.8 °C) 97.9 °F (36.6 °C)   BP  Min: 112/73  Max: 131/71 122/78   Pulse  Min: 78  Max: 88  78   Resp  Min: 16  Max: 18 16   SpO2  Min: 96 %  Max: 97 % 97 %     I have reviewed the following labs:  Recent Labs   Lab 06/30/25  0401 07/04/25  0351   WBC 5.47 5.34   RBC 3.35* 3.55*   HGB 9.9* 10.5*   HCT 31.0* 33.3*   MCV 92.5 93.8   MCH 29.6 29.6   MCHC 31.9* 31.5*   RDW 14.9 15.0   * 448*   MPV 9.2 9.3     Recent Labs   Lab 06/30/25  0401 07/04/25  0351    142   K 3.6 3.6    106   CO2 27 26   BUN 11.2 13.2   CREATININE 0.57 0.65   * 155*   CALCIUM 9.1 9.0   MG 1.90  --    ALBUMIN  --  3.2*   PROT  --  6.7   ALKPHOS  --  121   ALT  --  29   AST  --  23   BILITOT  --  0.3     Microbiology Results (last 7 days)       Procedure Component Value Units Date/Time    Fungal Culture [[id number]]  (Normal) Collected: 06/16/25 1338    Order Status: Completed Specimen: Tissue from Head Updated: 06/30/25 1501     Fungal Culture No Fungus Isolated at 2 Weeks    Fungal Culture [[id number]]  (Normal) Collected: 06/16/25 1357    Order Status: Completed Specimen: Bone from Head Updated: 06/30/25 1501     Fungal Culture No Fungus Isolated at 2 Weeks    Tissue Culture - Aerobic [[id number]]  (Abnormal) Collected: 06/23/25 1311    Order Status: Completed Specimen: Tissue from Brain Updated: 06/30/25 1406     Tissue - Aerobic Culture Rare Pseudomonas stutzeri     Comment: Susceptibility sent to reference lab. Results to follow on separate report.       Tissue Culture - Aerobic [[id number]] Collected: 06/23/25 1314    Order Status: Completed Specimen:  Tissue from Brain Updated: 06/28/25 0920     Tissue - Aerobic Culture No Growth             See below for Radiology    Assessment/Plan:  Left Subdural empyema - 6/23/25 s/p left craniectomy and evacuation of empyema with tissue culture positive for staph epidermidis and Pseudomonas stutzeri  Traumatic Subdural hematoma--> s/p left frontoparietal craniotomy and evacuation on 5/19/25   Chronic left SDH with new onset headache, Rt sided weakness and facial droop, s/p left diony hole on 6/2/25  Post op Increased subdural fluid collection, s/p left redo diony hole with fluid evacuation, drain placement on 6/16  Surgical site infection status post cranioplasty on 6/16 with tissue culture positive for Staph epidermidis   Hematoma, RUE- 6/30/25- conservative management   Hx-  CVA in June, 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,Depression/anxiety ,HLD, Hypothyroidism, Essential HTN, Type 2 diabetes mellitus.   Severe malnutrition      Continue Cefepime 2 gram q8h until final sensitivity is known for Pseudomonas Stutzeri (sent out to [facility] lab for susceptibility) - Tentatively planning cefepime 2g Q8H through 8/18 if isolate returns with sensitivity to Cefepime.    6/23- Sent out cx on 6/30 to [place] lab, still pending results as of 07/04/2025 at 12:51 p.m.  Continue [facility]   Neurosurgery cleared pt for Lovenox for VTE prophylaxis as of 6/27/25  Plavix can be resumed 2 weeks post op- 7/7/25 per Neurosurgery   Neuro check q4h  Insulin sliding scale, Accu-Cheks AC and HS  Given hyperglycemia, diet changed to diabetic with moderately thickened liquids as recommended per speech--> blood glucose improving  PT/OT - High intensity therapy suggested.  CM on board, patient is accepted but discharge held given waiting for final cultures that has been sent out  Morning lab stable       VTE prophylaxis: Lovenox     Patient condition:  Fair     Anticipated discharge and Disposition:   TBD, [facility] rehab will submit for Auth once final cultures received        All diagnosis and differential diagnosis have been reviewed; assessment and plan has been documented; I have personally reviewed the labs and test results that are presently available; I have reviewed the patients medication list; I have reviewed the consulting providers response and recommendations. I have reviewed or attempted to review medical records based upon their availability    All of the patient's questions have been  addressed and answered. Patient's is agreeable to the above stated plan. I will continue to monitor closely and make adjustments to medical management as needed.    Portions of this note dictated using EMR integrated voice recognition software, and may be subject to voice recognition errors not corrected at proofreading. Please contact writer for clarification if needed.   _____________________________________________________________________    Malnutrition Status:  Nutrition consulted. Most recent weight and BMI monitored-     Measurements:  Wt Readings from Last 1 Encounters:   06/12/25 49.9 kg (110 lb)   Body mass index is 17.75 kg/m².    Patient has been screened and assessed by RD.    Malnutrition Type:  Context: acute illness or injury  Level: severe    Malnutrition Characteristic Summary:  Weight Loss (Malnutrition): greater than 5% in 1 month  Energy Intake (Malnutrition): other (see comments) (Does not meet criteria)  Subcutaneous Fat (Malnutrition): moderate depletion  Muscle Mass (Malnutrition): moderate depletion  Fluid Accumulation (Malnutrition): other (see comments) (Not present)  Hand  Strength, Right (Malnutrition): Unable to assess    Interventions/Recommendations (treatment strategy):  Oral diet/nutrient modifications     Scheduled Med:   atorvastatin  80 mg Oral Daily    busPIRone  15 mg Oral BID    ceFEPIme  2 g Intravenous Q8H    enoxparin  40 mg Subcutaneous Q24H (prophylaxis, 1700)    ezetimibe  10 mg Oral Daily    famotidine  20 mg Oral BID    hydrocortisone    Topical (Top) BID    Lactobacillus rhamnosus GG  1 packet Oral Daily    levetiracetam  500 mg Oral BID    levothyroxine  88 mcg Oral Before breakfast    metoprolol succinate  12.5 mg Oral Daily    nortriptyline  25 mg Oral QHS    psyllium husk  1 packet Oral Daily    traZODone  25 mg Oral QHS      Continuous Infusions:     PRN Meds:  Current Facility-Administered Medications:     acetaminophen, 650 mg, Rectal, Q4H PRN    acetaminophen, 650 mg, Oral, Q4H PRN    bisacodyL, 10 mg, Rectal, Daily PRN    butalbital-acetaminophen-caffeine -40 mg, 1 tablet, Oral, Q4H PRN    dextrose 50%, 12.5 g, Intravenous, PRN    dextrose 50%, 25 g, Intravenous, PRN    diphenhydrAMINE, 25 mg, Oral, Q6H PRN    glucagon (human recombinant), 1 mg, Intramuscular, PRN    glucose, 16 g, Oral, PRN    glucose, 24 g, Oral, PRN    hydrALAZINE, 10 mg, Intravenous, Q4H PRN    insulin aspart U-100, 0-5 Units, Subcutaneous, QID (AC + HS) PRN    labetalol, 10 mg, Intravenous, Q4H PRN    melatonin, 6 mg, Oral, Nightly PRN    morphine, 2 mg, Intravenous, Q4H PRN    naloxone, 0.02 mg, Intravenous, PRN    ondansetron, 4 mg, Intravenous, Q8H PRN    oxyCODONE-acetaminophen, 1 tablet, Oral, Q4H PRN    prochlorperazine, 5 mg, Intravenous, Q6H PRN    sodium chloride 0.9%, 10 mL, Intravenous, PRN    Flushing PICC/Midline Protocol, , , Until Discontinued **AND** sodium chloride 0.9%, 10 mL, Intravenous, Q12H PRN    sodium chloride 0.9%, 3 mL, Intravenous, Q12H PRN         Jake Paulino MD  Department of Hospital Medicine   [facility]   07/04/2025

## 2025-07-04 NOTE — PT/OT/SLP PROGRESS
Occupational Therapy   Treatment    Name: Chelel Chandra  MRN: [id number]    Recommendations:     Recommended therapy intensity at discharge: High Intensity Therapy   Discharge Equipment Recommendations:  to be determined by next level of care  Barriers to discharge:       Assessment:     Chelle Chandra is a 59 y.o. female with a medical diagnosis of R sided weakness and facial droop due to L SDH s/p craniotomy and MMA embolization. On 5/30 patient with subdural hemorrhage s/p L diony hole. Pt then with new R sided weakness and underwent a redo of diony hole for fluid evacuation/drain placement and a left cranioplasty for subdural fluid evacuation on 6/16. Now with ICH s/p craniectomy on 6/23, swelling to RUE 6/30 ultrasound suggestive of hematoma. Performance deficits affecting function are weakness, impaired endurance, impaired self care skills, impaired functional mobility, impaired balance, decreased upper extremity function, decreased lower extremity function, decreased safety awareness, pain, impaired fine motor.     Pt with improved RUE strength this date, 3+  strength, 2+ proximally. Educated on exercises to perform in room as tolerated.     DME Justification: No DME recommended requiring DME justifications    Rehab Prognosis:  Good; patient would benefit from acute skilled OT services to address these deficits and reach maximum level of function.       Plan:     Patient to be seen 6 x/week to address the above listed problems via self-care/home management, therapeutic activities, therapeutic exercises, neuromuscular re-education, sensory integration, splinting  Plan of Care Expires: 08/04/25  Plan of Care Reviewed with: patient    Subjective     Pain/Comfort:  Pain Rating 1: 0/10    Objective:     Communicated with: nursing prior to session.  Patient found HOB elevated with peripheral IV upon OT entry to room.    General Precautions: Standard, fall, seizure    Orthopedic Precautions:   Braces:  (resting  hand splint, Giv Joya sling)  Respiratory Status: Room air  Vital Signs: Blood Pressure: 101/72     Occupational Performance:     Functional Mobility/Transfers:  Bed mobility:    Supine to Sit: stand by assistance    Balance:   Static Sitting Balance: One UE support: Good: Patient able to maintain balance without handhold support, limited postural sway.  Dynamic Sitting Balance: One UE support: Fair: Patient accepts minimal challenge; able to maintain balance while turning head/trunk.    Therapeutic Exercises:  Pt instructed in RUE AAROM including 2 x 5 table glides/shoulder flexion, squeezing foam block to improve  strength, and PNF D2 flex/ext to facilitate normal movement pattern.   Cueing at trunk and L shoulder to reduce compensatory movements during AAROM exercises.      Therapeutic Positioning    OT interventions performed during the course of today's session in an effort to prevent and/or reduce acquired pressure injuries:   Education was provided on benefits of and recommendations for therapeutic positioning    [facility] 6 Click ADL: 19    Co-Treatment: No    Patient Education:  Patient and spouse were provided with verbal education and demonstrations education regarding home exercise program .  Understanding was verbalized, however additional teaching warranted.      Patient left sitting edge of bed with  present.    GOALS:   Multidisciplinary Problems       Occupational Therapy Goals          Problem: Occupational Therapy    Goal Priority Disciplines Outcome Interventions   Occupational Therapy Goal     OT, PT/OT Progressing    Description: Goals: to be met by 07/14/25    Pt will complete grooming standing at sink with LRAD with CGA.  Pt will complete UB dressing with SBA.  Pt will complete LB dressing with SBA using LRAD.  Pt will complete toileting with CGA using LRAD.  Pt will complete functional mobility to/from toilet and toilet transfer with mod I using LRAD.   Pt will demo visual attention  to R side >80% of time with min verbal cues.  Pt will demo RUE strength of 3-/5 for participation in ADLs.                       Time Tracking:     OT Date of Treatment: 07/04/25  OT Start Time: 1401  OT Stop Time: 1437  OT Total Time (min): 36 min    Billable Minutes:Neuromuscular Re-education 2 units    OT/EDILSON: OT     Number of EDILSON visits since last OT visit: 2    7/4/2025

## 2025-07-04 NOTE — PLAN OF CARE
Problem: Adult Inpatient Plan of Care  Goal: Plan of Care Review  7/4/2025 0510 by Shannan Chapman RN  Outcome: Progressing  7/3/2025 1920 by Shannan Chapman RN  Outcome: Progressing  Goal: Patient-Specific Goal (Individualized)  7/4/2025 0510 by Shannan Chapman RN  Outcome: Progressing  7/3/2025 1920 by Shannan Chapman RN  Outcome: Progressing  Goal: Absence of Hospital-Acquired Illness or Injury  7/4/2025 0510 by Shannan Chapman RN  Outcome: Progressing  7/3/2025 1920 by Shannan Chapman RN  Outcome: Progressing  Goal: Optimal Comfort and Wellbeing  7/4/2025 0510 by Shannan Chapman RN  Outcome: Progressing  7/3/2025 1920 by Shannan Chapman RN  Outcome: Progressing  Goal: Readiness for Transition of Care  7/4/2025 0510 by Shannan Chapman RN  Outcome: Progressing  7/3/2025 1920 by Shannan Chapman RN  Outcome: Progressing     Problem: Diabetes Comorbidity  Goal: Blood Glucose Level Within Targeted Range  7/4/2025 0510 by Shannan Chapman RN  Outcome: Progressing  7/3/2025 1920 by Shannan Chapman RN  Outcome: Progressing     Problem: Wound  Goal: Optimal Coping  7/4/2025 0510 by Shannan Chapman RN  Outcome: Progressing  7/3/2025 1920 by Shannan Chapman RN  Outcome: Progressing  Goal: Optimal Functional Ability  7/4/2025 0510 by Shannan Chapman RN  Outcome: Progressing  7/3/2025 1920 by Shannan Chapman RN  Outcome: Progressing  Goal: Absence of Infection Signs and Symptoms  7/4/2025 0510 by Shannan Chapman RN  Outcome: Progressing  7/3/2025 1920 by Shannan Chapman RN  Outcome: Progressing  Goal: Improved Oral Intake  7/4/2025 0510 by Shannan Chapman RN  Outcome: Progressing  7/3/2025 1920 by Shannan Chapman RN  Outcome: Progressing  Goal: Optimal Pain Control and Function  7/4/2025 0510 by Shannan Chapman, RN  Outcome: Progressing  7/3/2025 1920 by Shannan Chapman, RN  Outcome: Progressing  Goal: Skin Health and Integrity  7/4/2025 0510 by Adela  ASHLEY Campbell  Outcome: Progressing  7/3/2025 1920 by Shannan Chapman RN  Outcome: Progressing  Goal: Optimal Wound Healing  7/4/2025 0510 by Shannan Chapman RN  Outcome: Progressing  7/3/2025 1920 by Shannan Chapman RN  Outcome: Progressing     Problem: Infection  Goal: Absence of Infection Signs and Symptoms  7/4/2025 0510 by Shannan Chapman RN  Outcome: Progressing  7/3/2025 1920 by Shannan Chapman RN  Outcome: Progressing     Problem: Comorbidity Management  Goal: Maintenance of Behavioral Health Symptom Control  7/4/2025 0510 by Shannan Chapman RN  Outcome: Progressing  7/3/2025 1920 by Shannan Chapman RN  Outcome: Progressing     Problem: Fall Injury Risk  Goal: Absence of Fall and Fall-Related Injury  7/4/2025 0510 by Shannan Chapman RN  Outcome: Progressing  7/3/2025 1920 by Shannan Chapman RN  Outcome: Progressing     Problem: Skin Injury Risk Increased  Goal: Skin Health and Integrity  7/4/2025 0510 by Shannan Chapman RN  Outcome: Progressing  7/3/2025 1920 by Shannan Chapman RN  Outcome: Progressing

## 2025-07-04 NOTE — PT/OT/SLP PROGRESS
"Physical Therapy Treatment    Patient Name:  Chelle Chandra   MRN:  [id number]    Recommendations:     Discharge therapy intensity: High Intensity Therapy   Discharge Equipment Recommendations: to be determined by next level of care  Barriers to discharge: Impaired mobility    Assessment:     Chelle Chandra is a 59 y.o. female admitted with a medical diagnosis of R sided weakness and facial droop due to L SDH s/p craniotomy and MMA embolization. On 5/30 patient with subdural hemorrhage s/p L diony hole.  She presents with the following impairments/functional limitations: weakness, gait instability, decreased upper extremity function, impaired endurance, impaired balance, decreased lower extremity function, impaired self care skills, pain, impaired functional mobility     DME Justification:  No DME recommended requiring DME justifications    Rehab Prognosis: Good; patient would benefit from acute skilled PT services to address these deficits and reach maximum level of function.    Recent Surgery: Procedure(s) (LRB):  CRANIECTOMY (Left) 11 Days Post-Op    Plan:     During this hospitalization, patient would benefit from acute PT services 6 x/week to address the identified rehab impairments via gait training, therapeutic activities, therapeutic exercises, neuromuscular re-education and progress toward the following goals:    Plan of Care Expires:  08/01/25    Subjective     Chief Complaint: none stated  Patient/Family Comments/goals: "I like walking"  Pain/Comfort:         Objective:     Communicated with nsg prior to session.  Patient found HOB elevated with peripheral IV upon PT entry to room.     General Precautions: Standard, fall, seizure  Orthopedic Precautions: N/A  Braces: N/A  Respiratory Status: Room air  Blood Pressure:   Skin Integrity: Visible skin intact    Helmet donned prior to mobility.   Functional Mobility:  Bed Mobility:     Supine to Sit: contact guard assistance  Transfers:     Sit to Stand:  " minimum assistance with quad cane  Bed to Chair: minimum assistance with  quad cane  using  Step Transfer  Gait: 20' x2 with SBQC, ModA- chair close follow  Use of mirror for all gait, some improvement with posture   VC/TC/A given for R lateral lean, glute activation, sequencing, posture, NBOS    Co-Treatment: No    Education:  Patient provided with verbal education education regarding PT role/goals/POC, fall prevention, safety awareness, and discharge/DME recommendations.  Understanding was verbalized.     Patient left up in chair with all lines intact, call button in reach, nsg notified, spouse present, and helmet donned      GOALS:   Multidisciplinary Problems       Physical Therapy Goals          Problem: Physical Therapy    Goal Priority Disciplines Outcome Interventions   Physical Therapy Goal     PT, PT/OT Progressing    Description: Goals to be met by: 25     Patient will increase functional independence with mobility by performin. Supine to sit with Modified [place]  2. Sit to supine with Modified [place]  3. Sit to stand transfer with Modified [place]  4. Bed to chair transfer with Modified [place] using Rolling Walker vs QC  5. Gait  x 200 feet with Modified [place] using Rolling Walker vs QC.                          Time Tracking:     PT Received On: 25  PT Start Time: 955     PT Stop Time: 1021  PT Total Time (min): 26 min     Billable Minutes: Gait Training 2    Treatment Type: Treatment  PT/PTA: PTA     Number of PTA visits since last PT visit: 3     2025

## 2025-07-04 NOTE — PLAN OF CARE
Problem: Adult Inpatient Plan of Care  Goal: Plan of Care Review  Outcome: Progressing  Goal: Optimal Comfort and Wellbeing  Outcome: Progressing     Problem: Wound  Goal: Skin Health and Integrity  Outcome: Progressing

## 2025-07-04 NOTE — PROGRESS NOTES
Infectious Diseases Progress Note       Patient Name: Chelle Chandra   : 1965 Age: 59 y.o. Sex: female  Admit Date: 2025   Hospital Day: 22  Room: 784/4 A  2025        CHIEF COMPLAINT:  Follow up for surgical site infection    IMPRESSION:  MSSE + Pseudomonas stutzeri SSI s/p craniotomy   Increased subdural fluid collection s/p I&D  and craniectomy   History of COPD  Type II DM        RECOMMENDATIONS:  Continue Cefepime for MSSE and pseudomonal SSI  Follow up results of Pseudomonas stutzeri sensitivities sent to [facility], in process  Will need ~8 weeks of IV ABX.    Tentatively planning cefepime 2g Q8H through .    Can DC from ID stand point and follow up clinic.    Abx Instructions:    Cefepime 2 g IV q 8 EOT 2025  CBC CMP ESR CRP Q week.  Mid or PICC dressing q weeks and PRN  Fax labs to ID clinic, follow up in 2 weeks post DC.    Will sign off for now.       INTERVAL HISTORY:  No acute changes overnight  Remains afebrile going to rehab.  Pseudomonas isolate sent to [facility] for further testing and sensitivities.    OBJECTIVE:  VITALS: Temp:    Reviewed.  General appearance: Chronically ill but non-toxic appearing middle aged  female seen sitting up in chair, she is awake and alert, in no acute distress.  SLP therapist at [facility] for therapy.    HEENT: Large defect to L side of head from previous craniectomy, incision site healing well with no redness, swelling, or drainage from site.  Sclera white, conjunctiva clear, no signs of thrush   Neck:  supple  Lungs:  breath sounds clear and equal bilaterally with no increased WOB.  Stable on room air  Cardiac:  RRR, no murmur  Abdomen:  soft, non-distended, non-tender.  Bowel sounds normal  Extremities:  No increased edema  :  No alvarez, voids      Labs reviewed.     Microbiology Results (Last 365 Days)     Procedure Component Value Units Date/Time   Anaerobic Culture [[id number]] Collected: 25 1314   Order Status: Completed  Specimen: Tissue from Brain Updated: 06/26/25 0907     Anaerobe Culture No Anaerobes Isolated   Fungal Culture [[id number]] Collected: 06/23/25 1314   Order Status: Sent Specimen: Tissue from Brain Updated: 06/23/25 1326   Gram Stain [[id number]] Collected: 06/23/25 1314   Order Status: Completed Specimen: Tissue from Brain Updated: 06/24/25 0705     GRAM STAIN Few WBC observed       Few Gram positive cocci   Tissue Culture - Aerobic [[id number]] Collected: 06/23/25 1314   Order Status: Completed Specimen: Tissue from Brain Updated: 06/28/25 0920     Tissue - Aerobic Culture No Growth   Anaerobic Culture [[id number]] Collected: 06/23/25 1313   Order Status: Completed Specimen: Tissue from Brain Updated: 06/26/25 0907     Anaerobe Culture No Anaerobes Isolated   Fungal Culture [[id number]] Collected: 06/23/25 1313   Order Status: Sent Specimen: Tissue from Brain Updated: 06/23/25 1326   Gram Stain [[id number]] Collected: 06/23/25 1313   Order Status: Completed Specimen: Tissue from Brain Updated: 06/24/25 0706     GRAM STAIN Few WBC observed       Rare Gram positive cocci   Tissue Culture - Aerobic [[id number]] (Abnormal)  Collected: 06/23/25 1313   Order Status: Completed Specimen: Tissue from Brain Updated: 06/27/25 0928     Tissue - Aerobic Culture Staphylococcus epidermidis Abnormal      Comment: Isolated from Thio only      Susceptibility              Staphylococcus epidermidis       CECY       Clindamycin <=0.25 Sensitive       Gentamicin <=0.5 Sensitive       Oxacillin <=0.25 Sensitive       Tetracycline <=1 Sensitive       Vancomycin 1 Sensitive                     Linear View         Anaerobic Culture [[id number]] Collected: 06/23/25 1311   Order Status: Completed Specimen: Tissue from Brain Updated: 06/26/25 0904     Anaerobe Culture No Anaerobes Isolated   Fungal Culture [[id number]] Collected: 06/23/25 1311   Order Status: Sent Specimen: Tissue from Brain Updated: 06/23/25 1326   Gram Stain [[id number]]  Collected: 06/23/25 1311   Order Status: Completed Specimen: Tissue from Brain Updated: 06/24/25 0707     GRAM STAIN Many WBC observed       No bacteria seen   Tissue Culture - Aerobic [[id number]] (Abnormal) Collected: 06/23/25 1311   Order Status: Completed Specimen: Tissue from Brain Updated: 06/30/25 1406     Tissue - Aerobic Culture Rare Pseudomonas stutzeri Abnormal      Comment: Susceptibility sent to reference lab. Results to follow on separate report.

## 2025-07-04 NOTE — PROGRESS NOTES
[facility] - Neurology  Neurosurgery  Progress Note    Subjective:     Interval History:   POD #11: Left craniectomy for evacuation of empyema  No acute events overnight. She complains of mild headaches. She denies dizziness or blurry vision. She continues with right upper extremity weakness. She is waiting for rehab placement.       Post-Op Info:  Procedure(s) (LRB):  CRANIECTOMY (Left)   11 Days Post-Op      Medications:  Continuous Infusions:  Scheduled Meds:   atorvastatin  80 mg Oral Daily    busPIRone  15 mg Oral BID    ceFEPIme  2 g Intravenous Q8H    enoxparin  40 mg Subcutaneous Q24H (prophylaxis, 1700)    ezetimibe  10 mg Oral Daily    famotidine  20 mg Oral BID    hydrocortisone   Topical (Top) BID    Lactobacillus rhamnosus GG  1 packet Oral Daily    levetiracetam  500 mg Oral BID    levothyroxine  88 mcg Oral Before breakfast    metoprolol succinate  12.5 mg Oral Daily    nortriptyline  25 mg Oral QHS    psyllium husk  1 packet Oral Daily    traZODone  25 mg Oral QHS     PRN Meds:  Current Facility-Administered Medications:     acetaminophen, 650 mg, Rectal, Q4H PRN    acetaminophen, 650 mg, Oral, Q4H PRN    bisacodyL, 10 mg, Rectal, Daily PRN    butalbital-acetaminophen-caffeine -40 mg, 1 tablet, Oral, Q4H PRN    dextrose 50%, 12.5 g, Intravenous, PRN    dextrose 50%, 25 g, Intravenous, PRN    diphenhydrAMINE, 25 mg, Oral, Q6H PRN    glucagon (human recombinant), 1 mg, Intramuscular, PRN    glucose, 16 g, Oral, PRN    glucose, 24 g, Oral, PRN    hydrALAZINE, 10 mg, Intravenous, Q4H PRN    insulin aspart U-100, 0-5 Units, Subcutaneous, QID (AC + HS) PRN    labetalol, 10 mg, Intravenous, Q4H PRN    melatonin, 6 mg, Oral, Nightly PRN    morphine, 2 mg, Intravenous, Q4H PRN    naloxone, 0.02 mg, Intravenous, PRN    ondansetron, 4 mg, Intravenous, Q8H PRN    oxyCODONE-acetaminophen, 1 tablet, Oral, Q4H PRN    prochlorperazine, 5 mg, Intravenous, Q6H PRN    sodium chloride 0.9%, 10 mL,  "Intravenous, PRN    Flushing PICC/Midline Protocol, , , Until Discontinued **AND** sodium chloride 0.9%, 10 mL, Intravenous, Q12H PRN    sodium chloride 0.9%, 3 mL, Intravenous, Q12H PRN     Review of Systems  Objective:     Weight: 49.9 kg (110 lb)  Body mass index is 17.75 kg/m².  Vital Signs (Most Recent):  Temp: 98.3 °F (36.8 °C) (07/04/25 0740)  Pulse: 81 (07/04/25 0740)  Resp: 18 (07/03/25 2151)  BP: 112/73 (07/04/25 0740)  SpO2: 96 % (07/04/25 0740) Vital Signs (24h Range):  Temp:  [98 °F (36.7 °C)-98.3 °F (36.8 °C)] 98.3 °F (36.8 °C)  Pulse:  [77-88] 81  Resp:  [16-18] 18  SpO2:  [96 %-97 %] 96 %  BP: (112-137)/(71-79) 112/73         Neurosurgery Physical Exam  GCS: 15  Alert and oriented to person, place, time, and situation  PERRLA  Speech is clear and coherent  No motor changes  RUE weakness  Surgical incision: open to air with staples     Significant Labs:  Recent Labs   Lab 07/04/25  0351   *      K 3.6      CO2 26   BUN 13.2   CREATININE 0.65   CALCIUM 9.0     Recent Labs   Lab 07/04/25  0351   WBC 5.34   HGB 10.5*   HCT 33.3*   *     No results for input(s): "LABPT", "INR", "APTT" in the last 48 hours.  Microbiology Results (last 7 days)       Procedure Component Value Units Date/Time    Fungal Culture [[id number]]  (Normal) Collected: 06/16/25 1338    Order Status: Completed Specimen: Tissue from Head Updated: 06/30/25 1501     Fungal Culture No Fungus Isolated at 2 Weeks    Fungal Culture [[id number]]  (Normal) Collected: 06/16/25 1357    Order Status: Completed Specimen: Bone from Head Updated: 06/30/25 1501     Fungal Culture No Fungus Isolated at 2 Weeks    Tissue Culture - Aerobic [[id number]]  (Abnormal) Collected: 06/23/25 1311    Order Status: Completed Specimen: Tissue from Brain Updated: 06/30/25 1406     Tissue - Aerobic Culture Rare Pseudomonas stutzeri     Comment: Susceptibility sent to reference lab. Results to follow on separate report.       Tissue Culture - " Aerobic [[id number]] Collected: 06/23/25 1314    Order Status: Completed Specimen: Tissue from Brain Updated: 06/28/25 0920     Tissue - Aerobic Culture No Growth    Tissue Culture - Aerobic [[id number]]  (Abnormal)  (Susceptibility) Collected: 06/23/25 1313    Order Status: Completed Specimen: Tissue from Brain Updated: 06/27/25 0928     Tissue - Aerobic Culture Staphylococcus epidermidis     Comment: Isolated from Thio only               Significant Diagnostics:      Assessment/Plan:      Plan:  - Floor status  - Neuro checks Q4H  - Pain control  - Keppra 500mg BID  - Abx per ID   - HOB 30 degrees  - Ok to leave surgical incision open to air. Ok to shower and wash hair with baby shampoo  - PT, OT  - SCDs and Lovenox for DVT prophylaxis   - Case management onboard, pending rehab placement at [facility]     Active Diagnoses:    Diagnosis Date Noted POA    PRINCIPAL PROBLEM:  Subdural empyema [G06.2] 06/26/2025 Yes    Severe malnutrition [E43] 05/31/2025 Yes      Problems Resolved During this Admission:       ILA Loyd-BC  Neurosurgery  [facility] - Neurology

## 2025-07-05 LAB
POCT GLUCOSE: 147 MG/DL (ref 70–110)
POCT GLUCOSE: 242 MG/DL (ref 70–110)
POCT GLUCOSE: 249 MG/DL (ref 70–110)

## 2025-07-05 PROCEDURE — 25000003 PHARM REV CODE 250: Performed by: INTERNAL MEDICINE

## 2025-07-05 PROCEDURE — 63600175 PHARM REV CODE 636 W HCPCS

## 2025-07-05 PROCEDURE — 25000242 PHARM REV CODE 250 ALT 637 W/ HCPCS: Performed by: INTERNAL MEDICINE

## 2025-07-05 PROCEDURE — 21400001 HC TELEMETRY ROOM

## 2025-07-05 PROCEDURE — 63600175 PHARM REV CODE 636 W HCPCS: Performed by: INTERNAL MEDICINE

## 2025-07-05 RX ADMIN — FAMOTIDINE 20 MG: 20 TABLET, FILM COATED ORAL at 09:07

## 2025-07-05 RX ADMIN — ATORVASTATIN CALCIUM 80 MG: 40 TABLET, FILM COATED ORAL at 09:07

## 2025-07-05 RX ADMIN — CEFEPIME 2 G: 2 INJECTION, POWDER, FOR SOLUTION INTRAVENOUS at 02:07

## 2025-07-05 RX ADMIN — HYDROCORTISONE: 1 CREAM TOPICAL at 09:07

## 2025-07-05 RX ADMIN — LEVETIRACETAM 500 MG: 100 SOLUTION ORAL at 08:07

## 2025-07-05 RX ADMIN — INSULIN ASPART 1 UNITS: 100 INJECTION, SOLUTION INTRAVENOUS; SUBCUTANEOUS at 08:07

## 2025-07-05 RX ADMIN — EZETIMIBE 10 MG: 10 TABLET ORAL at 09:07

## 2025-07-05 RX ADMIN — LEVOTHYROXINE SODIUM 88 MCG: 0.09 TABLET ORAL at 06:07

## 2025-07-05 RX ADMIN — ENOXAPARIN SODIUM 40 MG: 40 INJECTION SUBCUTANEOUS at 04:07

## 2025-07-05 RX ADMIN — NORTRIPTYLINE HYDROCHLORIDE 25 MG: 25 CAPSULE ORAL at 08:07

## 2025-07-05 RX ADMIN — OXYCODONE AND ACETAMINOPHEN 1 TABLET: 325; 10 TABLET ORAL at 12:07

## 2025-07-05 RX ADMIN — PSYLLIUM HUSK 1 PACKET: 3.4 POWDER ORAL at 09:07

## 2025-07-05 RX ADMIN — BUSPIRONE HYDROCHLORIDE 15 MG: 5 TABLET ORAL at 09:07

## 2025-07-05 RX ADMIN — HYDROCORTISONE: 1 CREAM TOPICAL at 08:07

## 2025-07-05 RX ADMIN — CEFEPIME 2 G: 2 INJECTION, POWDER, FOR SOLUTION INTRAVENOUS at 09:07

## 2025-07-05 RX ADMIN — TRAZODONE HYDROCHLORIDE 25 MG: 50 TABLET ORAL at 08:07

## 2025-07-05 RX ADMIN — CEFEPIME 2 G: 2 INJECTION, POWDER, FOR SOLUTION INTRAVENOUS at 06:07

## 2025-07-05 RX ADMIN — METOPROLOL SUCCINATE 12.5 MG: 25 TABLET, EXTENDED RELEASE ORAL at 09:07

## 2025-07-05 RX ADMIN — OXYCODONE AND ACETAMINOPHEN 1 TABLET: 325; 10 TABLET ORAL at 08:07

## 2025-07-05 RX ADMIN — BUSPIRONE HYDROCHLORIDE 15 MG: 5 TABLET ORAL at 08:07

## 2025-07-05 RX ADMIN — LEVETIRACETAM 500 MG: 100 SOLUTION ORAL at 09:07

## 2025-07-05 RX ADMIN — Medication 1 EACH: at 09:07

## 2025-07-05 RX ADMIN — BUTALBITAL, ACETAMINOPHEN, AND CAFFEINE 1 TABLET: 325; 50; 40 TABLET ORAL at 09:07

## 2025-07-05 RX ADMIN — INSULIN ASPART 2 UNITS: 100 INJECTION, SOLUTION INTRAVENOUS; SUBCUTANEOUS at 12:07

## 2025-07-05 RX ADMIN — FAMOTIDINE 20 MG: 20 TABLET, FILM COATED ORAL at 08:07

## 2025-07-05 NOTE — PLAN OF CARE
Problem: Adult Inpatient Plan of Care  Goal: Optimal Comfort and Wellbeing  Outcome: Progressing  Intervention: Monitor Pain and Promote Comfort  Flowsheets (Taken 7/5/2025 1552)  Pain Management Interventions: quiet environment facilitated  Intervention: Provide Person-Centered Care  Flowsheets (Taken 7/5/2025 1552)  Trust Relationship/Rapport: care explained     Problem: Wound  Goal: Optimal Functional Ability  Outcome: Progressing  Goal: Skin Health and Integrity  Outcome: Progressing  Intervention: Optimize Skin Protection  Flowsheets (Taken 7/5/2025 1552)  Pressure Reduction Techniques: frequent weight shift encouraged

## 2025-07-05 NOTE — PROGRESS NOTES
"[facility] Hospital Medicine Progress Note        Chief Complaint: Inpatient Follow-up subdural empyema    HPI per admitting team: "59-year-old female with PMHx of left MCA stroke s/p thrombectomy in 6/2024 on Plavix, hypothyroidism, anxiety, bronchial asthma, COPD, type 2 diabetes mellitus,  and GERD. Patient recently had subdural hematoma following a fall in May requiring intracranial embolization, craniotomy with hematoma evacuation. After this patient was discharged home. Late May she presented back to the ED with headache, right-sided weakness, facial droop and imaging was concerning for left MCA diminished flow and subacute hematoma. MRI brain was negative for CVA. Neurosurgery performed diony hole for drainage of subdural hematoma due to increased intracranial pressure with postop CT showing definitive improvement patient was on Plavix post embolization which was held briefly and resumed per Neurosurgery on 06/05 and she was transferred to rehab on 06/06. While in rehab patient was noted to have increase right upper extremity weakness and also significant headaches. CT head revealed reaccumulation of subdural fluid collection, increased from before patient was sent back to [facility] for further evaluation/neurosurgery services. Neurosurgery evaluated, planning for  shunt this hospitalization, home meds resumed as appropriate except for Plavix, symptomatic management for headache.  shunt scheduled for 6/16. Patient underwent redo diony hole, fluid evacuation and drain placement, concern for surgical site infection and therefore underwent cranioplasty. Infectious Disease consulted and patient was placed on cefepime, vancomycin and Flagyl pending intraoperative cultures.  Intraoperative cultures growing staph epi.  ID recommending IV vancomycin, IV Rocephin until 7/3.  Neurosurgery wished to monitor patient for another day until 6/21.  PT/OT on board; recommending " "high-intensity therapy.  Speech therapy recommending regular diet with moderately thick liquids.  CM on board for placement.   On 6/23 discharge to rehab was planned but Pt developed RUE weakness and Ct head showed increasing size of extra-axial fluid collections along cerebral convexity with increasing mass effect & increase parenchymal edema in L posterior frontal lobe. Pt was taken back to OR and underwent left craniotomy and evacuation of empyema. Post operatively admitted to ICU. Intraoperative culture from 6/23 grew  staph epi and rare Pseudomonas stutzeri.  ID suggested to stop Vancomycin and continue Cefepime as Staph epi is oxacillin sensitive and Cefepime will cover both Pseudomonas and MSSE with total course at least 6 to 8 weeks. Pt's care downgraded back to  service on 6/25/25.  ID requested sensitivity testing for Pseudomonas stutzeri, however per hospital microbiology their instrument was not able to run sensitivities, and they are sending the sample to [place] for further testing. ID recommended to continue Cefepime at this time with tentative end date 8/18/2025.  Pt remains with daily headache and dense weakness to RUE. MRI brain on 6/27/25 showed no acute stroke but noted post surgical left dural thickening and enhancement, left cerebral encephalomalacia with extensive gliotic changes.  As of 6/30 Pt is able to move her Rt upper ext some. Swelling noted inner aspect of Rt arm with U/S suggestive of hematoma 5.6x3.6x1.7 cm. Will continue conservative management for hematoma. Subdural JOHNATHAN drain and every other staple removed on 7/1/25 with complete staple removal on POD #21 7/14/25. NS cleared Pt to resume Plavix 2 weeks post op 7/7/25"       Interval Hx:   Patient sleeping comfortably, bundled up.  Did not wake up to my calling on examination   No family at bedside   Case was discussed with patient's nurse on the floor    Objective/physical exam:  General: In no acute distress, afebrile, concave " craniectomy site, incisions are clean   Chest: Clear to auscultation bilaterally anteriorly  Heart: RRR, +S1, S2, no appreciable murmur  Abdomen: Soft, nontender, BS +  MSK: Warm, no lower extremity edema, no clubbing or cyanosis  Neurologic:  Comfortably asleep      VITAL SIGNS: 24 HRS MIN & MAX LAST   Temp  Min: 98 °F (36.7 °C)  Max: 98.5 °F (36.9 °C) 98.2 °F (36.8 °C)   BP  Min: 98/63  Max: 132/67 132/67   Pulse  Min: 78  Max: 87  87   Resp  Min: 15  Max: 16 15   SpO2  Min: 95 %  Max: 96 % 96 %     I have reviewed the following labs:  Recent Labs   Lab 06/30/25  0401 07/04/25  0351   WBC 5.47 5.34   RBC 3.35* 3.55*   HGB 9.9* 10.5*   HCT 31.0* 33.3*   MCV 92.5 93.8   MCH 29.6 29.6   MCHC 31.9* 31.5*   RDW 14.9 15.0   * 448*   MPV 9.2 9.3     Recent Labs   Lab 06/30/25  0401 07/04/25  0351    142   K 3.6 3.6    106   CO2 27 26   BUN 11.2 13.2   CREATININE 0.57 0.65   * 155*   CALCIUM 9.1 9.0   MG 1.90  --    ALBUMIN  --  3.2*   PROT  --  6.7   ALKPHOS  --  121   ALT  --  29   AST  --  23   BILITOT  --  0.3     Microbiology Results (last 7 days)       Procedure Component Value Units Date/Time    Tissue Culture - Aerobic [[id number]]  (Abnormal) Collected: 06/23/25 1311    Order Status: Completed Specimen: Tissue from Brain Updated: 07/04/25 1417     Tissue - Aerobic Culture Rare Pseudomonas stutzeri     Comment: Susceptibility sent to reference lab. Results to follow on separate report.       Fungal Culture [[id number]]  (Normal) Collected: 06/16/25 1338    Order Status: Completed Specimen: Tissue from Head Updated: 06/30/25 1501     Fungal Culture No Fungus Isolated at 2 Weeks    Fungal Culture [[id number]]  (Normal) Collected: 06/16/25 1357    Order Status: Completed Specimen: Bone from Head Updated: 06/30/25 1501     Fungal Culture No Fungus Isolated at 2 Weeks             See below for Radiology    Assessment/Plan:  Left Subdural empyema - 6/23/25 s/p left craniectomy and evacuation of  empyema with tissue culture positive for staph epidermidis and Pseudomonas stutzeri  Traumatic Subdural hematoma--> s/p left frontoparietal craniotomy and evacuation on 5/19/25   Chronic left SDH with new onset headache, Rt sided weakness and facial droop, s/p left diony hole on 6/2/25  Post op Increased subdural fluid collection, s/p left redo diony hole with fluid evacuation, drain placement on 6/16  Surgical site infection status post cranioplasty on 6/16 with tissue culture positive for Staph epidermidis   Hematoma, RUE- 6/30/25- conservative management   Hx-  CVA in June, 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,Depression/anxiety ,HLD, Hypothyroidism, Essential HTN, Type 2 diabetes mellitus.   Severe malnutrition      Continue Cefepime 2 gram q8h until final sensitivity is known for Pseudomonas Stutzeri (sent out to ref lab for susceptibility) - Tentatively planning cefepime 2g Q8H through 8/18 if isolate returns with sensitivity to Cefepime.    6/23- Sent out cx on 6/30 to [facility], spoke to micro on 07/04, hoping to get results back on Monday 7/7  Continue [facility]   Neurosurgery cleared pt for Lovenox for VTE prophylaxis as of 6/27/25  Plavix can be resumed 2 weeks post op- 7/7/25, Monday per Neurosurgery   Neuro check q4h  Insulin sliding scale, Accu-Cheks AC and HS  Given hyperglycemia, diet changed to diabetic with moderately thickened liquids as recommended per speech--> blood glucose improving  PT/OT - High intensity therapy  CM on board, patient is accepted but discharge held given waiting for final cultures that has been sent out  Lab stable       VTE prophylaxis: Lovenox     Patient condition:  Fair     Anticipated discharge and Disposition:   TBD, [facility] rehab will submit for Auth once final cultures received       All diagnosis and differential diagnosis have been reviewed; assessment and plan has been documented; I have personally reviewed the labs and test results that are presently available; I have reviewed the  patients medication list; I have reviewed the consulting providers response and recommendations. I have reviewed or attempted to review medical records based upon their availability    All of the patient's questions have been  addressed and answered. Patient's is agreeable to the above stated plan. I will continue to monitor closely and make adjustments to medical management as needed.    Portions of this note dictated using EMR integrated voice recognition software, and may be subject to voice recognition errors not corrected at proofreading. Please contact writer for clarification if needed.   _____________________________________________________________________    Malnutrition Status:  Nutrition consulted. Most recent weight and BMI monitored-     Measurements:  Wt Readings from Last 1 Encounters:   06/12/25 49.9 kg (110 lb)   Body mass index is 17.75 kg/m².    Patient has been screened and assessed by RD.    Malnutrition Type:  Context: acute illness or injury  Level: severe    Malnutrition Characteristic Summary:  Weight Loss (Malnutrition): greater than 5% in 1 month  Energy Intake (Malnutrition): other (see comments) (Does not meet criteria)  Subcutaneous Fat (Malnutrition): moderate depletion  Muscle Mass (Malnutrition): moderate depletion  Fluid Accumulation (Malnutrition): other (see comments) (Not present)  Hand  Strength, Right (Malnutrition): Unable to assess    Interventions/Recommendations (treatment strategy):  Oral diet/nutrient modifications     Scheduled Med:   atorvastatin  80 mg Oral Daily    busPIRone  15 mg Oral BID    ceFEPIme  2 g Intravenous Q8H    enoxparin  40 mg Subcutaneous Q24H (prophylaxis, 1700)    ezetimibe  10 mg Oral Daily    famotidine  20 mg Oral BID    hydrocortisone   Topical (Top) BID    Lactobacillus rhamnosus GG  1 packet Oral Daily    levetiracetam  500 mg Oral BID    levothyroxine  88 mcg Oral Before breakfast    metoprolol succinate  12.5 mg Oral Daily     nortriptyline  25 mg Oral QHS    psyllium husk  1 packet Oral Daily    traZODone  25 mg Oral QHS      Continuous Infusions:     PRN Meds:  Current Facility-Administered Medications:     acetaminophen, 650 mg, Rectal, Q4H PRN    acetaminophen, 650 mg, Oral, Q4H PRN    bisacodyL, 10 mg, Rectal, Daily PRN    butalbital-acetaminophen-caffeine -40 mg, 1 tablet, Oral, Q4H PRN    dextrose 50%, 12.5 g, Intravenous, PRN    dextrose 50%, 25 g, Intravenous, PRN    diphenhydrAMINE, 25 mg, Oral, Q6H PRN    glucagon (human recombinant), 1 mg, Intramuscular, PRN    glucose, 16 g, Oral, PRN    glucose, 24 g, Oral, PRN    hydrALAZINE, 10 mg, Intravenous, Q4H PRN    insulin aspart U-100, 0-5 Units, Subcutaneous, QID (AC + HS) PRN    labetalol, 10 mg, Intravenous, Q4H PRN    melatonin, 6 mg, Oral, Nightly PRN    morphine, 2 mg, Intravenous, Q4H PRN    naloxone, 0.02 mg, Intravenous, PRN    ondansetron, 4 mg, Intravenous, Q8H PRN    oxyCODONE-acetaminophen, 1 tablet, Oral, Q4H PRN    prochlorperazine, 5 mg, Intravenous, Q6H PRN    sodium chloride 0.9%, 10 mL, Intravenous, PRN    Flushing PICC/Midline Protocol, , , Until Discontinued **AND** sodium chloride 0.9%, 10 mL, Intravenous, Q12H PRN    sodium chloride 0.9%, 3 mL, Intravenous, Q12H PRN         Jake Paulino MD  Department of Hospital Medicine   [facility]   07/05/2025

## 2025-07-06 LAB
POCT GLUCOSE: 143 MG/DL (ref 70–110)
POCT GLUCOSE: 145 MG/DL (ref 70–110)
POCT GLUCOSE: 182 MG/DL (ref 70–110)

## 2025-07-06 PROCEDURE — 63600175 PHARM REV CODE 636 W HCPCS: Performed by: INTERNAL MEDICINE

## 2025-07-06 PROCEDURE — 25000003 PHARM REV CODE 250: Performed by: INTERNAL MEDICINE

## 2025-07-06 PROCEDURE — 21400001 HC TELEMETRY ROOM

## 2025-07-06 PROCEDURE — 97116 GAIT TRAINING THERAPY: CPT | Mod: CQ

## 2025-07-06 PROCEDURE — 25000242 PHARM REV CODE 250 ALT 637 W/ HCPCS: Performed by: INTERNAL MEDICINE

## 2025-07-06 PROCEDURE — 63600175 PHARM REV CODE 636 W HCPCS

## 2025-07-06 RX ADMIN — ENOXAPARIN SODIUM 40 MG: 40 INJECTION SUBCUTANEOUS at 04:07

## 2025-07-06 RX ADMIN — OXYCODONE AND ACETAMINOPHEN 1 TABLET: 325; 10 TABLET ORAL at 04:07

## 2025-07-06 RX ADMIN — BUTALBITAL, ACETAMINOPHEN, AND CAFFEINE 1 TABLET: 325; 50; 40 TABLET ORAL at 12:07

## 2025-07-06 RX ADMIN — NORTRIPTYLINE HYDROCHLORIDE 25 MG: 25 CAPSULE ORAL at 08:07

## 2025-07-06 RX ADMIN — HYDROCORTISONE: 1 CREAM TOPICAL at 09:07

## 2025-07-06 RX ADMIN — EZETIMIBE 10 MG: 10 TABLET ORAL at 09:07

## 2025-07-06 RX ADMIN — FAMOTIDINE 20 MG: 20 TABLET, FILM COATED ORAL at 08:07

## 2025-07-06 RX ADMIN — BUSPIRONE HYDROCHLORIDE 15 MG: 5 TABLET ORAL at 08:07

## 2025-07-06 RX ADMIN — TRAZODONE HYDROCHLORIDE 25 MG: 50 TABLET ORAL at 08:07

## 2025-07-06 RX ADMIN — BUSPIRONE HYDROCHLORIDE 15 MG: 5 TABLET ORAL at 09:07

## 2025-07-06 RX ADMIN — Medication 1 EACH: at 09:07

## 2025-07-06 RX ADMIN — CEFEPIME 2 G: 2 INJECTION, POWDER, FOR SOLUTION INTRAVENOUS at 08:07

## 2025-07-06 RX ADMIN — METOPROLOL SUCCINATE 12.5 MG: 25 TABLET, EXTENDED RELEASE ORAL at 09:07

## 2025-07-06 RX ADMIN — CEFEPIME 2 G: 2 INJECTION, POWDER, FOR SOLUTION INTRAVENOUS at 01:07

## 2025-07-06 RX ADMIN — FAMOTIDINE 20 MG: 20 TABLET, FILM COATED ORAL at 09:07

## 2025-07-06 RX ADMIN — PSYLLIUM HUSK 1 PACKET: 3.4 POWDER ORAL at 09:07

## 2025-07-06 RX ADMIN — LEVOTHYROXINE SODIUM 88 MCG: 0.09 TABLET ORAL at 05:07

## 2025-07-06 RX ADMIN — ATORVASTATIN CALCIUM 80 MG: 40 TABLET, FILM COATED ORAL at 09:07

## 2025-07-06 RX ADMIN — LEVETIRACETAM 500 MG: 100 SOLUTION ORAL at 09:07

## 2025-07-06 RX ADMIN — CEFEPIME 2 G: 2 INJECTION, POWDER, FOR SOLUTION INTRAVENOUS at 05:07

## 2025-07-06 RX ADMIN — ACETAMINOPHEN 650 MG: 325 TABLET ORAL at 08:07

## 2025-07-06 RX ADMIN — LEVETIRACETAM 500 MG: 100 SOLUTION ORAL at 08:07

## 2025-07-06 RX ADMIN — OXYCODONE AND ACETAMINOPHEN 1 TABLET: 325; 10 TABLET ORAL at 09:07

## 2025-07-06 NOTE — PT/OT/SLP PROGRESS
"Physical Therapy Treatment    Patient Name:  Chelle Chandra   MRN:  [id number]    Recommendations:     Discharge therapy intensity: High Intensity Therapy   Discharge Equipment Recommendations: to be determined by next level of care  Barriers to discharge: Impaired mobility    Assessment:     Chelle Chandra is a 59 y.o. female admitted with a medical diagnosis of R sided weakness and facial droop due to L SDH s/p craniotomy and MMA embolization. On 5/30 patient with subdural hemorrhage s/p L diony hole.  She presents with the following impairments/functional limitations: weakness, gait instability, decreased upper extremity function, impaired endurance, impaired balance, decreased lower extremity function, impaired self care skills, pain, impaired functional mobility.     Pt cont to show good tolerance and motivation toward therapy. Pt required ModA for ambulation with SBQC and CGA-Danny for t/fs and bed mobility. Will cont to progress.     DME Justification:  No DME recommended requiring DME justifications    Rehab Prognosis: Good; patient would benefit from acute skilled PT services to address these deficits and reach maximum level of function.    Recent Surgery: Procedure(s) (LRB):  CRANIECTOMY (Left) 13 Days Post-Op    Plan:     During this hospitalization, patient would benefit from acute PT services 6 x/week to address the identified rehab impairments via gait training, therapeutic activities, therapeutic exercises, neuromuscular re-education and progress toward the following goals:    Plan of Care Expires:  08/01/25    Subjective     Chief Complaint: "It's cold in my room"  Patient/Family Comments/goals: none stated  Pain/Comfort:  Pain Rating 1: 0/10      Objective:     Communicated with nsg prior to session.  Patient found HOB elevated with peripheral IV upon PT entry to room.     General Precautions: Standard, fall, seizure  Orthopedic Precautions: N/A  Braces: N/A  Respiratory Status: Room air  Blood " Pressure:   Skin Integrity: Visible skin intact    Helmet donned prior to mobility.   Functional Mobility:  Bed Mobility:     Supine to Sit: contact guard assistance  Transfers:     Sit to Stand:  minimum assistance with quad cane  Bed to Chair: minimum assistance with  quad cane  using  Step Transfer  Gait: 20' x2 with SBQC, ModA- chair close follow  Use of mirror for all gait, noted improvement with posture   VC/TC/A given for R lateral lean, glute activation, sequencing, posture, NBOS    Co-Treatment: No    Education:  Patient provided with verbal education education regarding PT role/goals/POC, fall prevention, safety awareness, and discharge/DME recommendations.  Understanding was verbalized.     Patient left up in chair with all lines intact, call button in reach, nsg notified, and helmet donned      GOALS:   Multidisciplinary Problems       Physical Therapy Goals          Problem: Physical Therapy    Goal Priority Disciplines Outcome Interventions   Physical Therapy Goal     PT, PT/OT Progressing    Description: Goals to be met by: 25     Patient will increase functional independence with mobility by performin. Supine to sit with Modified [place]  2. Sit to supine with Modified [place]  3. Sit to stand transfer with Modified [place]  4. Bed to chair transfer with Modified [place] using Rolling Walker vs QC  5. Gait  x 200 feet with Modified [place] using Rolling Walker vs QC.                          Time Tracking:     PT Received On: 25  PT Start Time: 0840     PT Stop Time: 905  PT Total Time (min): 25 min     Billable Minutes: Gait Training 2    Treatment Type: Treatment  PT/PTA: PTA     Number of PTA visits since last PT visit: 4     2025

## 2025-07-06 NOTE — PLAN OF CARE
Problem: Adult Inpatient Plan of Care  Goal: Optimal Comfort and Wellbeing  Outcome: Progressing  Intervention: Monitor Pain and Promote Comfort  Flowsheets (Taken 7/6/2025 1818)  Pain Management Interventions:   medication offered   pillow support provided   position adjusted   quiet environment facilitated     Problem: Wound  Goal: Optimal Coping  Outcome: Progressing  Intervention: Support Patient and Family Response  Flowsheets (Taken 7/6/2025 1818)  Supportive Measures:   active listening utilized   self-care encouraged   self-responsibility promoted   verbalization of feelings encouraged  Family/Support System Care:   presence promoted   self-care encouraged   support provided   involvement promoted  Goal: Skin Health and Integrity  Outcome: Progressing  Intervention: Optimize Skin Protection  Flowsheets (Taken 7/6/2025 1818)  Pressure Reduction Techniques: frequent weight shift encouraged  Skin Protection: incontinence pads utilized  Activity Management:   Arm raise - L1   Standing - L3   Up to bedside commode - L3   Up in chair - L3  Head of Bed (HOB) Positioning: HOB at 30-45 degrees

## 2025-07-06 NOTE — PROGRESS NOTES
"[facility] Hospital Medicine Progress Note        Chief Complaint: Inpatient Follow-up subdural empyema    HPI per admitting team: "59-year-old female with PMHx of left MCA stroke s/p thrombectomy in 6/2024 on Plavix, hypothyroidism, anxiety, bronchial asthma, COPD, type 2 diabetes mellitus,  and GERD. Patient recently had subdural hematoma following a fall in May requiring intracranial embolization, craniotomy with hematoma evacuation. After this patient was discharged home. Late May she presented back to the ED with headache, right-sided weakness, facial droop and imaging was concerning for left MCA diminished flow and subacute hematoma. MRI brain was negative for CVA. Neurosurgery performed diony hole for drainage of subdural hematoma due to increased intracranial pressure with postop CT showing definitive improvement patient was on Plavix post embolization which was held briefly and resumed per Neurosurgery on 06/05 and she was transferred to rehab on 06/06. While in rehab patient was noted to have increase right upper extremity weakness and also significant headaches. CT head revealed reaccumulation of subdural fluid collection, increased from before patient was sent back to [facility] for further evaluation/neurosurgery services. Neurosurgery evaluated, planning for  shunt this hospitalization, home meds resumed as appropriate except for Plavix, symptomatic management for headache.  shunt scheduled for 6/16. Patient underwent redo diony hole, fluid evacuation and drain placement, concern for surgical site infection and therefore underwent cranioplasty. Infectious Disease consulted and patient was placed on cefepime, vancomycin and Flagyl pending intraoperative cultures.  Intraoperative cultures growing staph epi.  ID recommending IV vancomycin, IV Rocephin until 7/3.  Neurosurgery wished to monitor patient for another day until 6/21.  PT/OT on board; recommending " "high-intensity therapy.  Speech therapy recommending regular diet with moderately thick liquids.  CM on board for placement.   On 6/23 discharge to rehab was planned but Pt developed RUE weakness and Ct head showed increasing size of extra-axial fluid collections along cerebral convexity with increasing mass effect & increase parenchymal edema in L posterior frontal lobe. Pt was taken back to OR and underwent left craniotomy and evacuation of empyema. Post operatively admitted to ICU. Intraoperative culture from 6/23 grew  staph epi and rare Pseudomonas stutzeri.  ID suggested to stop Vancomycin and continue Cefepime as Staph epi is oxacillin sensitive and Cefepime will cover both Pseudomonas and MSSE with total course at least 6 to 8 weeks. Pt's care downgraded back to  service on 6/25/25.  ID requested sensitivity testing for Pseudomonas stutzeri, however per hospital microbiology their instrument was not able to run sensitivities, and they are sending the sample to [facility] for further testing. ID recommended to continue Cefepime at this time with tentative end date 8/18/2025.  Pt remains with daily headache and dense weakness to RUE. MRI brain on 6/27/25 showed no acute stroke but noted post surgical left dural thickening and enhancement, left cerebral encephalomalacia with extensive gliotic changes.  As of 6/30 Pt is able to move her Rt upper ext some. Swelling noted inner aspect of Rt arm with U/S suggestive of hematoma 5.6x3.6x1.7 cm. Will continue conservative management for hematoma. Subdural JOHNATHAN drain and every other staple removed on 7/1/25 with complete staple removal on POD #21 7/14/25. NS cleared Pt to resume Plavix 2 weeks post op 7/7/25"       Interval Hx:   Patient sleeping comfortably, bundled up, room is cold.  Did not wake up to my calling or examination   No family at bedside   Case was discussed with patient's nurse on the floor    Objective/physical exam:  General: In no acute distress, afebrile, " concave craniectomy site, incisions are clean   Chest: Clear to auscultation bilaterally anteriorly  Heart: RRR, +S1, S2, no appreciable murmur  Abdomen: Soft, nontender, BS +  MSK: Warm, no lower extremity edema, no clubbing or cyanosis  Neurologic:  Comfortably asleep      VITAL SIGNS: 24 HRS MIN & MAX LAST   Temp  Min: 98.1 °F (36.7 °C)  Max: 98.5 °F (36.9 °C) 98.1 °F (36.7 °C)   BP  Min: 104/65  Max: 132/67 121/75   Pulse  Min: 78  Max: 93  78   Resp  Min: 16  Max: 16 16   SpO2  Min: 94 %  Max: 97 % 97 %     I have reviewed the following labs:  Recent Labs   Lab 06/30/25  0401 07/04/25  0351   WBC 5.47 5.34   RBC 3.35* 3.55*   HGB 9.9* 10.5*   HCT 31.0* 33.3*   MCV 92.5 93.8   MCH 29.6 29.6   MCHC 31.9* 31.5*   RDW 14.9 15.0   * 448*   MPV 9.2 9.3     Recent Labs   Lab 06/30/25  0401 07/04/25  0351    142   K 3.6 3.6    106   CO2 27 26   BUN 11.2 13.2   CREATININE 0.57 0.65   * 155*   CALCIUM 9.1 9.0   MG 1.90  --    ALBUMIN  --  3.2*   PROT  --  6.7   ALKPHOS  --  121   ALT  --  29   AST  --  23   BILITOT  --  0.3     Microbiology Results (last 7 days)       Procedure Component Value Units Date/Time    Tissue Culture - Aerobic [[id number]]  (Abnormal) Collected: 06/23/25 1311    Order Status: Completed Specimen: Tissue from Brain Updated: 07/04/25 1417     Tissue - Aerobic Culture Rare Pseudomonas stutzeri     Comment: Susceptibility sent to reference lab. Results to follow on separate report.       Fungal Culture [[id number]]  (Normal) Collected: 06/16/25 1338    Order Status: Completed Specimen: Tissue from Head Updated: 06/30/25 1501     Fungal Culture No Fungus Isolated at 2 Weeks    Fungal Culture [[id number]]  (Normal) Collected: 06/16/25 1357    Order Status: Completed Specimen: Bone from Head Updated: 06/30/25 1501     Fungal Culture No Fungus Isolated at 2 Weeks             See below for Radiology    Assessment/Plan:  Left Subdural empyema - 6/23/25 s/p left craniectomy and  evacuation of empyema with tissue culture positive for staph epidermidis and Pseudomonas stutzeri  Traumatic Subdural hematoma--> s/p left frontoparietal craniotomy and evacuation on 5/19/25   Chronic left SDH with new onset headache, Rt sided weakness and facial droop, s/p left diony hole on 6/2/25  Post op Increased subdural fluid collection, s/p left redo diony hole with fluid evacuation, drain placement on 6/16  Surgical site infection status post cranioplasty on 6/16 with tissue culture positive for Staph epidermidis   Hematoma, RUE- 6/30/25- conservative management   Hx-  CVA in June, 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,Depression/anxiety ,HLD, Hypothyroidism, Essential HTN, Type 2 diabetes mellitus.   Severe malnutrition      Continue Cefepime 2 gram q8h until final sensitivity is known for Pseudomonas Stutzeri (sent out to [facility] lab for susceptibility) - Tentatively planning cefepime 2g Q8H through 8/18 if isolate returns with sensitivity to Cefepime.    6/23- Sent out cx on 6/30 to [facility] lab, spoke to micro on 07/04, hoping to get results back on Monday 7/7  Continue [facility]   Neurosurgery cleared pt for Lovenox for VTE prophylaxis as of 6/27/25  Plavix can be resumed 2 weeks post op- 7/7/25, Monday per Neurosurgery   Neuro check q4h  Insulin sliding scale, Accu-Cheks AC and HS  Given hyperglycemia, diet changed to diabetic with moderately thickened liquids as recommended per speech--> blood glucose improving  PT/OT - High intensity therapy  CM on board, patient is accepted but discharge held given waiting for final cultures that has been sent out  Lab stable       VTE prophylaxis: Lovenox     Patient condition:  Fair     Anticipated discharge and Disposition:   TBD, [facility] rehab will submit for Auth once final cultures received       All diagnosis and differential diagnosis have been reviewed; assessment and plan has been documented; I have personally reviewed the labs and test results that are presently available; I have  reviewed the patients medication list; I have reviewed the consulting providers response and recommendations. I have reviewed or attempted to review medical records based upon their availability    All of the patient's questions have been  addressed and answered. Patient's is agreeable to the above stated plan. I will continue to monitor closely and make adjustments to medical management as needed.    Portions of this note dictated using EMR integrated voice recognition software, and may be subject to voice recognition errors not corrected at proofreading. Please contact writer for clarification if needed.   _____________________________________________________________________    Malnutrition Status:  Nutrition consulted. Most recent weight and BMI monitored-     Measurements:  Wt Readings from Last 1 Encounters:   06/12/25 49.9 kg (110 lb)   Body mass index is 17.75 kg/m².    Patient has been screened and assessed by RD.    Malnutrition Type:  Context: acute illness or injury  Level: severe    Malnutrition Characteristic Summary:  Weight Loss (Malnutrition): greater than 5% in 1 month  Energy Intake (Malnutrition): other (see comments) (Does not meet criteria)  Subcutaneous Fat (Malnutrition): moderate depletion  Muscle Mass (Malnutrition): moderate depletion  Fluid Accumulation (Malnutrition): other (see comments) (Not present)  Hand  Strength, Right (Malnutrition): Unable to assess    Interventions/Recommendations (treatment strategy):  Oral diet/nutrient modifications     Scheduled Med:   atorvastatin  80 mg Oral Daily    busPIRone  15 mg Oral BID    ceFEPIme  2 g Intravenous Q8H    enoxparin  40 mg Subcutaneous Q24H (prophylaxis, 1700)    ezetimibe  10 mg Oral Daily    famotidine  20 mg Oral BID    hydrocortisone   Topical (Top) BID    Lactobacillus rhamnosus GG  1 packet Oral Daily    levetiracetam  500 mg Oral BID    levothyroxine  88 mcg Oral Before breakfast    metoprolol succinate  12.5 mg Oral Daily     nortriptyline  25 mg Oral QHS    psyllium husk  1 packet Oral Daily    traZODone  25 mg Oral QHS      Continuous Infusions:     PRN Meds:  Current Facility-Administered Medications:     acetaminophen, 650 mg, Rectal, Q4H PRN    acetaminophen, 650 mg, Oral, Q4H PRN    bisacodyL, 10 mg, Rectal, Daily PRN    butalbital-acetaminophen-caffeine -40 mg, 1 tablet, Oral, Q4H PRN    dextrose 50%, 12.5 g, Intravenous, PRN    dextrose 50%, 25 g, Intravenous, PRN    diphenhydrAMINE, 25 mg, Oral, Q6H PRN    glucagon (human recombinant), 1 mg, Intramuscular, PRN    glucose, 16 g, Oral, PRN    glucose, 24 g, Oral, PRN    hydrALAZINE, 10 mg, Intravenous, Q4H PRN    insulin aspart U-100, 0-5 Units, Subcutaneous, QID (AC + HS) PRN    labetalol, 10 mg, Intravenous, Q4H PRN    melatonin, 6 mg, Oral, Nightly PRN    morphine, 2 mg, Intravenous, Q4H PRN    naloxone, 0.02 mg, Intravenous, PRN    ondansetron, 4 mg, Intravenous, Q8H PRN    oxyCODONE-acetaminophen, 1 tablet, Oral, Q4H PRN    prochlorperazine, 5 mg, Intravenous, Q6H PRN    sodium chloride 0.9%, 10 mL, Intravenous, PRN    Flushing PICC/Midline Protocol, , , Until Discontinued **AND** sodium chloride 0.9%, 10 mL, Intravenous, Q12H PRN    sodium chloride 0.9%, 3 mL, Intravenous, Q12H PRN         Jake Paulino MD  Department of Hospital Medicine   [facility]   07/06/2025

## 2025-07-07 LAB
BACTERIA #/AREA URNS AUTO: ABNORMAL /HPF
BILIRUB UR QL STRIP.AUTO: NEGATIVE
CLARITY UR: ABNORMAL
COLOR UR AUTO: ABNORMAL
ERYTHROCYTE [DISTWIDTH] IN BLOOD BY AUTOMATED COUNT: 15.3 % (ref 11.5–17)
FUNGUS SPEC CULT: NORMAL
GLUCOSE UR QL STRIP: NORMAL
HCT VFR BLD AUTO: 34.4 % (ref 37–47)
HGB BLD-MCNC: 11 G/DL (ref 12–16)
HGB UR QL STRIP: ABNORMAL
HYALINE CASTS #/AREA URNS LPF: ABNORMAL /LPF
KETONES UR QL STRIP: NEGATIVE
LEUKOCYTE ESTERASE UR QL STRIP: 500
MCH RBC QN AUTO: 29.8 PG (ref 27–31)
MCHC RBC AUTO-ENTMCNC: 32 G/DL (ref 33–36)
MCV RBC AUTO: 93.2 FL (ref 80–94)
MUCOUS THREADS URNS QL MICRO: ABNORMAL /LPF
NITRITE UR QL STRIP: NEGATIVE
NRBC BLD AUTO-RTO: 0 %
PH UR STRIP: 6 [PH]
PLATELET # BLD AUTO: 416 X10(3)/MCL (ref 130–400)
PMV BLD AUTO: 9.6 FL (ref 7.4–10.4)
POCT GLUCOSE: 174 MG/DL (ref 70–110)
PROT UR QL STRIP: ABNORMAL
RBC # BLD AUTO: 3.69 X10(6)/MCL (ref 4.2–5.4)
RBC #/AREA URNS AUTO: ABNORMAL /HPF
SP GR UR STRIP.AUTO: 1.01 (ref 1–1.03)
SQUAMOUS #/AREA URNS LPF: ABNORMAL /HPF
UROBILINOGEN UR STRIP-ACNC: NORMAL
WBC # BLD AUTO: 5.68 X10(3)/MCL (ref 4.5–11.5)
WBC #/AREA URNS AUTO: >100 /HPF

## 2025-07-07 PROCEDURE — 25000003 PHARM REV CODE 250: Performed by: INTERNAL MEDICINE

## 2025-07-07 PROCEDURE — 97116 GAIT TRAINING THERAPY: CPT | Mod: CQ

## 2025-07-07 PROCEDURE — 97112 NEUROMUSCULAR REEDUCATION: CPT

## 2025-07-07 PROCEDURE — 99900035 HC TECH TIME PER 15 MIN (STAT)

## 2025-07-07 PROCEDURE — 21400001 HC TELEMETRY ROOM

## 2025-07-07 PROCEDURE — 25000242 PHARM REV CODE 250 ALT 637 W/ HCPCS: Performed by: INTERNAL MEDICINE

## 2025-07-07 PROCEDURE — 63600175 PHARM REV CODE 636 W HCPCS

## 2025-07-07 PROCEDURE — 63600175 PHARM REV CODE 636 W HCPCS: Performed by: INTERNAL MEDICINE

## 2025-07-07 PROCEDURE — 81001 URINALYSIS AUTO W/SCOPE: CPT | Performed by: INTERNAL MEDICINE

## 2025-07-07 PROCEDURE — 87086 URINE CULTURE/COLONY COUNT: CPT | Performed by: INTERNAL MEDICINE

## 2025-07-07 PROCEDURE — 97530 THERAPEUTIC ACTIVITIES: CPT | Mod: CQ

## 2025-07-07 PROCEDURE — 94799 UNLISTED PULMONARY SVC/PX: CPT

## 2025-07-07 PROCEDURE — C1751 CATH, INF, PER/CENT/MIDLINE: HCPCS

## 2025-07-07 PROCEDURE — 36415 COLL VENOUS BLD VENIPUNCTURE: CPT | Performed by: INTERNAL MEDICINE

## 2025-07-07 PROCEDURE — 97530 THERAPEUTIC ACTIVITIES: CPT

## 2025-07-07 PROCEDURE — 85027 COMPLETE CBC AUTOMATED: CPT | Performed by: INTERNAL MEDICINE

## 2025-07-07 PROCEDURE — 99900031 HC PATIENT EDUCATION (STAT)

## 2025-07-07 RX ORDER — HYDROCORTISONE 1 %
CREAM (GRAM) TOPICAL 2 TIMES DAILY PRN
Status: DISCONTINUED | OUTPATIENT
Start: 2025-07-07 | End: 2025-07-15 | Stop reason: HOSPADM

## 2025-07-07 RX ORDER — CIPROFLOXACIN 500 MG/1
500 TABLET, FILM COATED ORAL EVERY 12 HOURS
Status: COMPLETED | OUTPATIENT
Start: 2025-07-07 | End: 2025-07-09

## 2025-07-07 RX ORDER — CLOPIDOGREL BISULFATE 75 MG/1
75 TABLET ORAL DAILY
Status: DISCONTINUED | OUTPATIENT
Start: 2025-07-07 | End: 2025-07-15 | Stop reason: HOSPADM

## 2025-07-07 RX ADMIN — OXYCODONE AND ACETAMINOPHEN 1 TABLET: 325; 10 TABLET ORAL at 11:07

## 2025-07-07 RX ADMIN — LEVETIRACETAM 500 MG: 100 SOLUTION ORAL at 08:07

## 2025-07-07 RX ADMIN — EZETIMIBE 10 MG: 10 TABLET ORAL at 08:07

## 2025-07-07 RX ADMIN — TRAZODONE HYDROCHLORIDE 25 MG: 50 TABLET ORAL at 08:07

## 2025-07-07 RX ADMIN — FAMOTIDINE 20 MG: 20 TABLET, FILM COATED ORAL at 08:07

## 2025-07-07 RX ADMIN — Medication 1 EACH: at 08:07

## 2025-07-07 RX ADMIN — NORTRIPTYLINE HYDROCHLORIDE 25 MG: 25 CAPSULE ORAL at 08:07

## 2025-07-07 RX ADMIN — CLOPIDOGREL 75 MG: 75 TABLET ORAL at 01:07

## 2025-07-07 RX ADMIN — OXYCODONE AND ACETAMINOPHEN 1 TABLET: 325; 10 TABLET ORAL at 08:07

## 2025-07-07 RX ADMIN — BUSPIRONE HYDROCHLORIDE 15 MG: 5 TABLET ORAL at 08:07

## 2025-07-07 RX ADMIN — CIPROFLOXACIN 500 MG: 500 TABLET ORAL at 08:07

## 2025-07-07 RX ADMIN — PSYLLIUM HUSK 1 PACKET: 3.4 POWDER ORAL at 09:07

## 2025-07-07 RX ADMIN — CEFEPIME 2 G: 2 INJECTION, POWDER, FOR SOLUTION INTRAVENOUS at 08:07

## 2025-07-07 RX ADMIN — ATORVASTATIN CALCIUM 80 MG: 40 TABLET, FILM COATED ORAL at 08:07

## 2025-07-07 RX ADMIN — LEVOTHYROXINE SODIUM 88 MCG: 0.09 TABLET ORAL at 05:07

## 2025-07-07 RX ADMIN — METOPROLOL SUCCINATE 12.5 MG: 25 TABLET, EXTENDED RELEASE ORAL at 09:07

## 2025-07-07 RX ADMIN — ENOXAPARIN SODIUM 40 MG: 40 INJECTION SUBCUTANEOUS at 04:07

## 2025-07-07 RX ADMIN — CEFEPIME 2 G: 2 INJECTION, POWDER, FOR SOLUTION INTRAVENOUS at 01:07

## 2025-07-07 RX ADMIN — CEFEPIME 2 G: 2 INJECTION, POWDER, FOR SOLUTION INTRAVENOUS at 05:07

## 2025-07-07 NOTE — PROGRESS NOTES
NAEs overnight. Resting in bed.   Continued right arm weakness, improving overall    A/ox4. NAD. Resting in bed  Left surgical site healing well. Bone flap site depressed  Lifts right arm, improving overall  Moves other extremities well  Answers questions appropriately    Every other staple removal tomorrow, rest out 7/11  PTOTST  Antibiotics  Pending placement

## 2025-07-07 NOTE — PT/OT/SLP PROGRESS
Occupational Therapy   Treatment    Name: Chelle Chandra  MRN: [id number]    Recommendations:     Recommended therapy intensity at discharge: High Intensity Therapy   Discharge Equipment Recommendations:  to be determined by next level of care  Barriers to discharge:  None    Assessment:     Chelle Chandra is a 59 y.o. female with a medical diagnosis of R sided weakness and facial droop due to L SDH s/p craniotomy and MMA embolization. On 5/30 patient with subdural hemorrhage s/p L diony hole. Pt then with new R sided weakness and underwent a redo of diony hole for fluid evacuation/drain placement and a left cranioplasty for subdural fluid evacuation on 6/16. Now with ICH s/p craniectomy on 6/23, swelling to RUE 6/30 ultrasound suggestive of hematoma. She presents with motivation and good tolerance for treatment session. Performance deficits affecting function are weakness, impaired endurance, impaired self care skills, impaired functional mobility, impaired balance, decreased upper extremity function, decreased lower extremity function, decreased safety awareness, pain, impaired fine motor.     Pt tolerated treatment well. Pt presents with more midline posture than previous sessions, requiring minimal to no verbal cues to correct posture. Pt became discouraged during standing functional reach task, task was downgraded to eliminate gravity to gain more ROM. Pt presents with progress towards OT goals and remains appropriate for high intensity therapy.     DME Justification: No DME recommended requiring DME justifications    Rehab Prognosis:  Good; patient would benefit from acute skilled OT services to address these deficits and reach maximum level of function.       Plan:     Patient to be seen 6 x/week to address the above listed problems via self-care/home management, therapeutic activities, therapeutic exercises, neuromuscular re-education, sensory integration, splinting  Plan of Care Expires: 08/04/25  Plan of  Care Reviewed with: patient    Subjective     Pain/Comfort:  Pain Rating 1:  (wincing while don/doff helmet)    Objective:     Communicated with: nurse prior to session.  Patient found HOB elevated with peripheral IV upon OT entry to room.    General Precautions: Standard, seizure, fall    Orthopedic Precautions:   Braces:  (resting hand splint, Giv Joya sling)  Respiratory Status: Room air  Vital Signs: Blood Pressure: 115/81     Occupational Performance:     Functional Mobility/Transfers:  Bed mobility:    Scooting: stand by assistance  Bridging: stand by assistance  Supine to Sit: stand by assistance  Sit to Supine: minimum assistance; requiring assistance with RLE into bed.  Transfers: Sit to Stand: contact guard assistance with quad cane    Balance:   Static Sitting Balance: No UE support: Good: Patient able to maintain balance without handhold support, limited postural sway.  Dynamic Sitting Balance: One UE support: Requires CGA during seated TA tasks.   Pt demonstrates more midline posture than previous sessions. Requiring minimal verbal and tactile cues to correct posture.     Therapeutic Activities:  Pt performed seated functional reach task to eliminate gravity. 3 objects were placed on table for pt to reach for. Pt with success during task, requiring extra time to reach for objects. Pt performed 2x.  Pt pushed 3 cones R>L using RUE  Pt tolerated activity well, however reports needing a rest break.   TA used to simulate reaching for common household objects used for ADLs. Also to facilitate mvmt and strength in RUE for daily functional use.      Neuromuscular Re-education:  Pt performed standing functional reach activity in front standing mirror. Mirror used to remind pt of midline posture.   Numbered/lettered circles drawn on mirror, pt used RUE to reach for [place] mentioned by student therapist. Pt able to achieve ~50 degrees shoulder flexion before needing assistance from LUE to finish task.   After  reaching 6x, pt became fatigued/discouraged and required a seated rest break.     Therapeutic Positioning    OT interventions performed during the course of today's session in an effort to prevent and/or reduce acquired pressure injuries:   Education was provided on benefits of and recommendations for therapeutic positioning    [facility] 6 Click ADL: 17    Co-Treatment: No    Patient Education:  Patient provided with verbal education education regarding OT role/goals/POC, fall prevention, and safety awareness.  Understanding was verbalized.      Patient left HOB elevated with call button in reach.    GOALS:   Multidisciplinary Problems       Occupational Therapy Goals          Problem: Occupational Therapy    Goal Priority Disciplines Outcome Interventions   Occupational Therapy Goal     OT, PT/OT Progressing    Description: Goals: to be met by 07/14/25    Pt will complete grooming standing at sink with LRAD with CGA.  Pt will complete UB dressing with SBA.  Pt will complete LB dressing with SBA using LRAD.  Pt will complete toileting with CGA using LRAD.  Pt will complete functional mobility to/from toilet and toilet transfer with mod I using LRAD.   Pt will demo visual attention to R side >80% of time with min verbal cues.  Pt will demo RUE strength of 3-/5 for participation in ADLs.                       Time Tracking:     OT Date of Treatment: 07/07/25  OT Start Time: 1527  OT Stop Time: 1558  OT Total Time (min): 31 min    Billable Minutes:Therapeutic Activity 1 unit  Neuromuscular Re-education 1 unit    OTR/L readily available for conference at the time of the provision of services: LISA Mallory  OT/EDILSON: OT     Number of EDILSON visits since last OT visit: 3    7/7/2025

## 2025-07-07 NOTE — PROGRESS NOTES
Inpatient Nutrition Assessment    Admit Date: 6/11/2025   Total duration of encounter: 26 days   Patient Age: 59 y.o.    Nutrition Recommendation/Prescription     Continue current diet regular diet and moderately thickened liquids per SLP recommendations.   Resume chocolate Magic Cup (provides 190 kcal, 9 g protein per serving) BID once in stock.  Obtain new weight as feasible.    Communication of Recommendations: EMR    Nutrition Assessment     Malnutrition Assessment/Nutrition-Focused Physical Exam    Malnutrition Context: acute illness or injury (06/18/25 1545)  Malnutrition Level: severe (06/18/25 1545)  Energy Intake (Malnutrition): other (see comments) (Does not meet criteria) (06/18/25 1545)  Weight Loss (Malnutrition): greater than 5% in 1 month (06/18/25 1545)  Subcutaneous Fat (Malnutrition): moderate depletion (06/18/25 1545)  Orbital Region (Subcutaneous Fat Loss): moderate depletion        Muscle Mass (Malnutrition): moderate depletion (06/18/25 1545)  Amish Region (Muscle Loss): moderate depletion  Clavicle Bone Region (Muscle Loss): moderate depletion                    Fluid Accumulation (Malnutrition): other (see comments) (Not present) (06/18/25 1545)     Hand  Strength, Right (Malnutrition): Unable to assess (06/18/25 1545)  A minimum of two characteristics is recommended for diagnosis of either severe or non-severe malnutrition.    Chart Review    Reason Seen: length of stay and follow-up    Malnutrition Screening Tool Results   Have you recently lost weight without trying?: Yes: 2-13 lbs  Have you been eating poorly because of a decreased appetite?: No   MST Score: 1   Diagnosis:  Increased subdural fluid collection Status post left redo diony hole with fluid evacuation, drain placement 6/16  Suspected surgical site infection status post cranioplasty 6/16  Subdural hematoma requiring craniotomy with evacuation and MMA embolization early May with recurrence late May requiring diony  hole  Right-sided weakness/intractable headache secondary to above    Relevant Medical History:   hypothyroidism, anxiety, bronchial asthma, COPD, type 2 diabetes mellitus, 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, 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, GERD     Scheduled Medications:  atorvastatin, 80 mg, Daily  busPIRone, 15 mg, BID  ceFEPIme, 2 g, Q8H  ciprofloxacin HCl, 500 mg, Q12H  clopidogreL, 75 mg, Daily  enoxparin, 40 mg, Q24H (prophylaxis, 1700)  ezetimibe, 10 mg, Daily  famotidine, 20 mg, BID  Lactobacillus rhamnosus GG, 1 packet, Daily  levetiracetam, 500 mg, BID  levothyroxine, 88 mcg, Before breakfast  metoprolol succinate, 12.5 mg, Daily  nortriptyline, 25 mg, QHS  psyllium husk, 1 packet, Daily  traZODone, 25 mg, QHS    Continuous Infusions:     PRN Medications:  acetaminophen, 650 mg, Q4H PRN  acetaminophen, 650 mg, Q4H PRN  bisacodyL, 10 mg, Daily PRN  butalbital-acetaminophen-caffeine -40 mg, 1 tablet, Q4H PRN  dextrose 50%, 12.5 g, PRN  dextrose 50%, 25 g, PRN  diphenhydrAMINE, 25 mg, Q6H PRN  glucagon (human recombinant), 1 mg, PRN  glucose, 16 g, PRN  glucose, 24 g, PRN  hydrALAZINE, 10 mg, Q4H PRN  hydrocortisone, , BID PRN  insulin aspart U-100, 0-5 Units, QID (AC + HS) PRN  labetalol, 10 mg, Q4H PRN  melatonin, 6 mg, Nightly PRN  morphine, 2 mg, Q4H PRN  naloxone, 0.02 mg, PRN  ondansetron, 4 mg, Q8H PRN  oxyCODONE-acetaminophen, 1 tablet, Q4H PRN  prochlorperazine, 5 mg, Q6H PRN  sodium chloride 0.9%, 10 mL, PRN  sodium chloride 0.9%, 10 mL, Q12H PRN  sodium chloride 0.9%, 3 mL, Q12H PRN    Calorie Containing IV Medications: no significant kcals from medications at this time    Recent Labs   Lab 07/04/25  0351 07/07/25  0710     --    K 3.6  --    CALCIUM 9.0  --      --    CO2 26  --    BUN 13.2  --    CREATININE 0.65  --    EGFRNORACEVR >60  --    *  --    BILITOT 0.3  --    ALKPHOS 121  --    ALT 29  --    AST 23  --    ALBUMIN 3.2*  --    WBC 5.34 5.68   HGB 10.5* 11.0*   HCT 33.3* 34.4*  "    Nutrition Orders:  Diet Consistent Carbohydrate 2000 Calories (up to 75 gm per meal); Moderately Thick Liquids (IDDSI Level 3)  Dietary nutrition supplements BID; Magic Cup - Chocolate    Appetite/Oral Intake: fair/50-75% of meals  Factors Affecting Nutritional Intake: preferences   Social Needs Impacting Access to Food: none identified  Food/Protestant/Cultural Preferences: none reported  Food Allergies: no known food allergies  Last Bowel Movement: 07/05/25  Wound(s):  scalp and temporal incisions noted.     Comments    6/18/25: Spoke to family members at bedside who report pt's appetite has been fluctuating since admit depending on her pain levels. PO intake varies between %. Pt ate 75% of her breakfast this morning but did not eat much of her lunch. Family reports UBW of 112 lbs, last weighing that about 1 month ago. Family reports wt at admit was about 100 lbs which would indicate a 10% wt loss x1 month, nutritionally significant. Most recent bedscale wt from 6/12 is 110 lbs, will monitor for accuracy/trends.     6/20/25: Patient reports good oral intake of meals. Denies any nausea, vomiting, or diarrhea. Constipation noted.     6/24/25: Pt feeling "off" this AM and didn't eat as much. Still on mod thick liquids. Will send Magic Cup for added kcal and protein.    6/27/25: Pt reports fair appetite, does not eat much for breakfast but eats close to 50% of lunch and dinner. Pt states her family has been bringing in outside meals for dinner, she is looking forward to getting some ribs/steak. Pt consuming 1-2 Magic Cup supplements per day.      7/1/25: Pt working with therapy. No recent documentation of oral intake. Will follow-up early.     7/3/25: Fair intake of meals. Likes scrambled eggs for breakfast. Eating magic cup with dinner. Updated order to BID and only chocolate. Wants cereal and milk with every meal, updated in [facility]. Pt is receiving some outside food from . Denies any current GI " "complaints.     7/7/25: Pt out of rooms during rounds. Documentation of 50% breakfast consumed this morning. Consistent carbohydrate diet restriction added 7/3 by MD. Currently out of magic cup supplement so patient is not receiving. Will follow-up early.     Anthropometrics    Height: 5' 6" (167.6 cm), Height Method: Measured  Last Weight: 49.9 kg (110 lb) (06/12/25 0223), Weight Method: Bed Scale  BMI (Calculated): 17.8  BMI Classification: underweight (BMI less than 18.5)     Ideal Body Weight (IBW), Female: 130 lb     % Ideal Body Weight, Female (lb): 84.62 %                             Usual Weight Provided By: patient    Wt Readings from Last 5 Encounters:   06/12/25 49.9 kg (110 lb)   06/06/25 46.9 kg (103 lb 6.3 oz)   06/03/25 46.3 kg (102 lb)   05/15/25 49 kg (108 lb 0.4 oz)   05/06/25 50.8 kg (112 lb)     Weight Change(s) Since Admission:   7/1-7/7/25 no updated weights   Wt Readings from Last 1 Encounters:   06/12/25 0223 49.9 kg (110 lb)   06/11/25 1314 46.7 kg (103 lb)   Admit Weight: 46.7 kg (103 lb) (06/11/25 1314), Weight Method: Stated    Estimated Needs    Weight Used For Calorie Calculations: 49.9 kg (110 lb 0.2 oz)  Energy Calorie Requirements (kcal): 2109-5279 kcals (30-35 kcal/kg)  Energy Need Method: Kcal/kg  Weight Used For Protein Calculations: 49.9 kg (110 lb 0.2 oz)  Protein Requirements: 60-75 g (1.2-1.5 g/kg)  Fluid Requirements (mL): 1497 mL (30 mL/kg)  CHO Requirement: N/A     Enteral Nutrition     Patient not receiving enteral nutrition at this time.    Parenteral Nutrition     Patient not receiving parenteral nutrition support at this time.    Evaluation of Received Nutrient Intake    Calories: not meeting estimated needs, improving   Protein: not meeting estimated needs, improving     Patient Education     Not applicable.    Nutrition Diagnosis     PES: Inadequate energy intake related to acute illness as evidenced by meeting <75% EEN. (active)    PES: Severe acute disease or injury " related malnutrition Related to  As Evidenced by:  - weight loss: > 5% in 1 month - muscle mass depletion: 2 areas of moderate muscle loss (Clavicle, Temporalis) - loss of subcutaneous fat: 2 areas of moderate fat loss (Infraorbital, Buccal) active    Nutrition Interventions     Intervention(s): commercial food and collaboration with other providers  Intervention(s): Oral diet/nutrient modifications    Goal: Meet greater than 80% of nutritional needs by follow-up. (goal not met)  Goal: Maintain weight throughout hospitalization. (goal progressing)    Nutrition Goals & Monitoring     Dietitian will monitor: energy intake, weight, and gastrointestinal profile  Discharge planning: regular diet with texture modifications per SLP  Nutrition Risk/Follow-Up: patient at increased nutrition risk; dietitian will follow-up twice weekly   Please consult if re-assessment needed sooner.

## 2025-07-07 NOTE — PROGRESS NOTES
"[facility] Hospital Medicine Progress Note        Chief Complaint: Inpatient Follow-up subdural empyema    HPI per admitting team: "59-year-old female with PMHx of left MCA stroke s/p thrombectomy in 6/2024 on Plavix, hypothyroidism, anxiety, bronchial asthma, COPD, type 2 diabetes mellitus,  and GERD. Patient recently had subdural hematoma following a fall in May requiring intracranial embolization, craniotomy with hematoma evacuation. After this patient was discharged home. Late May she presented back to the ED with headache, right-sided weakness, facial droop and imaging was concerning for left MCA diminished flow and subacute hematoma. MRI brain was negative for CVA. Neurosurgery performed diony hole for drainage of subdural hematoma due to increased intracranial pressure with postop CT showing definitive improvement patient was on Plavix post embolization which was held briefly and resumed per Neurosurgery on 06/05 and she was transferred to rehab on 06/06. While in rehab patient was noted to have increase right upper extremity weakness and also significant headaches. CT head revealed reaccumulation of subdural fluid collection, increased from before patient was sent back to [facility] for further evaluation/neurosurgery services. Neurosurgery evaluated, planning for  shunt this hospitalization, home meds resumed as appropriate except for Plavix, symptomatic management for headache.  shunt scheduled for 6/16. Patient underwent redo diony hole, fluid evacuation and drain placement, concern for surgical site infection and therefore underwent cranioplasty. Infectious Disease consulted and patient was placed on cefepime, vancomycin and Flagyl pending intraoperative cultures.  Intraoperative cultures growing staph epi.  ID recommending IV vancomycin, IV Rocephin until 7/3.  Neurosurgery wished to monitor patient for another day until 6/21.  PT/OT on board; recommending " "high-intensity therapy.  Speech therapy recommending regular diet with moderately thick liquids.  CM on board for placement.   On 6/23 discharge to rehab was planned but Pt developed RUE weakness and Ct head showed increasing size of extra-axial fluid collections along cerebral convexity with increasing mass effect & increase parenchymal edema in L posterior frontal lobe. Pt was taken back to OR and underwent left craniotomy and evacuation of empyema. Post operatively admitted to ICU. Intraoperative culture from 6/23 grew  staph epi and rare Pseudomonas stutzeri.  ID suggested to stop Vancomycin and continue Cefepime as Staph epi is oxacillin sensitive and Cefepime will cover both Pseudomonas and MSSE with total course at least 6 to 8 weeks. Pt's care downgraded back to  service on 6/25/25.  ID requested sensitivity testing for Pseudomonas stutzeri, however per hospital microbiology their instrument was not able to run sensitivities, and they are sending the sample to [facility] for further testing. ID recommended to continue Cefepime at this time with tentative end date 8/18/2025.  Pt remains with daily headache and dense weakness to RUE. MRI brain on 6/27/25 showed no acute stroke but noted post surgical left dural thickening and enhancement, left cerebral encephalomalacia with extensive gliotic changes.  As of 6/30 Pt is able to move her Rt upper ext some. Swelling noted inner aspect of Rt arm with U/S suggestive of hematoma 5.6x3.6x1.7 cm. Will continue conservative management for hematoma. Subdural JOHNATHAN drain and every other staple removed on 7/1/25 with complete staple removal on POD #21 7/14/25. NS cleared Pt to resume Plavix 2 weeks post op 7/7/25"       Interval Hx:   Patient sleeping comfortably, bundled up,  Did not wake up to my calling or examination   No family at bedside   Case was discussed with patient's nurse and  on the floor    Objective/physical exam:  General: In no acute distress, " afebrile, concave craniectomy site, incisions are clean   Chest: Clear to auscultation bilaterally anteriorly  Heart: RRR, +S1, S2, no appreciable murmur  Abdomen: Soft, nontender, BS +  MSK: Warm, no lower extremity edema, no clubbing or cyanosis  Neurologic:  Comfortably asleep      VITAL SIGNS: 24 HRS MIN & MAX LAST   Temp  Min: 97.8 °F (36.6 °C)  Max: 98.7 °F (37.1 °C) 97.8 °F (36.6 °C)   BP  Min: 100/62  Max: 125/73 116/72   Pulse  Min: 79  Max: 95  89   Resp  Min: 16  Max: 18 18   SpO2  Min: 93 %  Max: 96 % 96 %     I have reviewed the following labs:  Recent Labs   Lab 07/04/25  0351 07/07/25  0710   WBC 5.34 5.68   RBC 3.55* 3.69*   HGB 10.5* 11.0*   HCT 33.3* 34.4*   MCV 93.8 93.2   MCH 29.6 29.8   MCHC 31.5* 32.0*   RDW 15.0 15.3   * 416*   MPV 9.3 9.6     Recent Labs   Lab 07/04/25  0351      K 3.6      CO2 26   BUN 13.2   CREATININE 0.65   *   CALCIUM 9.0   ALBUMIN 3.2*   PROT 6.7   ALKPHOS 121   ALT 29   AST 23   BILITOT 0.3     Microbiology Results (last 7 days)       Procedure Component Value Units Date/Time    Urine culture [[id number]] Collected: 07/07/25 0326    Order Status: Sent Specimen: Urine, Clean Catch Updated: 07/07/25 0456    Tissue Culture - Aerobic [[id number]]  (Abnormal) Collected: 06/23/25 1311    Order Status: Completed Specimen: Tissue from Brain Updated: 07/04/25 1417     Tissue - Aerobic Culture Rare Pseudomonas stutzeri     Comment: Susceptibility sent to reference lab. Results to follow on separate report.       Fungal Culture [[id number]]  (Normal) Collected: 06/16/25 1338    Order Status: Completed Specimen: Tissue from Head Updated: 06/30/25 1501     Fungal Culture No Fungus Isolated at 2 Weeks    Fungal Culture [[id number]]  (Normal) Collected: 06/16/25 1357    Order Status: Completed Specimen: Bone from Head Updated: 06/30/25 1501     Fungal Culture No Fungus Isolated at 2 Weeks             See below for Radiology    Assessment/Plan:  Symptomatic  UTI with hematuria  Left Subdural empyema - 6/23/25 s/p left craniectomy and evacuation of empyema with tissue culture positive for staph epidermidis and Pseudomonas stutzeri  Traumatic Subdural hematoma--> s/p left frontoparietal craniotomy and evacuation on 5/19/25   Chronic left SDH with new onset headache, Rt sided weakness and facial droop, s/p left diony hole on 6/2/25  Post op Increased subdural fluid collection, s/p left redo diony hole with fluid evacuation, drain placement on 6/16  Surgical site infection status post cranioplasty on 6/16 with tissue culture positive for Staph epidermidis   Hematoma, RUE- 6/30/25- conservative management   Hx-  CVA in June, 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,Depression/anxiety ,HLD, Hypothyroidism, Essential HTN, Type 2 diabetes mellitus.   Severe malnutrition      Continue Cefepime 2 gram q8h until final sensitivity is known for Pseudomonas Stutzeri (sent out to [facility] lab for susceptibility) - Tentatively planning cefepime 2g Q8H through 8/18 if isolate returns with sensitivity to Cefepime.    6/23- Sent out cx on 6/30 to [facility] lab, spoke to micro on Monday 7/7, the results are still not in and they are waiting to hear back from [facility].  Micro will update me once results are received  Patient complained of burning with urination and foul smell, UA concerning for acute cystitis with hematuria-urine culture in process  Empirically started on Cipro 500 mg b.i.d. for 3 days-day 1 of 3.  Will adjust antibiotic according to urine culture and sensitivity  Encourage fluid intake  No leukocytosis  Continue [facility]   Neurosurgery cleared pt for Lovenox for VTE prophylaxis as of 6/27/25  Plavix resumed 2 weeks post op on 7/7/25 per Neurosurgery recommendation on 7/1 (Cary Mcwilliams FNP note)  Neuro check q4h  Insulin sliding scale, Accu-Cheks AC and HS  Given hyperglycemia, diet changed to diabetic with moderately thickened liquids as recommended per speech--> blood glucose improving  PT/OT -  High intensity therapy  CM on board, patient is accepted to [facility] but discharge held given waiting for final cultures that has been sent out to [facility].  They will apply for Auth once cultures are finalized  Lab stable       VTE prophylaxis: Lovenox     Patient condition:  Fair     Anticipated discharge and Disposition:   TBD, [facility] will submit for Auth once final cultures received       All diagnosis and differential diagnosis have been reviewed; assessment and plan has been documented; I have personally reviewed the labs and test results that are presently available; I have reviewed the patients medication list; I have reviewed the consulting providers response and recommendations. I have reviewed or attempted to review medical records based upon their availability    All of the patient's questions have been  addressed and answered. Patient's is agreeable to the above stated plan. I will continue to monitor closely and make adjustments to medical management as needed.    Portions of this note dictated using EMR integrated voice recognition software, and may be subject to voice recognition errors not corrected at proofreading. Please contact writer for clarification if needed.   _____________________________________________________________________    Malnutrition Status:  Nutrition consulted. Most recent weight and BMI monitored-     Measurements:  Wt Readings from Last 1 Encounters:   06/12/25 49.9 kg (110 lb)   Body mass index is 17.75 kg/m².    Patient has been screened and assessed by RD.    Malnutrition Type:  Context: acute illness or injury  Level: severe    Malnutrition Characteristic Summary:  Weight Loss (Malnutrition): greater than 5% in 1 month  Energy Intake (Malnutrition): other (see comments) (Does not meet criteria)  Subcutaneous Fat (Malnutrition): moderate depletion  Muscle Mass (Malnutrition): moderate depletion  Fluid Accumulation (Malnutrition): other (see comments) (Not present)  Hand   Strength, Right (Malnutrition): Unable to assess    Interventions/Recommendations (treatment strategy):  Oral diet/nutrient modifications     Scheduled Med:   atorvastatin  80 mg Oral Daily    busPIRone  15 mg Oral BID    ceFEPIme  2 g Intravenous Q8H    ciprofloxacin HCl  500 mg Oral Q12H    clopidogreL  75 mg Oral Daily    enoxparin  40 mg Subcutaneous Q24H (prophylaxis, 1700)    ezetimibe  10 mg Oral Daily    famotidine  20 mg Oral BID    Lactobacillus rhamnosus GG  1 packet Oral Daily    levetiracetam  500 mg Oral BID    levothyroxine  88 mcg Oral Before breakfast    metoprolol succinate  12.5 mg Oral Daily    nortriptyline  25 mg Oral QHS    psyllium husk  1 packet Oral Daily    traZODone  25 mg Oral QHS      Continuous Infusions:     PRN Meds:  Current Facility-Administered Medications:     acetaminophen, 650 mg, Rectal, Q4H PRN    acetaminophen, 650 mg, Oral, Q4H PRN    bisacodyL, 10 mg, Rectal, Daily PRN    butalbital-acetaminophen-caffeine -40 mg, 1 tablet, Oral, Q4H PRN    dextrose 50%, 12.5 g, Intravenous, PRN    dextrose 50%, 25 g, Intravenous, PRN    diphenhydrAMINE, 25 mg, Oral, Q6H PRN    glucagon (human recombinant), 1 mg, Intramuscular, PRN    glucose, 16 g, Oral, PRN    glucose, 24 g, Oral, PRN    hydrALAZINE, 10 mg, Intravenous, Q4H PRN    hydrocortisone, , Topical (Top), BID PRN    insulin aspart U-100, 0-5 Units, Subcutaneous, QID (AC + HS) PRN    labetalol, 10 mg, Intravenous, Q4H PRN    melatonin, 6 mg, Oral, Nightly PRN    morphine, 2 mg, Intravenous, Q4H PRN    naloxone, 0.02 mg, Intravenous, PRN    ondansetron, 4 mg, Intravenous, Q8H PRN    oxyCODONE-acetaminophen, 1 tablet, Oral, Q4H PRN    prochlorperazine, 5 mg, Intravenous, Q6H PRN    sodium chloride 0.9%, 10 mL, Intravenous, PRN    Flushing PICC/Midline Protocol, , , Until Discontinued **AND** sodium chloride 0.9%, 10 mL, Intravenous, Q12H PRN    sodium chloride 0.9%, 3 mL, Intravenous, Q12H PRN         Jake Jefferson,  MD  Department of Hospital Medicine   [facility]   07/07/2025

## 2025-07-07 NOTE — PT/OT/SLP PROGRESS
Physical Therapy Treatment    Patient Name:  Chelle Chandra   MRN:  [id number]    Recommendations:     Discharge therapy intensity: High Intensity Therapy   Discharge Equipment Recommendations: to be determined by next level of care  Barriers to discharge: Impaired mobility    Assessment:     Chelle Chandra is a 59 y.o. female admitted with a medical diagnosis of  R sided weakness and facial droop due to L SDH s/p craniotomy and MMA embolization. On 5/30 patient with subdural hemorrhage s/p L diony hole.  She presents with the following impairments/functional limitations: weakness, gait instability, decreased upper extremity function, impaired endurance, impaired balance, decreased lower extremity function, impaired self care skills, pain, impaired functional mobility     Pt with HOB elevated and agreeable to PT tx today. Pt able to sit on EOB at [facility] where she was SBA for sitting balance, still with R lateral lean but able to self correct without cueing needed. Pt able to stand from EOB at min A with HHA to t/f to bedside chair with HHA min A. Pt then wheeled to hallway where she performed gait training with SBQC and use of mirror for lateral lean correction with no cueing needed. Pt able to ambulate 3x20ft with SBQC and modA, noted better sequencing today req less vcs as well as less scissoring noted. Pt left up in chair with all needs in reach and in no distress.     DME Justification:  No DME recommended requiring DME justifications    Rehab Prognosis: Good; patient would benefit from acute skilled PT services to address these deficits and reach maximum level of function.    Recent Surgery: Procedure(s) (LRB):  CRANIECTOMY (Left) 14 Days Post-Op    Plan:     During this hospitalization, patient would benefit from acute PT services 6 x/week to address the identified rehab impairments via gait training, therapeutic activities, therapeutic exercises, neuromuscular re-education and progress toward the following  "goals:    Plan of Care Expires:  25    Subjective     Chief Complaint: "I just really want a bath today"  Patient/Family Comments/goals: to get home  Pain/Comfort:  Pain Rating 1: 4/10  Location - Side 1: Left  Location 1: head  Pain Addressed 1: Reposition, Distraction      Objective:     Communicated with nsg prior to session.  Patient found HOB elevated with peripheral IV upon PT entry to room.     General Precautions: Standard, fall, seizure  Orthopedic Precautions: N/A  Braces: N/A  Respiratory Status: Room air  Blood Pressure:   Skin Integrity: Visible skin intact      Functional Mobility:  Bed Mobility:     Supine to Sit: contact guard assistance  Transfers:     Sit to Stand:  minimum assistance with hallway railing x5 trials from bedside chair, HHA from EOB x1 trial  Bed to Chair: minimum assistance with  hand-held assist  using  Step Transfer  Gait: 3x20ft with SBQC modA with mirror for lateral correction with less cueing needed today    Co-Treatment: No    Education:  Patient provided with verbal education education regarding PT role/goals/POC, fall prevention, safety awareness, and discharge/DME recommendations.  Understanding was verbalized.     Patient left up in chair with all lines intact, call button in reach, and nsg notified      GOALS:   Multidisciplinary Problems       Physical Therapy Goals          Problem: Physical Therapy    Goal Priority Disciplines Outcome Interventions   Physical Therapy Goal     PT, PT/OT Progressing    Description: Goals to be met by: 25     Patient will increase functional independence with mobility by performin. Supine to sit with Modified [place]  2. Sit to supine with Modified [place]  3. Sit to stand transfer with Modified [place]  4. Bed to chair transfer with Modified [place] using Rolling Walker vs QC  5. Gait  x 200 feet with Modified [place] using Rolling Walker vs QC.                          Time Tracking:     PT " Received On: 07/07/25  PT Start Time: 1345     PT Stop Time: 1408  PT Total Time (min): 23 min     Billable Minutes: Gait Training 15 and Therapeutic Activity 8    Treatment Type: Treatment  PT/PTA: PTA     Number of PTA visits since last PT visit: 5 07/07/2025

## 2025-07-08 LAB
POCT GLUCOSE: 156 MG/DL (ref 70–110)
POCT GLUCOSE: 197 MG/DL (ref 70–110)

## 2025-07-08 PROCEDURE — 97164 PT RE-EVAL EST PLAN CARE: CPT

## 2025-07-08 PROCEDURE — 97116 GAIT TRAINING THERAPY: CPT

## 2025-07-08 PROCEDURE — 25000003 PHARM REV CODE 250: Performed by: INTERNAL MEDICINE

## 2025-07-08 PROCEDURE — 21400001 HC TELEMETRY ROOM

## 2025-07-08 PROCEDURE — 92526 ORAL FUNCTION THERAPY: CPT

## 2025-07-08 PROCEDURE — 97112 NEUROMUSCULAR REEDUCATION: CPT

## 2025-07-08 PROCEDURE — 63600175 PHARM REV CODE 636 W HCPCS: Performed by: INTERNAL MEDICINE

## 2025-07-08 PROCEDURE — 63600175 PHARM REV CODE 636 W HCPCS

## 2025-07-08 PROCEDURE — 97535 SELF CARE MNGMENT TRAINING: CPT

## 2025-07-08 RX ADMIN — CIPROFLOXACIN 500 MG: 500 TABLET ORAL at 08:07

## 2025-07-08 RX ADMIN — FAMOTIDINE 20 MG: 20 TABLET, FILM COATED ORAL at 08:07

## 2025-07-08 RX ADMIN — EZETIMIBE 10 MG: 10 TABLET ORAL at 08:07

## 2025-07-08 RX ADMIN — LEVETIRACETAM 500 MG: 100 SOLUTION ORAL at 08:07

## 2025-07-08 RX ADMIN — CEFEPIME 2 G: 2 INJECTION, POWDER, FOR SOLUTION INTRAVENOUS at 05:07

## 2025-07-08 RX ADMIN — CLOPIDOGREL 75 MG: 75 TABLET ORAL at 08:07

## 2025-07-08 RX ADMIN — BUSPIRONE HYDROCHLORIDE 15 MG: 5 TABLET ORAL at 08:07

## 2025-07-08 RX ADMIN — Medication 1 EACH: at 08:07

## 2025-07-08 RX ADMIN — ACETAMINOPHEN 650 MG: 325 TABLET ORAL at 06:07

## 2025-07-08 RX ADMIN — OXYCODONE AND ACETAMINOPHEN 1 TABLET: 325; 10 TABLET ORAL at 05:07

## 2025-07-08 RX ADMIN — OXYCODONE AND ACETAMINOPHEN 1 TABLET: 325; 10 TABLET ORAL at 12:07

## 2025-07-08 RX ADMIN — CEFEPIME 2 G: 2 INJECTION, POWDER, FOR SOLUTION INTRAVENOUS at 08:07

## 2025-07-08 RX ADMIN — ENOXAPARIN SODIUM 40 MG: 40 INJECTION SUBCUTANEOUS at 04:07

## 2025-07-08 RX ADMIN — LEVOTHYROXINE SODIUM 88 MCG: 0.09 TABLET ORAL at 05:07

## 2025-07-08 RX ADMIN — NORTRIPTYLINE HYDROCHLORIDE 25 MG: 25 CAPSULE ORAL at 08:07

## 2025-07-08 RX ADMIN — METOPROLOL SUCCINATE 12.5 MG: 25 TABLET, EXTENDED RELEASE ORAL at 08:07

## 2025-07-08 RX ADMIN — CEFEPIME 2 G: 2 INJECTION, POWDER, FOR SOLUTION INTRAVENOUS at 01:07

## 2025-07-08 RX ADMIN — TRAZODONE HYDROCHLORIDE 25 MG: 50 TABLET ORAL at 08:07

## 2025-07-08 RX ADMIN — ATORVASTATIN CALCIUM 80 MG: 40 TABLET, FILM COATED ORAL at 08:07

## 2025-07-08 NOTE — PT/OT/SLP RE-EVAL
Physical Therapy Re-Evaluation    Patient Name:  Chelle Chandra   MRN:  [id number]    Recommendations:     Discharge therapy intensity: High Intensity Therapy   Discharge Equipment Recommendations: to be determined by next level of care   Barriers to discharge: Impaired mobility and Ongoing medical needs    Assessment:     Chelle Chandra is a 59 y.o. female admitted with a medical diagnosis of R sided weakness and facial droop due to L SDH s/p craniotomy and MMA embolization. On 5/30 patient with subdural hemorrhage s/p L diony hole. Pt then with new R sided weakness and underwent a redo of diony hole for fluid evacuation/drain placement and a left cranioplasty for subdural fluid evacuation on 6/16. Now with ICH s/p craniectomy on 6/23, swelling to RUE 6/30 ultrasound suggestive of hematoma.  She presents with the following impairments/functional limitations: weakness, impaired endurance, impaired self care skills, impaired functional mobility, gait instability, impaired balance, decreased upper extremity function, decreased lower extremity function, impaired coordination.    Pt presents with excellent motivation to participate. Noted improved gait endurance with quad cane compared to last assessment, ambulating up to 40' with QC and Rafael-modA. She continues to present with R-sided weakness, gait instability, and impaired trunk control and would benefit from high intensity therapy upon d/c.     Rehab Prognosis: Good; patient would benefit from acute skilled PT services to address these deficits and reach maximum level of function.    Recent Surgery: Procedure(s) (LRB):  CRANIECTOMY (Left) 15 Days Post-Op    Plan:     During this hospitalization, patient would benefit from acute PT services 6 x/week to address the identified rehab impairments via gait training, therapeutic activities, therapeutic exercises, neuromuscular re-education and progress toward the following goals:    Plan of Care Expires:   08/01/25    PT/PTA conference to discuss PT POC and patient's progression towards goals held with Beverley Mccauley PTA.     Subjective     Chief Complaint: none stated   Patient/Family Comments/goals: to get stronger  Pain/Comfort:  Pain Rating 1: 0/10    Patients cultural, spiritual, Gnosticist conflicts given the current situation: no    Objective:     Communicated with NSG prior to session.  Patient found HOB elevated with peripheral IV  upon PT entry to room.    General Precautions: Standard, seizure, fall; BP < 140/90  Orthopedic Precautions:N/A   Braces:  (helmet)  Respiratory Status: Room air  Blood Pressure: 110/72, HR 92       Exams:  RLE Strength: hip flexion 4/5, knee extension 4+/5; ankle DF/PF 4/5  LLE Strength: WFL  Skin integrity: Visible skin intact      Functional Mobility:  Bed Mobility:     Supine to Sit: contact guard assistance  Transfers:     Sit to Stand:  minimum assistance with hand-held assist and quad cane  Gait: 40' x 3/20' x 1 with QC and modA  Mirror used as visual cue for midline posture 2/2 R lateral lean; pt able to correct in static standing, but unable to maintain once ambulating   Gait belt on floor used as visual cue to increase base of support for 3 trials with pt encouraged to keep one foot on either side of belt; pt with good adherence with RLE, but requires verbal cues to attend to positioning of L foot   Pt demos R lateral lean, decreased hip flexion in R swing, foot flat contact on RLE with poor control of knee extension in loading response.   Overall, pt with improved gait mechanics with increased distance with verbal cues for sequencing (cane, R foot, L foot)   Balance: SBA for static sitting balance, pt demos R lateral lean, but able to self-correct without cues; Rafael for static standing balance with QC 2/2 R lateral lean       AM-PAC 6 CLICK MOBILITY  Total Score:17     Co-Treatment: No    Treatment & Education:  Patient and family provided with verbal education  education regarding PT role/goals/POC, fall prevention, safety awareness, and discharge/DME recommendations.  Understanding was verbalized.     Patient left up in chair with all lines intact, call button in reach, and family present. R resting hand splint donned at end of session.       GOALS:   Multidisciplinary Problems       Physical Therapy Goals          Problem: Physical Therapy    Goal Priority Disciplines Outcome Interventions   Physical Therapy Goal     PT, PT/OT Progressing    Description: Goals to be met by: 25     Patient will increase functional independence with mobility by performin. Supine to sit with Modified [place]  2. Sit to supine with Modified [place]  3. Sit to stand transfer with Modified [place]  4. Bed to chair transfer with Modified [place] using Rolling Walker vs QC  5. Gait  x 200 feet with Modified [place] using Rolling Walker vs QC.                          History:     Past Medical History:   Diagnosis Date    Accelerated junctional rhythm     Agatston CAC score, <100     Anxiety disorder, unspecified     Asthma     COPD type A     Diabetes mellitus without complication     GERD (gastroesophageal reflux disease)     History of COVID-19     Insomnia     Lung nodule        Past Surgical History:   Procedure Laterality Date    ANGIOGRAM, CORONARY, WITH LEFT HEART CATHETERIZATION      BACK SURGERY      BREAST LUMPECTOMY Right     CARDIOVERSION  2013    CARPAL TUNNEL RELEASE  10/23/2013    CRANIECTOMY Left 2025    Procedure: CRANIECTOMY;  Surgeon: James Rankin MD;  Location: [facility] OR;  Service: Neurosurgery;  Laterality: Left;  left redo-craniectomy for subdural empyema    CRANIECTOMY Left 2025    Procedure: CRANIECTOMY;  Surgeon: James Rankin MD;  Location: [facility] OR;  Service: Neurosurgery;  Laterality: Left;  LEFT //  PINS // SCOPE    CRANIOTOMY FOR EVACUATION OF SUBDURAL HEMATOMA Left 2025    Procedure: CRANIOTOMY, FOR  SUBDURAL HEMATOMA EVACUATION;  Surgeon: Ricardo Shelley MD;  Location: [facility] OR;  Service: Neurosurgery;  Laterality: Left;    CREATION OF TERRI HOLE WITH EVACUATION OF HEMATOMA Left 6/2/2025    Procedure: CREATION, CRANIAL TERRI HOLE, WITH HEMATOMA EVACUATION;  Surgeon: James Rankin MD;  Location: [facility] OR;  Service: Neurosurgery;  Laterality: Left;  LEFT BURRHOLE FOR SUBDURAL HEMATOMA EVACUATION // STEALTH // SHERRI SKYTRON // DRILL    EVACUATION OF EMPYEMA Left 6/16/2025    Procedure: EVACUATION, EMPYEMA;  Surgeon: James Rankin MD;  Location: [facility] OR;  Service: Neurosurgery;  Laterality: Left;  left craniotomy for subdural empyema    FUSION OF CERVICAL SPINE BY ANTERIOR APPROACH USING COMPUTER-ASSISTED NAVIGATION  06/10/2019    KIDNEY SURGERY      TONSILLECTOMY AND ADENOIDECTOMY      TOTAL ABDOMINAL HYSTERECTOMY      WRIST SURGERY         Time Tracking:     PT Received On: 07/08/25  PT Start Time: 1056     PT Stop Time: 1139  PT Total Time (min): 43 min     Billable Minutes: Re-eval 1 and Gait Training 2      07/08/2025

## 2025-07-08 NOTE — PLAN OF CARE
Problem: Adult Inpatient Plan of Care  Goal: Plan of Care Review  Outcome: Progressing  Goal: Patient-Specific Goal (Individualized)  Outcome: Progressing  Goal: Absence of Hospital-Acquired Illness or Injury  Outcome: Progressing  Goal: Optimal Comfort and Wellbeing  Outcome: Progressing  Goal: Readiness for Transition of Care  Outcome: Progressing     Problem: Wound  Goal: Optimal Coping  Outcome: Progressing  Goal: Optimal Functional Ability  Outcome: Progressing  Goal: Absence of Infection Signs and Symptoms  Outcome: Progressing  Goal: Improved Oral Intake  Outcome: Progressing     Problem: Infection  Goal: Absence of Infection Signs and Symptoms  Outcome: Progressing

## 2025-07-08 NOTE — PT/OT/SLP PROGRESS
"[facility]  Speech Language Pathology Department  Dysphagia Therapy Progress Note    Patient Name:  Chelle Chandra   MRN:  [id number]    Recommendations     General recommendations:  dysphagia therapy  Solid texture recommendation:  Regular Diet - IDDSI Level 7  Liquid consistency recommendation: Moderately thick liquids - IDDSI Level 3   Medications: crushed in puree  Aspiration precautions: small bites/sips, slow rate, alternate solids/liquids, and upright for PO intake    Discharge therapy intensity: High Intensity Therapy   Barriers to safe discharge:  none    Subjective     Patient awake, alert, and cooperative.  Spiritual/Cultural/Pentecostalism Beliefs/Practices that affect care: no    Pain/Comfort:  0/10    Respiratory Status:  room air    Objective     Therapeutic Activities:  Pt practiced compensatory strategies/postural changes with mod verbal and visual cues.  Pt completed chin tuck with right turn with min-mod cues with mildly thick liquids (4 oz with no s/sx of aspiration)  Increased accuracy with SLP model and reminder to hit target then slide over  Verbal and visual reminders for "sip, tuck, turn, swallow, stay, up" beneficial for accurate strategy use    Assessment     Pt continues to present with oropharyngeal dysphagia requiring diet modification to reduce the risk of aspiration.    Outcome Measures     Functional Oral Intake Scale: 5 - Total oral diet with multiple consistencies, by requiring special preparation or compensations    Goals     Multidisciplinary Problems       SLP Goals          Problem: SLP    Goal Priority Disciplines Outcome   SLP Goal     SLP    Description: LTG: The pt will tolerate least restrictive diet with no overt s/sx of aspiration.    STGs:  1. Patient will participate CTAR/pharyngeal exercises  2. Patient will participate in laryngeal elevation exercises  3. Patient will participate in repeat MBS when clinically appropriate  4. Patient will " utilize chin tuck with head turn consistently with liquid trials.                     Patient Education     Patient provided with verbal education regarding SLP POC.  Understanding was verbalized, however additional teaching warranted.    Plan     Will continue to follow and tx as appropriate.    SLP Follow-Up:  Yes   Patient to be seen:  5 x/week   Plan of Care expires:  07/16/25  Plan of Care reviewed with:  patient       Time Tracking     SLP Treatment Date:   07/08/25  Speech Start Time:  1540  Speech Stop Time:  1555     Speech Total Time (min):  15 min    Billable minutes:  Treatment of Swallow Dysfunction, 15 minutes       07/08/2025

## 2025-07-08 NOTE — PT/OT/SLP PROGRESS
Occupational Therapy   Treatment    Name: Chelle Chandra  MRN: [id number]    Recommendations:     Recommended therapy intensity at discharge: High Intensity Therapy   Discharge Equipment Recommendations:  to be determined by next level of care  Barriers to discharge:  None    Assessment:     Chelle Chandra is a 59 y.o. female with a medical diagnosis of Subdural empyema.  She presents with R sided weakness and facial droop due to L SDH s/p craniotomy and MMA embolization. On 5/30 patient with subdural hemorrhage s/p L diony hole. Pt then with new R sided weakness and underwent a redo of diony hole for fluid evacuation/drain placement and a left cranioplasty for subdural fluid evacuation on 6/16. Now with ICH s/p craniectomy on 6/23, swelling to RUE 6/30 ultrasound suggestive of hematoma. Performance deficits affecting function are weakness, impaired endurance, impaired self care skills, impaired functional mobility, impaired balance, decreased upper extremity function, decreased lower extremity function, decreased safety awareness, pain, impaired fine motor.     Pt tolerated treatment well. Performed toileting tasks with SBA-min A. Pt able to perform hygiene, however required assistance with clothing mgmt. Pt engaged in PNF exercises and gravity eliminated TA to promote functional use of RUE in daily activities. Pt presents with progress towards OT goals and remains appropriate for high intensity therapy at d/c.     DME Justification: No DME recommended requiring DME justifications    Rehab Prognosis:  Good; patient would benefit from acute skilled OT services to address these deficits and reach maximum level of function.       Plan:     Patient to be seen 6 x/week to address the above listed problems via self-care/home management, therapeutic activities, therapeutic exercises, neuromuscular re-education, sensory integration, splinting  Plan of Care Expires: 08/04/25  Plan of Care Reviewed with:  patient    Subjective     Pain/Comfort:  Pain Rating 1: 0/10    Objective:     Communicated with: nurse prior to session.  Patient found HOB elevated with peripheral IV upon OT entry to room.    General Precautions: Standard, fall, seizure (helmet when OOB or HOB 30 degrees)    Orthopedic Precautions:   Braces:  (helmet)  Respiratory Status: Room air  Vital Signs: Blood Pressure: 104/70     Occupational Performance:     Functional Mobility/Transfers:  Bed mobility:    Scooting: stand by assistance  Supine to Sit: stand by assistance  Transfers: Sit to Stand: contact guard assistance with quad cane  Toilet Transfer: minimum assistance with quad cane using Step Transfer  Pt ambulated to/from bed to toilet in restroom. Required min A using quad cane to maintain balance and minimal cueing to correct R lateral lean and take larger steps with the R foot. One LOB noted upon initial stand from EOB.    Activities of Daily Living:  Toileting: minimum assistance for clothing mgmt and SBA for hygiene    Balance:   Static Sitting Balance: One UE support: Good: Patient able to maintain balance without handhold support, limited postural sway.  Dynamic Sitting Balance: No UE support: Good, requires verbal/tactile cueing to maintain midline posture.     Neuromuscular re-ed:   Pt performed PNF exercises 2x using AAROM. Pt able to achieve ~30 degrees of shoulder flexion before requiring assistance from student therapist. Mirror placed in front of pt to provide visual feedback of posture seated EOB.   Pt performed functional task of pushing pegs from R to L on table to eliminate gravity. Pt then used L hand to place pegs into bucket. Pt successfully pushed 7 pegs to L hand using her RUE. Required min verbal cues to only use RUE and to not compensate at trunk to pull arm across table.     Therapeutic Positioning    OT interventions performed during the course of today's session in an effort to prevent and/or reduce acquired pressure  injuries:   Education was provided on benefits of and recommendations for therapeutic positioning  Therapeutic positioning was provided at the conclusion of session for contracture prevention    [facility] 6 Click ADL: 17    Patient Education:  Patient provided with verbal education education regarding OT role/goals/POC, fall prevention, and safety awareness.  Understanding was verbalized.      Patient left up in chair with call button in reach and helmet donned.    GOALS:   Multidisciplinary Problems       Occupational Therapy Goals          Problem: Occupational Therapy    Goal Priority Disciplines Outcome Interventions   Occupational Therapy Goal     OT, PT/OT Progressing    Description: Goals: to be met by 07/14/25    Pt will complete grooming standing at sink with LRAD with CGA.  Pt will complete UB dressing with SBA.  Pt will complete LB dressing with SBA using LRAD.  Pt will complete toileting with CGA using LRAD.  Pt will complete functional mobility to/from toilet and toilet transfer with mod I using LRAD.   Pt will demo visual attention to R side >80% of time with min verbal cues.  Pt will demo RUE strength of 3-/5 for participation in ADLs.                       Time Tracking:     OT Date of Treatment: 07/08/25  OT Start Time: 1458  OT Stop Time: 1535  OT Total Time (min): 37 min    Billable Minutes:Self Care/Home Management 1 unit  Neuromuscular Re-education 1 unit    OTR/L readily available for conference at the time of the provision of services: LISA Mallory  OT/EDILSON: OT     Number of EDILSON visits since last OT visit: 4 7/8/2025

## 2025-07-08 NOTE — PROGRESS NOTES
[facility]  Neurology  Neurosurgery  Progress Note    Subjective:     Interval History: Rounded on by Dr. Rankin.  CANDACEEs overnight. Resting in bed. No complaints.     Post-Op Info:  Procedure(s) (LRB):  CRANIECTOMY (Left)   15 Days Post-Op      Medications:  Continuous Infusions:  Scheduled Meds:   atorvastatin  80 mg Oral Daily    busPIRone  15 mg Oral BID    ceFEPIme  2 g Intravenous Q8H    ciprofloxacin HCl  500 mg Oral Q12H    clopidogreL  75 mg Oral Daily    enoxparin  40 mg Subcutaneous Q24H (prophylaxis, 1700)    ezetimibe  10 mg Oral Daily    famotidine  20 mg Oral BID    Lactobacillus rhamnosus GG  1 packet Oral Daily    levetiracetam  500 mg Oral BID    levothyroxine  88 mcg Oral Before breakfast    metoprolol succinate  12.5 mg Oral Daily    nortriptyline  25 mg Oral QHS    psyllium husk  1 packet Oral Daily    traZODone  25 mg Oral QHS     PRN Meds:  Current Facility-Administered Medications:     acetaminophen, 650 mg, Rectal, Q4H PRN    acetaminophen, 650 mg, Oral, Q4H PRN    bisacodyL, 10 mg, Rectal, Daily PRN    butalbital-acetaminophen-caffeine -40 mg, 1 tablet, Oral, Q4H PRN    dextrose 50%, 12.5 g, Intravenous, PRN    dextrose 50%, 25 g, Intravenous, PRN    diphenhydrAMINE, 25 mg, Oral, Q6H PRN    glucagon (human recombinant), 1 mg, Intramuscular, PRN    glucose, 16 g, Oral, PRN    glucose, 24 g, Oral, PRN    hydrALAZINE, 10 mg, Intravenous, Q4H PRN    hydrocortisone, , Topical (Top), BID PRN    insulin aspart U-100, 0-5 Units, Subcutaneous, QID (AC + HS) PRN    labetalol, 10 mg, Intravenous, Q4H PRN    melatonin, 6 mg, Oral, Nightly PRN    morphine, 2 mg, Intravenous, Q4H PRN    naloxone, 0.02 mg, Intravenous, PRN    ondansetron, 4 mg, Intravenous, Q8H PRN    oxyCODONE-acetaminophen, 1 tablet, Oral, Q4H PRN    prochlorperazine, 5 mg, Intravenous, Q6H PRN    sodium chloride 0.9%, 10 mL, Intravenous, PRN    Flushing PICC/Midline Protocol, , , Until Discontinued **AND** sodium  "chloride 0.9%, 10 mL, Intravenous, Q12H PRN    sodium chloride 0.9%, 3 mL, Intravenous, Q12H PRN     Review of Systems  Objective:     Weight: 49.9 kg (110 lb)  Body mass index is 17.75 kg/m².  Vital Signs (Most Recent):  Temp: 97.6 °F (36.4 °C) (07/07/25 1920)  Pulse: 96 (07/07/25 1920)  Resp: 18 (07/08/25 0557)  BP: 111/71 (07/07/25 1920)  SpO2: 95 % (07/07/25 1920) Vital Signs (24h Range):  Temp:  [97.6 °F (36.4 °C)-98.2 °F (36.8 °C)] 97.6 °F (36.4 °C)  Pulse:  [] 96  Resp:  [18] 18  SpO2:  [95 %-98 %] 95 %  BP: (111-125)/(71-81) 111/71                              Neurosurgery Physical Exam  A/ox4. NAD. Resting in bed  Left surgical site healing well. Bone flap site depressed  Lifts right arm, improving overall  Moves other extremities well  Answers questions appropriately  Significant Labs:  No results for input(s): "GLU", "NA", "K", "CL", "CO2", "BUN", "CREATININE", "CALCIUM", "MG" in the last 48 hours.  Recent Labs   Lab 07/07/25  0710   WBC 5.68   HGB 11.0*   HCT 34.4*   *     No results for input(s): "LABPT", "INR", "APTT" in the last 48 hours.  Microbiology Results (last 7 days)       Procedure Component Value Units Date/Time    Fungal Culture [[id number]]  (Normal) Collected: 06/23/25 1313    Order Status: Completed Specimen: Tissue from Brain Updated: 07/07/25 1401     Fungal Culture No Fungus Isolated at 2 Weeks    Fungal Culture [[id number]]  (Normal) Collected: 06/23/25 1314    Order Status: Completed Specimen: Tissue from Brain Updated: 07/07/25 1401     Fungal Culture No Fungus Isolated at 2 Weeks    Fungal Culture [[id number]]  (Normal) Collected: 06/23/25 1311    Order Status: Completed Specimen: Tissue from Brain Updated: 07/07/25 1401     Fungal Culture No Fungus Isolated at 2 Weeks    Tissue Culture - Aerobic [[id number]]  (Abnormal) Collected: 06/23/25 1311    Order Status: Completed Specimen: Tissue from Brain Updated: 07/07/25 1151     Tissue - Aerobic Culture Rare Pseudomonas " stutzeri     Comment: Susceptibility sent to reference lab. Results to follow on separate report.       Urine culture [[id number]] Collected: 07/07/25 0326    Order Status: Sent Specimen: Urine, Clean Catch Updated: 07/07/25 0456              Assessment/Plan:     Active Diagnoses:    Diagnosis Date Noted POA    PRINCIPAL PROBLEM:  Subdural empyema [G06.2] 06/26/2025 Yes    Severe malnutrition [E43] 05/31/2025 Yes      Problems Resolved During this Admission:     Every other staple removal today, remaining out 7/11  PTOTST  Antibiotics  Pending placement   Will sign off, call with any questions or decline     CECILIA Carrillo  Neurosurgery  [facility] - Neurology

## 2025-07-08 NOTE — PLAN OF CARE
Problem: Physical Therapy  Goal: Physical Therapy Goal  Description: Goals to be met by: 25     Patient will increase functional independence with mobility by performin. Supine to sit with Modified [place]  2. Sit to supine with Modified [place]  3. Sit to stand transfer with Modified [place]  4. Bed to chair transfer with Modified [place] using Rolling Walker vs QC  5. Gait  x 200 feet with Modified [place] using Rolling Walker vs QC.     Outcome: Progressing

## 2025-07-08 NOTE — PROGRESS NOTES
"[facility] Hospital Medicine Progress Note        Chief Complaint: Inpatient Follow-up subdural empyema    HPI per admitting team: "59-year-old female with PMHx of left MCA stroke s/p thrombectomy in 6/2024 on Plavix, hypothyroidism, anxiety, bronchial asthma, COPD, type 2 diabetes mellitus,  and GERD. Patient recently had subdural hematoma following a fall in May requiring intracranial embolization, craniotomy with hematoma evacuation. After this patient was discharged home. Late May she presented back to the ED with headache, right-sided weakness, facial droop and imaging was concerning for left MCA diminished flow and subacute hematoma. MRI brain was negative for CVA. Neurosurgery performed diony hole for drainage of subdural hematoma due to increased intracranial pressure with postop CT showing definitive improvement patient was on Plavix post embolization which was held briefly and resumed per Neurosurgery on 06/05 and she was transferred to rehab on 06/06. While in rehab patient was noted to have increase right upper extremity weakness and also significant headaches. CT head revealed reaccumulation of subdural fluid collection, increased from before patient was sent back to [facility] for further evaluation/neurosurgery services. Neurosurgery evaluated, planning for  shunt this hospitalization, home meds resumed as appropriate except for Plavix, symptomatic management for headache.  shunt scheduled for 6/16. Patient underwent redo diony hole, fluid evacuation and drain placement, concern for surgical site infection and therefore underwent cranioplasty. Infectious Disease consulted and patient was placed on cefepime, vancomycin and Flagyl pending intraoperative cultures.  Intraoperative cultures growing staph epi.  ID recommending IV vancomycin, IV Rocephin until 7/3.  Neurosurgery wished to monitor patient for another day until 6/21.  PT/OT on board; recommending " "high-intensity therapy.  Speech therapy recommending regular diet with moderately thick liquids.  CM on board for placement.   On 6/23 discharge to rehab was planned but Pt developed RUE weakness and Ct head showed increasing size of extra-axial fluid collections along cerebral convexity with increasing mass effect & increase parenchymal edema in L posterior frontal lobe. Pt was taken back to OR and underwent left craniotomy and evacuation of empyema. Post operatively admitted to ICU. Intraoperative culture from 6/23 grew  staph epi and rare Pseudomonas stutzeri.  ID suggested to stop Vancomycin and continue Cefepime as Staph epi is oxacillin sensitive and Cefepime will cover both Pseudomonas and MSSE with total course at least 6 to 8 weeks. Pt's care downgraded back to  service on 6/25/25.  ID requested sensitivity testing for Pseudomonas stutzeri, however per hospital microbiology their instrument was not able to run sensitivities, and they are sending the sample to [facility] for further testing. ID recommended to continue Cefepime at this time with tentative end date 8/18/2025.  Pt remains with daily headache and dense weakness to RUE. MRI brain on 6/27/25 showed no acute stroke but noted post surgical left dural thickening and enhancement, left cerebral encephalomalacia with extensive gliotic changes.  As of 6/30 Pt is able to move her Rt upper ext some. Swelling noted inner aspect of Rt arm with U/S suggestive of hematoma 5.6x3.6x1.7 cm. Will continue conservative management for hematoma. Subdural JOHNATHAN drain and every other staple removed on 7/1/25 with complete staple removal on POD #21 7/14/25. NS cleared Pt to resume Plavix 2 weeks post op 7/7/25"       Interval Hx:   Patient is awake and sitting in bed, family at bedside.  Informed patient that I spoke with micro but they have not received any information from [facility] and we need to make sure before she transferred to rehab that the treatment is correct  "   Patient and family verbalized understanding   Also inquired about UTI, informed that she needs to increase her fluid intake as her urine cultures are negative but will complete 3 days of antibiotics.  Patient reported her symptoms has resolved since starting the antibiotics  Case was discussed with patient's nurse and  on the floor    Objective/physical exam:  General: In no acute distress, afebrile, concave craniectomy site, incisions are clean   Chest: Clear to auscultation bilaterally anteriorly  Heart: RRR, +S1, S2, no appreciable murmur  Abdomen: Soft, nontender, BS +  MSK: Warm, no lower extremity edema, no clubbing or cyanosis  Neurologic:  Awake alert and oriented, answered all the questions appropriately      VITAL SIGNS: 24 HRS MIN & MAX LAST   Temp  Min: 97.6 °F (36.4 °C)  Max: 98.3 °F (36.8 °C) 98 °F (36.7 °C)   BP  Min: 104/70  Max: 112/69 104/70   Pulse  Min: 79  Max: 96  79   Resp  Min: 18  Max: 18 18   SpO2  Min: 93 %  Max: 95 % (!) 93 %     I have reviewed the following labs:  Recent Labs   Lab 07/04/25  0351 07/07/25  0710   WBC 5.34 5.68   RBC 3.55* 3.69*   HGB 10.5* 11.0*   HCT 33.3* 34.4*   MCV 93.8 93.2   MCH 29.6 29.8   MCHC 31.5* 32.0*   RDW 15.0 15.3   * 416*   MPV 9.3 9.6     Recent Labs   Lab 07/04/25  0351      K 3.6      CO2 26   BUN 13.2   CREATININE 0.65   *   CALCIUM 9.0   ALBUMIN 3.2*   PROT 6.7   ALKPHOS 121   ALT 29   AST 23   BILITOT 0.3     Microbiology Results (last 7 days)       Procedure Component Value Units Date/Time    Urine culture [[id number]] Collected: 07/07/25 0326    Order Status: Completed Specimen: Urine, Clean Catch Updated: 07/08/25 0653     Urine Culture No Growth At 24 Hours    Fungal Culture [[id number]]  (Normal) Collected: 06/23/25 1313    Order Status: Completed Specimen: Tissue from Brain Updated: 07/07/25 1401     Fungal Culture No Fungus Isolated at 2 Weeks    Fungal Culture [[id number]]  (Normal) Collected:  06/23/25 1314    Order Status: Completed Specimen: Tissue from Brain Updated: 07/07/25 1401     Fungal Culture No Fungus Isolated at 2 Weeks    Fungal Culture [[id number]]  (Normal) Collected: 06/23/25 1311    Order Status: Completed Specimen: Tissue from Brain Updated: 07/07/25 1401     Fungal Culture No Fungus Isolated at 2 Weeks    Tissue Culture - Aerobic [[id number]]  (Abnormal) Collected: 06/23/25 1311    Order Status: Completed Specimen: Tissue from Brain Updated: 07/07/25 1151     Tissue - Aerobic Culture Rare Pseudomonas stutzeri     Comment: Susceptibility sent to reference lab. Results to follow on separate report.                See below for Radiology    Assessment/Plan:  Symptomatic UTI with hematuria  Left Subdural empyema - 6/23/25 s/p left craniectomy and evacuation of empyema with tissue culture positive for staph epidermidis and Pseudomonas stutzeri  Traumatic Subdural hematoma--> s/p left frontoparietal craniotomy and evacuation on 5/19/25   Chronic left SDH with new onset headache, Rt sided weakness and facial droop, s/p left diony hole on 6/2/25  Post op Increased subdural fluid collection, s/p left redo diony hole with fluid evacuation, drain placement on 6/16  Surgical site infection status post cranioplasty on 6/16 with tissue culture positive for Staph epidermidis   Hematoma, RUE- 6/30/25- conservative management   Hx-  CVA in June, 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,Depression/anxiety ,HLD, Hypothyroidism, Essential HTN, Type 2 diabetes mellitus.   Severe malnutrition      Continue Cefepime 2 gram q8h until final sensitivity is known for Pseudomonas Stutzeri (sent out to ref lab for susceptibility) - Tentatively planning cefepime 2g Q8H through 8/18 if isolate returns with sensitivity to Cefepime.    6/23- Sent out cx on 6/30 to [place] lab, spoke to micro on Monday 7/7, the results are still not in and they are waiting to hear back from [facility].  7/8- received communication from [facility] that  the above identification did not match there testing so they are now waiting on identification of the organism.   Patient reported symptomatic UTI, urinalysis concerning for infection but urine culture came back negative at 24 hours  Empirically started on Cipro 500 mg b.i.d. for 3 days- day 2 of 3.   Encourage fluid intake, and reported dietary has not been bringing her thickened water, requested floor  to call dietary and request 3-4 thickened bottles water  No leukocytosis  Continue [facility]   Neurosurgery cleared pt for Lovenox for VTE prophylaxis as of 6/27/25  Plavix resumed 2 weeks post op on 7/7/25 per Neurosurgery recommendation on 7/1 (aCry Mcwilliams, FNP note)  Neuro check q4h  Insulin sliding scale, Accu-Cheks AC and HS  Given hyperglycemia, diet changed to diabetic with moderately thickened liquids as recommended per speech--> blood glucose improving  PT/OT - High intensity therapy  CM on board, patient is accepted to [facility] but discharge held given waiting for final cultures that has been sent out to [facility].  They will apply for Auth once cultures are finalized  Lab stable       VTE prophylaxis: Lovenox     Patient condition:  Fair     Anticipated discharge and Disposition:   TBD, [facility] will submit for Auth once final cultures received       All diagnosis and differential diagnosis have been reviewed; assessment and plan has been documented; I have personally reviewed the labs and test results that are presently available; I have reviewed the patients medication list; I have reviewed the consulting providers response and recommendations. I have reviewed or attempted to review medical records based upon their availability    All of the patient's questions have been  addressed and answered. Patient's is agreeable to the above stated plan. I will continue to monitor closely and make adjustments to medical management as needed.    Portions of this note dictated using EMR integrated voice  recognition software, and may be subject to voice recognition errors not corrected at proofreading. Please contact writer for clarification if needed.   _____________________________________________________________________    Malnutrition Status:  Nutrition consulted. Most recent weight and BMI monitored-     Measurements:  Wt Readings from Last 1 Encounters:   06/12/25 49.9 kg (110 lb)   Body mass index is 17.75 kg/m².    Patient has been screened and assessed by RD.    Malnutrition Type:  Context: acute illness or injury  Level: severe    Malnutrition Characteristic Summary:  Weight Loss (Malnutrition): greater than 5% in 1 month  Energy Intake (Malnutrition): other (see comments) (Does not meet criteria)  Subcutaneous Fat (Malnutrition): moderate depletion  Muscle Mass (Malnutrition): moderate depletion  Fluid Accumulation (Malnutrition): other (see comments) (Not present)  Hand  Strength, Right (Malnutrition): Unable to assess    Interventions/Recommendations (treatment strategy):  Oral diet/nutrient modifications     Scheduled Med:   atorvastatin  80 mg Oral Daily    busPIRone  15 mg Oral BID    ceFEPIme  2 g Intravenous Q8H    ciprofloxacin HCl  500 mg Oral Q12H    clopidogreL  75 mg Oral Daily    enoxparin  40 mg Subcutaneous Q24H (prophylaxis, 1700)    ezetimibe  10 mg Oral Daily    famotidine  20 mg Oral BID    Lactobacillus rhamnosus GG  1 packet Oral Daily    levetiracetam  500 mg Oral BID    levothyroxine  88 mcg Oral Before breakfast    metoprolol succinate  12.5 mg Oral Daily    nortriptyline  25 mg Oral QHS    psyllium husk  1 packet Oral Daily    traZODone  25 mg Oral QHS      Continuous Infusions:     PRN Meds:  Current Facility-Administered Medications:     acetaminophen, 650 mg, Rectal, Q4H PRN    acetaminophen, 650 mg, Oral, Q4H PRN    bisacodyL, 10 mg, Rectal, Daily PRN    butalbital-acetaminophen-caffeine -40 mg, 1 tablet, Oral, Q4H PRN    dextrose 50%, 12.5 g, Intravenous, PRN     dextrose 50%, 25 g, Intravenous, PRN    diphenhydrAMINE, 25 mg, Oral, Q6H PRN    glucagon (human recombinant), 1 mg, Intramuscular, PRN    glucose, 16 g, Oral, PRN    glucose, 24 g, Oral, PRN    hydrALAZINE, 10 mg, Intravenous, Q4H PRN    hydrocortisone, , Topical (Top), BID PRN    insulin aspart U-100, 0-5 Units, Subcutaneous, QID (AC + HS) PRN    labetalol, 10 mg, Intravenous, Q4H PRN    melatonin, 6 mg, Oral, Nightly PRN    morphine, 2 mg, Intravenous, Q4H PRN    naloxone, 0.02 mg, Intravenous, PRN    ondansetron, 4 mg, Intravenous, Q8H PRN    oxyCODONE-acetaminophen, 1 tablet, Oral, Q4H PRN    prochlorperazine, 5 mg, Intravenous, Q6H PRN    sodium chloride 0.9%, 10 mL, Intravenous, PRN    Flushing PICC/Midline Protocol, , , Until Discontinued **AND** sodium chloride 0.9%, 10 mL, Intravenous, Q12H PRN    sodium chloride 0.9%, 3 mL, Intravenous, Q12H PRN         Jake Paulino MD  Department of Hospital Medicine   [facility]   07/08/2025

## 2025-07-08 NOTE — PLAN OF CARE
Care conference with Dr. Paulino.  ID still waiting on final report from [facility]. Clinical update sent to [facility].

## 2025-07-09 LAB
BACTERIA UR CULT: ABNORMAL
POCT GLUCOSE: 175 MG/DL (ref 70–110)
POCT GLUCOSE: 234 MG/DL (ref 70–110)

## 2025-07-09 PROCEDURE — 63600175 PHARM REV CODE 636 W HCPCS: Performed by: INTERNAL MEDICINE

## 2025-07-09 PROCEDURE — 97116 GAIT TRAINING THERAPY: CPT

## 2025-07-09 PROCEDURE — 25000003 PHARM REV CODE 250: Performed by: INTERNAL MEDICINE

## 2025-07-09 PROCEDURE — 92526 ORAL FUNCTION THERAPY: CPT

## 2025-07-09 PROCEDURE — 25000003 PHARM REV CODE 250: Performed by: STUDENT IN AN ORGANIZED HEALTH CARE EDUCATION/TRAINING PROGRAM

## 2025-07-09 PROCEDURE — 21400001 HC TELEMETRY ROOM

## 2025-07-09 PROCEDURE — 97110 THERAPEUTIC EXERCISES: CPT

## 2025-07-09 PROCEDURE — 99900035 HC TECH TIME PER 15 MIN (STAT)

## 2025-07-09 PROCEDURE — 99900031 HC PATIENT EDUCATION (STAT)

## 2025-07-09 PROCEDURE — 63600175 PHARM REV CODE 636 W HCPCS

## 2025-07-09 PROCEDURE — 25000242 PHARM REV CODE 250 ALT 637 W/ HCPCS: Performed by: INTERNAL MEDICINE

## 2025-07-09 PROCEDURE — 94799 UNLISTED PULMONARY SVC/PX: CPT

## 2025-07-09 PROCEDURE — 97112 NEUROMUSCULAR REEDUCATION: CPT

## 2025-07-09 PROCEDURE — 94760 N-INVAS EAR/PLS OXIMETRY 1: CPT

## 2025-07-09 RX ORDER — DICLOFENAC SODIUM 10 MG/G
2 GEL TOPICAL 2 TIMES DAILY
Status: DISCONTINUED | OUTPATIENT
Start: 2025-07-09 | End: 2025-07-15 | Stop reason: HOSPADM

## 2025-07-09 RX ORDER — METHOCARBAMOL 500 MG/1
500 TABLET, FILM COATED ORAL 4 TIMES DAILY
Status: DISCONTINUED | OUTPATIENT
Start: 2025-07-09 | End: 2025-07-09

## 2025-07-09 RX ADMIN — EZETIMIBE 10 MG: 10 TABLET ORAL at 08:07

## 2025-07-09 RX ADMIN — LEVETIRACETAM 500 MG: 100 SOLUTION ORAL at 08:07

## 2025-07-09 RX ADMIN — NORTRIPTYLINE HYDROCHLORIDE 25 MG: 25 CAPSULE ORAL at 08:07

## 2025-07-09 RX ADMIN — CLOPIDOGREL 75 MG: 75 TABLET ORAL at 08:07

## 2025-07-09 RX ADMIN — METOPROLOL SUCCINATE 12.5 MG: 25 TABLET, EXTENDED RELEASE ORAL at 08:07

## 2025-07-09 RX ADMIN — BUSPIRONE HYDROCHLORIDE 15 MG: 5 TABLET ORAL at 08:07

## 2025-07-09 RX ADMIN — CIPROFLOXACIN 500 MG: 500 TABLET ORAL at 08:07

## 2025-07-09 RX ADMIN — TRAZODONE HYDROCHLORIDE 25 MG: 50 TABLET ORAL at 08:07

## 2025-07-09 RX ADMIN — LEVOTHYROXINE SODIUM 88 MCG: 0.09 TABLET ORAL at 05:07

## 2025-07-09 RX ADMIN — CEFEPIME 2 G: 2 INJECTION, POWDER, FOR SOLUTION INTRAVENOUS at 08:07

## 2025-07-09 RX ADMIN — PSYLLIUM HUSK 1 PACKET: 3.4 POWDER ORAL at 08:07

## 2025-07-09 RX ADMIN — BUTALBITAL, ACETAMINOPHEN, AND CAFFEINE 1 TABLET: 325; 50; 40 TABLET ORAL at 05:07

## 2025-07-09 RX ADMIN — ENOXAPARIN SODIUM 40 MG: 40 INJECTION SUBCUTANEOUS at 05:07

## 2025-07-09 RX ADMIN — FAMOTIDINE 20 MG: 20 TABLET, FILM COATED ORAL at 08:07

## 2025-07-09 RX ADMIN — ATORVASTATIN CALCIUM 80 MG: 40 TABLET, FILM COATED ORAL at 08:07

## 2025-07-09 RX ADMIN — Medication 1 EACH: at 08:07

## 2025-07-09 RX ADMIN — BUTALBITAL, ACETAMINOPHEN, AND CAFFEINE 1 TABLET: 325; 50; 40 TABLET ORAL at 12:07

## 2025-07-09 RX ADMIN — CEFEPIME 2 G: 2 INJECTION, POWDER, FOR SOLUTION INTRAVENOUS at 05:07

## 2025-07-09 RX ADMIN — CEFEPIME 2 G: 2 INJECTION, POWDER, FOR SOLUTION INTRAVENOUS at 02:07

## 2025-07-09 RX ADMIN — DICLOFENAC SODIUM 2 G: 10 GEL TOPICAL at 10:07

## 2025-07-09 RX ADMIN — BUTALBITAL, ACETAMINOPHEN, AND CAFFEINE 1 TABLET: 325; 50; 40 TABLET ORAL at 08:07

## 2025-07-09 NOTE — PROGRESS NOTES
"[facility] Hospital Medicine Progress Note        Chief Complaint: Inpatient Follow-up subdural empyema    HPI per admitting team: "59-year-old female with PMHx of left MCA stroke s/p thrombectomy in 6/2024 on Plavix, hypothyroidism, anxiety, bronchial asthma, COPD, type 2 diabetes mellitus,  and GERD. Patient recently had subdural hematoma following a fall in May requiring intracranial embolization, craniotomy with hematoma evacuation. After this patient was discharged home. Late May she presented back to the ED with headache, right-sided weakness, facial droop and imaging was concerning for left MCA diminished flow and subacute hematoma. MRI brain was negative for CVA. Neurosurgery performed diony hole for drainage of subdural hematoma due to increased intracranial pressure with postop CT showing definitive improvement patient was on Plavix post embolization which was held briefly and resumed per Neurosurgery on 06/05 and she was transferred to rehab on 06/06. While in rehab patient was noted to have increase right upper extremity weakness and also significant headaches. CT head revealed reaccumulation of subdural fluid collection, increased from before patient was sent back to [facility] for further evaluation/neurosurgery services. Neurosurgery evaluated, planning for  shunt this hospitalization, home meds resumed as appropriate except for Plavix, symptomatic management for headache.  shunt scheduled for 6/16. Patient underwent redo diony hole, fluid evacuation and drain placement, concern for surgical site infection and therefore underwent cranioplasty. Infectious Disease consulted and patient was placed on cefepime, vancomycin and Flagyl pending intraoperative cultures.  Intraoperative cultures growing staph epi.  ID recommending IV vancomycin, IV Rocephin until 7/3.  Neurosurgery wished to monitor patient for another day until 6/21.  PT/OT on board; recommending " "high-intensity therapy.  Speech therapy recommending regular diet with moderately thick liquids.  CM on board for placement.   On 6/23 discharge to rehab was planned but Pt developed RUE weakness and Ct head showed increasing size of extra-axial fluid collections along cerebral convexity with increasing mass effect & increase parenchymal edema in L posterior frontal lobe. Pt was taken back to OR and underwent left craniotomy and evacuation of empyema. Post operatively admitted to ICU. Intraoperative culture from 6/23 grew  staph epi and rare Pseudomonas stutzeri.  ID suggested to stop Vancomycin and continue Cefepime as Staph epi is oxacillin sensitive and Cefepime will cover both Pseudomonas and MSSE with total course at least 6 to 8 weeks. Pt's care downgraded back to  service on 6/25/25.  ID requested sensitivity testing for Pseudomonas stutzeri, however per hospital microbiology their instrument was not able to run sensitivities, and they are sending the sample to [place] for further testing. ID recommended to continue Cefepime at this time with tentative end date 8/18/2025.  Pt remains with daily headache and dense weakness to RUE. MRI brain on 6/27/25 showed no acute stroke but noted post surgical left dural thickening and enhancement, left cerebral encephalomalacia with extensive gliotic changes.  As of 6/30 Pt is able to move her Rt upper ext some. Swelling noted inner aspect of Rt arm with U/S suggestive of hematoma 5.6x3.6x1.7 cm. Will continue conservative management for hematoma. Subdural JOHNATHAN drain and every other staple removed on 7/1/25 with complete staple removal on POD #21 7/14/25. NS cleared Pt to resume Plavix 2 weeks post op 7/7/25"       Interval Hx:   Patient is awake and sitting in bed, informed patient that micro has reached out, we are hoping that mail lab will be able to identify the organism today and hopefully we can get the susceptibility by Friday which will be again reviewed by ID and " recommendations will be finalized on the information  Patient is very excited as she is able to move her right arm today, she has not been able to since her surgery, requested if nurse can place the right hand brace for her  No family is at bedside today   Case was discussed with patient's nurse and  on the floor    Objective/physical exam:  General: In no acute distress, afebrile, concave craniectomy site, incisions are clean   Chest: Clear to auscultation bilaterally anteriorly  Heart: RRR, +S1, S2, no appreciable murmur  Abdomen: Soft, nontender, BS +  MSK: Warm, no lower extremity edema, no clubbing or cyanosis  Neurologic:  Awake alert and oriented, answered all the questions appropriately, today able to move her right upper extremity, still weak but moving it and able to move the fingers on her right upper extremity as well      VITAL SIGNS: 24 HRS MIN & MAX LAST   Temp  Min: 98 °F (36.7 °C)  Max: 98.2 °F (36.8 °C) 98 °F (36.7 °C)   BP  Min: 104/70  Max: 125/76 125/76   Pulse  Min: 79  Max: 97  86   No data recorded 18   SpO2  Min: 95 %  Max: 99 % 95 %     I have reviewed the following labs:  Recent Labs   Lab 07/04/25  0351 07/07/25  0710   WBC 5.34 5.68   RBC 3.55* 3.69*   HGB 10.5* 11.0*   HCT 33.3* 34.4*   MCV 93.8 93.2   MCH 29.6 29.8   MCHC 31.5* 32.0*   RDW 15.0 15.3   * 416*   MPV 9.3 9.6     Recent Labs   Lab 07/04/25  0351      K 3.6      CO2 26   BUN 13.2   CREATININE 0.65   *   CALCIUM 9.0   ALBUMIN 3.2*   PROT 6.7   ALKPHOS 121   ALT 29   AST 23   BILITOT 0.3     Microbiology Results (last 7 days)       Procedure Component Value Units Date/Time    Urine culture [[id number]]  (Abnormal) Collected: 07/07/25 0326    Order Status: Completed Specimen: Urine, Clean Catch Updated: 07/09/25 0941     Urine Culture Less than 10,000 colonies/ml Yeast    Fungal Culture [[id number]]  (Normal) Collected: 06/23/25 1313    Order Status: Completed Specimen: Tissue from Brain  Updated: 07/07/25 1401     Fungal Culture No Fungus Isolated at 2 Weeks    Fungal Culture [[id number]]  (Normal) Collected: 06/23/25 1314    Order Status: Completed Specimen: Tissue from Brain Updated: 07/07/25 1401     Fungal Culture No Fungus Isolated at 2 Weeks    Fungal Culture [[id number]]  (Normal) Collected: 06/23/25 1311    Order Status: Completed Specimen: Tissue from Brain Updated: 07/07/25 1401     Fungal Culture No Fungus Isolated at 2 Weeks    Tissue Culture - Aerobic [[id number]]  (Abnormal) Collected: 06/23/25 1311    Order Status: Completed Specimen: Tissue from Brain Updated: 07/07/25 1151     Tissue - Aerobic Culture Rare Pseudomonas stutzeri     Comment: Susceptibility sent to reference lab. Results to follow on separate report.                See below for Radiology    Assessment/Plan:  Symptomatic UTI with hematuria  Left Subdural empyema - 6/23/25 s/p left craniectomy and evacuation of empyema with tissue culture positive for staph epidermidis and Pseudomonas stutzeri  Traumatic Subdural hematoma--> s/p left frontoparietal craniotomy and evacuation on 5/19/25   Chronic left SDH with new onset headache, Rt sided weakness and facial droop, s/p left diony hole on 6/2/25  Post op Increased subdural fluid collection, s/p left redo diony hole with fluid evacuation, drain placement on 6/16  Surgical site infection status post cranioplasty on 6/16 with tissue culture positive for Staph epidermidis   Hematoma, RUE- 6/30/25- conservative management   Hx-  CVA in June, 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,Depression/anxiety ,HLD, Hypothyroidism, Essential HTN, Type 2 diabetes mellitus.   Severe malnutrition      Continue Cefepime 2 gram q8h until final sensitivity is known for Pseudomonas Stutzeri (sent out to ref lab for susceptibility) - Tentatively planning cefepime 2g Q8H through 8/18 if isolate returns with sensitivity to Cefepime.    6/23- Sent out cx on 6/30 to [facility], micro reach back stating that [facility]  is hoping to get identification today and hopefully sensitivities by Friday-->  please f/u with micro  Once the cultures are finalized, may need to reach back to Infectious Disease for final recommendation  Patient reported symptomatic UTI, urinalysis concerning for infection--> urine culture less than 10,000 colonies yeast, insignificant  Completed 3 days of Cipro b.i.d, patient reports UTI symptoms resolved  Encourage fluid intake, requested dietary to bring her 3-4 thickened bottles water with tray for increase intake  No leukocytosis  Continue [facility]   Neurosurgery cleared pt for Lovenox for VTE prophylaxis as of 6/27/25  Plavix resumed 2 weeks post op on 7/7/25 per Neurosurgery recommendation on 7/1 (Cary Mcwilliams, FNP note)  Neuro check q4h  Insulin sliding scale, Accu-Cheks AC and HS  Given hyperglycemia, diet changed to diabetic with moderately thickened liquids as recommended per speech--> blood glucose improving  PT/OT - High intensity therapy  CM on board, patient is accepted to [facility] but discharge held given waiting for final cultures that has been sent out to [facility].  They will apply for Auth once cultures are finalized  Lab stable       VTE prophylaxis: Lovenox     Patient condition:  Fair     Anticipated discharge and Disposition:   TBD, [facility] will submit for Auth once final cultures from [facility] lab received, expecting Friday       All diagnosis and differential diagnosis have been reviewed; assessment and plan has been documented; I have personally reviewed the labs and test results that are presently available; I have reviewed the patients medication list; I have reviewed the consulting providers response and recommendations. I have reviewed or attempted to review medical records based upon their availability    All of the patient's questions have been  addressed and answered. Patient's is agreeable to the above stated plan. I will continue to monitor closely and make adjustments to medical  management as needed.    Portions of this note dictated using EMR integrated voice recognition software, and may be subject to voice recognition errors not corrected at proofreading. Please contact writer for clarification if needed.   _____________________________________________________________________    Malnutrition Status:  Nutrition consulted. Most recent weight and BMI monitored-     Measurements:  Wt Readings from Last 1 Encounters:   06/12/25 49.9 kg (110 lb)   Body mass index is 17.75 kg/m².    Patient has been screened and assessed by RD.    Malnutrition Type:  Context: acute illness or injury  Level: severe    Malnutrition Characteristic Summary:  Weight Loss (Malnutrition): greater than 5% in 1 month  Energy Intake (Malnutrition): other (see comments) (Does not meet criteria)  Subcutaneous Fat (Malnutrition): moderate depletion  Muscle Mass (Malnutrition): moderate depletion  Fluid Accumulation (Malnutrition): other (see comments) (Not present)  Hand  Strength, Right (Malnutrition): Unable to assess    Interventions/Recommendations (treatment strategy):  Oral diet/nutrient modifications     Scheduled Med:   atorvastatin  80 mg Oral Daily    busPIRone  15 mg Oral BID    ceFEPIme  2 g Intravenous Q8H    ciprofloxacin HCl  500 mg Oral Q12H    clopidogreL  75 mg Oral Daily    diclofenac sodium  2 g Topical (Top) BID    enoxparin  40 mg Subcutaneous Q24H (prophylaxis, 1700)    ezetimibe  10 mg Oral Daily    famotidine  20 mg Oral BID    Lactobacillus rhamnosus GG  1 packet Oral Daily    levetiracetam  500 mg Oral BID    levothyroxine  88 mcg Oral Before breakfast    metoprolol succinate  12.5 mg Oral Daily    nortriptyline  25 mg Oral QHS    psyllium husk  1 packet Oral Daily    traZODone  25 mg Oral QHS      Continuous Infusions:     PRN Meds:  Current Facility-Administered Medications:     acetaminophen, 650 mg, Rectal, Q4H PRN    acetaminophen, 650 mg, Oral, Q4H PRN    bisacodyL, 10 mg, Rectal, Daily  PRN    butalbital-acetaminophen-caffeine -40 mg, 1 tablet, Oral, Q4H PRN    dextrose 50%, 12.5 g, Intravenous, PRN    dextrose 50%, 25 g, Intravenous, PRN    diphenhydrAMINE, 25 mg, Oral, Q6H PRN    glucagon (human recombinant), 1 mg, Intramuscular, PRN    glucose, 16 g, Oral, PRN    glucose, 24 g, Oral, PRN    hydrALAZINE, 10 mg, Intravenous, Q4H PRN    hydrocortisone, , Topical (Top), BID PRN    insulin aspart U-100, 0-5 Units, Subcutaneous, QID (AC + HS) PRN    labetalol, 10 mg, Intravenous, Q4H PRN    melatonin, 6 mg, Oral, Nightly PRN    morphine, 2 mg, Intravenous, Q4H PRN    naloxone, 0.02 mg, Intravenous, PRN    ondansetron, 4 mg, Intravenous, Q8H PRN    oxyCODONE-acetaminophen, 1 tablet, Oral, Q4H PRN    prochlorperazine, 5 mg, Intravenous, Q6H PRN    sodium chloride 0.9%, 10 mL, Intravenous, PRN    Flushing PICC/Midline Protocol, , , Until Discontinued **AND** sodium chloride 0.9%, 10 mL, Intravenous, Q12H PRN    sodium chloride 0.9%, 3 mL, Intravenous, Q12H PRN         Jake Paulino MD  Department of Hospital Medicine   [facility]   07/09/2025

## 2025-07-09 NOTE — PT/OT/SLP PROGRESS
Physical Therapy Treatment    Patient Name:  Chelle Chandra   MRN:  [id number]    Recommendations:     Discharge therapy intensity: High Intensity Therapy   Discharge Equipment Recommendations: to be determined by next level of care  Barriers to discharge: Impaired mobility and Ongoing medical needs    Assessment:     Chelle Chandra is a 59 y.o. female admitted with a medical diagnosis of R sided weakness and facial droop due to L SDH s/p craniotomy and MMA embolization. On 5/30 patient with subdural hemorrhage s/p L diony hole. Pt then with new R sided weakness and underwent a redo of diony hole for fluid evacuation/drain placement and a left cranioplasty for subdural fluid evacuation on 6/16. Now with ICH s/p craniectomy on 6/23, swelling to RUE 6/30 ultrasound suggestive of hematoma.  She presents with the following impairments/functional limitations: weakness, impaired endurance, impaired self care skills, impaired functional mobility, gait instability, impaired balance, decreased upper extremity function, decreased lower extremity function, impaired coordination.    Pt demos increased gait distance, ambulating 50' with QC and Rafael. Pt able to correct R lateral lean with VC. Good tolerance to LE exercises to promote R hip and knee extensor strength. Remains appropriate for high intensity therapy upon d/c.       Rehab Prognosis: Good; patient would benefit from acute skilled PT services to address these deficits and reach maximum level of function.    Recent Surgery: Procedure(s) (LRB):  CRANIECTOMY (Left) 16 Days Post-Op    Plan:     During this hospitalization, patient would benefit from acute PT services 6 x/week to address the identified rehab impairments via gait training, therapeutic activities, therapeutic exercises, neuromuscular re-education and progress toward the following goals:    Plan of Care Expires:  08/01/25    Subjective     Chief Complaint: none stated   Patient/Family Comments/goals: to get  stronger  Pain/Comfort:  Pain Rating 1: 0/10      Objective:     Communicated with NSG prior to session.  Patient found up in chair with peripheral IV, helmet, R resting hand splint upon PT entry to room.     General Precautions: Standard, fall, seizure (bone flap pxns, BP < 140/90)  Orthopedic Precautions: N/A  Braces:  (helmet, givemohr sling during ambulation)  Respiratory Status: Room air  Blood Pressure: 115/78, HR 91      Functional Mobility:  Transfers:     Sit to Stand:  contact guard assistance and minimum assistance with rolling walker and hallway rail  Gait: 50'/35' with QC and Rafael; pt able to correct R lateral lean with verbal cues, no visual cue required. Pt demos R lateral lean, decreased hip flexion in R swing, foot flat contact on RLE with poor control of knee extension in loading response. Noted decreased knee extension in R stance with fatigue.   Balance: Rafael for static standing balance with QC    Therapeutic Activities/Exercises:  Seated R LAQs with 5 second hold 2x15  Standing R marches at hallway rail 2x15  Standing L marches at hallway rail 2x10, pt demos increased R lateral lean, difficulty maintaining quad contraction for knee extension   Lateral steps at hallway rail to R x 10 feet     Co-Treatment: No    Education:  Patient provided with verbal education education regarding PT role/goals/POC, fall prevention, safety awareness, and discharge/DME recommendations.  Understanding was verbalized.     Patient left up in chair with all lines intact, call button in reach, and R resting hand splint donned      GOALS:   Multidisciplinary Problems       Physical Therapy Goals          Problem: Physical Therapy    Goal Priority Disciplines Outcome Interventions   Physical Therapy Goal     PT, PT/OT Progressing    Description: Goals to be met by: 25     Patient will increase functional independence with mobility by performin. Supine to sit with Modified [place]  2. Sit to supine with  Modified [place]  3. Sit to stand transfer with Modified [place]  4. Bed to chair transfer with Modified [place] using Rolling Walker vs QC  5. Gait  x 200 feet with Modified [place] using Rolling Walker vs QC.                          Time Tracking:     PT Received On: 07/09/25  PT Start Time: 1036     PT Stop Time: 1115  PT Total Time (min): 39 min     Billable Minutes: Gait Training 2 and Therapeutic Exercise 1    Treatment Type: Treatment  PT/PTA: PT     Number of PTA visits since last PT visit: 1 07/09/2025

## 2025-07-09 NOTE — PT/OT/SLP PROGRESS
"[facility]  Speech Language Pathology Department  Dysphagia Therapy Progress Note    Patient Name:  Chelle Chandra   MRN:  [id number]    Recommendations     General recommendations:  dysphagia therapy  Solid texture recommendation:  Regular Diet - IDDSI Level 7  Liquid consistency recommendation: Moderately thick liquids - IDDSI Level 3   Medications: crushed in puree  Aspiration precautions: small bites/sips, slow rate, alternate solids/liquids, and upright for PO intake    Discharge therapy intensity: High Intensity Therapy   Barriers to safe discharge:  none    Subjective     Patient awake, alert, and cooperative.  Spiritual/Cultural/Protestant Beliefs/Practices that affect care: no    Pain/Comfort:  0/10    Respiratory Status:  room air    Objective     Therapeutic Activities:  Pt practiced compensatory strategies/postural changes with mod verbal and visual cues.  Pt completed chin tuck with right turn with min-mod cues with mildly thick liquids (4 oz with no s/sx of aspiration)  Increased accuracy with SLP model and reminder to hit target then slide over  Verbal and visual reminders for "sip, tuck, turn, swallow, stay, up" beneficial for accurate strategy use; able to fade cues throughout and pt using strategies and self correcting appropriately    Assessment     Pt continues to present with oropharyngeal dysphagia requiring diet modification to reduce the risk of aspiration.    Outcome Measures     Functional Oral Intake Scale: 5 - Total oral diet with multiple consistencies, by requiring special preparation or compensations    Goals     Multidisciplinary Problems       SLP Goals          Problem: SLP    Goal Priority Disciplines Outcome   SLP Goal     SLP    Description: LTG: The pt will tolerate least restrictive diet with no overt s/sx of aspiration.    STGs:  1. Patient will participate CTAR/pharyngeal exercises  2. Patient will participate in laryngeal elevation exercises  3. " Patient will participate in repeat MBS when clinically appropriate  4. Patient will utilize chin tuck with head turn consistently with liquid trials.                     Patient Education     Patient provided with verbal education regarding SLP POC.  Understanding was verbalized, however additional teaching warranted.    Plan     Will continue to follow and tx as appropriate.    SLP Follow-Up:  Yes   Patient to be seen:  5 x/week   Plan of Care expires:  07/16/25  Plan of Care reviewed with:  patient       Time Tracking     SLP Treatment Date:   07/09/25  Speech Start Time:  0956  Speech Stop Time:  1006     Speech Total Time (min):  10 min    Billable minutes:  Treatment of Swallow Dysfunction, 10 minutes       07/09/2025

## 2025-07-09 NOTE — PT/OT/SLP PROGRESS
Occupational Therapy   Treatment    Name: Chelle Chandra  MRN: [id number]    Recommendations:     Recommended therapy intensity at discharge: High Intensity Therapy   Discharge Equipment Recommendations:  to be determined by next level of care  Barriers to discharge:  None    Assessment:     Chelle Chandra is a 59 y.o. female with a medical diagnosis of R sided weakness and facial droop due to L SDH s/p craniotomy and MMA embolization. On 5/30 patient with subdural hemorrhage s/p L diony hole. Pt then with new R sided weakness and underwent a redo of diony hole for fluid evacuation/drain placement and a left cranioplasty for subdural fluid evacuation on 6/16. Now with ICH s/p craniectomy on 6/23, swelling to RUE 6/30 ultrasound suggestive of hematoma.  She presents with good tolerance for OT session. Performance deficits affecting function are weakness, impaired endurance, impaired self care skills, impaired functional mobility, impaired balance, decreased upper extremity function, decreased lower extremity function, decreased safety awareness, pain, impaired fine motor.     Pt tolerated treatment well. Pt performed neuromuscular re-ed exercises in standing and UE exercises seated EOB, requiring assistance to maintain upright posture/balance. Requires cues for leaning to L. Neuro re-ed and UE TE used to facilitate movement, strength, and proprioception to RUE for functional daily use. Pt presents with progress towards OT goals and remains appropriate for high intensity therapy.     DME Justification: No DME recommended requiring DME justifications    Rehab Prognosis:  Good; patient would benefit from acute skilled OT services to address these deficits and reach maximum level of function.       Plan:     Patient to be seen 6 x/week to address the above listed problems via self-care/home management, therapeutic activities, therapeutic exercises, neuromuscular re-education, sensory integration, splinting  Plan of Care  Expires: 08/04/25  Plan of Care Reviewed with: patient    Subjective     Pain/Comfort:  Pain Rating 1: 0/10    Objective:     Communicated with: nurse prior to session.  Patient found HOB elevated with peripheral IV upon OT entry to room.    General Precautions: Standard, seizure, fall    Orthopedic Precautions:   Braces:  (helmet)  Respiratory Status: Room air  Vital Signs: Blood Pressure: 114/81     Occupational Performance:     Functional Mobility/Transfers:  Bed mobility:    Scooting: contact guard assistance  Supine to Sit: contact guard assistance  Sit to Supine: minimum assistance  Transfers: Sit to Stand: contact guard assistance with quad cane    Balance:   Static Sitting Balance: One UE support: Good: Patient able to maintain balance without handhold support, limited postural sway.  Dynamic Sitting Balance: No UE support: Fair: Patient accepts minimal challenge; able to maintain balance while turning head/trunk. Pt adjusting pillows using LUE while seated EOB with LOB, drifted to R side.     Neuromuscular Re-education:  Pt performed WB activity through BUE in standing on bedside table. Requires min A for balance during standing. Pt performed WB activity 3x for ~20 seconds. WB performed through elbows resting on table. Pt encouraged to push equal amount of weight through BUE.   Pt performed table glides in standing x4. Sponge block placed under towel to provide stabilization of R hand during glide. Pt able to complete 4 table glides, however presents with fatigue and required seated rest break.   Neuromuscular re-ed activities used to facilitate movement, strength, and proprioception in RUE for functional daily use.     Therapeutic Exercise:  Pt performed:   2x10 AAROM shoulder shrugs  1x10 AROM scap retraction exercises    Therapeutic Positioning    OT interventions performed during the course of today's session in an effort to prevent and/or reduce acquired pressure injuries:   Education was provided on  benefits of and recommendations for therapeutic positioning  Therapeutic positioning was provided at the conclusion of session for contracture prevention. Resting hand splint donned at end of session.     [facility] 6 Click ADL: 18    Co-Treatment: No    Patient Education:  Patient provided with verbal education education regarding OT role/goals/POC, fall prevention, and safety awareness.  Understanding was verbalized.      Patient left HOB elevated with call button in reach and helmet doffed.    GOALS:   Multidisciplinary Problems       Occupational Therapy Goals          Problem: Occupational Therapy    Goal Priority Disciplines Outcome Interventions   Occupational Therapy Goal     OT, PT/OT Progressing    Description: Goals: to be met by 07/14/25    Pt will complete grooming standing at sink with LRAD with CGA.  Pt will complete UB dressing with SBA.  Pt will complete LB dressing with SBA using LRAD.  Pt will complete toileting with CGA using LRAD.  Pt will complete functional mobility to/from toilet and toilet transfer with mod I using LRAD.   Pt will demo visual attention to R side >80% of time with min verbal cues.  Pt will demo RUE strength of 3-/5 for participation in ADLs.                       Time Tracking:     OT Date of Treatment: 07/09/25  OT Start Time: 1445  OT Stop Time: 1523  OT Total Time (min): 38 min    Billable Minutes:Therapeutic Exercise 1 unit  Neuromuscular Re-education 2 units    OTR/L readily available for conference at the time of the provision of services: LISA Mallory  OT/EDILSON: OT     Number of EDILSON visits since last OT visit: 5 7/9/2025

## 2025-07-10 LAB
POCT GLUCOSE: 175 MG/DL (ref 70–110)
POCT GLUCOSE: 235 MG/DL (ref 70–110)

## 2025-07-10 PROCEDURE — 97168 OT RE-EVAL EST PLAN CARE: CPT

## 2025-07-10 PROCEDURE — 99900031 HC PATIENT EDUCATION (STAT)

## 2025-07-10 PROCEDURE — 97535 SELF CARE MNGMENT TRAINING: CPT

## 2025-07-10 PROCEDURE — 25000003 PHARM REV CODE 250: Performed by: INTERNAL MEDICINE

## 2025-07-10 PROCEDURE — 94799 UNLISTED PULMONARY SVC/PX: CPT

## 2025-07-10 PROCEDURE — 63600175 PHARM REV CODE 636 W HCPCS

## 2025-07-10 PROCEDURE — 97110 THERAPEUTIC EXERCISES: CPT | Mod: CQ

## 2025-07-10 PROCEDURE — 94760 N-INVAS EAR/PLS OXIMETRY 1: CPT

## 2025-07-10 PROCEDURE — 25000003 PHARM REV CODE 250: Performed by: NURSE PRACTITIONER

## 2025-07-10 PROCEDURE — 25000242 PHARM REV CODE 250 ALT 637 W/ HCPCS: Performed by: INTERNAL MEDICINE

## 2025-07-10 PROCEDURE — 97116 GAIT TRAINING THERAPY: CPT | Mod: CQ

## 2025-07-10 PROCEDURE — 99900035 HC TECH TIME PER 15 MIN (STAT)

## 2025-07-10 PROCEDURE — 63600175 PHARM REV CODE 636 W HCPCS: Performed by: INTERNAL MEDICINE

## 2025-07-10 PROCEDURE — 21400001 HC TELEMETRY ROOM

## 2025-07-10 RX ADMIN — DICLOFENAC SODIUM 2 G: 10 GEL TOPICAL at 08:07

## 2025-07-10 RX ADMIN — FAMOTIDINE 20 MG: 20 TABLET, FILM COATED ORAL at 08:07

## 2025-07-10 RX ADMIN — LEVETIRACETAM 500 MG: 100 SOLUTION ORAL at 08:07

## 2025-07-10 RX ADMIN — EZETIMIBE 10 MG: 10 TABLET ORAL at 08:07

## 2025-07-10 RX ADMIN — ATORVASTATIN CALCIUM 80 MG: 40 TABLET, FILM COATED ORAL at 08:07

## 2025-07-10 RX ADMIN — BUSPIRONE HYDROCHLORIDE 15 MG: 5 TABLET ORAL at 08:07

## 2025-07-10 RX ADMIN — LEVOTHYROXINE SODIUM 88 MCG: 0.09 TABLET ORAL at 05:07

## 2025-07-10 RX ADMIN — METOPROLOL SUCCINATE 12.5 MG: 25 TABLET, EXTENDED RELEASE ORAL at 08:07

## 2025-07-10 RX ADMIN — BUTALBITAL, ACETAMINOPHEN, AND CAFFEINE 1 TABLET: 325; 50; 40 TABLET ORAL at 09:07

## 2025-07-10 RX ADMIN — CEFEPIME 2 G: 2 INJECTION, POWDER, FOR SOLUTION INTRAVENOUS at 12:07

## 2025-07-10 RX ADMIN — PSYLLIUM HUSK 1 PACKET: 3.4 POWDER ORAL at 08:07

## 2025-07-10 RX ADMIN — NORTRIPTYLINE HYDROCHLORIDE 25 MG: 25 CAPSULE ORAL at 08:07

## 2025-07-10 RX ADMIN — CEFEPIME 2 G: 2 INJECTION, POWDER, FOR SOLUTION INTRAVENOUS at 05:07

## 2025-07-10 RX ADMIN — BUTALBITAL, ACETAMINOPHEN, AND CAFFEINE 1 TABLET: 325; 50; 40 TABLET ORAL at 08:07

## 2025-07-10 RX ADMIN — INSULIN ASPART 2 UNITS: 100 INJECTION, SOLUTION INTRAVENOUS; SUBCUTANEOUS at 11:07

## 2025-07-10 RX ADMIN — Medication 6 MG: at 08:07

## 2025-07-10 RX ADMIN — CEFEPIME 2 G: 2 INJECTION, POWDER, FOR SOLUTION INTRAVENOUS at 08:07

## 2025-07-10 RX ADMIN — BUTALBITAL, ACETAMINOPHEN, AND CAFFEINE 1 TABLET: 325; 50; 40 TABLET ORAL at 03:07

## 2025-07-10 RX ADMIN — ENOXAPARIN SODIUM 40 MG: 40 INJECTION SUBCUTANEOUS at 04:07

## 2025-07-10 RX ADMIN — Medication 6 MG: at 12:07

## 2025-07-10 RX ADMIN — CLOPIDOGREL 75 MG: 75 TABLET ORAL at 08:07

## 2025-07-10 RX ADMIN — TRAZODONE HYDROCHLORIDE 25 MG: 50 TABLET ORAL at 08:07

## 2025-07-10 RX ADMIN — ACETAMINOPHEN 650 MG: 325 TABLET ORAL at 12:07

## 2025-07-10 RX ADMIN — Medication 1 EACH: at 08:07

## 2025-07-10 NOTE — PROGRESS NOTES
Inpatient Nutrition Assessment    Admit Date: 6/11/2025   Total duration of encounter: 29 days   Patient Age: 59 y.o.    Nutrition Recommendation/Prescription     Continue current diet regular diet and moderately thickened liquids per SLP recommendations.   Resume chocolate Magic Cup (provides 190 kcal, 9 g protein per serving) BID once in stock.  Obtain new weight as feasible.    Communication of Recommendations: EMR    Nutrition Assessment     Malnutrition Assessment/Nutrition-Focused Physical Exam    Malnutrition Context: acute illness or injury (06/18/25 1545)  Malnutrition Level: severe (06/18/25 1545)  Energy Intake (Malnutrition): other (see comments) (Does not meet criteria) (06/18/25 1545)  Weight Loss (Malnutrition): greater than 5% in 1 month (06/18/25 1545)  Subcutaneous Fat (Malnutrition): moderate depletion (06/18/25 1545)  Orbital Region (Subcutaneous Fat Loss): moderate depletion        Muscle Mass (Malnutrition): moderate depletion (06/18/25 1545)  Anglican Region (Muscle Loss): moderate depletion  Clavicle Bone Region (Muscle Loss): moderate depletion                    Fluid Accumulation (Malnutrition): other (see comments) (Not present) (06/18/25 1545)     Hand  Strength, Right (Malnutrition): Unable to assess (06/18/25 1545)  A minimum of two characteristics is recommended for diagnosis of either severe or non-severe malnutrition.    Chart Review    Reason Seen: length of stay and follow-up    Malnutrition Screening Tool Results   Have you recently lost weight without trying?: Yes: 2-13 lbs  Have you been eating poorly because of a decreased appetite?: No   MST Score: 1   Diagnosis:  Increased subdural fluid collection Status post left redo diony hole with fluid evacuation, drain placement 6/16  Suspected surgical site infection status post cranioplasty 6/16  Subdural hematoma requiring craniotomy with evacuation and MMA embolization early May with recurrence late May requiring diony  hole  Right-sided weakness/intractable headache secondary to above    Relevant Medical History:   hypothyroidism, anxiety, bronchial asthma, COPD, type 2 diabetes mellitus, 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, 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, GERD     Scheduled Medications:  atorvastatin, 80 mg, Daily  busPIRone, 15 mg, BID  ceFEPIme, 2 g, Q8H  clopidogreL, 75 mg, Daily  diclofenac sodium, 2 g, BID  enoxparin, 40 mg, Q24H (prophylaxis, 1700)  ezetimibe, 10 mg, Daily  famotidine, 20 mg, BID  Lactobacillus rhamnosus GG, 1 packet, Daily  levetiracetam, 500 mg, BID  levothyroxine, 88 mcg, Before breakfast  metoprolol succinate, 12.5 mg, Daily  nortriptyline, 25 mg, QHS  psyllium husk, 1 packet, Daily  traZODone, 25 mg, QHS    Continuous Infusions:     PRN Medications:  acetaminophen, 650 mg, Q4H PRN  acetaminophen, 650 mg, Q4H PRN  bisacodyL, 10 mg, Daily PRN  butalbital-acetaminophen-caffeine -40 mg, 1 tablet, Q4H PRN  dextrose 50%, 12.5 g, PRN  dextrose 50%, 25 g, PRN  diphenhydrAMINE, 25 mg, Q6H PRN  glucagon (human recombinant), 1 mg, PRN  glucose, 16 g, PRN  glucose, 24 g, PRN  hydrALAZINE, 10 mg, Q4H PRN  hydrocortisone, , BID PRN  insulin aspart U-100, 0-5 Units, QID (AC + HS) PRN  labetalol, 10 mg, Q4H PRN  melatonin, 6 mg, Nightly PRN  morphine, 2 mg, Q4H PRN  naloxone, 0.02 mg, PRN  ondansetron, 4 mg, Q8H PRN  oxyCODONE-acetaminophen, 1 tablet, Q4H PRN  prochlorperazine, 5 mg, Q6H PRN  sodium chloride 0.9%, 10 mL, PRN  sodium chloride 0.9%, 10 mL, Q12H PRN  sodium chloride 0.9%, 3 mL, Q12H PRN    Calorie Containing IV Medications: no significant kcals from medications at this time    Recent Labs   Lab 07/04/25  0351 07/07/25  0710     --    K 3.6  --    CALCIUM 9.0  --      --    CO2 26  --    BUN 13.2  --    CREATININE 0.65  --    EGFRNORACEVR >60  --    *  --    BILITOT 0.3  --    ALKPHOS 121  --    ALT 29  --    AST 23  --    ALBUMIN 3.2*  --    WBC 5.34 5.68   HGB 10.5* 11.0*   HCT 33.3* 34.4*  "    Nutrition Orders:  Diet Consistent Carbohydrate 2000 Calories (up to 75 gm per meal); Moderately Thick Liquids (IDDSI Level 3)  Dietary nutrition supplements BID; Magic Cup - Chocolate    Appetite/Oral Intake: fair/50-75% of meals  Factors Affecting Nutritional Intake: preferences   Social Needs Impacting Access to Food: none identified  Food/Congregational/Cultural Preferences: none reported  Food Allergies: no known food allergies  Last Bowel Movement: 07/09/25  Wound(s):  scalp and temporal incisions noted.     Comments    6/18/25: Spoke to family members at bedside who report pt's appetite has been fluctuating since admit depending on her pain levels. PO intake varies between %. Pt ate 75% of her breakfast this morning but did not eat much of her lunch. Family reports UBW of 112 lbs, last weighing that about 1 month ago. Family reports wt at admit was about 100 lbs which would indicate a 10% wt loss x1 month, nutritionally significant. Most recent bedscale wt from 6/12 is 110 lbs, will monitor for accuracy/trends.     6/20/25: Patient reports good oral intake of meals. Denies any nausea, vomiting, or diarrhea. Constipation noted.     6/24/25: Pt feeling "off" this AM and didn't eat as much. Still on mod thick liquids. Will send Magic Cup for added kcal and protein.    6/27/25: Pt reports fair appetite, does not eat much for breakfast but eats close to 50% of lunch and dinner. Pt states her family has been bringing in outside meals for dinner, she is looking forward to getting some ribs/steak. Pt consuming 1-2 Magic Cup supplements per day.      7/1/25: Pt working with therapy. No recent documentation of oral intake. Will follow-up early.     7/3/25: Fair intake of meals. Likes scrambled eggs for breakfast. Eating magic cup with dinner. Updated order to BID and only chocolate. Wants cereal and milk with every meal, updated in CBORD. Pt is receiving some outside food from . Denies any current GI " "complaints.     7/7/25: Pt out of rooms during rounds. Documentation of 50% breakfast consumed this morning. Consistent carbohydrate diet restriction added 7/3 by MD. Currently out of magic cup supplement so patient is not receiving. Will follow-up early.     7/10/25: Reports fair appetite, ate scrambled eggs and cereal for breakfast. No GI complaints. Still out of magic cup. Requesting water at bedside, place request on CBORD. No updated labs.     Anthropometrics    Height: 5' 6" (167.6 cm), Height Method: Measured  Last Weight: 49.9 kg (110 lb) (06/12/25 0223), Weight Method: Bed Scale  BMI (Calculated): 17.8  BMI Classification: underweight (BMI less than 18.5)     Ideal Body Weight (IBW), Female: 130 lb     % Ideal Body Weight, Female (lb): 84.62 %                             Usual Weight Provided By: patient    Wt Readings from Last 5 Encounters:   06/12/25 49.9 kg (110 lb)   06/06/25 46.9 kg (103 lb 6.3 oz)   06/03/25 46.3 kg (102 lb)   05/15/25 49 kg (108 lb 0.4 oz)   05/06/25 50.8 kg (112 lb)     Weight Change(s) Since Admission:   7/1-7/10/25 no updated weights   Wt Readings from Last 1 Encounters:   06/12/25 0223 49.9 kg (110 lb)   06/11/25 1314 46.7 kg (103 lb)   Admit Weight: 46.7 kg (103 lb) (06/11/25 1314), Weight Method: Stated    Estimated Needs    Weight Used For Calorie Calculations: 49.9 kg (110 lb 0.2 oz)  Energy Calorie Requirements (kcal): 3997-7924 kcals (30-35 kcal/kg)  Energy Need Method: Kcal/kg  Weight Used For Protein Calculations: 49.9 kg (110 lb 0.2 oz)  Protein Requirements: 60-75 g (1.2-1.5 g/kg)  Fluid Requirements (mL): 1497 mL (30 mL/kg)  CHO Requirement: N/A     Enteral Nutrition     Patient not receiving enteral nutrition at this time.    Parenteral Nutrition     Patient not receiving parenteral nutrition support at this time.    Evaluation of Received Nutrient Intake    Calories: not meeting estimated needs, improving   Protein: not meeting estimated needs, improving     Patient " Education     Not applicable.    Nutrition Diagnosis     PES: Inadequate energy intake related to acute illness as evidenced by meeting <75% EEN. (active)    PES: Severe acute disease or injury related malnutrition Related to  As Evidenced by:  - weight loss: > 5% in 1 month - muscle mass depletion: 2 areas of moderate muscle loss (Clavicle, Temporalis) - loss of subcutaneous fat: 2 areas of moderate fat loss (Infraorbital, Buccal) active    Nutrition Interventions     Intervention(s): Collaboration and referral of nutrition care\  Intervention(s): Oral diet/nutrient modifications    Goal: Meet greater than 80% of nutritional needs by follow-up. (goal not met)  Goal: Maintain weight throughout hospitalization. (goal progressing)    Nutrition Goals & Monitoring     Dietitian will monitor: energy intake, weight, and gastrointestinal profile  Discharge planning: regular diet with texture modifications per SLP  Nutrition Risk/Follow-Up: patient at increased nutrition risk; dietitian will follow-up twice weekly   Please consult if re-assessment needed sooner.

## 2025-07-10 NOTE — PT/OT/SLP RE-EVAL
Occupational Therapy   Re-evaluation    Name: Chelle Chandra  MRN: [id number]  Recent Surgery: Procedure(s) (LRB):  CRANIECTOMY (Left) 17 Days Post-Op    Recommendations:     Discharge therapy intensity: High Intensity Therapy   Discharge Equipment Recommendations:  to be determined by next level of care  Barriers to discharge:  None    Assessment:     Chelle Chandra is a 59 y.o. female with a medical diagnosis of R sided weakness and facial droop due to L SDH s/p craniotomy and MMA embolization. On 5/30 patient with subdural hemorrhage s/p L diony hole. Pt then with new R sided weakness and underwent a redo of diony hole for fluid evacuation/drain placement and a left cranioplasty for subdural fluid evacuation on 6/16. Now with ICH s/p craniectomy on 6/23, swelling to RUE 6/30 ultrasound suggestive of hematoma.  She presents with the following performance deficits affecting function: weakness, impaired endurance, impaired self care skills, impaired functional mobility, impaired balance, decreased upper extremity function, decreased lower extremity function, decreased safety awareness, pain, impaired fine motor.      Pt tolerated re-eval well. Pt performed oral hygiene standing at sink with mod A using RUE as stabilizer on counter. Pt required assistance to place toothpaste on brush. Pt progressed from min A to CGA using quad cane for STS. Pt demonstrates R lateral lean during standing activities however is able to self correct with verbal and tactile cues. Minimal R lateral lean during sitting activities. Pt with decreased ROM for RUE, however per pt at times she has more mvmt in RUE than during session. Noted trace muscle contraction during PROM elbow flex/ext exercises. Pt presents with progress towards OT goals. OT goals and Poc remains appropriate.     Rehab Prognosis: Good; patient would benefit from acute skilled OT services to address these deficits and reach maximum level of function.       DME  "Justification: No DME recommended requiring DME justifications    Plan:     Patient to be seen 6 x/week to address the above listed problems via self-care/home management, therapeutic activities, therapeutic exercises, neuromuscular re-education, sensory integration, splinting  Plan of Care Expires: 08/04/25  Plan of Care Reviewed with: patient    OT/LEE conference to discuss OT POC and patient's progression towards goals held with ROSA Conn    Subjective     Chief Complaint: wants to take a shower  Patient/Family Comments/goals: go home/more therapy     Pain/Comfort:  Pain Rating 1: 0/10    Patients cultural, spiritual, [employer]ec[employer]alism conflicts given the current situation: no    Objective:     OT communicated with nurse prior to session.      Patient was found HOB elevated with peripheral IV upon OT entry to room.    General Precautions: Standard, fall, seizure  Orthopedic Precautions:    Braces:  (helmet when OOB and HOB >30 degrees)    Vital Signs: Blood Pressure: 112/75    Functional Mobility/Transfers:  Transfers: Sit to Stand: contact guard assistance with quad cane    Activities of Daily Living:  Grooming: moderate assistance for oral hygiene standing at sink. Pt requires verbal and tactile cues for R sided lean and for WB through RUE for duration of task. Required assistance with applying toothpaste to brush.      AMPAC 6 Click ADL:  AMPAC Total Score: 17    Functional Cognition:  Orientation: oriented to Person, Place, and Time    Visual Perceptual Skills:  Intact    Upper Extremity Function:  Right Upper Extremity:   Range of Motion: Impaired. AROM: shoulder flexion ~50 degrees, scap elevation <40 degrees, scap retraction WFL, elbow flexion ~90 degrees, sup neutral FA position achieved, no active pronation, no AROM at digits. PROM for RUE WFL. Subluxation of R shoulder 1-2 inch  Strength: grossly 2+/5  Sensation: Pt reports ability to feel light touch throughout RUE, however it feels "duller" compared " to [place]. Reports no numbness/tingling.     Left Upper Extremity:  Range of Motion: WFL  Strength: WFL  Sensation: WFL    Therapeutic exercise:   Pt performed:   1x10 AAROM  B shoulder shrugs  1x10 AROM B shoulder retraction exercises  1x5 PROM flexion and extension of RUE; noted trace muscle contraction in bicep during performance.     Balance:   Static sitting balance: WFL  Dynamic sitting balance:WFL except requires CGA-SBA while turning head and trunk   Static standing balance:Impaired. Requires CGA-min A 2/2 R lateral lean and fatigue  Dynamic standing balance:Impaired. Requires mod A while performing oral hygiene at sink 2/2 R lateral lean and decreased knee ext in standing.     Therapeutic Positioning  Risk for acquired pressure injuries is decreased due to ability to get to BSC/toilet with assist, intact sensation, and continence.    OT interventions performed during the course of today's session in an effort to prevent and/or reduce acquired pressure injuries:   Education was provided on benefits of and recommendations for therapeutic positioning  Therapeutic positioning was provided at the conclusion of session for contracture prevention. Resting hand splint donned at end of session.     OT recommendations for therapeutic positioning throughout hospitalization:   Follow [facility] Pressure Injury Prevention Protocol    Co-Treatment: No    Patient Education:  Patient provided with verbal education education regarding OT role/goals/POC, fall prevention, and safety awareness.  Understanding was verbalized.     Patient left up in chair with call button in reach and nurse present    GOALS:   Multidisciplinary Problems       Occupational Therapy Goals          Problem: Occupational Therapy    Goal Priority Disciplines Outcome Interventions   Occupational Therapy Goal     OT, PT/OT Progressing    Description: Goals: to be met by 07/14/25    Pt will complete grooming standing at sink with LRAD with CGA.  Pt will complete  UB dressing with SBA.  Pt will complete LB dressing with SBA using LRAD.  Pt will complete toileting with CGA using LRAD.  Pt will complete functional mobility to/from toilet and toilet transfer with mod I using LRAD.   Pt will demo visual attention to R side >80% of time with min verbal cues.  Pt will demo RUE strength of 3-/5 for participation in ADLs.                       History:     Past Medical History:   Diagnosis Date    Accelerated junctional rhythm     Agatston CAC score, <100     Anxiety disorder, unspecified     Asthma     COPD type A     Diabetes mellitus without complication     GERD (gastroesophageal reflux disease)     History of COVID-19     Insomnia     Lung nodule          Past Surgical History:   Procedure Laterality Date    ANGIOGRAM, CORONARY, WITH LEFT HEART CATHETERIZATION      BACK SURGERY      BREAST LUMPECTOMY Right     CARDIOVERSION  08/01/2013    CARPAL TUNNEL RELEASE  10/23/2013    CRANIECTOMY Left 6/16/2025    Procedure: CRANIECTOMY;  Surgeon: James Rankin MD;  Location: [facility];  Service: Neurosurgery;  Laterality: Left;  left redo-craniectomy for subdural empyema    CRANIECTOMY Left 6/23/2025    Procedure: CRANIECTOMY;  Surgeon: James Rankin MD;  Location: [facility] OR;  Service: Neurosurgery;  Laterality: Left;  LEFT //  PINS // SCOPE    CRANIOTOMY FOR EVACUATION OF SUBDURAL HEMATOMA Left 05/19/2025    Procedure: CRANIOTOMY, FOR SUBDURAL HEMATOMA EVACUATION;  Surgeon: Ricardo Shelley MD;  Location: [facility] OR;  Service: Neurosurgery;  Laterality: Left;    CREATION OF TERRI HOLE WITH EVACUATION OF HEMATOMA Left 6/2/2025    Procedure: CREATION, CRANIAL TERRI HOLE, WITH HEMATOMA EVACUATION;  Surgeon: James Rankin MD;  Location: [facility];  Service: Neurosurgery;  Laterality: Left;  LEFT BURRHOLE FOR SUBDURAL HEMATOMA EVACUATION // STEALTH // SHERRI SKYTRON // DRILL    EVACUATION OF EMPYEMA Left 6/16/2025    Procedure: EVACUATION, EMPYEMA;  Surgeon: James Rankin MD;  Location: [facility]  OR;  Service: Neurosurgery;  Laterality: Left;  left craniotomy for subdural empyema    FUSION OF CERVICAL SPINE BY ANTERIOR APPROACH USING COMPUTER-ASSISTED NAVIGATION  06/10/2019    KIDNEY SURGERY      TONSILLECTOMY AND ADENOIDECTOMY      TOTAL ABDOMINAL HYSTERECTOMY      WRIST SURGERY         Time Tracking:     OT Date of Treatment: 07/10/25  OT Start Time: 1119  OT Stop Time: 1156  OT Total Time (min): 37 min    Billable Minutes:Re-eval 1 unit  Self Care/Home Management 1 unit    7/10/2025

## 2025-07-10 NOTE — PT/OT/SLP PROGRESS
"Physical Therapy Treatment    Patient Name:  Chelle Chandra   MRN:  [id number]    Recommendations:     Discharge therapy intensity: High Intensity Therapy   Discharge Equipment Recommendations: to be determined by next level of care  Barriers to discharge: Impaired mobility and Ongoing medical needs    Assessment:     Chelle Chandra is a 59 y.o. female admitted with a medical diagnosis of R sided weakness and facial droop due to L SDH s/p craniotomy and MMA embolization. On 5/30 patient with subdural hemorrhage s/p L diony hole. Pt then with new R sided weakness and underwent a redo of diony hole for fluid evacuation/drain placement and a left cranioplasty for subdural fluid evacuation on 6/16. Now with ICH s/p craniectomy on 6/23, swelling to RUE 6/30 ultrasound suggestive of hematoma.  She presents with the following impairments/functional limitations: weakness, impaired endurance, impaired self care skills, impaired functional mobility, gait instability, impaired balance, decreased upper extremity function, decreased lower extremity function, impaired coordination.    Rehab Prognosis: Good; patient would benefit from acute skilled PT services to address these deficits and reach maximum level of function.    Recent Surgery: Procedure(s) (LRB):  CRANIECTOMY (Left) 17 Days Post-Op    Plan:     During this hospitalization, patient would benefit from acute PT services 6 x/week to address the identified rehab impairments via gait training, therapeutic activities, therapeutic exercises, neuromuscular re-education and progress toward the following goals:    Plan of Care Expires:  08/01/25    Subjective     Chief Complaint: "I feel funny"  Patient/Family Comments/goals: to get stronger  Pain/Comfort:  Pain Rating 1: 0/10      Objective:     Communicated with NSG prior to session.  Patient found HOB elevated with peripheral IV, helmet, R resting hand splint upon PT entry to room.     General Precautions: Standard, fall, " seizure  Orthopedic Precautions: N/A  Braces:  (helmet)  Respiratory Status: Room air  Blood Pressure: 124/66       Functional Mobility:  Bed mobility:   Sup to sit: CGA  Transfers:     Sit to Stand:  contact guard assistance and minimum assistance with WBQC  Gait: 50'/30' with WBQC and Rafael   R lateral lean with verbal cues to correct, decreased hip flexion in R swing, foot flat contact on RLE with poor control of knee extension in loading response. Noted decreased knee extension in R stance with fatigue.   Balance: Rafael for static standing balance with QC    Therapeutic Activities/Exercises:  Seated R LAQs with 5 second hold 2x8  Seated alter marches 2x10    Co-Treatment: No    Education:  Patient provided with verbal education education regarding PT role/goals/POC, fall prevention, safety awareness, and discharge/DME recommendations.  Understanding was verbalized.     Patient left up in chair with all lines intact, call button in reach, and R resting hand splint donned      GOALS:   Multidisciplinary Problems       Physical Therapy Goals          Problem: Physical Therapy    Goal Priority Disciplines Outcome Interventions   Physical Therapy Goal     PT, PT/OT Progressing    Description: Goals to be met by: 25     Patient will increase functional independence with mobility by performin. Supine to sit with Modified [place]  2. Sit to supine with Modified [place]  3. Sit to stand transfer with Modified [place]  4. Bed to chair transfer with Modified [place] using Rolling Walker vs QC  5. Gait  x 200 feet with Modified [place] using Rolling Walker vs QC.                          Time Tracking:     PT Received On: 07/10/25  PT Start Time: 945     PT Stop Time: 1023  PT Total Time (min): 38 min     Billable Minutes: Gait Training 2 and Therapeutic Exercise 1    Treatment Type: Treatment  PT/PTA: PTA     Number of PTA visits since last PT visit: 2     07/10/2025

## 2025-07-10 NOTE — PROGRESS NOTES
07/10/25 0945   Missed Time Reason   SLP Attempted Eval Date 07/10/25   SLP Attempted Eval Time 0945   Missed Treatment Reason Other (Comment)     SLP attempting to see pt x2 (9:45 and 10:12) however pt working with PT at this time and unavailable for speech therapy session. SLP to re-attempt as schedule allows.

## 2025-07-10 NOTE — PROGRESS NOTES
[facility] Hospital Medicine Progress Note        Chief Complaint: Inpatient Follow-up subdural empyema    HPI per admitting team:   59-year-old female with left MCA stroke s/p thrombectomy in 6/2024 on Plavix, hypothyroidism, anxiety, bronchial asthma, COPD, type 2 diabetes mellitus,  and GERD. Patient recently had subdural hematoma following a fall in May requiring intracranial embolization, craniotomy with hematoma evacuation. After this patient was discharged home. Late May she presented back to the ED with headache, right-sided weakness, facial droop and imaging was concerning for left MCA diminished flow and subacute hematoma. MRI brain was negative for CVA. Neurosurgery performed diony hole for drainage of subdural hematoma due to increased intracranial pressure with postop CT showing definitive improvement patient was on Plavix post embolization which was held briefly and resumed per Neurosurgery on 06/05 and she was transferred to rehab on 06/06. While in rehab patient was noted to have increase right upper extremity weakness and also significant headaches. CT head revealed reaccumulation of subdural fluid collection, increased from before patient was sent back to main [place] for further evaluation/neurosurgery services. Neurosurgery evaluated, planning for  shunt this hospitalization, home meds resumed as appropriate except for Plavix, symptomatic management for headache.  shunt scheduled for 6/16. Patient underwent redo diony hole, fluid evacuation and drain placement, concern for surgical site infection and therefore underwent cranioplasty. Infectious Disease consulted and patient was placed on cefepime, vancomycin and Flagyl pending intraoperative cultures.  Intraoperative cultures growing staph epi.  ID recommending IV vancomycin, IV Rocephin until 7/3.  Neurosurgery wished to monitor patient for another day until 6/21.  PT/OT on board; recommending high-intensity  therapy.  Speech therapy recommending regular diet with moderately thick liquids.  CM on board for placement.   On 6/23 discharge to rehab was planned but Pt developed RUE weakness and Ct head showed increasing size of extra-axial fluid collections along cerebral convexity with increasing mass effect & increase parenchymal edema in L posterior frontal lobe. Pt was taken back to OR and underwent left craniotomy and evacuation of empyema. Post operatively admitted to ICU. Intraoperative culture from 6/23 grew  staph epi and rare Pseudomonas stutzeri.  ID suggested to stop Vancomycin and continue Cefepime as Staph epi is oxacillin sensitive and Cefepime will cover both Pseudomonas and MSSE with total course at least 6 to 8 weeks. Pt's care downgraded back to  service on 6/25/25.  ID requested sensitivity testing for Pseudomonas stutzeri, however per hospital microbiology their instrument was not able to run sensitivities, and they are sending the sample to [place] for further testing. ID recommended to continue Cefepime at this time with tentative end date 8/18/2025.  Pt remains with daily headache and dense weakness to RUE. MRI brain on 6/27/25 showed no acute stroke but noted post surgical left dural thickening and enhancement, left cerebral encephalomalacia with extensive gliotic changes.  As of 6/30 Pt is able to move her Rt upper ext some. Swelling noted inner aspect of Rt arm with U/S suggestive of hematoma 5.6x3.6x1.7 cm. Will continue conservative management for hematoma. Subdural JOHNATHAN drain and every other staple removed on 7/1/25 with complete staple removal on POD #21 7/14/25. NS cleared Pt to resume Plavix 2 weeks post op 7/7/25. Patient able to move RUE/RLE more. Awaiting sensitivities from [place] for Pseudomonas stutzeri isolated operatively.       Interval Hx:   No new complaints. Patient upset at protracted hospital course and eager to begin stint at Rehab. Counseled regarding importance of appropriate  antimicrobial coverage based on culture results due to which her DC was being held up. She voiced understanding. Able to move RUE/RLE more.    Objective/physical exam:  General: alert lady lying comfortably in bed, in no acute distress  HENT: oral and oropharyngeal mucosa moist, pink, with no erythema or exudates, no ear pain or discharge  Neck: normal neck movement, no lymph nodes or swellings, no JVD or Carotid bruit  Respiratory: clear breathing sounds bilaterally, no crackles, rales, ronchi or wheezes  Cardiovascular: clear S1 and S2, no murmurs, rubs or gallops  Peripheral Vascular: no lesions, ulcers or erosions, normal peripheral pulses and no pedal edema  Gastrointestinal: soft, non-tender, non-distended abdomen, no guarding, rigidity or rebound tenderness, normal bowel sounds  Integumentary: normal skin color, no rashes or lesions  Neuro: AAO x 3; motor strength 5/5 in LUE/LLE, 3/5 in RUE/RLE; sensation intact to gross and fine touch B/L; CN II-XII grossly intact      VITAL SIGNS: 24 HRS MIN & MAX LAST   Temp  Min: 97.6 °F (36.4 °C)  Max: 98 °F (36.7 °C) 97.6 °F (36.4 °C)   BP  Min: 97/55  Max: 118/76 117/74   Pulse  Min: 78  Max: 105  89   No data recorded 18   SpO2  Min: 97 %  Max: 98 % 98 %     I have reviewed the following labs:  Recent Labs   Lab 07/04/25  0351 07/07/25  0710   WBC 5.34 5.68   RBC 3.55* 3.69*   HGB 10.5* 11.0*   HCT 33.3* 34.4*   MCV 93.8 93.2   MCH 29.6 29.8   MCHC 31.5* 32.0*   RDW 15.0 15.3   * 416*   MPV 9.3 9.6     Recent Labs   Lab 07/04/25  0351      K 3.6      CO2 26   BUN 13.2   CREATININE 0.65   *   CALCIUM 9.0   ALBUMIN 3.2*   PROT 6.7   ALKPHOS 121   ALT 29   AST 23   BILITOT 0.3     Assessment/Plan:  Symptomatic UTI with hematuria  Left Subdural empyema - 6/23/25 s/p left craniectomy and evacuation of empyema with tissue culture positive for staph epidermidis and Pseudomonas stutzeri  Traumatic Subdural hematoma--> s/p left frontoparietal  craniotomy and evacuation on 5/19/25   Chronic left SDH with new onset headache, Rt sided weakness and facial droop, s/p left diony hole on 6/2/25  Post op Increased subdural fluid collection, s/p left redo diony hole with fluid evacuation, drain placement on 6/16  Surgical site infection status post cranioplasty on 6/16 with tissue culture positive for Staph epidermidis   Hematoma, RUE- 6/30/25- conservative management   Hx-  CVA in June, 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,Depression/anxiety ,HLD, Hypothyroidism, Essential HTN, Type 2 diabetes mellitus.   Severe malnutrition    No new complaints  Able to move RUL/rle more than previously  Remains on IV cefepime 2 g t.i.d., planned till 08/18; final sensitivities from operative culture growing Pseudomonas stutzeri pending  Patient reported symptomatic UTI, UA showed infection, UCX with less than 10,000 colonies yeast  Completed 3 days of Cipro b.i.d,  Continue [facility]   Neurosurgery cleared pt for Lovenox for VTE prophylaxis as of 6/27/25  Plavix resumed 2 weeks post op on 7/7/25 per Neurosurgery recommendation on 7/1 (Cary Mcwilliams, FNP note)  Neuro check q4h  Insulin sliding scale, Accu-Cheks AC and HS  PT/OT - High intensity therapy  CM on board  Continued on atorvastatin, buspirone b.i.d., Plavix, Zetia, Pepcid b.i.d., levothyroxine, metoprolol succinate, nortriptyline, trazodone     VTE prophylaxis: Lovenox     Patient condition:  Fair     Anticipated discharge and Disposition:   TBD, [facility] re[facility] will submit for Auth once final cultures from [place] lab received, expecting Friday       All diagnosis and differential diagnosis have been reviewed; assessment and plan has been documented; I have personally reviewed the labs and test results that are presently available; I have reviewed the patients medication list; I have reviewed the consulting providers response and recommendations. I have reviewed or attempted to review medical records based upon their availability    All of  the patient's questions have been  addressed and answered. Patient's is agreeable to the above stated plan. I will continue to monitor closely and make adjustments to medical management as needed.    Portions of this note dictated using EMR integrated voice recognition software, and may be subject to voice recognition errors not corrected at proofreading. Please contact writer for clarification if needed.       Chucho Hernandez MD  Department of Hospital Medicine   [facility]   07/10/2025

## 2025-07-10 NOTE — PLAN OF CARE
Spoke with [facility] In Lab (Christy).  Asked for update on [facility] sensitivities. Read CM an internal david from this week that [facility] found some discrepanies and were starting the process over. She will ask for a more concrete update and let CM know.

## 2025-07-11 LAB
BACTERIA ISLT AEROBE CULT: ABNORMAL
BACTERIA TISS AEROBE CULT: ABNORMAL
MAYO GENERIC ORDERABLE RESULT: ABNORMAL
POCT GLUCOSE: 148 MG/DL (ref 70–110)
POCT GLUCOSE: 164 MG/DL (ref 70–110)
POCT GLUCOSE: 184 MG/DL (ref 70–110)
POCT GLUCOSE: 201 MG/DL (ref 70–110)

## 2025-07-11 PROCEDURE — 25000003 PHARM REV CODE 250: Performed by: INTERNAL MEDICINE

## 2025-07-11 PROCEDURE — 63600175 PHARM REV CODE 636 W HCPCS

## 2025-07-11 PROCEDURE — 97530 THERAPEUTIC ACTIVITIES: CPT

## 2025-07-11 PROCEDURE — 25000242 PHARM REV CODE 250 ALT 637 W/ HCPCS: Performed by: INTERNAL MEDICINE

## 2025-07-11 PROCEDURE — 97116 GAIT TRAINING THERAPY: CPT

## 2025-07-11 PROCEDURE — 21400001 HC TELEMETRY ROOM

## 2025-07-11 PROCEDURE — 97110 THERAPEUTIC EXERCISES: CPT

## 2025-07-11 PROCEDURE — 63600175 PHARM REV CODE 636 W HCPCS: Performed by: INTERNAL MEDICINE

## 2025-07-11 RX ADMIN — BUTALBITAL, ACETAMINOPHEN, AND CAFFEINE 1 TABLET: 325; 50; 40 TABLET ORAL at 09:07

## 2025-07-11 RX ADMIN — TRAZODONE HYDROCHLORIDE 25 MG: 50 TABLET ORAL at 09:07

## 2025-07-11 RX ADMIN — CEFEPIME 2 G: 2 INJECTION, POWDER, FOR SOLUTION INTRAVENOUS at 01:07

## 2025-07-11 RX ADMIN — INSULIN ASPART 1 UNITS: 100 INJECTION, SOLUTION INTRAVENOUS; SUBCUTANEOUS at 09:07

## 2025-07-11 RX ADMIN — ENOXAPARIN SODIUM 40 MG: 40 INJECTION SUBCUTANEOUS at 03:07

## 2025-07-11 RX ADMIN — ATORVASTATIN CALCIUM 80 MG: 40 TABLET, FILM COATED ORAL at 09:07

## 2025-07-11 RX ADMIN — OXYCODONE AND ACETAMINOPHEN 1 TABLET: 325; 10 TABLET ORAL at 11:07

## 2025-07-11 RX ADMIN — DICLOFENAC SODIUM 2 G: 10 GEL TOPICAL at 09:07

## 2025-07-11 RX ADMIN — NORTRIPTYLINE HYDROCHLORIDE 25 MG: 25 CAPSULE ORAL at 09:07

## 2025-07-11 RX ADMIN — EZETIMIBE 10 MG: 10 TABLET ORAL at 09:07

## 2025-07-11 RX ADMIN — FAMOTIDINE 20 MG: 20 TABLET, FILM COATED ORAL at 09:07

## 2025-07-11 RX ADMIN — CLOPIDOGREL 75 MG: 75 TABLET ORAL at 09:07

## 2025-07-11 RX ADMIN — CEFEPIME 2 G: 2 INJECTION, POWDER, FOR SOLUTION INTRAVENOUS at 09:07

## 2025-07-11 RX ADMIN — LEVOTHYROXINE SODIUM 88 MCG: 0.09 TABLET ORAL at 05:07

## 2025-07-11 RX ADMIN — LEVETIRACETAM 500 MG: 100 SOLUTION ORAL at 09:07

## 2025-07-11 RX ADMIN — Medication 1 EACH: at 09:07

## 2025-07-11 RX ADMIN — BUSPIRONE HYDROCHLORIDE 15 MG: 5 TABLET ORAL at 09:07

## 2025-07-11 RX ADMIN — METOPROLOL SUCCINATE 12.5 MG: 25 TABLET, EXTENDED RELEASE ORAL at 09:07

## 2025-07-11 RX ADMIN — PSYLLIUM HUSK 1 PACKET: 3.4 POWDER ORAL at 09:07

## 2025-07-11 RX ADMIN — CEFEPIME 2 G: 2 INJECTION, POWDER, FOR SOLUTION INTRAVENOUS at 05:07

## 2025-07-11 NOTE — PROGRESS NOTES
[facility] Hospital Medicine Progress Note        Chief Complaint: Inpatient Follow-up subdural empyema    HPI per admitting team:   59-year-old female with left MCA stroke s/p thrombectomy in 6/2024 on Plavix, hypothyroidism, anxiety, bronchial asthma, COPD, type 2 diabetes mellitus,  and GERD. Patient recently had subdural hematoma following a fall in May requiring intracranial embolization, craniotomy with hematoma evacuation. After this patient was discharged home. Late May she presented back to the ED with headache, right-sided weakness, facial droop and imaging was concerning for left MCA diminished flow and subacute hematoma. MRI brain was negative for CVA. Neurosurgery performed diony hole for drainage of subdural hematoma due to increased intracranial pressure with postop CT showing definitive improvement patient was on Plavix post embolization which was held briefly and resumed per Neurosurgery on 06/05 and she was transferred to rehab on 06/06. While in rehab patient was noted to have increase right upper extremity weakness and also significant headaches. CT head revealed reaccumulation of subdural fluid collection, increased from before patient was sent back to main [place] for further evaluation/neurosurgery services. Neurosurgery evaluated, planning for  shunt this hospitalization, home meds resumed as appropriate except for Plavix, symptomatic management for headache.  shunt scheduled for 6/16. Patient underwent redo diony hole, fluid evacuation and drain placement, concern for surgical site infection and therefore underwent cranioplasty. Infectious Disease consulted and patient was placed on cefepime, vancomycin and Flagyl pending intraoperative cultures.  Intraoperative cultures growing staph epi.  ID recommending IV vancomycin, IV Rocephin until 7/3.  Neurosurgery wished to monitor patient for another day until 6/21.  PT/OT on board; recommending high-intensity  therapy.  Speech therapy recommending regular diet with moderately thick liquids.  CM on board for placement.   On 6/23 discharge to rehab was planned but Pt developed RUE weakness and Ct head showed increasing size of extra-axial fluid collections along cerebral convexity with increasing mass effect & increase parenchymal edema in L posterior frontal lobe. Pt was taken back to OR and underwent left craniotomy and evacuation of empyema. Post operatively admitted to ICU. Intraoperative culture from 6/23 grew  staph epi and rare Pseudomonas stutzeri.  ID suggested to stop Vancomycin and continue Cefepime as Staph epi is oxacillin sensitive and Cefepime will cover both Pseudomonas and MSSE with total course at least 6 to 8 weeks. Pt's care downgraded back to  service on 6/25/25.  ID requested sensitivity testing for Pseudomonas stutzeri, however per hospital microbiology their instrument was not able to run sensitivities, and they are sending the sample to [place] for further testing. ID recommended to continue Cefepime at this time with tentative end date 8/18/2025.  Pt remains with daily headache and dense weakness to RUE. MRI brain on 6/27/25 showed no acute stroke but noted post surgical left dural thickening and enhancement, left cerebral encephalomalacia with extensive gliotic changes.  As of 6/30 Pt is able to move her Rt upper ext some. Swelling noted inner aspect of Rt arm with U/S suggestive of hematoma 5.6x3.6x1.7 cm. Will continue conservative management for hematoma. Subdural JOHNATHAN drain and every other staple removed on 7/1/25 with complete staple removal on POD #21 7/14/25. NS cleared Pt to resume Plavix 2 weeks post op 7/7/25. Patient able to move RUE/RLE more. Awaiting sensitivities from [place] for Pseudomonas stutzeri isolated operatively.     Patient upset at protracted hospital course and eager to begin stint at Rehab. Counseled regarding importance of appropriate antimicrobial coverage based on culture  results due to which her DC was being held up. She voiced understanding.    Interval Hx:   Noted to be in better spirits.  No new complaints. Able to move RUE/RLE more.  Awaiting update on antimicrobial sensitivities.    Objective/physical exam:  General: alert lady lying comfortably in bed, in no acute distress  HENT: oral and oropharyngeal mucosa moist, pink, with no erythema or exudates, no ear pain or discharge  Neck: normal neck movement, no lymph nodes or swellings, no JVD or Carotid bruit  Respiratory: clear breathing sounds bilaterally, no crackles, rales, ronchi or wheezes  Cardiovascular: clear S1 and S2, no murmurs, rubs or gallops  Peripheral Vascular: no lesions, ulcers or erosions, normal peripheral pulses and no pedal edema  Gastrointestinal: soft, non-tender, non-distended abdomen, no guarding, rigidity or rebound tenderness, normal bowel sounds  Integumentary: normal skin color, no rashes or lesions  Neuro: AAO x 3; motor strength 5/5 in LUE/LLE, 3/5 in RUE/RLE; sensation intact to gross and fine touch B/L; CN II-XII grossly intact      VITAL SIGNS: 24 HRS MIN & MAX LAST   Temp  Min: 97.4 °F (36.3 °C)  Max: 98.2 °F (36.8 °C) 97.9 °F (36.6 °C)   BP  Min: 114/80  Max: 118/74 114/80   Pulse  Min: 76  Max: 109  92   Resp  Min: 18  Max: 18 18   SpO2  Min: 96 %  Max: 98 % 96 %     I have reviewed the following labs:  Recent Labs   Lab 07/07/25  0710   WBC 5.68   RBC 3.69*   HGB 11.0*   HCT 34.4*   MCV 93.2   MCH 29.8   MCHC 32.0*   RDW 15.3   *   MPV 9.6     Assessment/Plan:  Symptomatic UTI with hematuria  Left Subdural empyema - 6/23/25 s/p left craniectomy and evacuation of empyema with tissue culture positive for staph epidermidis and Pseudomonas stutzeri  Traumatic Subdural hematoma--> s/p left frontoparietal craniotomy and evacuation on 5/19/25   Chronic left SDH with new onset headache, Rt sided weakness and facial droop, s/p left diony hole on 6/2/25  Post op Increased subdural fluid  collection, s/p left redo diony hole with fluid evacuation, drain placement on 6/16  Surgical site infection status post cranioplasty on 6/16 with tissue culture positive for Staph epidermidis   Hematoma, RUE- 6/30/25- conservative management   Hx-  CVA in June, 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,Depression/anxiety ,HLD, Hypothyroidism, Essential HTN, Type 2 diabetes mellitus.   Severe Malnutrition    No new complaints  Able to move RUE/RLE better than previously  Remains on IV Cefepime 2 g t.i.d., planned till 08/18; final sensitivities from operative culture growing Pseudomonas stutzeri pending  Patient reported symptomatic UTI, UA showed infection, UCX with less than 10,000 colonies yeast  Completed 3 days of Ciprofloxacin b.i.d.  Continue [facility]   Neurosurgery cleared pt for Lovenox for VTE prophylaxis as of 6/27/25  Plavix resumed 2 weeks post op on 7/7 per Neurosurgery recommendation on 7/1  ISS LD  PT/OT recommended high intensity therapy  CM on board for placement  Continued on atorvastatin, buspirone b.i.d., Plavix, Zetia, Pepcid b.i.d., levothyroxine, metoprolol succinate, nortriptyline, trazodone     VTE prophylaxis: Lovenox     Patient condition:  Fair     Anticipated discharge and Disposition:   TBD, [facility] will submit for Auth once final cultures from [place] lab received, expecting Friday       All diagnosis and differential diagnosis have been reviewed; assessment and plan has been documented; I have personally reviewed the labs and test results that are presently available; I have reviewed the patients medication list; I have reviewed the consulting providers response and recommendations. I have reviewed or attempted to review medical records based upon their availability    All of the patient's questions have been  addressed and answered. Patient's is agreeable to the above stated plan. I will continue to monitor closely and make adjustments to medical management as needed.    Portions of this note  dictated using EMR integrated voice recognition software, and may be subject to voice recognition errors not corrected at proofreading. Please contact writer for clarification if needed.       Chucho Hernandez MD  Department of Hospital Medicine   [facility]   07/11/2025

## 2025-07-11 NOTE — PT/OT/SLP PROGRESS
Occupational Therapy   Treatment    Name: Chelle Chandra  MRN: [id number]    Recommendations:     Recommended therapy intensity at discharge: High Intensity Therapy   Discharge Equipment Recommendations:  to be determined by next level of care  Barriers to discharge:  None    Assessment:     Chelle Chandra is a 59 y.o. female with a medical diagnosis of R sided weakness and facial droop due to L SDH s/p craniotomy and MMA embolization. On 5/30 patient with subdural hemorrhage s/p L diony hole. Pt then with new R sided weakness and underwent a redo of diony hole for fluid evacuation/drain placement and a left cranioplasty for subdural fluid evacuation on 6/16. Now with ICH s/p craniectomy on 6/23, swelling to RUST 6/30 ultrasound suggestive of hematoma.  She presents with good tolerance and motivation for therapy session. Performance deficits affecting function are weakness, impaired endurance, impaired self care skills, impaired functional mobility, impaired balance, decreased upper extremity function, decreased lower extremity function, decreased safety awareness, pain, impaired fine motor.     Pt tolerated treatment well. Pt mvmt in UE decreased compared to previous sessions. Attempted functional reach activities however, pt presented with no active mvmt at RUST. Pt became discouraged with attempts, encouraged to engage in WB and UE exercises. PT with motivation to engage in OT session, remains appropriate for high intensity therapy.     DME Justification: No DME recommended requiring DME justifications    Rehab Prognosis:  Good; patient would benefit from acute skilled OT services to address these deficits and reach maximum level of function.       Plan:     Patient to be seen 6 x/week to address the above listed problems via self-care/home management, therapeutic activities, therapeutic exercises, neuromuscular re-education, sensory integration, splinting  Plan of Care Expires: 08/04/25  Plan of Care Reviewed  with: patient    Subjective     Pain/Comfort:  Pain Rating 1:  (mild pain at R shoulder with stretch/mvmt)    Objective:     Communicated with: nurse prior to session.  Patient found HOB elevated with peripheral IV upon OT entry to room.    General Precautions: Standard, fall, seizure    Orthopedic Precautions: (midline catheter)  Braces:  (helmet when OOB and HOB >30 degrees)  Respiratory Status: Room air  Vital Signs: Blood Pressure: 111/72     Occupational Performance:     Functional Mobility/Transfers:  Bed mobility:    Scooting: contact guard assistance  Supine to Sit: stand by assistance  Transfers: Sit to Stand: contact guard assistance with rolling walker  Pt ambulated from EOB to bedside chair with min A using quad cane and verbal cues for step length.     Balance:   Static Sitting Balance: One UE support: Good: Patient able to maintain balance without handhold support, limited postural sway.  Dynamic Sitting Balance: No UE support: Fair: Patient accepts minimal challenge; able to maintain balance while turning head/trunk. Pt had one LOB while seated EOB while adjusting position.     Therapeutic Activities:  Pt attempted to perform functional reach for objects on mirror in standing. Pt unable to perform any active movement at this time.   Provided gravity minimized plane using table. Pt attempted to perform functional reach to mirror with gravity minimized. Some Active mvmt noted, however heavily compensating at trunk to move arm forward.    Pt became upset/discouraged with attempts  Encouraged pt to engage in WB through RUE to provide proprioception to RUE. Student therapist hand placed between pt elbow and table, noted pt applying weight through RUE. Performed 3x with 5-10 sec holds.     Therapeutic Exercise:  Pt performed:   1x10 AAROM shoulder shrugs  Provided PROM for RUE elbow flexion/extension 1x10, muscle contraction noted at bicep during performance.   PROM R shoulder flexion stretch x3  PROM R  shoulder abd stretch x3  PROM wrist flexion/extension x3    Therapeutic Positioning    OT interventions performed during the course of today's session in an effort to prevent and/or reduce acquired pressure injuries:   Education was provided on benefits of and recommendations for therapeutic positioning  Therapeutic positioning was provided at the conclusion of session for contracture prevention. Resting hand splint donned.     [facility] 6 Click ADL: 17    Co-Treatment: No    Patient Education:  Patient provided with verbal education education regarding OT role/goals/POC, fall prevention, and safety awareness.  Understanding was verbalized.      Patient left up in chair with call button in reach and helmet donned.    GOALS:   Multidisciplinary Problems       Occupational Therapy Goals          Problem: Occupational Therapy    Goal Priority Disciplines Outcome Interventions   Occupational Therapy Goal     OT, PT/OT Progressing    Description: Goals: to be met by 07/14/25    Pt will complete grooming standing at sink with LRAD with CGA.  Pt will complete UB dressing with SBA.  Pt will complete LB dressing with SBA using LRAD.  Pt will complete toileting with CGA using LRAD.  Pt will complete functional mobility to/from toilet and toilet transfer with mod I using LRAD.   Pt will demo visual attention to R side >80% of time with min verbal cues.  Pt will demo RUE strength of 3-/5 for participation in ADLs.                       Time Tracking:     OT Date of Treatment: 07/11/25  OT Start Time: 1500  OT Stop Time: 1540  OT Total Time (min): 40 min    Billable Minutes:Therapeutic Activity 1 unit  Therapeutic Exercise 2 units    OTR/L readily available for conference at the time of the provision of services: LISA Mallory  OT/EDILSON: OT     Number of EDILSON visits since last OT visit: 1 7/11/2025

## 2025-07-11 NOTE — PT/OT/SLP PROGRESS
Physical Therapy Treatment    Patient Name:  Chelle Chandra   MRN:  [id number]    Recommendations:     Discharge therapy intensity: High Intensity Therapy   Discharge Equipment Recommendations: other (see comments) (to be determined by next level of care facility)  Barriers to discharge: Impaired mobility and Ongoing medical needs    Assessment:     Chelle Chandra is a 59 y.o. female admitted with a medical diagnosis of  R sided weakness and facial droop due to L SDH s/p craniotomy and MMA embolization. On 5/30 patient with subdural hemorrhage s/p L diony hole. Pt then with new R sided weakness and underwent a redo of diony hole for fluid evacuation/drain placement and a left cranioplasty for subdural fluid evacuation on 6/16. Now with ICH s/p craniectomy on 6/23, swelling to RUE 6/30 ultrasound suggestive of hematoma.  She presents with the following impairments/functional limitations: weakness, impaired endurance, impaired self care skills, impaired functional mobility, gait instability, impaired balance, decreased coordination, decreased upper extremity function, decreased lower extremity function, decreased safety awareness. Pt tolerated session well. She expressed wanting to go outside. Helmet donned. After ambulating in room, NSG cleared pt to go outside with PT. Pt requires Min-ModA for ambulation, Danny for transfers, Danny for bed mobility, and Dnany for WC propulsion. Pt would benefit from HIGH intensity skilled therapy sessions following d/c from this facility to minimize fall risk and maximize functional independence with ADLs.     DME Justification:  No DME recommended requiring DME justifications    Rehab Prognosis: Good; patient would benefit from acute skilled PT services to address these deficits and reach maximum level of function.    Recent Surgery: Procedure(s) (LRB):  CRANIECTOMY (Left) 18 Days Post-Op    Plan:     During this hospitalization, patient would benefit from acute PT services 6 x/week  to address the identified rehab impairments via gait training, therapeutic activities, therapeutic exercises, neuromuscular re-education and progress toward the following goals:    Plan of Care Expires:  08/01/25    Subjective     Chief Complaint: to get out of her room  Patient/Family Comments/goals: return home  Pain/Comfort:  Pain Rating 1: 0/10      Objective:     Communicated with NSG prior to session.  Patient found HOB elevated with no active line(s) upon PT entry to room.     General Precautions: Standard, fall, seizure  Orthopedic Precautions: N/A  Braces:  (helment when OOB. Head elevated >30 deg.)  Respiratory Status: Room air  Skin Integrity: Visible skin intact      Functional Mobility:  Bed Mobility:     Scooting: contact guard assistance  Supine to Sit: minimum assistance  Sit to Supine: minimum assistance  Transfers:     Sit to Stand:  minimum assistance with quad cane  Gait: Pt ambulates ~15 ft Min-ModA using quad cane.   Balance: static sitting balance: GOOD-. Static standing balance: FAIR. Dynamic standing balance: FAIR-    Therapeutic Activities/Exercises:  Supine <> Sit performed using bed rail for UE support. Verbal and tactile cues required for sequencing ans safe hand placement.   X2 sit <> stands Danny. 1st attempt from EOB, 2nd from WC. Pt demonstrates significant right trunk lean.   Pt ambulates ~15 ft Min-ModA using quad cane. Pt demonstrates difficulty with right foot clearance, weight bearing on RLE, and has a significant right trunk lean. Trunk lean improves with verbal cues.  Pt requires Danny with manual WC propulsion. Pt propelled WC using LUE ~15 ft on level surface. Verbal and tactile cues required for sequencing and for safe hand placement on wheel.   There EX: pt performed LAQ with ~5 second hold before lowering back to starting position, seated marches, and ankle DF/PF. Verbal and tactile cues required for pt to mobilize through full ROM.     Co-Treatment:  No    Education:  Patient provided with verbal education education regarding PT role/goals/POC, fall prevention, safety awareness, and home exercise program.  Understanding was verbalized.     Patient left HOB elevated with all lines intact and call button in reach      GOALS:   Multidisciplinary Problems       Physical Therapy Goals          Problem: Physical Therapy    Goal Priority Disciplines Outcome Interventions   Physical Therapy Goal     PT, PT/OT Progressing    Description: Goals to be met by: 25     Patient will increase functional independence with mobility by performin. Supine to sit with Modified [place]  2. Sit to supine with Modified [place]  3. Sit to stand transfer with Modified [place]  4. Bed to chair transfer with Modified [place] using Rolling Walker vs QC  5. Gait  x 200 feet with Modified [place] using Rolling Walker vs QC.                          Time Tracking:     PT Received On: 25  PT Start Time: 1352     PT Stop Time: 1434  PT Total Time (min): 42 min     Billable Minutes: Gait Training 1 unit, Therapeutic Activity 1 unit, and Therapeutic Exercise 1 unit    Treatment Type: Treatment  PT/PTA: PT     Number of PTA visits since last PT visit: 3     2025

## 2025-07-12 LAB
POCT GLUCOSE: 156 MG/DL (ref 70–110)
POCT GLUCOSE: 165 MG/DL (ref 70–110)

## 2025-07-12 PROCEDURE — 25000242 PHARM REV CODE 250 ALT 637 W/ HCPCS: Performed by: INTERNAL MEDICINE

## 2025-07-12 PROCEDURE — 25000003 PHARM REV CODE 250: Performed by: INTERNAL MEDICINE

## 2025-07-12 PROCEDURE — 63600175 PHARM REV CODE 636 W HCPCS

## 2025-07-12 PROCEDURE — 21400001 HC TELEMETRY ROOM

## 2025-07-12 PROCEDURE — 97116 GAIT TRAINING THERAPY: CPT | Mod: CQ

## 2025-07-12 PROCEDURE — 63600175 PHARM REV CODE 636 W HCPCS: Performed by: INTERNAL MEDICINE

## 2025-07-12 PROCEDURE — 97112 NEUROMUSCULAR REEDUCATION: CPT | Mod: CO

## 2025-07-12 RX ADMIN — LEVETIRACETAM 500 MG: 100 SOLUTION ORAL at 08:07

## 2025-07-12 RX ADMIN — BUSPIRONE HYDROCHLORIDE 15 MG: 5 TABLET ORAL at 10:07

## 2025-07-12 RX ADMIN — EZETIMIBE 10 MG: 10 TABLET ORAL at 10:07

## 2025-07-12 RX ADMIN — BUSPIRONE HYDROCHLORIDE 15 MG: 5 TABLET ORAL at 08:07

## 2025-07-12 RX ADMIN — FAMOTIDINE 20 MG: 20 TABLET, FILM COATED ORAL at 10:07

## 2025-07-12 RX ADMIN — NORTRIPTYLINE HYDROCHLORIDE 25 MG: 25 CAPSULE ORAL at 08:07

## 2025-07-12 RX ADMIN — Medication 1 EACH: at 10:07

## 2025-07-12 RX ADMIN — BUTALBITAL, ACETAMINOPHEN, AND CAFFEINE 1 TABLET: 325; 50; 40 TABLET ORAL at 07:07

## 2025-07-12 RX ADMIN — PSYLLIUM HUSK 1 PACKET: 3.4 POWDER ORAL at 10:07

## 2025-07-12 RX ADMIN — CEFEPIME 2 G: 2 INJECTION, POWDER, FOR SOLUTION INTRAVENOUS at 08:07

## 2025-07-12 RX ADMIN — TRAZODONE HYDROCHLORIDE 25 MG: 50 TABLET ORAL at 08:07

## 2025-07-12 RX ADMIN — FAMOTIDINE 20 MG: 20 TABLET, FILM COATED ORAL at 08:07

## 2025-07-12 RX ADMIN — CEFEPIME 2 G: 2 INJECTION, POWDER, FOR SOLUTION INTRAVENOUS at 05:07

## 2025-07-12 RX ADMIN — CLOPIDOGREL 75 MG: 75 TABLET ORAL at 10:07

## 2025-07-12 RX ADMIN — OXYCODONE AND ACETAMINOPHEN 1 TABLET: 325; 10 TABLET ORAL at 02:07

## 2025-07-12 RX ADMIN — CEFEPIME 2 G: 2 INJECTION, POWDER, FOR SOLUTION INTRAVENOUS at 01:07

## 2025-07-12 RX ADMIN — DICLOFENAC SODIUM 2 G: 10 GEL TOPICAL at 10:07

## 2025-07-12 RX ADMIN — METOPROLOL SUCCINATE 12.5 MG: 25 TABLET, EXTENDED RELEASE ORAL at 10:07

## 2025-07-12 RX ADMIN — LEVOTHYROXINE SODIUM 88 MCG: 0.09 TABLET ORAL at 05:07

## 2025-07-12 RX ADMIN — ATORVASTATIN CALCIUM 80 MG: 40 TABLET, FILM COATED ORAL at 10:07

## 2025-07-12 RX ADMIN — LEVETIRACETAM 500 MG: 100 SOLUTION ORAL at 10:07

## 2025-07-12 RX ADMIN — ENOXAPARIN SODIUM 40 MG: 40 INJECTION SUBCUTANEOUS at 04:07

## 2025-07-12 RX ADMIN — BUTALBITAL, ACETAMINOPHEN, AND CAFFEINE 1 TABLET: 325; 50; 40 TABLET ORAL at 05:07

## 2025-07-12 NOTE — PROGRESS NOTES
[facility] Hospital Medicine Progress Note        Chief Complaint: Inpatient Follow-up subdural empyema    HPI per admitting team:   59-year-old female with left MCA stroke s/p thrombectomy in 6/2024 on Plavix, hypothyroidism, anxiety, bronchial asthma, COPD, type 2 diabetes mellitus,  and GERD. Patient recently had subdural hematoma following a fall in May requiring intracranial embolization, craniotomy with hematoma evacuation. After this patient was discharged home. Late May she presented back to the ED with headache, right-sided weakness, facial droop and imaging was concerning for left MCA diminished flow and subacute hematoma. MRI brain was negative for CVA. Neurosurgery performed diony hole for drainage of subdural hematoma due to increased intracranial pressure with postop CT showing definitive improvement patient was on Plavix post embolization which was held briefly and resumed per Neurosurgery on 06/05 and she was transferred to rehab on 06/06. While in rehab patient was noted to have increase right upper extremity weakness and also significant headaches. CT head revealed reaccumulation of subdural fluid collection, increased from before patient was sent back to main [place] for further evaluation/neurosurgery services. Neurosurgery evaluated, planning for  shunt this hospitalization, home meds resumed as appropriate except for Plavix, symptomatic management for headache.  shunt scheduled for 6/16. Patient underwent redo diony hole, fluid evacuation and drain placement, concern for surgical site infection and therefore underwent cranioplasty. Infectious Disease consulted and patient was placed on cefepime, vancomycin and Flagyl pending intraoperative cultures.  Intraoperative cultures growing staph epi.  ID recommending IV vancomycin, IV Rocephin until 7/3.  Neurosurgery wished to monitor patient for another day until 6/21.  PT/OT on board; recommending high-intensity  therapy.  Speech therapy recommending regular diet with moderately thick liquids.  CM on board for placement.   On 6/23 discharge to rehab was planned but Pt developed RUE weakness and Ct head showed increasing size of extra-axial fluid collections along cerebral convexity with increasing mass effect & increase parenchymal edema in L posterior frontal lobe. Pt was taken back to OR and underwent left craniotomy and evacuation of empyema. Post operatively admitted to ICU. Intraoperative culture from 6/23 grew  staph epi and rare Pseudomonas stutzeri.  ID suggested to stop Vancomycin and continue Cefepime as Staph epi is oxacillin sensitive and Cefepime will cover both Pseudomonas and MSSE with total course at least 6 to 8 weeks. Pt's care downgraded back to  service on 6/25/25.  ID requested sensitivity testing for Pseudomonas stutzeri, however per hospital microbiology their instrument was not able to run sensitivities, and they are sending the sample to [place] for further testing. ID recommended to continue Cefepime at this time with tentative end date 8/18/2025.  Pt remains with daily headache and dense weakness to RUE. MRI brain on 6/27/25 showed no acute stroke but noted post surgical left dural thickening and enhancement, left cerebral encephalomalacia with extensive gliotic changes.  As of 6/30 Pt is able to move her Rt upper ext some. Swelling noted inner aspect of Rt arm with U/S suggestive of hematoma 5.6x3.6x1.7 cm. Will continue conservative management for hematoma. Subdural JOHNATHAN drain and every other staple removed on 7/1/25 with complete staple removal on POD #21 7/14/25. NS cleared Pt to resume Plavix 2 weeks post op 7/7/25. Patient able to move RUE/RLE more. Awaiting sensitivities from [place] for Pseudomonas stutzeri isolated operatively.     Patient upset at protracted hospital course and eager to begin stint at Rehab. Counseled regarding importance of appropriate antimicrobial coverage based on culture  results due to which her DC was being held up. She voiced understanding.  Antimicrobial sensitivities for Pseudomonas stutzerimonas returned & are showing sensitivity to Cefepime.  Will continue following ID recommendations from 7/4 with planned end date of antimicrobials on 8/18.    Interval Hx:   No new complaints. Able to move RUE/RLE less than yesterday.  Will plan for discharge once insurance approval is obtained for placement.    Objective/physical exam:  General: alert lady lying comfortably in bed, in no acute distress  HENT: oral and oropharyngeal mucosa moist, pink, with no erythema or exudates, no ear pain or discharge  Neck: normal neck movement, no lymph nodes or swellings, no JVD or Carotid bruit  Respiratory: clear breathing sounds bilaterally, no crackles, rales, ronchi or wheezes  Cardiovascular: clear S1 and S2, no murmurs, rubs or gallops  Peripheral Vascular: no lesions, ulcers or erosions, normal peripheral pulses and no pedal edema  Gastrointestinal: soft, non-tender, non-distended abdomen, no guarding, rigidity or rebound tenderness, normal bowel sounds  Integumentary: normal skin color, no rashes or lesions  Neuro: AAO x 3; motor strength 5/5 in LUE/LLE, 3/5 in RUE/RLE; sensation intact to gross and fine touch B/L; CN II-XII grossly intact      VITAL SIGNS: 24 HRS MIN & MAX LAST   Temp  Min: 97.5 °F (36.4 °C)  Max: 98.2 °F (36.8 °C) 98.2 °F (36.8 °C)   BP  Min: 101/70  Max: 128/81 120/79   Pulse  Min: 82  Max: 98  83   Resp  Min: 18  Max: 18 18   SpO2  Min: 94 %  Max: 98 % 97 %     I have reviewed the following labs:  Recent Labs   Lab 07/07/25  0710   WBC 5.68   RBC 3.69*   HGB 11.0*   HCT 34.4*   MCV 93.2   MCH 29.8   MCHC 32.0*   RDW 15.3   *   MPV 9.6     Assessment/Plan:  Symptomatic UTI with hematuria  Left Subdural empyema - 6/23/25 s/p left craniectomy and evacuation of empyema with tissue culture positive for staph epidermidis and Pseudomonas stutzeri  Traumatic Subdural  hematoma--> s/p left frontoparietal craniotomy and evacuation on 5/19/25   Chronic left SDH with new onset headache, Rt sided weakness and facial droop, s/p left diony hole on 6/2/25  Post op Increased subdural fluid collection, s/p left redo diony hole with fluid evacuation, drain placement on 6/16  Surgical site infection status post cranioplasty on 6/16 with tissue culture positive for Staph epidermidis   Hematoma, RUE- 6/30/25- conservative management   Hx-  CVA in June, 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,Depression/anxiety ,HLD, Hypothyroidism, Essential HTN, Type 2 diabetes mellitus.   Severe Malnutrition    No new complaints  Able to move RUE/RLE better than previously  Remains on IV Cefepime 2 g t.i.d., planned till 08/18; final sensitivities from operative culture growing Pseudomonas stutzeri showing sensitivity to cefepime  Patient reported symptomatic UTI, UA showed infection, UCX with less than 10,000 colonies yeast  Completed 3 days of Ciprofloxacin b.i.d.  Continue [facility]   Neurosurgery cleared pt for Lovenox for VTE prophylaxis as of 6/27/25  Plavix resumed 2 weeks post op on 7/7 per Neurosurgery recommendation on 7/1  ISS LD  PT/OT recommended high intensity therapy  CM on board for placement  Continued on atorvastatin, buspirone b.i.d., Plavix, Zetia, Pepcid b.i.d., levothyroxine, metoprolol succinate, nortriptyline, trazodone     VTE prophylaxis: Lovenox     Patient condition:  Fair     Anticipated discharge and Disposition:   TBD, [facility] rehab will submit for Auth once final cultures from [place] lab received, expecting Friday       All diagnosis and differential diagnosis have been reviewed; assessment and plan has been documented; I have personally reviewed the labs and test results that are presently available; I have reviewed the patients medication list; I have reviewed the consulting providers response and recommendations. I have reviewed or attempted to review medical records based upon their  availability    All of the patient's questions have been  addressed and answered. Patient's is agreeable to the above stated plan. I will continue to monitor closely and make adjustments to medical management as needed.    Portions of this note dictated using EMR integrated voice recognition software, and may be subject to voice recognition errors not corrected at proofreading. Please contact writer for clarification if needed.       Chucho Hernandez MD  Department of Hospital Medicine   [facility]   07/12/2025

## 2025-07-12 NOTE — PT/OT/SLP PROGRESS
Physical Therapy Treatment    Patient Name:  Chelle Chandra   MRN:  [id number]    Recommendations:     Discharge therapy intensity: High Intensity Therapy   Discharge Equipment Recommendations: other (see comments) (to be determined by next level of care facility)  Barriers to discharge: Decreased caregiver support, Impaired mobility, and Ongoing medical needs    Assessment:     Chelle Chandra is a 59 y.o. female admitted with a medical diagnosis of  R sided weakness and facial droop due to L SDH s/p craniotomy and MMA embolization. On 5/30 patient with subdural hemorrhage s/p L diony hole. Pt then with new R sided weakness and underwent a redo of diony hole for fluid evacuation/drain placement and a left cranioplasty for subdural fluid evacuation on 6/16. Now with ICH s/p craniectomy on 6/23, swelling to RUE 6/30 ultrasound suggestive of hematoma.  She presents with the following impairments/functional limitations: weakness, impaired endurance, impaired self care skills, impaired functional mobility, gait instability, impaired balance, decreased coordination, decreased upper extremity function, decreased lower extremity function, decreased safety awareness.     Rehab Prognosis: Good; patient would benefit from acute skilled PT services to address these deficits and reach maximum level of function.    Recent Surgery: Procedure(s) (LRB):  CRANIECTOMY (Left) 19 Days Post-Op    Plan:     During this hospitalization, patient would benefit from acute PT services 6 x/week to address the identified rehab impairments via gait training, therapeutic activities, therapeutic exercises, neuromuscular re-education and progress toward the following goals:    Plan of Care Expires:  08/01/25    Subjective     Chief Complaint: weakness    Objective:     Communicated with nurse prior to session.  Patient found supine with no active line(s) upon PT entry to room.     General Precautions: Standard, fall, seizure  Orthopedic Precautions:  N/A  Braces:  (helment when OOB. Head elevated >30 deg.)  Respiratory Status: Room air  Blood Pressure: 112/74  Skin Integrity: Visible skin intact      Functional Mobility:  Min assist to get EOB and min assist STS  Gait 15 ft QC and mod assist with right swing.     Education Provided:  Role and goals of PT, transfer training, bed mobility, gait training, balance training, safety awareness, assistive device, strengthening exercises, and importance of participating in PT to return to PLOF.     Patient left up in chair with call button in reach      GOALS:   Multidisciplinary Problems       Physical Therapy Goals          Problem: Physical Therapy    Goal Priority Disciplines Outcome Interventions   Physical Therapy Goal     PT, PT/OT Progressing    Description: Goals to be met by: 25     Patient will increase functional independence with mobility by performin. Supine to sit with Modified [place]  2. Sit to supine with Modified [place]  3. Sit to stand transfer with Modified [place]  4. Bed to chair transfer with Modified [place] using Rolling Walker vs QC  5. Gait  x 200 feet with Modified [place] using Rolling Walker vs QC.                          Time Tracking:     PT Received On: 25  PT Start Time: 1230     PT Stop Time: 1243  PT Total Time (min): 13 min     Billable Minutes: Gait Training 13    Treatment Type: Treatment  PT/PTA: PTA     Number of PTA visits since last PT visit: 4     2025

## 2025-07-12 NOTE — PT/OT/SLP PROGRESS
"Occupational Therapy   Treatment    Name: Chelle Chandra  MRN: [id number]    Recommendations:     Recommended therapy intensity at discharge: High Intensity Therapy   Discharge Equipment Recommendations:  to be determined by next level of care       Assessment:     Chelle Chandra is a 59 y.o. female with a medical diagnosis of Subdural empyema.  She presents with pleasant demeanor and good reception to education; however, she can be loquacious and require redirection.  and son also present and receptive to education. Performance deficits affecting function are weakness, impaired endurance, impaired self care skills, impaired functional mobility, impaired balance, decreased upper extremity function, decreased lower extremity function, decreased safety awareness, pain, impaired fine motor.     Rehab Prognosis:  Good; patient would benefit from acute skilled OT services to address these deficits and reach maximum level of function.       Plan:     Patient to be seen 6 x/week to address the above listed problems via self-care/home management, therapeutic activities, therapeutic exercises, neuromuscular re-education, sensory integration, splinting  Plan of Care Expires: 08/04/25  Plan of Care Reviewed with: patient    Subjective     Pain/Comfort:  Pain Rating 1: 4/10  Location 1: head  Pain Addressed 1: Distraction (Patient does not want meds. "Don't worry, I'll tell them if I need something.")    Objective:     Communicated with: Nurse prior to session.  Patient found up in chair with  and son present  upon OT entry to room.    General Precautions: Standard, fall, seizure    Orthopedic Precautions: (midline catheter)  Braces:  (helmet when OOB and HOB >30 degrees)  Respiratory Status: Room air       Occupational Performance:     Neuromuscular Re-education:  -LEE provides education and/or demonstration on several neuro principles including but not limited to...  - WB for stability and sensory input  - " Repetitious tasks to promote brain-body connection   - Self-ROM to increase (R) side attention   - optimal positioning for (R)UE to decrease pain, chances of subluxation (especially when up in chair), and joint integrity  - technique for doffing/donning resting hand splint   - transfer safety    -Patient performs (R) lateral leans onto forearm, pushing to return to midline, to promote WB and shoulder stability; 2 x 5 reps with CGA d/t patient's verbalized fear of falling  -Patient dons and doffs resting hand splint with Min A  -Sit > stand with Min A to place geomat in bedside chair   -LEE edu family on setup, including chair lock, gait belt, block (R)LE, etc.  -Stand pivot from chair to EOB with Min A, into supine (with HOB elevated)with Max A d/t poor (R) side attention.    - With increased cues and assist, she straightens hips and trunk.   -In bed, patient performs self-ROM at shoulder, elbow, forearm.   -LEE reviews high points of session most important to remembers, including (R) side attention, especially to (R) wrist and hand; midline orientation; transfer safety  -CNA enters and asks about patient's transfer skills, as she plans to complete shower at some point today.      Therapeutic Positioning  OT interventions performed during the course of today's session in an effort to prevent and/or reduce acquired pressure injuries:   Education was provided on benefits of and recommendations for therapeutic positioning  Therapeutic positioning was provided at the conclusion of session to offload all bony prominences for the prevention and/or reduction of pressure injuries and for joint integrity      [facility] 6 Click ADL: 17    Co-Treatment: No    Patient Education:  Patient, spouse were, and son/s were provided with verbal education and demonstrations education regarding OT role/goals/POC, safety awareness, and multiple neuro principles described above.  Understanding was verbalized, however additional teaching  warranted.      Patient left HOB elevated with all lines intact, call button in reach, and family present.    GOALS:   Multidisciplinary Problems       Occupational Therapy Goals          Problem: Occupational Therapy    Goal Priority Disciplines Outcome Interventions   Occupational Therapy Goal     OT, PT/OT Progressing    Description: Goals: to be met by 07/14/25    Pt will complete grooming standing at sink with LRAD with CGA.  Pt will complete UB dressing with SBA.  Pt will complete LB dressing with SBA using LRAD.  Pt will complete toileting with CGA using LRAD.  Pt will complete functional mobility to/from toilet and toilet transfer with mod I using LRAD.   Pt will demo visual attention to R side >80% of time with min verbal cues.  Pt will demo RUE strength of 3-/5 for participation in ADLs.                       Time Tracking:     OT Date of Treatment: 07/12/25  OT Start Time: 1027  OT Stop Time: 1109  OT Total Time (min): 42 min    Billable Minutes:Neuromuscular Re-education 42    OTR/L readily available for conference at the time of the provision of services: Alicia Lejeune, LOTR  OT/EDILSON: EDILSON     Number of EDILSON visits since last OT visit: 2    7/12/2025

## 2025-07-12 NOTE — PLAN OF CARE
Problem: Wound  Goal: Optimal Coping  Outcome: Progressing  Intervention: Support Patient and Family Response  Flowsheets (Taken 7/12/2025 0330)  Supportive Measures: active listening utilized  Family/Support System Care:   presence promoted   self-care encouraged  Goal: Optimal Functional Ability  Outcome: Progressing  Intervention: Optimize Functional Ability  Flowsheets (Taken 7/12/2025 0330)  Activity Management: Arm raise - L1  Activity Assistance Provided: assistance, 1 person  Goal: Absence of Infection Signs and Symptoms  Outcome: Progressing  Intervention: Prevent or Manage Infection  Flowsheets (Taken 7/12/2025 0330)  Fever Reduction/Comfort Measures: lightweight bedding  Infection Management: aseptic technique maintained  Isolation Precautions: precautions maintained  Goal: Skin Health and Integrity  Outcome: Progressing  Intervention: Optimize Skin Protection  Flowsheets (Taken 7/12/2025 0330)  Pressure Reduction Techniques:   frequent weight shift encouraged   positioned off wounds  Skin Protection: incontinence pads utilized  Activity Management: Arm raise - L1  Head of Bed (HOB) Positioning: HOB at 30-45 degrees     Problem: Infection  Goal: Absence of Infection Signs and Symptoms  Outcome: Progressing  Intervention: Prevent or Manage Infection  Flowsheets (Taken 7/12/2025 0330)  Fever Reduction/Comfort Measures: lightweight bedding  Infection Management: aseptic technique maintained  Isolation Precautions: precautions maintained     Problem: Fall Injury Risk  Goal: Absence of Fall and Fall-Related Injury  Outcome: Progressing  Intervention: Identify and Manage Contributors  Flowsheets (Taken 7/12/2025 0330)  Self-Care Promotion: independence encouraged  Medication Review/Management: medications reviewed  Intervention: Promote Injury-Free Environment  Flowsheets (Taken 7/12/2025 0330)  Safety Promotion/Fall Prevention:   assistive device/personal item within reach   bedside commode chair   nonskid  shoes/socks when out of bed     Problem: Skin Injury Risk Increased  Goal: Skin Health and Integrity  Outcome: Progressing  Intervention: Optimize Skin Protection  Flowsheets (Taken 7/12/2025 0330)  Pressure Reduction Techniques:   frequent weight shift encouraged   positioned off wounds  Skin Protection: incontinence pads utilized  Activity Management: Arm raise - L1  Head of Bed (HOB) Positioning: HOB at 30-45 degrees

## 2025-07-13 LAB
POCT GLUCOSE: 151 MG/DL (ref 70–110)
POCT GLUCOSE: 221 MG/DL (ref 70–110)
POCT GLUCOSE: 221 MG/DL (ref 70–110)
POCT GLUCOSE: 244 MG/DL (ref 70–110)

## 2025-07-13 PROCEDURE — 25000003 PHARM REV CODE 250: Performed by: INTERNAL MEDICINE

## 2025-07-13 PROCEDURE — 21400001 HC TELEMETRY ROOM

## 2025-07-13 PROCEDURE — 63600175 PHARM REV CODE 636 W HCPCS: Performed by: INTERNAL MEDICINE

## 2025-07-13 PROCEDURE — 63600175 PHARM REV CODE 636 W HCPCS

## 2025-07-13 PROCEDURE — 25000003 PHARM REV CODE 250: Performed by: STUDENT IN AN ORGANIZED HEALTH CARE EDUCATION/TRAINING PROGRAM

## 2025-07-13 PROCEDURE — 25000242 PHARM REV CODE 250 ALT 637 W/ HCPCS: Performed by: INTERNAL MEDICINE

## 2025-07-13 RX ADMIN — DICLOFENAC SODIUM 2 G: 10 GEL TOPICAL at 08:07

## 2025-07-13 RX ADMIN — LEVETIRACETAM 500 MG: 100 SOLUTION ORAL at 08:07

## 2025-07-13 RX ADMIN — CEFEPIME 2 G: 2 INJECTION, POWDER, FOR SOLUTION INTRAVENOUS at 08:07

## 2025-07-13 RX ADMIN — INSULIN ASPART 2 UNITS: 100 INJECTION, SOLUTION INTRAVENOUS; SUBCUTANEOUS at 12:07

## 2025-07-13 RX ADMIN — FAMOTIDINE 20 MG: 20 TABLET, FILM COATED ORAL at 08:07

## 2025-07-13 RX ADMIN — FAMOTIDINE 20 MG: 20 TABLET, FILM COATED ORAL at 09:07

## 2025-07-13 RX ADMIN — ATORVASTATIN CALCIUM 80 MG: 40 TABLET, FILM COATED ORAL at 08:07

## 2025-07-13 RX ADMIN — BISACODYL 10 MG: 10 SUPPOSITORY RECTAL at 05:07

## 2025-07-13 RX ADMIN — LEVOTHYROXINE SODIUM 88 MCG: 0.09 TABLET ORAL at 05:07

## 2025-07-13 RX ADMIN — INSULIN ASPART 1 UNITS: 100 INJECTION, SOLUTION INTRAVENOUS; SUBCUTANEOUS at 09:07

## 2025-07-13 RX ADMIN — Medication 1 EACH: at 08:07

## 2025-07-13 RX ADMIN — CEFEPIME 2 G: 2 INJECTION, POWDER, FOR SOLUTION INTRAVENOUS at 12:07

## 2025-07-13 RX ADMIN — BUTALBITAL, ACETAMINOPHEN, AND CAFFEINE 1 TABLET: 325; 50; 40 TABLET ORAL at 07:07

## 2025-07-13 RX ADMIN — NORTRIPTYLINE HYDROCHLORIDE 25 MG: 25 CAPSULE ORAL at 09:07

## 2025-07-13 RX ADMIN — BUSPIRONE HYDROCHLORIDE 15 MG: 5 TABLET ORAL at 09:07

## 2025-07-13 RX ADMIN — BUTALBITAL, ACETAMINOPHEN, AND CAFFEINE 1 TABLET: 325; 50; 40 TABLET ORAL at 02:07

## 2025-07-13 RX ADMIN — TRAZODONE HYDROCHLORIDE 25 MG: 50 TABLET ORAL at 08:07

## 2025-07-13 RX ADMIN — CLOPIDOGREL 75 MG: 75 TABLET ORAL at 08:07

## 2025-07-13 RX ADMIN — INSULIN ASPART 2 UNITS: 100 INJECTION, SOLUTION INTRAVENOUS; SUBCUTANEOUS at 05:07

## 2025-07-13 RX ADMIN — BUSPIRONE HYDROCHLORIDE 15 MG: 5 TABLET ORAL at 08:07

## 2025-07-13 RX ADMIN — METOPROLOL SUCCINATE 12.5 MG: 25 TABLET, EXTENDED RELEASE ORAL at 08:07

## 2025-07-13 RX ADMIN — PSYLLIUM HUSK 1 PACKET: 3.4 POWDER ORAL at 08:07

## 2025-07-13 RX ADMIN — EZETIMIBE 10 MG: 10 TABLET ORAL at 08:07

## 2025-07-13 RX ADMIN — CEFEPIME 2 G: 2 INJECTION, POWDER, FOR SOLUTION INTRAVENOUS at 05:07

## 2025-07-13 RX ADMIN — ENOXAPARIN SODIUM 40 MG: 40 INJECTION SUBCUTANEOUS at 05:07

## 2025-07-13 NOTE — PROGRESS NOTES
[facility] Hospital Medicine Progress Note        Chief Complaint: Inpatient Follow-up subdural empyema    HPI per admitting team:   59-year-old female with left MCA stroke s/p thrombectomy in 6/2024 on Plavix, hypothyroidism, anxiety, bronchial asthma, COPD, type 2 diabetes mellitus,  and GERD. Patient recently had subdural hematoma following a fall in May requiring intracranial embolization, craniotomy with hematoma evacuation. After this patient was discharged home. Late May she presented back to the ED with headache, right-sided weakness, facial droop and imaging was concerning for left MCA diminished flow and subacute hematoma. MRI brain was negative for CVA. Neurosurgery performed diony hole for drainage of subdural hematoma due to increased intracranial pressure with postop CT showing definitive improvement patient was on Plavix post embolization which was held briefly and resumed per Neurosurgery on 06/05 and she was transferred to rehab on 06/06. While in rehab patient was noted to have increase right upper extremity weakness and also significant headaches. CT head revealed reaccumulation of subdural fluid collection, increased from before patient was sent back to main [facility] for further evaluation/neurosurgery services. Neurosurgery evaluated, planning for  shunt this hospitalization, home meds resumed as appropriate except for Plavix, symptomatic management for headache.  shunt scheduled for 6/16. Patient underwent redo diony hole, fluid evacuation and drain placement, concern for surgical site infection and therefore underwent cranioplasty. Infectious Disease consulted and patient was placed on cefepime, vancomycin and Flagyl pending intraoperative cultures.  Intraoperative cultures growing staph epi.  ID recommending IV vancomycin, IV Rocephin until 7/3.  Neurosurgery wished to monitor patient for another day until 6/21.  PT/OT on board; recommending high-intensity  therapy.  Speech therapy recommending regular diet with moderately thick liquids.  CM on board for placement.   On 6/23 discharge to rehab was planned but Pt developed RUE weakness and Ct head showed increasing size of extra-axial fluid collections along cerebral convexity with increasing mass effect & increase parenchymal edema in L posterior frontal lobe. Pt was taken back to OR and underwent left craniotomy and evacuation of empyema. Post operatively admitted to ICU. Intraoperative culture from 6/23 grew  staph epi and rare Pseudomonas stutzeri.  ID suggested to stop Vancomycin and continue Cefepime as Staph epi is oxacillin sensitive and Cefepime will cover both Pseudomonas and MSSE with total course at least 6 to 8 weeks. Pt's care downgraded back to  service on 6/25/25.  ID requested sensitivity testing for Pseudomonas stutzeri, however per hospital microbiology their instrument was not able to run sensitivities, and they are sending the sample to [place] for further testing. ID recommended to continue Cefepime at this time with tentative end date 8/18/2025.  Pt remains with daily headache and dense weakness to RUE. MRI brain on 6/27/25 showed no acute stroke but noted post surgical left dural thickening and enhancement, left cerebral encephalomalacia with extensive gliotic changes.  As of 6/30 Pt is able to move her Rt upper ext some. Swelling noted inner aspect of Rt arm with U/S suggestive of hematoma 5.6x3.6x1.7 cm. Will continue conservative management for hematoma. Subdural JOHNATHAN drain and every other staple removed on 7/1/25 with complete staple removal on POD #21 7/14/25. NS cleared Pt to resume Plavix 2 weeks post op 7/7/25. Patient able to move RUE/RLE more. Awaiting sensitivities from [place] for Pseudomonas stutzeri isolated operatively.     Patient upset at protracted hospital course and eager to begin stint at Rehab. Counseled regarding importance of appropriate antimicrobial coverage based on culture  results due to which her DC was being held up. She voiced understanding.  Antimicrobial sensitivities for Pseudomonas stutzerimonas returned & are showing sensitivity to Cefepime.  Will continue following ID recommendations from 7/4 with planned end date of antimicrobials on 8/18.  Noted to have increased weakness in RUE compared to 2 days ago.  CT head ordered which showed postsurgical changes with no significant change from prior CT with slightly decreased pneumocephalus.    Interval Hx:   No new complaints.  Continues to have trouble with RUE motor strength with better strength in RLE.  Plan for DC once insurance authorization is obtained by rehab facility    Objective/physical exam:  General: alert lady lying comfortably in bed, in no acute distress  HENT: oral and oropharyngeal mucosa moist, pink, with no erythema or exudates, no ear pain or discharge  Neck: normal neck movement, no lymph nodes or swellings, no JVD or Carotid bruit  Respiratory: clear breathing sounds bilaterally, no crackles, rales, ronchi or wheezes  Cardiovascular: clear S1 and S2, no murmurs, rubs or gallops  Peripheral Vascular: no lesions, ulcers or erosions, normal peripheral pulses and no pedal edema  Gastrointestinal: soft, non-tender, non-distended abdomen, no guarding, rigidity or rebound tenderness, normal bowel sounds  Integumentary: normal skin color, no rashes or lesions  Neuro: AAO x 3; motor strength 5/5 in LUE/LLE, 3/5 in RLE & 2/5 in RUE; sensation intact to gross and fine touch B/L; CN II-XII grossly intact      VITAL SIGNS: 24 HRS MIN & MAX LAST   Temp  Min: 97.4 °F (36.3 °C)  Max: 98.6 °F (37 °C) 98.1 °F (36.7 °C)   BP  Min: 91/63  Max: 131/81 110/76   Pulse  Min: 84  Max: 102  92   Resp  Min: 15  Max: 16 15   SpO2  Min: 95 %  Max: 96 % 96 %     I have reviewed the following labs:  Recent Labs   Lab 07/07/25  0710   WBC 5.68   RBC 3.69*   HGB 11.0*   HCT 34.4*   MCV 93.2   MCH 29.8   MCHC 32.0*   RDW 15.3   *   MPV  9.6     Assessment/Plan:  Symptomatic UTI with hematuria  Left Subdural empyema - 6/23/25 s/p left craniectomy and evacuation of empyema with tissue culture positive for staph epidermidis and Pseudomonas stutzeri  Traumatic Subdural hematoma--> s/p left frontoparietal craniotomy and evacuation on 5/19/25   Chronic left SDH with new onset headache, Rt sided weakness and facial droop, s/p left diony hole on 6/2/25  Post op Increased subdural fluid collection, s/p left redo diony hole with fluid evacuation, drain placement on 6/16  Surgical site infection status post cranioplasty on 6/16 with tissue culture positive for Staph epidermidis   Hematoma, RUE- 6/30/25- conservative management   Hx-  CVA in June, 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,Depression/anxiety ,HLD, Hypothyroidism, Essential HTN, Type 2 diabetes mellitus.   Severe Malnutrition    No new complaints  Able to move RUE less than before, RLE motor strength stable  CT head repeated on 07/13 with no acute findings  Remains on IV Cefepime 2 g t.i.d., planned till 08/18; final sensitivities from operative culture growing Pseudomonas stutzeri showing sensitivity to cefepime  Patient reported symptomatic UTI, UA showed infection, UCX with less than 10,000 colonies yeast  Completed 3 days of Ciprofloxacin b.i.d.  [facility]   Neurosurgery cleared pt for Lovenox for VTE prophylaxis as of 6/27/25  Plavix resumed 2 weeks post op on 7/7 per Neurosurgery recommendation on 7/1  ISS LD  PT/OT recommended high intensity therapy  CM on board for placement  Continued on atorvastatin, buspirone b.i.d., Plavix, Zetia, Pepcid b.i.d., levothyroxine, metoprolol succinate, nortriptyline, trazodone     VTE prophylaxis: Lovenox     Patient condition:  Fair     Anticipated discharge and Disposition:   TBD, [facility] rehab will submit for Auth once final cultures from [place] lab received, expecting Friday       All diagnosis and differential diagnosis have been reviewed; assessment and plan has  been documented; I have personally reviewed the labs and test results that are presently available; I have reviewed the patients medication list; I have reviewed the consulting providers response and recommendations. I have reviewed or attempted to review medical records based upon their availability    All of the patient's questions have been  addressed and answered. Patient's is agreeable to the above stated plan. I will continue to monitor closely and make adjustments to medical management as needed.    Portions of this note dictated using EMR integrated voice recognition software, and may be subject to voice recognition errors not corrected at proofreading. Please contact writer for clarification if needed.       Chucho Hernandez MD  Department of Hospital Medicine   [facility]   07/13/2025

## 2025-07-13 NOTE — PLAN OF CARE
Problem: Adult Inpatient Plan of Care  Goal: Absence of Hospital-Acquired Illness or Injury  Outcome: Progressing  Intervention: Identify and Manage Fall Risk  Flowsheets (Taken 7/13/2025 0332)  Safety Promotion/Fall Prevention:   assistive device/personal item within reach   nonskid shoes/socks when out of bed   side rails raised x 3   patient expresses understanding of fall risk and prevention   high risk medications identified   family expresses understanding of fall risk and prevention   bedside commode chair  Intervention: Prevent Skin Injury  Flowsheets (Taken 7/13/2025 0332)  Body Position: position changed independently  Skin Protection: incontinence pads utilized  Device Skin Pressure Protection:   pressure points protected   absorbent pad utilized/changed  Intervention: Prevent and Manage VTE (Venous Thromboembolism) Risk  Flowsheets (Taken 7/13/2025 0332)  VTE Prevention/Management: fluids promoted  Intervention: Prevent Infection  Flowsheets (Taken 7/13/2025 0332)  Infection Prevention:   equipment surfaces disinfected   hand hygiene promoted   personal protective equipment utilized   rest/sleep promoted  Goal: Optimal Comfort and Wellbeing  Outcome: Progressing  Intervention: Monitor Pain and Promote Comfort  Flowsheets (Taken 7/13/2025 0332)  Pain Management Interventions:   medication offered   pillow support provided   position adjusted   quiet environment facilitated   relaxation techniques promoted  Intervention: Provide Person-Centered Care  Flowsheets (Taken 7/13/2025 0332)  Trust Relationship/Rapport:   care explained   questions encouraged   questions answered   thoughts/feelings acknowledged   reassurance provided     Problem: Diabetes Comorbidity  Goal: Blood Glucose Level Within Targeted Range  Outcome: Progressing  Intervention: Monitor and Manage Glycemia  Flowsheets (Taken 7/13/2025 0332)  Glycemic Management: blood glucose monitored     Problem: Wound  Goal: Optimal Coping  Outcome:  Progressing  Intervention: Support Patient and Family Response  Flowsheets (Taken 7/13/2025 0332)  Supportive Measures:   active listening utilized   self-care encouraged   positive reinforcement provided  Family/Support System Care:   presence promoted   self-care encouraged  Goal: Optimal Functional Ability  Outcome: Progressing  Intervention: Optimize Functional Ability  Flowsheets (Taken 7/13/2025 0332)  Assistive Device Utilized: gait belt  Activity Management: Up to bedside commode - L3  Activity Assistance Provided: assistance, 1 person  Goal: Absence of Infection Signs and Symptoms  Outcome: Progressing  Intervention: Prevent or Manage Infection  Flowsheets (Taken 7/13/2025 0332)  Fever Reduction/Comfort Measures: lightweight bedding  Infection Management: aseptic technique maintained  Isolation Precautions: precautions maintained  Goal: Optimal Pain Control and Function  Outcome: Progressing  Goal: Skin Health and Integrity  Outcome: Progressing  Intervention: Optimize Skin Protection  Flowsheets (Taken 7/13/2025 0332)  Pressure Reduction Techniques: frequent weight shift encouraged  Pressure Reduction Devices: positioning supports utilized  Skin Protection: incontinence pads utilized  Activity Management: Up to bedside commode - L3  Head of Bed (HOB) Positioning: HOB at 30-45 degrees  Goal: Optimal Wound Healing  Outcome: Progressing  Intervention: Promote Wound Healing  Flowsheets (Taken 7/13/2025 0332)  Sleep/Rest Enhancement:   consistent schedule promoted   regular sleep/rest pattern promoted     Problem: Infection  Goal: Absence of Infection Signs and Symptoms  Outcome: Progressing  Intervention: Prevent or Manage Infection  Flowsheets (Taken 7/13/2025 0332)  Fever Reduction/Comfort Measures: lightweight bedding  Infection Management: aseptic technique maintained  Isolation Precautions: precautions maintained     Problem: Fall Injury Risk  Goal: Absence of Fall and Fall-Related Injury  Outcome:  Progressing  Intervention: Identify and Manage Contributors  Flowsheets (Taken 7/13/2025 0332)  Self-Care Promotion: independence encouraged  Medication Review/Management: medications reviewed  Intervention: Promote Injury-Free Environment  Flowsheets (Taken 7/13/2025 0332)  Safety Promotion/Fall Prevention:   assistive device/personal item within reach   nonskid shoes/socks when out of bed   side rails raised x 3   patient expresses understanding of fall risk and prevention   high risk medications identified   family expresses understanding of fall risk and prevention   bedside commode chair     Problem: Skin Injury Risk Increased  Goal: Skin Health and Integrity  Outcome: Progressing  Intervention: Optimize Skin Protection  Flowsheets (Taken 7/13/2025 0332)  Pressure Reduction Techniques: frequent weight shift encouraged  Pressure Reduction Devices: positioning supports utilized  Skin Protection: incontinence pads utilized  Activity Management: Up to bedside commode - L3  Head of Bed (HOB) Positioning: HOB at 30-45 degrees  Intervention: Promote and Optimize Oral Intake  Flowsheets (Taken 7/13/2025 0332)  Oral Nutrition Promotion: rest periods promoted

## 2025-07-14 LAB
POCT GLUCOSE: 147 MG/DL (ref 70–110)
POCT GLUCOSE: 218 MG/DL (ref 70–110)
POCT GLUCOSE: 304 MG/DL (ref 70–110)

## 2025-07-14 PROCEDURE — 25000003 PHARM REV CODE 250: Performed by: INTERNAL MEDICINE

## 2025-07-14 PROCEDURE — 97530 THERAPEUTIC ACTIVITIES: CPT | Mod: CO

## 2025-07-14 PROCEDURE — 63600175 PHARM REV CODE 636 W HCPCS

## 2025-07-14 PROCEDURE — 25000242 PHARM REV CODE 250 ALT 637 W/ HCPCS: Performed by: INTERNAL MEDICINE

## 2025-07-14 PROCEDURE — 97530 THERAPEUTIC ACTIVITIES: CPT

## 2025-07-14 PROCEDURE — 21400001 HC TELEMETRY ROOM

## 2025-07-14 PROCEDURE — 63600175 PHARM REV CODE 636 W HCPCS: Performed by: INTERNAL MEDICINE

## 2025-07-14 PROCEDURE — 25000003 PHARM REV CODE 250: Performed by: STUDENT IN AN ORGANIZED HEALTH CARE EDUCATION/TRAINING PROGRAM

## 2025-07-14 PROCEDURE — 97112 NEUROMUSCULAR REEDUCATION: CPT | Mod: CO

## 2025-07-14 PROCEDURE — C1751 CATH, INF, PER/CENT/MIDLINE: HCPCS

## 2025-07-14 RX ORDER — CEFEPIME HYDROCHLORIDE 2 G/1
2 INJECTION, POWDER, FOR SOLUTION INTRAVENOUS EVERY 8 HOURS
Qty: 210 G | Refills: 0 | Status: ON HOLD | OUTPATIENT
Start: 2025-07-14 | End: 2025-08-18

## 2025-07-14 RX ORDER — CLOPIDOGREL BISULFATE 75 MG/1
75 TABLET ORAL DAILY
Qty: 30 TABLET | Refills: 11 | Status: ON HOLD | OUTPATIENT
Start: 2025-07-15 | End: 2026-07-15

## 2025-07-14 RX ORDER — ALPRAZOLAM 0.25 MG/1
0.25 TABLET ORAL ONCE
Status: COMPLETED | OUTPATIENT
Start: 2025-07-14 | End: 2025-07-14

## 2025-07-14 RX ADMIN — BUSPIRONE HYDROCHLORIDE 15 MG: 5 TABLET ORAL at 08:07

## 2025-07-14 RX ADMIN — LEVETIRACETAM 500 MG: 100 SOLUTION ORAL at 08:07

## 2025-07-14 RX ADMIN — METOPROLOL SUCCINATE 12.5 MG: 25 TABLET, EXTENDED RELEASE ORAL at 08:07

## 2025-07-14 RX ADMIN — BUTALBITAL, ACETAMINOPHEN, AND CAFFEINE 1 TABLET: 325; 50; 40 TABLET ORAL at 05:07

## 2025-07-14 RX ADMIN — LEVOTHYROXINE SODIUM 88 MCG: 0.09 TABLET ORAL at 05:07

## 2025-07-14 RX ADMIN — BUTALBITAL, ACETAMINOPHEN, AND CAFFEINE 1 TABLET: 325; 50; 40 TABLET ORAL at 01:07

## 2025-07-14 RX ADMIN — CLOPIDOGREL 75 MG: 75 TABLET ORAL at 08:07

## 2025-07-14 RX ADMIN — BUTALBITAL, ACETAMINOPHEN, AND CAFFEINE 1 TABLET: 325; 50; 40 TABLET ORAL at 02:07

## 2025-07-14 RX ADMIN — EZETIMIBE 10 MG: 10 TABLET ORAL at 08:07

## 2025-07-14 RX ADMIN — Medication 1 EACH: at 08:07

## 2025-07-14 RX ADMIN — CEFEPIME 2 G: 2 INJECTION, POWDER, FOR SOLUTION INTRAVENOUS at 05:07

## 2025-07-14 RX ADMIN — CEFEPIME 2 G: 2 INJECTION, POWDER, FOR SOLUTION INTRAVENOUS at 08:07

## 2025-07-14 RX ADMIN — CEFEPIME 2 G: 2 INJECTION, POWDER, FOR SOLUTION INTRAVENOUS at 12:07

## 2025-07-14 RX ADMIN — INSULIN ASPART 4 UNITS: 100 INJECTION, SOLUTION INTRAVENOUS; SUBCUTANEOUS at 12:07

## 2025-07-14 RX ADMIN — ALPRAZOLAM 0.25 MG: 0.25 TABLET ORAL at 04:07

## 2025-07-14 RX ADMIN — TRAZODONE HYDROCHLORIDE 25 MG: 50 TABLET ORAL at 08:07

## 2025-07-14 RX ADMIN — FAMOTIDINE 20 MG: 20 TABLET, FILM COATED ORAL at 08:07

## 2025-07-14 RX ADMIN — DICLOFENAC SODIUM 2 G: 10 GEL TOPICAL at 08:07

## 2025-07-14 RX ADMIN — NORTRIPTYLINE HYDROCHLORIDE 25 MG: 25 CAPSULE ORAL at 08:07

## 2025-07-14 RX ADMIN — ATORVASTATIN CALCIUM 80 MG: 40 TABLET, FILM COATED ORAL at 08:07

## 2025-07-14 RX ADMIN — ENOXAPARIN SODIUM 40 MG: 40 INJECTION SUBCUTANEOUS at 04:07

## 2025-07-14 RX ADMIN — PSYLLIUM HUSK 1 PACKET: 3.4 POWDER ORAL at 08:07

## 2025-07-14 NOTE — PT/OT/SLP PROGRESS
Occupational Therapy   Treatment    Name: Chelle Chandra  MRN: [id number]    Recommendations:     Recommended therapy intensity at discharge: High Intensity Therapy   Discharge Equipment Recommendations:  to be determined by next level of care  Barriers to discharge:       Assessment:     Chelle Chandra is a 59 y.o. female with a medical diagnosis of R sided weakness and facial droop due to L SDH s/p craniotomy and MMA embolization. On 5/30 patient with subdural hemorrhage s/p L diony hole. Pt then with new R sided weakness and underwent a redo of diony hole for fluid evacuation/drain placement and a left cranioplasty for subdural fluid evacuation on 6/16. Now with ICH s/p craniectomy on 6/23, swelling to RUE 6/30 ultrasound suggestive of hematoma. Performance deficits affecting function are weakness, impaired endurance, impaired self care skills, impaired functional mobility, impaired balance, decreased upper extremity function, decreased lower extremity function, decreased safety awareness, pain, impaired fine motor.     She presents with increased RUE and RLE weakness today states awaiting MRI. RN approved LEE to treat today. She tolerated session well yet presents being discouraged at end of session d/t increased weakness of R side, req cueing to increase mood and encouragement.      Rehab Prognosis:  Good; patient would benefit from acute skilled OT services to address these deficits and reach maximum level of function.       Plan:     Patient to be seen 6 x/week to address the above listed problems via self-care/home management, therapeutic activities, therapeutic exercises, neuromuscular re-education, sensory integration, splinting  Plan of Care Expires: 08/04/25  Plan of Care Reviewed with: patient    Subjective     Pain/Comfort:  Pain Rating 1: 0/10    Objective:     Communicated with: RN prior to session.  Patient found supine with   upon OT entry to room.    General Precautions: Standard, fall, seizure     Orthopedic Precautions: (midline catheter)  Braces:  (helmet with OOB and HOB >30 degrees)  Respiratory Status: Room air  Vital Signs: Blood Pressure: 118/76     Occupational Performance:     Functional Mobility/Transfers:  Bed mobility:    Scooting: contact guard assistance  Supine to Sit: minimum assistance  Sit to Supine: moderate assistance  Transfers: Sit to Stand: moderate assistance with hand-held assist      Balance:   Static Sitting Balance: One UE support: Good: Patient able to maintain balance without handhold support, limited postural sway.    Therapeutic Activity:  Pt performed 3 sit to stand from EOB with modA to maintain balance presenting with heavy right side lean. LEE stabilized RUE during stands d/t flaccidity and increased weakness. RUE sling would be appropriate for pt at this time during standing and ambulation.    Neuromuscular Tunde:  LEE educated pt on self PROM exercises to perform on RUE while laying in bed to increase RUE strength and neuromuscular control return. Pt verbalizes and demonstrates self PROM of RUE.      OT interventions performed during the course of today's session in an effort to prevent and/or reduce acquired pressure injuries:   Education was provided on benefits of and recommendations for therapeutic positioning     [facility] 6 Click ADL: 15    Co-Treatment: No    Patient Education:  Patient provided with verbal education education regarding OT role/goals/POC, fall prevention, and safety awareness.  Understanding was verbalized.      Patient left supine with all lines intact and call button in reach.    GOALS:   Multidisciplinary Problems       Occupational Therapy Goals          Problem: Occupational Therapy    Goal Priority Disciplines Outcome Interventions   Occupational Therapy Goal     OT, PT/OT Progressing    Description: Goals: to be met by 07/14/25    Pt will complete grooming standing at sink with LRAD with CGA.  Pt will complete UB dressing with SBA.  Pt will  complete LB dressing with SBA using LRAD.  Pt will complete toileting with CGA using LRAD.  Pt will complete functional mobility to/from toilet and toilet transfer with mod I using LRAD.   Pt will demo visual attention to R side >80% of time with min verbal cues.  Pt will demo RUE strength of 3-/5 for participation in ADLs.                       Time Tracking:     OT Date of Treatment: 07/14/25  OT Start Time: 1400  OT Stop Time: 1431  OT Total Time (min): 31 min    Billable Minutes:Neuromuscular Re-education 31    OTR/L readily available for conference at the time of the provision of services: LISA Mallory  OT/EDILSON: EDILSON     Number of EDILSON visits since last OT visit: 3    7/14/2025

## 2025-07-14 NOTE — PLAN OF CARE
Clinical updates sent to [facility]. Necessary sensitivities are back from  [place] per Dr. Hernandez.

## 2025-07-14 NOTE — PT/OT/SLP PROGRESS
Physical Therapy Treatment    Patient Name:  Chelle Chandra   MRN:  [id number]    Recommendations:     Discharge therapy intensity: High Intensity Therapy   Discharge Equipment Recommendations: other (see comments) (to be determined by next level of care facility)  Barriers to discharge: Impaired mobility and Ongoing medical needs    Assessment:     Chelle Chandra is a 59 y.o. female admitted with a medical diagnosis of R sided weakness and facial droop due to L SDH s/p craniotomy and MMA embolization. On 5/30 patient with subdural hemorrhage s/p L diony hole. Pt then with new R sided weakness and underwent a redo of diony hole for fluid evacuation/drain placement and a left cranioplasty for subdural fluid evacuation on 6/16. Now with ICH s/p craniectomy on 6/23, swelling to RUE 6/30 ultrasound suggestive of hematoma.  She presents with the following impairments/functional limitations: weakness, impaired endurance, impaired self care skills, impaired functional mobility, gait instability, impaired balance, decreased upper extremity function, decreased lower extremity function, decreased safety awareness. Pt tolerated therapy session fair. She presents with new onset of worsening Right UE and LE weakness and states she is very fatigued. Pt also demonstrates a significant right and forward trunk lean during sitting and standing balance activities. NSG and MD notified. Pt currently requires Danny for bed mobility, Danny for transfers and Min-ModA to maintain standing balance. Pt would benefit from HIGH intensity skilled therapy sessions following d/c from this facility.     DME Justification:  No DME recommended requiring DME justifications    Rehab Prognosis: Good; patient would benefit from acute skilled PT services to address these deficits and reach maximum level of function.    Recent Surgery: Procedure(s) (LRB):  CRANIECTOMY (Left) 21 Days Post-Op    Plan:     During this hospitalization, patient would benefit from  "acute PT services 6 x/week to address the identified rehab impairments via gait training, therapeutic activities, therapeutic exercises, neuromuscular re-education and progress toward the following goals:    Plan of Care Expires:  08/01/25    Subjective     Chief Complaint: "tired"  Patient/Family Comments/goals: return home  Pain/Comfort:  Pain Rating 1: 0/10      Objective:     Communicated with MD ADRY prior to session.  Patient found HOB elevated with no active line(s) upon PT entry to room.     General Precautions: Standard, fall, seizure  Orthopedic Precautions: N/A  Braces:  (helmet when OOB. >30 deg HOB elevation)  Respiratory Status: Room air  Skin Integrity: Visible skin intact      Functional Mobility:  Bed Mobility:     Scooting: minimum assistance  Supine to Sit: minimum assistance  Sit to Supine: minimum assistance  Transfers:     Sit to Stand:  minimum assistance with rolling walker  Balance: Static sitting balance: FAIR-. Static standing balance: POOR+    Therapeutic Activities/Exercises:  Helmet donned prior to mobility.   Pt performed bed mobility using bed rail for UE support. Pt unable to utilized RUE for mobility. Giv[employer]r sling donned once seated. Danny required for trunk and LE mobility.   Pt performed static sitting balance while setaed EOB. Pt demonstrates signficant right ans forward trunk lean. Pt also demonstrates difficulty with hold head in midline. Pt's sitting balance improved with verbal and tactile cues to remain in midline, but pt could not maintain sitting balance independently.   Pt performed sit to stand transfer from EOB. Upon standing, pt unable to fully extend right knee and demonstrates significant right trunk lean. Verbal and tactile cues given to improve technique, but pot unable to maintain standing balance and required Min-ModA.   Pt unable to fully extend right knee while seated EOB and was previously able to perform this activity. RLE strength grossly graded 2-/5. "     Co-Treatment: No    Education:  Patient and family were provided with verbal education education regarding PT role/goals/POC, fall prevention, and safety awareness.  Understanding was verbalized.     Patient left HOB elevated with all lines intact, call button in reach, and family present      GOALS:   Multidisciplinary Problems       Physical Therapy Goals          Problem: Physical Therapy    Goal Priority Disciplines Outcome Interventions   Physical Therapy Goal     PT, PT/OT Progressing    Description: Goals to be met by: 25     Patient will increase functional independence with mobility by performin. Supine to sit with Modified [place]  2. Sit to supine with Modified [place]  3. Sit to stand transfer with Modified [place]  4. Bed to chair transfer with Modified [place] using Rolling Walker vs QC  5. Gait  x 200 feet with Modified [place] using Rolling Walker vs QC.                          Time Tracking:     PT Received On: 25  PT Start Time: 1056     PT Stop Time: 1111  PT Total Time (min): 15 min     Billable Minutes: Therapeutic Activity 1 unit    Treatment Type: Treatment  PT/PTA: PT     Number of PTA visits since last PT visit: 5     2025

## 2025-07-14 NOTE — PROGRESS NOTES
Inpatient Nutrition Assessment    Admit Date: 6/11/2025   Total duration of encounter: 33 days   Patient Age: 59 y.o.    Nutrition Recommendation/Prescription     Continue current diet regular diet and moderately thickened liquids per SLP recommendations.   Resume chocolate Magic Cup (provides 190 kcal, 9 g protein per serving) BID once in stock.  Obtain new weight as feasible.    Communication of Recommendations: EMR    Nutrition Assessment     Malnutrition Assessment/Nutrition-Focused Physical Exam    Malnutrition Context: acute illness or injury (06/18/25 1545)  Malnutrition Level: severe (06/18/25 1545)  Energy Intake (Malnutrition): other (see comments) (Does not meet criteria) (06/18/25 1545)  Weight Loss (Malnutrition): greater than 5% in 1 month (06/18/25 1545)  Subcutaneous Fat (Malnutrition): moderate depletion (06/18/25 1545)  Orbital Region (Subcutaneous Fat Loss): moderate depletion        Muscle Mass (Malnutrition): moderate depletion (06/18/25 1545)  Mandaeism Region (Muscle Loss): moderate depletion  Clavicle Bone Region (Muscle Loss): moderate depletion                    Fluid Accumulation (Malnutrition): other (see comments) (Not present) (06/18/25 1545)     Hand  Strength, Right (Malnutrition): Unable to assess (06/18/25 1545)  A minimum of two characteristics is recommended for diagnosis of either severe or non-severe malnutrition.    Chart Review    Reason Seen: length of stay and follow-up    Malnutrition Screening Tool Results   Have you recently lost weight without trying?: Yes: 2-13 lbs  Have you been eating poorly because of a decreased appetite?: No   MST Score: 1   Diagnosis:  Increased subdural fluid collection Status post left redo diony hole with fluid evacuation, drain placement 6/16  Suspected surgical site infection status post cranioplasty 6/16  Subdural hematoma requiring craniotomy with evacuation and MMA embolization early May with recurrence late May requiring diony  "hole  Right-sided weakness/intractable headache secondary to above    Relevant Medical History:   hypothyroidism, anxiety, bronchial asthma, COPD, type 2 diabetes mellitus, 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, 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, GERD     Scheduled Medications:  atorvastatin, 80 mg, Daily  busPIRone, 15 mg, BID  ceFEPIme, 2 g, Q8H  clopidogreL, 75 mg, Daily  diclofenac sodium, 2 g, BID  enoxparin, 40 mg, Q24H (prophylaxis, 1700)  ezetimibe, 10 mg, Daily  famotidine, 20 mg, BID  Lactobacillus rhamnosus GG, 1 packet, Daily  levetiracetam, 500 mg, BID  levothyroxine, 88 mcg, Before breakfast  metoprolol succinate, 12.5 mg, Daily  nortriptyline, 25 mg, QHS  psyllium husk, 1 packet, Daily  traZODone, 25 mg, QHS    Continuous Infusions:     PRN Medications:  acetaminophen, 650 mg, Q4H PRN  acetaminophen, 650 mg, Q4H PRN  bisacodyL, 10 mg, Daily PRN  butalbital-acetaminophen-caffeine -40 mg, 1 tablet, Q4H PRN  dextrose 50%, 12.5 g, PRN  dextrose 50%, 25 g, PRN  diphenhydrAMINE, 25 mg, Q6H PRN  glucagon (human recombinant), 1 mg, PRN  glucose, 16 g, PRN  glucose, 24 g, PRN  hydrALAZINE, 10 mg, Q4H PRN  hydrocortisone, , BID PRN  insulin aspart U-100, 0-5 Units, QID (AC + HS) PRN  labetalol, 10 mg, Q4H PRN  melatonin, 6 mg, Nightly PRN  morphine, 2 mg, Q4H PRN  naloxone, 0.02 mg, PRN  ondansetron, 4 mg, Q8H PRN  oxyCODONE-acetaminophen, 1 tablet, Q4H PRN  prochlorperazine, 5 mg, Q6H PRN  sodium chloride 0.9%, 10 mL, PRN  sodium chloride 0.9%, 10 mL, Q12H PRN  sodium chloride 0.9%, 3 mL, Q12H PRN    Calorie Containing IV Medications: no significant kcals from medications at this time    No results for input(s): "NA", "K", "CALCIUM", "PHOS", "MG", "CL", "CO2", "BUN", "CREATININE", "EGFRNORACEVR", "GLU", "BILITOT", "ALKPHOS", "ALT", "AST", "ALBUMIN", "PREALB", "CRP", "HSCRP", "TRIG", "HGBA1C", "AMMONIA", "LIPASE", "AMYLASE", "WBC", "HGB", "HCT" in the last 168 hours.    Nutrition Orders:  Diet Consistent Carbohydrate 2000 " "Calories (up to 75 gm per meal); Moderately Thick Liquids (IDDSI Level 3)  Dietary nutrition supplements BID; Magic Cup - Chocolate    Appetite/Oral Intake: fair/50-75% of meals  Factors Affecting Nutritional Intake: preferences   Social Needs Impacting Access to Food: none identified  Food/Mosque/Cultural Preferences: none reported  Food Allergies: no known food allergies  Last Bowel Movement: 07/13/25  Wound(s):  scalp and temporal incisions noted.     Comments    6/18/25: Spoke to family members at bedside who report pt's appetite has been fluctuating since admit depending on her pain levels. PO intake varies between %. Pt ate 75% of her breakfast this morning but did not eat much of her lunch. Family reports UBW of 112 lbs, last weighing that about 1 month ago. Family reports wt at admit was about 100 lbs which would indicate a 10% wt loss x1 month, nutritionally significant. Most recent bedscale wt from 6/12 is 110 lbs, will monitor for accuracy/trends.     6/20/25: Patient reports good oral intake of meals. Denies any nausea, vomiting, or diarrhea. Constipation noted.     6/24/25: Pt feeling "off" this AM and didn't eat as much. Still on mod thick liquids. Will send Magic Cup for added kcal and protein.    6/27/25: Pt reports fair appetite, does not eat much for breakfast but eats close to 50% of lunch and dinner. Pt states her family has been bringing in outside meals for dinner, she is looking forward to getting some ribs/steak. Pt consuming 1-2 Magic Cup supplements per day.      7/1/25: Pt working with therapy. No recent documentation of oral intake. Will follow-up early.     7/3/25: Fair intake of meals. Likes scrambled eggs for breakfast. Eating magic cup with dinner. Updated order to BID and only chocolate. Wants cereal and milk with every meal, updated in CBORD. Pt is receiving some outside food from . Denies any current GI complaints.     7/7/25: Pt out of rooms during rounds. " "Documentation of 50% breakfast consumed this morning. Consistent carbohydrate diet restriction added 7/3 by MD. Currently out of magic cup supplement so patient is not receiving. Will follow-up early.     7/10/25: Reports fair appetite, ate scrambled eggs and cereal for breakfast. No GI complaints. Still out of magic cup. Requesting water at bedside, place request on CBORD. No updated labs.     7/14/25: Eating cheerios and milk, apple juice and applesauce for breakfast. Requesting 2 servings of cereal, notified kitchen. Reports good intake over the weekend. No current GI complaints, states 3 good bowel movements yesterday. No updated labs.     Anthropometrics    Height: 5' 6" (167.6 cm), Height Method: Measured  Last Weight: 49.9 kg (110 lb) (06/12/25 0223), Weight Method: Bed Scale  BMI (Calculated): 17.8  BMI Classification: underweight (BMI less than 18.5)     Ideal Body Weight (IBW), Female: 130 lb     % Ideal Body Weight, Female (lb): 84.62 %                             Usual Weight Provided By: patient    Wt Readings from Last 5 Encounters:   06/12/25 49.9 kg (110 lb)   06/06/25 46.9 kg (103 lb 6.3 oz)   06/03/25 46.3 kg (102 lb)   05/15/25 49 kg (108 lb 0.4 oz)   05/06/25 50.8 kg (112 lb)     Weight Change(s) Since Admission:   7/1-7/10/25 no updated weights   Wt Readings from Last 1 Encounters:   06/12/25 0223 49.9 kg (110 lb)   06/11/25 1314 46.7 kg (103 lb)   Admit Weight: 46.7 kg (103 lb) (06/11/25 1314), Weight Method: Stated    Estimated Needs    Weight Used For Calorie Calculations: 49.9 kg (110 lb 0.2 oz)  Energy Calorie Requirements (kcal): 8015-7115 kcals (30-35 kcal/kg)  Energy Need Method: Kcal/kg  Weight Used For Protein Calculations: 49.9 kg (110 lb 0.2 oz)  Protein Requirements: 60-75 g (1.2-1.5 g/kg)  Fluid Requirements (mL): 1497 mL (30 mL/kg)  CHO Requirement: N/A     Enteral Nutrition     Patient not receiving enteral nutrition at this time.    Parenteral Nutrition     Patient not receiving " parenteral nutrition support at this time.    Evaluation of Received Nutrient Intake    Calories: meeting estimated needs, likely >/=75% EER  Protein: meeting estimated needs,  likely >/=75% EER    Patient Education     Not applicable.    Nutrition Diagnosis     PES: Inadequate energy intake related to acute illness as evidenced by meeting <75% EEN. (resolved)    PES: Severe acute disease or injury related malnutrition Related to  As Evidenced by:  - weight loss: > 5% in 1 month - muscle mass depletion: 2 areas of moderate muscle loss (Clavicle, Temporalis) - loss of subcutaneous fat: 2 areas of moderate fat loss (Infraorbital, Buccal) active    Nutrition Interventions     Intervention(s): Collaboration and referral of nutrition care  Intervention(s): Oral diet/nutrient modifications    Goal: Meet greater than 80% of nutritional needs by follow-up. (goal progressing)  Goal: Maintain weight throughout hospitalization. (goal progressing)    Nutrition Goals & Monitoring     Dietitian will monitor: energy intake, weight, and gastrointestinal profile  Discharge planning: regular diet with texture modifications per SLP  Nutrition Risk/Follow-Up: patient at increased nutrition risk; dietitian will follow-up twice weekly   Please consult if re-assessment needed sooner.

## 2025-07-15 ENCOUNTER — HOSPITAL ENCOUNTER (INPATIENT)
Facility: HOSPITAL | Age: 60
LOS: 28 days | Discharge: HOME HEALTH | DRG: 094 | End: 2025-08-12
Attending: INTERNAL MEDICINE | Admitting: INTERNAL MEDICINE
Payer: COMMERCIAL

## 2025-07-15 VITALS
HEIGHT: 66 IN | BODY MASS INDEX: 17.68 KG/M2 | WEIGHT: 110 LBS | HEART RATE: 91 BPM | DIASTOLIC BLOOD PRESSURE: 73 MMHG | SYSTOLIC BLOOD PRESSURE: 112 MMHG | RESPIRATION RATE: 15 BRPM | OXYGEN SATURATION: 97 % | TEMPERATURE: 97 F

## 2025-07-15 DIAGNOSIS — R53.1 ACUTE RIGHT-SIDED WEAKNESS: ICD-10-CM

## 2025-07-15 DIAGNOSIS — G06.2 SUBDURAL EMPYEMA: ICD-10-CM

## 2025-07-15 DIAGNOSIS — S06.5XAA SUBDURAL HEMATOMA: ICD-10-CM

## 2025-07-15 DIAGNOSIS — Z98.890 S/P CRANIOTOMY: Primary | ICD-10-CM

## 2025-07-15 LAB
POCT GLUCOSE: 144 MG/DL (ref 70–110)
POCT GLUCOSE: 153 MG/DL (ref 70–110)
POCT GLUCOSE: 210 MG/DL (ref 70–110)
POCT GLUCOSE: 246 MG/DL (ref 70–110)

## 2025-07-15 PROCEDURE — 25000003 PHARM REV CODE 250: Performed by: INTERNAL MEDICINE

## 2025-07-15 PROCEDURE — 63600175 PHARM REV CODE 636 W HCPCS: Performed by: NURSE PRACTITIONER

## 2025-07-15 PROCEDURE — 11800000 HC REHAB PRIVATE ROOM

## 2025-07-15 PROCEDURE — 25000242 PHARM REV CODE 250 ALT 637 W/ HCPCS: Performed by: INTERNAL MEDICINE

## 2025-07-15 PROCEDURE — 25000003 PHARM REV CODE 250: Performed by: NURSE PRACTITIONER

## 2025-07-15 PROCEDURE — 94761 N-INVAS EAR/PLS OXIMETRY MLT: CPT

## 2025-07-15 PROCEDURE — 99900035 HC TECH TIME PER 15 MIN (STAT)

## 2025-07-15 PROCEDURE — 63600175 PHARM REV CODE 636 W HCPCS

## 2025-07-15 RX ORDER — HYDROCODONE BITARTRATE AND ACETAMINOPHEN 5; 325 MG/1; MG/1
1 TABLET ORAL DAILY PRN
Status: DISCONTINUED | OUTPATIENT
Start: 2025-07-15 | End: 2025-08-12 | Stop reason: HOSPADM

## 2025-07-15 RX ORDER — GLUCAGON 1 MG
1 KIT INJECTION
Status: DISCONTINUED | OUTPATIENT
Start: 2025-07-15 | End: 2025-08-12 | Stop reason: HOSPADM

## 2025-07-15 RX ORDER — IBUPROFEN 200 MG
16 TABLET ORAL
Status: DISCONTINUED | OUTPATIENT
Start: 2025-07-15 | End: 2025-08-12 | Stop reason: HOSPADM

## 2025-07-15 RX ORDER — ATORVASTATIN CALCIUM 40 MG/1
80 TABLET, FILM COATED ORAL DAILY
Status: DISCONTINUED | OUTPATIENT
Start: 2025-07-16 | End: 2025-08-12 | Stop reason: HOSPADM

## 2025-07-15 RX ORDER — OXYCODONE HYDROCHLORIDE 5 MG/1
5 TABLET ORAL EVERY 4 HOURS PRN
Status: DISCONTINUED | OUTPATIENT
Start: 2025-07-15 | End: 2025-08-12 | Stop reason: HOSPADM

## 2025-07-15 RX ORDER — ONDANSETRON HYDROCHLORIDE 2 MG/ML
4 INJECTION, SOLUTION INTRAVENOUS EVERY 8 HOURS PRN
Status: DISCONTINUED | OUTPATIENT
Start: 2025-07-15 | End: 2025-08-12 | Stop reason: HOSPADM

## 2025-07-15 RX ORDER — LABETALOL HCL 20 MG/4 ML
10 SYRINGE (ML) INTRAVENOUS EVERY 4 HOURS PRN
Status: DISCONTINUED | OUTPATIENT
Start: 2025-07-15 | End: 2025-08-12 | Stop reason: HOSPADM

## 2025-07-15 RX ORDER — CLOPIDOGREL BISULFATE 75 MG/1
75 TABLET ORAL DAILY
Status: DISCONTINUED | OUTPATIENT
Start: 2025-07-16 | End: 2025-08-12 | Stop reason: HOSPADM

## 2025-07-15 RX ORDER — DOCUSATE SODIUM 50 MG/5ML
100 LIQUID ORAL 2 TIMES DAILY
Status: DISCONTINUED | OUTPATIENT
Start: 2025-07-15 | End: 2025-08-12 | Stop reason: HOSPADM

## 2025-07-15 RX ORDER — POLYETHYLENE GLYCOL 3350 17 G/17G
17 POWDER, FOR SOLUTION ORAL EVERY 12 HOURS PRN
Status: DISCONTINUED | OUTPATIENT
Start: 2025-07-15 | End: 2025-08-12 | Stop reason: HOSPADM

## 2025-07-15 RX ORDER — HYDRALAZINE HYDROCHLORIDE 20 MG/ML
10 INJECTION INTRAMUSCULAR; INTRAVENOUS EVERY 4 HOURS PRN
Status: DISCONTINUED | OUTPATIENT
Start: 2025-07-15 | End: 2025-08-12 | Stop reason: HOSPADM

## 2025-07-15 RX ORDER — LEVETIRACETAM 100 MG/ML
500 SOLUTION ORAL 2 TIMES DAILY
Status: DISCONTINUED | OUTPATIENT
Start: 2025-07-15 | End: 2025-08-12 | Stop reason: HOSPADM

## 2025-07-15 RX ORDER — NORTRIPTYLINE HYDROCHLORIDE 25 MG/1
25 CAPSULE ORAL NIGHTLY
Status: DISCONTINUED | OUTPATIENT
Start: 2025-07-15 | End: 2025-08-12 | Stop reason: HOSPADM

## 2025-07-15 RX ORDER — INSULIN ASPART 100 [IU]/ML
0-5 INJECTION, SOLUTION INTRAVENOUS; SUBCUTANEOUS
Status: DISCONTINUED | OUTPATIENT
Start: 2025-07-15 | End: 2025-08-12 | Stop reason: HOSPADM

## 2025-07-15 RX ORDER — IBUPROFEN 200 MG
24 TABLET ORAL
Status: DISCONTINUED | OUTPATIENT
Start: 2025-07-15 | End: 2025-08-12 | Stop reason: HOSPADM

## 2025-07-15 RX ORDER — L. ACIDOPHILUS/L.BULGARICUS 100MM CELL
1 GRANULES IN PACKET (EA) ORAL DAILY
Status: DISCONTINUED | OUTPATIENT
Start: 2025-07-15 | End: 2025-08-12 | Stop reason: HOSPADM

## 2025-07-15 RX ORDER — ACETAMINOPHEN 325 MG/1
650 TABLET ORAL EVERY 6 HOURS PRN
Status: DISCONTINUED | OUTPATIENT
Start: 2025-07-15 | End: 2025-08-12 | Stop reason: HOSPADM

## 2025-07-15 RX ORDER — CEFEPIME HYDROCHLORIDE 2 G/1
2 INJECTION, POWDER, FOR SOLUTION INTRAVENOUS EVERY 8 HOURS
Status: DISCONTINUED | OUTPATIENT
Start: 2025-07-15 | End: 2025-07-29

## 2025-07-15 RX ORDER — LEVOTHYROXINE SODIUM 88 UG/1
88 TABLET ORAL
Status: DISCONTINUED | OUTPATIENT
Start: 2025-07-16 | End: 2025-07-21

## 2025-07-15 RX ORDER — DOCUSATE SODIUM 100 MG/1
100 CAPSULE, LIQUID FILLED ORAL 2 TIMES DAILY
Status: DISCONTINUED | OUTPATIENT
Start: 2025-07-15 | End: 2025-07-15 | Stop reason: SDUPTHER

## 2025-07-15 RX ORDER — DICLOFENAC SODIUM 10 MG/G
2 GEL TOPICAL 2 TIMES DAILY
Status: DISCONTINUED | OUTPATIENT
Start: 2025-07-15 | End: 2025-08-12 | Stop reason: HOSPADM

## 2025-07-15 RX ORDER — LEVETIRACETAM 500 MG/1
500 TABLET ORAL 2 TIMES DAILY
Status: DISCONTINUED | OUTPATIENT
Start: 2025-07-15 | End: 2025-07-15

## 2025-07-15 RX ORDER — LANOLIN ALCOHOL/MO/W.PET/CERES
1 CREAM (GRAM) TOPICAL DAILY
Status: DISCONTINUED | OUTPATIENT
Start: 2025-07-16 | End: 2025-08-12 | Stop reason: HOSPADM

## 2025-07-15 RX ORDER — DOCUSATE SODIUM 100 MG/1
100 CAPSULE, LIQUID FILLED ORAL 2 TIMES DAILY
Status: DISCONTINUED | OUTPATIENT
Start: 2025-07-15 | End: 2025-07-15

## 2025-07-15 RX ORDER — ONDANSETRON 4 MG/1
8 TABLET, ORALLY DISINTEGRATING ORAL EVERY 8 HOURS PRN
Status: DISCONTINUED | OUTPATIENT
Start: 2025-07-15 | End: 2025-08-12 | Stop reason: HOSPADM

## 2025-07-15 RX ORDER — FAMOTIDINE 20 MG/1
20 TABLET, FILM COATED ORAL 2 TIMES DAILY
Status: DISCONTINUED | OUTPATIENT
Start: 2025-07-15 | End: 2025-08-12 | Stop reason: HOSPADM

## 2025-07-15 RX ORDER — TALC
6 POWDER (GRAM) TOPICAL NIGHTLY PRN
Status: DISCONTINUED | OUTPATIENT
Start: 2025-07-15 | End: 2025-08-12 | Stop reason: HOSPADM

## 2025-07-15 RX ORDER — ENOXAPARIN SODIUM 100 MG/ML
40 INJECTION SUBCUTANEOUS EVERY 24 HOURS
Status: DISCONTINUED | OUTPATIENT
Start: 2025-07-15 | End: 2025-07-22

## 2025-07-15 RX ORDER — EZETIMIBE 10 MG/1
10 TABLET ORAL DAILY
Status: DISCONTINUED | OUTPATIENT
Start: 2025-07-15 | End: 2025-08-12 | Stop reason: HOSPADM

## 2025-07-15 RX ORDER — BISACODYL 10 MG/1
10 SUPPOSITORY RECTAL DAILY PRN
Status: DISCONTINUED | OUTPATIENT
Start: 2025-07-15 | End: 2025-08-12 | Stop reason: HOSPADM

## 2025-07-15 RX ADMIN — FAMOTIDINE 20 MG: 20 TABLET, FILM COATED ORAL at 09:07

## 2025-07-15 RX ADMIN — ATORVASTATIN CALCIUM 80 MG: 40 TABLET, FILM COATED ORAL at 09:07

## 2025-07-15 RX ADMIN — FAMOTIDINE 20 MG: 20 TABLET, FILM COATED ORAL at 08:07

## 2025-07-15 RX ADMIN — BUTALBITAL, ACETAMINOPHEN, AND CAFFEINE 1 TABLET: 325; 50; 40 TABLET ORAL at 02:07

## 2025-07-15 RX ADMIN — Medication 1 EACH: at 09:07

## 2025-07-15 RX ADMIN — EZETIMIBE 10 MG: 10 TABLET ORAL at 09:07

## 2025-07-15 RX ADMIN — CLOPIDOGREL 75 MG: 75 TABLET ORAL at 09:07

## 2025-07-15 RX ADMIN — LEVETIRACETAM 500 MG: 100 SOLUTION ORAL at 09:07

## 2025-07-15 RX ADMIN — CEFEPIME 2 G: 2 INJECTION, POWDER, FOR SOLUTION INTRAVENOUS at 06:07

## 2025-07-15 RX ADMIN — NORTRIPTYLINE HYDROCHLORIDE 25 MG: 25 CAPSULE ORAL at 08:07

## 2025-07-15 RX ADMIN — BUSPIRONE HYDROCHLORIDE 15 MG: 5 TABLET ORAL at 09:07

## 2025-07-15 RX ADMIN — DOCUSATE SODIUM LIQUID 100 MG: 100 LIQUID ORAL at 08:07

## 2025-07-15 RX ADMIN — LEVETIRACETAM 500 MG: 100 SOLUTION ORAL at 08:07

## 2025-07-15 RX ADMIN — OXYCODONE HYDROCHLORIDE 5 MG: 5 TABLET ORAL at 03:07

## 2025-07-15 RX ADMIN — TRAZODONE HYDROCHLORIDE 25 MG: 50 TABLET ORAL at 08:07

## 2025-07-15 RX ADMIN — CEFEPIME 2 G: 2 INJECTION, POWDER, FOR SOLUTION INTRAVENOUS at 12:07

## 2025-07-15 RX ADMIN — DICLOFENAC SODIUM 2 G: 10 GEL TOPICAL at 10:07

## 2025-07-15 RX ADMIN — BUSPIRONE HYDROCHLORIDE 15 MG: 10 TABLET ORAL at 08:07

## 2025-07-15 RX ADMIN — LEVOTHYROXINE SODIUM 88 MCG: 0.09 TABLET ORAL at 06:07

## 2025-07-15 RX ADMIN — METOPROLOL SUCCINATE 12.5 MG: 25 TABLET, EXTENDED RELEASE ORAL at 09:07

## 2025-07-15 RX ADMIN — INSULIN ASPART 1 UNITS: 100 INJECTION, SOLUTION INTRAVENOUS; SUBCUTANEOUS at 08:07

## 2025-07-15 RX ADMIN — PSYLLIUM HUSK 1 PACKET: 3.4 POWDER ORAL at 09:07

## 2025-07-15 RX ADMIN — INSULIN ASPART 2 UNITS: 100 INJECTION, SOLUTION INTRAVENOUS; SUBCUTANEOUS at 11:07

## 2025-07-15 RX ADMIN — CEFEPIME 2 G: 2 INJECTION, POWDER, FOR SOLUTION INTRAVENOUS at 09:07

## 2025-07-15 RX ADMIN — ENOXAPARIN SODIUM 40 MG: 40 INJECTION SUBCUTANEOUS at 05:07

## 2025-07-15 RX ADMIN — OXYCODONE HYDROCHLORIDE 5 MG: 5 TABLET ORAL at 08:07

## 2025-07-15 NOTE — PROGRESS NOTES
[facility] Hospital Medicine Progress Note        Chief Complaint: Inpatient Follow-up subdural empyema    HPI per admitting team:   59-year-old female with left MCA stroke s/p thrombectomy in 6/2024 on Plavix, hypothyroidism, anxiety, bronchial asthma, COPD, type 2 diabetes mellitus,  and GERD. Patient recently had subdural hematoma following a fall in May requiring intracranial embolization, craniotomy with hematoma evacuation. After this patient was discharged home. Late May she presented back to the ED with headache, right-sided weakness, facial droop and imaging was concerning for left MCA diminished flow and subacute hematoma. MRI brain was negative for CVA. Neurosurgery performed diony hole for drainage of subdural hematoma due to increased intracranial pressure with postop CT showing definitive improvement patient was on Plavix post embolization which was held briefly and resumed per Neurosurgery on 06/05 and she was transferred to rehab on 06/06. While in rehab patient was noted to have increase right upper extremity weakness and also significant headaches. CT head revealed reaccumulation of subdural fluid collection, increased from before patient was sent back to main [place] for further evaluation/neurosurgery services. Neurosurgery evaluated, planning for  shunt this hospitalization, home meds resumed as appropriate except for Plavix, symptomatic management for headache.  shunt scheduled for 6/16. Patient underwent redo diony hole, fluid evacuation and drain placement, concern for surgical site infection and therefore underwent cranioplasty. Infectious Disease consulted and patient was placed on cefepime, vancomycin and Flagyl pending intraoperative cultures.  Intraoperative cultures growing staph epi.  ID recommending IV vancomycin, IV Rocephin until 7/3.  Neurosurgery wished to monitor patient for another day until 6/21.  PT/OT on board; recommending high-intensity  therapy.  Speech therapy recommending regular diet with moderately thick liquids.  CM on board for placement.   On 6/23 discharge to rehab was planned but Pt developed RUE weakness and Ct head showed increasing size of extra-axial fluid collections along cerebral convexity with increasing mass effect & increase parenchymal edema in L posterior frontal lobe. Pt was taken back to OR and underwent left craniotomy and evacuation of empyema. Post operatively admitted to ICU. Intraoperative culture from 6/23 grew  staph epi and rare Pseudomonas stutzeri.  ID suggested to stop Vancomycin and continue Cefepime as Staph epi is oxacillin sensitive and Cefepime will cover both Pseudomonas and MSSE with total course at least 6 to 8 weeks. Pt's care downgraded back to  service on 6/25/25.  ID requested sensitivity testing for Pseudomonas stutzeri, however per hospital microbiology their instrument was not able to run sensitivities, and they are sending the sample to [place] for further testing. ID recommended to continue Cefepime at this time with tentative end date 8/18/2025.  Pt remains with daily headache and dense weakness to RUE. MRI brain on 6/27/25 showed no acute stroke but noted post surgical left dural thickening and enhancement, left cerebral encephalomalacia with extensive gliotic changes.  As of 6/30 Pt is able to move her Rt upper ext some. Swelling noted inner aspect of Rt arm with U/S suggestive of hematoma 5.6x3.6x1.7 cm. Will continue conservative management for hematoma. Subdural JOHNATHAN drain and every other staple removed on 7/1/25 with complete staple removal on POD #21 7/14/25. NS cleared Pt to resume Plavix 2 weeks post op 7/7/25. Patient able to move RUE/RLE more. Awaiting sensitivities from [place] for Pseudomonas stutzeri isolated operatively.     Patient upset at protracted hospital course and eager to begin stint at Rehab. Counseled regarding importance of appropriate antimicrobial coverage based on culture  "results due to which her DC was being held up. She voiced understanding.  Antimicrobial sensitivities for Pseudomonas stutzerimonas returned & are showing sensitivity to Cefepime.  Will continue following ID recommendations from 7/4 with planned end date of antimicrobials on 8/18.  Noted to have increased weakness in RUE compared to 2 days ago.  CT head ordered which showed postsurgical changes with no significant change from prior CT with slightly decreased pneumocephalus.    Interval Hx:   No new complaints.  Noted to have worsened RUE weakness with motor strength 0/5 with better strength in RLE 3/5. CT Head from yesterday negative. MRI Brain ordered. Neurosurgery notified. Patient planned for DC to Rehab today but will hold until MRI results.    Objective/physical exam:  General: alert lady lying comfortably in bed, in no acute distress  HENT: oral and oropharyngeal mucosa moist, pink, with no erythema or exudates, no ear pain or discharge  Neck: normal neck movement, no lymph nodes or swellings, no JVD or Carotid bruit  Respiratory: clear breathing sounds bilaterally, no crackles, rales, ronchi or wheezes  Cardiovascular: clear S1 and S2, no murmurs, rubs or gallops  Peripheral Vascular: no lesions, ulcers or erosions, normal peripheral pulses and no pedal edema  Gastrointestinal: soft, non-tender, non-distended abdomen, no guarding, rigidity or rebound tenderness, normal bowel sounds  Integumentary: normal skin color, no rashes or lesions  Neuro: AAO x 3; motor strength 5/5 in LUE/LLE, 3/5 in RLE & 0/5 in RUE; sensation intact to gross and fine touch B/L; CN II-XII grossly intact      VITAL SIGNS: 24 HRS MIN & MAX LAST   Temp  Min: 97.4 °F (36.3 °C)  Max: 97.5 °F (36.4 °C) 97.4 °F (36.3 °C)   BP  Min: 104/67  Max: 115/76 112/73   Pulse  Min: 89  Max: 102  91   No data recorded 15   SpO2  Min: 96 %  Max: 98 % 97 %     I have reviewed the following labs:  No results for input(s): "WBC", "RBC", "HGB", "HCT", " ""MCV", "MCH", "MCHC", "RDW", "PLT", "MPV", "GRAN", "LYMPH", "MONO", "BASO", "NRBC" in the last 168 hours.    Assessment/Plan:  Worsened RUE Weakness  Symptomatic UTI with hematuria  Left Subdural empyema - 6/23/25 s/p left craniectomy and evacuation of empyema with tissue culture positive for staph epidermidis and Pseudomonas stutzeri  Traumatic Subdural hematoma--> s/p left frontoparietal craniotomy and evacuation on 5/19/25   Chronic left SDH with new onset headache, Rt sided weakness and facial droop, s/p left diony hole on 6/2/25  Post op Increased subdural fluid collection, s/p left redo diony hole with fluid evacuation, drain placement on 6/16  Surgical site infection status post cranioplasty on 6/16 with tissue culture positive for Staph epidermidis   Hematoma, RUE- 6/30/25- conservative management   Hx-  CVA in June, 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,Depression/anxiety ,HLD, Hypothyroidism, Essential HTN, Type 2 diabetes mellitus.   Severe Malnutrition    No new complaints  Worsened RUE weakness from 2 days ago with 0/5 motor strength; MRI Brain pending  Will follow Neurosurgery recommendations after MRI  CT head repeated on 07/13 with no acute findings  Remains on IV Cefepime 2 g t.i.d., planned till 08/18; final sensitivities from operative culture growing Pseudomonas stutzeri showing sensitivity to cefepime  Patient reported symptomatic UTI, UA showed infection, UCX with less than 10,000 colonies yeast  Completed 3 days of Ciprofloxacin b.i.d.  Continue [facility]   Neurosurgery cleared pt for Lovenox for VTE prophylaxis as of 6/27/25  Plavix resumed 2 weeks post op on 7/7 per Neurosurgery recommendation on 7/1  ISS LD  PT/OT recommended high intensity therapy  CM on board for placement  Continued on atorvastatin, buspirone b.i.d., Plavix, Zetia, Pepcid b.i.d., levothyroxine, metoprolol succinate, nortriptyline, trazodone     VTE prophylaxis: Lovenox     Patient condition:  Fair     Anticipated discharge and " Disposition:   TBD, [facility] rehab will submit for Auth once final cultures from [place] lab received, expecting Friday       All diagnosis and differential diagnosis have been reviewed; assessment and plan has been documented; I have personally reviewed the labs and test results that are presently available; I have reviewed the patients medication list; I have reviewed the consulting providers response and recommendations. I have reviewed or attempted to review medical records based upon their availability    All of the patient's questions have been  addressed and answered. Patient's is agreeable to the above stated plan. I will continue to monitor closely and make adjustments to medical management as needed.    Portions of this note dictated using EMR integrated voice recognition software, and may be subject to voice recognition errors not corrected at proofreading. Please contact writer for clarification if needed.       Chucho Hernandez MD  Department of Hospital Medicine   [facility]   07/15/2025

## 2025-07-15 NOTE — PLAN OF CARE
07/15/25 1201   Final Note   Assessment Type Final Discharge Note   Anticipated Discharge Disposition Rehab   Post-Acute Status   Post-Acute Authorization Other  ([facility])   Post-Acute Placement Status Set-up Complete/Auth obtained   Discharge Delays None known at this time     Patient has been accepted by [facility].  Internal transportation arranged for 1300. D/C docs sent to [facility] via [facility].  No further discharge planning needs identified at this time.

## 2025-07-15 NOTE — PROGRESS NOTES
No new issues.  Events of yesterday noted.  Awake, alert, Moves right leg well.Weak right arm as before.  MRI shows post-op changes with no new stroke.  Okay for discharge.  Thank you.

## 2025-07-15 NOTE — PROGRESS NOTES
[facility] Hospital Medicine Progress Note        Chief Complaint: Inpatient Follow-up subdural empyema    HPI per admitting team:   59-year-old female with left MCA stroke s/p thrombectomy in 6/2024 on Plavix, hypothyroidism, anxiety, bronchial asthma, COPD, type 2 diabetes mellitus,  and GERD. Patient recently had subdural hematoma following a fall in May requiring intracranial embolization, craniotomy with hematoma evacuation. After this patient was discharged home. Late May she presented back to the ED with headache, right-sided weakness, facial droop and imaging was concerning for left MCA diminished flow and subacute hematoma. MRI brain was negative for CVA. Neurosurgery performed diony hole for drainage of subdural hematoma due to increased intracranial pressure with postop CT showing definitive improvement patient was on Plavix post embolization which was held briefly and resumed per Neurosurgery on 06/05 and she was transferred to rehab on 06/06. While in rehab patient was noted to have increase right upper extremity weakness and also significant headaches. CT head revealed reaccumulation of subdural fluid collection, increased from before patient was sent back to main [place] for further evaluation/neurosurgery services. Neurosurgery evaluated, planning for  shunt this hospitalization, home meds resumed as appropriate except for Plavix, symptomatic management for headache.  shunt scheduled for 6/16. Patient underwent redo diony hole, fluid evacuation and drain placement, concern for surgical site infection and therefore underwent cranioplasty. Infectious Disease consulted and patient was placed on cefepime, vancomycin and Flagyl pending intraoperative cultures.  Intraoperative cultures growing staph epi.  ID recommending IV vancomycin, IV Rocephin until 7/3.  Neurosurgery wished to monitor patient for another day until 6/21.  PT/OT on board; recommending high-intensity  therapy.  Speech therapy recommending regular diet with moderately thick liquids.  CM on board for placement.   On 6/23 discharge to rehab was planned but Pt developed RUE weakness and Ct head showed increasing size of extra-axial fluid collections along cerebral convexity with increasing mass effect & increase parenchymal edema in L posterior frontal lobe. Pt was taken back to OR and underwent left craniotomy and evacuation of empyema. Post operatively admitted to ICU. Intraoperative culture from 6/23 grew  staph epi and rare Pseudomonas stutzeri.  ID suggested to stop Vancomycin and continue Cefepime as Staph epi is oxacillin sensitive and Cefepime will cover both Pseudomonas and MSSE with total course at least 6 to 8 weeks. Pt's care downgraded back to  service on 6/25/25.  ID requested sensitivity testing for Pseudomonas stutzeri, however per hospital microbiology their instrument was not able to run sensitivities, and they are sending the sample to [place] for further testing. ID recommended to continue Cefepime at this time with tentative end date 8/18/2025.  Pt remains with daily headache and dense weakness to RUE. MRI brain on 6/27/25 showed no acute stroke but noted post surgical left dural thickening and enhancement, left cerebral encephalomalacia with extensive gliotic changes.  As of 6/30 Pt is able to move her Rt upper ext some. Swelling noted inner aspect of Rt arm with U/S suggestive of hematoma 5.6x3.6x1.7 cm. Will continue conservative management for hematoma. Subdural JOHNATHAN drain and every other staple removed on 7/1/25 with complete staple removal on POD #21 7/14/25. NS cleared Pt to resume Plavix 2 weeks post op 7/7/25. Patient able to move RUE/RLE more. Awaiting sensitivities from [place] for Pseudomonas stutzeri isolated operatively.     Patient upset at protracted hospital course and eager to begin stint at Rehab. Counseled regarding importance of appropriate antimicrobial coverage based on culture  "results due to which her DC was being held up. She voiced understanding.  Antimicrobial sensitivities for Pseudomonas stutzerimonas returned & are showing sensitivity to Cefepime.  Will continue following ID recommendations from 7/4 with planned end date of antimicrobials on 8/18.  Noted to have increased weakness in RUE compared to 2 days ago.  CT head ordered which showed postsurgical changes with no significant change from prior CT with slightly decreased pneumocephalus.      Repeat MRI 7/14/25 showed postop changes and no acute changes.      Interval Hx:   HD S.  No acute events overnight.  Comfortably resting.  Currently awaiting placement.  Tolerating p.o..  Headaches improving.  No family members at bedside.    Objective/physical exam:  General: alert lady lying comfortably in bed, in no acute distress  HENT: oral and oropharyngeal mucosa moist, pink, with no erythema or exudates, no ear pain or discharge  Neck: normal neck movement, no lymph nodes or swellings, no JVD or Carotid bruit  Respiratory: clear breathing sounds bilaterally, no crackles, rales, ronchi or wheezes  Cardiovascular: clear S1 and S2, no murmurs, rubs or gallops  Peripheral Vascular: no lesions, ulcers or erosions, normal peripheral pulses and no pedal edema  Gastrointestinal: soft, non-tender, non-distended abdomen, no guarding, rigidity or rebound tenderness, normal bowel sounds  Integumentary: normal skin color, no rashes or lesions  Neuro: AAO x 3; motor strength 5/5 in LUE/LLE, 3/5 in RLE & 2/5 in RUE; sensation intact to gross and fine touch B/L; CN II-XII grossly intact      VITAL SIGNS: 24 HRS MIN & MAX LAST   Temp  Min: 97.4 °F (36.3 °C)  Max: 98 °F (36.7 °C) 97.4 °F (36.3 °C)   BP  Min: 104/67  Max: 115/76 112/73   Pulse  Min: 86  Max: 102  91   No data recorded 15   SpO2  Min: 96 %  Max: 98 % 97 %     I have reviewed the following labs:  No results for input(s): "WBC", "RBC", "HGB", "HCT", "MCV", "MCH", "MCHC", "RDW", "PLT", " ""MPV", "GRAN", "LYMPH", "MONO", "BASO", "NRBC" in the last 168 hours.    Assessment/Plan:  Symptomatic UTI with hematuria  Left Subdural empyema - 6/23/25 s/p left craniectomy and evacuation of empyema with tissue culture positive for staph epidermidis and Pseudomonas stutzeri  Traumatic Subdural hematoma--> s/p left frontoparietal craniotomy and evacuation on 5/19/25   Chronic left SDH with new onset headache, Rt sided weakness and facial droop, s/p left diony hole on 6/2/25  Post op Increased subdural fluid collection, s/p left redo diony hole with fluid evacuation, drain placement on 6/16  Surgical site infection status post cranioplasty on 6/16 with tissue culture positive for Staph epidermidis   Hematoma, RUE- 6/30/25- conservative management   Hx-  CVA in June, 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,Depression/anxiety ,HLD, Hypothyroidism, Essential HTN, Type 2 diabetes mellitus.   Severe Malnutrition    Plan:   -medically stable for discharge.  PT OT as tolerated  -repeat MRI reviewed.  Neurology clearing for discharge       Remains on IV Cefepime 2 g t.i.d., planned till 08/18; final sensitivities from operative culture growing Pseudomonas stutzeri showing sensitivity to cefepime  Patient reported symptomatic UTI, UA showed infection, UCX with less than 10,000 colonies yeast  Completed 3 days of Ciprofloxacin b.i.d.  Continue [facility]   Neurosurgery cleared pt for Lovenox for VTE prophylaxis as of 6/27/25  Plavix resumed 2 weeks post op on 7/7 per Neurosurgery recommendation on 7/1  ISS LD  PT/OT recommended high intensity therapy  CM on board for placement  Continued on atorvastatin, buspirone b.i.d., Plavix, Zetia, Pepcid b.i.d., levothyroxine, metoprolol succinate, nortriptyline, trazodone     VTE prophylaxis: Lovenox     Patient condition:  Fair     Anticipated discharge and Disposition:   [facility] will submit for Auth once final cultures from [place] lab received, expecting Friday       All diagnosis and " differential diagnosis have been reviewed; assessment and plan has been documented; I have personally reviewed the labs and test results that are presently available; I have reviewed the patients medication list; I have reviewed the consulting providers response and recommendations. I have reviewed or attempted to review medical records based upon their availability    All of the patient's questions have been  addressed and answered. Patient's is agreeable to the above stated plan. I will continue to monitor closely and make adjustments to medical management as needed.    Portions of this note dictated using EMR integrated voice recognition software, and may be subject to voice recognition errors not corrected at proofreading. Please contact writer for clarification if needed.       Oswald Crawley MD  Department of Hospital Medicine   [facility]   07/15/2025

## 2025-07-15 NOTE — PLAN OF CARE
Problem: Adult Inpatient Plan of Care  Goal: Plan of Care Review  Outcome: Met  Goal: Patient-Specific Goal (Individualized)  Outcome: Met  Goal: Absence of Hospital-Acquired Illness or Injury  Outcome: Met  Goal: Optimal Comfort and Wellbeing  Outcome: Met  Goal: Readiness for Transition of Care  Outcome: Met     Problem: Diabetes Comorbidity  Goal: Blood Glucose Level Within Targeted Range  Outcome: Met     Problem: Wound  Goal: Optimal Coping  Outcome: Met  Goal: Optimal Functional Ability  Outcome: Met  Goal: Absence of Infection Signs and Symptoms  Outcome: Met  Goal: Improved Oral Intake  Outcome: Met  Goal: Optimal Pain Control and Function  Outcome: Met  Goal: Skin Health and Integrity  Outcome: Met  Goal: Optimal Wound Healing  Outcome: Met     Problem: Infection  Goal: Absence of Infection Signs and Symptoms  Outcome: Met     Problem: Comorbidity Management  Goal: Maintenance of Behavioral Health Symptom Control  Outcome: Met     Problem: Fall Injury Risk  Goal: Absence of Fall and Fall-Related Injury  Outcome: Met     Problem: Skin Injury Risk Increased  Goal: Skin Health and Integrity  Outcome: Met

## 2025-07-16 LAB
ALBUMIN SERPL-MCNC: 3.4 G/DL (ref 3.4–4.8)
ALBUMIN/GLOB SERPL: 1 RATIO (ref 1.1–2)
ALP SERPL-CCNC: 142 UNIT/L (ref 40–150)
ALT SERPL-CCNC: 71 UNIT/L (ref 0–55)
ANION GAP SERPL CALC-SCNC: 7 MEQ/L
AST SERPL-CCNC: 25 UNIT/L (ref 11–45)
BASOPHILS # BLD AUTO: 0.06 X10(3)/MCL
BASOPHILS NFR BLD AUTO: 1.6 %
BILIRUB SERPL-MCNC: 0.4 MG/DL
BUN SERPL-MCNC: 15.9 MG/DL (ref 9.8–20.1)
CALCIUM SERPL-MCNC: 9.2 MG/DL (ref 8.4–10.2)
CHLORIDE SERPL-SCNC: 110 MMOL/L (ref 98–107)
CO2 SERPL-SCNC: 24 MMOL/L (ref 23–31)
CREAT SERPL-MCNC: 0.65 MG/DL (ref 0.55–1.02)
CREAT/UREA NIT SERPL: 24
EOSINOPHIL # BLD AUTO: 0.23 X10(3)/MCL (ref 0–0.9)
EOSINOPHIL NFR BLD AUTO: 6.2 %
ERYTHROCYTE [DISTWIDTH] IN BLOOD BY AUTOMATED COUNT: 14.4 % (ref 11.5–17)
EST. AVERAGE GLUCOSE BLD GHB EST-MCNC: 148.5 MG/DL
GFR SERPLBLD CREATININE-BSD FMLA CKD-EPI: >60 ML/MIN/1.73/M2
GLOBULIN SER-MCNC: 3.4 GM/DL (ref 2.4–3.5)
GLUCOSE SERPL-MCNC: 150 MG/DL (ref 82–115)
HBA1C MFR BLD: 6.8 %
HCT VFR BLD AUTO: 35.2 % (ref 37–47)
HGB BLD-MCNC: 11.7 G/DL (ref 12–16)
IMM GRANULOCYTES # BLD AUTO: 0.02 X10(3)/MCL (ref 0–0.04)
IMM GRANULOCYTES NFR BLD AUTO: 0.5 %
LYMPHOCYTES # BLD AUTO: 1.6 X10(3)/MCL (ref 0.6–4.6)
LYMPHOCYTES NFR BLD AUTO: 43.4 %
MAGNESIUM SERPL-MCNC: 1.8 MG/DL (ref 1.6–2.6)
MCH RBC QN AUTO: 30.5 PG (ref 27–31)
MCHC RBC AUTO-ENTMCNC: 33.2 G/DL (ref 33–36)
MCV RBC AUTO: 91.7 FL (ref 80–94)
MONOCYTES # BLD AUTO: 0.48 X10(3)/MCL (ref 0.1–1.3)
MONOCYTES NFR BLD AUTO: 13 %
NEUTROPHILS # BLD AUTO: 1.3 X10(3)/MCL (ref 2.1–9.2)
NEUTROPHILS NFR BLD AUTO: 35.3 %
NRBC BLD AUTO-RTO: 0 %
PHOSPHATE SERPL-MCNC: 4 MG/DL (ref 2.3–4.7)
PLATELET # BLD AUTO: 309 X10(3)/MCL (ref 130–400)
PMV BLD AUTO: 10 FL (ref 7.4–10.4)
POCT GLUCOSE: 144 MG/DL (ref 70–110)
POCT GLUCOSE: 156 MG/DL (ref 70–110)
POCT GLUCOSE: 161 MG/DL (ref 70–110)
POCT GLUCOSE: 217 MG/DL (ref 70–110)
POTASSIUM SERPL-SCNC: 3.7 MMOL/L (ref 3.5–5.1)
PREALB SERPL-MCNC: 31.7 MG/DL (ref 14–37)
PROT SERPL-MCNC: 6.8 GM/DL (ref 5.8–7.6)
RBC # BLD AUTO: 3.84 X10(6)/MCL (ref 4.2–5.4)
SODIUM SERPL-SCNC: 141 MMOL/L (ref 136–145)
WBC # BLD AUTO: 3.69 X10(3)/MCL (ref 4.5–11.5)

## 2025-07-16 PROCEDURE — 97162 PT EVAL MOD COMPLEX 30 MIN: CPT

## 2025-07-16 PROCEDURE — 97542 WHEELCHAIR MNGMENT TRAINING: CPT

## 2025-07-16 PROCEDURE — 99900035 HC TECH TIME PER 15 MIN (STAT)

## 2025-07-16 PROCEDURE — 25000003 PHARM REV CODE 250: Performed by: NURSE PRACTITIONER

## 2025-07-16 PROCEDURE — 97530 THERAPEUTIC ACTIVITIES: CPT

## 2025-07-16 PROCEDURE — 25000003 PHARM REV CODE 250: Performed by: INTERNAL MEDICINE

## 2025-07-16 PROCEDURE — 92610 EVALUATE SWALLOWING FUNCTION: CPT

## 2025-07-16 PROCEDURE — 63600175 PHARM REV CODE 636 W HCPCS: Performed by: NURSE PRACTITIONER

## 2025-07-16 PROCEDURE — 97110 THERAPEUTIC EXERCISES: CPT

## 2025-07-16 PROCEDURE — 83036 HEMOGLOBIN GLYCOSYLATED A1C: CPT | Performed by: NURSE PRACTITIONER

## 2025-07-16 PROCEDURE — 85025 COMPLETE CBC W/AUTO DIFF WBC: CPT | Performed by: NURSE PRACTITIONER

## 2025-07-16 PROCEDURE — 83735 ASSAY OF MAGNESIUM: CPT | Performed by: NURSE PRACTITIONER

## 2025-07-16 PROCEDURE — 97116 GAIT TRAINING THERAPY: CPT

## 2025-07-16 PROCEDURE — 97166 OT EVAL MOD COMPLEX 45 MIN: CPT

## 2025-07-16 PROCEDURE — 97535 SELF CARE MNGMENT TRAINING: CPT

## 2025-07-16 PROCEDURE — 84100 ASSAY OF PHOSPHORUS: CPT | Performed by: NURSE PRACTITIONER

## 2025-07-16 PROCEDURE — 36415 COLL VENOUS BLD VENIPUNCTURE: CPT | Performed by: NURSE PRACTITIONER

## 2025-07-16 PROCEDURE — 11800000 HC REHAB PRIVATE ROOM

## 2025-07-16 PROCEDURE — 84134 ASSAY OF PREALBUMIN: CPT | Performed by: NURSE PRACTITIONER

## 2025-07-16 PROCEDURE — 99223 1ST HOSP IP/OBS HIGH 75: CPT | Mod: ,,, | Performed by: NURSE PRACTITIONER

## 2025-07-16 PROCEDURE — 80053 COMPREHEN METABOLIC PANEL: CPT | Performed by: NURSE PRACTITIONER

## 2025-07-16 RX ADMIN — METOPROLOL SUCCINATE 12.5 MG: 25 TABLET, EXTENDED RELEASE ORAL at 08:07

## 2025-07-16 RX ADMIN — Medication 1 EACH: at 08:07

## 2025-07-16 RX ADMIN — LEVOTHYROXINE SODIUM 88 MCG: 0.09 TABLET ORAL at 05:07

## 2025-07-16 RX ADMIN — INSULIN ASPART 2 UNITS: 100 INJECTION, SOLUTION INTRAVENOUS; SUBCUTANEOUS at 12:07

## 2025-07-16 RX ADMIN — CEFEPIME 2 G: 2 INJECTION, POWDER, FOR SOLUTION INTRAVENOUS at 03:07

## 2025-07-16 RX ADMIN — BUSPIRONE HYDROCHLORIDE 15 MG: 10 TABLET ORAL at 09:07

## 2025-07-16 RX ADMIN — BUSPIRONE HYDROCHLORIDE 15 MG: 10 TABLET ORAL at 08:07

## 2025-07-16 RX ADMIN — DOCUSATE SODIUM LIQUID 100 MG: 100 LIQUID ORAL at 08:07

## 2025-07-16 RX ADMIN — TRAZODONE HYDROCHLORIDE 25 MG: 50 TABLET ORAL at 09:07

## 2025-07-16 RX ADMIN — OXYCODONE HYDROCHLORIDE 5 MG: 5 TABLET ORAL at 05:07

## 2025-07-16 RX ADMIN — EZETIMIBE 10 MG: 10 TABLET ORAL at 08:07

## 2025-07-16 RX ADMIN — ENOXAPARIN SODIUM 40 MG: 40 INJECTION SUBCUTANEOUS at 04:07

## 2025-07-16 RX ADMIN — OXYCODONE HYDROCHLORIDE 5 MG: 5 TABLET ORAL at 10:07

## 2025-07-16 RX ADMIN — FAMOTIDINE 20 MG: 20 TABLET, FILM COATED ORAL at 09:07

## 2025-07-16 RX ADMIN — FERROUS SULFATE TAB 325 MG (65 MG ELEMENTAL FE) 1 EACH: 325 (65 FE) TAB at 08:07

## 2025-07-16 RX ADMIN — FAMOTIDINE 20 MG: 20 TABLET, FILM COATED ORAL at 08:07

## 2025-07-16 RX ADMIN — CEFEPIME 2 G: 2 INJECTION, POWDER, FOR SOLUTION INTRAVENOUS at 06:07

## 2025-07-16 RX ADMIN — LEVETIRACETAM 500 MG: 100 SOLUTION ORAL at 09:07

## 2025-07-16 RX ADMIN — CEFEPIME 2 G: 2 INJECTION, POWDER, FOR SOLUTION INTRAVENOUS at 10:07

## 2025-07-16 RX ADMIN — DICLOFENAC SODIUM 2 G: 10 GEL TOPICAL at 08:07

## 2025-07-16 RX ADMIN — DOCUSATE SODIUM LIQUID 100 MG: 100 LIQUID ORAL at 09:07

## 2025-07-16 RX ADMIN — OXYCODONE HYDROCHLORIDE 5 MG: 5 TABLET ORAL at 03:07

## 2025-07-16 RX ADMIN — ATORVASTATIN CALCIUM 80 MG: 40 TABLET, FILM COATED ORAL at 08:07

## 2025-07-16 RX ADMIN — LEVETIRACETAM 500 MG: 100 SOLUTION ORAL at 08:07

## 2025-07-16 RX ADMIN — DICLOFENAC SODIUM 2 G: 10 GEL TOPICAL at 09:07

## 2025-07-16 RX ADMIN — CLOPIDOGREL BISULFATE 75 MG: 75 TABLET, FILM COATED ORAL at 08:07

## 2025-07-16 RX ADMIN — NORTRIPTYLINE HYDROCHLORIDE 25 MG: 25 CAPSULE ORAL at 09:07

## 2025-07-16 RX ADMIN — ACETAMINOPHEN 650 MG: 325 TABLET ORAL at 10:07

## 2025-07-16 NOTE — DISCHARGE SUMMARY
[facility] Medicine Discharge Summary    Admit Date: 6/11/2025  Discharge Date and Time: 7/16/20257:43 AM  Admitting Physician:  Team  Discharging Physician: Oswald Crawley MD.  Primary Care Physician: Jorge Silver MD  Consults: Cardiology      59-year-old female with left MCA stroke s/p thrombectomy in 6/2024 on Plavix, hypothyroidism, anxiety, bronchial asthma, COPD, type 2 diabetes mellitus,  and GERD. Patient recently had subdural hematoma following a fall in May requiring intracranial embolization, craniotomy with hematoma evacuation. After this patient was discharged home. Late May she presented back to the ED with headache, right-sided weakness, facial droop and imaging was concerning for left MCA diminished flow and subacute hematoma. MRI brain was negative for CVA. Neurosurgery performed diony hole for drainage of subdural hematoma due to increased intracranial pressure with postop CT showing definitive improvement patient was on Plavix post embolization which was held briefly and resumed per Neurosurgery on 06/05 and she was transferred to rehab on 06/06. While in rehab patient was noted to have increase right upper extremity weakness and also significant headaches. CT head revealed reaccumulation of subdural fluid collection, increased from before patient was sent back to main [place] for further evaluation/neurosurgery services. Neurosurgery evaluated, planning for  shunt this hospitalization, home meds resumed as appropriate except for Plavix, symptomatic management for headache.  shunt scheduled for 6/16. Patient underwent redo diony hole, fluid evacuation and drain placement, concern for surgical site infection and therefore underwent cranioplasty. Infectious Disease consulted and patient was placed on cefepime, vancomycin and Flagyl pending intraoperative cultures.  Intraoperative cultures growing staph epi.  ID recommending IV vancomycin, IV  Rocephin until 7/3.  Neurosurgery wished to monitor patient for another day until 6/21.  PT/OT on board; recommending high-intensity therapy.  Speech therapy recommending regular diet with moderately thick liquids.  CM on board for placement.   On 6/23 discharge to rehab was planned but Pt developed RUE weakness and Ct head showed increasing size of extra-axial fluid collections along cerebral convexity with increasing mass effect & increase parenchymal edema in L posterior frontal lobe. Pt was taken back to OR and underwent left craniotomy and evacuation of empyema. Post operatively admitted to ICU. Intraoperative culture from 6/23 grew  staph epi and rare Pseudomonas stutzeri.  ID suggested to stop Vancomycin and continue Cefepime as Staph epi is oxacillin sensitive and Cefepime will cover both Pseudomonas and MSSE with total course at least 6 to 8 weeks. Pt's care downgraded back to  service on 6/25/25.  ID requested sensitivity testing for Pseudomonas stutzeri, however per hospital microbiology their instrument was not able to run sensitivities, and they are sending the sample to [facility] for further testing. ID recommended to continue Cefepime at this time with tentative end date 8/18/2025.  Pt remains with daily headache and dense weakness to RUE. MRI brain on 6/27/25 showed no acute stroke but noted post surgical left dural thickening and enhancement, left cerebral encephalomalacia with extensive gliotic changes.  As of 6/30 Pt is able to move her Rt upper ext some. Swelling noted inner aspect of Rt arm with U/S suggestive of hematoma 5.6x3.6x1.7 cm. Will continue conservative management for hematoma. Subdural JOHNATHAN drain and every other staple removed on 7/1/25 with complete staple removal on POD #21 7/14/25. NS cleared Pt to resume Plavix 2 weeks post op 7/7/25. Patient able to move RUE/RLE more. Awaiting sensitivities from [facility] for Pseudomonas stutzeri isolated operatively.     Patient upset at protracted  hospital course and eager to begin stint at Rehab. Counseled regarding importance of appropriate antimicrobial coverage based on culture results due to which her DC was being held up. She voiced understanding.  Antimicrobial sensitivities for Pseudomonas stutzerimonas returned & are showing sensitivity to Cefepime.  Will continue following ID recommendations from 7/4 with planned end date of antimicrobials on 8/18.  Noted to have increased weakness in RUE compared to 2 days ago.  CT head ordered which showed postsurgical changes with no significant change from prior CT with slightly decreased pneumocephalus.        Repeat MRI 7/14/25 showed postop changes and no acute changes.  Neurosurgery cleared for discharge.  She was dc'ed.  She will complete antibiotics after dc.  Medications were reviewed before.  She left hospital on 07/15/2025 as per prior dc orders.           Symptomatic UTI with hematuria- improving  Left Subdural empyema - 6/23/25 s/p left craniectomy and evacuation of empyema with tissue culture positive for staph epidermidis and Pseudomonas stutzeri  Traumatic Subdural hematoma--> s/p left frontoparietal craniotomy and evacuation on 5/19/25   Chronic left SDH with new onset headache, Rt sided weakness and facial droop, s/p left diony hole on 6/2/25  Post op Increased subdural fluid collection, s/p left redo diony hole with fluid evacuation, drain placement on 6/16  Surgical site infection status post cranioplasty on 6/16 with tissue culture positive for Staph epidermidis   Hematoma, RUE- 6/30/25- conservative management   Hx-  CVA in June, 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,Depression/anxiety ,HLD, Hypothyroidism, Essential HTN, Type 2 diabetes mellitus.   Severe Malnutrition            Pt was seen and examined on the day of discharge  Vitals:  VITAL SIGNS: 24 HRS MIN & MAX LAST   Temp  Min: 97.4 °F (36.3 °C)  Max: 98 °F (36.7 °C) 97.4 °F (36.3 °C)   BP  Min: 107/71  Max: 121/83 112/73   Pulse  Min: 88  Max:  108  91   Resp  Min: 18  Max: 18 15   SpO2  Min: 93 %  Max: 98 % 97 %     General: Comfortable, not in distress  Respiratory: Clear to auscultation bilaterally, nonlabored breathing  Cardiovascular: RRR, S1, S2  Abdominal: Soft, nontender, nondistended  Neurological: AOx4, no focal deficits  Psychiatric: Cooperative          Procedures Performed: No admission procedures for hospital encounter.     Significant Diagnostic Studies: See Full reports for all details    Recent Labs   Lab 07/16/25  0536   WBC 3.69*   RBC 3.84*   HGB 11.7*   HCT 35.2*   MCV 91.7   MCH 30.5   MCHC 33.2   RDW 14.4      MPV 10.0       Recent Labs   Lab 07/16/25  0537      K 3.7   *   CO2 24   BUN 15.9   CREATININE 0.65   *   CALCIUM 9.2   MG 1.80   ALBUMIN 3.4   PROT 6.8   ALKPHOS 142   ALT 71*   AST 25   BILITOT 0.4        Microbiology Results (last 7 days)       Procedure Component Value Units Date/Time    Tissue Culture - Aerobic [[id number]]  (Abnormal) Collected: 06/23/25 1311    Order Status: Completed Specimen: Tissue from Brain Updated: 07/11/25 1350     Tissue - Aerobic Culture Rare Pseudomonas stutzeri     Comment: Susceptibility sent to reference lab. Results to follow on separate report.       Organism Referral For Id, Aerobic Bacteria [[id number]]  (Abnormal) Collected: 06/23/25 1311    Order Status: Completed Specimen: Tissue from Brain Updated: 07/11/25 1320     Organism Refer for ID, Aerobic Bact SEE COMMENTS     Comment: SOURCE: BRAIN, Source Brain tissue, IDPseudomonas stutzeki  ORGANISM REFER FOR ID, AEROBIC BACT                    FINAL    PSEUDOMONAS/STUTZERIMONAS sp      ----------------------------------------------------------    Organism     PSEUDOMONAS/STUTZERIMONAS sp    Antibiotic   CECY (mcg/mL)  Interpretation    ----------------------------------------------------------    Pip/Jacques             <=8/4       S    Cefepime              <=2       S    Ceftazidime           <=4       S     Meropenem          <=0.12       S    Aztreonam             <=4       S    Ciprofloxacin      <=0.25       S    Levofloxacin        <=0.5       S    Amikacin              <=4       S    Gentamicin            <=1       S    Tobramycin            <=1       S    TMP/SMX          <=.5/9.5       S  ------------------------------------------------------------    S=SUSCEPTIBLE  I=INTERMEDIATE  R=RESISTANT    NS=NONSUSCEPTIBLE  SDD=SUSCEPTIBLE DOSE DEPENDENT  ------------------------------------------------------------     Test Performed by:  [place] [address]  : Bean Hudson Ph.D.; CLIA# [id number]       Urine culture [[id number]]  (Abnormal) Collected: 07/07/25 0326    Order Status: Completed Specimen: Urine, Clean Catch Updated: 07/09/25 0941     Urine Culture Less than 10,000 colonies/ml Yeast             MRI Brain Without Contrast  Narrative: EXAMINATION:  MRI BRAIN WITHOUT CONTRAST    CLINICAL HISTORY:  Stroke, follow up;Worsened RUE weakness;    TECHNIQUE:  Multiplanar, multisequence MR images of the brain were obtained without the administration of intravenous contrast.    COMPARISON:  CT head dated 07/13/2025, MRI brain dated 06/27/2025    FINDINGS:  There are postoperative changes from left-sided craniectomy.  There is no acute infarct identified.  There is encephalomalacia in the left cerebral hemisphere.  Edema in the left posterior frontal lobe has improved.  There are no definite new parenchymal signal changes.  There is postoperative pneumocephalus with small amount of extra-axial fluid along the left cerebral convexity, not significantly changed.    There is no significant mass effect or midline shift.  The basal cisterns are patent.  The ventricles are stable in size.  The major intracranial flow voids are patent.  Impression: 1. No acute infarct identified.  2. Overall improvement of parenchymal edema in the left  posterior frontal lobe.  3. Otherwise stable exam compared to 06/27/2025.    Electronically signed by: Beverley Ames  Date:    07/14/2025  Time:    16:58         Medication List        PAUSE taking these medications      * clopidogreL 75 mg tablet  Wait to take this until your doctor or other care provider tells you to start again.  Commonly known as: PLAVIX  Take 1 tablet (75 mg total) by mouth once daily.  You also have another medication with the same name that you may need to continue taking.           * This list has 1 medication(s) that are the same as other medications prescribed for you. Read the directions carefully, and ask your doctor or other care provider to review them with you.                START taking these medications      ceFEPIme 2 gram injection  Commonly known as: MAXIPIME  Inject 2 g into the vein every 8 (eight) hours.     famotidine 20 MG tablet  Commonly known as: PEPCID  Take 1 tablet (20 mg total) by mouth 2 (two) times daily.     traZODone 50 MG tablet  Commonly known as: DESYREL  Take 0.5 tablets (25 mg total) by mouth every evening.            CHANGE how you take these medications      * clopidogreL 75 mg tablet  Commonly known as: PLAVIX  Take 1 tablet (75 mg total) by mouth once daily.  What changed: Another medication with the same name was paused. Ask your nurse or doctor if you should take this medication.     nortriptyline 25 MG capsule  Commonly known as: PAMELOR  Take 1 capsule (25 mg total) by mouth every evening.  What changed: when to take this           * This list has 1 medication(s) that are the same as other medications prescribed for you. Read the directions carefully, and ask your doctor or other care provider to review them with you.                CONTINUE taking these medications      atorvastatin 80 MG tablet  Commonly known as: LIPITOR  Take 1 tablet (80 mg total) by mouth once daily.     busPIRone 15 MG tablet  Commonly known as: BUSPAR  Take 1 tablet (15 mg  "total) by mouth 2 (two) times daily.     docusate sodium 50 MG capsule  Commonly known as: COLACE  Take 2 capsules (100 mg total) by mouth 2 (two) times daily.     ezetimibe 10 mg tablet  Commonly known as: ZETIA  Take 1 tablet by mouth once daily     FARXIGA 5 mg Tab tablet  Generic drug: dapagliflozin propanediol  Take 1 tablet by mouth once daily     levETIRAcetam 500 MG Tab  Commonly known as: KEPPRA  Take 1 tablet (500 mg total) by mouth 2 (two) times daily. for 4 days     levothyroxine 88 MCG tablet  Commonly known as: SYNTHROID  Take 1 tablet (88 mcg total) by mouth before breakfast.     metFORMIN 500 MG tablet  Commonly known as: GLUCOPHAGE  TAKE 1 TABLET BY MOUTH TWICE DAILY WITH MEALS     metoprolol succinate 25 MG 24 hr tablet  Commonly known as: TOPROL-XL     ondansetron 8 MG tablet  Commonly known as: ZOFRAN  Take 1 tablet (8 mg total) by mouth every 6 (six) hours as needed for Nausea.     pen needle, diabetic 32 gauge x 5/32" Ndle  1 each by Misc.(Non-Drug; Combo Route) route once a week.            ASK your doctor about these medications      0.9% NaCl SolP 500 mL with vancomycin 1,000 mg SolR 1,000 mg  Irrigate with 1,000 mg as directed once daily. for 13 days  Ask about: Should I take this medication?     butalbital-acetaminophen-caffeine -40 mg -40 mg per tablet  Commonly known as: FIORICET, ESGIC  Take 1 tablet by mouth every 4 (four) hours as needed for Pain.  Ask about: Should I take this medication?     D5W PgBk 100 mL with cefTRIAXone 1 gram SolR 2 g  Inject 2 g into the vein once daily. for 13 days  Ask about: Should I take this medication?               Where to Get Your Medications        These medications were sent to [facility] - [facility] [address]  [address], [place] [address]      Phone: [phone]   ceFEPIme 2 gram injection  clopidogreL 75 mg tablet  famotidine 20 MG tablet  nortriptyline 25 MG capsule  traZODone 50 MG tablet   "     You can get these medications from any pharmacy    Bring a paper prescription for each of these medications  0.9% NaCl SolP 500 mL with vancomycin 1,000 mg SolR 1,000 mg  D5W PgBk 100 mL with cefTRIAXone 1 gram SolR 2 g          Explained in detail to the patient about the discharge plan, medications, and follow-up visits. Pt understands and agrees with the treatment plan  Discharge Disposition: Skilled Nursing Facility   Discharged Condition: stable  Diet-    Medications Per DC med rec  Activities as tolerated   Follow-up Information       James Rankin MD Follow up on 8/12/2025.    Specialty: Neurosurgery  Why: 08/12/25@10:00 a.m.  Contact information:  [address]  [facility] [address] [phone]                           For further questions contact hospitalist office    Discharge time 33 minutes    For worsening symptoms, chest pain, shortness of breath, increased abdominal pain, high grade fever, stroke or stroke like symptoms, immediately go to the nearest Emergency Room or call 911 as soon as possible.      Oswald Wright M.D, on 7/16/2025. at 7:43 AM.

## 2025-07-17 LAB
POCT GLUCOSE: 207 MG/DL (ref 70–110)
POCT GLUCOSE: 207 MG/DL (ref 70–110)
POCT GLUCOSE: 233 MG/DL (ref 70–110)

## 2025-07-17 PROCEDURE — 94761 N-INVAS EAR/PLS OXIMETRY MLT: CPT

## 2025-07-17 PROCEDURE — 99900035 HC TECH TIME PER 15 MIN (STAT)

## 2025-07-17 PROCEDURE — 25000003 PHARM REV CODE 250: Performed by: INTERNAL MEDICINE

## 2025-07-17 PROCEDURE — 97110 THERAPEUTIC EXERCISES: CPT

## 2025-07-17 PROCEDURE — 11800000 HC REHAB PRIVATE ROOM

## 2025-07-17 PROCEDURE — 63600175 PHARM REV CODE 636 W HCPCS: Performed by: NURSE PRACTITIONER

## 2025-07-17 PROCEDURE — 92523 SPEECH SOUND LANG COMPREHEN: CPT

## 2025-07-17 PROCEDURE — 97530 THERAPEUTIC ACTIVITIES: CPT

## 2025-07-17 PROCEDURE — 97116 GAIT TRAINING THERAPY: CPT | Mod: CQ

## 2025-07-17 PROCEDURE — 97535 SELF CARE MNGMENT TRAINING: CPT

## 2025-07-17 PROCEDURE — 25000003 PHARM REV CODE 250: Performed by: NURSE PRACTITIONER

## 2025-07-17 PROCEDURE — 92526 ORAL FUNCTION THERAPY: CPT

## 2025-07-17 RX ORDER — TRAZODONE HYDROCHLORIDE 50 MG/1
50 TABLET ORAL NIGHTLY
Status: DISCONTINUED | OUTPATIENT
Start: 2025-07-17 | End: 2025-07-22

## 2025-07-17 RX ADMIN — POLYETHYLENE GLYCOL 3350 17 G: 17 POWDER, FOR SOLUTION ORAL at 02:07

## 2025-07-17 RX ADMIN — METOPROLOL SUCCINATE 12.5 MG: 25 TABLET, EXTENDED RELEASE ORAL at 07:07

## 2025-07-17 RX ADMIN — LEVETIRACETAM 500 MG: 100 SOLUTION ORAL at 07:07

## 2025-07-17 RX ADMIN — DICLOFENAC SODIUM 2 G: 10 GEL TOPICAL at 08:07

## 2025-07-17 RX ADMIN — LEVOTHYROXINE SODIUM 88 MCG: 0.09 TABLET ORAL at 06:07

## 2025-07-17 RX ADMIN — EZETIMIBE 10 MG: 10 TABLET ORAL at 07:07

## 2025-07-17 RX ADMIN — INSULIN ASPART 2 UNITS: 100 INJECTION, SOLUTION INTRAVENOUS; SUBCUTANEOUS at 04:07

## 2025-07-17 RX ADMIN — NORTRIPTYLINE HYDROCHLORIDE 25 MG: 25 CAPSULE ORAL at 08:07

## 2025-07-17 RX ADMIN — CLOPIDOGREL BISULFATE 75 MG: 75 TABLET, FILM COATED ORAL at 07:07

## 2025-07-17 RX ADMIN — FERROUS SULFATE TAB 325 MG (65 MG ELEMENTAL FE) 1 EACH: 325 (65 FE) TAB at 07:07

## 2025-07-17 RX ADMIN — CEFEPIME 2 G: 2 INJECTION, POWDER, FOR SOLUTION INTRAVENOUS at 02:07

## 2025-07-17 RX ADMIN — OXYCODONE HYDROCHLORIDE 5 MG: 5 TABLET ORAL at 09:07

## 2025-07-17 RX ADMIN — BISACODYL 10 MG: 10 SUPPOSITORY RECTAL at 02:07

## 2025-07-17 RX ADMIN — DOCUSATE SODIUM LIQUID 100 MG: 100 LIQUID ORAL at 08:07

## 2025-07-17 RX ADMIN — ENOXAPARIN SODIUM 40 MG: 40 INJECTION SUBCUTANEOUS at 04:07

## 2025-07-17 RX ADMIN — OXYCODONE HYDROCHLORIDE 5 MG: 5 TABLET ORAL at 02:07

## 2025-07-17 RX ADMIN — CEFEPIME 2 G: 2 INJECTION, POWDER, FOR SOLUTION INTRAVENOUS at 07:07

## 2025-07-17 RX ADMIN — Medication 1 EACH: at 07:07

## 2025-07-17 RX ADMIN — FAMOTIDINE 20 MG: 20 TABLET, FILM COATED ORAL at 07:07

## 2025-07-17 RX ADMIN — TRAZODONE HYDROCHLORIDE 50 MG: 50 TABLET ORAL at 08:07

## 2025-07-17 RX ADMIN — BUSPIRONE HYDROCHLORIDE 15 MG: 10 TABLET ORAL at 08:07

## 2025-07-17 RX ADMIN — LEVETIRACETAM 500 MG: 100 SOLUTION ORAL at 08:07

## 2025-07-17 RX ADMIN — ATORVASTATIN CALCIUM 80 MG: 40 TABLET, FILM COATED ORAL at 07:07

## 2025-07-17 RX ADMIN — BUSPIRONE HYDROCHLORIDE 15 MG: 10 TABLET ORAL at 07:07

## 2025-07-17 RX ADMIN — INSULIN ASPART 1 UNITS: 100 INJECTION, SOLUTION INTRAVENOUS; SUBCUTANEOUS at 08:07

## 2025-07-17 RX ADMIN — CEFEPIME 2 G: 2 INJECTION, POWDER, FOR SOLUTION INTRAVENOUS at 09:07

## 2025-07-17 RX ADMIN — INSULIN ASPART 2 UNITS: 100 INJECTION, SOLUTION INTRAVENOUS; SUBCUTANEOUS at 12:07

## 2025-07-17 RX ADMIN — FAMOTIDINE 20 MG: 20 TABLET, FILM COATED ORAL at 08:07

## 2025-07-17 RX ADMIN — DOCUSATE SODIUM LIQUID 100 MG: 100 LIQUID ORAL at 07:07

## 2025-07-17 RX ADMIN — OXYCODONE HYDROCHLORIDE 5 MG: 5 TABLET ORAL at 08:07

## 2025-07-18 LAB
POCT GLUCOSE: 142 MG/DL (ref 70–110)
POCT GLUCOSE: 208 MG/DL (ref 70–110)
POCT GLUCOSE: 222 MG/DL (ref 70–110)

## 2025-07-18 PROCEDURE — 25000003 PHARM REV CODE 250: Performed by: NURSE PRACTITIONER

## 2025-07-18 PROCEDURE — 97116 GAIT TRAINING THERAPY: CPT

## 2025-07-18 PROCEDURE — 99900035 HC TECH TIME PER 15 MIN (STAT)

## 2025-07-18 PROCEDURE — 25000003 PHARM REV CODE 250: Performed by: INTERNAL MEDICINE

## 2025-07-18 PROCEDURE — 11800000 HC REHAB PRIVATE ROOM

## 2025-07-18 PROCEDURE — 92526 ORAL FUNCTION THERAPY: CPT

## 2025-07-18 PROCEDURE — 63600175 PHARM REV CODE 636 W HCPCS: Performed by: NURSE PRACTITIONER

## 2025-07-18 PROCEDURE — 97110 THERAPEUTIC EXERCISES: CPT

## 2025-07-18 PROCEDURE — 99900031 HC PATIENT EDUCATION (STAT)

## 2025-07-18 PROCEDURE — 94761 N-INVAS EAR/PLS OXIMETRY MLT: CPT

## 2025-07-18 PROCEDURE — 97530 THERAPEUTIC ACTIVITIES: CPT

## 2025-07-18 PROCEDURE — 99233 SBSQ HOSP IP/OBS HIGH 50: CPT | Mod: ,,, | Performed by: NURSE PRACTITIONER

## 2025-07-18 PROCEDURE — 97535 SELF CARE MNGMENT TRAINING: CPT

## 2025-07-18 RX ORDER — METFORMIN HYDROCHLORIDE 500 MG/1
500 TABLET ORAL 2 TIMES DAILY WITH MEALS
Status: DISCONTINUED | OUTPATIENT
Start: 2025-07-18 | End: 2025-07-22

## 2025-07-18 RX ADMIN — FERROUS SULFATE TAB 325 MG (65 MG ELEMENTAL FE) 1 EACH: 325 (65 FE) TAB at 07:07

## 2025-07-18 RX ADMIN — OXYCODONE HYDROCHLORIDE 5 MG: 5 TABLET ORAL at 04:07

## 2025-07-18 RX ADMIN — OXYCODONE HYDROCHLORIDE 5 MG: 5 TABLET ORAL at 12:07

## 2025-07-18 RX ADMIN — DOCUSATE SODIUM LIQUID 100 MG: 100 LIQUID ORAL at 07:07

## 2025-07-18 RX ADMIN — FAMOTIDINE 20 MG: 20 TABLET, FILM COATED ORAL at 07:07

## 2025-07-18 RX ADMIN — CLOPIDOGREL BISULFATE 75 MG: 75 TABLET, FILM COATED ORAL at 07:07

## 2025-07-18 RX ADMIN — OXYCODONE HYDROCHLORIDE 5 MG: 5 TABLET ORAL at 09:07

## 2025-07-18 RX ADMIN — LEVETIRACETAM 500 MG: 100 SOLUTION ORAL at 07:07

## 2025-07-18 RX ADMIN — BUSPIRONE HYDROCHLORIDE 15 MG: 10 TABLET ORAL at 07:07

## 2025-07-18 RX ADMIN — ENOXAPARIN SODIUM 40 MG: 40 INJECTION SUBCUTANEOUS at 04:07

## 2025-07-18 RX ADMIN — TRAZODONE HYDROCHLORIDE 50 MG: 50 TABLET ORAL at 09:07

## 2025-07-18 RX ADMIN — ATORVASTATIN CALCIUM 80 MG: 40 TABLET, FILM COATED ORAL at 07:07

## 2025-07-18 RX ADMIN — NORTRIPTYLINE HYDROCHLORIDE 25 MG: 25 CAPSULE ORAL at 09:07

## 2025-07-18 RX ADMIN — INSULIN ASPART 2 UNITS: 100 INJECTION, SOLUTION INTRAVENOUS; SUBCUTANEOUS at 04:07

## 2025-07-18 RX ADMIN — DICLOFENAC SODIUM 2 G: 10 GEL TOPICAL at 07:07

## 2025-07-18 RX ADMIN — EZETIMIBE 10 MG: 10 TABLET ORAL at 07:07

## 2025-07-18 RX ADMIN — FAMOTIDINE 20 MG: 20 TABLET, FILM COATED ORAL at 09:07

## 2025-07-18 RX ADMIN — OXYCODONE HYDROCHLORIDE 5 MG: 5 TABLET ORAL at 07:07

## 2025-07-18 RX ADMIN — LEVETIRACETAM 500 MG: 100 SOLUTION ORAL at 09:07

## 2025-07-18 RX ADMIN — METOPROLOL SUCCINATE 12.5 MG: 25 TABLET, EXTENDED RELEASE ORAL at 07:07

## 2025-07-18 RX ADMIN — Medication 1 EACH: at 07:07

## 2025-07-18 RX ADMIN — INSULIN ASPART 1 UNITS: 100 INJECTION, SOLUTION INTRAVENOUS; SUBCUTANEOUS at 09:07

## 2025-07-18 RX ADMIN — BUSPIRONE HYDROCHLORIDE 15 MG: 10 TABLET ORAL at 09:07

## 2025-07-18 RX ADMIN — METFORMIN HYDROCHLORIDE 500 MG: 500 TABLET, FILM COATED ORAL at 04:07

## 2025-07-18 RX ADMIN — CEFEPIME 2 G: 2 INJECTION, POWDER, FOR SOLUTION INTRAVENOUS at 05:07

## 2025-07-18 RX ADMIN — METFORMIN HYDROCHLORIDE 500 MG: 500 TABLET, FILM COATED ORAL at 07:07

## 2025-07-18 RX ADMIN — DOCUSATE SODIUM LIQUID 100 MG: 100 LIQUID ORAL at 09:07

## 2025-07-18 RX ADMIN — CEFEPIME 2 G: 2 INJECTION, POWDER, FOR SOLUTION INTRAVENOUS at 10:07

## 2025-07-18 RX ADMIN — DICLOFENAC SODIUM 2 G: 10 GEL TOPICAL at 09:07

## 2025-07-18 RX ADMIN — LEVOTHYROXINE SODIUM 88 MCG: 0.09 TABLET ORAL at 05:07

## 2025-07-18 RX ADMIN — CEFEPIME 2 G: 2 INJECTION, POWDER, FOR SOLUTION INTRAVENOUS at 12:07

## 2025-07-19 LAB
POCT GLUCOSE: 168 MG/DL (ref 70–110)
POCT GLUCOSE: 183 MG/DL (ref 70–110)
POCT GLUCOSE: 187 MG/DL (ref 70–110)

## 2025-07-19 PROCEDURE — 25000003 PHARM REV CODE 250: Performed by: INTERNAL MEDICINE

## 2025-07-19 PROCEDURE — 97112 NEUROMUSCULAR REEDUCATION: CPT

## 2025-07-19 PROCEDURE — 11800000 HC REHAB PRIVATE ROOM

## 2025-07-19 PROCEDURE — 97530 THERAPEUTIC ACTIVITIES: CPT

## 2025-07-19 PROCEDURE — 99900035 HC TECH TIME PER 15 MIN (STAT)

## 2025-07-19 PROCEDURE — 97530 THERAPEUTIC ACTIVITIES: CPT | Mod: CQ

## 2025-07-19 PROCEDURE — 97110 THERAPEUTIC EXERCISES: CPT | Mod: CQ

## 2025-07-19 PROCEDURE — 92526 ORAL FUNCTION THERAPY: CPT

## 2025-07-19 PROCEDURE — 94761 N-INVAS EAR/PLS OXIMETRY MLT: CPT

## 2025-07-19 PROCEDURE — 63600175 PHARM REV CODE 636 W HCPCS: Performed by: NURSE PRACTITIONER

## 2025-07-19 PROCEDURE — 97535 SELF CARE MNGMENT TRAINING: CPT

## 2025-07-19 PROCEDURE — 25000003 PHARM REV CODE 250: Performed by: NURSE PRACTITIONER

## 2025-07-19 PROCEDURE — 97116 GAIT TRAINING THERAPY: CPT | Mod: CQ

## 2025-07-19 PROCEDURE — 99900031 HC PATIENT EDUCATION (STAT)

## 2025-07-19 RX ADMIN — NORTRIPTYLINE HYDROCHLORIDE 25 MG: 25 CAPSULE ORAL at 08:07

## 2025-07-19 RX ADMIN — OXYCODONE HYDROCHLORIDE 5 MG: 5 TABLET ORAL at 01:07

## 2025-07-19 RX ADMIN — EZETIMIBE 10 MG: 10 TABLET ORAL at 07:07

## 2025-07-19 RX ADMIN — OXYCODONE HYDROCHLORIDE 5 MG: 5 TABLET ORAL at 07:07

## 2025-07-19 RX ADMIN — LEVOTHYROXINE SODIUM 88 MCG: 0.09 TABLET ORAL at 05:07

## 2025-07-19 RX ADMIN — TRAZODONE HYDROCHLORIDE 50 MG: 50 TABLET ORAL at 08:07

## 2025-07-19 RX ADMIN — FAMOTIDINE 20 MG: 20 TABLET, FILM COATED ORAL at 08:07

## 2025-07-19 RX ADMIN — METFORMIN HYDROCHLORIDE 500 MG: 500 TABLET, FILM COATED ORAL at 07:07

## 2025-07-19 RX ADMIN — Medication 1 EACH: at 07:07

## 2025-07-19 RX ADMIN — CEFEPIME 2 G: 2 INJECTION, POWDER, FOR SOLUTION INTRAVENOUS at 05:07

## 2025-07-19 RX ADMIN — LEVETIRACETAM 500 MG: 100 SOLUTION ORAL at 08:07

## 2025-07-19 RX ADMIN — ATORVASTATIN CALCIUM 80 MG: 40 TABLET, FILM COATED ORAL at 07:07

## 2025-07-19 RX ADMIN — CEFEPIME 2 G: 2 INJECTION, POWDER, FOR SOLUTION INTRAVENOUS at 10:07

## 2025-07-19 RX ADMIN — OXYCODONE HYDROCHLORIDE 5 MG: 5 TABLET ORAL at 08:07

## 2025-07-19 RX ADMIN — DOCUSATE SODIUM LIQUID 100 MG: 100 LIQUID ORAL at 08:07

## 2025-07-19 RX ADMIN — DICLOFENAC SODIUM 2 G: 10 GEL TOPICAL at 08:07

## 2025-07-19 RX ADMIN — FERROUS SULFATE TAB 325 MG (65 MG ELEMENTAL FE) 1 EACH: 325 (65 FE) TAB at 07:07

## 2025-07-19 RX ADMIN — CLOPIDOGREL BISULFATE 75 MG: 75 TABLET, FILM COATED ORAL at 07:07

## 2025-07-19 RX ADMIN — METFORMIN HYDROCHLORIDE 500 MG: 500 TABLET, FILM COATED ORAL at 04:07

## 2025-07-19 RX ADMIN — BUSPIRONE HYDROCHLORIDE 15 MG: 10 TABLET ORAL at 08:07

## 2025-07-19 RX ADMIN — LEVETIRACETAM 500 MG: 100 SOLUTION ORAL at 07:07

## 2025-07-19 RX ADMIN — FAMOTIDINE 20 MG: 20 TABLET, FILM COATED ORAL at 07:07

## 2025-07-19 RX ADMIN — BUSPIRONE HYDROCHLORIDE 15 MG: 10 TABLET ORAL at 07:07

## 2025-07-19 RX ADMIN — CEFEPIME 2 G: 2 INJECTION, POWDER, FOR SOLUTION INTRAVENOUS at 01:07

## 2025-07-19 RX ADMIN — ENOXAPARIN SODIUM 40 MG: 40 INJECTION SUBCUTANEOUS at 04:07

## 2025-07-19 RX ADMIN — DOCUSATE SODIUM LIQUID 100 MG: 100 LIQUID ORAL at 07:07

## 2025-07-19 RX ADMIN — METOPROLOL SUCCINATE 12.5 MG: 25 TABLET, EXTENDED RELEASE ORAL at 07:07

## 2025-07-20 LAB
POCT GLUCOSE: 144 MG/DL (ref 70–110)
POCT GLUCOSE: 155 MG/DL (ref 70–110)
POCT GLUCOSE: 193 MG/DL (ref 70–110)
POCT GLUCOSE: 210 MG/DL (ref 70–110)

## 2025-07-20 PROCEDURE — 97530 THERAPEUTIC ACTIVITIES: CPT | Mod: CQ

## 2025-07-20 PROCEDURE — 25000003 PHARM REV CODE 250: Performed by: INTERNAL MEDICINE

## 2025-07-20 PROCEDURE — 25000003 PHARM REV CODE 250: Performed by: NURSE PRACTITIONER

## 2025-07-20 PROCEDURE — 99900035 HC TECH TIME PER 15 MIN (STAT)

## 2025-07-20 PROCEDURE — 99233 SBSQ HOSP IP/OBS HIGH 50: CPT | Mod: ,,, | Performed by: NURSE PRACTITIONER

## 2025-07-20 PROCEDURE — 63600175 PHARM REV CODE 636 W HCPCS: Performed by: NURSE PRACTITIONER

## 2025-07-20 PROCEDURE — 94761 N-INVAS EAR/PLS OXIMETRY MLT: CPT

## 2025-07-20 PROCEDURE — 97110 THERAPEUTIC EXERCISES: CPT | Mod: CQ

## 2025-07-20 PROCEDURE — 99900031 HC PATIENT EDUCATION (STAT)

## 2025-07-20 PROCEDURE — 11800000 HC REHAB PRIVATE ROOM

## 2025-07-20 PROCEDURE — 97116 GAIT TRAINING THERAPY: CPT | Mod: CQ

## 2025-07-20 RX ADMIN — METFORMIN HYDROCHLORIDE 500 MG: 500 TABLET, FILM COATED ORAL at 06:07

## 2025-07-20 RX ADMIN — BUSPIRONE HYDROCHLORIDE 15 MG: 10 TABLET ORAL at 08:07

## 2025-07-20 RX ADMIN — METFORMIN HYDROCHLORIDE 500 MG: 500 TABLET, FILM COATED ORAL at 04:07

## 2025-07-20 RX ADMIN — METOPROLOL SUCCINATE 12.5 MG: 25 TABLET, EXTENDED RELEASE ORAL at 08:07

## 2025-07-20 RX ADMIN — EZETIMIBE 10 MG: 10 TABLET ORAL at 08:07

## 2025-07-20 RX ADMIN — FAMOTIDINE 20 MG: 20 TABLET, FILM COATED ORAL at 09:07

## 2025-07-20 RX ADMIN — DICLOFENAC SODIUM 2 G: 10 GEL TOPICAL at 08:07

## 2025-07-20 RX ADMIN — ATORVASTATIN CALCIUM 80 MG: 40 TABLET, FILM COATED ORAL at 08:07

## 2025-07-20 RX ADMIN — LEVOTHYROXINE SODIUM 88 MCG: 0.09 TABLET ORAL at 06:07

## 2025-07-20 RX ADMIN — FAMOTIDINE 20 MG: 20 TABLET, FILM COATED ORAL at 08:07

## 2025-07-20 RX ADMIN — CEFEPIME 2 G: 2 INJECTION, POWDER, FOR SOLUTION INTRAVENOUS at 01:07

## 2025-07-20 RX ADMIN — OXYCODONE HYDROCHLORIDE 5 MG: 5 TABLET ORAL at 05:07

## 2025-07-20 RX ADMIN — BUSPIRONE HYDROCHLORIDE 15 MG: 10 TABLET ORAL at 09:07

## 2025-07-20 RX ADMIN — FERROUS SULFATE TAB 325 MG (65 MG ELEMENTAL FE) 1 EACH: 325 (65 FE) TAB at 08:07

## 2025-07-20 RX ADMIN — INSULIN ASPART 2 UNITS: 100 INJECTION, SOLUTION INTRAVENOUS; SUBCUTANEOUS at 12:07

## 2025-07-20 RX ADMIN — CEFEPIME 2 G: 2 INJECTION, POWDER, FOR SOLUTION INTRAVENOUS at 09:07

## 2025-07-20 RX ADMIN — LEVETIRACETAM 500 MG: 100 SOLUTION ORAL at 09:07

## 2025-07-20 RX ADMIN — TRAZODONE HYDROCHLORIDE 50 MG: 50 TABLET ORAL at 09:07

## 2025-07-20 RX ADMIN — ENOXAPARIN SODIUM 40 MG: 40 INJECTION SUBCUTANEOUS at 04:07

## 2025-07-20 RX ADMIN — CEFEPIME 2 G: 2 INJECTION, POWDER, FOR SOLUTION INTRAVENOUS at 06:07

## 2025-07-20 RX ADMIN — DOCUSATE SODIUM LIQUID 100 MG: 100 LIQUID ORAL at 08:07

## 2025-07-20 RX ADMIN — LEVETIRACETAM 500 MG: 100 SOLUTION ORAL at 08:07

## 2025-07-20 RX ADMIN — NORTRIPTYLINE HYDROCHLORIDE 25 MG: 25 CAPSULE ORAL at 09:07

## 2025-07-20 RX ADMIN — DOCUSATE SODIUM LIQUID 100 MG: 100 LIQUID ORAL at 09:07

## 2025-07-20 RX ADMIN — OXYCODONE HYDROCHLORIDE 5 MG: 5 TABLET ORAL at 08:07

## 2025-07-20 RX ADMIN — OXYCODONE HYDROCHLORIDE 5 MG: 5 TABLET ORAL at 12:07

## 2025-07-20 RX ADMIN — Medication 1 EACH: at 08:07

## 2025-07-20 RX ADMIN — CLOPIDOGREL BISULFATE 75 MG: 75 TABLET, FILM COATED ORAL at 08:07

## 2025-07-21 LAB
ALBUMIN SERPL-MCNC: 3.3 G/DL (ref 3.4–4.8)
ALBUMIN/GLOB SERPL: 1 RATIO (ref 1.1–2)
ALP SERPL-CCNC: 137 UNIT/L (ref 40–150)
ALT SERPL-CCNC: 74 UNIT/L (ref 0–55)
ANION GAP SERPL CALC-SCNC: 8 MEQ/L
AST SERPL-CCNC: 30 UNIT/L (ref 11–45)
BASOPHILS # BLD AUTO: 0.07 X10(3)/MCL
BASOPHILS NFR BLD AUTO: 1.8 %
BILIRUB SERPL-MCNC: 0.3 MG/DL
BUN SERPL-MCNC: 20 MG/DL (ref 9.8–20.1)
CALCIUM SERPL-MCNC: 9.3 MG/DL (ref 8.4–10.2)
CHLORIDE SERPL-SCNC: 108 MMOL/L (ref 98–107)
CO2 SERPL-SCNC: 26 MMOL/L (ref 23–31)
CREAT SERPL-MCNC: 0.68 MG/DL (ref 0.55–1.02)
CREAT/UREA NIT SERPL: 29
EOSINOPHIL # BLD AUTO: 0.24 X10(3)/MCL (ref 0–0.9)
EOSINOPHIL NFR BLD AUTO: 6.3 %
ERYTHROCYTE [DISTWIDTH] IN BLOOD BY AUTOMATED COUNT: 14.5 % (ref 11.5–17)
FUNGUS SPEC CULT: NORMAL
FUNGUS SPEC CULT: NORMAL
GFR SERPLBLD CREATININE-BSD FMLA CKD-EPI: >60 ML/MIN/1.73/M2
GLOBULIN SER-MCNC: 3.2 GM/DL (ref 2.4–3.5)
GLUCOSE SERPL-MCNC: 185 MG/DL (ref 82–115)
HCT VFR BLD AUTO: 34.1 % (ref 37–47)
HGB BLD-MCNC: 11.3 G/DL (ref 12–16)
IMM GRANULOCYTES # BLD AUTO: 0.02 X10(3)/MCL (ref 0–0.04)
IMM GRANULOCYTES NFR BLD AUTO: 0.5 %
LYMPHOCYTES # BLD AUTO: 1.24 X10(3)/MCL (ref 0.6–4.6)
LYMPHOCYTES NFR BLD AUTO: 32.7 %
MAGNESIUM SERPL-MCNC: 1.8 MG/DL (ref 1.6–2.6)
MCH RBC QN AUTO: 30.5 PG (ref 27–31)
MCHC RBC AUTO-ENTMCNC: 33.1 G/DL (ref 33–36)
MCV RBC AUTO: 92.2 FL (ref 80–94)
MONOCYTES # BLD AUTO: 0.48 X10(3)/MCL (ref 0.1–1.3)
MONOCYTES NFR BLD AUTO: 12.7 %
NEUTROPHILS # BLD AUTO: 1.74 X10(3)/MCL (ref 2.1–9.2)
NEUTROPHILS NFR BLD AUTO: 46 %
NRBC BLD AUTO-RTO: 0 %
PHOSPHATE SERPL-MCNC: 4 MG/DL (ref 2.3–4.7)
PLATELET # BLD AUTO: 261 X10(3)/MCL (ref 130–400)
PMV BLD AUTO: 10.1 FL (ref 7.4–10.4)
POCT GLUCOSE: 167 MG/DL (ref 70–110)
POCT GLUCOSE: 184 MG/DL (ref 70–110)
POCT GLUCOSE: 278 MG/DL (ref 70–110)
POTASSIUM SERPL-SCNC: 4.2 MMOL/L (ref 3.5–5.1)
PREALB SERPL-MCNC: 30.3 MG/DL (ref 14–37)
PROT SERPL-MCNC: 6.5 GM/DL (ref 5.8–7.6)
RBC # BLD AUTO: 3.7 X10(6)/MCL (ref 4.2–5.4)
SODIUM SERPL-SCNC: 142 MMOL/L (ref 136–145)
TSH SERPL-ACNC: 8.18 UIU/ML (ref 0.35–4.94)
WBC # BLD AUTO: 3.79 X10(3)/MCL (ref 4.5–11.5)

## 2025-07-21 PROCEDURE — 99900035 HC TECH TIME PER 15 MIN (STAT)

## 2025-07-21 PROCEDURE — 97530 THERAPEUTIC ACTIVITIES: CPT | Mod: CQ

## 2025-07-21 PROCEDURE — 84443 ASSAY THYROID STIM HORMONE: CPT | Performed by: NURSE PRACTITIONER

## 2025-07-21 PROCEDURE — 92526 ORAL FUNCTION THERAPY: CPT

## 2025-07-21 PROCEDURE — 80053 COMPREHEN METABOLIC PANEL: CPT | Performed by: NURSE PRACTITIONER

## 2025-07-21 PROCEDURE — 25000003 PHARM REV CODE 250: Performed by: INTERNAL MEDICINE

## 2025-07-21 PROCEDURE — 97116 GAIT TRAINING THERAPY: CPT

## 2025-07-21 PROCEDURE — 97112 NEUROMUSCULAR REEDUCATION: CPT

## 2025-07-21 PROCEDURE — 97110 THERAPEUTIC EXERCISES: CPT

## 2025-07-21 PROCEDURE — 99233 SBSQ HOSP IP/OBS HIGH 50: CPT | Mod: ,,, | Performed by: NURSE PRACTITIONER

## 2025-07-21 PROCEDURE — 97110 THERAPEUTIC EXERCISES: CPT | Mod: CQ

## 2025-07-21 PROCEDURE — 85025 COMPLETE CBC W/AUTO DIFF WBC: CPT | Performed by: NURSE PRACTITIONER

## 2025-07-21 PROCEDURE — 25000003 PHARM REV CODE 250: Performed by: NURSE PRACTITIONER

## 2025-07-21 PROCEDURE — 97530 THERAPEUTIC ACTIVITIES: CPT

## 2025-07-21 PROCEDURE — 63600175 PHARM REV CODE 636 W HCPCS: Performed by: NURSE PRACTITIONER

## 2025-07-21 PROCEDURE — 83735 ASSAY OF MAGNESIUM: CPT | Performed by: NURSE PRACTITIONER

## 2025-07-21 PROCEDURE — 36415 COLL VENOUS BLD VENIPUNCTURE: CPT | Performed by: NURSE PRACTITIONER

## 2025-07-21 PROCEDURE — 84100 ASSAY OF PHOSPHORUS: CPT | Performed by: NURSE PRACTITIONER

## 2025-07-21 PROCEDURE — 84134 ASSAY OF PREALBUMIN: CPT | Performed by: NURSE PRACTITIONER

## 2025-07-21 PROCEDURE — 11800000 HC REHAB PRIVATE ROOM

## 2025-07-21 PROCEDURE — 99900031 HC PATIENT EDUCATION (STAT)

## 2025-07-21 PROCEDURE — 94761 N-INVAS EAR/PLS OXIMETRY MLT: CPT

## 2025-07-21 RX ORDER — LEVOTHYROXINE SODIUM 100 UG/1
100 TABLET ORAL
Status: DISCONTINUED | OUTPATIENT
Start: 2025-07-22 | End: 2025-08-12 | Stop reason: HOSPADM

## 2025-07-21 RX ADMIN — METFORMIN HYDROCHLORIDE 500 MG: 500 TABLET, FILM COATED ORAL at 08:07

## 2025-07-21 RX ADMIN — BUSPIRONE HYDROCHLORIDE 15 MG: 10 TABLET ORAL at 08:07

## 2025-07-21 RX ADMIN — FAMOTIDINE 20 MG: 20 TABLET, FILM COATED ORAL at 08:07

## 2025-07-21 RX ADMIN — METOPROLOL SUCCINATE 12.5 MG: 25 TABLET, EXTENDED RELEASE ORAL at 08:07

## 2025-07-21 RX ADMIN — LEVETIRACETAM 500 MG: 100 SOLUTION ORAL at 09:07

## 2025-07-21 RX ADMIN — DICLOFENAC SODIUM 2 G: 10 GEL TOPICAL at 08:07

## 2025-07-21 RX ADMIN — LEVOTHYROXINE SODIUM 88 MCG: 0.09 TABLET ORAL at 05:07

## 2025-07-21 RX ADMIN — ENOXAPARIN SODIUM 40 MG: 40 INJECTION SUBCUTANEOUS at 05:07

## 2025-07-21 RX ADMIN — EZETIMIBE 10 MG: 10 TABLET ORAL at 08:07

## 2025-07-21 RX ADMIN — CEFEPIME 2 G: 2 INJECTION, POWDER, FOR SOLUTION INTRAVENOUS at 08:07

## 2025-07-21 RX ADMIN — ATORVASTATIN CALCIUM 80 MG: 40 TABLET, FILM COATED ORAL at 08:07

## 2025-07-21 RX ADMIN — METFORMIN HYDROCHLORIDE 500 MG: 500 TABLET, FILM COATED ORAL at 05:07

## 2025-07-21 RX ADMIN — OXYCODONE HYDROCHLORIDE 5 MG: 5 TABLET ORAL at 08:07

## 2025-07-21 RX ADMIN — ACETAMINOPHEN 650 MG: 325 TABLET ORAL at 02:07

## 2025-07-21 RX ADMIN — DOCUSATE SODIUM LIQUID 100 MG: 100 LIQUID ORAL at 08:07

## 2025-07-21 RX ADMIN — LEVETIRACETAM 500 MG: 100 SOLUTION ORAL at 08:07

## 2025-07-21 RX ADMIN — CLOPIDOGREL BISULFATE 75 MG: 75 TABLET, FILM COATED ORAL at 08:07

## 2025-07-21 RX ADMIN — CEFEPIME 2 G: 2 INJECTION, POWDER, FOR SOLUTION INTRAVENOUS at 02:07

## 2025-07-21 RX ADMIN — Medication 1 EACH: at 08:07

## 2025-07-21 RX ADMIN — NORTRIPTYLINE HYDROCHLORIDE 25 MG: 25 CAPSULE ORAL at 08:07

## 2025-07-21 RX ADMIN — FERROUS SULFATE TAB 325 MG (65 MG ELEMENTAL FE) 1 EACH: 325 (65 FE) TAB at 08:07

## 2025-07-21 RX ADMIN — INSULIN ASPART 1 UNITS: 100 INJECTION, SOLUTION INTRAVENOUS; SUBCUTANEOUS at 08:07

## 2025-07-21 RX ADMIN — CEFEPIME 2 G: 2 INJECTION, POWDER, FOR SOLUTION INTRAVENOUS at 05:07

## 2025-07-21 RX ADMIN — TRAZODONE HYDROCHLORIDE 50 MG: 50 TABLET ORAL at 08:07

## 2025-07-22 LAB
POCT GLUCOSE: 162 MG/DL (ref 70–110)
POCT GLUCOSE: 174 MG/DL (ref 70–110)
POCT GLUCOSE: 188 MG/DL (ref 70–110)
POCT GLUCOSE: 195 MG/DL (ref 70–110)

## 2025-07-22 PROCEDURE — 99900035 HC TECH TIME PER 15 MIN (STAT)

## 2025-07-22 PROCEDURE — 25000003 PHARM REV CODE 250: Performed by: NURSE PRACTITIONER

## 2025-07-22 PROCEDURE — 97168 OT RE-EVAL EST PLAN CARE: CPT

## 2025-07-22 PROCEDURE — 92611 MOTION FLUOROSCOPY/SWALLOW: CPT

## 2025-07-22 PROCEDURE — 99900031 HC PATIENT EDUCATION (STAT)

## 2025-07-22 PROCEDURE — 94761 N-INVAS EAR/PLS OXIMETRY MLT: CPT

## 2025-07-22 PROCEDURE — 97116 GAIT TRAINING THERAPY: CPT

## 2025-07-22 PROCEDURE — 11800000 HC REHAB PRIVATE ROOM

## 2025-07-22 PROCEDURE — 25000003 PHARM REV CODE 250: Performed by: INTERNAL MEDICINE

## 2025-07-22 PROCEDURE — 97164 PT RE-EVAL EST PLAN CARE: CPT

## 2025-07-22 PROCEDURE — 97535 SELF CARE MNGMENT TRAINING: CPT

## 2025-07-22 PROCEDURE — 63600175 PHARM REV CODE 636 W HCPCS: Performed by: NURSE PRACTITIONER

## 2025-07-22 PROCEDURE — 97110 THERAPEUTIC EXERCISES: CPT

## 2025-07-22 RX ORDER — METFORMIN HYDROCHLORIDE 500 MG/1
1000 TABLET ORAL 2 TIMES DAILY WITH MEALS
Status: DISCONTINUED | OUTPATIENT
Start: 2025-07-22 | End: 2025-08-12 | Stop reason: HOSPADM

## 2025-07-22 RX ADMIN — LEVETIRACETAM 500 MG: 100 SOLUTION ORAL at 08:07

## 2025-07-22 RX ADMIN — BUSPIRONE HYDROCHLORIDE 15 MG: 10 TABLET ORAL at 07:07

## 2025-07-22 RX ADMIN — CEFEPIME 2 G: 2 INJECTION, POWDER, FOR SOLUTION INTRAVENOUS at 12:07

## 2025-07-22 RX ADMIN — EZETIMIBE 10 MG: 10 TABLET ORAL at 07:07

## 2025-07-22 RX ADMIN — BUSPIRONE HYDROCHLORIDE 15 MG: 10 TABLET ORAL at 08:07

## 2025-07-22 RX ADMIN — OXYCODONE HYDROCHLORIDE 5 MG: 5 TABLET ORAL at 08:07

## 2025-07-22 RX ADMIN — RIVAROXABAN 10 MG: 10 TABLET, FILM COATED ORAL at 04:07

## 2025-07-22 RX ADMIN — LEVETIRACETAM 500 MG: 100 SOLUTION ORAL at 07:07

## 2025-07-22 RX ADMIN — LEVOTHYROXINE SODIUM 100 MCG: 100 TABLET ORAL at 05:07

## 2025-07-22 RX ADMIN — METFORMIN HYDROCHLORIDE 1000 MG: 500 TABLET, FILM COATED ORAL at 04:07

## 2025-07-22 RX ADMIN — METOPROLOL SUCCINATE 12.5 MG: 25 TABLET, EXTENDED RELEASE ORAL at 07:07

## 2025-07-22 RX ADMIN — DOCUSATE SODIUM LIQUID 100 MG: 100 LIQUID ORAL at 07:07

## 2025-07-22 RX ADMIN — Medication 1 EACH: at 07:07

## 2025-07-22 RX ADMIN — FAMOTIDINE 20 MG: 20 TABLET, FILM COATED ORAL at 07:07

## 2025-07-22 RX ADMIN — DOCUSATE SODIUM LIQUID 100 MG: 100 LIQUID ORAL at 08:07

## 2025-07-22 RX ADMIN — CLOPIDOGREL BISULFATE 75 MG: 75 TABLET, FILM COATED ORAL at 07:07

## 2025-07-22 RX ADMIN — TRAZODONE HYDROCHLORIDE 75 MG: 50 TABLET ORAL at 08:07

## 2025-07-22 RX ADMIN — FAMOTIDINE 20 MG: 20 TABLET, FILM COATED ORAL at 08:07

## 2025-07-22 RX ADMIN — METFORMIN HYDROCHLORIDE 500 MG: 500 TABLET, FILM COATED ORAL at 07:07

## 2025-07-22 RX ADMIN — NORTRIPTYLINE HYDROCHLORIDE 25 MG: 25 CAPSULE ORAL at 08:07

## 2025-07-22 RX ADMIN — ATORVASTATIN CALCIUM 80 MG: 40 TABLET, FILM COATED ORAL at 07:07

## 2025-07-22 RX ADMIN — CEFEPIME 2 G: 2 INJECTION, POWDER, FOR SOLUTION INTRAVENOUS at 08:07

## 2025-07-22 RX ADMIN — OXYCODONE HYDROCHLORIDE 5 MG: 5 TABLET ORAL at 12:07

## 2025-07-22 RX ADMIN — DICLOFENAC SODIUM 2 G: 10 GEL TOPICAL at 08:07

## 2025-07-22 RX ADMIN — FERROUS SULFATE TAB 325 MG (65 MG ELEMENTAL FE) 1 EACH: 325 (65 FE) TAB at 07:07

## 2025-07-22 RX ADMIN — OXYCODONE HYDROCHLORIDE 5 MG: 5 TABLET ORAL at 07:07

## 2025-07-22 RX ADMIN — CEFEPIME 2 G: 2 INJECTION, POWDER, FOR SOLUTION INTRAVENOUS at 05:07

## 2025-07-23 LAB
POCT GLUCOSE: 141 MG/DL (ref 70–110)
POCT GLUCOSE: 153 MG/DL (ref 70–110)
POCT GLUCOSE: 174 MG/DL (ref 70–110)
POCT GLUCOSE: 175 MG/DL (ref 70–110)
POCT GLUCOSE: 225 MG/DL (ref 70–110)

## 2025-07-23 PROCEDURE — 25000003 PHARM REV CODE 250: Performed by: NURSE PRACTITIONER

## 2025-07-23 PROCEDURE — 97535 SELF CARE MNGMENT TRAINING: CPT

## 2025-07-23 PROCEDURE — 97112 NEUROMUSCULAR REEDUCATION: CPT

## 2025-07-23 PROCEDURE — 25000003 PHARM REV CODE 250: Performed by: INTERNAL MEDICINE

## 2025-07-23 PROCEDURE — 63600175 PHARM REV CODE 636 W HCPCS: Performed by: NURSE PRACTITIONER

## 2025-07-23 PROCEDURE — 97530 THERAPEUTIC ACTIVITIES: CPT

## 2025-07-23 PROCEDURE — 94761 N-INVAS EAR/PLS OXIMETRY MLT: CPT

## 2025-07-23 PROCEDURE — 99233 SBSQ HOSP IP/OBS HIGH 50: CPT | Mod: ,,, | Performed by: NURSE PRACTITIONER

## 2025-07-23 PROCEDURE — 92526 ORAL FUNCTION THERAPY: CPT

## 2025-07-23 PROCEDURE — 97110 THERAPEUTIC EXERCISES: CPT | Mod: CQ

## 2025-07-23 PROCEDURE — 99900035 HC TECH TIME PER 15 MIN (STAT)

## 2025-07-23 PROCEDURE — 11800000 HC REHAB PRIVATE ROOM

## 2025-07-23 PROCEDURE — 97110 THERAPEUTIC EXERCISES: CPT

## 2025-07-23 PROCEDURE — 97116 GAIT TRAINING THERAPY: CPT

## 2025-07-23 RX ADMIN — BUSPIRONE HYDROCHLORIDE 15 MG: 10 TABLET ORAL at 08:07

## 2025-07-23 RX ADMIN — LEVETIRACETAM 500 MG: 100 SOLUTION ORAL at 08:07

## 2025-07-23 RX ADMIN — TRAZODONE HYDROCHLORIDE 75 MG: 50 TABLET ORAL at 08:07

## 2025-07-23 RX ADMIN — CEFEPIME 2 G: 2 INJECTION, POWDER, FOR SOLUTION INTRAVENOUS at 10:07

## 2025-07-23 RX ADMIN — METOPROLOL SUCCINATE 12.5 MG: 25 TABLET, EXTENDED RELEASE ORAL at 08:07

## 2025-07-23 RX ADMIN — OXYCODONE HYDROCHLORIDE 5 MG: 5 TABLET ORAL at 03:07

## 2025-07-23 RX ADMIN — CEFEPIME 2 G: 2 INJECTION, POWDER, FOR SOLUTION INTRAVENOUS at 03:07

## 2025-07-23 RX ADMIN — RIVAROXABAN 10 MG: 10 TABLET, FILM COATED ORAL at 04:07

## 2025-07-23 RX ADMIN — FAMOTIDINE 20 MG: 20 TABLET, FILM COATED ORAL at 08:07

## 2025-07-23 RX ADMIN — BISACODYL 10 MG: 10 SUPPOSITORY RECTAL at 06:07

## 2025-07-23 RX ADMIN — NORTRIPTYLINE HYDROCHLORIDE 25 MG: 25 CAPSULE ORAL at 10:07

## 2025-07-23 RX ADMIN — ATORVASTATIN CALCIUM 80 MG: 40 TABLET, FILM COATED ORAL at 08:07

## 2025-07-23 RX ADMIN — CEFEPIME 2 G: 2 INJECTION, POWDER, FOR SOLUTION INTRAVENOUS at 05:07

## 2025-07-23 RX ADMIN — FERROUS SULFATE TAB 325 MG (65 MG ELEMENTAL FE) 1 EACH: 325 (65 FE) TAB at 08:07

## 2025-07-23 RX ADMIN — OXYCODONE HYDROCHLORIDE 5 MG: 5 TABLET ORAL at 10:07

## 2025-07-23 RX ADMIN — DICLOFENAC SODIUM 2 G: 10 GEL TOPICAL at 08:07

## 2025-07-23 RX ADMIN — DOCUSATE SODIUM LIQUID 100 MG: 100 LIQUID ORAL at 08:07

## 2025-07-23 RX ADMIN — LEVOTHYROXINE SODIUM 100 MCG: 100 TABLET ORAL at 05:07

## 2025-07-23 RX ADMIN — Medication 1 EACH: at 08:07

## 2025-07-23 RX ADMIN — EZETIMIBE 10 MG: 10 TABLET ORAL at 08:07

## 2025-07-23 RX ADMIN — INSULIN ASPART 2 UNITS: 100 INJECTION, SOLUTION INTRAVENOUS; SUBCUTANEOUS at 04:07

## 2025-07-23 RX ADMIN — METFORMIN HYDROCHLORIDE 1000 MG: 500 TABLET, FILM COATED ORAL at 08:07

## 2025-07-23 RX ADMIN — CLOPIDOGREL BISULFATE 75 MG: 75 TABLET, FILM COATED ORAL at 08:07

## 2025-07-23 RX ADMIN — METFORMIN HYDROCHLORIDE 1000 MG: 500 TABLET, FILM COATED ORAL at 04:07

## 2025-07-24 LAB
BASOPHILS # BLD AUTO: 0.08 X10(3)/MCL
BASOPHILS NFR BLD AUTO: 1.5 %
EOSINOPHIL # BLD AUTO: 0.3 X10(3)/MCL (ref 0–0.9)
EOSINOPHIL NFR BLD AUTO: 5.6 %
ERYTHROCYTE [DISTWIDTH] IN BLOOD BY AUTOMATED COUNT: 14.2 % (ref 11.5–17)
HCT VFR BLD AUTO: 37.3 % (ref 37–47)
HGB BLD-MCNC: 12.1 G/DL (ref 12–16)
IMM GRANULOCYTES # BLD AUTO: 0.03 X10(3)/MCL (ref 0–0.04)
IMM GRANULOCYTES NFR BLD AUTO: 0.6 %
LYMPHOCYTES # BLD AUTO: 1.65 X10(3)/MCL (ref 0.6–4.6)
LYMPHOCYTES NFR BLD AUTO: 30.7 %
MCH RBC QN AUTO: 30.2 PG (ref 27–31)
MCHC RBC AUTO-ENTMCNC: 32.4 G/DL (ref 33–36)
MCV RBC AUTO: 93 FL (ref 80–94)
MONOCYTES # BLD AUTO: 0.56 X10(3)/MCL (ref 0.1–1.3)
MONOCYTES NFR BLD AUTO: 10.4 %
NEUTROPHILS # BLD AUTO: 2.76 X10(3)/MCL (ref 2.1–9.2)
NEUTROPHILS NFR BLD AUTO: 51.2 %
NRBC BLD AUTO-RTO: 0 %
PLATELET # BLD AUTO: 319 X10(3)/MCL (ref 130–400)
PMV BLD AUTO: 10.7 FL (ref 7.4–10.4)
POCT GLUCOSE: 143 MG/DL (ref 70–110)
POCT GLUCOSE: 152 MG/DL (ref 70–110)
POCT GLUCOSE: 178 MG/DL (ref 70–110)
POCT GLUCOSE: 197 MG/DL (ref 70–110)
RBC # BLD AUTO: 4.01 X10(6)/MCL (ref 4.2–5.4)
WBC # BLD AUTO: 5.38 X10(3)/MCL (ref 4.5–11.5)

## 2025-07-24 PROCEDURE — 36415 COLL VENOUS BLD VENIPUNCTURE: CPT | Performed by: INTERNAL MEDICINE

## 2025-07-24 PROCEDURE — 25000003 PHARM REV CODE 250: Performed by: NURSE PRACTITIONER

## 2025-07-24 PROCEDURE — 63600175 PHARM REV CODE 636 W HCPCS: Performed by: NURSE PRACTITIONER

## 2025-07-24 PROCEDURE — 97530 THERAPEUTIC ACTIVITIES: CPT

## 2025-07-24 PROCEDURE — 25000003 PHARM REV CODE 250: Performed by: INTERNAL MEDICINE

## 2025-07-24 PROCEDURE — 97112 NEUROMUSCULAR REEDUCATION: CPT

## 2025-07-24 PROCEDURE — 97116 GAIT TRAINING THERAPY: CPT

## 2025-07-24 PROCEDURE — 92526 ORAL FUNCTION THERAPY: CPT

## 2025-07-24 PROCEDURE — 85025 COMPLETE CBC W/AUTO DIFF WBC: CPT | Performed by: INTERNAL MEDICINE

## 2025-07-24 PROCEDURE — 11800000 HC REHAB PRIVATE ROOM

## 2025-07-24 PROCEDURE — 99900035 HC TECH TIME PER 15 MIN (STAT)

## 2025-07-24 PROCEDURE — 97110 THERAPEUTIC EXERCISES: CPT

## 2025-07-24 RX ADMIN — CEFEPIME 2 G: 2 INJECTION, POWDER, FOR SOLUTION INTRAVENOUS at 10:07

## 2025-07-24 RX ADMIN — CEFEPIME 2 G: 2 INJECTION, POWDER, FOR SOLUTION INTRAVENOUS at 05:07

## 2025-07-24 RX ADMIN — OXYCODONE HYDROCHLORIDE 5 MG: 5 TABLET ORAL at 08:07

## 2025-07-24 RX ADMIN — DOCUSATE SODIUM LIQUID 100 MG: 100 LIQUID ORAL at 08:07

## 2025-07-24 RX ADMIN — Medication 1 EACH: at 09:07

## 2025-07-24 RX ADMIN — LEVETIRACETAM 500 MG: 100 SOLUTION ORAL at 08:07

## 2025-07-24 RX ADMIN — BUSPIRONE HYDROCHLORIDE 15 MG: 10 TABLET ORAL at 09:07

## 2025-07-24 RX ADMIN — LEVETIRACETAM 500 MG: 100 SOLUTION ORAL at 09:07

## 2025-07-24 RX ADMIN — METFORMIN HYDROCHLORIDE 1000 MG: 500 TABLET, FILM COATED ORAL at 04:07

## 2025-07-24 RX ADMIN — FAMOTIDINE 20 MG: 20 TABLET, FILM COATED ORAL at 09:07

## 2025-07-24 RX ADMIN — DICLOFENAC SODIUM 2 G: 10 GEL TOPICAL at 07:07

## 2025-07-24 RX ADMIN — METFORMIN HYDROCHLORIDE 1000 MG: 500 TABLET, FILM COATED ORAL at 09:07

## 2025-07-24 RX ADMIN — LEVOTHYROXINE SODIUM 100 MCG: 100 TABLET ORAL at 05:07

## 2025-07-24 RX ADMIN — TRAZODONE HYDROCHLORIDE 75 MG: 50 TABLET ORAL at 08:07

## 2025-07-24 RX ADMIN — ATORVASTATIN CALCIUM 80 MG: 40 TABLET, FILM COATED ORAL at 09:07

## 2025-07-24 RX ADMIN — BUSPIRONE HYDROCHLORIDE 15 MG: 10 TABLET ORAL at 08:07

## 2025-07-24 RX ADMIN — CEFEPIME 2 G: 2 INJECTION, POWDER, FOR SOLUTION INTRAVENOUS at 03:07

## 2025-07-24 RX ADMIN — METOPROLOL SUCCINATE 12.5 MG: 25 TABLET, EXTENDED RELEASE ORAL at 09:07

## 2025-07-24 RX ADMIN — FERROUS SULFATE TAB 325 MG (65 MG ELEMENTAL FE) 1 EACH: 325 (65 FE) TAB at 09:07

## 2025-07-24 RX ADMIN — CLOPIDOGREL BISULFATE 75 MG: 75 TABLET, FILM COATED ORAL at 09:07

## 2025-07-24 RX ADMIN — EZETIMIBE 10 MG: 10 TABLET ORAL at 09:07

## 2025-07-24 RX ADMIN — RIVAROXABAN 10 MG: 10 TABLET, FILM COATED ORAL at 04:07

## 2025-07-24 RX ADMIN — FAMOTIDINE 20 MG: 20 TABLET, FILM COATED ORAL at 08:07

## 2025-07-24 RX ADMIN — NORTRIPTYLINE HYDROCHLORIDE 25 MG: 25 CAPSULE ORAL at 08:07

## 2025-07-24 RX ADMIN — OXYCODONE HYDROCHLORIDE 5 MG: 5 TABLET ORAL at 06:07

## 2025-07-25 LAB
POCT GLUCOSE: 119 MG/DL (ref 70–110)
POCT GLUCOSE: 151 MG/DL (ref 70–110)
POCT GLUCOSE: 165 MG/DL (ref 70–110)

## 2025-07-25 PROCEDURE — 11800000 HC REHAB PRIVATE ROOM

## 2025-07-25 PROCEDURE — 25000003 PHARM REV CODE 250: Performed by: NURSE PRACTITIONER

## 2025-07-25 PROCEDURE — 97110 THERAPEUTIC EXERCISES: CPT | Mod: CQ

## 2025-07-25 PROCEDURE — 92526 ORAL FUNCTION THERAPY: CPT

## 2025-07-25 PROCEDURE — 63600175 PHARM REV CODE 636 W HCPCS: Performed by: NURSE PRACTITIONER

## 2025-07-25 PROCEDURE — 97530 THERAPEUTIC ACTIVITIES: CPT | Mod: CQ

## 2025-07-25 PROCEDURE — 97116 GAIT TRAINING THERAPY: CPT | Mod: CQ

## 2025-07-25 PROCEDURE — 99900035 HC TECH TIME PER 15 MIN (STAT)

## 2025-07-25 PROCEDURE — 97110 THERAPEUTIC EXERCISES: CPT

## 2025-07-25 PROCEDURE — 99233 SBSQ HOSP IP/OBS HIGH 50: CPT | Mod: ,,, | Performed by: NURSE PRACTITIONER

## 2025-07-25 PROCEDURE — 25000003 PHARM REV CODE 250: Performed by: INTERNAL MEDICINE

## 2025-07-25 PROCEDURE — 97535 SELF CARE MNGMENT TRAINING: CPT

## 2025-07-25 RX ADMIN — BUSPIRONE HYDROCHLORIDE 15 MG: 10 TABLET ORAL at 09:07

## 2025-07-25 RX ADMIN — DICLOFENAC SODIUM 2 G: 10 GEL TOPICAL at 08:07

## 2025-07-25 RX ADMIN — DICLOFENAC SODIUM 2 G: 10 GEL TOPICAL at 09:07

## 2025-07-25 RX ADMIN — EZETIMIBE 10 MG: 10 TABLET ORAL at 08:07

## 2025-07-25 RX ADMIN — METOPROLOL SUCCINATE 12.5 MG: 25 TABLET, EXTENDED RELEASE ORAL at 08:07

## 2025-07-25 RX ADMIN — LEVETIRACETAM 500 MG: 100 SOLUTION ORAL at 09:07

## 2025-07-25 RX ADMIN — ACETAMINOPHEN 650 MG: 325 TABLET ORAL at 08:07

## 2025-07-25 RX ADMIN — LEVOTHYROXINE SODIUM 100 MCG: 100 TABLET ORAL at 06:07

## 2025-07-25 RX ADMIN — BUSPIRONE HYDROCHLORIDE 15 MG: 10 TABLET ORAL at 08:07

## 2025-07-25 RX ADMIN — OXYCODONE HYDROCHLORIDE 5 MG: 5 TABLET ORAL at 01:07

## 2025-07-25 RX ADMIN — DOCUSATE SODIUM LIQUID 100 MG: 100 LIQUID ORAL at 08:07

## 2025-07-25 RX ADMIN — FAMOTIDINE 20 MG: 20 TABLET, FILM COATED ORAL at 08:07

## 2025-07-25 RX ADMIN — METFORMIN HYDROCHLORIDE 1000 MG: 500 TABLET, FILM COATED ORAL at 05:07

## 2025-07-25 RX ADMIN — TRAZODONE HYDROCHLORIDE 75 MG: 50 TABLET ORAL at 09:07

## 2025-07-25 RX ADMIN — DOCUSATE SODIUM LIQUID 100 MG: 100 LIQUID ORAL at 09:07

## 2025-07-25 RX ADMIN — OXYCODONE HYDROCHLORIDE 5 MG: 5 TABLET ORAL at 06:07

## 2025-07-25 RX ADMIN — Medication 1 EACH: at 08:07

## 2025-07-25 RX ADMIN — NORTRIPTYLINE HYDROCHLORIDE 25 MG: 25 CAPSULE ORAL at 09:07

## 2025-07-25 RX ADMIN — ATORVASTATIN CALCIUM 80 MG: 40 TABLET, FILM COATED ORAL at 08:07

## 2025-07-25 RX ADMIN — CEFEPIME 2 G: 2 INJECTION, POWDER, FOR SOLUTION INTRAVENOUS at 06:07

## 2025-07-25 RX ADMIN — LEVETIRACETAM 500 MG: 100 SOLUTION ORAL at 08:07

## 2025-07-25 RX ADMIN — METFORMIN HYDROCHLORIDE 1000 MG: 500 TABLET, FILM COATED ORAL at 08:07

## 2025-07-25 RX ADMIN — RIVAROXABAN 10 MG: 10 TABLET, FILM COATED ORAL at 05:07

## 2025-07-25 RX ADMIN — FAMOTIDINE 20 MG: 20 TABLET, FILM COATED ORAL at 09:07

## 2025-07-25 RX ADMIN — CEFEPIME 2 G: 2 INJECTION, POWDER, FOR SOLUTION INTRAVENOUS at 09:07

## 2025-07-25 RX ADMIN — FERROUS SULFATE TAB 325 MG (65 MG ELEMENTAL FE) 1 EACH: 325 (65 FE) TAB at 08:07

## 2025-07-25 RX ADMIN — CLOPIDOGREL BISULFATE 75 MG: 75 TABLET, FILM COATED ORAL at 08:07

## 2025-07-25 RX ADMIN — CEFEPIME 2 G: 2 INJECTION, POWDER, FOR SOLUTION INTRAVENOUS at 01:07

## 2025-07-26 LAB
POCT GLUCOSE: 123 MG/DL (ref 70–110)
POCT GLUCOSE: 146 MG/DL (ref 70–110)
POCT GLUCOSE: 265 MG/DL (ref 70–110)

## 2025-07-26 PROCEDURE — 63600175 PHARM REV CODE 636 W HCPCS: Performed by: NURSE PRACTITIONER

## 2025-07-26 PROCEDURE — 11800000 HC REHAB PRIVATE ROOM

## 2025-07-26 PROCEDURE — 92526 ORAL FUNCTION THERAPY: CPT

## 2025-07-26 PROCEDURE — 25000003 PHARM REV CODE 250: Performed by: NURSE PRACTITIONER

## 2025-07-26 PROCEDURE — 25000003 PHARM REV CODE 250: Performed by: INTERNAL MEDICINE

## 2025-07-26 PROCEDURE — 97110 THERAPEUTIC EXERCISES: CPT | Mod: CQ

## 2025-07-26 PROCEDURE — 97530 THERAPEUTIC ACTIVITIES: CPT | Mod: CQ

## 2025-07-26 PROCEDURE — 97116 GAIT TRAINING THERAPY: CPT | Mod: CQ

## 2025-07-26 PROCEDURE — 99900035 HC TECH TIME PER 15 MIN (STAT)

## 2025-07-26 RX ADMIN — LEVETIRACETAM 500 MG: 100 SOLUTION ORAL at 09:07

## 2025-07-26 RX ADMIN — RIVAROXABAN 10 MG: 10 TABLET, FILM COATED ORAL at 04:07

## 2025-07-26 RX ADMIN — BUSPIRONE HYDROCHLORIDE 15 MG: 10 TABLET ORAL at 09:07

## 2025-07-26 RX ADMIN — FAMOTIDINE 20 MG: 20 TABLET, FILM COATED ORAL at 07:07

## 2025-07-26 RX ADMIN — FAMOTIDINE 20 MG: 20 TABLET, FILM COATED ORAL at 09:07

## 2025-07-26 RX ADMIN — METFORMIN HYDROCHLORIDE 1000 MG: 500 TABLET, FILM COATED ORAL at 07:07

## 2025-07-26 RX ADMIN — EZETIMIBE 10 MG: 10 TABLET ORAL at 07:07

## 2025-07-26 RX ADMIN — OXYCODONE HYDROCHLORIDE 5 MG: 5 TABLET ORAL at 07:07

## 2025-07-26 RX ADMIN — CLOPIDOGREL BISULFATE 75 MG: 75 TABLET, FILM COATED ORAL at 07:07

## 2025-07-26 RX ADMIN — OXYCODONE HYDROCHLORIDE 5 MG: 5 TABLET ORAL at 02:07

## 2025-07-26 RX ADMIN — BISACODYL 10 MG: 10 SUPPOSITORY RECTAL at 09:07

## 2025-07-26 RX ADMIN — FERROUS SULFATE TAB 325 MG (65 MG ELEMENTAL FE) 1 EACH: 325 (65 FE) TAB at 07:07

## 2025-07-26 RX ADMIN — ATORVASTATIN CALCIUM 80 MG: 40 TABLET, FILM COATED ORAL at 07:07

## 2025-07-26 RX ADMIN — TRAZODONE HYDROCHLORIDE 75 MG: 50 TABLET ORAL at 09:07

## 2025-07-26 RX ADMIN — CEFEPIME 2 G: 2 INJECTION, POWDER, FOR SOLUTION INTRAVENOUS at 02:07

## 2025-07-26 RX ADMIN — CEFEPIME 2 G: 2 INJECTION, POWDER, FOR SOLUTION INTRAVENOUS at 05:07

## 2025-07-26 RX ADMIN — DICLOFENAC SODIUM 2 G: 10 GEL TOPICAL at 07:07

## 2025-07-26 RX ADMIN — LEVOTHYROXINE SODIUM 100 MCG: 100 TABLET ORAL at 06:07

## 2025-07-26 RX ADMIN — INSULIN ASPART 3 UNITS: 100 INJECTION, SOLUTION INTRAVENOUS; SUBCUTANEOUS at 04:07

## 2025-07-26 RX ADMIN — BUSPIRONE HYDROCHLORIDE 15 MG: 10 TABLET ORAL at 07:07

## 2025-07-26 RX ADMIN — METFORMIN HYDROCHLORIDE 1000 MG: 500 TABLET, FILM COATED ORAL at 04:07

## 2025-07-26 RX ADMIN — CEFEPIME 2 G: 2 INJECTION, POWDER, FOR SOLUTION INTRAVENOUS at 09:07

## 2025-07-26 RX ADMIN — Medication 1 EACH: at 07:07

## 2025-07-26 RX ADMIN — DOCUSATE SODIUM LIQUID 100 MG: 100 LIQUID ORAL at 09:07

## 2025-07-26 RX ADMIN — DOCUSATE SODIUM LIQUID 100 MG: 100 LIQUID ORAL at 07:07

## 2025-07-26 RX ADMIN — METOPROLOL SUCCINATE 12.5 MG: 25 TABLET, EXTENDED RELEASE ORAL at 07:07

## 2025-07-26 RX ADMIN — ACETAMINOPHEN 650 MG: 325 TABLET ORAL at 04:07

## 2025-07-26 RX ADMIN — NORTRIPTYLINE HYDROCHLORIDE 25 MG: 25 CAPSULE ORAL at 09:07

## 2025-07-26 RX ADMIN — LEVETIRACETAM 500 MG: 100 SOLUTION ORAL at 07:07

## 2025-07-26 RX ADMIN — DICLOFENAC SODIUM 2 G: 10 GEL TOPICAL at 09:07

## 2025-07-27 LAB
POCT GLUCOSE: 143 MG/DL (ref 70–110)
POCT GLUCOSE: 158 MG/DL (ref 70–110)
POCT GLUCOSE: 231 MG/DL (ref 70–110)

## 2025-07-27 PROCEDURE — 99900035 HC TECH TIME PER 15 MIN (STAT)

## 2025-07-27 PROCEDURE — 25000003 PHARM REV CODE 250: Performed by: NURSE PRACTITIONER

## 2025-07-27 PROCEDURE — 11800000 HC REHAB PRIVATE ROOM

## 2025-07-27 PROCEDURE — 63600175 PHARM REV CODE 636 W HCPCS: Performed by: NURSE PRACTITIONER

## 2025-07-27 PROCEDURE — 97535 SELF CARE MNGMENT TRAINING: CPT

## 2025-07-27 PROCEDURE — 25000003 PHARM REV CODE 250: Performed by: INTERNAL MEDICINE

## 2025-07-27 RX ADMIN — FAMOTIDINE 20 MG: 20 TABLET, FILM COATED ORAL at 08:07

## 2025-07-27 RX ADMIN — Medication 1 EACH: at 08:07

## 2025-07-27 RX ADMIN — LEVOTHYROXINE SODIUM 100 MCG: 100 TABLET ORAL at 05:07

## 2025-07-27 RX ADMIN — FAMOTIDINE 20 MG: 20 TABLET, FILM COATED ORAL at 07:07

## 2025-07-27 RX ADMIN — INSULIN ASPART 1 UNITS: 100 INJECTION, SOLUTION INTRAVENOUS; SUBCUTANEOUS at 07:07

## 2025-07-27 RX ADMIN — CEFEPIME 2 G: 2 INJECTION, POWDER, FOR SOLUTION INTRAVENOUS at 06:07

## 2025-07-27 RX ADMIN — BUSPIRONE HYDROCHLORIDE 15 MG: 10 TABLET ORAL at 07:07

## 2025-07-27 RX ADMIN — ATORVASTATIN CALCIUM 80 MG: 40 TABLET, FILM COATED ORAL at 08:07

## 2025-07-27 RX ADMIN — NORTRIPTYLINE HYDROCHLORIDE 25 MG: 25 CAPSULE ORAL at 07:07

## 2025-07-27 RX ADMIN — METOPROLOL SUCCINATE 12.5 MG: 25 TABLET, EXTENDED RELEASE ORAL at 08:07

## 2025-07-27 RX ADMIN — LEVETIRACETAM 500 MG: 100 SOLUTION ORAL at 08:07

## 2025-07-27 RX ADMIN — DOCUSATE SODIUM LIQUID 100 MG: 100 LIQUID ORAL at 08:07

## 2025-07-27 RX ADMIN — FERROUS SULFATE TAB 325 MG (65 MG ELEMENTAL FE) 1 EACH: 325 (65 FE) TAB at 08:07

## 2025-07-27 RX ADMIN — LEVETIRACETAM 500 MG: 100 SOLUTION ORAL at 07:07

## 2025-07-27 RX ADMIN — EZETIMIBE 10 MG: 10 TABLET ORAL at 08:07

## 2025-07-27 RX ADMIN — OXYCODONE HYDROCHLORIDE 5 MG: 5 TABLET ORAL at 01:07

## 2025-07-27 RX ADMIN — DOCUSATE SODIUM LIQUID 100 MG: 100 LIQUID ORAL at 07:07

## 2025-07-27 RX ADMIN — DICLOFENAC SODIUM 2 G: 10 GEL TOPICAL at 07:07

## 2025-07-27 RX ADMIN — CLOPIDOGREL BISULFATE 75 MG: 75 TABLET, FILM COATED ORAL at 08:07

## 2025-07-27 RX ADMIN — CEFEPIME 2 G: 2 INJECTION, POWDER, FOR SOLUTION INTRAVENOUS at 09:07

## 2025-07-27 RX ADMIN — CEFEPIME 2 G: 2 INJECTION, POWDER, FOR SOLUTION INTRAVENOUS at 01:07

## 2025-07-27 RX ADMIN — METFORMIN HYDROCHLORIDE 1000 MG: 500 TABLET, FILM COATED ORAL at 08:07

## 2025-07-27 RX ADMIN — RIVAROXABAN 10 MG: 10 TABLET, FILM COATED ORAL at 05:07

## 2025-07-27 RX ADMIN — BUSPIRONE HYDROCHLORIDE 15 MG: 10 TABLET ORAL at 08:07

## 2025-07-27 RX ADMIN — TRAZODONE HYDROCHLORIDE 75 MG: 50 TABLET ORAL at 07:07

## 2025-07-27 RX ADMIN — METFORMIN HYDROCHLORIDE 1000 MG: 500 TABLET, FILM COATED ORAL at 05:07

## 2025-07-27 RX ADMIN — OXYCODONE HYDROCHLORIDE 5 MG: 5 TABLET ORAL at 07:07

## 2025-07-27 RX ADMIN — DICLOFENAC SODIUM 2 G: 10 GEL TOPICAL at 08:07

## 2025-07-27 RX ADMIN — OXYCODONE HYDROCHLORIDE 5 MG: 5 TABLET ORAL at 08:07

## 2025-07-28 LAB
ALBUMIN SERPL-MCNC: 3.5 G/DL (ref 3.4–4.8)
ALBUMIN/GLOB SERPL: 1 RATIO (ref 1.1–2)
ALP SERPL-CCNC: 138 UNIT/L (ref 40–150)
ALT SERPL-CCNC: 48 UNIT/L (ref 0–55)
ANION GAP SERPL CALC-SCNC: 9 MEQ/L
AST SERPL-CCNC: 15 UNIT/L (ref 11–45)
BASOPHILS # BLD AUTO: 0.08 X10(3)/MCL
BASOPHILS NFR BLD AUTO: 1.7 %
BILIRUB SERPL-MCNC: 0.4 MG/DL
BUN SERPL-MCNC: 26.8 MG/DL (ref 9.8–20.1)
CALCIUM SERPL-MCNC: 9.6 MG/DL (ref 8.4–10.2)
CHLORIDE SERPL-SCNC: 105 MMOL/L (ref 98–107)
CO2 SERPL-SCNC: 24 MMOL/L (ref 23–31)
CREAT SERPL-MCNC: 0.76 MG/DL (ref 0.55–1.02)
CREAT/UREA NIT SERPL: 35
EOSINOPHIL # BLD AUTO: 0.33 X10(3)/MCL (ref 0–0.9)
EOSINOPHIL NFR BLD AUTO: 7.1 %
ERYTHROCYTE [DISTWIDTH] IN BLOOD BY AUTOMATED COUNT: 14.2 % (ref 11.5–17)
GFR SERPLBLD CREATININE-BSD FMLA CKD-EPI: >60 ML/MIN/1.73/M2
GLOBULIN SER-MCNC: 3.6 GM/DL (ref 2.4–3.5)
GLUCOSE SERPL-MCNC: 151 MG/DL (ref 82–115)
HCT VFR BLD AUTO: 38.2 % (ref 37–47)
HGB BLD-MCNC: 12.4 G/DL (ref 12–16)
IMM GRANULOCYTES # BLD AUTO: 0.02 X10(3)/MCL (ref 0–0.04)
IMM GRANULOCYTES NFR BLD AUTO: 0.4 %
LYMPHOCYTES # BLD AUTO: 1.59 X10(3)/MCL (ref 0.6–4.6)
LYMPHOCYTES NFR BLD AUTO: 34.2 %
MAGNESIUM SERPL-MCNC: 1.6 MG/DL (ref 1.6–2.6)
MCH RBC QN AUTO: 29.9 PG (ref 27–31)
MCHC RBC AUTO-ENTMCNC: 32.5 G/DL (ref 33–36)
MCV RBC AUTO: 92 FL (ref 80–94)
MONOCYTES # BLD AUTO: 0.6 X10(3)/MCL (ref 0.1–1.3)
MONOCYTES NFR BLD AUTO: 12.9 %
NEUTROPHILS # BLD AUTO: 2.03 X10(3)/MCL (ref 2.1–9.2)
NEUTROPHILS NFR BLD AUTO: 43.7 %
NRBC BLD AUTO-RTO: 0 %
PHOSPHATE SERPL-MCNC: 4.2 MG/DL (ref 2.3–4.7)
PLATELET # BLD AUTO: 341 X10(3)/MCL (ref 130–400)
PMV BLD AUTO: 10.6 FL (ref 7.4–10.4)
POCT GLUCOSE: 114 MG/DL (ref 70–110)
POCT GLUCOSE: 133 MG/DL (ref 70–110)
POCT GLUCOSE: 136 MG/DL (ref 70–110)
POCT GLUCOSE: 144 MG/DL (ref 70–110)
POTASSIUM SERPL-SCNC: 4.2 MMOL/L (ref 3.5–5.1)
PREALB SERPL-MCNC: 31.9 MG/DL (ref 14–37)
PROT SERPL-MCNC: 7.1 GM/DL (ref 5.8–7.6)
RBC # BLD AUTO: 4.15 X10(6)/MCL (ref 4.2–5.4)
SODIUM SERPL-SCNC: 138 MMOL/L (ref 136–145)
WBC # BLD AUTO: 4.65 X10(3)/MCL (ref 4.5–11.5)

## 2025-07-28 PROCEDURE — 97110 THERAPEUTIC EXERCISES: CPT | Mod: CQ

## 2025-07-28 PROCEDURE — 94761 N-INVAS EAR/PLS OXIMETRY MLT: CPT

## 2025-07-28 PROCEDURE — 63600175 PHARM REV CODE 636 W HCPCS: Performed by: NURSE PRACTITIONER

## 2025-07-28 PROCEDURE — 11800000 HC REHAB PRIVATE ROOM

## 2025-07-28 PROCEDURE — 92526 ORAL FUNCTION THERAPY: CPT

## 2025-07-28 PROCEDURE — 97530 THERAPEUTIC ACTIVITIES: CPT | Mod: CQ

## 2025-07-28 PROCEDURE — 83735 ASSAY OF MAGNESIUM: CPT | Performed by: NURSE PRACTITIONER

## 2025-07-28 PROCEDURE — 25000003 PHARM REV CODE 250: Performed by: NURSE PRACTITIONER

## 2025-07-28 PROCEDURE — 36415 COLL VENOUS BLD VENIPUNCTURE: CPT | Performed by: NURSE PRACTITIONER

## 2025-07-28 PROCEDURE — 99233 SBSQ HOSP IP/OBS HIGH 50: CPT | Mod: ,,, | Performed by: NURSE PRACTITIONER

## 2025-07-28 PROCEDURE — 97110 THERAPEUTIC EXERCISES: CPT

## 2025-07-28 PROCEDURE — 99900031 HC PATIENT EDUCATION (STAT)

## 2025-07-28 PROCEDURE — 80053 COMPREHEN METABOLIC PANEL: CPT | Performed by: NURSE PRACTITIONER

## 2025-07-28 PROCEDURE — 97116 GAIT TRAINING THERAPY: CPT | Mod: CQ

## 2025-07-28 PROCEDURE — 85025 COMPLETE CBC W/AUTO DIFF WBC: CPT | Performed by: NURSE PRACTITIONER

## 2025-07-28 PROCEDURE — 25000003 PHARM REV CODE 250: Performed by: INTERNAL MEDICINE

## 2025-07-28 PROCEDURE — 84134 ASSAY OF PREALBUMIN: CPT | Performed by: NURSE PRACTITIONER

## 2025-07-28 PROCEDURE — 99900035 HC TECH TIME PER 15 MIN (STAT)

## 2025-07-28 PROCEDURE — 84100 ASSAY OF PHOSPHORUS: CPT | Performed by: NURSE PRACTITIONER

## 2025-07-28 PROCEDURE — 97535 SELF CARE MNGMENT TRAINING: CPT

## 2025-07-28 PROCEDURE — 97168 OT RE-EVAL EST PLAN CARE: CPT

## 2025-07-28 RX ORDER — METOPROLOL TARTRATE 1 MG/ML
5 INJECTION, SOLUTION INTRAVENOUS
Status: DISCONTINUED | OUTPATIENT
Start: 2025-07-28 | End: 2025-08-12 | Stop reason: HOSPADM

## 2025-07-28 RX ORDER — METOPROLOL SUCCINATE 25 MG/1
25 TABLET, EXTENDED RELEASE ORAL DAILY
Status: DISCONTINUED | OUTPATIENT
Start: 2025-07-29 | End: 2025-08-12 | Stop reason: HOSPADM

## 2025-07-28 RX ORDER — METOPROLOL TARTRATE 1 MG/ML
5 INJECTION, SOLUTION INTRAVENOUS ONCE
Status: COMPLETED | OUTPATIENT
Start: 2025-07-28 | End: 2025-07-28

## 2025-07-28 RX ADMIN — METFORMIN HYDROCHLORIDE 1000 MG: 500 TABLET, FILM COATED ORAL at 07:07

## 2025-07-28 RX ADMIN — FAMOTIDINE 20 MG: 20 TABLET, FILM COATED ORAL at 07:07

## 2025-07-28 RX ADMIN — CEFEPIME 2 G: 2 INJECTION, POWDER, FOR SOLUTION INTRAVENOUS at 01:07

## 2025-07-28 RX ADMIN — BUSPIRONE HYDROCHLORIDE 15 MG: 10 TABLET ORAL at 08:07

## 2025-07-28 RX ADMIN — OXYCODONE HYDROCHLORIDE 5 MG: 5 TABLET ORAL at 04:07

## 2025-07-28 RX ADMIN — BUSPIRONE HYDROCHLORIDE 15 MG: 10 TABLET ORAL at 07:07

## 2025-07-28 RX ADMIN — METOPROLOL TARTRATE 5 MG: 1 INJECTION, SOLUTION INTRAVENOUS at 11:07

## 2025-07-28 RX ADMIN — DOCUSATE SODIUM LIQUID 100 MG: 100 LIQUID ORAL at 08:07

## 2025-07-28 RX ADMIN — LEVOTHYROXINE SODIUM 100 MCG: 100 TABLET ORAL at 06:07

## 2025-07-28 RX ADMIN — METFORMIN HYDROCHLORIDE 1000 MG: 500 TABLET, FILM COATED ORAL at 04:07

## 2025-07-28 RX ADMIN — SODIUM CHLORIDE 500 ML: 9 INJECTION, SOLUTION INTRAVENOUS at 11:07

## 2025-07-28 RX ADMIN — RIVAROXABAN 10 MG: 10 TABLET, FILM COATED ORAL at 04:07

## 2025-07-28 RX ADMIN — METOPROLOL SUCCINATE 12.5 MG: 25 TABLET, EXTENDED RELEASE ORAL at 07:07

## 2025-07-28 RX ADMIN — DICLOFENAC SODIUM 2 G: 10 GEL TOPICAL at 08:07

## 2025-07-28 RX ADMIN — CLOPIDOGREL BISULFATE 75 MG: 75 TABLET, FILM COATED ORAL at 07:07

## 2025-07-28 RX ADMIN — CEFEPIME 2 G: 2 INJECTION, POWDER, FOR SOLUTION INTRAVENOUS at 06:07

## 2025-07-28 RX ADMIN — TRAZODONE HYDROCHLORIDE 75 MG: 50 TABLET ORAL at 08:07

## 2025-07-28 RX ADMIN — LEVETIRACETAM 500 MG: 100 SOLUTION ORAL at 07:07

## 2025-07-28 RX ADMIN — ATORVASTATIN CALCIUM 80 MG: 40 TABLET, FILM COATED ORAL at 07:07

## 2025-07-28 RX ADMIN — EZETIMIBE 10 MG: 10 TABLET ORAL at 07:07

## 2025-07-28 RX ADMIN — Medication 1 EACH: at 07:07

## 2025-07-28 RX ADMIN — FAMOTIDINE 20 MG: 20 TABLET, FILM COATED ORAL at 08:07

## 2025-07-28 RX ADMIN — FERROUS SULFATE TAB 325 MG (65 MG ELEMENTAL FE) 1 EACH: 325 (65 FE) TAB at 07:07

## 2025-07-28 RX ADMIN — DOCUSATE SODIUM LIQUID 100 MG: 100 LIQUID ORAL at 07:07

## 2025-07-28 RX ADMIN — LEVETIRACETAM 500 MG: 100 SOLUTION ORAL at 08:07

## 2025-07-28 RX ADMIN — NORTRIPTYLINE HYDROCHLORIDE 25 MG: 25 CAPSULE ORAL at 08:07

## 2025-07-28 RX ADMIN — CEFEPIME 2 G: 2 INJECTION, POWDER, FOR SOLUTION INTRAVENOUS at 09:07

## 2025-07-29 LAB
CREAT SERPL-MCNC: 0.85 MG/DL (ref 0.55–1.02)
GFR SERPLBLD CREATININE-BSD FMLA CKD-EPI: >60 ML/MIN/1.73/M2
POCT GLUCOSE: 113 MG/DL (ref 70–110)
POCT GLUCOSE: 143 MG/DL (ref 70–110)
POCT GLUCOSE: 166 MG/DL (ref 70–110)

## 2025-07-29 PROCEDURE — 92610 EVALUATE SWALLOWING FUNCTION: CPT

## 2025-07-29 PROCEDURE — 36415 COLL VENOUS BLD VENIPUNCTURE: CPT | Performed by: INTERNAL MEDICINE

## 2025-07-29 PROCEDURE — 25000003 PHARM REV CODE 250: Performed by: NURSE PRACTITIONER

## 2025-07-29 PROCEDURE — 99900035 HC TECH TIME PER 15 MIN (STAT)

## 2025-07-29 PROCEDURE — 82565 ASSAY OF CREATININE: CPT | Performed by: INTERNAL MEDICINE

## 2025-07-29 PROCEDURE — 25000003 PHARM REV CODE 250: Performed by: INTERNAL MEDICINE

## 2025-07-29 PROCEDURE — 97535 SELF CARE MNGMENT TRAINING: CPT

## 2025-07-29 PROCEDURE — 94761 N-INVAS EAR/PLS OXIMETRY MLT: CPT

## 2025-07-29 PROCEDURE — 63600175 PHARM REV CODE 636 W HCPCS: Performed by: INTERNAL MEDICINE

## 2025-07-29 PROCEDURE — 97110 THERAPEUTIC EXERCISES: CPT

## 2025-07-29 PROCEDURE — 63600175 PHARM REV CODE 636 W HCPCS: Performed by: NURSE PRACTITIONER

## 2025-07-29 PROCEDURE — 11800000 HC REHAB PRIVATE ROOM

## 2025-07-29 PROCEDURE — 97164 PT RE-EVAL EST PLAN CARE: CPT

## 2025-07-29 PROCEDURE — 97116 GAIT TRAINING THERAPY: CPT

## 2025-07-29 PROCEDURE — 99233 SBSQ HOSP IP/OBS HIGH 50: CPT | Mod: ,,, | Performed by: NURSE PRACTITIONER

## 2025-07-29 RX ORDER — CEFEPIME HYDROCHLORIDE 2 G/1
2 INJECTION, POWDER, FOR SOLUTION INTRAVENOUS EVERY 12 HOURS
Status: DISCONTINUED | OUTPATIENT
Start: 2025-07-29 | End: 2025-08-06

## 2025-07-29 RX ORDER — CEFEPIME HYDROCHLORIDE 2 G/1
2 INJECTION, POWDER, FOR SOLUTION INTRAVENOUS
Status: DISCONTINUED | OUTPATIENT
Start: 2025-07-29 | End: 2025-07-29 | Stop reason: CLARIF

## 2025-07-29 RX ADMIN — CLOPIDOGREL BISULFATE 75 MG: 75 TABLET, FILM COATED ORAL at 08:07

## 2025-07-29 RX ADMIN — FERROUS SULFATE TAB 325 MG (65 MG ELEMENTAL FE) 1 EACH: 325 (65 FE) TAB at 08:07

## 2025-07-29 RX ADMIN — LEVOTHYROXINE SODIUM 100 MCG: 100 TABLET ORAL at 06:07

## 2025-07-29 RX ADMIN — DOCUSATE SODIUM LIQUID 100 MG: 100 LIQUID ORAL at 08:07

## 2025-07-29 RX ADMIN — ACETAMINOPHEN 650 MG: 325 TABLET ORAL at 09:07

## 2025-07-29 RX ADMIN — METFORMIN HYDROCHLORIDE 1000 MG: 500 TABLET, FILM COATED ORAL at 05:07

## 2025-07-29 RX ADMIN — METOPROLOL SUCCINATE 25 MG: 25 TABLET, EXTENDED RELEASE ORAL at 08:07

## 2025-07-29 RX ADMIN — RIVAROXABAN 10 MG: 10 TABLET, FILM COATED ORAL at 05:07

## 2025-07-29 RX ADMIN — BUSPIRONE HYDROCHLORIDE 15 MG: 10 TABLET ORAL at 08:07

## 2025-07-29 RX ADMIN — FAMOTIDINE 20 MG: 20 TABLET, FILM COATED ORAL at 08:07

## 2025-07-29 RX ADMIN — CEFEPIME 2 G: 2 INJECTION, POWDER, FOR SOLUTION INTRAVENOUS at 06:07

## 2025-07-29 RX ADMIN — Medication 1 EACH: at 08:07

## 2025-07-29 RX ADMIN — TRAZODONE HYDROCHLORIDE 75 MG: 50 TABLET ORAL at 08:07

## 2025-07-29 RX ADMIN — METFORMIN HYDROCHLORIDE 1000 MG: 500 TABLET, FILM COATED ORAL at 08:07

## 2025-07-29 RX ADMIN — LEVETIRACETAM 500 MG: 100 SOLUTION ORAL at 08:07

## 2025-07-29 RX ADMIN — ATORVASTATIN CALCIUM 80 MG: 40 TABLET, FILM COATED ORAL at 08:07

## 2025-07-29 RX ADMIN — DICLOFENAC SODIUM 2 G: 10 GEL TOPICAL at 08:07

## 2025-07-29 RX ADMIN — CEFEPIME 2 G: 2 INJECTION, POWDER, FOR SOLUTION INTRAVENOUS at 04:07

## 2025-07-29 RX ADMIN — NORTRIPTYLINE HYDROCHLORIDE 25 MG: 25 CAPSULE ORAL at 08:07

## 2025-07-29 RX ADMIN — EZETIMIBE 10 MG: 10 TABLET ORAL at 08:07

## 2025-07-30 LAB
POCT GLUCOSE: 115 MG/DL (ref 70–110)
POCT GLUCOSE: 162 MG/DL (ref 70–110)
POCT GLUCOSE: 176 MG/DL (ref 70–110)

## 2025-07-30 PROCEDURE — 97116 GAIT TRAINING THERAPY: CPT

## 2025-07-30 PROCEDURE — 99233 SBSQ HOSP IP/OBS HIGH 50: CPT | Mod: ,,, | Performed by: NURSE PRACTITIONER

## 2025-07-30 PROCEDURE — 97535 SELF CARE MNGMENT TRAINING: CPT

## 2025-07-30 PROCEDURE — 25000003 PHARM REV CODE 250: Performed by: INTERNAL MEDICINE

## 2025-07-30 PROCEDURE — 97110 THERAPEUTIC EXERCISES: CPT

## 2025-07-30 PROCEDURE — 25000003 PHARM REV CODE 250: Performed by: NURSE PRACTITIONER

## 2025-07-30 PROCEDURE — 63600175 PHARM REV CODE 636 W HCPCS: Performed by: INTERNAL MEDICINE

## 2025-07-30 PROCEDURE — 97112 NEUROMUSCULAR REEDUCATION: CPT

## 2025-07-30 PROCEDURE — 11800000 HC REHAB PRIVATE ROOM

## 2025-07-30 PROCEDURE — 92526 ORAL FUNCTION THERAPY: CPT

## 2025-07-30 PROCEDURE — 99900035 HC TECH TIME PER 15 MIN (STAT)

## 2025-07-30 RX ADMIN — LEVOTHYROXINE SODIUM 100 MCG: 100 TABLET ORAL at 05:07

## 2025-07-30 RX ADMIN — TRAZODONE HYDROCHLORIDE 75 MG: 50 TABLET ORAL at 08:07

## 2025-07-30 RX ADMIN — LEVETIRACETAM 500 MG: 100 SOLUTION ORAL at 08:07

## 2025-07-30 RX ADMIN — ATORVASTATIN CALCIUM 80 MG: 40 TABLET, FILM COATED ORAL at 08:07

## 2025-07-30 RX ADMIN — METFORMIN HYDROCHLORIDE 1000 MG: 500 TABLET, FILM COATED ORAL at 04:07

## 2025-07-30 RX ADMIN — ACETAMINOPHEN 650 MG: 325 TABLET ORAL at 12:07

## 2025-07-30 RX ADMIN — METFORMIN HYDROCHLORIDE 1000 MG: 500 TABLET, FILM COATED ORAL at 08:07

## 2025-07-30 RX ADMIN — CLOPIDOGREL BISULFATE 75 MG: 75 TABLET, FILM COATED ORAL at 08:07

## 2025-07-30 RX ADMIN — FERROUS SULFATE TAB 325 MG (65 MG ELEMENTAL FE) 1 EACH: 325 (65 FE) TAB at 08:07

## 2025-07-30 RX ADMIN — DICLOFENAC SODIUM 2 G: 10 GEL TOPICAL at 08:07

## 2025-07-30 RX ADMIN — METOPROLOL SUCCINATE 25 MG: 25 TABLET, EXTENDED RELEASE ORAL at 08:07

## 2025-07-30 RX ADMIN — BUSPIRONE HYDROCHLORIDE 15 MG: 10 TABLET ORAL at 08:07

## 2025-07-30 RX ADMIN — RIVAROXABAN 10 MG: 10 TABLET, FILM COATED ORAL at 04:07

## 2025-07-30 RX ADMIN — CEFEPIME 2 G: 2 INJECTION, POWDER, FOR SOLUTION INTRAVENOUS at 05:07

## 2025-07-30 RX ADMIN — CEFEPIME 2 G: 2 INJECTION, POWDER, FOR SOLUTION INTRAVENOUS at 06:07

## 2025-07-30 RX ADMIN — EZETIMIBE 10 MG: 10 TABLET ORAL at 08:07

## 2025-07-30 RX ADMIN — NORTRIPTYLINE HYDROCHLORIDE 25 MG: 25 CAPSULE ORAL at 08:07

## 2025-07-30 RX ADMIN — FAMOTIDINE 20 MG: 20 TABLET, FILM COATED ORAL at 08:07

## 2025-07-30 RX ADMIN — ACETAMINOPHEN 650 MG: 325 TABLET ORAL at 08:07

## 2025-07-30 RX ADMIN — Medication 1 EACH: at 08:07

## 2025-07-31 LAB
POCT GLUCOSE: 133 MG/DL (ref 70–110)
POCT GLUCOSE: 137 MG/DL (ref 70–110)
POCT GLUCOSE: 155 MG/DL (ref 70–110)
POCT GLUCOSE: 174 MG/DL (ref 70–110)

## 2025-07-31 PROCEDURE — 92526 ORAL FUNCTION THERAPY: CPT

## 2025-07-31 PROCEDURE — 25000003 PHARM REV CODE 250: Performed by: NURSE PRACTITIONER

## 2025-07-31 PROCEDURE — 99233 SBSQ HOSP IP/OBS HIGH 50: CPT | Mod: ,,, | Performed by: NURSE PRACTITIONER

## 2025-07-31 PROCEDURE — 99900035 HC TECH TIME PER 15 MIN (STAT)

## 2025-07-31 PROCEDURE — 97110 THERAPEUTIC EXERCISES: CPT

## 2025-07-31 PROCEDURE — 25000003 PHARM REV CODE 250: Performed by: INTERNAL MEDICINE

## 2025-07-31 PROCEDURE — 97535 SELF CARE MNGMENT TRAINING: CPT

## 2025-07-31 PROCEDURE — 97116 GAIT TRAINING THERAPY: CPT

## 2025-07-31 PROCEDURE — 11800000 HC REHAB PRIVATE ROOM

## 2025-07-31 PROCEDURE — 63600175 PHARM REV CODE 636 W HCPCS: Performed by: INTERNAL MEDICINE

## 2025-07-31 RX ADMIN — DICLOFENAC SODIUM 2 G: 10 GEL TOPICAL at 07:07

## 2025-07-31 RX ADMIN — FERROUS SULFATE TAB 325 MG (65 MG ELEMENTAL FE) 1 EACH: 325 (65 FE) TAB at 07:07

## 2025-07-31 RX ADMIN — OXYCODONE HYDROCHLORIDE 5 MG: 5 TABLET ORAL at 07:07

## 2025-07-31 RX ADMIN — DICLOFENAC SODIUM 2 G: 10 GEL TOPICAL at 08:07

## 2025-07-31 RX ADMIN — ACETAMINOPHEN 650 MG: 325 TABLET ORAL at 12:07

## 2025-07-31 RX ADMIN — FAMOTIDINE 20 MG: 20 TABLET, FILM COATED ORAL at 08:07

## 2025-07-31 RX ADMIN — LEVOTHYROXINE SODIUM 100 MCG: 100 TABLET ORAL at 05:07

## 2025-07-31 RX ADMIN — LEVETIRACETAM 500 MG: 100 SOLUTION ORAL at 08:07

## 2025-07-31 RX ADMIN — NORTRIPTYLINE HYDROCHLORIDE 25 MG: 25 CAPSULE ORAL at 08:07

## 2025-07-31 RX ADMIN — RIVAROXABAN 10 MG: 10 TABLET, FILM COATED ORAL at 04:07

## 2025-07-31 RX ADMIN — BUSPIRONE HYDROCHLORIDE 15 MG: 10 TABLET ORAL at 07:07

## 2025-07-31 RX ADMIN — METFORMIN HYDROCHLORIDE 1000 MG: 500 TABLET, FILM COATED ORAL at 07:07

## 2025-07-31 RX ADMIN — OXYCODONE HYDROCHLORIDE 5 MG: 5 TABLET ORAL at 08:07

## 2025-07-31 RX ADMIN — FAMOTIDINE 20 MG: 20 TABLET, FILM COATED ORAL at 07:07

## 2025-07-31 RX ADMIN — LEVETIRACETAM 500 MG: 100 SOLUTION ORAL at 07:07

## 2025-07-31 RX ADMIN — BUSPIRONE HYDROCHLORIDE 15 MG: 10 TABLET ORAL at 08:07

## 2025-07-31 RX ADMIN — CEFEPIME 2 G: 2 INJECTION, POWDER, FOR SOLUTION INTRAVENOUS at 05:07

## 2025-07-31 RX ADMIN — CLOPIDOGREL BISULFATE 75 MG: 75 TABLET, FILM COATED ORAL at 07:07

## 2025-07-31 RX ADMIN — METFORMIN HYDROCHLORIDE 1000 MG: 500 TABLET, FILM COATED ORAL at 04:07

## 2025-07-31 RX ADMIN — CEFEPIME 2 G: 2 INJECTION, POWDER, FOR SOLUTION INTRAVENOUS at 04:07

## 2025-07-31 RX ADMIN — Medication 1 EACH: at 07:07

## 2025-07-31 RX ADMIN — ATORVASTATIN CALCIUM 80 MG: 40 TABLET, FILM COATED ORAL at 07:07

## 2025-07-31 RX ADMIN — TRAZODONE HYDROCHLORIDE 75 MG: 50 TABLET ORAL at 08:07

## 2025-07-31 RX ADMIN — EZETIMIBE 10 MG: 10 TABLET ORAL at 07:07

## 2025-07-31 RX ADMIN — METOPROLOL SUCCINATE 25 MG: 25 TABLET, EXTENDED RELEASE ORAL at 07:07

## 2025-08-01 LAB — POCT GLUCOSE: 129 MG/DL (ref 70–110)

## 2025-08-01 PROCEDURE — 25000003 PHARM REV CODE 250: Performed by: NURSE PRACTITIONER

## 2025-08-01 PROCEDURE — 25000003 PHARM REV CODE 250: Performed by: INTERNAL MEDICINE

## 2025-08-01 PROCEDURE — 97110 THERAPEUTIC EXERCISES: CPT

## 2025-08-01 PROCEDURE — 97116 GAIT TRAINING THERAPY: CPT | Mod: CQ

## 2025-08-01 PROCEDURE — 99900031 HC PATIENT EDUCATION (STAT)

## 2025-08-01 PROCEDURE — 63600175 PHARM REV CODE 636 W HCPCS: Performed by: INTERNAL MEDICINE

## 2025-08-01 PROCEDURE — 97535 SELF CARE MNGMENT TRAINING: CPT

## 2025-08-01 PROCEDURE — 99900035 HC TECH TIME PER 15 MIN (STAT)

## 2025-08-01 PROCEDURE — 97530 THERAPEUTIC ACTIVITIES: CPT

## 2025-08-01 PROCEDURE — 97550 CAREGIVER TRAING 1ST 30 MIN: CPT

## 2025-08-01 PROCEDURE — 11800000 HC REHAB PRIVATE ROOM

## 2025-08-01 PROCEDURE — 97110 THERAPEUTIC EXERCISES: CPT | Mod: CQ

## 2025-08-01 PROCEDURE — 94761 N-INVAS EAR/PLS OXIMETRY MLT: CPT

## 2025-08-01 RX ADMIN — BUSPIRONE HYDROCHLORIDE 15 MG: 10 TABLET ORAL at 08:08

## 2025-08-01 RX ADMIN — LEVOTHYROXINE SODIUM 100 MCG: 100 TABLET ORAL at 05:08

## 2025-08-01 RX ADMIN — ATORVASTATIN CALCIUM 80 MG: 40 TABLET, FILM COATED ORAL at 08:08

## 2025-08-01 RX ADMIN — NORTRIPTYLINE HYDROCHLORIDE 25 MG: 25 CAPSULE ORAL at 08:08

## 2025-08-01 RX ADMIN — Medication 1 EACH: at 08:08

## 2025-08-01 RX ADMIN — FAMOTIDINE 20 MG: 20 TABLET, FILM COATED ORAL at 08:08

## 2025-08-01 RX ADMIN — CEFEPIME 2 G: 2 INJECTION, POWDER, FOR SOLUTION INTRAVENOUS at 05:08

## 2025-08-01 RX ADMIN — EZETIMIBE 10 MG: 10 TABLET ORAL at 08:08

## 2025-08-01 RX ADMIN — METFORMIN HYDROCHLORIDE 1000 MG: 500 TABLET, FILM COATED ORAL at 05:08

## 2025-08-01 RX ADMIN — LEVETIRACETAM 500 MG: 100 SOLUTION ORAL at 08:08

## 2025-08-01 RX ADMIN — ACETAMINOPHEN 650 MG: 325 TABLET ORAL at 03:08

## 2025-08-01 RX ADMIN — FERROUS SULFATE TAB 325 MG (65 MG ELEMENTAL FE) 1 EACH: 325 (65 FE) TAB at 08:08

## 2025-08-01 RX ADMIN — DOCUSATE SODIUM LIQUID 100 MG: 100 LIQUID ORAL at 08:08

## 2025-08-01 RX ADMIN — CLOPIDOGREL BISULFATE 75 MG: 75 TABLET, FILM COATED ORAL at 08:08

## 2025-08-01 RX ADMIN — OXYCODONE HYDROCHLORIDE 5 MG: 5 TABLET ORAL at 05:08

## 2025-08-01 RX ADMIN — TRAZODONE HYDROCHLORIDE 75 MG: 50 TABLET ORAL at 08:08

## 2025-08-01 RX ADMIN — RIVAROXABAN 10 MG: 10 TABLET, FILM COATED ORAL at 05:08

## 2025-08-01 RX ADMIN — METFORMIN HYDROCHLORIDE 1000 MG: 500 TABLET, FILM COATED ORAL at 08:08

## 2025-08-01 RX ADMIN — METOPROLOL SUCCINATE 25 MG: 25 TABLET, EXTENDED RELEASE ORAL at 08:08

## 2025-08-02 LAB
POCT GLUCOSE: 125 MG/DL (ref 70–110)
POCT GLUCOSE: 145 MG/DL (ref 70–110)
POCT GLUCOSE: 170 MG/DL (ref 70–110)
POCT GLUCOSE: 177 MG/DL (ref 70–110)
POCT GLUCOSE: 184 MG/DL (ref 70–110)
POCT GLUCOSE: 198 MG/DL (ref 70–110)

## 2025-08-02 PROCEDURE — 99900035 HC TECH TIME PER 15 MIN (STAT)

## 2025-08-02 PROCEDURE — 25000003 PHARM REV CODE 250: Performed by: INTERNAL MEDICINE

## 2025-08-02 PROCEDURE — 94761 N-INVAS EAR/PLS OXIMETRY MLT: CPT

## 2025-08-02 PROCEDURE — 25000003 PHARM REV CODE 250: Performed by: NURSE PRACTITIONER

## 2025-08-02 PROCEDURE — 99900031 HC PATIENT EDUCATION (STAT)

## 2025-08-02 PROCEDURE — 92526 ORAL FUNCTION THERAPY: CPT

## 2025-08-02 PROCEDURE — 11800000 HC REHAB PRIVATE ROOM

## 2025-08-02 PROCEDURE — 63600175 PHARM REV CODE 636 W HCPCS: Performed by: INTERNAL MEDICINE

## 2025-08-02 PROCEDURE — 97140 MANUAL THERAPY 1/> REGIONS: CPT

## 2025-08-02 PROCEDURE — 97116 GAIT TRAINING THERAPY: CPT

## 2025-08-02 PROCEDURE — 97112 NEUROMUSCULAR REEDUCATION: CPT

## 2025-08-02 RX ADMIN — CLOPIDOGREL BISULFATE 75 MG: 75 TABLET, FILM COATED ORAL at 08:08

## 2025-08-02 RX ADMIN — BUSPIRONE HYDROCHLORIDE 15 MG: 10 TABLET ORAL at 08:08

## 2025-08-02 RX ADMIN — RIVAROXABAN 10 MG: 10 TABLET, FILM COATED ORAL at 05:08

## 2025-08-02 RX ADMIN — FERROUS SULFATE TAB 325 MG (65 MG ELEMENTAL FE) 1 EACH: 325 (65 FE) TAB at 08:08

## 2025-08-02 RX ADMIN — OXYCODONE HYDROCHLORIDE 5 MG: 5 TABLET ORAL at 02:08

## 2025-08-02 RX ADMIN — NORTRIPTYLINE HYDROCHLORIDE 25 MG: 25 CAPSULE ORAL at 08:08

## 2025-08-02 RX ADMIN — DOCUSATE SODIUM LIQUID 100 MG: 100 LIQUID ORAL at 08:08

## 2025-08-02 RX ADMIN — LEVOTHYROXINE SODIUM 100 MCG: 100 TABLET ORAL at 05:08

## 2025-08-02 RX ADMIN — EZETIMIBE 10 MG: 10 TABLET ORAL at 08:08

## 2025-08-02 RX ADMIN — METOPROLOL SUCCINATE 25 MG: 25 TABLET, EXTENDED RELEASE ORAL at 08:08

## 2025-08-02 RX ADMIN — CEFEPIME 2 G: 2 INJECTION, POWDER, FOR SOLUTION INTRAVENOUS at 05:08

## 2025-08-02 RX ADMIN — METFORMIN HYDROCHLORIDE 1000 MG: 500 TABLET, FILM COATED ORAL at 05:08

## 2025-08-02 RX ADMIN — ATORVASTATIN CALCIUM 80 MG: 40 TABLET, FILM COATED ORAL at 08:08

## 2025-08-02 RX ADMIN — TRAZODONE HYDROCHLORIDE 75 MG: 50 TABLET ORAL at 08:08

## 2025-08-02 RX ADMIN — LEVETIRACETAM 500 MG: 100 SOLUTION ORAL at 08:08

## 2025-08-02 RX ADMIN — OXYCODONE HYDROCHLORIDE 5 MG: 5 TABLET ORAL at 06:08

## 2025-08-02 RX ADMIN — Medication 1 EACH: at 08:08

## 2025-08-02 RX ADMIN — METFORMIN HYDROCHLORIDE 1000 MG: 500 TABLET, FILM COATED ORAL at 08:08

## 2025-08-02 RX ADMIN — FAMOTIDINE 20 MG: 20 TABLET, FILM COATED ORAL at 08:08

## 2025-08-03 LAB
POCT GLUCOSE: 129 MG/DL (ref 70–110)
POCT GLUCOSE: 144 MG/DL (ref 70–110)
POCT GLUCOSE: 149 MG/DL (ref 70–110)
POCT GLUCOSE: 237 MG/DL (ref 70–110)

## 2025-08-03 PROCEDURE — 25000003 PHARM REV CODE 250: Performed by: NURSE PRACTITIONER

## 2025-08-03 PROCEDURE — 97530 THERAPEUTIC ACTIVITIES: CPT

## 2025-08-03 PROCEDURE — 25000003 PHARM REV CODE 250: Performed by: INTERNAL MEDICINE

## 2025-08-03 PROCEDURE — 63600175 PHARM REV CODE 636 W HCPCS: Performed by: NURSE PRACTITIONER

## 2025-08-03 PROCEDURE — 99900031 HC PATIENT EDUCATION (STAT)

## 2025-08-03 PROCEDURE — 11800000 HC REHAB PRIVATE ROOM

## 2025-08-03 PROCEDURE — 99900035 HC TECH TIME PER 15 MIN (STAT)

## 2025-08-03 PROCEDURE — 97116 GAIT TRAINING THERAPY: CPT

## 2025-08-03 PROCEDURE — 97112 NEUROMUSCULAR REEDUCATION: CPT

## 2025-08-03 PROCEDURE — 63600175 PHARM REV CODE 636 W HCPCS: Performed by: INTERNAL MEDICINE

## 2025-08-03 PROCEDURE — 94761 N-INVAS EAR/PLS OXIMETRY MLT: CPT

## 2025-08-03 RX ADMIN — BUSPIRONE HYDROCHLORIDE 15 MG: 10 TABLET ORAL at 09:08

## 2025-08-03 RX ADMIN — CEFEPIME 2 G: 2 INJECTION, POWDER, FOR SOLUTION INTRAVENOUS at 06:08

## 2025-08-03 RX ADMIN — TRAZODONE HYDROCHLORIDE 75 MG: 50 TABLET ORAL at 08:08

## 2025-08-03 RX ADMIN — LEVETIRACETAM 500 MG: 100 SOLUTION ORAL at 09:08

## 2025-08-03 RX ADMIN — CEFEPIME 2 G: 2 INJECTION, POWDER, FOR SOLUTION INTRAVENOUS at 05:08

## 2025-08-03 RX ADMIN — Medication 1 EACH: at 09:08

## 2025-08-03 RX ADMIN — NORTRIPTYLINE HYDROCHLORIDE 25 MG: 25 CAPSULE ORAL at 08:08

## 2025-08-03 RX ADMIN — OXYCODONE HYDROCHLORIDE 5 MG: 5 TABLET ORAL at 01:08

## 2025-08-03 RX ADMIN — METFORMIN HYDROCHLORIDE 1000 MG: 500 TABLET, FILM COATED ORAL at 09:08

## 2025-08-03 RX ADMIN — BUSPIRONE HYDROCHLORIDE 15 MG: 10 TABLET ORAL at 08:08

## 2025-08-03 RX ADMIN — ATORVASTATIN CALCIUM 80 MG: 40 TABLET, FILM COATED ORAL at 09:08

## 2025-08-03 RX ADMIN — INSULIN ASPART 1 UNITS: 100 INJECTION, SOLUTION INTRAVENOUS; SUBCUTANEOUS at 08:08

## 2025-08-03 RX ADMIN — FERROUS SULFATE TAB 325 MG (65 MG ELEMENTAL FE) 1 EACH: 325 (65 FE) TAB at 09:08

## 2025-08-03 RX ADMIN — RIVAROXABAN 10 MG: 10 TABLET, FILM COATED ORAL at 05:08

## 2025-08-03 RX ADMIN — DOCUSATE SODIUM LIQUID 100 MG: 100 LIQUID ORAL at 09:08

## 2025-08-03 RX ADMIN — FAMOTIDINE 20 MG: 20 TABLET, FILM COATED ORAL at 08:08

## 2025-08-03 RX ADMIN — METOPROLOL SUCCINATE 25 MG: 25 TABLET, EXTENDED RELEASE ORAL at 09:08

## 2025-08-03 RX ADMIN — LEVOTHYROXINE SODIUM 100 MCG: 100 TABLET ORAL at 06:08

## 2025-08-03 RX ADMIN — OXYCODONE HYDROCHLORIDE 5 MG: 5 TABLET ORAL at 08:08

## 2025-08-03 RX ADMIN — METFORMIN HYDROCHLORIDE 1000 MG: 500 TABLET, FILM COATED ORAL at 05:08

## 2025-08-03 RX ADMIN — EZETIMIBE 10 MG: 10 TABLET ORAL at 09:08

## 2025-08-03 RX ADMIN — FAMOTIDINE 20 MG: 20 TABLET, FILM COATED ORAL at 09:08

## 2025-08-03 RX ADMIN — CLOPIDOGREL BISULFATE 75 MG: 75 TABLET, FILM COATED ORAL at 09:08

## 2025-08-03 RX ADMIN — LEVETIRACETAM 500 MG: 100 SOLUTION ORAL at 08:08

## 2025-08-04 LAB
ALBUMIN SERPL-MCNC: 3.3 G/DL (ref 3.4–4.8)
ALBUMIN/GLOB SERPL: 1 RATIO (ref 1.1–2)
ALP SERPL-CCNC: 97 UNIT/L (ref 40–150)
ALT SERPL-CCNC: 32 UNIT/L (ref 0–55)
ANION GAP SERPL CALC-SCNC: 9 MEQ/L
AST SERPL-CCNC: 16 UNIT/L (ref 11–45)
BASOPHILS # BLD AUTO: 0.07 X10(3)/MCL
BASOPHILS NFR BLD AUTO: 1.6 %
BILIRUB SERPL-MCNC: 0.3 MG/DL
BUN SERPL-MCNC: 19.1 MG/DL (ref 9.8–20.1)
CALCIUM SERPL-MCNC: 9.1 MG/DL (ref 8.4–10.2)
CHLORIDE SERPL-SCNC: 106 MMOL/L (ref 98–107)
CO2 SERPL-SCNC: 26 MMOL/L (ref 23–31)
CREAT SERPL-MCNC: 0.7 MG/DL (ref 0.55–1.02)
CREAT/UREA NIT SERPL: 27
EOSINOPHIL # BLD AUTO: 0.41 X10(3)/MCL (ref 0–0.9)
EOSINOPHIL NFR BLD AUTO: 9.1 %
ERYTHROCYTE [DISTWIDTH] IN BLOOD BY AUTOMATED COUNT: 13.7 % (ref 11.5–17)
GFR SERPLBLD CREATININE-BSD FMLA CKD-EPI: >60 ML/MIN/1.73/M2
GLOBULIN SER-MCNC: 3.2 GM/DL (ref 2.4–3.5)
GLUCOSE SERPL-MCNC: 124 MG/DL (ref 82–115)
HCT VFR BLD AUTO: 35 % (ref 37–47)
HGB BLD-MCNC: 11.5 G/DL (ref 12–16)
IMM GRANULOCYTES # BLD AUTO: 0.02 X10(3)/MCL (ref 0–0.04)
IMM GRANULOCYTES NFR BLD AUTO: 0.4 %
LYMPHOCYTES # BLD AUTO: 1.45 X10(3)/MCL (ref 0.6–4.6)
LYMPHOCYTES NFR BLD AUTO: 32.3 %
MAGNESIUM SERPL-MCNC: 1.5 MG/DL (ref 1.6–2.6)
MCH RBC QN AUTO: 30.3 PG (ref 27–31)
MCHC RBC AUTO-ENTMCNC: 32.9 G/DL (ref 33–36)
MCV RBC AUTO: 92.1 FL (ref 80–94)
MONOCYTES # BLD AUTO: 0.52 X10(3)/MCL (ref 0.1–1.3)
MONOCYTES NFR BLD AUTO: 11.6 %
NEUTROPHILS # BLD AUTO: 2.02 X10(3)/MCL (ref 2.1–9.2)
NEUTROPHILS NFR BLD AUTO: 45 %
NRBC BLD AUTO-RTO: 0 %
PHOSPHATE SERPL-MCNC: 4.3 MG/DL (ref 2.3–4.7)
PLATELET # BLD AUTO: 302 X10(3)/MCL (ref 130–400)
PMV BLD AUTO: 10.2 FL (ref 7.4–10.4)
POCT GLUCOSE: 115 MG/DL (ref 70–110)
POCT GLUCOSE: 131 MG/DL (ref 70–110)
POCT GLUCOSE: 221 MG/DL (ref 70–110)
POCT GLUCOSE: 86 MG/DL (ref 70–110)
POTASSIUM SERPL-SCNC: 3.8 MMOL/L (ref 3.5–5.1)
PREALB SERPL-MCNC: 25.8 MG/DL (ref 14–37)
PROT SERPL-MCNC: 6.5 GM/DL (ref 5.8–7.6)
RBC # BLD AUTO: 3.8 X10(6)/MCL (ref 4.2–5.4)
SODIUM SERPL-SCNC: 141 MMOL/L (ref 136–145)
WBC # BLD AUTO: 4.49 X10(3)/MCL (ref 4.5–11.5)

## 2025-08-04 PROCEDURE — 97168 OT RE-EVAL EST PLAN CARE: CPT

## 2025-08-04 PROCEDURE — 99900031 HC PATIENT EDUCATION (STAT)

## 2025-08-04 PROCEDURE — 80053 COMPREHEN METABOLIC PANEL: CPT | Performed by: NURSE PRACTITIONER

## 2025-08-04 PROCEDURE — 94761 N-INVAS EAR/PLS OXIMETRY MLT: CPT

## 2025-08-04 PROCEDURE — 83735 ASSAY OF MAGNESIUM: CPT | Performed by: NURSE PRACTITIONER

## 2025-08-04 PROCEDURE — 63600175 PHARM REV CODE 636 W HCPCS: Performed by: INTERNAL MEDICINE

## 2025-08-04 PROCEDURE — 63600175 PHARM REV CODE 636 W HCPCS: Performed by: NURSE PRACTITIONER

## 2025-08-04 PROCEDURE — 97164 PT RE-EVAL EST PLAN CARE: CPT

## 2025-08-04 PROCEDURE — 11800000 HC REHAB PRIVATE ROOM

## 2025-08-04 PROCEDURE — 97116 GAIT TRAINING THERAPY: CPT | Mod: CQ

## 2025-08-04 PROCEDURE — 85025 COMPLETE CBC W/AUTO DIFF WBC: CPT | Performed by: NURSE PRACTITIONER

## 2025-08-04 PROCEDURE — 97535 SELF CARE MNGMENT TRAINING: CPT

## 2025-08-04 PROCEDURE — 25000003 PHARM REV CODE 250: Performed by: NURSE PRACTITIONER

## 2025-08-04 PROCEDURE — 84134 ASSAY OF PREALBUMIN: CPT | Performed by: NURSE PRACTITIONER

## 2025-08-04 PROCEDURE — 99900035 HC TECH TIME PER 15 MIN (STAT)

## 2025-08-04 PROCEDURE — 97530 THERAPEUTIC ACTIVITIES: CPT | Mod: CQ

## 2025-08-04 PROCEDURE — 25000003 PHARM REV CODE 250: Performed by: INTERNAL MEDICINE

## 2025-08-04 PROCEDURE — 97110 THERAPEUTIC EXERCISES: CPT

## 2025-08-04 PROCEDURE — 92526 ORAL FUNCTION THERAPY: CPT

## 2025-08-04 PROCEDURE — 36415 COLL VENOUS BLD VENIPUNCTURE: CPT | Performed by: NURSE PRACTITIONER

## 2025-08-04 PROCEDURE — 84100 ASSAY OF PHOSPHORUS: CPT | Performed by: NURSE PRACTITIONER

## 2025-08-04 PROCEDURE — 99233 SBSQ HOSP IP/OBS HIGH 50: CPT | Mod: ,,, | Performed by: NURSE PRACTITIONER

## 2025-08-04 RX ORDER — POLYETHYLENE GLYCOL 3350 17 G/17G
17 POWDER, FOR SOLUTION ORAL ONCE
Status: COMPLETED | OUTPATIENT
Start: 2025-08-04 | End: 2025-08-04

## 2025-08-04 RX ORDER — LANOLIN ALCOHOL/MO/W.PET/CERES
400 CREAM (GRAM) TOPICAL 2 TIMES DAILY
Status: DISCONTINUED | OUTPATIENT
Start: 2025-08-04 | End: 2025-08-12 | Stop reason: HOSPADM

## 2025-08-04 RX ADMIN — NORTRIPTYLINE HYDROCHLORIDE 25 MG: 25 CAPSULE ORAL at 09:08

## 2025-08-04 RX ADMIN — FAMOTIDINE 20 MG: 20 TABLET, FILM COATED ORAL at 08:08

## 2025-08-04 RX ADMIN — CLOPIDOGREL BISULFATE 75 MG: 75 TABLET, FILM COATED ORAL at 08:08

## 2025-08-04 RX ADMIN — BUSPIRONE HYDROCHLORIDE 15 MG: 10 TABLET ORAL at 09:08

## 2025-08-04 RX ADMIN — DICLOFENAC SODIUM 2 G: 10 GEL TOPICAL at 08:08

## 2025-08-04 RX ADMIN — RIVAROXABAN 10 MG: 10 TABLET, FILM COATED ORAL at 05:08

## 2025-08-04 RX ADMIN — TRAZODONE HYDROCHLORIDE 75 MG: 50 TABLET ORAL at 09:08

## 2025-08-04 RX ADMIN — INSULIN ASPART 2 UNITS: 100 INJECTION, SOLUTION INTRAVENOUS; SUBCUTANEOUS at 06:08

## 2025-08-04 RX ADMIN — OXYCODONE HYDROCHLORIDE 5 MG: 5 TABLET ORAL at 02:08

## 2025-08-04 RX ADMIN — Medication 400 MG: at 12:08

## 2025-08-04 RX ADMIN — LEVOTHYROXINE SODIUM 100 MCG: 100 TABLET ORAL at 06:08

## 2025-08-04 RX ADMIN — DOCUSATE SODIUM LIQUID 100 MG: 100 LIQUID ORAL at 09:08

## 2025-08-04 RX ADMIN — DOCUSATE SODIUM LIQUID 100 MG: 100 LIQUID ORAL at 08:08

## 2025-08-04 RX ADMIN — LEVETIRACETAM 500 MG: 100 SOLUTION ORAL at 08:08

## 2025-08-04 RX ADMIN — EZETIMIBE 10 MG: 10 TABLET ORAL at 08:08

## 2025-08-04 RX ADMIN — FAMOTIDINE 20 MG: 20 TABLET, FILM COATED ORAL at 09:08

## 2025-08-04 RX ADMIN — METFORMIN HYDROCHLORIDE 1000 MG: 500 TABLET, FILM COATED ORAL at 08:08

## 2025-08-04 RX ADMIN — METFORMIN HYDROCHLORIDE 1000 MG: 500 TABLET, FILM COATED ORAL at 05:08

## 2025-08-04 RX ADMIN — Medication 400 MG: at 09:08

## 2025-08-04 RX ADMIN — OXYCODONE HYDROCHLORIDE 5 MG: 5 TABLET ORAL at 09:08

## 2025-08-04 RX ADMIN — POLYETHYLENE GLYCOL 3350 17 G: 17 POWDER, FOR SOLUTION ORAL at 12:08

## 2025-08-04 RX ADMIN — ATORVASTATIN CALCIUM 80 MG: 40 TABLET, FILM COATED ORAL at 08:08

## 2025-08-04 RX ADMIN — Medication 1 EACH: at 08:08

## 2025-08-04 RX ADMIN — BUSPIRONE HYDROCHLORIDE 15 MG: 10 TABLET ORAL at 08:08

## 2025-08-04 RX ADMIN — METOPROLOL SUCCINATE 25 MG: 25 TABLET, EXTENDED RELEASE ORAL at 08:08

## 2025-08-04 RX ADMIN — CEFEPIME 2 G: 2 INJECTION, POWDER, FOR SOLUTION INTRAVENOUS at 05:08

## 2025-08-04 RX ADMIN — CEFEPIME 2 G: 2 INJECTION, POWDER, FOR SOLUTION INTRAVENOUS at 06:08

## 2025-08-04 RX ADMIN — FERROUS SULFATE TAB 325 MG (65 MG ELEMENTAL FE) 1 EACH: 325 (65 FE) TAB at 08:08

## 2025-08-04 RX ADMIN — LEVETIRACETAM 500 MG: 100 SOLUTION ORAL at 09:08

## 2025-08-05 DIAGNOSIS — I62.00 SUBDURAL HEMORRHAGE: Primary | ICD-10-CM

## 2025-08-05 LAB
POCT GLUCOSE: 139 MG/DL (ref 70–110)
POCT GLUCOSE: 159 MG/DL (ref 70–110)

## 2025-08-05 PROCEDURE — 97110 THERAPEUTIC EXERCISES: CPT

## 2025-08-05 PROCEDURE — 99900035 HC TECH TIME PER 15 MIN (STAT)

## 2025-08-05 PROCEDURE — 94761 N-INVAS EAR/PLS OXIMETRY MLT: CPT

## 2025-08-05 PROCEDURE — 11800000 HC REHAB PRIVATE ROOM

## 2025-08-05 PROCEDURE — 97530 THERAPEUTIC ACTIVITIES: CPT

## 2025-08-05 PROCEDURE — 92610 EVALUATE SWALLOWING FUNCTION: CPT

## 2025-08-05 PROCEDURE — 25000003 PHARM REV CODE 250: Performed by: NURSE PRACTITIONER

## 2025-08-05 PROCEDURE — 25000003 PHARM REV CODE 250: Performed by: INTERNAL MEDICINE

## 2025-08-05 PROCEDURE — 97535 SELF CARE MNGMENT TRAINING: CPT | Mod: CO

## 2025-08-05 PROCEDURE — 63600175 PHARM REV CODE 636 W HCPCS: Performed by: INTERNAL MEDICINE

## 2025-08-05 PROCEDURE — 99900031 HC PATIENT EDUCATION (STAT)

## 2025-08-05 PROCEDURE — 97116 GAIT TRAINING THERAPY: CPT

## 2025-08-05 RX ADMIN — METFORMIN HYDROCHLORIDE 1000 MG: 500 TABLET, FILM COATED ORAL at 08:08

## 2025-08-05 RX ADMIN — BUSPIRONE HYDROCHLORIDE 15 MG: 10 TABLET ORAL at 08:08

## 2025-08-05 RX ADMIN — DOCUSATE SODIUM LIQUID 100 MG: 100 LIQUID ORAL at 08:08

## 2025-08-05 RX ADMIN — LEVETIRACETAM 500 MG: 100 SOLUTION ORAL at 08:08

## 2025-08-05 RX ADMIN — OXYCODONE HYDROCHLORIDE 5 MG: 5 TABLET ORAL at 08:08

## 2025-08-05 RX ADMIN — LEVOTHYROXINE SODIUM 100 MCG: 100 TABLET ORAL at 05:08

## 2025-08-05 RX ADMIN — FAMOTIDINE 20 MG: 20 TABLET, FILM COATED ORAL at 08:08

## 2025-08-05 RX ADMIN — CLOPIDOGREL BISULFATE 75 MG: 75 TABLET, FILM COATED ORAL at 08:08

## 2025-08-05 RX ADMIN — METOPROLOL SUCCINATE 25 MG: 25 TABLET, EXTENDED RELEASE ORAL at 08:08

## 2025-08-05 RX ADMIN — Medication 1 EACH: at 08:08

## 2025-08-05 RX ADMIN — OXYCODONE HYDROCHLORIDE 5 MG: 5 TABLET ORAL at 02:08

## 2025-08-05 RX ADMIN — METFORMIN HYDROCHLORIDE 1000 MG: 500 TABLET, FILM COATED ORAL at 05:08

## 2025-08-05 RX ADMIN — CEFEPIME 2 G: 2 INJECTION, POWDER, FOR SOLUTION INTRAVENOUS at 05:08

## 2025-08-05 RX ADMIN — Medication 400 MG: at 08:08

## 2025-08-05 RX ADMIN — EZETIMIBE 10 MG: 10 TABLET ORAL at 08:08

## 2025-08-05 RX ADMIN — ATORVASTATIN CALCIUM 80 MG: 40 TABLET, FILM COATED ORAL at 08:08

## 2025-08-05 RX ADMIN — TRAZODONE HYDROCHLORIDE 75 MG: 50 TABLET ORAL at 08:08

## 2025-08-05 RX ADMIN — BISACODYL 10 MG: 10 SUPPOSITORY RECTAL at 02:08

## 2025-08-05 RX ADMIN — RIVAROXABAN 10 MG: 10 TABLET, FILM COATED ORAL at 05:08

## 2025-08-05 RX ADMIN — NORTRIPTYLINE HYDROCHLORIDE 25 MG: 25 CAPSULE ORAL at 08:08

## 2025-08-05 RX ADMIN — FERROUS SULFATE TAB 325 MG (65 MG ELEMENTAL FE) 1 EACH: 325 (65 FE) TAB at 08:08

## 2025-08-05 RX ADMIN — DICLOFENAC SODIUM 2 G: 10 GEL TOPICAL at 08:08

## 2025-08-06 LAB
POCT GLUCOSE: 107 MG/DL (ref 70–110)
POCT GLUCOSE: 121 MG/DL (ref 70–110)
POCT GLUCOSE: 203 MG/DL (ref 70–110)

## 2025-08-06 PROCEDURE — 99900035 HC TECH TIME PER 15 MIN (STAT)

## 2025-08-06 PROCEDURE — 94761 N-INVAS EAR/PLS OXIMETRY MLT: CPT

## 2025-08-06 PROCEDURE — 25000003 PHARM REV CODE 250: Performed by: INTERNAL MEDICINE

## 2025-08-06 PROCEDURE — 63600175 PHARM REV CODE 636 W HCPCS: Performed by: NURSE PRACTITIONER

## 2025-08-06 PROCEDURE — 11800000 HC REHAB PRIVATE ROOM

## 2025-08-06 PROCEDURE — 97116 GAIT TRAINING THERAPY: CPT

## 2025-08-06 PROCEDURE — 99233 SBSQ HOSP IP/OBS HIGH 50: CPT | Mod: ,,, | Performed by: NURSE PRACTITIONER

## 2025-08-06 PROCEDURE — 97535 SELF CARE MNGMENT TRAINING: CPT | Mod: CO

## 2025-08-06 PROCEDURE — 97530 THERAPEUTIC ACTIVITIES: CPT | Mod: CO

## 2025-08-06 PROCEDURE — 97530 THERAPEUTIC ACTIVITIES: CPT

## 2025-08-06 PROCEDURE — 92526 ORAL FUNCTION THERAPY: CPT

## 2025-08-06 PROCEDURE — 25000003 PHARM REV CODE 250: Performed by: NURSE PRACTITIONER

## 2025-08-06 PROCEDURE — 63600175 PHARM REV CODE 636 W HCPCS: Performed by: INTERNAL MEDICINE

## 2025-08-06 RX ORDER — CEFEPIME HYDROCHLORIDE 2 G/1
2 INJECTION, POWDER, FOR SOLUTION INTRAVENOUS
Status: DISCONTINUED | OUTPATIENT
Start: 2025-08-06 | End: 2025-08-12 | Stop reason: HOSPADM

## 2025-08-06 RX ADMIN — Medication 400 MG: at 08:08

## 2025-08-06 RX ADMIN — LEVETIRACETAM 500 MG: 100 SOLUTION ORAL at 08:08

## 2025-08-06 RX ADMIN — INSULIN ASPART 2 UNITS: 100 INJECTION, SOLUTION INTRAVENOUS; SUBCUTANEOUS at 04:08

## 2025-08-06 RX ADMIN — DOCUSATE SODIUM LIQUID 100 MG: 100 LIQUID ORAL at 08:08

## 2025-08-06 RX ADMIN — OXYCODONE HYDROCHLORIDE 5 MG: 5 TABLET ORAL at 08:08

## 2025-08-06 RX ADMIN — CEFEPIME 2 G: 2 INJECTION, POWDER, FOR SOLUTION INTRAVENOUS at 02:08

## 2025-08-06 RX ADMIN — EZETIMIBE 10 MG: 10 TABLET ORAL at 08:08

## 2025-08-06 RX ADMIN — CEFEPIME 2 G: 2 INJECTION, POWDER, FOR SOLUTION INTRAVENOUS at 05:08

## 2025-08-06 RX ADMIN — Medication 1 EACH: at 08:08

## 2025-08-06 RX ADMIN — ATORVASTATIN CALCIUM 80 MG: 40 TABLET, FILM COATED ORAL at 08:08

## 2025-08-06 RX ADMIN — BUSPIRONE HYDROCHLORIDE 15 MG: 10 TABLET ORAL at 08:08

## 2025-08-06 RX ADMIN — LEVOTHYROXINE SODIUM 100 MCG: 100 TABLET ORAL at 05:08

## 2025-08-06 RX ADMIN — TRAZODONE HYDROCHLORIDE 75 MG: 50 TABLET ORAL at 08:08

## 2025-08-06 RX ADMIN — CEFEPIME 2 G: 2 INJECTION, POWDER, FOR SOLUTION INTRAVENOUS at 09:08

## 2025-08-06 RX ADMIN — METOPROLOL SUCCINATE 25 MG: 25 TABLET, EXTENDED RELEASE ORAL at 08:08

## 2025-08-06 RX ADMIN — RIVAROXABAN 10 MG: 10 TABLET, FILM COATED ORAL at 04:08

## 2025-08-06 RX ADMIN — OXYCODONE HYDROCHLORIDE 5 MG: 5 TABLET ORAL at 03:08

## 2025-08-06 RX ADMIN — DICLOFENAC SODIUM 2 G: 10 GEL TOPICAL at 08:08

## 2025-08-06 RX ADMIN — FAMOTIDINE 20 MG: 20 TABLET, FILM COATED ORAL at 08:08

## 2025-08-06 RX ADMIN — METFORMIN HYDROCHLORIDE 1000 MG: 500 TABLET, FILM COATED ORAL at 08:08

## 2025-08-06 RX ADMIN — NORTRIPTYLINE HYDROCHLORIDE 25 MG: 25 CAPSULE ORAL at 08:08

## 2025-08-06 RX ADMIN — FERROUS SULFATE TAB 325 MG (65 MG ELEMENTAL FE) 1 EACH: 325 (65 FE) TAB at 08:08

## 2025-08-06 RX ADMIN — METFORMIN HYDROCHLORIDE 1000 MG: 500 TABLET, FILM COATED ORAL at 04:08

## 2025-08-06 RX ADMIN — CLOPIDOGREL BISULFATE 75 MG: 75 TABLET, FILM COATED ORAL at 08:08

## 2025-08-07 LAB
ALBUMIN SERPL-MCNC: 3.3 G/DL (ref 3.4–4.8)
ALBUMIN/GLOB SERPL: 1.1 RATIO (ref 1.1–2)
ALP SERPL-CCNC: 93 UNIT/L (ref 40–150)
ALT SERPL-CCNC: 29 UNIT/L (ref 0–55)
ANION GAP SERPL CALC-SCNC: 7 MEQ/L
AST SERPL-CCNC: 13 UNIT/L (ref 11–45)
BASOPHILS # BLD AUTO: 0.05 X10(3)/MCL
BASOPHILS NFR BLD AUTO: 1 %
BILIRUB SERPL-MCNC: 0.3 MG/DL
BUN SERPL-MCNC: 16.9 MG/DL (ref 9.8–20.1)
CALCIUM SERPL-MCNC: 9.3 MG/DL (ref 8.4–10.2)
CHLORIDE SERPL-SCNC: 104 MMOL/L (ref 98–107)
CO2 SERPL-SCNC: 29 MMOL/L (ref 23–31)
CREAT SERPL-MCNC: 0.7 MG/DL (ref 0.55–1.02)
CREAT/UREA NIT SERPL: 24
EOSINOPHIL # BLD AUTO: 0.41 X10(3)/MCL (ref 0–0.9)
EOSINOPHIL NFR BLD AUTO: 8.5 %
ERYTHROCYTE [DISTWIDTH] IN BLOOD BY AUTOMATED COUNT: 13.7 % (ref 11.5–17)
GFR SERPLBLD CREATININE-BSD FMLA CKD-EPI: >60 ML/MIN/1.73/M2
GLOBULIN SER-MCNC: 3.1 GM/DL (ref 2.4–3.5)
GLUCOSE SERPL-MCNC: 123 MG/DL (ref 82–115)
HCT VFR BLD AUTO: 34.3 % (ref 37–47)
HGB BLD-MCNC: 11.5 G/DL (ref 12–16)
IMM GRANULOCYTES # BLD AUTO: 0.01 X10(3)/MCL (ref 0–0.04)
IMM GRANULOCYTES NFR BLD AUTO: 0.2 %
LYMPHOCYTES # BLD AUTO: 1.42 X10(3)/MCL (ref 0.6–4.6)
LYMPHOCYTES NFR BLD AUTO: 29.6 %
MCH RBC QN AUTO: 30.6 PG (ref 27–31)
MCHC RBC AUTO-ENTMCNC: 33.5 G/DL (ref 33–36)
MCV RBC AUTO: 91.2 FL (ref 80–94)
MONOCYTES # BLD AUTO: 0.58 X10(3)/MCL (ref 0.1–1.3)
MONOCYTES NFR BLD AUTO: 12.1 %
NEUTROPHILS # BLD AUTO: 2.33 X10(3)/MCL (ref 2.1–9.2)
NEUTROPHILS NFR BLD AUTO: 48.6 %
NRBC BLD AUTO-RTO: 0 %
PLATELET # BLD AUTO: 279 X10(3)/MCL (ref 130–400)
PMV BLD AUTO: 10.1 FL (ref 7.4–10.4)
POCT GLUCOSE: 132 MG/DL (ref 70–110)
POCT GLUCOSE: 157 MG/DL (ref 70–110)
POTASSIUM SERPL-SCNC: 4.1 MMOL/L (ref 3.5–5.1)
PROT SERPL-MCNC: 6.4 GM/DL (ref 5.8–7.6)
RBC # BLD AUTO: 3.76 X10(6)/MCL (ref 4.2–5.4)
SODIUM SERPL-SCNC: 140 MMOL/L (ref 136–145)
WBC # BLD AUTO: 4.8 X10(3)/MCL (ref 4.5–11.5)

## 2025-08-07 PROCEDURE — 92526 ORAL FUNCTION THERAPY: CPT

## 2025-08-07 PROCEDURE — 25000003 PHARM REV CODE 250: Performed by: NURSE PRACTITIONER

## 2025-08-07 PROCEDURE — 97116 GAIT TRAINING THERAPY: CPT | Mod: CQ

## 2025-08-07 PROCEDURE — 85025 COMPLETE CBC W/AUTO DIFF WBC: CPT | Performed by: INTERNAL MEDICINE

## 2025-08-07 PROCEDURE — 11800000 HC REHAB PRIVATE ROOM

## 2025-08-07 PROCEDURE — 63600175 PHARM REV CODE 636 W HCPCS: Performed by: INTERNAL MEDICINE

## 2025-08-07 PROCEDURE — 25000003 PHARM REV CODE 250: Performed by: INTERNAL MEDICINE

## 2025-08-07 PROCEDURE — 97110 THERAPEUTIC EXERCISES: CPT | Mod: CQ

## 2025-08-07 PROCEDURE — 99900035 HC TECH TIME PER 15 MIN (STAT)

## 2025-08-07 PROCEDURE — 97530 THERAPEUTIC ACTIVITIES: CPT | Mod: CQ

## 2025-08-07 PROCEDURE — 36415 COLL VENOUS BLD VENIPUNCTURE: CPT | Performed by: INTERNAL MEDICINE

## 2025-08-07 PROCEDURE — 80053 COMPREHEN METABOLIC PANEL: CPT | Performed by: INTERNAL MEDICINE

## 2025-08-07 PROCEDURE — 97535 SELF CARE MNGMENT TRAINING: CPT

## 2025-08-07 PROCEDURE — 97110 THERAPEUTIC EXERCISES: CPT

## 2025-08-07 RX ADMIN — RIVAROXABAN 10 MG: 10 TABLET, FILM COATED ORAL at 04:08

## 2025-08-07 RX ADMIN — FAMOTIDINE 20 MG: 20 TABLET, FILM COATED ORAL at 08:08

## 2025-08-07 RX ADMIN — CLOPIDOGREL BISULFATE 75 MG: 75 TABLET, FILM COATED ORAL at 08:08

## 2025-08-07 RX ADMIN — DICLOFENAC SODIUM 2 G: 10 GEL TOPICAL at 08:08

## 2025-08-07 RX ADMIN — LEVETIRACETAM 500 MG: 100 SOLUTION ORAL at 08:08

## 2025-08-07 RX ADMIN — METOPROLOL SUCCINATE 25 MG: 25 TABLET, EXTENDED RELEASE ORAL at 08:08

## 2025-08-07 RX ADMIN — METFORMIN HYDROCHLORIDE 1000 MG: 500 TABLET, FILM COATED ORAL at 08:08

## 2025-08-07 RX ADMIN — Medication 400 MG: at 08:08

## 2025-08-07 RX ADMIN — CEFEPIME 2 G: 2 INJECTION, POWDER, FOR SOLUTION INTRAVENOUS at 09:08

## 2025-08-07 RX ADMIN — BUSPIRONE HYDROCHLORIDE 15 MG: 10 TABLET ORAL at 08:08

## 2025-08-07 RX ADMIN — TRAZODONE HYDROCHLORIDE 75 MG: 50 TABLET ORAL at 08:08

## 2025-08-07 RX ADMIN — NORTRIPTYLINE HYDROCHLORIDE 25 MG: 25 CAPSULE ORAL at 08:08

## 2025-08-07 RX ADMIN — EZETIMIBE 10 MG: 10 TABLET ORAL at 08:08

## 2025-08-07 RX ADMIN — OXYCODONE HYDROCHLORIDE 5 MG: 5 TABLET ORAL at 08:08

## 2025-08-07 RX ADMIN — DOCUSATE SODIUM LIQUID 100 MG: 100 LIQUID ORAL at 08:08

## 2025-08-07 RX ADMIN — METFORMIN HYDROCHLORIDE 1000 MG: 500 TABLET, FILM COATED ORAL at 04:08

## 2025-08-07 RX ADMIN — CEFEPIME 2 G: 2 INJECTION, POWDER, FOR SOLUTION INTRAVENOUS at 05:08

## 2025-08-07 RX ADMIN — LEVOTHYROXINE SODIUM 100 MCG: 100 TABLET ORAL at 05:08

## 2025-08-07 RX ADMIN — FERROUS SULFATE TAB 325 MG (65 MG ELEMENTAL FE) 1 EACH: 325 (65 FE) TAB at 08:08

## 2025-08-07 RX ADMIN — CEFEPIME 2 G: 2 INJECTION, POWDER, FOR SOLUTION INTRAVENOUS at 01:08

## 2025-08-07 RX ADMIN — Medication 1 EACH: at 08:08

## 2025-08-07 RX ADMIN — ATORVASTATIN CALCIUM 80 MG: 40 TABLET, FILM COATED ORAL at 08:08

## 2025-08-07 RX ADMIN — OXYCODONE HYDROCHLORIDE 5 MG: 5 TABLET ORAL at 03:08

## 2025-08-08 LAB
POCT GLUCOSE: 119 MG/DL (ref 70–110)
POCT GLUCOSE: 253 MG/DL (ref 70–110)

## 2025-08-08 PROCEDURE — 11800000 HC REHAB PRIVATE ROOM

## 2025-08-08 PROCEDURE — 97535 SELF CARE MNGMENT TRAINING: CPT

## 2025-08-08 PROCEDURE — 92526 ORAL FUNCTION THERAPY: CPT

## 2025-08-08 PROCEDURE — 97164 PT RE-EVAL EST PLAN CARE: CPT

## 2025-08-08 PROCEDURE — 63600175 PHARM REV CODE 636 W HCPCS: Performed by: INTERNAL MEDICINE

## 2025-08-08 PROCEDURE — 63600175 PHARM REV CODE 636 W HCPCS: Performed by: NURSE PRACTITIONER

## 2025-08-08 PROCEDURE — 25000003 PHARM REV CODE 250: Performed by: INTERNAL MEDICINE

## 2025-08-08 PROCEDURE — 25000003 PHARM REV CODE 250: Performed by: NURSE PRACTITIONER

## 2025-08-08 PROCEDURE — 99900035 HC TECH TIME PER 15 MIN (STAT)

## 2025-08-08 PROCEDURE — 99233 SBSQ HOSP IP/OBS HIGH 50: CPT | Mod: ,,, | Performed by: NURSE PRACTITIONER

## 2025-08-08 PROCEDURE — 97110 THERAPEUTIC EXERCISES: CPT

## 2025-08-08 PROCEDURE — 97530 THERAPEUTIC ACTIVITIES: CPT

## 2025-08-08 PROCEDURE — 97116 GAIT TRAINING THERAPY: CPT

## 2025-08-08 RX ADMIN — CEFEPIME 2 G: 2 INJECTION, POWDER, FOR SOLUTION INTRAVENOUS at 01:08

## 2025-08-08 RX ADMIN — OXYCODONE HYDROCHLORIDE 5 MG: 5 TABLET ORAL at 01:08

## 2025-08-08 RX ADMIN — INSULIN ASPART 3 UNITS: 100 INJECTION, SOLUTION INTRAVENOUS; SUBCUTANEOUS at 04:08

## 2025-08-08 RX ADMIN — FAMOTIDINE 20 MG: 20 TABLET, FILM COATED ORAL at 09:08

## 2025-08-08 RX ADMIN — LEVOTHYROXINE SODIUM 100 MCG: 100 TABLET ORAL at 05:08

## 2025-08-08 RX ADMIN — METFORMIN HYDROCHLORIDE 1000 MG: 500 TABLET, FILM COATED ORAL at 07:08

## 2025-08-08 RX ADMIN — NORTRIPTYLINE HYDROCHLORIDE 25 MG: 25 CAPSULE ORAL at 09:08

## 2025-08-08 RX ADMIN — DOCUSATE SODIUM LIQUID 100 MG: 100 LIQUID ORAL at 07:08

## 2025-08-08 RX ADMIN — FERROUS SULFATE TAB 325 MG (65 MG ELEMENTAL FE) 1 EACH: 325 (65 FE) TAB at 07:08

## 2025-08-08 RX ADMIN — Medication 400 MG: at 07:08

## 2025-08-08 RX ADMIN — Medication 1 EACH: at 07:08

## 2025-08-08 RX ADMIN — BUSPIRONE HYDROCHLORIDE 15 MG: 10 TABLET ORAL at 09:08

## 2025-08-08 RX ADMIN — BUSPIRONE HYDROCHLORIDE 15 MG: 10 TABLET ORAL at 07:08

## 2025-08-08 RX ADMIN — CLOPIDOGREL BISULFATE 75 MG: 75 TABLET, FILM COATED ORAL at 07:08

## 2025-08-08 RX ADMIN — METFORMIN HYDROCHLORIDE 1000 MG: 500 TABLET, FILM COATED ORAL at 04:08

## 2025-08-08 RX ADMIN — FAMOTIDINE 20 MG: 20 TABLET, FILM COATED ORAL at 07:08

## 2025-08-08 RX ADMIN — METOPROLOL SUCCINATE 25 MG: 25 TABLET, EXTENDED RELEASE ORAL at 07:08

## 2025-08-08 RX ADMIN — ATORVASTATIN CALCIUM 80 MG: 40 TABLET, FILM COATED ORAL at 07:08

## 2025-08-08 RX ADMIN — EZETIMIBE 10 MG: 10 TABLET ORAL at 07:08

## 2025-08-08 RX ADMIN — LEVETIRACETAM 500 MG: 100 SOLUTION ORAL at 09:08

## 2025-08-08 RX ADMIN — DICLOFENAC SODIUM 2 G: 10 GEL TOPICAL at 09:08

## 2025-08-08 RX ADMIN — DOCUSATE SODIUM LIQUID 100 MG: 100 LIQUID ORAL at 09:08

## 2025-08-08 RX ADMIN — OXYCODONE HYDROCHLORIDE 5 MG: 5 TABLET ORAL at 07:08

## 2025-08-08 RX ADMIN — Medication 400 MG: at 09:08

## 2025-08-08 RX ADMIN — RIVAROXABAN 10 MG: 10 TABLET, FILM COATED ORAL at 04:08

## 2025-08-08 RX ADMIN — TRAZODONE HYDROCHLORIDE 75 MG: 50 TABLET ORAL at 09:08

## 2025-08-08 RX ADMIN — CEFEPIME 2 G: 2 INJECTION, POWDER, FOR SOLUTION INTRAVENOUS at 05:08

## 2025-08-08 RX ADMIN — CEFEPIME 2 G: 2 INJECTION, POWDER, FOR SOLUTION INTRAVENOUS at 09:08

## 2025-08-08 RX ADMIN — LEVETIRACETAM 500 MG: 100 SOLUTION ORAL at 07:08

## 2025-08-09 LAB — POCT GLUCOSE: 137 MG/DL (ref 70–110)

## 2025-08-09 PROCEDURE — 11800000 HC REHAB PRIVATE ROOM

## 2025-08-09 PROCEDURE — 25000003 PHARM REV CODE 250: Performed by: INTERNAL MEDICINE

## 2025-08-09 PROCEDURE — 97530 THERAPEUTIC ACTIVITIES: CPT | Mod: CQ

## 2025-08-09 PROCEDURE — 97116 GAIT TRAINING THERAPY: CPT | Mod: CQ

## 2025-08-09 PROCEDURE — 63600175 PHARM REV CODE 636 W HCPCS: Performed by: INTERNAL MEDICINE

## 2025-08-09 PROCEDURE — 99900035 HC TECH TIME PER 15 MIN (STAT)

## 2025-08-09 PROCEDURE — 25000003 PHARM REV CODE 250: Performed by: NURSE PRACTITIONER

## 2025-08-09 PROCEDURE — 92526 ORAL FUNCTION THERAPY: CPT

## 2025-08-09 RX ADMIN — CEFEPIME 2 G: 2 INJECTION, POWDER, FOR SOLUTION INTRAVENOUS at 09:08

## 2025-08-09 RX ADMIN — CEFEPIME 2 G: 2 INJECTION, POWDER, FOR SOLUTION INTRAVENOUS at 01:08

## 2025-08-09 RX ADMIN — FERROUS SULFATE TAB 325 MG (65 MG ELEMENTAL FE) 1 EACH: 325 (65 FE) TAB at 07:08

## 2025-08-09 RX ADMIN — LEVOTHYROXINE SODIUM 100 MCG: 100 TABLET ORAL at 05:08

## 2025-08-09 RX ADMIN — METFORMIN HYDROCHLORIDE 1000 MG: 500 TABLET, FILM COATED ORAL at 07:08

## 2025-08-09 RX ADMIN — BUSPIRONE HYDROCHLORIDE 15 MG: 10 TABLET ORAL at 07:08

## 2025-08-09 RX ADMIN — CEFEPIME 2 G: 2 INJECTION, POWDER, FOR SOLUTION INTRAVENOUS at 05:08

## 2025-08-09 RX ADMIN — EZETIMIBE 10 MG: 10 TABLET ORAL at 07:08

## 2025-08-09 RX ADMIN — TRAZODONE HYDROCHLORIDE 75 MG: 50 TABLET ORAL at 09:08

## 2025-08-09 RX ADMIN — RIVAROXABAN 10 MG: 10 TABLET, FILM COATED ORAL at 04:08

## 2025-08-09 RX ADMIN — CLOPIDOGREL BISULFATE 75 MG: 75 TABLET, FILM COATED ORAL at 07:08

## 2025-08-09 RX ADMIN — ATORVASTATIN CALCIUM 80 MG: 40 TABLET, FILM COATED ORAL at 07:08

## 2025-08-09 RX ADMIN — METFORMIN HYDROCHLORIDE 1000 MG: 500 TABLET, FILM COATED ORAL at 04:08

## 2025-08-09 RX ADMIN — METOPROLOL SUCCINATE 25 MG: 25 TABLET, EXTENDED RELEASE ORAL at 07:08

## 2025-08-09 RX ADMIN — Medication 1 EACH: at 07:08

## 2025-08-09 RX ADMIN — FAMOTIDINE 20 MG: 20 TABLET, FILM COATED ORAL at 07:08

## 2025-08-09 RX ADMIN — OXYCODONE HYDROCHLORIDE 5 MG: 5 TABLET ORAL at 04:08

## 2025-08-09 RX ADMIN — Medication 400 MG: at 07:08

## 2025-08-09 RX ADMIN — NORTRIPTYLINE HYDROCHLORIDE 25 MG: 25 CAPSULE ORAL at 07:08

## 2025-08-09 RX ADMIN — LEVETIRACETAM 500 MG: 100 SOLUTION ORAL at 07:08

## 2025-08-09 RX ADMIN — DOCUSATE SODIUM LIQUID 100 MG: 100 LIQUID ORAL at 07:08

## 2025-08-09 RX ADMIN — OXYCODONE HYDROCHLORIDE 5 MG: 5 TABLET ORAL at 07:08

## 2025-08-09 RX ADMIN — DICLOFENAC SODIUM 2 G: 10 GEL TOPICAL at 07:08

## 2025-08-10 LAB — POCT GLUCOSE: 110 MG/DL (ref 70–110)

## 2025-08-10 PROCEDURE — 97116 GAIT TRAINING THERAPY: CPT

## 2025-08-10 PROCEDURE — 97110 THERAPEUTIC EXERCISES: CPT

## 2025-08-10 PROCEDURE — 25000003 PHARM REV CODE 250: Performed by: INTERNAL MEDICINE

## 2025-08-10 PROCEDURE — 63600175 PHARM REV CODE 636 W HCPCS: Performed by: INTERNAL MEDICINE

## 2025-08-10 PROCEDURE — 25000003 PHARM REV CODE 250: Performed by: NURSE PRACTITIONER

## 2025-08-10 PROCEDURE — 99900035 HC TECH TIME PER 15 MIN (STAT)

## 2025-08-10 PROCEDURE — 11800000 HC REHAB PRIVATE ROOM

## 2025-08-10 RX ORDER — LIDOCAINE 50 MG/G
1 PATCH TOPICAL
Status: DISCONTINUED | OUTPATIENT
Start: 2025-08-10 | End: 2025-08-12 | Stop reason: HOSPADM

## 2025-08-10 RX ADMIN — DOCUSATE SODIUM LIQUID 100 MG: 100 LIQUID ORAL at 07:08

## 2025-08-10 RX ADMIN — LEVETIRACETAM 500 MG: 100 SOLUTION ORAL at 09:08

## 2025-08-10 RX ADMIN — METOPROLOL SUCCINATE 25 MG: 25 TABLET, EXTENDED RELEASE ORAL at 07:08

## 2025-08-10 RX ADMIN — Medication 400 MG: at 09:08

## 2025-08-10 RX ADMIN — FAMOTIDINE 20 MG: 20 TABLET, FILM COATED ORAL at 09:08

## 2025-08-10 RX ADMIN — TRAZODONE HYDROCHLORIDE 75 MG: 50 TABLET ORAL at 09:08

## 2025-08-10 RX ADMIN — LEVETIRACETAM 500 MG: 100 SOLUTION ORAL at 07:08

## 2025-08-10 RX ADMIN — CEFEPIME 2 G: 2 INJECTION, POWDER, FOR SOLUTION INTRAVENOUS at 05:08

## 2025-08-10 RX ADMIN — DICLOFENAC SODIUM 2 G: 10 GEL TOPICAL at 07:08

## 2025-08-10 RX ADMIN — BUSPIRONE HYDROCHLORIDE 15 MG: 10 TABLET ORAL at 09:08

## 2025-08-10 RX ADMIN — FERROUS SULFATE TAB 325 MG (65 MG ELEMENTAL FE) 1 EACH: 325 (65 FE) TAB at 07:08

## 2025-08-10 RX ADMIN — METFORMIN HYDROCHLORIDE 1000 MG: 500 TABLET, FILM COATED ORAL at 04:08

## 2025-08-10 RX ADMIN — EZETIMIBE 10 MG: 10 TABLET ORAL at 07:08

## 2025-08-10 RX ADMIN — DICLOFENAC SODIUM 2 G: 10 GEL TOPICAL at 09:08

## 2025-08-10 RX ADMIN — BUSPIRONE HYDROCHLORIDE 15 MG: 10 TABLET ORAL at 07:08

## 2025-08-10 RX ADMIN — ATORVASTATIN CALCIUM 80 MG: 40 TABLET, FILM COATED ORAL at 07:08

## 2025-08-10 RX ADMIN — OXYCODONE HYDROCHLORIDE 5 MG: 5 TABLET ORAL at 09:08

## 2025-08-10 RX ADMIN — LIDOCAINE 1 PATCH: 50 PATCH TOPICAL at 01:08

## 2025-08-10 RX ADMIN — OXYCODONE HYDROCHLORIDE 5 MG: 5 TABLET ORAL at 01:08

## 2025-08-10 RX ADMIN — LEVOTHYROXINE SODIUM 100 MCG: 100 TABLET ORAL at 05:08

## 2025-08-10 RX ADMIN — CLOPIDOGREL BISULFATE 75 MG: 75 TABLET, FILM COATED ORAL at 07:08

## 2025-08-10 RX ADMIN — Medication 400 MG: at 07:08

## 2025-08-10 RX ADMIN — FAMOTIDINE 20 MG: 20 TABLET, FILM COATED ORAL at 07:08

## 2025-08-10 RX ADMIN — RIVAROXABAN 10 MG: 10 TABLET, FILM COATED ORAL at 04:08

## 2025-08-10 RX ADMIN — CEFEPIME 2 G: 2 INJECTION, POWDER, FOR SOLUTION INTRAVENOUS at 01:08

## 2025-08-10 RX ADMIN — METFORMIN HYDROCHLORIDE 1000 MG: 500 TABLET, FILM COATED ORAL at 07:08

## 2025-08-10 RX ADMIN — NORTRIPTYLINE HYDROCHLORIDE 25 MG: 25 CAPSULE ORAL at 09:08

## 2025-08-10 RX ADMIN — CEFEPIME 2 G: 2 INJECTION, POWDER, FOR SOLUTION INTRAVENOUS at 10:08

## 2025-08-10 RX ADMIN — OXYCODONE HYDROCHLORIDE 5 MG: 5 TABLET ORAL at 07:08

## 2025-08-10 RX ADMIN — Medication 1 EACH: at 07:08

## 2025-08-11 LAB
ALBUMIN SERPL-MCNC: 3.4 G/DL (ref 3.4–4.8)
ALBUMIN/GLOB SERPL: 1 RATIO (ref 1.1–2)
ALP SERPL-CCNC: 87 UNIT/L (ref 40–150)
ALT SERPL-CCNC: 31 UNIT/L (ref 0–55)
ANION GAP SERPL CALC-SCNC: 7 MEQ/L
AST SERPL-CCNC: 19 UNIT/L (ref 11–45)
BASOPHILS # BLD AUTO: 0.06 X10(3)/MCL
BASOPHILS NFR BLD AUTO: 1 %
BILIRUB SERPL-MCNC: 0.4 MG/DL
BUN SERPL-MCNC: 18.8 MG/DL (ref 9.8–20.1)
CALCIUM SERPL-MCNC: 9.2 MG/DL (ref 8.4–10.2)
CHLORIDE SERPL-SCNC: 106 MMOL/L (ref 98–107)
CO2 SERPL-SCNC: 26 MMOL/L (ref 23–31)
CREAT SERPL-MCNC: 0.66 MG/DL (ref 0.55–1.02)
CREAT/UREA NIT SERPL: 28
EOSINOPHIL # BLD AUTO: 0.4 X10(3)/MCL (ref 0–0.9)
EOSINOPHIL NFR BLD AUTO: 6.8 %
ERYTHROCYTE [DISTWIDTH] IN BLOOD BY AUTOMATED COUNT: 13.4 % (ref 11.5–17)
GFR SERPLBLD CREATININE-BSD FMLA CKD-EPI: >60 ML/MIN/1.73/M2
GLOBULIN SER-MCNC: 3.3 GM/DL (ref 2.4–3.5)
GLUCOSE SERPL-MCNC: 109 MG/DL (ref 82–115)
HCT VFR BLD AUTO: 35.3 % (ref 37–47)
HGB BLD-MCNC: 11.6 G/DL (ref 12–16)
IMM GRANULOCYTES # BLD AUTO: 0.02 X10(3)/MCL (ref 0–0.04)
IMM GRANULOCYTES NFR BLD AUTO: 0.3 %
LYMPHOCYTES # BLD AUTO: 1.54 X10(3)/MCL (ref 0.6–4.6)
LYMPHOCYTES NFR BLD AUTO: 26.2 %
MAGNESIUM SERPL-MCNC: 1.9 MG/DL (ref 1.6–2.6)
MCH RBC QN AUTO: 30.1 PG (ref 27–31)
MCHC RBC AUTO-ENTMCNC: 32.9 G/DL (ref 33–36)
MCV RBC AUTO: 91.7 FL (ref 80–94)
MONOCYTES # BLD AUTO: 0.68 X10(3)/MCL (ref 0.1–1.3)
MONOCYTES NFR BLD AUTO: 11.6 %
NEUTROPHILS # BLD AUTO: 3.17 X10(3)/MCL (ref 2.1–9.2)
NEUTROPHILS NFR BLD AUTO: 54.1 %
NRBC BLD AUTO-RTO: 0 %
PHOSPHATE SERPL-MCNC: 3.8 MG/DL (ref 2.3–4.7)
PLATELET # BLD AUTO: 280 X10(3)/MCL (ref 130–400)
PMV BLD AUTO: 10.1 FL (ref 7.4–10.4)
POCT GLUCOSE: 113 MG/DL (ref 70–110)
POCT GLUCOSE: 116 MG/DL (ref 70–110)
POCT GLUCOSE: 150 MG/DL (ref 70–110)
POTASSIUM SERPL-SCNC: 3.7 MMOL/L (ref 3.5–5.1)
PREALB SERPL-MCNC: 25.8 MG/DL (ref 14–37)
PROT SERPL-MCNC: 6.7 GM/DL (ref 5.8–7.6)
RBC # BLD AUTO: 3.85 X10(6)/MCL (ref 4.2–5.4)
SODIUM SERPL-SCNC: 139 MMOL/L (ref 136–145)
WBC # BLD AUTO: 5.87 X10(3)/MCL (ref 4.5–11.5)

## 2025-08-11 PROCEDURE — 94761 N-INVAS EAR/PLS OXIMETRY MLT: CPT

## 2025-08-11 PROCEDURE — 94799 UNLISTED PULMONARY SVC/PX: CPT

## 2025-08-11 PROCEDURE — 97168 OT RE-EVAL EST PLAN CARE: CPT

## 2025-08-11 PROCEDURE — 36415 COLL VENOUS BLD VENIPUNCTURE: CPT | Performed by: NURSE PRACTITIONER

## 2025-08-11 PROCEDURE — 25000003 PHARM REV CODE 250: Performed by: NURSE PRACTITIONER

## 2025-08-11 PROCEDURE — 11800000 HC REHAB PRIVATE ROOM

## 2025-08-11 PROCEDURE — 99900031 HC PATIENT EDUCATION (STAT)

## 2025-08-11 PROCEDURE — 97530 THERAPEUTIC ACTIVITIES: CPT | Mod: CQ

## 2025-08-11 PROCEDURE — 85025 COMPLETE CBC W/AUTO DIFF WBC: CPT | Performed by: NURSE PRACTITIONER

## 2025-08-11 PROCEDURE — 97116 GAIT TRAINING THERAPY: CPT | Mod: CQ

## 2025-08-11 PROCEDURE — 80053 COMPREHEN METABOLIC PANEL: CPT | Performed by: NURSE PRACTITIONER

## 2025-08-11 PROCEDURE — 63600175 PHARM REV CODE 636 W HCPCS: Performed by: INTERNAL MEDICINE

## 2025-08-11 PROCEDURE — 92526 ORAL FUNCTION THERAPY: CPT

## 2025-08-11 PROCEDURE — 84134 ASSAY OF PREALBUMIN: CPT | Performed by: NURSE PRACTITIONER

## 2025-08-11 PROCEDURE — 84100 ASSAY OF PHOSPHORUS: CPT | Performed by: NURSE PRACTITIONER

## 2025-08-11 PROCEDURE — 25000003 PHARM REV CODE 250: Performed by: INTERNAL MEDICINE

## 2025-08-11 PROCEDURE — 99233 SBSQ HOSP IP/OBS HIGH 50: CPT | Mod: ,,, | Performed by: NURSE PRACTITIONER

## 2025-08-11 PROCEDURE — 83735 ASSAY OF MAGNESIUM: CPT | Performed by: NURSE PRACTITIONER

## 2025-08-11 PROCEDURE — 97535 SELF CARE MNGMENT TRAINING: CPT

## 2025-08-11 PROCEDURE — 97110 THERAPEUTIC EXERCISES: CPT

## 2025-08-11 RX ADMIN — METFORMIN HYDROCHLORIDE 1000 MG: 500 TABLET, FILM COATED ORAL at 07:08

## 2025-08-11 RX ADMIN — DICLOFENAC SODIUM 2 G: 10 GEL TOPICAL at 08:08

## 2025-08-11 RX ADMIN — BUSPIRONE HYDROCHLORIDE 15 MG: 10 TABLET ORAL at 08:08

## 2025-08-11 RX ADMIN — EZETIMIBE 10 MG: 10 TABLET ORAL at 07:08

## 2025-08-11 RX ADMIN — DOCUSATE SODIUM LIQUID 100 MG: 100 LIQUID ORAL at 08:08

## 2025-08-11 RX ADMIN — LEVOTHYROXINE SODIUM 100 MCG: 100 TABLET ORAL at 06:08

## 2025-08-11 RX ADMIN — Medication 400 MG: at 07:08

## 2025-08-11 RX ADMIN — FAMOTIDINE 20 MG: 20 TABLET, FILM COATED ORAL at 07:08

## 2025-08-11 RX ADMIN — LEVETIRACETAM 500 MG: 100 SOLUTION ORAL at 08:08

## 2025-08-11 RX ADMIN — Medication 1 EACH: at 07:08

## 2025-08-11 RX ADMIN — LEVETIRACETAM 500 MG: 100 SOLUTION ORAL at 07:08

## 2025-08-11 RX ADMIN — METOPROLOL SUCCINATE 25 MG: 25 TABLET, EXTENDED RELEASE ORAL at 07:08

## 2025-08-11 RX ADMIN — FAMOTIDINE 20 MG: 20 TABLET, FILM COATED ORAL at 08:08

## 2025-08-11 RX ADMIN — CEFEPIME 2 G: 2 INJECTION, POWDER, FOR SOLUTION INTRAVENOUS at 06:08

## 2025-08-11 RX ADMIN — METFORMIN HYDROCHLORIDE 1000 MG: 500 TABLET, FILM COATED ORAL at 05:08

## 2025-08-11 RX ADMIN — CEFEPIME 2 G: 2 INJECTION, POWDER, FOR SOLUTION INTRAVENOUS at 09:08

## 2025-08-11 RX ADMIN — TRAZODONE HYDROCHLORIDE 75 MG: 50 TABLET ORAL at 08:08

## 2025-08-11 RX ADMIN — CEFEPIME 2 G: 2 INJECTION, POWDER, FOR SOLUTION INTRAVENOUS at 02:08

## 2025-08-11 RX ADMIN — FERROUS SULFATE TAB 325 MG (65 MG ELEMENTAL FE) 1 EACH: 325 (65 FE) TAB at 07:08

## 2025-08-11 RX ADMIN — RIVAROXABAN 10 MG: 10 TABLET, FILM COATED ORAL at 05:08

## 2025-08-11 RX ADMIN — BUSPIRONE HYDROCHLORIDE 15 MG: 10 TABLET ORAL at 07:08

## 2025-08-11 RX ADMIN — ATORVASTATIN CALCIUM 80 MG: 40 TABLET, FILM COATED ORAL at 07:08

## 2025-08-11 RX ADMIN — LIDOCAINE 1 PATCH: 50 PATCH TOPICAL at 02:08

## 2025-08-11 RX ADMIN — Medication 400 MG: at 08:08

## 2025-08-11 RX ADMIN — CLOPIDOGREL BISULFATE 75 MG: 75 TABLET, FILM COATED ORAL at 07:08

## 2025-08-11 RX ADMIN — OXYCODONE HYDROCHLORIDE 5 MG: 5 TABLET ORAL at 09:08

## 2025-08-11 RX ADMIN — NORTRIPTYLINE HYDROCHLORIDE 25 MG: 25 CAPSULE ORAL at 08:08

## 2025-08-12 VITALS
HEIGHT: 66 IN | BODY MASS INDEX: 16.51 KG/M2 | DIASTOLIC BLOOD PRESSURE: 81 MMHG | WEIGHT: 102.75 LBS | TEMPERATURE: 98 F | HEART RATE: 98 BPM | RESPIRATION RATE: 16 BRPM | SYSTOLIC BLOOD PRESSURE: 118 MMHG | OXYGEN SATURATION: 96 %

## 2025-08-12 LAB — POCT GLUCOSE: 128 MG/DL (ref 70–110)

## 2025-08-12 PROCEDURE — 25000003 PHARM REV CODE 250: Performed by: NURSE PRACTITIONER

## 2025-08-12 PROCEDURE — 99900035 HC TECH TIME PER 15 MIN (STAT)

## 2025-08-12 PROCEDURE — 63600175 PHARM REV CODE 636 W HCPCS: Performed by: INTERNAL MEDICINE

## 2025-08-12 PROCEDURE — 25000003 PHARM REV CODE 250: Performed by: INTERNAL MEDICINE

## 2025-08-12 RX ORDER — CEFEPIME HYDROCHLORIDE 2 G/1
2 INJECTION, POWDER, FOR SOLUTION INTRAVENOUS EVERY 8 HOURS
Start: 2025-08-12 | End: 2025-08-18

## 2025-08-12 RX ORDER — EZETIMIBE 10 MG/1
10 TABLET ORAL DAILY
Qty: 90 TABLET | Refills: 0 | Status: SHIPPED | OUTPATIENT
Start: 2025-08-13 | End: 2025-11-11

## 2025-08-12 RX ORDER — NORTRIPTYLINE HYDROCHLORIDE 25 MG/1
25 CAPSULE ORAL NIGHTLY
Qty: 30 CAPSULE | Refills: 2 | Status: SHIPPED | OUTPATIENT
Start: 2025-08-12 | End: 2025-11-10

## 2025-08-12 RX ORDER — METFORMIN HYDROCHLORIDE 1000 MG/1
1000 TABLET ORAL 2 TIMES DAILY WITH MEALS
Qty: 180 TABLET | Refills: 0 | Status: SHIPPED | OUTPATIENT
Start: 2025-08-12 | End: 2025-11-10

## 2025-08-12 RX ORDER — LEVETIRACETAM 500 MG/1
500 TABLET ORAL 2 TIMES DAILY
Qty: 180 TABLET | Refills: 0 | Status: SHIPPED | OUTPATIENT
Start: 2025-08-12 | End: 2025-11-10

## 2025-08-12 RX ORDER — OXYCODONE AND ACETAMINOPHEN 5; 325 MG/1; MG/1
1 TABLET ORAL EVERY 8 HOURS PRN
Qty: 15 TABLET | Refills: 0 | Status: SHIPPED | OUTPATIENT
Start: 2025-08-12 | End: 2025-08-17

## 2025-08-12 RX ORDER — BUSPIRONE HYDROCHLORIDE 15 MG/1
15 TABLET ORAL 2 TIMES DAILY
Qty: 60 TABLET | Refills: 2 | Status: SHIPPED | OUTPATIENT
Start: 2025-08-12 | End: 2025-11-10

## 2025-08-12 RX ORDER — LANOLIN ALCOHOL/MO/W.PET/CERES
400 CREAM (GRAM) TOPICAL 2 TIMES DAILY
Qty: 60 TABLET | Refills: 0 | Status: SHIPPED | OUTPATIENT
Start: 2025-08-12 | End: 2025-09-11

## 2025-08-12 RX ORDER — CLOPIDOGREL BISULFATE 75 MG/1
75 TABLET ORAL DAILY
Qty: 30 TABLET | Refills: 2 | Status: SHIPPED | OUTPATIENT
Start: 2025-08-12 | End: 2025-11-10

## 2025-08-12 RX ORDER — ATORVASTATIN CALCIUM 80 MG/1
80 TABLET, FILM COATED ORAL DAILY
Qty: 90 TABLET | Refills: 0 | Status: SHIPPED | OUTPATIENT
Start: 2025-08-12 | End: 2025-11-10

## 2025-08-12 RX ORDER — TRAZODONE HYDROCHLORIDE 50 MG/1
50 TABLET ORAL NIGHTLY
Qty: 30 TABLET | Refills: 0 | Status: SHIPPED | OUTPATIENT
Start: 2025-08-12 | End: 2025-09-11

## 2025-08-12 RX ORDER — FAMOTIDINE 20 MG/1
20 TABLET, FILM COATED ORAL 2 TIMES DAILY
Qty: 60 TABLET | Refills: 0 | Status: SHIPPED | OUTPATIENT
Start: 2025-08-12 | End: 2025-09-11

## 2025-08-12 RX ORDER — FERROUS SULFATE 325(65) MG
325 TABLET, DELAYED RELEASE (ENTERIC COATED) ORAL DAILY
Qty: 30 TABLET | Refills: 0 | Status: SHIPPED | OUTPATIENT
Start: 2025-08-12 | End: 2025-09-11

## 2025-08-12 RX ORDER — DOCUSATE SODIUM 50 MG/5ML
100 LIQUID ORAL 2 TIMES DAILY
Qty: 473 ML | Refills: 0 | Status: SHIPPED | OUTPATIENT
Start: 2025-08-12 | End: 2025-08-22

## 2025-08-12 RX ORDER — METOPROLOL SUCCINATE 25 MG/1
25 TABLET, EXTENDED RELEASE ORAL DAILY
Qty: 90 TABLET | Refills: 0 | Status: SHIPPED | OUTPATIENT
Start: 2025-08-13 | End: 2025-11-11

## 2025-08-12 RX ORDER — LEVOTHYROXINE SODIUM 88 UG/1
88 TABLET ORAL
Qty: 30 TABLET | Refills: 2 | Status: SHIPPED | OUTPATIENT
Start: 2025-08-12 | End: 2025-11-10

## 2025-08-12 RX ADMIN — LEVETIRACETAM 500 MG: 100 SOLUTION ORAL at 08:08

## 2025-08-12 RX ADMIN — FAMOTIDINE 20 MG: 20 TABLET, FILM COATED ORAL at 08:08

## 2025-08-12 RX ADMIN — CLOPIDOGREL BISULFATE 75 MG: 75 TABLET, FILM COATED ORAL at 08:08

## 2025-08-12 RX ADMIN — Medication 400 MG: at 08:08

## 2025-08-12 RX ADMIN — METOPROLOL SUCCINATE 25 MG: 25 TABLET, EXTENDED RELEASE ORAL at 08:08

## 2025-08-12 RX ADMIN — Medication 1 EACH: at 08:08

## 2025-08-12 RX ADMIN — ATORVASTATIN CALCIUM 80 MG: 40 TABLET, FILM COATED ORAL at 08:08

## 2025-08-12 RX ADMIN — BUSPIRONE HYDROCHLORIDE 15 MG: 10 TABLET ORAL at 08:08

## 2025-08-12 RX ADMIN — CEFEPIME 2 G: 2 INJECTION, POWDER, FOR SOLUTION INTRAVENOUS at 11:08

## 2025-08-12 RX ADMIN — FERROUS SULFATE TAB 325 MG (65 MG ELEMENTAL FE) 1 EACH: 325 (65 FE) TAB at 08:08

## 2025-08-12 RX ADMIN — LEVOTHYROXINE SODIUM 100 MCG: 100 TABLET ORAL at 06:08

## 2025-08-12 RX ADMIN — EZETIMIBE 10 MG: 10 TABLET ORAL at 08:08

## 2025-08-12 RX ADMIN — METFORMIN HYDROCHLORIDE 1000 MG: 500 TABLET, FILM COATED ORAL at 08:08

## 2025-08-12 RX ADMIN — CEFEPIME 2 G: 2 INJECTION, POWDER, FOR SOLUTION INTRAVENOUS at 06:08

## 2025-08-12 RX ADMIN — DICLOFENAC SODIUM 2 G: 10 GEL TOPICAL at 08:08

## 2025-08-13 ENCOUNTER — PATIENT OUTREACH (OUTPATIENT)
Dept: ADMINISTRATIVE | Facility: CLINIC | Age: 60
End: 2025-08-13
Payer: COMMERCIAL

## 2025-08-15 ENCOUNTER — HOSPITAL ENCOUNTER (OUTPATIENT)
Dept: RADIOLOGY | Facility: HOSPITAL | Age: 60
Discharge: HOME | End: 2025-08-15
Attending: NEUROLOGICAL SURGERY
Payer: COMMERCIAL

## 2025-08-15 DIAGNOSIS — I62.00 SUBDURAL HEMORRHAGE: ICD-10-CM

## 2025-08-15 PROCEDURE — 70450 CT HEAD/BRAIN W/O DYE: CPT | Mod: TC

## 2025-08-18 ENCOUNTER — TELEPHONE (OUTPATIENT)
Dept: INFECTIOUS DISEASES | Facility: CLINIC | Age: 60
End: 2025-08-18
Payer: COMMERCIAL

## 2025-08-18 ENCOUNTER — LAB REQUISITION (OUTPATIENT)
Dept: LAB | Facility: HOSPITAL | Age: 60
End: 2025-08-18
Payer: COMMERCIAL

## 2025-08-18 DIAGNOSIS — G06.2 EXTRADURAL AND SUBDURAL ABSCESS, UNSPECIFIED: ICD-10-CM

## 2025-08-18 LAB
ALBUMIN SERPL-MCNC: 3.8 G/DL (ref 3.4–4.8)
ALBUMIN/GLOB SERPL: 1.1 RATIO (ref 1.1–2)
ALP SERPL-CCNC: 101 UNIT/L (ref 40–150)
ALT SERPL-CCNC: 37 UNIT/L (ref 0–55)
ANION GAP SERPL CALC-SCNC: 13 MEQ/L
AST SERPL-CCNC: 18 UNIT/L (ref 11–45)
BASOPHILS # BLD AUTO: 0.1 X10(3)/MCL
BASOPHILS NFR BLD AUTO: 1.3 %
BILIRUB SERPL-MCNC: 0.3 MG/DL
BUN SERPL-MCNC: 10.7 MG/DL (ref 9.8–20.1)
CALCIUM SERPL-MCNC: 10 MG/DL (ref 8.4–10.2)
CHLORIDE SERPL-SCNC: 102 MMOL/L (ref 98–107)
CO2 SERPL-SCNC: 21 MMOL/L (ref 23–31)
CREAT SERPL-MCNC: 0.78 MG/DL (ref 0.55–1.02)
CREAT/UREA NIT SERPL: 14
CRP SERPL-MCNC: <1 MG/L
EOSINOPHIL # BLD AUTO: 0.2 X10(3)/MCL (ref 0–0.9)
EOSINOPHIL NFR BLD AUTO: 2.5 %
ERYTHROCYTE [DISTWIDTH] IN BLOOD BY AUTOMATED COUNT: 13.1 % (ref 11.5–17)
ERYTHROCYTE [SEDIMENTATION RATE] IN BLOOD: 7 MM/HR (ref 0–30)
GFR SERPLBLD CREATININE-BSD FMLA CKD-EPI: >60 ML/MIN/1.73/M2
GLOBULIN SER-MCNC: 3.5 GM/DL (ref 2.4–3.5)
GLUCOSE SERPL-MCNC: 215 MG/DL (ref 82–115)
HCT VFR BLD AUTO: 39.4 % (ref 37–47)
HGB BLD-MCNC: 13.5 G/DL (ref 12–16)
IMM GRANULOCYTES # BLD AUTO: 0.02 X10(3)/MCL (ref 0–0.04)
IMM GRANULOCYTES NFR BLD AUTO: 0.3 %
LYMPHOCYTES # BLD AUTO: 1.83 X10(3)/MCL (ref 0.6–4.6)
LYMPHOCYTES NFR BLD AUTO: 23.3 %
MCH RBC QN AUTO: 30.5 PG (ref 27–31)
MCHC RBC AUTO-ENTMCNC: 34.3 G/DL (ref 33–36)
MCV RBC AUTO: 89.1 FL (ref 80–94)
MONOCYTES # BLD AUTO: 0.68 X10(3)/MCL (ref 0.1–1.3)
MONOCYTES NFR BLD AUTO: 8.7 %
NEUTROPHILS # BLD AUTO: 5.02 X10(3)/MCL (ref 2.1–9.2)
NEUTROPHILS NFR BLD AUTO: 63.9 %
NRBC BLD AUTO-RTO: 0 %
PLATELET # BLD AUTO: 396 X10(3)/MCL (ref 130–400)
PMV BLD AUTO: 10.1 FL (ref 7.4–10.4)
POTASSIUM SERPL-SCNC: 4.5 MMOL/L (ref 3.5–5.1)
PROT SERPL-MCNC: 7.3 GM/DL (ref 5.8–7.6)
RBC # BLD AUTO: 4.42 X10(6)/MCL (ref 4.2–5.4)
SODIUM SERPL-SCNC: 136 MMOL/L (ref 136–145)
WBC # BLD AUTO: 7.85 X10(3)/MCL (ref 4.5–11.5)

## 2025-08-18 PROCEDURE — 86140 C-REACTIVE PROTEIN: CPT

## 2025-08-18 PROCEDURE — 80053 COMPREHEN METABOLIC PANEL: CPT

## 2025-08-18 PROCEDURE — 85025 COMPLETE CBC W/AUTO DIFF WBC: CPT

## 2025-08-18 PROCEDURE — 85652 RBC SED RATE AUTOMATED: CPT

## 2025-08-19 PROCEDURE — 85730 THROMBOPLASTIN TIME PARTIAL: CPT | Performed by: FAMILY MEDICINE

## 2025-08-19 PROCEDURE — 85610 PROTHROMBIN TIME: CPT | Performed by: FAMILY MEDICINE

## 2025-08-20 DIAGNOSIS — I61.9 NONTRAUMATIC CEREBRAL HEMORRHAGE: Primary | ICD-10-CM

## 2025-08-21 ENCOUNTER — HOSPITAL ENCOUNTER (OUTPATIENT)
Dept: RADIOLOGY | Facility: HOSPITAL | Age: 60
Discharge: HOME | End: 2025-08-21
Attending: NEUROLOGICAL SURGERY
Payer: COMMERCIAL

## 2025-08-21 DIAGNOSIS — I61.9 NONTRAUMATIC CEREBRAL HEMORRHAGE: ICD-10-CM

## 2025-08-21 PROCEDURE — 70470 CT HEAD/BRAIN W/O & W/DYE: CPT | Mod: TC

## 2025-08-21 PROCEDURE — 25500020 PHARM REV CODE 255: Performed by: NEUROLOGICAL SURGERY

## 2025-08-21 RX ADMIN — IOHEXOL 75 ML: 350 INJECTION, SOLUTION INTRAVENOUS at 08:08

## 2025-08-22 PROBLEM — F31.9 BIPOLAR DEPRESSION: Status: RESOLVED | Noted: 2019-06-04 | Resolved: 2025-08-22

## 2025-08-31 PROBLEM — T81.49XA SURGICAL SITE INFECTION: Status: ACTIVE | Noted: 2025-08-31

## 2025-08-31 PROBLEM — Z98.890 STATUS POST CRANIOTOMY: Status: ACTIVE | Noted: 2025-08-31

## 2025-09-02 ENCOUNTER — OFFICE VISIT (OUTPATIENT)
Dept: INFECTIOUS DISEASES | Facility: CLINIC | Age: 60
End: 2025-09-02
Payer: COMMERCIAL

## 2025-09-02 VITALS
HEIGHT: 66 IN | WEIGHT: 101 LBS | DIASTOLIC BLOOD PRESSURE: 69 MMHG | OXYGEN SATURATION: 98 % | SYSTOLIC BLOOD PRESSURE: 109 MMHG | RESPIRATION RATE: 18 BRPM | BODY MASS INDEX: 16.23 KG/M2 | HEART RATE: 83 BPM

## 2025-09-02 DIAGNOSIS — T81.49XA SURGICAL SITE INFECTION: Primary | ICD-10-CM

## 2025-09-02 DIAGNOSIS — Z98.890 STATUS POST CRANIOTOMY: ICD-10-CM

## 2025-09-02 PROCEDURE — 3051F HG A1C>EQUAL 7.0%<8.0%: CPT | Mod: CPTII,S$GLB,, | Performed by: INTERNAL MEDICINE

## 2025-09-02 PROCEDURE — 3078F DIAST BP <80 MM HG: CPT | Mod: CPTII,S$GLB,, | Performed by: INTERNAL MEDICINE

## 2025-09-02 PROCEDURE — 1111F DSCHRG MED/CURRENT MED MERGE: CPT | Mod: CPTII,S$GLB,, | Performed by: INTERNAL MEDICINE

## 2025-09-02 PROCEDURE — 99999 PR PBB SHADOW E&M-EST. PATIENT-LVL IV: CPT | Mod: PBBFAC,,, | Performed by: INTERNAL MEDICINE

## 2025-09-02 PROCEDURE — 3074F SYST BP LT 130 MM HG: CPT | Mod: CPTII,S$GLB,, | Performed by: INTERNAL MEDICINE

## 2025-09-02 PROCEDURE — 99213 OFFICE O/P EST LOW 20 MIN: CPT | Mod: S$GLB,,, | Performed by: INTERNAL MEDICINE

## 2025-09-02 PROCEDURE — 1160F RVW MEDS BY RX/DR IN RCRD: CPT | Mod: CPTII,S$GLB,, | Performed by: INTERNAL MEDICINE

## 2025-09-02 PROCEDURE — 1159F MED LIST DOCD IN RCRD: CPT | Mod: CPTII,S$GLB,, | Performed by: INTERNAL MEDICINE

## 2025-09-02 PROCEDURE — 3008F BODY MASS INDEX DOCD: CPT | Mod: CPTII,S$GLB,, | Performed by: INTERNAL MEDICINE

## (undated) DEVICE — NDL HYPO 22GX1 1/2 SYR 10ML LL

## (undated) DEVICE — IMPLANTABLE DEVICE: Type: IMPLANTABLE DEVICE | Status: NON-FUNCTIONAL

## (undated) DEVICE — DRESSING TELFA N ADH 3X8

## (undated) DEVICE — TUBE SUCTION MEDI-VAC STERILE

## (undated) DEVICE — KIT SURGIFLO HEMOSTATIC MATRIX

## (undated) DEVICE — Device

## (undated) DEVICE — KIT SURGICAL TURNOVER

## (undated) DEVICE — POSITIONER HEAD ADULT

## (undated) DEVICE — SUT SILK BLK BR. 2 2-60

## (undated) DEVICE — DRAPE UTILITY W/ TAPE 20X30IN

## (undated) DEVICE — MARKER WRITESITE SKIN CHLRAPRP

## (undated) DEVICE — DRAPE SURG W/TWL 17 5/8X23

## (undated) DEVICE — SUT VICRYL PLUS 3-0 X-1 18IN

## (undated) DEVICE — SOL .9NACL PF 100 ML

## (undated) DEVICE — TUBING SILICON CLR 3/16IN 10FT

## (undated) DEVICE — SOL CLEARIFY VISUALIZATION LAP

## (undated) DEVICE — RESERVOIR JACKSON-PRATT 100CC

## (undated) DEVICE — SUT VICRYL 4-0 18 P-3

## (undated) DEVICE — POINTER AXIEM CRANIAL TRACER

## (undated) DEVICE — POSITIONER HEEL FOAM CONVOLTD

## (undated) DEVICE — KIT DRAIN WOUND RND SPRNG RESV

## (undated) DEVICE — TRAY CATH 1-LYR URIMTR 16FR

## (undated) DEVICE — BAND RUBBER STERILE 1/4X3.5IN

## (undated) DEVICE — SOL NACL IRR 1000ML BTL

## (undated) DEVICE — SUT PERMAHAND SLIK BLK 30IN

## (undated) DEVICE — BLADE SURG STAINLESS STEEL #15

## (undated) DEVICE — APPLICATOR CHLORAPREP ORN 26ML

## (undated) DEVICE — TRAY SKIN SCRUB WET PREMIUM

## (undated) DEVICE — ELECTRODE BLADE INSULATED 1 IN

## (undated) DEVICE — SUT 3-0 12-18IN SILK

## (undated) DEVICE — PENCIL SMOKE EVAC TELSCP 15FT

## (undated) DEVICE — GLOVE PROTEXIS PI SYN SURG 7

## (undated) DEVICE — GLOVE PROTEXIS HYDROGEL SZ7.5

## (undated) DEVICE — TUBING IRR BIPOLAR CORD 12FT

## (undated) DEVICE — SUT 3-0 ETHILON 18 FS-1

## (undated) DEVICE — HEMOSTAT SURGICEL FIBRLR 2X4IN

## (undated) DEVICE — DRAPE CRANIOTOMY T SURG STRL

## (undated) DEVICE — CLIPS RANEY SCALP FFS/ASSY

## (undated) DEVICE — COVER HD BACK TABLE 6FT

## (undated) DEVICE — TUBING LAPARSCOPIC INSUFFLATIN

## (undated) DEVICE — INTRO SHEATH PEELWY 14FR 6IN

## (undated) DEVICE — SUT 3-0 CHROMIC GUT / FS-2

## (undated) DEVICE — APPLICATOR STRL COT 2INNR 6IN

## (undated) DEVICE — ROUTER TAPERED 2.3MM

## (undated) DEVICE — BURR 4.0MM RD FLUTED SOFT TOUC

## (undated) DEVICE — CLOSURE SKIN STERI STRIP 1/2X4

## (undated) DEVICE — ADHESIVE MASTISOL VIAL 48/BX

## (undated) DEVICE — TUBE SUCTION FRAZIER VENT 10FR

## (undated) DEVICE — HEMOSTAT SURGICEL 2X14IN

## (undated) DEVICE — DRAPE SURGICAL STERI IRRG PCH

## (undated) DEVICE — STYLET AXIEM 23CM SINGLE COIL

## (undated) DEVICE — SUT BONE WAX 2.5 GRMS 12/BX

## (undated) DEVICE — ZOR AXS BATTERY PACK

## (undated) DEVICE — SEALANT ADHERUS DURAL AUTOSPRY

## (undated) DEVICE — GLOVE PROTEXIS BLUE LATEX 7.5

## (undated) DEVICE — DRAPE INCISE IOBAN 2 23X23IN

## (undated) DEVICE — SYR 10CC LUER LOCK

## (undated) DEVICE — GLOVE PROTEXIS BLUE LATEX 8

## (undated) DEVICE — SYR SLIP TIP 1CC

## (undated) DEVICE — TROCAR ENDOPATH XCEL 5X100MM

## (undated) DEVICE — PERFORATOR SURG CRAN 14X11MM

## (undated) DEVICE — DRAIN ROUND SUCTION 10FR

## (undated) DEVICE — GLOVE PROTEXIS LTX MICRO  7.5

## (undated) DEVICE — DRAPE ORTH SPLIT 77X108IN

## (undated) DEVICE — TRACKER PATIENT NON INVASIVE

## (undated) DEVICE — BOWL STERILE LARGE 32OZ

## (undated) DEVICE — NDL INSUF ULTRA VERESS 120MM

## (undated) DEVICE — STAPLER SKIN PROXIMATE WIDE

## (undated) DEVICE — ROUTER STRAIGHT RED 1.1MM

## (undated) DEVICE — KIT CATH ARES ANTIBIO: Type: IMPLANTABLE DEVICE | Status: NON-FUNCTIONAL

## (undated) DEVICE — SUT 4/0 18IN NUROLON BLK B

## (undated) DEVICE — DRAIN JACKSON PRATT TRCR 10FR

## (undated) DEVICE — DRAPE FLUID WARMER ORS 44X44IN

## (undated) DEVICE — ELECTRODE PATIENT RETURN DISP

## (undated) DEVICE — KIT ANTIFOG W/SPONG & FLUID

## (undated) DEVICE — CONTAINER SPECIMEN SCREW 4OZ

## (undated) DEVICE — SUT VICRYL 2-0 8-18 CP-2

## (undated) DEVICE — CULTSWAB+ AMIES W/O CHARC SNG

## (undated) DEVICE — KIT BIOSEAL BONE FLAP STRL

## (undated) DEVICE — GLOVE PROTEXIS PI SYN SURG 7.5

## (undated) DEVICE — SPONGE SURGIFOAM 100 8.5X12X10

## (undated) DEVICE — DRAPE BAG ISOLATION 20 X 20

## (undated) DEVICE — HOOK LONE STAR RETRCT 12MM

## (undated) DEVICE — SUT 4-0 NEUROLON RB1 TAPER

## (undated) DEVICE — SPONGE COTTON TRAY 4X4IN

## (undated) DEVICE — SWAB CULTURETTE SINGLE

## (undated) DEVICE — DRESSING XEROFORM NONADH 1X8IN

## (undated) DEVICE — SHEET AVIENE MICFIB 1.4X1.4IN

## (undated) DEVICE — GOWN ECLIPSE REINF LVL4 TWL XL

## (undated) DEVICE — SUT VICRYL PLUS 4-0 FS-2 27IN